# Patient Record
Sex: FEMALE | Race: WHITE | NOT HISPANIC OR LATINO | Employment: UNEMPLOYED | URBAN - METROPOLITAN AREA
[De-identification: names, ages, dates, MRNs, and addresses within clinical notes are randomized per-mention and may not be internally consistent; named-entity substitution may affect disease eponyms.]

---

## 2020-06-29 ENCOUNTER — TRANSFERRED RECORDS (OUTPATIENT)
Dept: MULTI SPECIALTY CLINIC | Facility: CLINIC | Age: 20
End: 2020-06-29

## 2020-06-29 LAB
HIV 1&2 EXT: NORMAL
PAP-ABSTRACT: NORMAL

## 2020-12-26 ENCOUNTER — NURSE TRIAGE (OUTPATIENT)
Dept: NURSING | Facility: CLINIC | Age: 20
End: 2020-12-26

## 2020-12-26 ENCOUNTER — HOSPITAL ENCOUNTER (INPATIENT)
Facility: CLINIC | Age: 20
LOS: 3 days | Discharge: HOME | End: 2020-12-30
Attending: EMERGENCY MEDICINE | Admitting: EMERGENCY MEDICINE
Payer: COMMERCIAL

## 2020-12-26 ENCOUNTER — RECORDS - HEALTHEAST (OUTPATIENT)
Dept: ADMINISTRATIVE | Facility: OTHER | Age: 20
End: 2020-12-26

## 2020-12-26 DIAGNOSIS — R05.9 COUGH: ICD-10-CM

## 2020-12-26 DIAGNOSIS — M79.10 MYALGIA: ICD-10-CM

## 2020-12-26 DIAGNOSIS — J02.9 ACUTE PHARYNGITIS, UNSPECIFIED ETIOLOGY: ICD-10-CM

## 2020-12-26 DIAGNOSIS — M31.30 GRANULOMATOSIS WITH POLYANGIITIS, UNSPECIFIED WHETHER RENAL INVOLVEMENT (H): Primary | ICD-10-CM

## 2020-12-26 DIAGNOSIS — U07.1 INFECTION DUE TO 2019-NCOV: ICD-10-CM

## 2020-12-26 DIAGNOSIS — F17.210 CIGARETTE NICOTINE DEPENDENCE WITHOUT COMPLICATION: ICD-10-CM

## 2020-12-26 DIAGNOSIS — F17.210 CIGARETTE SMOKER: ICD-10-CM

## 2020-12-26 DIAGNOSIS — R21 RASH AND OTHER NONSPECIFIC SKIN ERUPTION: ICD-10-CM

## 2020-12-26 PROCEDURE — 99285 EMERGENCY DEPT VISIT HI MDM: CPT | Performed by: EMERGENCY MEDICINE

## 2020-12-26 PROCEDURE — C9803 HOPD COVID-19 SPEC COLLECT: HCPCS | Performed by: EMERGENCY MEDICINE

## 2020-12-26 PROCEDURE — 99285 EMERGENCY DEPT VISIT HI MDM: CPT | Mod: 25 | Performed by: EMERGENCY MEDICINE

## 2020-12-26 ASSESSMENT — MIFFLIN-ST. JEOR: SCORE: 1788.27

## 2020-12-27 ENCOUNTER — APPOINTMENT (OUTPATIENT)
Dept: GENERAL RADIOLOGY | Facility: CLINIC | Age: 20
End: 2020-12-27
Attending: EMERGENCY MEDICINE
Payer: COMMERCIAL

## 2020-12-27 PROBLEM — M79.10 MYALGIA: Status: ACTIVE | Noted: 2020-12-27

## 2020-12-27 PROBLEM — M31.30: Status: ACTIVE | Noted: 2020-12-27

## 2020-12-27 LAB
ALBUMIN UR-MCNC: 300 MG/DL
ANION GAP SERPL CALCULATED.3IONS-SCNC: 7 MMOL/L (ref 3–14)
APPEARANCE UR: CLEAR
BACTERIA #/AREA URNS HPF: ABNORMAL /HPF
BASOPHILS # BLD AUTO: 0 10E9/L (ref 0–0.2)
BASOPHILS NFR BLD AUTO: 0.4 %
BILIRUB UR QL STRIP: NEGATIVE
BUN SERPL-MCNC: 11 MG/DL (ref 7–30)
CALCIUM SERPL-MCNC: 9.4 MG/DL (ref 8.5–10.1)
CHLORIDE SERPL-SCNC: 108 MMOL/L (ref 94–109)
CK SERPL-CCNC: 62 U/L (ref 30–225)
CO2 SERPL-SCNC: 25 MMOL/L (ref 20–32)
COLOR UR AUTO: YELLOW
CREAT SERPL-MCNC: 0.65 MG/DL (ref 0.52–1.04)
CREAT UR-MCNC: 160 MG/DL
CRP SERPL-MCNC: 39 MG/L (ref 0–8)
DIFFERENTIAL METHOD BLD: NORMAL
EOSINOPHIL # BLD AUTO: 0.4 10E9/L (ref 0–0.7)
EOSINOPHIL NFR BLD AUTO: 4.8 %
ERYTHROCYTE [DISTWIDTH] IN BLOOD BY AUTOMATED COUNT: 11.7 % (ref 10–15)
ERYTHROCYTE [SEDIMENTATION RATE] IN BLOOD BY WESTERGREN METHOD: 38 MM/H (ref 0–20)
FLUABV+SARS-COV-2+RSV PNL RESP NAA+PROBE: NEGATIVE
FLUABV+SARS-COV-2+RSV PNL RESP NAA+PROBE: NEGATIVE
GFR SERPL CREATININE-BSD FRML MDRD: >90 ML/MIN/{1.73_M2}
GLUCOSE SERPL-MCNC: 86 MG/DL (ref 70–99)
GLUCOSE UR STRIP-MCNC: NEGATIVE MG/DL
HCG UR QL: NEGATIVE
HCT VFR BLD AUTO: 38.6 % (ref 35–47)
HGB BLD-MCNC: 12.6 G/DL (ref 11.7–15.7)
HGB UR QL STRIP: ABNORMAL
HYALINE CASTS #/AREA URNS LPF: 12 /LPF (ref 0–2)
IMM GRANULOCYTES # BLD: 0 10E9/L (ref 0–0.4)
IMM GRANULOCYTES NFR BLD: 0.2 %
INTERNAL QC OK POCT: YES
KETONES UR STRIP-MCNC: NEGATIVE MG/DL
LABORATORY COMMENT REPORT: ABNORMAL
LACTATE BLD-SCNC: 1.1 MMOL/L (ref 0.7–2)
LEUKOCYTE ESTERASE UR QL STRIP: NEGATIVE
LYMPHOCYTES # BLD AUTO: 2.1 10E9/L (ref 0.8–5.3)
LYMPHOCYTES NFR BLD AUTO: 23.6 %
MCH RBC QN AUTO: 28.7 PG (ref 26.5–33)
MCHC RBC AUTO-ENTMCNC: 32.6 G/DL (ref 31.5–36.5)
MCV RBC AUTO: 88 FL (ref 78–100)
MIXED CELL CASTS #/AREA URNS LPF: 3 /LPF
MONOCYTES # BLD AUTO: 0.7 10E9/L (ref 0–1.3)
MONOCYTES NFR BLD AUTO: 7.7 %
MUCOUS THREADS #/AREA URNS LPF: PRESENT /LPF
NEUTROPHILS # BLD AUTO: 5.7 10E9/L (ref 1.6–8.3)
NEUTROPHILS NFR BLD AUTO: 63.3 %
NITRATE UR QL: NEGATIVE
NRBC # BLD AUTO: 0 10*3/UL
NRBC BLD AUTO-RTO: 0 /100
PH UR STRIP: 6 PH (ref 5–7)
PLATELET # BLD AUTO: 373 10E9/L (ref 150–450)
POTASSIUM SERPL-SCNC: 4 MMOL/L (ref 3.4–5.3)
PROT UR-MCNC: 2.34 G/L
PROT/CREAT 24H UR: 1.46 G/G CR (ref 0–0.2)
RBC # BLD AUTO: 4.39 10E12/L (ref 3.8–5.2)
RBC #/AREA URNS AUTO: 54 /HPF (ref 0–2)
RSV RNA SPEC QL NAA+PROBE: ABNORMAL
SARS-COV-2 RNA SPEC QL NAA+PROBE: POSITIVE
SODIUM SERPL-SCNC: 140 MMOL/L (ref 133–144)
SOURCE: ABNORMAL
SP GR UR STRIP: 1.02 (ref 1–1.03)
SPECIMEN SOURCE: ABNORMAL
SQUAMOUS #/AREA URNS AUTO: 2 /HPF (ref 0–1)
TRANS CELLS #/AREA URNS HPF: <1 /HPF (ref 0–1)
TROPONIN I SERPL-MCNC: <0.015 UG/L (ref 0–0.04)
UROBILINOGEN UR STRIP-MCNC: NORMAL MG/DL (ref 0–2)
WBC # BLD AUTO: 9 10E9/L (ref 4–11)
WBC #/AREA URNS AUTO: 18 /HPF (ref 0–5)

## 2020-12-27 PROCEDURE — G0378 HOSPITAL OBSERVATION PER HR: HCPCS

## 2020-12-27 PROCEDURE — 83605 ASSAY OF LACTIC ACID: CPT | Performed by: EMERGENCY MEDICINE

## 2020-12-27 PROCEDURE — 250N000013 HC RX MED GY IP 250 OP 250 PS 637: Performed by: EMERGENCY MEDICINE

## 2020-12-27 PROCEDURE — 96372 THER/PROPH/DIAG INJ SC/IM: CPT | Performed by: EMERGENCY MEDICINE

## 2020-12-27 PROCEDURE — 81001 URINALYSIS AUTO W/SCOPE: CPT | Performed by: EMERGENCY MEDICINE

## 2020-12-27 PROCEDURE — 84484 ASSAY OF TROPONIN QUANT: CPT | Performed by: EMERGENCY MEDICINE

## 2020-12-27 PROCEDURE — 999N000128 HC STATISTIC PERIPHERAL IV START W/O US GUIDANCE

## 2020-12-27 PROCEDURE — 258N000003 HC RX IP 258 OP 636: Performed by: EMERGENCY MEDICINE

## 2020-12-27 PROCEDURE — 250N000013 HC RX MED GY IP 250 OP 250 PS 637: Performed by: PHYSICIAN ASSISTANT

## 2020-12-27 PROCEDURE — 120N000002 HC R&B MED SURG/OB UMMC

## 2020-12-27 PROCEDURE — 85652 RBC SED RATE AUTOMATED: CPT | Performed by: EMERGENCY MEDICINE

## 2020-12-27 PROCEDURE — 82550 ASSAY OF CK (CPK): CPT | Performed by: EMERGENCY MEDICINE

## 2020-12-27 PROCEDURE — 86140 C-REACTIVE PROTEIN: CPT | Performed by: EMERGENCY MEDICINE

## 2020-12-27 PROCEDURE — 80048 BASIC METABOLIC PNL TOTAL CA: CPT | Performed by: EMERGENCY MEDICINE

## 2020-12-27 PROCEDURE — 250N000013 HC RX MED GY IP 250 OP 250 PS 637: Performed by: NURSE PRACTITIONER

## 2020-12-27 PROCEDURE — 96361 HYDRATE IV INFUSION ADD-ON: CPT | Performed by: EMERGENCY MEDICINE

## 2020-12-27 PROCEDURE — 87636 SARSCOV2 & INF A&B AMP PRB: CPT | Performed by: EMERGENCY MEDICINE

## 2020-12-27 PROCEDURE — 81025 URINE PREGNANCY TEST: CPT | Performed by: EMERGENCY MEDICINE

## 2020-12-27 PROCEDURE — 99233 SBSQ HOSP IP/OBS HIGH 50: CPT | Performed by: STUDENT IN AN ORGANIZED HEALTH CARE EDUCATION/TRAINING PROGRAM

## 2020-12-27 PROCEDURE — 250N000011 HC RX IP 250 OP 636: Performed by: EMERGENCY MEDICINE

## 2020-12-27 PROCEDURE — 96374 THER/PROPH/DIAG INJ IV PUSH: CPT | Performed by: EMERGENCY MEDICINE

## 2020-12-27 PROCEDURE — 96375 TX/PRO/DX INJ NEW DRUG ADDON: CPT | Performed by: EMERGENCY MEDICINE

## 2020-12-27 PROCEDURE — 96376 TX/PRO/DX INJ SAME DRUG ADON: CPT | Performed by: EMERGENCY MEDICINE

## 2020-12-27 PROCEDURE — 250N000011 HC RX IP 250 OP 636: Performed by: NURSE PRACTITIONER

## 2020-12-27 PROCEDURE — 84156 ASSAY OF PROTEIN URINE: CPT

## 2020-12-27 PROCEDURE — 87086 URINE CULTURE/COLONY COUNT: CPT | Performed by: EMERGENCY MEDICINE

## 2020-12-27 PROCEDURE — 250N000011 HC RX IP 250 OP 636: Performed by: PHYSICIAN ASSISTANT

## 2020-12-27 PROCEDURE — 99223 1ST HOSP IP/OBS HIGH 75: CPT | Mod: GC | Performed by: INTERNAL MEDICINE

## 2020-12-27 PROCEDURE — 71046 X-RAY EXAM CHEST 2 VIEWS: CPT

## 2020-12-27 PROCEDURE — 85025 COMPLETE CBC W/AUTO DIFF WBC: CPT | Performed by: EMERGENCY MEDICINE

## 2020-12-27 RX ORDER — METHYLPREDNISOLONE SODIUM SUCCINATE 125 MG/2ML
80 INJECTION, POWDER, LYOPHILIZED, FOR SOLUTION INTRAMUSCULAR; INTRAVENOUS ONCE
Status: COMPLETED | OUTPATIENT
Start: 2020-12-28 | End: 2020-12-28

## 2020-12-27 RX ORDER — CEPHALEXIN 500 MG/1
500 CAPSULE ORAL 3 TIMES DAILY
COMMUNITY
Start: 2020-12-22 | End: 2021-01-01

## 2020-12-27 RX ORDER — NALOXONE HYDROCHLORIDE 0.4 MG/ML
0.2 INJECTION, SOLUTION INTRAMUSCULAR; INTRAVENOUS; SUBCUTANEOUS
Status: DISCONTINUED | OUTPATIENT
Start: 2020-12-27 | End: 2020-12-30 | Stop reason: HOSPADM

## 2020-12-27 RX ORDER — NICOTINE 21 MG/24HR
1 PATCH, TRANSDERMAL 24 HOURS TRANSDERMAL DAILY
Status: DISCONTINUED | OUTPATIENT
Start: 2020-12-27 | End: 2020-12-30 | Stop reason: HOSPADM

## 2020-12-27 RX ORDER — HYDROMORPHONE HYDROCHLORIDE 1 MG/ML
0.5 INJECTION, SOLUTION INTRAMUSCULAR; INTRAVENOUS; SUBCUTANEOUS
Status: DISCONTINUED | OUTPATIENT
Start: 2020-12-27 | End: 2020-12-27

## 2020-12-27 RX ORDER — POLYETHYLENE GLYCOL 3350 17 G/17G
17 POWDER, FOR SOLUTION ORAL DAILY
Status: DISCONTINUED | OUTPATIENT
Start: 2020-12-27 | End: 2020-12-30 | Stop reason: HOSPADM

## 2020-12-27 RX ORDER — ACETAMINOPHEN 325 MG/1
650 TABLET ORAL EVERY 4 HOURS PRN
Status: DISCONTINUED | OUTPATIENT
Start: 2020-12-27 | End: 2020-12-30 | Stop reason: HOSPADM

## 2020-12-27 RX ORDER — MORPHINE SULFATE 2 MG/ML
2 INJECTION, SOLUTION INTRAMUSCULAR; INTRAVENOUS ONCE
Status: COMPLETED | OUTPATIENT
Start: 2020-12-27 | End: 2020-12-27

## 2020-12-27 RX ORDER — CODEINE PHOSPHATE AND GUAIFENESIN 10; 100 MG/5ML; MG/5ML
5 SOLUTION ORAL 3 TIMES DAILY PRN
Status: ON HOLD | COMMUNITY
Start: 2020-12-20 | End: 2021-01-29

## 2020-12-27 RX ORDER — MORPHINE SULFATE 2 MG/ML
4 INJECTION, SOLUTION INTRAMUSCULAR; INTRAVENOUS
Status: DISCONTINUED | OUTPATIENT
Start: 2020-12-27 | End: 2020-12-30 | Stop reason: HOSPADM

## 2020-12-27 RX ORDER — AMOXICILLIN 250 MG
2 CAPSULE ORAL 2 TIMES DAILY
Status: DISCONTINUED | OUTPATIENT
Start: 2020-12-27 | End: 2020-12-30 | Stop reason: HOSPADM

## 2020-12-27 RX ORDER — OXYCODONE HYDROCHLORIDE 5 MG/1
5 TABLET ORAL ONCE
Status: COMPLETED | OUTPATIENT
Start: 2020-12-27 | End: 2020-12-27

## 2020-12-27 RX ORDER — CARBOXYMETHYLCELLULOSE SODIUM 5 MG/ML
1 SOLUTION/ DROPS OPHTHALMIC
Status: DISCONTINUED | OUTPATIENT
Start: 2020-12-27 | End: 2020-12-30 | Stop reason: HOSPADM

## 2020-12-27 RX ORDER — METHYLPREDNISOLONE SODIUM SUCCINATE 125 MG/2ML
80 INJECTION, POWDER, LYOPHILIZED, FOR SOLUTION INTRAMUSCULAR; INTRAVENOUS ONCE
Status: COMPLETED | OUTPATIENT
Start: 2020-12-27 | End: 2020-12-27

## 2020-12-27 RX ORDER — CEPHALEXIN 500 MG/1
500 CAPSULE ORAL 3 TIMES DAILY
Status: DISCONTINUED | OUTPATIENT
Start: 2020-12-27 | End: 2020-12-30 | Stop reason: HOSPADM

## 2020-12-27 RX ORDER — ONDANSETRON 2 MG/ML
4 INJECTION INTRAMUSCULAR; INTRAVENOUS EVERY 30 MIN PRN
Status: DISCONTINUED | OUTPATIENT
Start: 2020-12-27 | End: 2020-12-27 | Stop reason: DRUGHIGH

## 2020-12-27 RX ORDER — CEFPODOXIME PROXETIL 200 MG/1
200 TABLET, FILM COATED ORAL ONCE
Status: COMPLETED | OUTPATIENT
Start: 2020-12-27 | End: 2020-12-27

## 2020-12-27 RX ORDER — SODIUM CHLORIDE 9 MG/ML
INJECTION, SOLUTION INTRAVENOUS CONTINUOUS
Status: DISCONTINUED | OUTPATIENT
Start: 2020-12-27 | End: 2020-12-28

## 2020-12-27 RX ORDER — CEFTRIAXONE 1 G/1
1 INJECTION, POWDER, FOR SOLUTION INTRAMUSCULAR; INTRAVENOUS ONCE
Status: DISCONTINUED | OUTPATIENT
Start: 2020-12-27 | End: 2020-12-27

## 2020-12-27 RX ORDER — NALOXONE HYDROCHLORIDE 0.4 MG/ML
0.4 INJECTION, SOLUTION INTRAMUSCULAR; INTRAVENOUS; SUBCUTANEOUS
Status: DISCONTINUED | OUTPATIENT
Start: 2020-12-27 | End: 2020-12-30 | Stop reason: HOSPADM

## 2020-12-27 RX ORDER — AMOXICILLIN 250 MG
1 CAPSULE ORAL 2 TIMES DAILY
Status: DISCONTINUED | OUTPATIENT
Start: 2020-12-27 | End: 2020-12-30 | Stop reason: HOSPADM

## 2020-12-27 RX ORDER — ACETAMINOPHEN 650 MG/1
650 SUPPOSITORY RECTAL EVERY 4 HOURS PRN
Status: DISCONTINUED | OUTPATIENT
Start: 2020-12-27 | End: 2020-12-30 | Stop reason: HOSPADM

## 2020-12-27 RX ORDER — ONDANSETRON 2 MG/ML
4 INJECTION INTRAMUSCULAR; INTRAVENOUS EVERY 6 HOURS PRN
Status: DISCONTINUED | OUTPATIENT
Start: 2020-12-27 | End: 2020-12-30 | Stop reason: HOSPADM

## 2020-12-27 RX ORDER — ONDANSETRON 4 MG/1
4 TABLET, ORALLY DISINTEGRATING ORAL EVERY 6 HOURS PRN
Status: DISCONTINUED | OUTPATIENT
Start: 2020-12-27 | End: 2020-12-30 | Stop reason: HOSPADM

## 2020-12-27 RX ORDER — CODEINE PHOSPHATE AND GUAIFENESIN 10; 100 MG/5ML; MG/5ML
5 SOLUTION ORAL 3 TIMES DAILY PRN
Status: DISCONTINUED | OUTPATIENT
Start: 2020-12-27 | End: 2020-12-30 | Stop reason: HOSPADM

## 2020-12-27 RX ADMIN — CEPHALEXIN 500 MG: 500 CAPSULE ORAL at 16:23

## 2020-12-27 RX ADMIN — BENZOCAINE, MENTHOL 1 LOZENGE: 15; 3.6 LOZENGE ORAL at 11:29

## 2020-12-27 RX ADMIN — BENZOCAINE, MENTHOL 1 LOZENGE: 15; 3.6 LOZENGE ORAL at 20:00

## 2020-12-27 RX ADMIN — HYDROMORPHONE HYDROCHLORIDE 0.5 MG: 1 INJECTION, SOLUTION INTRAMUSCULAR; INTRAVENOUS; SUBCUTANEOUS at 02:47

## 2020-12-27 RX ADMIN — HYDROMORPHONE HYDROCHLORIDE 1 MG: 1 INJECTION, SOLUTION INTRAMUSCULAR; INTRAVENOUS; SUBCUTANEOUS at 13:29

## 2020-12-27 RX ADMIN — SODIUM CHLORIDE: 9 INJECTION, SOLUTION INTRAVENOUS at 17:42

## 2020-12-27 RX ADMIN — CEFPODOXIME PROXETIL 200 MG: 200 TABLET, FILM COATED ORAL at 05:14

## 2020-12-27 RX ADMIN — NICOTINE 1 PATCH: 14 PATCH, EXTENDED RELEASE TRANSDERMAL at 19:50

## 2020-12-27 RX ADMIN — ACETAMINOPHEN 650 MG: 325 TABLET, FILM COATED ORAL at 21:24

## 2020-12-27 RX ADMIN — OXYCODONE HYDROCHLORIDE 5 MG: 5 TABLET ORAL at 09:05

## 2020-12-27 RX ADMIN — BENZOCAINE AND MENTHOL 1 LOZENGE: 15; 3.6 LOZENGE ORAL at 04:40

## 2020-12-27 RX ADMIN — MORPHINE SULFATE 4 MG: 2 INJECTION, SOLUTION INTRAMUSCULAR; INTRAVENOUS at 23:06

## 2020-12-27 RX ADMIN — HYDROMORPHONE HYDROCHLORIDE 0.5 MG: 1 INJECTION, SOLUTION INTRAMUSCULAR; INTRAVENOUS; SUBCUTANEOUS at 03:16

## 2020-12-27 RX ADMIN — GUAIFENESIN AND CODEINE PHOSPHATE 5 ML: 100; 10 SOLUTION ORAL at 20:01

## 2020-12-27 RX ADMIN — SODIUM CHLORIDE 1000 ML: 9 INJECTION, SOLUTION INTRAVENOUS at 02:46

## 2020-12-27 RX ADMIN — METHYLPREDNISOLONE 81.25 MG: 125 INJECTION, POWDER, LYOPHILIZED, FOR SOLUTION INTRAMUSCULAR; INTRAVENOUS at 18:59

## 2020-12-27 RX ADMIN — CEPHALEXIN 500 MG: 500 CAPSULE ORAL at 21:24

## 2020-12-27 RX ADMIN — ONDANSETRON 4 MG: 2 INJECTION INTRAMUSCULAR; INTRAVENOUS at 02:52

## 2020-12-27 RX ADMIN — OXYCODONE HYDROCHLORIDE 5 MG: 5 TABLET ORAL at 05:13

## 2020-12-27 RX ADMIN — MORPHINE SULFATE 2 MG: 2 INJECTION, SOLUTION INTRAMUSCULAR; INTRAVENOUS at 16:24

## 2020-12-27 RX ADMIN — MORPHINE SULFATE 4 MG: 2 INJECTION, SOLUTION INTRAMUSCULAR; INTRAVENOUS at 19:50

## 2020-12-27 ASSESSMENT — ACTIVITIES OF DAILY LIVING (ADL)
WHICH_OF_THE_ABOVE_FUNCTIONAL_RISKS_HAD_A_RECENT_ONSET_OR_CHANGE?: AMBULATION;TRANSFERRING;DRESSING
HEARING_DIFFICULTY_OR_DEAF: NO
TOILETING_ISSUES: NO
DRESSING/BATHING_DIFFICULTY: NO
DIFFICULTY_EATING/SWALLOWING: NO
FALL_HISTORY_WITHIN_LAST_SIX_MONTHS: NO
WALKING_OR_CLIMBING_STAIRS_DIFFICULTY: NO
DIFFICULTY_COMMUNICATING: NO
CONCENTRATING,_REMEMBERING_OR_MAKING_DECISIONS_DIFFICULTY: NO
WEAR_GLASSES_OR_BLIND: NO

## 2020-12-27 ASSESSMENT — MIFFLIN-ST. JEOR: SCORE: 1835.45

## 2020-12-27 ASSESSMENT — ENCOUNTER SYMPTOMS
SORE THROAT: 1
MYALGIAS: 1
COUGH: 1

## 2020-12-27 NOTE — PROGRESS NOTES
Brief Rheumatology Note:    We were contacted this morning regarding patient Ms. Shields. She came to the hospital for body aches, joint aches, myalgias and pedal edema that started a few days back. She has a recent diagnosis of COVID infection earlier Dec 2020, and most recent PCR done 12/27 continues to be positive. She was then seen by [facility] for myalgias and sore throat and diagnosed with strep infection and given Keflex.     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 She was receiving RTX  infusions Q 6-8 months, and has been off it and in remission since 2015/2016. She does have history of PR3 positivity.   She now presents with above mentioned symptoms and elevated inflammatory markers, UA showing proteinuria, pulmonary nodule of CXR, and what seems to be petechial lesions on lower extremities, which are all concerning for flaring of her vasculitis.   We recommend starting with Lab work including ASA, dsDNA, Protein to creatinine ratio, first morning UA, ANCA, PR3, MPO, Hep B, Hep C, cryoglobulins, CESARIO.   We will see the patient in person this AM for a full consult note.   She is being transferred to the [facility].    Kori Bain MD  Rheumatology Fellow    Case discussed with Dr. Call, attending physician.

## 2020-12-27 NOTE — ED PROVIDER NOTES
ED Provider Note  [facility]      History     Chief Complaint   Patient presents with     Generalized Body Aches     Cough     Rash     The history is provided by the patient and medical records.   Cough  Associated symptoms: myalgias, rash and sore throat    Rash  Associated symptoms: myalgias and sore throat      Cande Shields is a 20 year old female with a past medical history significant for Carolyn's granulomatosis, cannabis abuse and alcohol abuse.   3 weeks ago diagnosed with Covid following presentation for typical viral myalgias which improved.  Subsequently also developed sore throat, was diagnosed with strep which was treated, and also recently started on Keflex for urinary tract infection.  Of note she has been taking approximately half the dose-500 mg twice a day rather than 4 times a day.    Today she presents because of 4 days of worsening whole body aches including her upper extremity and lower extremity joints as well as muscles, primarily noted in the thighs.  Also notes bilateral lower extremity swelling in her feet as well as diffuse body rash, most notable in the extensor surfaces of her legs and thighs, elbows, and under her chin.  Has no oral ulcers or pain along her lips.  The patient and her mother, who was present for the encounter and interview via [employer], think that this may be a Wegener's flare as she had similar presentations in the past.    Currently denies any chest pain or fevers no shortness of breath.  Endorses ongoing bothersome cough with occasional posttussive nausea    No trauma or falls.  No headache.  No visual changes.  Does note conjunctival injection practically along the left eye.      Past Medical History  Past Medical History:   Diagnosis Date     ADHD (attention deficit hyperactivity disorder)      Wegener's disease, pulmonary (H) 2010    renal biopdy     Past Surgical History:   Procedure Laterality Date     ENT SURGERY  2000    cyst      "EXCISE GANGLION WRIST  2009          acetaminophen (TYLENOL) 325 MG tablet       Calcium Carb-Cholecalciferol (HM CALCIUM-VITAMIN D) 600-800 MG-UNIT TABS       ibuprofen (ADVIL) 200 MG tablet       nicotine (NICODERM CQ) 14 MG/24HR 24 hr patch       ondansetron (ZOFRAN-ODT) 4 MG ODT tab       polyethylene glycol (MIRALAX) 17 GM/Dose powder       predniSONE (DELTASONE) 5 MG tablet       senna-docusate (SENOKOT-S/PERICOLACE) 8.6-50 MG tablet       sulfamethoxazole-trimethoprim (BACTRIM DS) 800-160 MG tablet       buPROPion (WELLBUTRIN XL) 150 MG 24 hr tablet       guaiFENesin-codeine (ROBITUSSIN AC) 100-10 MG/5ML solution      Allergies   Allergen Reactions     Penicillins Rash     Unknown, but think rash.  Tolerated cephalexin (12/27/20), cefpodoxime (12/27/20)     Family History  Family History   Problem Relation Age of Onset     Thyroid Disease Mother      Asthma No family hx of      Diabetes No family hx of      Social History   Social History     Tobacco Use     Smoking status: Current Every Day Smoker     Packs/day: 1.00     Smokeless tobacco: Never Used   Substance Use Topics     Alcohol use: Yes     Comment: 3x a week     Drug use: No      Past medical history, past surgical history, medications, allergies, family history, and social history were reviewed with the patient. No additional pertinent items.       Review of Systems   HENT: Positive for sore throat.    Respiratory: Positive for cough.    Musculoskeletal: Positive for myalgias.   Skin: Positive for rash.     A complete review of systems was performed with pertinent positives and negatives noted in the HPI, and all other systems negative.    Physical Exam   BP: 132/83  Pulse: 98  Temp: 98.1  F (36.7  C)  Resp: 20  Height: 162.6 cm (5' 4\")  Weight: 104.3 kg (230 lb)  SpO2: 98 %  Physical Exam  GEN: Upset, in pain, non toxic, cooperative and conversant.   HEENT: The head is normocephalic and atraumatic. Pupils are equal round and reactive to light. " Extraocular motions are intact.  With limbic sparing. there is bilateral conjunctival injection.  There is no facial swelling. The neck is nontender and supple.  There is scattered areas of papular rash with central excoriation or ulceration, nonblanching distributed over the anterior submandibular area.  CV: Regular rate and rhythm . 2+ radial pulses bilaterally.  PULM: Clear to auscultation bilaterally.  ABD: Soft, nontender, .   EXT: Full range of motion.  Bilateral lower extremity 2+ edema to above the ankles  NEURO: Cranial nerves II through XII are intact and symmetric. Bilateral upper and lower extremities grossly show full range of motion without any focal deficits.   SKIN: Bilateral lower extremities he was scattered papular circular rash with central ulceration versus excoriation.  No vesicles.  Nonblanching.  Approximately 1-3 mm in size.  No skin peeling or desquamation.  PSYCH: Calm and cooperative, interactive.     ED Course      Procedures             Results for orders placed or performed during the hospital encounter of 12/26/20   XR Chest 2 Views     Status: None    Narrative    EXAM: XR CHEST 2 VW  LOCATION: [facility]  DATE/TIME: 12/27/2020 3:15 AM    INDICATION: Cough and myalgias. History of Wegener's granulomatosis.  COMPARISON: None.    FINDINGS: The heart size is normal. There is a 1.2 cm nodule in the right lung laterally. The lungs are otherwise clear. No pneumothorax or pleural effusion.      Impression    IMPRESSION: Right lung nodule.   CBC with platelets differential     Status: None   Result Value Ref Range    WBC 9.0 4.0 - 11.0 10e9/L    RBC Count 4.39 3.8 - 5.2 10e12/L    Hemoglobin 12.6 11.7 - 15.7 g/dL    Hematocrit 38.6 35.0 - 47.0 %    MCV 88 78 - 100 fl    MCH 28.7 26.5 - 33.0 pg    MCHC 32.6 31.5 - 36.5 g/dL    RDW 11.7 10.0 - 15.0 %    Platelet Count 373 150 - 450 10e9/L    Diff Method Automated Method     % Neutrophils 63.3 %    % Lymphocytes 23.6 %    %  Monocytes 7.7 %    % Eosinophils 4.8 %    % Basophils 0.4 %    % Immature Granulocytes 0.2 %    Nucleated RBCs 0 0 /100    Absolute Neutrophil 5.7 1.6 - 8.3 10e9/L    Absolute Lymphocytes 2.1 0.8 - 5.3 10e9/L    Absolute Monocytes 0.7 0.0 - 1.3 10e9/L    Absolute Eosinophils 0.4 0.0 - 0.7 10e9/L    Absolute Basophils 0.0 0.0 - 0.2 10e9/L    Abs Immature Granulocytes 0.0 0 - 0.4 10e9/L    Absolute Nucleated RBC 0.0    Basic metabolic panel     Status: None   Result Value Ref Range    Sodium 140 133 - 144 mmol/L    Potassium 4.0 3.4 - 5.3 mmol/L    Chloride 108 94 - 109 mmol/L    Carbon Dioxide 25 20 - 32 mmol/L    Anion Gap 7 3 - 14 mmol/L    Glucose 86 70 - 99 mg/dL    Urea Nitrogen 11 7 - 30 mg/dL    Creatinine 0.65 0.52 - 1.04 mg/dL    GFR Estimate >90 >60 mL/min/[1.73_m2]    GFR Estimate If Black >90 >60 mL/min/[1.73_m2]    Calcium 9.4 8.5 - 10.1 mg/dL   Lactic acid whole blood     Status: None   Result Value Ref Range    Lactic Acid 1.1 0.7 - 2.0 mmol/L   Troponin I     Status: None   Result Value Ref Range    Troponin I ES <0.015 0.000 - 0.045 ug/L   UA with Microscopic reflex to Culture     Status: Abnormal    Specimen: Urine clean catch; Midstream Urine   Result Value Ref Range    Color Urine Yellow     Appearance Urine Clear     Glucose Urine Negative NEG^Negative mg/dL    Bilirubin Urine Negative NEG^Negative    Ketones Urine Negative NEG^Negative mg/dL    Specific Gravity Urine 1.023 1.003 - 1.035    Blood Urine Large (A) NEG^Negative    pH Urine 6.0 5.0 - 7.0 pH    Protein Albumin Urine 300 (A) NEG^Negative mg/dL    Urobilinogen mg/dL Normal 0.0 - 2.0 mg/dL    Nitrite Urine Negative NEG^Negative    Leukocyte Esterase Urine Negative NEG^Negative    Source Midstream Urine     WBC Urine 18 (H) 0 - 5 /HPF    RBC Urine 54 (H) 0 - 2 /HPF    Bacteria Urine Few (A) NEG^Negative /HPF    Squamous Epithelial /HPF Urine 2 (H) 0 - 1 /HPF    Transitional Epi <1 0 - 1 /HPF    Mucous Urine Present (A) NEG^Negative /LPF     Hyaline Casts 12 (H) 0 - 2 /LPF    Cellular Cast 3 (A) NEG^Negative /LPF   CK total     Status: None   Result Value Ref Range    CK Total 62 30 - 225 U/L   Erythrocyte sedimentation rate auto     Status: Abnormal   Result Value Ref Range    Sed Rate 38 (H) 0 - 20 mm/h   CRP inflammation     Status: Abnormal   Result Value Ref Range    CRP Inflammation 39.0 (H) 0.0 - 8.0 mg/L   Symptomatic Influenza A/B & SARS-CoV2 (COVID-19) Virus PCR Multiplex     Status: Abnormal    Specimen: Nasopharyngeal   Result Value Ref Range    Flu A/B & SARS-COV-2 PCR Source Nasopharyngeal     SARS-CoV-2 PCR Result POSITIVE (AA)     Influenza A PCR Negative NEG^Negative    Influenza B PCR Negative NEG^Negative    Respiratory Syncytial Virus PCR (Note)     Flu A/B & SARS-CoV-2 PCR Comment (Note)    Creatinine random urine     Status: None   Result Value Ref Range    Creatinine Urine Random 160 mg/dL   Protein  random urine with Creat Ratio     Status: Abnormal   Result Value Ref Range    Protein Random Urine 2.34 g/L    Protein Total Urine g/gr Creatinine 1.46 (H) 0 - 0.2 g/g Cr   ANCA IgG by IFA with Reflex to Titer     Status: Abnormal   Result Value Ref Range    Neutrophil Cytoplasmic Antibody 1:80 (A) <1:10 [titer]    Neutrophil Cytoplasmic Antibody Pattern       Cytoplasmic ANCA (c-ANCA) staining pattern observed and confirmed on formalin-fixed   neutrophils.     Anti Nuclear Ilene IgG by IFA with Reflex     Status: None   Result Value Ref Range    CESARIO interpretation Negative NEG^Negative   Complement C3     Status: None   Result Value Ref Range    Complement C3 150 81 - 157 mg/dL   Complement C4     Status: None   Result Value Ref Range    Complement C4 30 13 - 39 mg/dL   Cryoglobulin identification     Status: None   Result Value Ref Range    Cryo-Albumin Negative NEG^Negative    Cryo-IgG Negative NEG^Negative    Cryo-IgA Negative NEG^Negative    Cryo-IgM Negative NEG^Negative    Cryo-Kappa Negative NEG^Negative    Cryo-Lambda  Negative NEG^Negative    Cryo ID Interp (Note)    Cryoglobulin quantitative     Status: Abnormal   Result Value Ref Range    Cryoglobulin Trace (A) NEG^Negative %   DNase-B Antibody     Status: Abnormal   Result Value Ref Range    DNase-B Antibody 824 (H) 0 - 260 U/mL   Hepatitis B Surface Antibody     Status: None   Result Value Ref Range    Hepatitis B Surface Antibody 0.78 <8.00 m[IU]/mL   Hepatitis B surface antigen     Status: None   Result Value Ref Range    Hep B Surface Agn Nonreactive NR^Nonreactive   Hepatits C antibody     Status: None   Result Value Ref Range    Hepatitis C Antibody Nonreactive NR^Nonreactive   Myeloperoxidase Antibody IgG     Status: None   Result Value Ref Range    Myeloperoxidase Antibody IgG <0.2 0.0 - 0.9 AI   Proteinase 3 Antibody IgG     Status: Abnormal   Result Value Ref Range    Proteinase 3 Antibody IgG >8.0 (H) 0.0 - 0.9 AI   UA with Microscopic     Status: Abnormal   Result Value Ref Range    Color Urine Yellow     Appearance Urine Clear     Glucose Urine Negative NEG^Negative mg/dL    Bilirubin Urine Negative NEG^Negative    Ketones Urine Negative NEG^Negative mg/dL    Specific Gravity Urine 1.021 1.003 - 1.035    Blood Urine Large (A) NEG^Negative    pH Urine 6.5 5.0 - 7.0 pH    Protein Albumin Urine 100 (A) NEG^Negative mg/dL    Urobilinogen mg/dL Normal 0.0 - 2.0 mg/dL    Nitrite Urine Negative NEG^Negative    Leukocyte Esterase Urine Small (A) NEG^Negative    Source Midstream Urine     WBC Urine 18 (H) 0 - 5 /HPF    RBC Urine >182 (H) 0 - 2 /HPF    Bacteria Urine Few (A) NEG^Negative /HPF    Squamous Epithelial /HPF Urine <1 0 - 1 /HPF    Mucous Urine Present (A) NEG^Negative /LPF    Hyaline Casts 2 0 - 2 /LPF   Comprehensive metabolic panel     Status: Abnormal   Result Value Ref Range    Sodium 138 133 - 144 mmol/L    Potassium 4.1 3.4 - 5.3 mmol/L    Chloride 110 (H) 94 - 109 mmol/L    Carbon Dioxide 25 20 - 32 mmol/L    Anion Gap 3 3 - 14 mmol/L    Glucose 113 (H)  70 - 99 mg/dL    Urea Nitrogen 11 7 - 30 mg/dL    Creatinine 0.61 0.52 - 1.04 mg/dL    GFR Estimate >90 >60 mL/min/[1.73_m2]    GFR Estimate If Black >90 >60 mL/min/[1.73_m2]    Calcium 8.8 8.5 - 10.1 mg/dL    Bilirubin Total 0.2 0.2 - 1.3 mg/dL    Albumin 2.5 (L) 3.4 - 5.0 g/dL    Protein Total 7.0 6.8 - 8.8 g/dL    Alkaline Phosphatase 102 40 - 150 U/L    ALT 39 0 - 50 U/L    AST 15 0 - 45 U/L   CBC with platelets     Status: Abnormal   Result Value Ref Range    WBC 10.8 4.0 - 11.0 10e9/L    RBC Count 3.93 3.8 - 5.2 10e12/L    Hemoglobin 11.3 (L) 11.7 - 15.7 g/dL    Hematocrit 35.4 35.0 - 47.0 %    MCV 90 78 - 100 fl    MCH 28.8 26.5 - 33.0 pg    MCHC 31.9 31.5 - 36.5 g/dL    RDW 11.5 10.0 - 15.0 %    Platelet Count 324 150 - 450 10e9/L   [facility] Miscellaneous Test     Status: Abnormal   Result Value Ref Range    Result SEE NOTE (A)     Test Name Streptolysin O Antibody (ASO)     Send Outs Misc Test Code 50,095     Send Outs Misc Test Specimen Serum    CBC with platelets     Status: Abnormal   Result Value Ref Range    WBC 11.5 (H) 4.0 - 11.0 10e9/L    RBC Count 3.96 3.8 - 5.2 10e12/L    Hemoglobin 11.3 (L) 11.7 - 15.7 g/dL    Hematocrit 36.1 35.0 - 47.0 %    MCV 91 78 - 100 fl    MCH 28.5 26.5 - 33.0 pg    MCHC 31.3 (L) 31.5 - 36.5 g/dL    RDW 11.6 10.0 - 15.0 %    Platelet Count 324 150 - 450 10e9/L   Basic metabolic panel     Status: Abnormal   Result Value Ref Range    Sodium 141 133 - 144 mmol/L    Potassium 3.8 3.4 - 5.3 mmol/L    Chloride 110 (H) 94 - 109 mmol/L    Carbon Dioxide 26 20 - 32 mmol/L    Anion Gap 6 3 - 14 mmol/L    Glucose 84 70 - 99 mg/dL    Urea Nitrogen 13 7 - 30 mg/dL    Creatinine 0.67 0.52 - 1.04 mg/dL    GFR Estimate >90 >60 mL/min/[1.73_m2]    GFR Estimate If Black >90 >60 mL/min/[1.73_m2]    Calcium 9.1 8.5 - 10.1 mg/dL   CRP inflammation     Status: Abnormal   Result Value Ref Range    CRP Inflammation 27.0 (H) 0.0 - 8.0 mg/L   CBC with platelets     Status: Abnormal   Result Value Ref  Range    WBC 6.7 4.0 - 11.0 10e9/L    RBC Count 3.89 3.8 - 5.2 10e12/L    Hemoglobin 11.0 (L) 11.7 - 15.7 g/dL    Hematocrit 35.0 35.0 - 47.0 %    MCV 90 78 - 100 fl    MCH 28.3 26.5 - 33.0 pg    MCHC 31.4 (L) 31.5 - 36.5 g/dL    RDW 11.8 10.0 - 15.0 %    Platelet Count 258 150 - 450 10e9/L   Basic metabolic panel     Status: None   Result Value Ref Range    Sodium 140 133 - 144 mmol/L    Potassium 3.6 3.4 - 5.3 mmol/L    Chloride 108 94 - 109 mmol/L    Carbon Dioxide 25 20 - 32 mmol/L    Anion Gap 7 3 - 14 mmol/L    Glucose 84 70 - 99 mg/dL    Urea Nitrogen 15 7 - 30 mg/dL    Creatinine 0.77 0.52 - 1.04 mg/dL    GFR Estimate >90 >60 mL/min/[1.73_m2]    GFR Estimate If Black >90 >60 mL/min/[1.73_m2]    Calcium 8.7 8.5 - 10.1 mg/dL   hCG qual urine POCT     Status: Normal   Result Value Ref Range    HCG Qual Urine Negative neg    Internal QC OK Yes    Urine Culture Aerobic Bacterial     Status: None    Specimen: Urine clean catch; Midstream Urine   Result Value Ref Range    Specimen Description Midstream Urine     Special Requests Specimen received in preservative     Culture Micro No growth      Medications   medication instruction (has no administration in time range)   0.9% sodium chloride BOLUS (0 mLs Intravenous Stopped 12/27/20 0440)   benzocaine-menthol (CEPACOL) 15-3.6 MG lozenge 1 lozenge (1 lozenge Buccal Given 12/27/20 0440)   oxyCODONE (ROXICODONE) tablet 5 mg (5 mg Oral Given 12/27/20 0513)   cefpodoxime (VANTIN) tablet 200 mg (200 mg Oral Given 12/27/20 0514)   oxyCODONE (ROXICODONE) tablet 5 mg (5 mg Oral Given 12/27/20 0905)   benzocaine-menthol (CEPACOL) 15-3.6 MG lozenge 1 lozenge (1 lozenge Buccal Given 12/27/20 1129)   HYDROmorphone (DILAUDID) injection 1 mg (1 mg Intramuscular Given 12/27/20 1329)   morphine (PF) injection 2 mg (2 mg Intravenous Given 12/27/20 1624)   methylPREDNISolone sodium succinate (solu-MEDROL) injection 81.25 mg (81.25 mg Intravenous Given 12/27/20 2931)    methylPREDNISolone sodium succinate (solu-MEDROL) injection 81.25 mg (81.25 mg Intravenous Given 12/28/20 0849)   methylPREDNISolone sodium succinate (solu-MEDROL) injection 81.25 mg (81.25 mg Intravenous Given 12/29/20 0932)   diphenhydrAMINE (BENADRYL) capsule 50 mg (50 mg Oral Given 12/29/20 2028)   acetaminophen (TYLENOL) tablet 650 mg (650 mg Oral Given 12/29/20 2028)   riTUXimab-abbs (TRUXIMA) 800 mg in sodium chloride 0.9 % 800 mL infusion for NON-oncology use ( Intravenous Rate/Dose Change 12/30/20 0041)        Assessments & Plan (with Medical Decision Making)   20-year-old female with complex medical history including Wegener's granulomatosis, recent Covid exposure and likely improvement, strep throat, and urinary tract infection now with rash.  DDx is broad including drug reaction, autoimmune disorder, RA, Wegener's flare, among others.    Clinically the patient is nontoxic but very comfortable appearing has significant myalgias.  Labs reviewed   ESR and CRP are both elevated.  CK is pending   COVID pending (was positive 12/5, but sx had improved)  Chest x-ray without pna   Urinalysis notable for leukourea as well as hematuria.  Given that she has been on antibiotics for 4 days this may be related to resistant strain or inappropriate antibiotic use as noted above.  According to her culture from 4 days ago she has pansensitive E. coli.  We will give her 1 g of ceftriaxone IV in the meantime and then consider a p.o. regimen upon discharge.    The rash does not appear to be consistent with SJS, and my suspicion for a rash such as this related to a drug reaction is low.  More concerning for vasculitic type process.  Given her clinical presentation she would be appropriate for a rheumatology consultation.  We will will admit her to ED observation with anticipated room consult tomorrow morning.  At this point we will continue with analgesics as needed, and will withhold steroids until she is seen by  rheumatology.  Patient is in agreement with this plan.          I have reviewed the nursing notes. I have reviewed the findings, diagnosis, plan and need for follow up with the patient.    Discharge Medication List as of 12/30/2020 12:28 PM      START taking these medications    Details   acetaminophen (TYLENOL) 325 MG tablet Take 2 tablets (650 mg) by mouth every 4 hours as needed for mild pain, Disp-1 Bottle, R-3, E-Prescribe      Calcium Carb-Cholecalciferol (HM CALCIUM-VITAMIN D) 600-800 MG-UNIT TABS Take 1 tablet by mouth daily, Disp-30 tablet, R-3, E-Prescribe      nicotine (NICODERM CQ) 14 MG/24HR 24 hr patch Place 1 patch onto the skin daily, Disp-30 patch, R-0, E-Prescribe      ondansetron (ZOFRAN-ODT) 4 MG ODT tab Take 1 tablet (4 mg) by mouth every 6 hours as needed for nausea or vomiting, Disp-20 tablet, R-3, E-Prescribe      oxyCODONE (ROXICODONE) 5 MG tablet Take 1 tablet (5 mg) by mouth every 6 hours as needed for pain, Disp-20 tablet, R-0, E-Prescribe      polyethylene glycol (MIRALAX) 17 GM/Dose powder Take 17 g by mouth 2 times daily as needed for constipation, Disp-510 g, R-3, E-Prescribe      predniSONE (DELTASONE) 5 MG tablet Take 15 tablets (75 mg) by mouth daily for 3 days, THEN 8 tablets (40 mg) daily for 7 days, THEN 6 tablets (30 mg) daily for 14 days, THEN 5 tablets (25 mg) daily for 14 days., Disp-255 tablet, R-0, E-Prescribe      senna-docusate (SENOKOT-S/PERICOLACE) 8.6-50 MG tablet Take 1 tablet by mouth 2 times daily as needed for constipation, Disp-30 tablet, R-3, E-Prescribe      sulfamethoxazole-trimethoprim (BACTRIM DS) 800-160 MG tablet Take 1 tablet by mouth three times a week, Disp-24 tablet, R-0, E-Prescribe             Final diagnoses:   Granulomatosis with polyangiitis, unspecified whether renal involvement (H)   Cigarette nicotine dependence without complication   Myalgia   Infection due to 2019-nCoV   Acute pharyngitis, unspecified etiology   Cough   Rash and other  nonspecific skin eruption   Cigarette smoker       --    [facility] EMERGENCY DEPARTMENT  12/26/2020     Varun Bernal MD  01/21/21 0980

## 2020-12-27 NOTE — ED NOTES
0.5 mg dilaudid removed from Pyxis by this RN and passed to GERHARD Bella for administration inside a covid positive patient room.

## 2020-12-27 NOTE — H&P
[facility]     History and Physical - ED ObservationService       Date of Admission:  12/26/2020    Assessment & Plan   Cande Shields is a 20 year old female admitted on 12/26/2020. She has a past medical history significant for Carolyn's granulomatosis, cannabis abuse and alcohol abuse.   3 weeks ago diagnosed with Covid following presentation for typical viral myalgias.    ##. Granulomatosis with Polyangitis  ##. COVID 19 infection   ##. Tobacco abuse  Patient reports she was diagnosed with COVID 19 infection earlier this month, were her symptoms were mild body aches. She reports feeling better about 1 week after, and a few days after that the myalgias returned much more severe, this time accompanied by arthrlagias, and a rash. She has for the past 4 days, a  worsening whole body aches including her upper extremity and lower extremity joints as well as muscles, primarily noted in the thighs.  Also notes bilateral lower extremity swelling in her feet as well as diffuse body rash, most notable in the extensor surfaces of her legs and thighs, elbows, and under her chin.  Patient reports a similar presentation of a Wegener's flare.  Continues to have a productive non- bloody cough with occasional posttussive nausea. Patient is currently a smoker. In the ED: Vitals:BP:119/70  Pulse: 114 Temp:98.7  Resp: 16 SP02:99 % Labs: Na 140, K 4.0, Cr 0.65, BUN 11, GFR >90, CK 62, CRP 39.0, BG 86, WBC 9.0, Hgb 12.6, Plt 373, UA with large blood in urine, < 300 protein, WBC 18, RBC 54, Few bacteria,  COVID positive (from previous infection) Medications: Bolus of NS IV x 1 L, Vantin 200 mg , Dilaudid 1 mg IV x 1m Oxycodone 5 mg Po x 1,  Imaging: Chest xray: The heart size is normal. There is a 1.2 cm nodule in the right lung laterally. The lungs are otherwise clear. No pneumothorax or pleural effusion. Consults: Rheumatology Plan: Admit to ED observation for symptom management.   -  Symptom management with lozenges, viscous lidocaine  - Morphine 4 mg IV prn  - Per Rheumatology: Recommended to order labs including, ASA, anti-DNAse 3, protein to creatinine ratio, UA of first morning void, ANCA, PR3, MPO, Hep B, Hep C, cryoglobulinemia, CESARIO.   - Start IV solu-medrol 80mg tonight, and a repeat dose tomorrow.   - She will eventually need additional therapy for her relapsing disease, likely with Rituximab, but we will wait a few days given her ongoing COVID 19 infection.  - Rheumatology will continue to follow    ## Recent diagnosis of a UTI: Initiated on Keflex  - Continue Keflex    ## Recent diagnosis of strep throat: Was diagnosed with strep which was treated with a Z-pack.         Diet:   Regular diet  DVT Prophylaxis: Low Risk/Ambulatory with no VTE prophylaxis indicated  Guillen Catheter: not present  Code Status:   Full         Disposition Plan   Expected discharge: 4 - 7 days, recommended to prior living arrangement once adequate pain management/ tolerating PO medications.  Entered: LISETH Hyatt 12/27/2020, 3:46 AM     The patient's care was discussed with the Attending Physician, Dr. Ren, Bedside Nurse, Care Coordinator/ and Patient.    LISETH Hyatt  [facility]   Contact information available via [facility] Paging/Directory      ______________________________________________________________________    Chief Complaint   Generalized body aches, cough, rash    History is obtained from the patient    History of Present Illness   Cande Shields is a 20 year old female with a past medical history significant for Carolyn's granulomatosis, cannabis abuse and alcohol abuse, BE, ADHD, recent COVID19, recent Strep pharyngitis, recent UTI presenting to the ED with generalized body aches, cough, rash.    Per chart review in Care Everywhere, she was seen at [facility] with sore throat and LE edema. She was positive for  "Strep; ESR 25; LLE US negative for DVT. She was given a prescription for Azithromycin.     On 12/22/20 she p[facility]t [facility] with complaints of myalgias and cough. She also complained of additional bumps on elbows and knees as well as edema in left lower leg. She was noted to have CRP 2.8, ESR 32. Normal CK. UA showing 163 RBC, small LE, 40 WBC, positive nitrite. UCx grew 100k E. Coli. She was discharged with a prescription for Keflex.     Per ED Note: \"Today she presents because of 4 days of worsening whole body aches including her upper extremity and lower extremity joints as well as muscles, primarily noted in the thighs.  Also notes bilateral lower extremity swelling in her feet as well as diffuse body rash, most notable in the extensor surfaces of her legs and thighs, elbows, and under her chin.  Has no oral ulcers or pain along her lips.  The patient and her mother, who was present for the encounter and interview via [employer] ???, think that this may be a Wegener's flare as she had similar presentations in the past. Currently denies any chest pain or fevers no shortness of breath.  Endorses ongoing bothersome cough with occasional posttussive nausea. No trauma or falls.  No headache.  No visual changes. Does note conjunctival injection practically along the left eye.\"    In the ED, HR 90's-100's, /83, RR 20, SaO2 % on RA, Temp 98.1. Labs show normal BMP and CBC. Normal lactic acid at 1.1. Elevated CRP 39 and ESR 38. CXR reports 1.2 cm right lung nodule laterally otherwise negative. In the ED the patient was given 1L NS bolus, dilaudid 0.5 IV x 2, zofran 4mg IV x 1. She is at [facility] awaiting and open bed on the [facility] for Rheumatology consult.       Review of Systems    All other ROS negative except those mentioned in above note.       Past Medical History    I have reviewed this patient's medical history and updated it with pertinent information if needed.   Past Medical History:   Diagnosis " Date     ADHD (attention deficit hyperactivity disorder)      Wegener's disease, pulmonary (H) 2010    renal biopdy       Past Surgical History   I have reviewed this patient's surgical history and updated it with pertinent information if needed.  Past Surgical History:   Procedure Laterality Date     ENT SURGERY  2000    cyst     EXCISE GANGLION WRIST  2009       Social History   I have reviewed this patient's social history and updated it with pertinent information if needed.  Social History     Tobacco Use     Smoking status: Current Every Day Smoker     Packs/day: 1.00     Smokeless tobacco: Never Used   Substance Use Topics     Alcohol use: Yes     Comment: 3x a week     Drug use: No       Family History   I have reviewed this patient's family history and updated it with pertinent information if needed.  Family History   Problem Relation Age of Onset     Thyroid Disease Mother      Asthma No family hx of      Diabetes No family hx of        Prior to Admission Medications   Prior to Admission Medications   Prescriptions Last Dose Informant Patient Reported? Taking?   buPROPion (WELLBUTRIN XL) 150 MG 24 hr tablet   No No   Sig: Take 1 tablet by mouth every 24 hours.   cephALEXin (KEFLEX) 500 MG capsule 12/26/2020 at 1200  Yes Yes   Sig: Take 500 mg by mouth 3 times daily   guaiFENesin-codeine (ROBITUSSIN AC) 100-10 MG/5ML solution 12/26/2020 at 0700  Yes No   Sig: Take 5 mLs by mouth 3 times daily as needed   ibuprofen (ADVIL) 200 MG tablet 12/26/2020 at 1900  Yes Yes   Sig: Take 400 mg by mouth every 4 hours as needed.      Facility-Administered Medications: None     Allergies   Allergies   Allergen Reactions     Penicillins Rash     Unknown, but think rash.       Physical Exam   Vital Signs: Temp: 98.1  F (36.7  C) Temp src: Oral BP: 132/83 Pulse: 98   Resp: 20 SpO2: 100 % O2 Device: None (Room air)    Weight: 230 lbs 0 oz    Constitutional: awake, alert, cooperative, no apparent distress, and appears stated  age  Eyes: Lids and lashes normal, pupils equal, round and reactive to light, extra ocular muscles intact, sclera clear, conjunctiva normal  ENT: Normocephalic, without obvious abnormality, atraumatic, sinuses nontender on palpation, external ears without lesions, oral pharynx with moist mucous membranes, tonsils without erythema or exudates, gums normal and good dentition.  Hematologic / Lymphatic: no cervical lymphadenopathy  Respiratory: No increased work of breathing, good air exchange, clear to auscultation bilaterally, no crackles or wheezing  Cardiovascular: Normal apical impulse, regular rate and rhythm, normal S1 and S2, no S3 or S4, and no murmur noted  GI: No scars, normal bowel sounds, soft, non-distended, non-tender, no masses palpated, no hepatosplenomegally  Skin: no bruising or bleeding and normal skin color, texture, turgor  Musculoskeletal: There is no redness, warmth, or swelling of the joints.  Full range of motion noted.  Motor strength is 5 out of 5 all extremities bilaterally.  Tone is normal.  Neurologic: Awake, alert, oriented to name, place and time.  Cranial nerves II-XII are grossly intact.  Motor is 5 out of 5 bilaterally.  Cerebellar finger to nose, heel to shin intact.  Sensory is intact.  Babinski down going, Romberg negative, and gait is normal.  Neuropsychiatric: General: normal, calm and normal eye contact    Data   Data reviewed today: I reviewed all medications, new labs and imaging results over the last 24 hours.  Recent Labs   Lab 12/27/20  0242   WBC 9.0   HGB 12.6   MCV 88         POTASSIUM 4.0   CHLORIDE 108   CO2 25   BUN 11   CR 0.65   ANIONGAP 7   KRAIG 9.4   GLC 86   TROPI <0.015     Most Recent 3 CBC's:  Recent Labs   Lab Test 12/27/20  0242   WBC 9.0   HGB 12.6   MCV 88        Most Recent 3 BMP's:  Recent Labs   Lab Test 12/27/20  0242      POTASSIUM 4.0   CHLORIDE 108   CO2 25   BUN 11   CR 0.65   ANIONGAP 7   KRAIG 9.4   GLC 86     Most Recent 2  LFT's:No lab results found.  Most Recent 3 INR's:No lab results found.  Most Recent 3 Creatinines:  Recent Labs   Lab Test 12/27/20  0242   CR 0.65     Most Recent 3 Hemoglobins:  Recent Labs   Lab Test 12/27/20 0242   HGB 12.6     Most Recent ESR & CRP:  Recent Labs   Lab Test 12/27/20 0242   SED 38*   CRP 39.0*     Recent Results (from the past 24 hour(s))   XR Chest 2 Views    Narrative    EXAM: XR CHEST 2 VW  LOCATION: [facility]  DATE/TIME: 12/27/2020 3:15 AM    INDICATION: Cough and myalgias. History of Wegener's granulomatosis.  COMPARISON: None.    FINDINGS: The heart size is normal. There is a 1.2 cm nodule in the right lung laterally. The lungs are otherwise clear. No pneumothorax or pleural effusion.      Impression    IMPRESSION: Right lung nodule.

## 2020-12-27 NOTE — ED NOTES
[facility]    ED Nurse to Floor Handoff     Cande Shields is a 20 year old female who speaks English and lives with a spouse,  in a home  They arrived in the ED by car from home    ED Chief Complaint: Generalized Body Aches, Cough, and Rash    ED Dx;   Final diagnoses:   None         Needed?: No    Allergies:   Allergies   Allergen Reactions     Penicillins Rash     Unknown, but think rash.   .  Past Medical Hx:   Past Medical History:   Diagnosis Date     ADHD (attention deficit hyperactivity disorder)      Wegener's disease, pulmonary (H) 2010    renal biopdy      Baseline Mental status: WDL  Current Mental Status changes: at basesline    Infection present or suspected this encounter: Covid-19  Sepsis suspected: No  Isolation type: Special Precautions-COVID-19  Patient tested for COVID 19 prior to admission: YES     Activity level - Baseline/Home:  Independent  Activity Level - Current:   standby assist    Bariatric equipment needed?: No    In the ED these meds were given:   Medications   0.9% sodium chloride BOLUS (0 mLs Intravenous Stopped 12/27/20 0440)     Followed by   sodium chloride 0.9% infusion (has no administration in time range)   ondansetron (ZOFRAN) injection 4 mg (4 mg Intravenous Given 12/27/20 0252)   HYDROmorphone (PF) (DILAUDID) injection 0.5 mg (0.5 mg Intravenous Given 12/27/20 0316)   cefTRIAXone (ROCEPHIN) 1 g vial to attach to  mL bag for ADULTS or NS 50 mL bag for PEDS (has no administration in time range)   benzocaine-menthol (CEPACOL) 15-3.6 MG lozenge 1 lozenge (1 lozenge Buccal Given 12/27/20 0440)       Drips running?  No    Home pump  No    Current LDAs       Labs results:   Labs Ordered and Resulted from Time of ED Arrival Up to the Time of Departure from the ED   ROUTINE UA WITH MICROSCOPIC REFLEX TO CULTURE - Abnormal; Notable for the following components:       Result Value    Blood Urine Large (*)     Protein  "Albumin Urine 300 (*)     WBC Urine 18 (*)     RBC Urine 54 (*)     Bacteria Urine Few (*)     Squamous Epithelial /HPF Urine 2 (*)     Mucous Urine Present (*)     Hyaline Casts 12 (*)     Cellular Cast 3 (*)     All other components within normal limits   ERYTHROCYTE SEDIMENTATION RATE AUTO - Abnormal; Notable for the following components:    Sed Rate 38 (*)     All other components within normal limits   CRP INFLAMMATION - Abnormal; Notable for the following components:    CRP Inflammation 39.0 (*)     All other components within normal limits   INFLUENZA A/B & SARS-COV2 PCR MULTIPLEX - Abnormal; Notable for the following components:    SARS-CoV-2 PCR Result POSITIVE (*)     All other components within normal limits   HCG QUAL URINE POCT - Normal   CBC WITH PLATELETS DIFFERENTIAL   BASIC METABOLIC PANEL   LACTIC ACID WHOLE BLOOD   TROPONIN I   CK TOTAL   URINE CULTURE AEROBIC BACTERIAL       Imaging Studies:   Recent Results (from the past 24 hour(s))   XR Chest 2 Views    Narrative    EXAM: XR CHEST 2 VW  LOCATION: [facility]  DATE/TIME: 12/27/2020 3:15 AM    INDICATION: Cough and myalgias. History of Wegener's granulomatosis.  COMPARISON: None.    FINDINGS: The heart size is normal. There is a 1.2 cm nodule in the right lung laterally. The lungs are otherwise clear. No pneumothorax or pleural effusion.      Impression    IMPRESSION: Right lung nodule.       Recent vital signs:   /71   Pulse 105   Temp 98.1  F (36.7  C) (Oral)   Resp 20   Ht 1.626 m (5' 4\")   Wt 104.3 kg (230 lb)   LMP 12/15/2020 (Approximate)   SpO2 100%   Breastfeeding No   BMI 39.48 kg/m      [place] Coma Scale Score: 15 (12/26/20 2354)       Cardiac Rhythm: Other  Pt needs tele? No  Skin/wound Issues: None    Code Status: Full Code    Pain control: fair    Nausea control: good    Abnormal labs/tests/findings requiring intervention:     Family present during ED course? No   Family Comments/Social Situation " comments:     Tasks needing completion: None    Jena Soriano, RN  [facility] --   [phone] [facility]  [phone] [facility] ED

## 2020-12-27 NOTE — CONSULTS
Rheumatology Consultation Note    REASON FOR CONSULT: I was asked to see the patient for evaluation of possible flare of GPA.    PROBLEM LIST:   Granulomatosis with Polyangiitis  COVID 19 infection    DISCUSSION: This 19 yo F has a history of Granulomatosis with polyangitis dx at age 10, with PR3 positivitiy, initially treated with Methotrexate, Steroids, Rituximab infusions Q6-8 months until about 2012, since then has been in remission. She presents to the hospital today for myalgias, arthralgias, pedal edema and what seems to be a petechial non blanching palpable purpura rash involving her thighs, legs, elbows, chin and neck, that appear to be ischemic vasculitic lesions.   Patient also has proteinuria on UA, lung nodule on CXR, elevated inflammatory markers.   At this time our leading diagnosis is GPA flare, other  etiologies include SLE and infection related immune response.  Of note, she also has recent diagnosis of COVID positivity in early December, with repeat COVID PCR done today still positive.  We have recommended to order labs including, ASA, anti-DNAse 3, protein to creatinine ratio, UA of first morning void, ANCA, PR3, MPO, Hep B, Hep C, cryoglobulinemia, CESARIO.   We also recommend to start IV solu-medrol 80mg tonight, and a repeat dose tomorrow. She will eventually need additional therapy for her relapsing disease, likely with Rituximab, but we will wait a few days given her ongoing COVID 19 infection.  We have spoken to infectious disease regarding our plan and they are on board.   We will re-evaluate patient tomorrow.     RECOMMENDATIONS:  -- Lab studies mentioned above  -- Start IV solumedrol 80mg, give one dose tonight, another dose tomorrow, then re-evalaute    The patient was seen and staffed with Dr. Call. The rheumatology team will continue to follow along, please contact with any questions or concerns.     I saw this patient with the Rheumatology Fellow. I agree with the findings and  recommendations. Discussed concurrent COVID19 infection with relapsing GPA, with Dr. Zuniga, Infectious Disease. Plan to initiate 1 mg/kg/day methylprednisolone for relapsing GPA right away, and contemplate addition of rituximab when course of covid19 infection is clear.    Ramses Call M.D.  Staff Rheumatologist, [facility]  Pager [phone]            HPI:   Patient is a 19 yo presenting with joint aches, myalgias and pedal edema. She reports that she was diagnosed with COVID 19 infection earlier this month, were her symptoms were mild body aches. She reports feeling better about 1 week after, and a few days after that the myalgias returned much more severe, this time accompanied by arthrlagias, and a rash. The myalgias and joint pain have been so severe her  has had to help her get dressed due to limited mobility in her joints due to pain.   The rash was initially on her elbows, then migrated to the left leg, right leg and now on her chin and neck. The rash is described to be itchy and painful. She does not recall having a rash similar to this in the past. She denies any mouth ulcers or sores.  A few days after the COVID infection, she went to [facility] for sore throat, and diagnosed with Streptococcal pharyngitis. She was treated with 5 days of Keflex and completed the course.   She also reports about 1-2 months (prior to COVID infection) of scant blood everytime she blows her nose, which she attributes to the cold air. Also afte rthe strep swab exam she did notice some blood in the back of her throat.   She has also noticed some painful bumps on the outside of her nose also started about 1-2 months back.   About her rheumatologic history, she was diagnosed with GPA at age 10, were she initially presented with hemoptysis and bloody nasal discharge, and also had kidney involvement. She was initially treated with Methotrexate and steroids, and then put on Rituxumb which she recieved every 6-8  months until about 2012. She reports that around 2012 she was in remission, and has not had any episodes since. She was supposed to follow up with rheumatology closely but did not. She went to one follow up around 2015, and then went to another follow up July 2020, when she found out she was pregnant. unfortunately at week 31 gestation she delivered a stillborn fetus, due to placenta insufficiency and preeclampsia.   She denies any visual disturbances, headaches, dizziness, shortness of breath, chest pain, abdominal pain, urinary or bowel movement changes, numbness, tingling, weakness.     Immunosuppressants and therapies:  Current: none  Previous: Rituximab, Methotrexate, Prednisone    ROS:   Negative unless stated in the HPI     PMH:   Reviewed  --pregnancy loss/stillbirth at 31 weeks 2020  -- diagnosed with GPA at age 10, when she initially presented with hemoptysis and bloody nasal discharge, and also had kidney involvement. She was initially treated with Methotrexate and steroids, and then put on Rituxumb which she recieved every 6-8 months until about 2012. She reports that around 2012 she was in remission, and has not had any episodes since.    SOCIAL HISTORY AND RISK FACTORS  Residence: lives with   Work/school: no/no  Tobacco use: smokes 1 PPD  Alcohol use: drinks many drinks 2-3 days out of the week   Illicit drug use:  denies    FAMILY HISTORY:  reviewed    EXAMINATION:    BP (!) 171/117 (Cuff Location: Left Arm)   Pulse 84   Temp 36.2  C (97.2  F) (Tympanic)   Resp 18   Ht 1.829 m (6')   Wt 126.4 kg (278 lb 8.8 oz)   SpO2 100%   BMI 37.78 kg/m    GENERAL: NAD  HEAD/SCALP: No scalp tenderness. Temporal arteries 2+ and equal. No jaw tenderness/difficulty with jaw ROM.   EYES: EOMI, white sclera without icterus or injection.   ENT/MOUTH: oropharynx clear - no oral or nasal ulcers.   LYMPH: No lymphadenopathy present  HEART: RRR, no murmurs, rubs, or gallops. Pulses 2+ in distal  extremities.  LUNGS: Clear bilaterally, no wheezes, rhonchi, rales. Normal effort.  ABDOMEN: soft, nontender, nondistended, bs+  NEURO: AOx3. CN II-XII grossly intact. Strength 5/5 in proximal and distal muscle groups in bilateral upper and lower extremities. Normal speech.  SKIN: Warm, dry; multiple erythematous papules that are nonblanchable, some with dark centering over her thighs, legs, extensor surface of her elbows and under chin extending into the neck. Also some puprple marginating papules also nonblanchable and purple in color on the palmar surfaces.    MSK:  Painful shoulder ROM, flexion limited to 30 degrees due to the pain.   Tenderness over MCP and MTP joints b/l, without any palpable synovitis.   Generalized joint tenderness on examination    RELEVANT DATA:  Labs:  ESR/CRP:38, 40  Urinalysis:+18 WBC, +5 RBC, 12 hyaline cast, 3 cellular cast, 300 protein  Cr and BUN within normal  SARS-CoV 2 PCR: positive    Autoimmune workup:  Positive:   2011: positive ANCA 1:160, positive PR3, negative MPO    Imaging results:   EXAM: XR CHEST 2 VW  LOCATION: [facility]  DATE/TIME: 12/27/2020 3:15 AM     INDICATION: Cough and myalgias. History of Wegener's granulomatosis.  COMPARISON: None.     FINDINGS: The heart size is normal. There is a 1.2 cm nodule in the right lung laterally. The lungs are otherwise clear. No pneumothorax or pleural effusion.                                                                      IMPRESSION: Right lung nodule.    RHEUM RESULTS Latest Ref Rng & Units 3/2/2011 3/2/2011 12/27/2020   SED RATE 0 - 20 mm/h - - 38(H)   CRP, INFLAMMATION 0.0 - 8.0 mg/L - - 39.0(H)   CK TOTAL 30 - 225 U/L - - 62   ALBUMIN 3.9 - 5.1 g/dL 4.0 - -   WBC 4.0 - 11.0 10e9/L 12.6(H) 10.6 9.0   RBC 3.8 - 5.2 10e12/L 4.38 4.13 4.39   HGB 11.7 - 15.7 g/dL 11.0(L) 10.2(L) 12.6   HCT 35.0 - 47.0 % 35.4 33.7(L) 38.6   MCV 78 - 100 fl 81 82 88   MCHC 31.5 - 36.5 g/dL 31.1(L) 30.3(L) 32.6   RDW 10.0 - 15.0  % 19.0(H) 19.0(H) 11.7    - 450 10e9/L 256 230 373   CREATININE 0.52 - 1.04 mg/dL 0.56 - 0.65   GFR ESTIMATE, IF BLACK >60 mL/min/[1.73:m2] GFR not calculated, patient <16 years old. - >90   GFR ESTIMATE >60 mL/min/[1.73:m2] GFR not calculated, patient <16 years old. - >90       Neutrophil Cytoplasmic IgG Antibody   Date Value Ref Range Status   02/22/2011 1:160  Final     Comment:     Reference range: <1:20  (Note)  Cytoplasmic ANCA (c-ANCA) staining pattern observed. No  perinuclear ANCA (p-ANCA) pattern seen. Presence of p-ANCA  ruled out on formalin-fixed neutrophils.  TEST INFORMATION: Anti-Neutrophil Cyto Ab, IgG    Neutrophil Cytoplasmic Antibodies (C-ANCA = granular  cytoplasmic staining, P-ANCA = perinuclear staining) are  found in the serum of over 90 percent of patients with  certain necrotizing systemic vasculitides, and usually in  less than 5 percent of patients with collagen vascular  disease or arthritis.  Performed by [facility],  [address] [facility] [facility] [id number] [phone]  [email or web address], Leonie Damon MD, Lab. Director                                                                           ,  ,  ,  ,  ,   Serine Protease 3   Date Value Ref Range Status   02/22/2011 1684 (H)  Final     Comment:     Reference range: 0 to 19  Unit: AU/mL  (Note)  REFERENCE INTERVAL: Serine Protease 3, IgG     19 AU/mL or Less ........ Negative   20-25 AU/mL ............. Equivocal   26 AU/mL or Greater ..... Positive    Approximately 90% of patients with a P-ANCA pattern by IFA  have antibodies specific for MPO.    Approximately 85% of patients with a C-ANCA pattern by IFA  have antibodies specific for PR3.  Performed by [facility],  [address] [facility] [id number] [phone]  [email or web address], Leonie Damon MD, Lab. Director                                                                                                                                                                                                                                     ,   Myeloperox Antibodyys   Date Value Ref Range Status   02/22/2011 0  Final     Comment:     Reference range: 0 to 19  Unit: AU/mL  (Note)  REFERENCE INTERVAL: Myeloperoxidase Abs, IgG     19 AU/mL or Less ......... Negative   20-25 AU/mL .............. Equivocal   26 AU/mL or Greater ...... Positive    Approximately 90% of patients with a P-ANCA pattern by IFA  have antibodies specific for MPO.    Approximately 85% of patients with a C-ANCA pattern by IFA  have antibodies specific for PR3.                                                                                                          Kori Solis MD  Rheumatology Fellow

## 2020-12-27 NOTE — TELEPHONE ENCOUNTER
Mother is concerned about daughter. She was in [facility] 4 days ago. She is relapsing with her disease, and mother feels that hospital has misdiagnosed her. She was told she has COVID.  Hx of Carolyn's granulomatosis. She has bumps that look like blood blisters all over her body. Her feet are enlarged. [facility] gave her IV drug for inflammation. She is getting blisters and purple color to her foot. This is the same as when she was 10 yrs old. She was hospitalized 7 times in 6 months. She woke up today crying because her legs hurt so bad and she is having difficulty sitting on a toilet. . She cannot sit in a bath tub. Mother states she needs a rheumatologist. Mother called the [facility] but they could not get her into office appointment until 1/13/2021. Mother is unable to go with daughter into ER due to COVID restrictions. Mother feels her sx are getting worse every day. Her former pediatric rheumatologist was Dr Enriqueta Christianson @ [facility] [address] [place] [address]. Daughter has been In remission for years.    Daughter was treated @ [facility] when she was hospitalized @ age 10.  Mother states daughter needs methotrexate and steroids. She is worse than when she was seen in ER.   Mother feels that daughter needs to go back to the ER now. She is considering taking her to the [facility].     Pura Deleon RN Triage Nurse Advisor 10:44 PM 12/26/2020  .

## 2020-12-27 NOTE — ED NOTES
PIV was infiltrated and was removed. MD was informed. IV antibiotic was switch to oral. CRNA was paged.

## 2020-12-28 LAB
ALBUMIN SERPL-MCNC: 2.5 G/DL (ref 3.4–5)
ALBUMIN UR-MCNC: 100 MG/DL
ALP SERPL-CCNC: 102 U/L (ref 40–150)
ALT SERPL W P-5'-P-CCNC: 39 U/L (ref 0–50)
ANION GAP SERPL CALCULATED.3IONS-SCNC: 3 MMOL/L (ref 3–14)
APPEARANCE UR: CLEAR
AST SERPL W P-5'-P-CCNC: 15 U/L (ref 0–45)
BACTERIA #/AREA URNS HPF: ABNORMAL /HPF
BACTERIA SPEC CULT: NO GROWTH
BILIRUB SERPL-MCNC: 0.2 MG/DL (ref 0.2–1.3)
BILIRUB UR QL STRIP: NEGATIVE
BUN SERPL-MCNC: 11 MG/DL (ref 7–30)
C3 SERPL-MCNC: 150 MG/DL (ref 81–157)
C4 SERPL-MCNC: 30 MG/DL (ref 13–39)
CALCIUM SERPL-MCNC: 8.8 MG/DL (ref 8.5–10.1)
CHLORIDE SERPL-SCNC: 110 MMOL/L (ref 94–109)
CO2 SERPL-SCNC: 25 MMOL/L (ref 20–32)
COLOR UR AUTO: YELLOW
CREAT SERPL-MCNC: 0.61 MG/DL (ref 0.52–1.04)
ERYTHROCYTE [DISTWIDTH] IN BLOOD BY AUTOMATED COUNT: 11.5 % (ref 10–15)
GFR SERPL CREATININE-BSD FRML MDRD: >90 ML/MIN/{1.73_M2}
GLUCOSE SERPL-MCNC: 113 MG/DL (ref 70–99)
GLUCOSE UR STRIP-MCNC: NEGATIVE MG/DL
HBV SURFACE AB SERPL IA-ACNC: 0.78 M[IU]/ML
HBV SURFACE AG SERPL QL IA: NONREACTIVE
HCT VFR BLD AUTO: 35.4 % (ref 35–47)
HCV AB SERPL QL IA: NONREACTIVE
HEP C HIM: NORMAL
HGB BLD-MCNC: 11.3 G/DL (ref 11.7–15.7)
HGB UR QL STRIP: ABNORMAL
HYALINE CASTS #/AREA URNS LPF: 2 /LPF (ref 0–2)
KETONES UR STRIP-MCNC: NEGATIVE MG/DL
LEUKOCYTE ESTERASE UR QL STRIP: ABNORMAL
Lab: NORMAL
MCH RBC QN AUTO: 28.8 PG (ref 26.5–33)
MCHC RBC AUTO-ENTMCNC: 31.9 G/DL (ref 31.5–36.5)
MCV RBC AUTO: 90 FL (ref 78–100)
MUCOUS THREADS #/AREA URNS LPF: PRESENT /LPF
MYELOPEROXIDASE AB SER-ACNC: <0.2 AI (ref 0–0.9)
NITRATE UR QL: NEGATIVE
PH UR STRIP: 6.5 PH (ref 5–7)
PLATELET # BLD AUTO: 324 10E9/L (ref 150–450)
POTASSIUM SERPL-SCNC: 4.1 MMOL/L (ref 3.4–5.3)
PROT SERPL-MCNC: 7 G/DL (ref 6.8–8.8)
PROTEINASE3 IGG SER-ACNC: >8 AI (ref 0–0.9)
RBC # BLD AUTO: 3.93 10E12/L (ref 3.8–5.2)
RBC #/AREA URNS AUTO: >182 /HPF (ref 0–2)
SODIUM SERPL-SCNC: 138 MMOL/L (ref 133–144)
SOURCE: ABNORMAL
SP GR UR STRIP: 1.02 (ref 1–1.03)
SPECIMEN SOURCE: NORMAL
SQUAMOUS #/AREA URNS AUTO: <1 /HPF (ref 0–1)
UROBILINOGEN UR STRIP-MCNC: NORMAL MG/DL (ref 0–2)
WBC # BLD AUTO: 10.8 10E9/L (ref 4–11)
WBC #/AREA URNS AUTO: 18 /HPF (ref 0–5)

## 2020-12-28 PROCEDURE — 86160 COMPLEMENT ANTIGEN: CPT | Performed by: PHYSICIAN ASSISTANT

## 2020-12-28 PROCEDURE — 80053 COMPREHEN METABOLIC PANEL: CPT | Performed by: PHYSICIAN ASSISTANT

## 2020-12-28 PROCEDURE — 86334 IMMUNOFIX E-PHORESIS SERUM: CPT | Mod: 26 | Performed by: PATHOLOGY

## 2020-12-28 PROCEDURE — 250N000011 HC RX IP 250 OP 636: Performed by: PHYSICIAN ASSISTANT

## 2020-12-28 PROCEDURE — 84999 UNLISTED CHEMISTRY PROCEDURE: CPT | Performed by: PHYSICIAN ASSISTANT

## 2020-12-28 PROCEDURE — 250N000013 HC RX MED GY IP 250 OP 250 PS 637: Performed by: PHYSICIAN ASSISTANT

## 2020-12-28 PROCEDURE — 99233 SBSQ HOSP IP/OBS HIGH 50: CPT | Performed by: INTERNAL MEDICINE

## 2020-12-28 PROCEDURE — 86060 ANTISTREPTOLYSIN O TITER: CPT | Performed by: PHYSICIAN ASSISTANT

## 2020-12-28 PROCEDURE — 85027 COMPLETE CBC AUTOMATED: CPT | Performed by: PHYSICIAN ASSISTANT

## 2020-12-28 PROCEDURE — 250N000013 HC RX MED GY IP 250 OP 250 PS 637: Performed by: STUDENT IN AN ORGANIZED HEALTH CARE EDUCATION/TRAINING PROGRAM

## 2020-12-28 PROCEDURE — 86803 HEPATITIS C AB TEST: CPT | Performed by: PHYSICIAN ASSISTANT

## 2020-12-28 PROCEDURE — 250N000011 HC RX IP 250 OP 636: Performed by: INTERNAL MEDICINE

## 2020-12-28 PROCEDURE — 83876 ASSAY MYELOPEROXIDASE: CPT | Performed by: PHYSICIAN ASSISTANT

## 2020-12-28 PROCEDURE — 81001 URINALYSIS AUTO W/SCOPE: CPT | Performed by: PHYSICIAN ASSISTANT

## 2020-12-28 PROCEDURE — 86706 HEP B SURFACE ANTIBODY: CPT | Performed by: PHYSICIAN ASSISTANT

## 2020-12-28 PROCEDURE — 87340 HEPATITIS B SURFACE AG IA: CPT | Performed by: PHYSICIAN ASSISTANT

## 2020-12-28 PROCEDURE — 86038 ANTINUCLEAR ANTIBODIES: CPT | Performed by: PHYSICIAN ASSISTANT

## 2020-12-28 PROCEDURE — 82595 ASSAY OF CRYOGLOBULIN: CPT | Performed by: PHYSICIAN ASSISTANT

## 2020-12-28 PROCEDURE — 83516 IMMUNOASSAY NONANTIBODY: CPT | Performed by: PHYSICIAN ASSISTANT

## 2020-12-28 PROCEDURE — 86255 FLUORESCENT ANTIBODY SCREEN: CPT | Performed by: PHYSICIAN ASSISTANT

## 2020-12-28 PROCEDURE — 36415 COLL VENOUS BLD VENIPUNCTURE: CPT | Performed by: PHYSICIAN ASSISTANT

## 2020-12-28 PROCEDURE — 86215 DEOXYRIBONUCLEASE ANTIBODY: CPT | Performed by: PHYSICIAN ASSISTANT

## 2020-12-28 PROCEDURE — 258N000003 HC RX IP 258 OP 636: Performed by: PHYSICIAN ASSISTANT

## 2020-12-28 PROCEDURE — 120N000002 HC R&B MED SURG/OB UMMC

## 2020-12-28 PROCEDURE — 86334 IMMUNOFIX E-PHORESIS SERUM: CPT | Performed by: PHYSICIAN ASSISTANT

## 2020-12-28 PROCEDURE — 86256 FLUORESCENT ANTIBODY TITER: CPT | Performed by: PHYSICIAN ASSISTANT

## 2020-12-28 RX ORDER — TRAZODONE HYDROCHLORIDE 50 MG/1
50 TABLET, FILM COATED ORAL
Status: DISCONTINUED | OUTPATIENT
Start: 2020-12-28 | End: 2020-12-30 | Stop reason: HOSPADM

## 2020-12-28 RX ADMIN — MORPHINE SULFATE 4 MG: 2 INJECTION, SOLUTION INTRAMUSCULAR; INTRAVENOUS at 14:31

## 2020-12-28 RX ADMIN — MORPHINE SULFATE 4 MG: 2 INJECTION, SOLUTION INTRAMUSCULAR; INTRAVENOUS at 03:46

## 2020-12-28 RX ADMIN — METHYLPREDNISOLONE SODIUM SUCCINATE 81.25 MG: 125 INJECTION, POWDER, FOR SOLUTION INTRAMUSCULAR; INTRAVENOUS at 08:49

## 2020-12-28 RX ADMIN — BENZOCAINE, MENTHOL 1 LOZENGE: 15; 3.6 LOZENGE ORAL at 09:00

## 2020-12-28 RX ADMIN — MORPHINE SULFATE 4 MG: 2 INJECTION, SOLUTION INTRAMUSCULAR; INTRAVENOUS at 07:06

## 2020-12-28 RX ADMIN — DOCUSATE SODIUM AND SENNOSIDES 2 TABLET: 8.6; 5 TABLET, FILM COATED ORAL at 08:50

## 2020-12-28 RX ADMIN — MORPHINE SULFATE 4 MG: 2 INJECTION, SOLUTION INTRAMUSCULAR; INTRAVENOUS at 10:40

## 2020-12-28 RX ADMIN — Medication 1 MG: at 19:56

## 2020-12-28 RX ADMIN — SODIUM CHLORIDE: 9 INJECTION, SOLUTION INTRAVENOUS at 01:01

## 2020-12-28 RX ADMIN — SODIUM CHLORIDE: 9 INJECTION, SOLUTION INTRAVENOUS at 09:00

## 2020-12-28 RX ADMIN — ENOXAPARIN SODIUM 40 MG: 40 INJECTION SUBCUTANEOUS at 16:13

## 2020-12-28 RX ADMIN — NICOTINE 1 PATCH: 14 PATCH, EXTENDED RELEASE TRANSDERMAL at 08:49

## 2020-12-28 RX ADMIN — CEPHALEXIN 500 MG: 500 CAPSULE ORAL at 14:31

## 2020-12-28 RX ADMIN — BENZOCAINE, MENTHOL 1 LOZENGE: 15; 3.6 LOZENGE ORAL at 03:53

## 2020-12-28 RX ADMIN — MORPHINE SULFATE 4 MG: 2 INJECTION, SOLUTION INTRAMUSCULAR; INTRAVENOUS at 19:56

## 2020-12-28 RX ADMIN — CEPHALEXIN 500 MG: 500 CAPSULE ORAL at 19:56

## 2020-12-28 RX ADMIN — CEPHALEXIN 500 MG: 500 CAPSULE ORAL at 08:50

## 2020-12-28 RX ADMIN — TRAZODONE HYDROCHLORIDE 50 MG: 50 TABLET ORAL at 23:21

## 2020-12-28 ASSESSMENT — ACTIVITIES OF DAILY LIVING (ADL)
ADLS_ACUITY_SCORE: 13
ADLS_ACUITY_SCORE: 15
ADLS_ACUITY_SCORE: 15
ADLS_ACUITY_SCORE: 13

## 2020-12-28 NOTE — PLAN OF CARE
"BP (!) 140/73 (BP Location: Right arm)   Pulse 101   Temp 98.3  F (36.8  C) (Oral)   Resp 16   Ht 1.626 m (5' 4\")   Wt 109 kg (240 lb 6.4 oz)   LMP 12/15/2020 (Approximate)   SpO2 94%   Breastfeeding No   BMI 41.26 kg/m        Neuro: A&Ox4, calls appropriately  Cardiac: hypertensive but within parameters. Denies chest pain.  Respiratory: sats mid 90s on RA, denies SOB. Infreq dry cough/throat.   GI/: +BS, +passing flatus, LBM 12/26. Voiding adequately, not saving.   Diet: Tolerating regular diet.  Pain/Nausea: C/O generalized all over pain. Currently managed with PRN morphine IV. C/O dry throat discomfort. PRN cepacol drop given X1. Denies nausea.  Skin/Incisions: BLE rash with scattered scabbing and blisters on finger joints due to GPA flare up.  Lines/Drains: Left PIV- saline locked.   Activity: Up indepedently  Plan: Rheumatology unable to see patient today d/t communication issue with telecom services. Will plan to see patient via virtual visit tomorrow. Continue to monitor and follow POC.    "

## 2020-12-28 NOTE — UTILIZATION REVIEW
Admission Status; Secondary Review Determination         Under the authority of the Utilization Management Committee, the utilization review process indicated a secondary review on the above patient.  The review outcome is based on review of the medical records, discussions with staff, and applying clinical experience noted on the date of the review.        (x)      Inpatient Status Appropriate - This patient's medical care is consistent with medical management for inpatient care and reasonable inpatient medical practice.      RATIONALE FOR DETERMINATION   The patient is a 20-year-old admitted on 12/26/2020.  She has a history of granulomatosis with polyangiitis diagnosed at age 10 and has been on medications including methotrexate, steroids, Rituxan infusions until about 2012 and has been in remission since that time.  It appears now that she has a flare with elevated inflammatory markers, lung nodule on chest x-ray and proteinuria on urinalysis.  On exam she has pedal edema and petechia with nonblanching palpable purpura involving her thighs legs elbows chin and neck there appears to be consistent with ischemic vasculitic lesions.  She has symptoms of myalgias and arthralgias.  She is started on IV Solu-Medrol 80 mg with an additional dose both on admission and today.  Rheumatology has been in consultation.  She did have a history of being diagnosed with COVID-19 earlier this month.  She has a history of positive antinuclear antibody test of 1: 160 with a positive VA-3.  Based on need for acute hospital management and additional acute work-up, the patient is appropriate for inpatient class.      The severity of illness, intensity of service provided, expected LOS and risk for adverse outcome make the care complex, high risk and appropriate for hospital admission.        The information on this document is developed by the utilization review team in order for the business office to ensure compliance.  This only  denotes the appropriateness of proper admission status and does not reflect the quality of care rendered.         The definitions of Inpatient Status and Observation Status used in making the determination above are those provided in the CMS Coverage Manual, Chapter 1 and Chapter 6, section 70.4.      Sincerely,     Carlos Millard MD  Physician Advisor  Utilization Review/ Case Management  [facility].

## 2020-12-28 NOTE — PROGRESS NOTES
[facility]     Medicine Progress Note - Hospitalist Service, Gold Cross Cover       Date of Admission:  12/26/2020  Assessment & Plan         Cande Shields is a 19 yo female with a past medical history of Granulomatosis with Polyangiitis, recent UTI  and ADHD who presented to the ED on 12/27 with a several day history of myalgias, joint pain, and pedal edema. She is admitted to internal medicine for concerns of a GPA flare.     Granulomatosis with Polyangiitis Flare - Patient presented to the [facility] ED with complaints of myalgias, joint pain, pedal edema, and a petechial rash of her bilateral LE's for the past several days.  CRP elevated at 39. Chest x-ray with right lung nodule.  Seen by rheumatology in ED who expressed concerns for GPA flare. Of note, patient considered to be in remission since 2012.  Remote treatment with methotrexate, steroids, and Rituximab.   -Rheumatology following, appreciate recs  -Solu-medrol 80mg tonight and tomorrow AM  -Obtain ASA, anti-DNAse 3, protein to creatinine ratio, PR3, ANCA, MPO, hepatitis B and C, cryoglobulinemia, and CESARIO  -Obtain first morning void UA  -Pain control with morphine 4mg Q3H PRN    COVID 19 Infection, recovered - Tested positive 12/5, remains positive with test today (12/27), likely viral shedding. Asymptomatic and afebrile.     Recent Urinary Tract Infection  Proteinuria - Diagnosed 12/22 with a UTI. Started on Keflex for a 10 day course (ending 1/1/2021).  Found to have proteinuria on UA today, likely secondary to suspected GPA flare.   -Continue Keflex course while inpatient   -First void UA tomorrow AM  -Protein to creatinine ratio              Diet: Regular Diet Adult    DVT Prophylaxis: Ambulate every shift  Guillen Catheter: not present  Code Status: Full Code           Disposition Plan   Expected discharge: 2 - 3 days, recommended to prior living arrangement once GPA Flare treatment plan  established.  Entered: Sangita Kearns PA-C 12/27/2020, 6:13 PM       The patient's care was discussed with Dr. Alejandra Kearns PA-C  Hospitalist Service, [facility]   Contact information available via [facility] Paging/Directory  Please see sign in/sign out for up to date coverage information  ______________________________________________________________________    Interval History   Internal Medicine is assuming care of the patient from ED Obs.  Patient presented early this morning with complaints of myalgias, joint pain, and pedal edema for the past several days. New rash of bilateral hands and lower extremities.      She states her main compliant is her body pain.  She states the morphine has been working well.  She denies epistaxis, hematemesis, shortness of breath, fevers, chills, palpitations, chest pain, abdominal pain, change in bowel or urinary habits.     Data reviewed today: I reviewed all medications, new labs and imaging results over the last 24 hours.    Physical Exam   Vital Signs: Temp: 98.7  F (37.1  C) Temp src: Oral BP: 122/70 Pulse: 88   Resp: 20 SpO2: 95 % O2 Device: None (Room air)    Weight: 240 lbs 6.4 oz  GENERAL: Alert and oriented x 3. NAD. Ambulatory. Cooperative.   HEENT: Anicteric sclera. Mucous membranes moist. NC. AT.   CV: RRR. S1, S2. No murmurs appreciated.   RESPIRATORY: Effort normal on RA. Lungs CTAB with no wheezing, rales, rhonchi.   GI: Abdomen soft and non distended with normoactive bowel sounds present in all quadrants. No tenderness, rebound, guarding. No lesions.   NEUROLOGICAL: No focal deficits. Moves all extremities.  CN 2-12 grossly intact.  EXTREMITIES: Trace pedal edema. Intact bilateral pedal pulses.   SKIN: No jaundice.Petechial rash of bilateral lower extremities. Nodular rash on palmar surface of hands         Data   Results for YUMIKO LYONS (MRN [id number]) as of 12/27/2020 18:15   Ref.  Range 12/27/2020 02:42   Sodium Latest Ref Range: 133 - 144 mmol/L 140   Potassium Latest Ref Range: 3.4 - 5.3 mmol/L 4.0   Chloride Latest Ref Range: 94 - 109 mmol/L 108   Carbon Dioxide Latest Ref Range: 20 - 32 mmol/L 25   Urea Nitrogen Latest Ref Range: 7 - 30 mg/dL 11   Creatinine Latest Ref Range: 0.52 - 1.04 mg/dL 0.65   GFR Estimate Latest Ref Range: >60 mL/min/1.73_m2 >90   GFR Estimate If Black Latest Ref Range: >60 mL/min/1.73_m2 >90   Calcium Latest Ref Range: 8.5 - 10.1 mg/dL 9.4   Anion Gap Latest Ref Range: 3 - 14 mmol/L 7   CK Total Latest Ref Range: 30 - 225 U/L 62   CRP Inflammation Latest Ref Range: 0.0 - 8.0 mg/L 39.0 (H)   Lactic Acid Latest Ref Range: 0.7 - 2.0 mmol/L 1.1   Results for LUCIANHAYLEYFABIANA YUMIKO N (MRN [id number]) as of 12/27/2020 18:15   Ref. Range 12/27/2020 02:42   WBC Latest Ref Range: 4.0 - 11.0 10e9/L 9.0   Hemoglobin Latest Ref Range: 11.7 - 15.7 g/dL 12.6   Hematocrit Latest Ref Range: 35.0 - 47.0 % 38.6   Platelet Count Latest Ref Range: 150 - 450 10e9/L 373   RBC Count Latest Ref Range: 3.8 - 5.2 10e12/L 4.39   MCV Latest Ref Range: 78 - 100 fl 88   MCH Latest Ref Range: 26.5 - 33.0 pg 28.7   MCHC Latest Ref Range: 31.5 - 36.5 g/dL 32.6

## 2020-12-28 NOTE — PROGRESS NOTES
[facility]     Medicine Progress Note - Hospitalist Service, Gold 11        Date of Admission:  12/26/2020  Assessment & Plan       Cande Shields is a 19 yo female with a past medical history of Granulomatosis with Polyangiitis, recent UTI  and ADHD who presented to the ED on 12/27 with a several day history of myalgias, joint pain, and pedal edema. She is admitted to internal medicine for concerns of a GPA flare.     Today  - solumedrol in am, with further steroids per rheum who will see   - continue morphine   - start lovenox dvt ppx   - stop IVF      Granulomatosis with Polyangiitis Flare - Patient presented to the [facility] ED with complaints of myalgias, joint pain, pedal edema, and a petechial rash of her bilateral LE's for the past several days.  CRP elevated at 39. Chest x-ray with right lung nodule.  Seen by rheumatology in ED who expressed concerns for GPA flare. Of note, patient considered to be in remission since 2012.  Remote treatment with methotrexate, steroids, and Rituximab.   -Rheumatology following, appreciate recs  -Solu-medrol 80mg this AM, further steroids pending rheum evaluation   -pending ASA, anti-DNAse 3, protein to creatinine ratio, PR3, ANCA, MPO, hepatitis B and C, cryoglobulinemia, and CESARIO  -Pain control with morphine 4mg Q3H PRN     COVID 19 Infection, recovered - Tested positive 12/5, remains positive with test today (12/27), likely viral shedding. Asymptomatic and afebrile.      Recent Urinary Tract Infection  Proteinuria - Diagnosed 12/22 with a UTI. Started on Keflex for a 10 day course (ending 1/1/2021).  Found to have proteinuria on UA today, likely secondary to suspected GPA flare. Protein to creatinine ratio increased to 1.5   -Continue Keflex course while inpatient         Diet: Regular Diet Adult    DVT Prophylaxis: Enoxaparin (Lovenox) SQ  Guillen Catheter: not present  Code Status: Full Code           Disposition Plan  "  Expected discharge: 1-2, recommended to prior living arrangement once adequate pain management/ tolerating PO medications and rheumatology plan in place .  Entered: Chantale Turcios MD 12/28/2020, 1:23 PM       The patient's care was discussed with the Bedside Nurse and Patient.    Chantale Turcios MD  Hospitalist Service, [address]   Contact information available via [facility] Paging/Directory  Please see sign in/sign out for up to date coverage information  ______________________________________________________________________    Interval History    No acute events overnight, nursing notes reviewed.     Feeling overall much better this morning, pain controlled on morphine regimen. Tolerating diet without abdominal pain or nausea. Rash mostly unchanged, legs are more itchy recently  Than when it started. Hands are achy in the joints, but elbows ok though rash is present there as well. Lower leg swelling a little worse, rash unchanged from last night. Notes the bottom of her feet are \"blue\" and tender.     5-pt ROS otherwise negative, including for new fevers, chest pain, shortness of breath, abdominal pain, or nausea/vomiting.         Data reviewed today: I reviewed all medications, new labs and imaging results over the last 24 hours.    Physical Exam   Vital Signs: Temp: 96.3  F (35.7  C) Temp src: Oral BP: 132/72 Pulse: 98   Resp: 16 SpO2: 93 % O2 Device: None (Room air)    Weight: 240 lbs 6.4 oz  Gen: NAD, alert, pleasant, cooperative, non-toxic  HEENT: EOMI, no scleral icterus, no oral ulcers   Resp: CTAB, no crackles or wheezes, no increased WOB  Cardiac: RRR, no S3/S4, no M/R/G appreciated  GI: soft, non-tender, non-distended  Ext: WWP, trace to 1+ edema in lower extremities   Skin: inumberable crusted small blood scattered rash along bilateral lower extremities. Hands with small blood blisters in palmar flexe of the fingers. Elbows with bleeding/crusted " nodules, no lesions over back, chest, abdomen. Bottom of the feet with purplish discoloration (may be deeper purpura but not typical) and tenderness   Neuro: AOx3, CN 2-12 grossly intact, appropriate mentation    Data

## 2020-12-28 NOTE — PLAN OF CARE
"Pt skin check done by writer only d/t COVID precautions. Pt skin is CDI, pt does have some bruising on feet, and some red raised bumps on all extremities. The bumps are very painful, most likely related to pts Carolyn's flare per MD. Pt is up with SBA/A1 d/t pain. Urine sample collected and sent, last BM 12/26. First morning urine UA ordered as well to be collected on 12/28. Rheum saw pt in the room, labs ordered per rheum recs. Pt got x1 dose of IV morphine on unit. Med helped \"a little\" per pt, but it helped more than the IM, PO and IV dilaudid pt got on rivers[place]. PRN q3 morphine ordered, pt would like to stay on top of pain meds ON. On reg diet, poor appetite. Pt is obs status:    vital signs normal or at patient baseline - MET, 18:00 VSS, 14:00 VS were stable exc for tachycardia.   -tolerating oral intake to maintain hydration - MET, pt tolerating water and apple juice at bedside.  -adequate pain control on oral analgesics - NOT MET, pt oon IV morphine, PO dilaudid and oxy did not help when pt was on riverside per pt.   -tolerating oral antibiotics or has plans for home infusion setup - Not MET, no abx plan in place.  -infection is improving - Not MET, follow uplabs not done to monitor pts infectious status.   "

## 2020-12-28 NOTE — PLAN OF CARE
"/71 (BP Location: Right arm)   Pulse 94   Temp 97.5  F (36.4  C) (Oral)   Resp 16   Ht 1.626 m (5' 4\")   Wt 109 kg (240 lb 6.4 oz)   LMP 12/15/2020 (Approximate)   SpO2 95%   Breastfeeding No   BMI 41.26 kg/m      Status: GPA flare  Activity: SBA/up ad drake  Neuros: A&Ox4, no deficits noted.  Cardiac: WDL, denies chest pain.  Respiratory: WDL on RA, denies SOB. Infreq cough/dry throat managed with robitussin and throat lozenges.  GI/: +BS, LBM 12/26, voids spont/not saving.  Diet: Tolerating regular diet.  Skin/Incisions: WDL ex BLE rash and scattered blisters/dry scabs on bilateral elbows and finger joints r/t GPA flare up.  Lines/Drains: L PIV running NS at 125.  Pain/Nausea: Pain manged with PRN morphine, denies nausea.  New Changes: No acute changes this shift.  Plan: Continue to monitor and follow POC.    "

## 2020-12-28 NOTE — PLAN OF CARE
Time: 2949-5336    Reason for admission: Myalgia, arthralgia    Pt is A&Ox4, up w/ SBA, calls appropriately. VSS on RA ex intermittently tachy in low 100s. C/o generalized body aches and joint pain -- currently managed well w/ IV morphine q3h and PO tylenol. PRN robitussin-codeine given for infrequent dry/congested cough, cepacol lozenge given for sore throat. LS clear, denies SOB. Trace edema in bilateral feet. Voiding spontaneously, LBM 12/26 per pt -- declined stool softeners. Regular diet. L PIV infusing NS @ 125mL/hr. Generalized purpura rash on all extremities.    Shift highlights: Pt is inpatient status, gold cross cover paged to remove OBS ords. Transfer to  med/surg, pt hand off report given to Yaz HENNESSY.    Plan: Rheumatology following, continue steroids and pain management

## 2020-12-28 NOTE — PROGRESS NOTES
Admitted/transferred from:   2 RN full   skin assessment completed by Yaz Downs RN and Julisa EDUARDO RN.  Skin assessment finding: rash on BLEs, small raised diffuse blisters/petechiae; larger blisters/scabs on bilateral elbows and finger joints.   Interventions/actions: skin interventions include standard education on appropriate skin health management and continued monitoring of rash.     Will continue to monitor.

## 2020-12-28 NOTE — PLAN OF CARE
Per charge update, ED OBS is singing off and GOLD medicine will take over cares. Pt will be changed to inpt status, still on obs as of now.

## 2020-12-29 LAB
ANA SER QL IF: NEGATIVE
ANCA AB PATTERN SER IF-IMP: ABNORMAL
ANION GAP SERPL CALCULATED.3IONS-SCNC: 6 MMOL/L (ref 3–14)
BUN SERPL-MCNC: 13 MG/DL (ref 7–30)
C-ANCA TITR SER IF: ABNORMAL {TITER}
CALCIUM SERPL-MCNC: 9.1 MG/DL (ref 8.5–10.1)
CHLORIDE SERPL-SCNC: 110 MMOL/L (ref 94–109)
CO2 SERPL-SCNC: 26 MMOL/L (ref 20–32)
CREAT SERPL-MCNC: 0.67 MG/DL (ref 0.52–1.04)
CRP SERPL-MCNC: 27 MG/L (ref 0–8)
ERYTHROCYTE [DISTWIDTH] IN BLOOD BY AUTOMATED COUNT: 11.6 % (ref 10–15)
GFR SERPL CREATININE-BSD FRML MDRD: >90 ML/MIN/{1.73_M2}
GLUCOSE SERPL-MCNC: 84 MG/DL (ref 70–99)
HCT VFR BLD AUTO: 36.1 % (ref 35–47)
HGB BLD-MCNC: 11.3 G/DL (ref 11.7–15.7)
MCH RBC QN AUTO: 28.5 PG (ref 26.5–33)
MCHC RBC AUTO-ENTMCNC: 31.3 G/DL (ref 31.5–36.5)
MCV RBC AUTO: 91 FL (ref 78–100)
PLATELET # BLD AUTO: 324 10E9/L (ref 150–450)
POTASSIUM SERPL-SCNC: 3.8 MMOL/L (ref 3.4–5.3)
RBC # BLD AUTO: 3.96 10E12/L (ref 3.8–5.2)
RESULT: ABNORMAL
SEND OUTS MISC TEST CODE: ABNORMAL
SEND OUTS MISC TEST SPECIMEN: ABNORMAL
SODIUM SERPL-SCNC: 141 MMOL/L (ref 133–144)
STREP DNASE B SER-ACNC: 824 U/ML (ref 0–260)
TEST NAME: ABNORMAL
WBC # BLD AUTO: 11.5 10E9/L (ref 4–11)

## 2020-12-29 PROCEDURE — 250N000011 HC RX IP 250 OP 636: Performed by: INTERNAL MEDICINE

## 2020-12-29 PROCEDURE — 99232 SBSQ HOSP IP/OBS MODERATE 35: CPT | Performed by: INTERNAL MEDICINE

## 2020-12-29 PROCEDURE — 36415 COLL VENOUS BLD VENIPUNCTURE: CPT | Performed by: INTERNAL MEDICINE

## 2020-12-29 PROCEDURE — 80048 BASIC METABOLIC PNL TOTAL CA: CPT | Performed by: INTERNAL MEDICINE

## 2020-12-29 PROCEDURE — 250N000013 HC RX MED GY IP 250 OP 250 PS 637: Performed by: PHYSICIAN ASSISTANT

## 2020-12-29 PROCEDURE — 250N000013 HC RX MED GY IP 250 OP 250 PS 637: Performed by: INTERNAL MEDICINE

## 2020-12-29 PROCEDURE — 120N000002 HC R&B MED SURG/OB UMMC

## 2020-12-29 PROCEDURE — 99232 SBSQ HOSP IP/OBS MODERATE 35: CPT | Mod: GC | Performed by: INTERNAL MEDICINE

## 2020-12-29 PROCEDURE — 85027 COMPLETE CBC AUTOMATED: CPT | Performed by: INTERNAL MEDICINE

## 2020-12-29 PROCEDURE — 86140 C-REACTIVE PROTEIN: CPT | Performed by: INTERNAL MEDICINE

## 2020-12-29 PROCEDURE — 258N000003 HC RX IP 258 OP 636: Performed by: INTERNAL MEDICINE

## 2020-12-29 PROCEDURE — 250N000011 HC RX IP 250 OP 636: Performed by: PHYSICIAN ASSISTANT

## 2020-12-29 RX ORDER — METHYLPREDNISOLONE SODIUM SUCCINATE 125 MG/2ML
80 INJECTION, POWDER, LYOPHILIZED, FOR SOLUTION INTRAMUSCULAR; INTRAVENOUS ONCE
Status: COMPLETED | OUTPATIENT
Start: 2020-12-29 | End: 2020-12-29

## 2020-12-29 RX ORDER — METHYLPREDNISOLONE SODIUM SUCCINATE 125 MG/2ML
125 INJECTION, POWDER, LYOPHILIZED, FOR SOLUTION INTRAMUSCULAR; INTRAVENOUS
Status: DISCONTINUED | OUTPATIENT
Start: 2020-12-29 | End: 2020-12-30 | Stop reason: HOSPADM

## 2020-12-29 RX ORDER — DIPHENHYDRAMINE HCL 25 MG
50 CAPSULE ORAL ONCE
Status: COMPLETED | OUTPATIENT
Start: 2020-12-29 | End: 2020-12-29

## 2020-12-29 RX ORDER — DIPHENHYDRAMINE HYDROCHLORIDE 50 MG/ML
50 INJECTION INTRAMUSCULAR; INTRAVENOUS
Status: DISCONTINUED | OUTPATIENT
Start: 2020-12-29 | End: 2020-12-30 | Stop reason: HOSPADM

## 2020-12-29 RX ORDER — ALBUTEROL SULFATE 90 UG/1
2 AEROSOL, METERED RESPIRATORY (INHALATION)
Status: DISCONTINUED | OUTPATIENT
Start: 2020-12-29 | End: 2020-12-30 | Stop reason: HOSPADM

## 2020-12-29 RX ORDER — ACETAMINOPHEN 325 MG/1
650 TABLET ORAL ONCE
Status: COMPLETED | OUTPATIENT
Start: 2020-12-29 | End: 2020-12-29

## 2020-12-29 RX ORDER — ALBUTEROL SULFATE 0.83 MG/ML
2.5 SOLUTION RESPIRATORY (INHALATION)
Status: DISCONTINUED | OUTPATIENT
Start: 2020-12-29 | End: 2020-12-30 | Stop reason: HOSPADM

## 2020-12-29 RX ORDER — SODIUM CHLORIDE 9 MG/ML
INJECTION, SOLUTION INTRAVENOUS CONTINUOUS PRN
Status: DISCONTINUED | OUTPATIENT
Start: 2020-12-29 | End: 2020-12-30 | Stop reason: HOSPADM

## 2020-12-29 RX ORDER — SULFAMETHOXAZOLE/TRIMETHOPRIM 800-160 MG
1 TABLET ORAL
Status: DISCONTINUED | OUTPATIENT
Start: 2020-12-30 | End: 2020-12-30 | Stop reason: HOSPADM

## 2020-12-29 RX ADMIN — NICOTINE 1 PATCH: 14 PATCH, EXTENDED RELEASE TRANSDERMAL at 09:33

## 2020-12-29 RX ADMIN — MORPHINE SULFATE 4 MG: 2 INJECTION, SOLUTION INTRAMUSCULAR; INTRAVENOUS at 09:32

## 2020-12-29 RX ADMIN — RITUXIMAB-ABBS 800 MG: 10 INJECTION, SOLUTION INTRAVENOUS at 21:14

## 2020-12-29 RX ADMIN — ACETAMINOPHEN 650 MG: 325 TABLET, FILM COATED ORAL at 20:28

## 2020-12-29 RX ADMIN — METHYLPREDNISOLONE SODIUM SUCCINATE 81.25 MG: 125 INJECTION, POWDER, FOR SOLUTION INTRAMUSCULAR; INTRAVENOUS at 09:32

## 2020-12-29 RX ADMIN — CEPHALEXIN 500 MG: 500 CAPSULE ORAL at 14:37

## 2020-12-29 RX ADMIN — CEPHALEXIN 500 MG: 500 CAPSULE ORAL at 20:36

## 2020-12-29 RX ADMIN — DIPHENHYDRAMINE HYDROCHLORIDE 50 MG: 25 CAPSULE ORAL at 20:28

## 2020-12-29 RX ADMIN — MORPHINE SULFATE 4 MG: 2 INJECTION, SOLUTION INTRAMUSCULAR; INTRAVENOUS at 02:26

## 2020-12-29 RX ADMIN — ENOXAPARIN SODIUM 40 MG: 40 INJECTION SUBCUTANEOUS at 17:15

## 2020-12-29 RX ADMIN — CEPHALEXIN 500 MG: 500 CAPSULE ORAL at 09:32

## 2020-12-29 ASSESSMENT — ACTIVITIES OF DAILY LIVING (ADL)
ADLS_ACUITY_SCORE: 13

## 2020-12-29 NOTE — PROGRESS NOTES
RHEUMATOLOGY PROGRESS NOTE - FELLOW     Cande Shields MRN# [id number]   Age: 20 year old YOB: 2000     Date of Admission: 12/26/2020  Date of initial consult: 12/27/2020    Assessment and Plan:   Summary: 20 year old female with PMHx of GPA (+ND-3) diagnosed at age 10, treated with methotrexate, glucocorticoids and RTX infusions up until about 2012. Patient also recently diagnosed with COVID-19 pneumonia. She was admitted on 12/26/20 with myalgias, arthralgias, pedal edema, and petechial rash also with palpable purpura. Found to have active sediment on urinalysis and updated lab data suggestive of relapse of GPA. Patient started on methylprednisolone.    Interval discussion: Updated imaging and laboratory data notable for R lung nodule on CXR, active sediment with hematuria and proteinuria, but normal kidney function. Inflammatory markers are elevated. C-ANCA returns positive at 1:80 and ND-3 elevated at >8. Complements, CESARIO, MPO, HBV, HCV were negative.     From a COVID perspective, patient is asymptomatic. Denies dyspnea or cough. Initially tested positive on 12/5, but test remains positive on 12/27. Given relapse of GPA, which is more common in patients who are ND-3+, it would be prudent to complete re-induction with rituximab. Patient can get first dose while hospitalized and then will need weekly RTX infusions x4 weeks total. I suspect symptoms at this time are driven by GPA flare and COVID infection has resolved. Will plan to utilize decreased dose of prednisone as it was shown to be just as effective as traditional higher dose steroid taper in recent PEXIVAS trial.    Problem list:  Granulomatosus with polyangiitis, relapse  Active urinary sediment with hematuria, pyuria, proteinuria  R Lung Nodule  COVID pneumonia, recovered    Recommendations:  -- Rituximab 375 mg/m2 IV to be given this evening. (ordered for you along with pretreatment meds including diphenhydramine and APAP).  -- Will need  weekly rituximab infusions x4. Remaining 3 can be completed at outpatient infusion center (we can arrange)  -- Recommend transition to PO prednisone 75 mg daily tomorrow for 4 doses (12/30-1/2/21).  ---- Further prednisone taper as follows:        Prednisone 40 mg daily x 1 week (1/3-1/9), then;        Prednisone 30 mg daily x 2 weeks (1/10-1/23), then;        Prednisone 25 mg daily x 2 weeks (1/24-2/6). Continued taper can be directed by outpatient rheumatologist.  -- Recommend Bactrim, 1 DS tablet thrice weekly, for PJP prophylaxis while daily prednisone dose >20 mg. This can be started at discharge.  -- Recommend initiation of Calcium 1200 mg daily and Vitamin D 800 units daily for bone health optimization while on glucocorticoids.  -- Patient should follow up with outpatient rheumatology, Dr. Call, roughly 2-3 weeks after discharge. (we will arrange)  -- If patient tolerates rituximab well this evening and is doing well tomorrow, rheumatology has no objection to patient discharging tomorrow, 12/30.    The patient was seen and staffed with Dr. Call.     Gideon Cutler, DO  Rheumatology fellow  [phone]    Subjective/24 hour events:   No acute events overnight.  Feels better in her joints and muscles after getting started on the solumedrol.  No cough or dyspnea.  Did endorse some mild blood when she was blowing her nose last night. Quickly achieved hemostasis.         Medications:     Current Facility-Administered Medications   Medication     acetaminophen (TYLENOL) Suppository 650 mg     acetaminophen (TYLENOL) tablet 650 mg     benzocaine-menthol (CEPACOL) 15-3.6 MG lozenge 1 lozenge     carboxymethylcellulose PF (REFRESH PLUS) 0.5 % ophthalmic solution 1 drop     cephALEXin (KEFLEX) capsule 500 mg     enoxaparin ANTICOAGULANT (LOVENOX) injection 40 mg     guaiFENesin-codeine (ROBITUSSIN AC) 100-10 MG/5ML solution 5 mL     lidocaine (XYLOCAINE) 2 % 15 mL, alum & mag hydroxide-simethicone (MAALOX)  "15 mL GI Cocktail     melatonin tablet 1 mg     morphine (PF) injection 4 mg     naloxone (NARCAN) injection 0.2 mg    Or     naloxone (NARCAN) injection 0.4 mg    Or     naloxone (NARCAN) injection 0.2 mg    Or     naloxone (NARCAN) injection 0.4 mg     nicotine (NICODERM CQ) 14 MG/24HR 24 hr patch 1 patch     nicotine Patch in Place     ondansetron (ZOFRAN-ODT) ODT tab 4 mg    Or     ondansetron (ZOFRAN) injection 4 mg     polyethylene glycol (MIRALAX) Packet 17 g     senna-docusate (SENOKOT-S/PERICOLACE) 8.6-50 MG per tablet 1 tablet    Or     senna-docusate (SENOKOT-S/PERICOLACE) 8.6-50 MG per tablet 2 tablet     traZODone (DESYREL) tablet 50 mg            Review of Systems:   Complete 8 point ROS completed and negative unless mentioned in subjective.          Physical Exam:   /60 (BP Location: Right arm)   Pulse 96   Temp 97.2  F (36.2  C) (Oral)   Resp 16   Ht 1.626 m (5' 4\")   Wt 109 kg (240 lb 6.4 oz)   LMP 12/15/2020 (Approximate)   SpO2 96%   Breastfeeding No   BMI 41.26 kg/m      General: NAD  HEENT: NC/AT. EOMI, white sclera  CV: RRR, no m/r/g  Lung: CTAB, no wheezes, rales, or rhonchi. Normal work of breathing.  Abdomen: soft, non-tender, non-distended  Neuro: A&O x3.  Skin: Warm, dry; multiple erythematous papules that are nonblanchable, some with dark centering over her thighs, legs, extensor surface of her elbows. Also some purple, nonblanching papules on palmar surface that have decreased in size from admission.  Msk: FAROM of shoulders, much improved from admission. No synovitis appreciated over hands.          Data:   Labs and imaging reviewed.     Gideon Cutler DO  Rheumatology fellow  [phone]       "

## 2020-12-29 NOTE — PLAN OF CARE
"/60 (BP Location: Right arm)   Pulse 96   Temp 97.2  F (36.2  C) (Oral)   Resp 16   Ht 1.626 m (5' 4\")   Wt 109 kg (240 lb 6.4 oz)   LMP 12/15/2020 (Approximate)   SpO2 96%   Breastfeeding No   BMI 41.26 kg/m      Neuro: A&Ox4, calls appropriately  Cardiac: slightly hypertensive. Denies chest pain.  Respiratory: sats mid 90s on RA, denies SOB. Infrequent dry cough/ throat discomfort.   GI/: +BS, +passing flatus, pt reports one BM today. Voiding adequately, not saving.   Diet: Regular diet, tolerating well   Pain/Nausea: C/O generalized all over pain. Currently managed with PRN morphine IV. Denies nausea.  Skin/Incisions: BLE rash with scattered scabbing and blisters on finger joints due to GPA flare up.  Lines/Drains: Left PIV- saline locked.   Activity: Up independently  New changes this shift: New order for rituximab infusion to start this evening. Premeds tylenol and benadryl will need to be given prior to infusion.   Plan: Continue to monitor and follow POC.  "

## 2020-12-29 NOTE — PLAN OF CARE
"/62 (BP Location: Right arm)   Pulse 98   Temp 98  F (36.7  C) (Oral)   Resp 16   Ht 1.626 m (5' 4\")   Wt 109 kg (240 lb 6.4 oz)   LMP 12/15/2020 (Approximate)   SpO2 94%   Breastfeeding No   BMI 41.26 kg/m      Status: GPA flare  Activity: SBA/up ad drake  Neuros: A&Ox4, no deficits noted.  Cardiac: WDL, denies chest pain.  Respiratory: WDL on RA, denies SOB. Infreq cough/dry throat managed with robitussin and throat lozenges.  GI/: +BS, +BM this shift, voids spont/not saving.  Diet: Tolerating regular diet.  Skin/Incisions: WDL ex BLE rash and scattered blisters/dry scabs on bilateral elbows and finger joints r/t GPA flare up.  Lines/Drains: L PIV SL.  Pain/Nausea: Pain manged with PRN morphine, denies nausea.  New Changes: No acute changes this shift.  Plan: Continue to monitor and follow POC, rheumatology consult today.             "

## 2020-12-29 NOTE — PROGRESS NOTES
[facility]     Medicine Progress Note - Hospitalist Service, [place]        Date of Admission:  12/26/2020  Assessment & Plan       Cande Shields is a 19 yo female with a past medical history of Granulomatosis with Polyangiitis, recent UTI  and ADHD who presented to the ED on 12/27 with a several day history of myalgias, joint pain, and pedal edema. She is admitted to internal medicine for concerns of a GPA flare.     Today  - Repeat solumedrol this AM  - continue morphine       Granulomatosis with Polyangiitis Flare - Patient presented to the [facility] ED with complaints of myalgias, joint pain, pedal edema, and a petechial rash of her bilateral LE's for the past several days.  CRP elevated at 39. Chest x-ray with right lung nodule.  Seen by rheumatology in ED who expressed concerns for GPA flare. Of note, patient considered to be in remission since 2012.  Remote treatment with methotrexate, steroids, and Rituximab.   -Rheumatology following, appreciate recs  -Repeat Solu-medrol 80mg this AM, further steroids pending rheum evaluation   -Rheum discussing further recommendation regarding RTX  -Pain control with morphine 4mg Q3H PRN     COVID 19 Infection, recovered - Tested positive 12/5, remains positive with test today (12/27), likely viral shedding. Asymptomatic and afebrile.      Recent Urinary Tract Infection  Proteinuria - Diagnosed 12/22 with a UTI. Started on Keflex for a 10 day course (ending 1/1/2021).  Found to have proteinuria on UA today, likely secondary to suspected GPA flare. Protein to creatinine ratio increased to 1.5   -Continue Keflex course while inpatient         Diet: Regular Diet Adult    DVT Prophylaxis: Enoxaparin (Lovenox) SQ  Guillen Catheter: not present  Code Status: Full Code           Disposition Plan   Expected discharge: 1-2, recommended to prior living arrangement once adequate pain management/ tolerating PO medications and  rheumatology plan in place .  Entered: Deny Lee MD 12/29/2020, 3:48 PM       The patient's care was discussed with the Bedside Nurse and Patient.    Deny Lee MD  Hospitalist Service, [address]   Contact information available via [facility] Paging/Directory  Please see sign in/sign out for up to date coverage information  ______________________________________________________________________    Interval History    No acute events overnight, feels much improved, pain controlled with current regimen     5-pt ROS otherwise negative      Data reviewed today: I reviewed all medications, new labs and imaging results over the last 24 hours.    Physical Exam   Vital Signs: Temp: 97.2  F (36.2  C) Temp src: Oral BP: 132/60 Pulse: 96   Resp: 16 SpO2: 96 % O2 Device: None (Room air)    Weight: 240 lbs 6.4 oz  Gen: NAD, alert, pleasant, cooperative, non-toxic  HEENT: EOMI, no scleral icterus, no oral ulcers   Resp: CTAB, no crackles or wheezes, no increased WOB  Cardiac: RRR, no S3/S4, no M/R/G appreciated  GI: soft, non-tender, non-distended  Ext: WWP, trace to 1+ edema in lower extremities   Skin: inumberable crusted small blood scattered rash along bilateral lower extremities. Hands with small blood blisters in palmar flexe of the fingers. Elbows with bleeding/crusted nodules, no lesions over back, chest, abdomen. Bottom of the feet with purplish discoloration (may be deeper purpura but not typical) and tenderness   Neuro: AOx3, CN 2-12 grossly intact, appropriate mentation    Data

## 2020-12-30 ENCOUNTER — AMBULATORY - HEALTHEAST (OUTPATIENT)
Dept: MULTI SPECIALTY CLINIC | Facility: CLINIC | Age: 20
End: 2020-12-30

## 2020-12-30 ENCOUNTER — TELEPHONE (OUTPATIENT)
Dept: RHEUMATOLOGY | Facility: CLINIC | Age: 20
End: 2020-12-30

## 2020-12-30 ENCOUNTER — RECORDS - HEALTHEAST (OUTPATIENT)
Dept: ADMINISTRATIVE | Facility: OTHER | Age: 20
End: 2020-12-30

## 2020-12-30 VITALS
HEIGHT: 64 IN | SYSTOLIC BLOOD PRESSURE: 123 MMHG | HEART RATE: 77 BPM | OXYGEN SATURATION: 93 % | RESPIRATION RATE: 16 BRPM | DIASTOLIC BLOOD PRESSURE: 65 MMHG | WEIGHT: 240.4 LBS | BODY MASS INDEX: 41.04 KG/M2 | TEMPERATURE: 99.2 F

## 2020-12-30 LAB
ANION GAP SERPL CALCULATED.3IONS-SCNC: 7 MMOL/L (ref 3–14)
BUN SERPL-MCNC: 15 MG/DL (ref 7–30)
CALCIUM SERPL-MCNC: 8.7 MG/DL (ref 8.5–10.1)
CHLORIDE SERPL-SCNC: 108 MMOL/L (ref 94–109)
CO2 SERPL-SCNC: 25 MMOL/L (ref 20–32)
CREAT SERPL-MCNC: 0.77 MG/DL (ref 0.52–1.04)
CREAT SERPL-MCNC: 0.77 MG/DL (ref 0.52–1.04)
ERYTHROCYTE [DISTWIDTH] IN BLOOD BY AUTOMATED COUNT: 11.8 % (ref 10–15)
GFR ESTIMATE EXT - HISTORICAL: >90 ML/MIN/1.73M2
GFR ESTIMATE, IF BLACK EXT - HISTORICAL: >90 ML/MIN/1.73M2
GFR SERPL CREATININE-BSD FRML MDRD: >90 ML/MIN/{1.73_M2}
GLUCOSE SERPL-MCNC: 84 MG/DL (ref 70–99)
HCT VFR BLD AUTO: 35 % (ref 35–47)
HGB BLD-MCNC: 11 G/DL (ref 11.7–15.7)
MCH RBC QN AUTO: 28.3 PG (ref 26.5–33)
MCHC RBC AUTO-ENTMCNC: 31.4 G/DL (ref 31.5–36.5)
MCV RBC AUTO: 90 FL (ref 78–100)
PLATELET # BLD AUTO: 258 10E9/L (ref 150–450)
POTASSIUM SERPL-SCNC: 3.6 MMOL/L (ref 3.4–5.3)
RBC # BLD AUTO: 3.89 10E12/L (ref 3.8–5.2)
SODIUM SERPL-SCNC: 140 MMOL/L (ref 133–144)
WBC # BLD AUTO: 6.7 10E9/L (ref 4–11)

## 2020-12-30 PROCEDURE — 250N000013 HC RX MED GY IP 250 OP 250 PS 637: Performed by: PHYSICIAN ASSISTANT

## 2020-12-30 PROCEDURE — 250N000012 HC RX MED GY IP 250 OP 636 PS 637: Performed by: INTERNAL MEDICINE

## 2020-12-30 PROCEDURE — 36415 COLL VENOUS BLD VENIPUNCTURE: CPT | Performed by: INTERNAL MEDICINE

## 2020-12-30 PROCEDURE — 80048 BASIC METABOLIC PNL TOTAL CA: CPT | Performed by: INTERNAL MEDICINE

## 2020-12-30 PROCEDURE — 99238 HOSP IP/OBS DSCHRG MGMT 30/<: CPT | Performed by: INTERNAL MEDICINE

## 2020-12-30 PROCEDURE — 250N000013 HC RX MED GY IP 250 OP 250 PS 637: Performed by: INTERNAL MEDICINE

## 2020-12-30 PROCEDURE — 85027 COMPLETE CBC AUTOMATED: CPT | Performed by: INTERNAL MEDICINE

## 2020-12-30 PROCEDURE — 250N000011 HC RX IP 250 OP 636: Performed by: PHYSICIAN ASSISTANT

## 2020-12-30 RX ORDER — POLYETHYLENE GLYCOL 3350 17 G/17G
17 POWDER, FOR SOLUTION ORAL 2 TIMES DAILY PRN
Qty: 510 G | Refills: 3 | Status: ON HOLD | OUTPATIENT
Start: 2020-12-30 | End: 2021-01-29

## 2020-12-30 RX ORDER — AMOXICILLIN 250 MG
1 CAPSULE ORAL 2 TIMES DAILY PRN
Qty: 30 TABLET | Refills: 3 | Status: ON HOLD | OUTPATIENT
Start: 2020-12-30 | End: 2021-01-29

## 2020-12-30 RX ORDER — ONDANSETRON 4 MG/1
4 TABLET, ORALLY DISINTEGRATING ORAL EVERY 6 HOURS PRN
Qty: 20 TABLET | Refills: 3 | Status: ON HOLD | OUTPATIENT
Start: 2020-12-30 | End: 2021-02-01

## 2020-12-30 RX ORDER — NICOTINE 21 MG/24HR
1 PATCH, TRANSDERMAL 24 HOURS TRANSDERMAL DAILY
Qty: 30 PATCH | Refills: 0 | Status: ON HOLD | OUTPATIENT
Start: 2020-12-31 | End: 2021-02-01

## 2020-12-30 RX ORDER — OXYCODONE HYDROCHLORIDE 5 MG/1
5 TABLET ORAL EVERY 6 HOURS PRN
Qty: 20 TABLET | Refills: 0 | Status: SHIPPED | OUTPATIENT
Start: 2020-12-30 | End: 2021-01-04

## 2020-12-30 RX ORDER — SULFAMETHOXAZOLE/TRIMETHOPRIM 800-160 MG
1 TABLET ORAL
Qty: 24 TABLET | Refills: 0 | Status: ON HOLD | OUTPATIENT
Start: 2021-01-01 | End: 2021-02-01

## 2020-12-30 RX ORDER — PREDNISONE 5 MG/1
TABLET ORAL
Qty: 255 TABLET | Refills: 0 | Status: ON HOLD | OUTPATIENT
Start: 2020-12-31 | End: 2021-01-29

## 2020-12-30 RX ORDER — ACETAMINOPHEN 325 MG/1
650 TABLET ORAL EVERY 4 HOURS PRN
Qty: 1 BOTTLE | Refills: 3 | Status: SHIPPED | OUTPATIENT
Start: 2020-12-30 | End: 2021-04-06

## 2020-12-30 RX ADMIN — MORPHINE SULFATE 4 MG: 2 INJECTION, SOLUTION INTRAMUSCULAR; INTRAVENOUS at 10:49

## 2020-12-30 RX ADMIN — SULFAMETHOXAZOLE AND TRIMETHOPRIM 1 TABLET: 800; 160 TABLET ORAL at 10:07

## 2020-12-30 RX ADMIN — CEPHALEXIN 500 MG: 500 CAPSULE ORAL at 10:02

## 2020-12-30 RX ADMIN — PREDNISONE 75 MG: 5 TABLET ORAL at 10:01

## 2020-12-30 RX ADMIN — MORPHINE SULFATE 4 MG: 2 INJECTION, SOLUTION INTRAMUSCULAR; INTRAVENOUS at 02:38

## 2020-12-30 RX ADMIN — NICOTINE 1 PATCH: 14 PATCH, EXTENDED RELEASE TRANSDERMAL at 10:02

## 2020-12-30 ASSESSMENT — ACTIVITIES OF DAILY LIVING (ADL)
ADLS_ACUITY_SCORE: 13

## 2020-12-30 NOTE — TELEPHONE ENCOUNTER
Left message for Cande to return my call in regards to setting up an appointment with Dr. Call.    Lexis Tellez MSN, RN  Rheumatology RN Care Coordinator  [facility]          ----- Message from Gideon Cutler DO sent at 12/30/2020  2:27 PM CST -----  Mrs. Shields discharged earlier today, 12/30. She is a patient who relapsed with ANCA vasculitis and was starting rituximab therapy again. She had her first dose on 12/30. She will need it weekly x3 more doses in the infusion center. Dr. Call would also like to see her in the clinic in about 2-3 weeks. Thanks so much for your help!    Neil Cutler  Rheumatology fellow  p:[phone]

## 2020-12-30 NOTE — PLAN OF CARE
Patient discharging. Discharge paperwork was reviewed with patient. Patient expressed understanding. A copy was given to patient. Pt discharging home. Significant other will be transport. Discharge medications available at outside pharmacy; pt was advised to pickup medications after discharge. All patient belongings were taken with patient.     Patient refused discharge wheel chair transport; patient wanted to walk to lobby. Alert and oriented at discharge. Denied pain.

## 2020-12-30 NOTE — PLAN OF CARE
Problem: Adult Inpatient Plan of Care  Goal: Plan of Care Review  Outcome: Adequate for Discharge  Goal: Patient-Specific Goal (Individualized)  Outcome: Adequate for Discharge  Goal: Absence of Hospital-Acquired Illness or Injury  Outcome: Adequate for Discharge  Intervention: Identify and Manage Fall Risk  Recent Flowsheet Documentation  Taken 12/30/2020 1000 by MYRON EISENBERG  Safety Promotion/Fall Prevention:   nonskid shoes/slippers when out of bed   patient and family education   assistive device/personal items within reach  Intervention: Prevent Skin Injury  Recent Flowsheet Documentation  Taken 12/30/2020 1000 by MYRON EISENBERG  Body Position: position changed independently  Intervention: Prevent and Manage VTE (Venous Thromboembolism) Risk  Recent Flowsheet Documentation  Taken 12/30/2020 1000 by MYRON EISENBERG  VTE Prevention/Management: ambulation promoted  Goal: Optimal Comfort and Wellbeing  Outcome: Adequate for Discharge  Goal: Readiness for Transition of Care  Outcome: Adequate for Discharge

## 2020-12-30 NOTE — PLAN OF CARE
"BP (!) 146/75 (BP Location: Right arm)   Pulse 89   Temp 98.3  F (36.8  C) (Oral)   Resp 16   Ht 1.626 m (5' 4\")   Wt 109 kg (240 lb 6.4 oz)   LMP 12/15/2020 (Approximate)   SpO2 100%   Breastfeeding No   BMI 41.26 kg/m      Activity: up ad drake/SBA  Neuros: WDL, intermittently anxious  Cardiac: WDL, VSS  Respiratory: WDL, sating well on RA  GI/: Voiding spontaneously, no BM this shift  Diet: Regular, tolerating well  Skin: Diffuse rash on BLE  Lines: PIV infusing Truxima infusion, then SL   Pain/nausea: IV morphine   New changes this shift: Truxima infusion this shift, monitoring VS per order. No infusion reaction.  Plan:   Continue plan of care. Possible discharge today      "

## 2020-12-30 NOTE — PROVIDER NOTIFICATION
Dr Lee paged by this RN via text page, asking if IV morphine was ok to give if patient was being discharged today. Awaiting return communication or orders.     Will continue to monitor.     Michael Cornell RN

## 2020-12-31 ENCOUNTER — RECORDS - HEALTHEAST (OUTPATIENT)
Dept: ADMINISTRATIVE | Facility: OTHER | Age: 20
End: 2020-12-31

## 2020-12-31 ENCOUNTER — TELEPHONE (OUTPATIENT)
Dept: RHEUMATOLOGY | Facility: CLINIC | Age: 20
End: 2020-12-31

## 2020-12-31 DIAGNOSIS — I77.82 ANCA-ASSOCIATED VASCULITIS (H): ICD-10-CM

## 2020-12-31 RX ORDER — ACETAMINOPHEN 325 MG/1
650 TABLET ORAL ONCE
Status: CANCELLED
Start: 2021-01-06

## 2020-12-31 RX ORDER — DIPHENHYDRAMINE HCL 25 MG
50 CAPSULE ORAL ONCE
Status: CANCELLED
Start: 2021-01-06

## 2020-12-31 RX ORDER — HEPARIN SODIUM (PORCINE) LOCK FLUSH IV SOLN 100 UNIT/ML 100 UNIT/ML
5 SOLUTION INTRAVENOUS
Status: CANCELLED | OUTPATIENT
Start: 2021-01-06

## 2020-12-31 RX ORDER — HEPARIN SODIUM,PORCINE 10 UNIT/ML
5 VIAL (ML) INTRAVENOUS
Status: CANCELLED | OUTPATIENT
Start: 2021-01-06

## 2020-12-31 NOTE — TELEPHONE ENCOUNTER
Left message for Cande to return my call in regards to scheduling infusion appointment and appointment with Dr. Call .    Lexis Tellez MSN, RN  Rheumatology RN Care Coordinator   [place]

## 2020-12-31 NOTE — TELEPHONE ENCOUNTER
Made several calls to various infusion centers to make sure that Cande is able to get her next infusion on 1/6/2021.  The only infusion center that can accommodate is [facility] infusion center at [address]; [place], [place]  On the 1st floor infusion center.  Infusion orders and note and face sheet have been faxed at [phone].    Lexis RETANA, RN  Rheumatology RN Care Coordinator  [facility]    ----- Message from Ramses Call MD sent at 12/30/2020  4:30 PM CST -----  Thank you Lexis.  Even more than an appointment with me, she needs rituximab orders for 1 week from today. 375 mg/m2 IV with standard pre-medications (no methylprednisolone). Are you able to copy the orders that Dr. Cutler and I placed for patient in the hospital last night?  Thanks.  ----- Message -----  From: Lexis Tellez RN  Sent: 12/30/2020   2:48 PM CST  To: Ramses Call MD, #    I have left a message for her to return my call; but I will continue to try to reach her.     Lexis RETANA, RN  Rheumatology RN Care Coordinator  [facility]      ----- Message -----  From: Gideon Cutler DO  Sent: 12/30/2020   2:27 PM CST  To: Ramses Call MD, Lexis Tellez RN    Hi Lexis,     Mrs. Shields discharged earlier today, 12/30. She is a patient who relapsed with ANCA vasculitis and was starting rituximab therapy again. She had her first dose on 12/30. She will need it weekly x3 more doses in the infusion center. Dr. Call would also like to see her in the clinic in about 2-3 weeks. Thanks so much for your help!    Neil Cutler  Rheumatology fellow  p:[phone]

## 2020-12-31 NOTE — TELEPHONE ENCOUNTER
Left message on the mothers phone as well to have Cande return my call.     Lexis Tellez MSN, RN  Rheumatology RN Care Coordinator   [place]

## 2020-12-31 NOTE — TELEPHONE ENCOUNTER
Left message for Cande to return my call and also left her my direct line to call     Lexis Tellez MSN, RN  Rheumatology RN Care Coordinator  [place]

## 2020-12-31 NOTE — DISCHARGE SUMMARY
[facility]   Hospitalist Discharge Summary      Date of Admission:  12/26/2020  Date of Discharge:  12/30/2020  1:48 PM  Discharging Provider: Deny Lee MD  Discharge Team: Hospitalist Service, Gold 11    Discharge Diagnoses   Please see below on Hospital course     Follow-ups Needed After Discharge   Follow-up Appointments     Adult [facility] Follow-up and recommended labs and tests      Follow up with primary care provider, Trudy Faye, within 7 days for   hospital follow- up.  No follow up labs or test are needed.    You will need 3 more Rituximab infusions, each given weekly. The   Rheumatology team will arrange and give you a call for appointments     Please follow up with your Rheumatologist in 2-3 weeks (you will get a   call for this appointment)      Appointments on [place] and/or [facility] (with [facility] or [facility]   provider or service). Call [phone] if you haven't heard regarding   these appointments within 7 days of discharge.             Unresulted Labs Ordered in the Past 30 Days of this Admission     Date and Time Order Name Status Description    12/28/2020 0001 Cryoglobulin quantitative In process     12/28/2020 0001 Cryoglobulin identification In process       These results will be followed up by Ordering physician     Discharge Disposition   Discharged to home  Condition at discharge: Good    Hospital Course   Cande Shields is a 19 yo female with a past medical history of Granulomatosis with Polyangiitis, recent UTI  and ADHD who presented to the ED on 12/27 with a several day history of myalgias, joint pain, and pedal edema. She is admitted to internal medicine for concerns of a GPA flare.      Granulomatosis with Polyangiitis Flare  Patient presented to the [facility] ED with complaints of myalgias, joint pain, pedal edema, and a petechial rash of her bilateral LE's for the past several days.  CRP elevated at 39. Chest x-ray with  right lung nodule.  Seen by rheumatology in ED who expressed concerns for GPA flare. Of note, patient considered to be in remission since 2012.  Remote treatment with methotrexate, steroids, and Rituximab.   -Rheumatology following, appreciate recs  -S/p Solu-medrol 80mg and 1 dose of Rituximab   -Recommended steroid taper (slow) and weekly Rituximab x3   -Rheum to arrange f/u and further treatment      COVID 19 Infection, recovered - Tested positive 12/5, remains positive with test today (12/27), likely viral shedding. Asymptomatic and afebrile.      Recent Urinary Tract Infection  Proteinuria - Diagnosed 12/22 with a UTI. Started on Keflex for a 10 day course (ending 1/1/2021).  Found to have proteinuria on UA today, likely secondary to suspected GPA flare  -Continue Keflex    Consultations This Hospital Stay   VASCULAR ACCESS CARE ADULT IP CONSULT  VASCULAR ACCESS CARE ADULT IP CONSULT    Code Status   Full Code    Time Spent on this Encounter   I, Deny Lee MD, personally saw the patient today and spent less than or equal to 30 minutes discharging this patient.       Deny Lee MD  [facility] [address] [address]  Phone: [phone]  ______________________________________________________________________    Physical Exam   Vital Signs: Temp: 99.2  F (37.3  C) Temp src: Oral BP: 123/65 Pulse: 77   Resp: 16 SpO2: 93 % O2 Device: None (Room air)    Weight: 240 lbs 6.4 oz    Gen: NAD, alert, pleasant, cooperative, non-toxic  HEENT: EOMI, no scleral icterus, no oral ulcers   Resp: CTAB, no crackles or wheezes, no increased WOB  Cardiac: RRR, no S3/S4, no M/R/G appreciated  GI: soft, non-tender, non-distended  Ext: WWP, trace to 1+ edema in lower extremities   Skin: inumberable crusted small blood scattered rash along bilateral lower extremities. Hands with small blood blisters in palmar flexe of the fingers. Elbows with bleeding/crusted nodules, no lesions  over back, chest, abdomen. Bottom of the feet with purplish discoloration (may be deeper purpura but not typical) and tenderness   Neuro: AOx3, CN 2-12 grossly intact, appropriate mentation          Primary Care Physician   Trudy Faye    Discharge Orders      Reason for your hospital stay    You were hospitalized for fatigue, pain, rash which was presumed to be due to GPA flare treated with steroids and Rituximab     Adult [facility] Follow-up and recommended labs and tests    Follow up with primary care provider, Trudy Faye, within 7 days for hospital follow- up.  No follow up labs or test are needed.    You will need 3 more Rituximab infusions, each given weekly. The Rheumatology team will arrange and give you a call for appointments     Please follow up with your Rheumatologist in 2-3 weeks (you will get a call for this appointment)      Appointments on [place] and/or [facility] (with [facility] or [facility] provider or service). Call [phone] if you haven't heard regarding these appointments within 7 days of discharge.     Activity    Your activity upon discharge: activity as tolerated     Discharge Instructions    1. Please continue the prednisone taper, once you are done with 25 mg taper your Rheumatologist will give you instruction.   2. Please take Bactrim three times a week while on prednisone     Full Code     Diet    Follow this diet upon discharge: Orders Placed This Encounter      Regular Diet Adult       Significant Results and Procedures   Most Recent 3 CBC's:  Recent Labs   Lab Test 12/30/20  0711 12/29/20  0636 12/28/20  0849   WBC 6.7 11.5* 10.8   HGB 11.0* 11.3* 11.3*   MCV 90 91 90    324 324     Most Recent 3 BMP's:  Recent Labs   Lab Test 12/30/20  0711 12/29/20  0636 12/28/20  0849    141 138   POTASSIUM 3.6 3.8 4.1   CHLORIDE 108 110* 110*   CO2 25 26 25   BUN 15 13 11   CR 0.77 0.67 0.61   ANIONGAP 7 6 3   KRAIG 8.7 9.1 8.8   GLC 84 84 113*   ,   Results for orders placed  or performed during the hospital encounter of 12/26/20   XR Chest 2 Views    Narrative    EXAM: XR CHEST 2 VW  LOCATION: [facility]  DATE/TIME: 12/27/2020 3:15 AM    INDICATION: Cough and myalgias. History of Wegener's granulomatosis.  COMPARISON: None.    FINDINGS: The heart size is normal. There is a 1.2 cm nodule in the right lung laterally. The lungs are otherwise clear. No pneumothorax or pleural effusion.      Impression    IMPRESSION: Right lung nodule.       Discharge Medications   Discharge Medication List as of 12/30/2020 12:28 PM      START taking these medications    Details   acetaminophen (TYLENOL) 325 MG tablet Take 2 tablets (650 mg) by mouth every 4 hours as needed for mild pain, Disp-1 Bottle, R-3, E-Prescribe      Calcium Carb-Cholecalciferol (HM CALCIUM-VITAMIN D) 600-800 MG-UNIT TABS Take 1 tablet by mouth daily, Disp-30 tablet, R-3, E-Prescribe      nicotine (NICODERM CQ) 14 MG/24HR 24 hr patch Place 1 patch onto the skin daily, Disp-30 patch, R-0, E-Prescribe      ondansetron (ZOFRAN-ODT) 4 MG ODT tab Take 1 tablet (4 mg) by mouth every 6 hours as needed for nausea or vomiting, Disp-20 tablet, R-3, E-Prescribe      oxyCODONE (ROXICODONE) 5 MG tablet Take 1 tablet (5 mg) by mouth every 6 hours as needed for pain, Disp-20 tablet, R-0, E-Prescribe      polyethylene glycol (MIRALAX) 17 GM/Dose powder Take 17 g by mouth 2 times daily as needed for constipation, Disp-510 g, R-3, E-Prescribe      predniSONE (DELTASONE) 5 MG tablet Take 15 tablets (75 mg) by mouth daily for 3 days, THEN 8 tablets (40 mg) daily for 7 days, THEN 6 tablets (30 mg) daily for 14 days, THEN 5 tablets (25 mg) daily for 14 days., Disp-255 tablet, R-0, E-Prescribe      senna-docusate (SENOKOT-S/PERICOLACE) 8.6-50 MG tablet Take 1 tablet by mouth 2 times daily as needed for constipation, Disp-30 tablet, R-3, E-Prescribe      sulfamethoxazole-trimethoprim (BACTRIM DS) 800-160 MG tablet Take 1 tablet by mouth three  times a week, Disp-24 tablet, R-0, E-Prescribe         CONTINUE these medications which have NOT CHANGED    Details   buPROPion (WELLBUTRIN XL) 150 MG 24 hr tablet Take 1 tablet by mouth every 24 hours., 150 mg, Oral, EVERY 24 HOURS Starting 10/5/2011, Until Discontinued, Disp-30 tablet, R-0, E-Prescribe      cephALEXin (KEFLEX) 500 MG capsule Take 500 mg by mouth 3 times daily, Historical      guaiFENesin-codeine (ROBITUSSIN AC) 100-10 MG/5ML solution Take 5 mLs by mouth 3 times daily as needed, Historical      ibuprofen (ADVIL) 200 MG tablet Take 400 mg by mouth every 4 hours as needed., 400 mg, Oral, EVERY 4 HOURS PRN, Until Discontinued, Historical           Allergies   Allergies   Allergen Reactions     Penicillins Rash     Unknown, but think rash.  Tolerated cephalexin (12/27/20), cefpodoxime (12/27/20)

## 2021-01-04 LAB — CRYOGLOB SER QL: ABNORMAL %

## 2021-01-04 ASSESSMENT — MIFFLIN-ST. JEOR: SCORE: 2136.13

## 2021-01-05 ENCOUNTER — NURSE TRIAGE (OUTPATIENT)
Dept: NURSING | Facility: CLINIC | Age: 21
End: 2021-01-05

## 2021-01-05 ENCOUNTER — AMBULATORY - HEALTHEAST (OUTPATIENT)
Dept: INFUSION THERAPY | Facility: HOSPITAL | Age: 21
End: 2021-01-05

## 2021-01-05 ENCOUNTER — TELEPHONE (OUTPATIENT)
Dept: RHEUMATOLOGY | Facility: CLINIC | Age: 21
End: 2021-01-05

## 2021-01-05 DIAGNOSIS — I77.82 ANCA-ASSOCIATED VASCULITIS (H): Primary | ICD-10-CM

## 2021-01-05 DIAGNOSIS — I77.82 ANCA-ASSOCIATED VASCULITIS (H): ICD-10-CM

## 2021-01-05 DIAGNOSIS — M31.30 WEGENER'S GRANULOMATOSIS: ICD-10-CM

## 2021-01-05 LAB
ALBUMIN SERPL ELPH-MCNC: NEGATIVE G/DL
CRYOGLOB IGA & IGG & IGM SER-IMP: NORMAL
CRYOGLOB TYP SER IFE: NEGATIVE

## 2021-01-05 NOTE — TELEPHONE ENCOUNTER
"Cande needs a post Covid-19 clearance in order to receive her infusion scheduled for tomorrow.    She initially tested Positive for Covid-19 a little more than a month ago (with [facility] in [place]).    She is still testing Positive. She was just notified that she would need clearance from a provider before she is allowed to have the infusion tomorrow.    She was contacted by \"Lexis\" with [facility] center.  [phone]    Notified that on-call provider would be paged. Call back if no provider contact within 20 minutes    4:34 pm - Contacted page , [place].  On-call provider, Dr. Kori Bain to contact pt directly @ [phone]    Anna Hollins RN  [facility] Nurse Advisors      Reason for Disposition    [1] Follow-up call from patient regarding patient's clinical status AND [2] information urgent    Protocols used: PCP CALL - NO TRIAGE-A-      "

## 2021-01-05 NOTE — TELEPHONE ENCOUNTER
"COVID 19 Infection, recovered - Tested positive 12/5, remains positive with test today (12/27), likely viral shedding. Asymptomatic and afebrile.     Additional documentation faxed [phone] to  infusion center regarding \"COVID RECOVERED\" as infusion center would actually like Cande to retest.     Infusion center says they will more than likely test her for COVID when she arrives in the AM.  They say they will go ahead and still continue on with the infusion.    Cande has been contacted to let her know that the infusion will still take place as scheduled.     Lexis Tellez MSN, RN  Rheumatology RN Care Coordinator  [facility]      "

## 2021-01-06 ENCOUNTER — INFUSION - HEALTHEAST (OUTPATIENT)
Dept: INFUSION THERAPY | Facility: HOSPITAL | Age: 21
End: 2021-01-06

## 2021-01-06 ENCOUNTER — COMMUNICATION - HEALTHEAST (OUTPATIENT)
Dept: SCHEDULING | Facility: CLINIC | Age: 21
End: 2021-01-06

## 2021-01-06 DIAGNOSIS — I77.82 ANCA-ASSOCIATED VASCULITIS (H): ICD-10-CM

## 2021-01-06 LAB
IGA SERPL-MCNC: 121 MG/DL (ref 65–400)
IGA SERPL-MCNC: 1231 MG/DL
IGM SERPL-MCNC: 44 MG/DL (ref 60–280)
SARS-COV-2 PCR COMMENT: NORMAL
SARS-COV-2 RNA SPEC QL NAA+PROBE: NEGATIVE
SARS-COV-2 VIRUS SPECIMEN SOURCE: NORMAL

## 2021-01-06 ASSESSMENT — MIFFLIN-ST. JEOR: SCORE: 1960.76

## 2021-01-07 LAB
CD19 CELLS # BLD: 27 /UL (ref 44–582)
CD19 CELLS NFR BLD: 3 % (ref 3–27)
CD3 CELLS # BLD: 881 /UL
CD3 CELLS NFR BLD: 86 % (ref 53–86)
CD3+CD4+ CELLS # BLD: 509 /UL
CD3+CD4+ CELLS NFR BLD: 50 % (ref 31–67)
CD3+CD4+ CELLS/CD3+CD8+ CLL BLD: 1.44 % (ref 0.8–4.5)
CD3+CD8+ CELLS # BLD: 353 /UL (ref 117–923)
CD3+CD8+ CELLS NFR BLD: 35 % (ref 13–44)
CD3-CD16+CD56+ CELLS # BLD: 109 /UL (ref 66–546)
CD3-CD16+CD56+ CELLS NFR BLD: 11 % (ref 4–27)
LYMPHOCYTES # BLD AUTO: 1 THOU/UL (ref 0.8–4.4)

## 2021-01-13 ENCOUNTER — INFUSION - HEALTHEAST (OUTPATIENT)
Dept: INFUSION THERAPY | Facility: HOSPITAL | Age: 21
End: 2021-01-13

## 2021-01-13 DIAGNOSIS — I77.82 ANCA-ASSOCIATED VASCULITIS (H): ICD-10-CM

## 2021-01-15 ENCOUNTER — HEALTH MAINTENANCE LETTER (OUTPATIENT)
Age: 21
End: 2021-01-15

## 2021-01-20 ENCOUNTER — INFUSION - HEALTHEAST (OUTPATIENT)
Dept: INFUSION THERAPY | Facility: HOSPITAL | Age: 21
End: 2021-01-20

## 2021-01-20 DIAGNOSIS — I77.82 ANCA-ASSOCIATED VASCULITIS (H): ICD-10-CM

## 2021-01-26 ENCOUNTER — OFFICE VISIT (OUTPATIENT)
Dept: RHEUMATOLOGY | Facility: CLINIC | Age: 21
End: 2021-01-26
Attending: INTERNAL MEDICINE
Payer: COMMERCIAL

## 2021-01-26 VITALS
DIASTOLIC BLOOD PRESSURE: 86 MMHG | TEMPERATURE: 98.3 F | HEART RATE: 91 BPM | WEIGHT: 246.7 LBS | SYSTOLIC BLOOD PRESSURE: 130 MMHG | OXYGEN SATURATION: 96 % | BODY MASS INDEX: 42.35 KG/M2

## 2021-01-26 DIAGNOSIS — D84.9 IMMUNOSUPPRESSED STATUS (H): ICD-10-CM

## 2021-01-26 DIAGNOSIS — I77.82 ANCA-ASSOCIATED VASCULITIS (H): Primary | ICD-10-CM

## 2021-01-26 DIAGNOSIS — E66.01 MORBID OBESITY (H): ICD-10-CM

## 2021-01-26 PROCEDURE — 99215 OFFICE O/P EST HI 40 MIN: CPT | Performed by: INTERNAL MEDICINE

## 2021-01-26 PROCEDURE — 85025 COMPLETE CBC W/AUTO DIFF WBC: CPT | Performed by: INTERNAL MEDICINE

## 2021-01-26 PROCEDURE — G0463 HOSPITAL OUTPT CLINIC VISIT: HCPCS

## 2021-01-26 RX ORDER — PREDNISONE 10 MG/1
TABLET ORAL
Qty: 150 TABLET | Refills: 1 | Status: ON HOLD | OUTPATIENT
Start: 2021-01-26 | End: 2021-02-01

## 2021-01-26 RX ORDER — SULFAMETHOXAZOLE/TRIMETHOPRIM 800-160 MG
TABLET ORAL
Qty: 12 TABLET | Refills: 1 | Status: ON HOLD | OUTPATIENT
Start: 2021-01-26 | End: 2021-01-29

## 2021-01-26 SDOH — HEALTH STABILITY: MENTAL HEALTH: HOW MANY STANDARD DRINKS CONTAINING ALCOHOL DO YOU HAVE ON A TYPICAL DAY?: NOT ASKED

## 2021-01-26 SDOH — HEALTH STABILITY: MENTAL HEALTH: HOW OFTEN DO YOU HAVE A DRINK CONTAINING ALCOHOL?: 2-3 TIMES A WEEK

## 2021-01-26 SDOH — HEALTH STABILITY: MENTAL HEALTH: HOW OFTEN DO YOU HAVE 6 OR MORE DRINKS ON ONE OCCASION?: NOT ASKED

## 2021-01-26 NOTE — PROGRESS NOTES
Rheumatology Clinic Visit 1-in person     Cande Shields MRN# [id number]   YOB: 2000 Age: 20 year old     Date of Visit: 01/26/2021  Primary care provider: Trudy Faye          Assessment and Plan:     #Granulomatous polyangiitis, flare in : Skin rash on the legs has improved, but shortness of breath, frequent bloody nose, fatigue, and bumps on the skin continue.  Physical exam today is remarkable for normal nose and throat exam; flesh colored firm papules over the extensor surfaces of both elbows persist; fading erythematous papules and macules on both thighs and legs are present but are less intense than lesions that were seen during physical exam in late December 2020.  On December 28, 2020, ANCA was positive at 1-80; proteinase 3 antibodies were greater than 8 units.  MPO was negative.  Complement C3 and C4 and CESARIO were normal or negative; anti-DNase B antibody was elevated at 824.  Hepatitis B and C were negative.  Urinalysis showed large blood with greater than 182 red cells per high-powered field.  CBC showed mild anemia with hemoglobin of 11.0.  CRP was 27 at the time of discharge on December 29.  Comprehensive metabolic panel was unremarkable except for an albumin of 2.5.    Patient has now completed induction therapy for GPA flare, started in late  with 1 mg/kg/day prednisone, and a 4 dose course of rituximab 375 mg/m  completed in late January 2021.  There are multiple persistent symptoms.  Some are likely attributable to high-dose prednisone exposure (tremor, weight gain, leg swelling, easy bruising), but others raise questions aise questions about potential ongoing inflammatory conditions, including infection or persistently active GPA. Studies to check inflammation, kidney function, blood counts, and urine are in order.  I will request consultations with ENT, to investigate reports of daily bloody nasal discharge, and with dermatology, to gain input regarding the  cause of persistent painful elbow papules.  At the same time, prednisone may be tapered now that rituximab course has been completed.  Measures to prevent steroid effects on bone density and to prevent pneumocystis infection are appropriate.    #  Urinary discharge: Bladder infection versus kidney infection; symptom needs investigation.    Plan:  1.  Dermatology consultation regarding painful elbow bumps.  2.  ENT consultation regarding persistent bloody noses and cough productive of phlegm.  3.  Blood work today for creatinine, CBC, CRP, LFTs, and urinalysis with culture  4.  Reduce prednisone to 50 mg daily for 1 week, then 40 mg daily for 1 week then 30 mg daily for 1 week, then 20 mg daily.  5. Take calcium carbonate 1200 meq daily and Vit D 800 international unit(s) daily to protect bones.  6. Take bactrim ds 3 times weekly to prevent pneumocystis until prednisone 20 mg per day or less.    RTC 4 weeks    Ramses Call MD  Staff Rheumatologist, [facility] spent >50% of this 40 minute encounter in counseling and coordination of care regarding the patient's complex medical problem of granulomatous polyangiitis.              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     Background: Granulomatosis with polyangitis dx at age 10, with PR3 positivitiy, initially treated with Methotrexate, Steroids, Rituximab infusions Q6-8 months until about 2012, since then had been in remission.    On December 26 2020, she was admitted to [facility] for for myalgias, arthralgias, pedal edema and a  "petechial non blanching palpable purpura rash involving her thighs, legs, elbows, chin and neck (vasculitis). She had proteinuria on UA, lung nodule on CXR, elevated inflammatory markers.  Although COVID-19 infection was also present, a flare of granulomatous polyangiitis was diagnosed.  The patient was started on Solu-Medrol 1 mg/kg/day.  She was also begun on induction protocol with rituximab 375 mg/m  IV 4 doses, given on a weekly schedule.  She improved, and was discharged on December 30, 2020 on high-dose prednisone.    Interval history 01-:    She had more bumps on her feet,a nd her joints started to ache when she went down to 40 mg prednisone after a few days.    She notes tremors and shakinesss in her hands, and her legs are swollen on prednisone.    Skin on her legs has cleared up. \"bumps\" on her elbows persist, no larger.    She is breathless with minimal exertion, like climbing stairs. She notes hot flashes, she sometiems has sweats as well, sudden onset after being cold.    She continues to cough; there is frequently flecks of blood, usually dark.    Her nose is dry despite use of a humidifier. She also has had a bloody nose daily for the last 10 days.            Review of Systems:       Constitutional: negative  Skin: negative  Eyes: negative  Ears/Nose/Throat: negative  Respiratory: No shortness of breath, dyspnea on exertion, cough, or hemoptysis  Cardiovascular: negative  Gastrointestinal: negative  Genitourinary: negative  Musculoskeletal: negative  Neurologic: negative  Psychiatric: negative  Hematologic/Lymphatic/Immunologic: negative  Endocrine: negative          Past Medical History:     Past Medical History:   Diagnosis Date     ADHD (attention deficit hyperactivity disorder)      Wegener's disease, pulmonary (H) 2010    renal biopdy     Past Surgical History:   Procedure Laterality Date     ENT SURGERY  2000    cyst     EXCISE GANGLION WRIST  2009            Social History:     Social " History     Occupational History     Not on file   Tobacco Use     Smoking status: Current Every Day Smoker     Packs/day: 1.00     Smokeless tobacco: Never Used   Substance and Sexual Activity     Alcohol use: Yes     Comment: 3x a week     Drug use: No     Sexual activity: Never            Family History:     Family History   Problem Relation Age of Onset     Thyroid Disease Mother      Asthma No family hx of      Diabetes No family hx of             Allergies:     Allergies   Allergen Reactions     Penicillins Rash     Unknown, but think rash.  Tolerated cephalexin (12/27/20), cefpodoxime (12/27/20)            Medications:     Current Outpatient Medications   Medication Sig Dispense Refill     acetaminophen (TYLENOL) 325 MG tablet Take 2 tablets (650 mg) by mouth every 4 hours as needed for mild pain 1 Bottle 3     buPROPion (WELLBUTRIN XL) 150 MG 24 hr tablet Take 1 tablet by mouth every 24 hours. 30 tablet 0     Calcium Carb-Cholecalciferol (HM CALCIUM-VITAMIN D) 600-800 MG-UNIT TABS Take 1 tablet by mouth daily 30 tablet 3     guaiFENesin-codeine (ROBITUSSIN AC) 100-10 MG/5ML solution Take 5 mLs by mouth 3 times daily as needed       ibuprofen (ADVIL) 200 MG tablet Take 400 mg by mouth every 4 hours as needed.       nicotine (NICODERM CQ) 14 MG/24HR 24 hr patch Place 1 patch onto the skin daily 30 patch 0     ondansetron (ZOFRAN-ODT) 4 MG ODT tab Take 1 tablet (4 mg) by mouth every 6 hours as needed for nausea or vomiting 20 tablet 3     polyethylene glycol (MIRALAX) 17 GM/Dose powder Take 17 g by mouth 2 times daily as needed for constipation 510 g 3     predniSONE (DELTASONE) 5 MG tablet Take 15 tablets (75 mg) by mouth daily for 3 days, THEN 8 tablets (40 mg) daily for 7 days, THEN 6 tablets (30 mg) daily for 14 days, THEN 5 tablets (25 mg) daily for 14 days. 255 tablet 0     senna-docusate (SENOKOT-S/PERICOLACE) 8.6-50 MG tablet Take 1 tablet by mouth 2 times daily as needed for constipation 30 tablet 3      sulfamethoxazole-trimethoprim (BACTRIM DS) 800-160 MG tablet Take 1 tablet by mouth three times a week 24 tablet 0            Physical Exam:   not currently breastfeeding.  Wt Readings from Last 4 Encounters:   12/27/20 109 kg (240 lb 6.4 oz)   10/03/11 83.9 kg (185 lb) (99 %, Z= 2.32)*   01/11/11 61.7 kg (136 lb) (94 %, Z= 1.52)*   07/06/10 54.9 kg (121 lb) (90 %, Z= 1.28)*     * Growth percentiles are based on [facility] (Girls, 2-20 Years) data.       Constitutional: well-developed, appearing stated age; cooperative  Eyes: nl EOM, PERRLA, vision, conjunctiva, sclera  ENT: nl external ears, nose, hearing, lips, teeth, gums, throat  No mucous membrane lesions, normal saliva pool  Neck: no mass or thyroid enlargement  Resp: lungs clear to auscultation, nl to palpation  CV: RRR, no murmurs, rubs or gallops, no edema  GI: no ABD mass or tenderness, no HSM  Lymph: no cervical, supraclavicular, inguinal or epitrochlear nodes  MS: The TMJ, neck, shoulder, elbow, wrist, MCP/PIP/DIP, spine, hip, knee were examined and found normal. No active synovitis or altered joint anatomy. Full joint ROM. Normal  strength. No dactylitis,  tenosynovitis, enthesopathy.  Skin: Fading, poorly marginated purplish 1 to 2 mm macules over thighs and legs; firm 1 to 2 mm papules grouped over both extensor surfaces of the elbows.  Neuro: nl cranial nerves  Psych: nl judgement, orientation, memory, affect.         Data:     No results found for any visits on 01/26/21.  RHEUM RESULTS Latest Ref Rng & Units 12/28/2020 12/29/2020 12/30/2020   COMPLEMENT C3 81 - 157 mg/dL 150 - -   COMPLEMENT C4 13 - 39 mg/dL 30 - -   SED RATE 0 - 20 mm/h - - -   CRP, INFLAMMATION 0.0 - 8.0 mg/L - 27.0(H) -   CK TOTAL 30 - 225 U/L - - -   AST 0 - 45 U/L 15 - -   ALT 0 - 50 U/L 39 - -   ALBUMIN 3.4 - 5.0 g/dL 2.5(L) - -   WBC 4.0 - 11.0 10e9/L 10.8 11.5(H) 6.7   RBC 3.8 - 5.2 10e12/L 3.93 3.96 3.89   HGB 11.7 - 15.7 g/dL 11.3(L) 11.3(L) 11.0(L)   HCT 35.0 - 47.0 %  35.4 36.1 35.0   MCV 78 - 100 fl 90 91 90   MCHC 31.5 - 36.5 g/dL 31.9 31.3(L) 31.4(L)   RDW 10.0 - 15.0 % 11.5 11.6 11.8    - 450 10e9/L 324 324 258   CREATININE 0.52 - 1.04 mg/dL 0.61 0.67 0.77   GFR ESTIMATE, IF BLACK >60 mL/min/[1.73:m2] >90 >90 >90   GFR ESTIMATE >60 mL/min/[1.73:m2] >90 >90 >90   HEPATITIS C ANTIBODY NR:Nonreactive Nonreactive - -        ,  ,  ,  ,  ,  ,   CESARIO interpretation   Date Value Ref Range Status   12/28/2020 Negative NEG^Negative Final     Comment:                                        Reference range:  <1:40  NEGATIVE  1:40 - 1:80  BORDERLINE POSITIVE  >1:80 POSITIVE       ,  ,  ,  ,  ,  ,  ,  ,  ,   Hep B Surface Agn   Date Value Ref Range Status   12/28/2020 Nonreactive NR^Nonreactive Final   ,  ,  ,  ,  ,  ,  ,  ,  ,  ,  ,   Neutrophil Cytoplasmic IgG Antibody   Date Value Ref Range Status   02/22/2011 1:160  Final     Comment:     Reference range: <1:20  (Note)  Cytoplasmic ANCA (c-ANCA) staining pattern observed. No  perinuclear ANCA (p-ANCA) pattern seen. Presence of p-ANCA  ruled out on formalin-fixed neutrophils.  TEST INFORMATION: Anti-Neutrophil Cyto Ab, IgG    Neutrophil Cytoplasmic Antibodies (C-ANCA = granular  cytoplasmic staining, P-ANCA = perinuclear staining) are  found in the serum of over 90 percent of patients with  certain necrotizing systemic vasculitides, and usually in  less than 5 percent of patients with collagen vascular  disease or arthritis.  Performed by [facility],  [address] [place] [id number] [phone]  [email or web address], Leonie Damon MD, Lab. Director                                                                           ,   Proteinase 3 Antibody IgG   Date Value Ref Range Status   12/28/2020 >8.0 (H) 0.0 - 0.9 AI Final     Comment:     Positive  Antibody index (AI) values reflect qualitative changes in antibody   concentration that cannot be directly associated with clinical condition or   disease state.       ,    Myeloperoxidase Antibody IgG   Date Value Ref Range Status   12/28/2020 <0.2 0.0 - 0.9 AI Final     Comment:     Negative  Antibody index (AI) values reflect qualitative changes in antibody   concentration that cannot be directly associated with clinical condition or   disease state.       ,   Neutrophil Cytoplasmic Antibody   Date Value Ref Range Status   12/28/2020 1:80 (A) <1:10 [titer] Final   ,   Neutrophil Cytoplasmic Antibody Pattern   Date Value Ref Range Status   12/28/2020   Final    Cytoplasmic ANCA (c-ANCA) staining pattern observed and confirmed on formalin-fixed   neutrophils.       ,   Serine Protease 3   Date Value Ref Range Status   02/22/2011 1684 (H)  Final     Comment:     Reference range: 0 to 19  Unit: AU/mL  (Note)  REFERENCE INTERVAL: Serine Protease 3, IgG     19 AU/mL or Less ........ Negative   20-25 AU/mL ............. Equivocal   26 AU/mL or Greater ..... Positive    Approximately 90% of patients with a P-ANCA pattern by IFA  have antibodies specific for MPO.    Approximately 85% of patients with a C-ANCA pattern by IFA  have antibodies specific for PR3.  Performed by [facility],  [address] [place] [id number] [phone]  [email or web address], Leonie Damon MD, Lab. Director                                                                                                                                                                                                                                    ,   Myeloperox Antibodyys   Date Value Ref Range Status   02/22/2011 0  Final     Comment:     Reference range: 0 to 19  Unit: AU/mL  (Note)  REFERENCE INTERVAL: Myeloperoxidase Abs, IgG     19 AU/mL or Less ......... Negative   20-25 AU/mL .............. Equivocal   26 AU/mL or Greater ...... Positive    Approximately 90% of patients with a P-ANCA pattern by IFA  have antibodies specific for MPO.    Approximately 85% of patients with a C-ANCA pattern by IFA  have antibodies  specific for PR3.                                                                                                        ,  ,  ,  ,  ,  ,  ,  ,  ,  ,  ,  ,

## 2021-01-26 NOTE — NURSING NOTE
Patient has questions about the prednisone dose and also the bactrim.    Chief Complaint   Patient presents with     RECHECK     Hospital follow up         /86 (BP Location: Right arm, Patient Position: Sitting, Cuff Size: Adult Regular)   Pulse 91   Temp 98.3  F (36.8  C)   Wt 111.9 kg (246 lb 11.2 oz)   SpO2 96%   BMI 42.35 kg/m        Cyrus Perez, EMT

## 2021-01-26 NOTE — LETTER
1/26/2021       RE: Cande Shields  [address] [place] [id number]     Dear Colleague,    Thank you for referring your patient, Cande Shields, to the [facility] RHEUMATOLOGY CLINIC Branch at [facility]. Please see a copy of my visit note below.    Rheumatology Clinic Visit 1-in person     Cande Shields MRN# [id number]   YOB: 2000 Age: 20 year old     Date of Visit: 01/26/2021  Primary care provider: Trudy Faye          Assessment and Plan:     #Granulomatous polyangiitis, flare in : Skin rash on the legs has improved, but shortness of breath, frequent bloody nose, fatigue, and bumps on the skin continue.  Physical exam today is remarkable for normal nose and throat exam; flesh colored firm papules over the extensor surfaces of both elbows persist; fading erythematous papules and macules on both thighs and legs are present but are less intense than lesions that were seen during physical exam in late December 2020.  On December 28, 2020, ANCA was positive at 1-80; proteinase 3 antibodies were greater than 8 units.  MPO was negative.  Complement C3 and C4 and CESARIO were normal or negative; anti-DNase B antibody was elevated at 824.  Hepatitis B and C were negative.  Urinalysis showed large blood with greater than 182 red cells per high-powered field.  CBC showed mild anemia with hemoglobin of 11.0.  CRP was 27 at the time of discharge on December 29.  Comprehensive metabolic panel was unremarkable except for an albumin of 2.5.    Patient has now completed induction therapy for GPA flare, started in late  with 1 mg/kg/day prednisone, and a 4 dose course of rituximab 375 mg/m  completed in late January 2021.  There are multiple persistent symptoms.  Some are likely attributable to high-dose prednisone exposure (tremor, weight gain, leg swelling, easy bruising), but others raise questions aise questions about potential ongoing  inflammatory conditions, including infection or persistently active GPA. Studies to check inflammation, kidney function, blood counts, and urine are in order.  I will request consultations with ENT, to investigate reports of daily bloody nasal discharge, and with dermatology, to gain input regarding the cause of persistent painful elbow papules.  At the same time, prednisone may be tapered now that rituximab course has been completed.  Measures to prevent steroid effects on bone density and to prevent pneumocystis infection are appropriate.    #  Urinary discharge: Bladder infection versus kidney infection; symptom needs investigation.    Plan:  1.  Dermatology consultation regarding painful elbow bumps.  2.  ENT consultation regarding persistent bloody noses and cough productive of phlegm.  3.  Blood work today for creatinine, CBC, CRP, LFTs, and urinalysis with culture  4.  Reduce prednisone to 50 mg daily for 1 week, then 40 mg daily for 1 week then 30 mg daily for 1 week, then 20 mg daily.  5. Take calcium carbonate 1200 meq daily and Vit D 800 international unit(s) daily to protect bones.  6. Take bactrim ds 3 times weekly to prevent pneumocystis until prednisone 20 mg per day or less.    RTC 4 weeks    Ramses Call MD  Staff Rheumatologist, [facility] spent >50% of this 40 minute encounter in counseling and coordination of care regarding the patient's complex medical problem of granulomatous polyangiitis.              Active Problem List:     Patient Active Problem List    Diagnosis Date Noted     ANCA-associated vasculitis (H) 12/31/2020     Priority: Medium     Myalgia 12/27/2020     Priority: Medium     Granulomatosis with polyangiitis, unspecified whether renal involvement (H) 12/27/2020     Priority: Medium     Wegener's granulomatosis (H) 10/04/2011     Priority: Medium            History of Present Illness:   Cande PAWEL Cornelius presents for evaluation post hospital discharge.     Background:  "Granulomatosis with polyangitis dx at age 10, with PR3 positivitiy, initially treated with Methotrexate, Steroids, Rituximab infusions Q6-8 months until about 2012, since then had been in remission.    On December 26 2020, she was admitted to [facility] for for myalgias, arthralgias, pedal edema and a petechial non blanching palpable purpura rash involving her thighs, legs, elbows, chin and neck (vasculitis). She had proteinuria on UA, lung nodule on CXR, elevated inflammatory markers.  Although COVID-19 infection was also present, a flare of granulomatous polyangiitis was diagnosed.  The patient was started on Solu-Medrol 1 mg/kg/day.  She was also begun on induction protocol with rituximab 375 mg/m  IV 4 doses, given on a weekly schedule.  She improved, and was discharged on December 30, 2020 on high-dose prednisone.    Interval history 01-:    She had more bumps on her feet,a nd her joints started to ache when she went down to 40 mg prednisone after a few days.    She notes tremors and shakinesss in her hands, and her legs are swollen on prednisone.    Skin on her legs has cleared up. \"bumps\" on her elbows persist, no larger.    She is breathless with minimal exertion, like climbing stairs. She notes hot flashes, she sometiems has sweats as well, sudden onset after being cold.    She continues to cough; there is frequently flecks of blood, usually dark.    Her nose is dry despite use of a humidifier. She also has had a bloody nose daily for the last 10 days.            Review of Systems:       Constitutional: negative  Skin: negative  Eyes: negative  Ears/Nose/Throat: negative  Respiratory: No shortness of breath, dyspnea on exertion, cough, or hemoptysis  Cardiovascular: negative  Gastrointestinal: negative  Genitourinary: negative  Musculoskeletal: negative  Neurologic: negative  Psychiatric: negative  Hematologic/Lymphatic/Immunologic: negative  Endocrine: negative          Past Medical " History:     Past Medical History:   Diagnosis Date     ADHD (attention deficit hyperactivity disorder)      Wegener's disease, pulmonary (H) 2010    renal biopdy     Past Surgical History:   Procedure Laterality Date     ENT SURGERY  2000    cyst     EXCISE GANGLION WRIST  2009            Social History:     Social History     Occupational History     Not on file   Tobacco Use     Smoking status: Current Every Day Smoker     Packs/day: 1.00     Smokeless tobacco: Never Used   Substance and Sexual Activity     Alcohol use: Yes     Comment: 3x a week     Drug use: No     Sexual activity: Never            Family History:     Family History   Problem Relation Age of Onset     Thyroid Disease Mother      Asthma No family hx of      Diabetes No family hx of             Allergies:     Allergies   Allergen Reactions     Penicillins Rash     Unknown, but think rash.  Tolerated cephalexin (12/27/20), cefpodoxime (12/27/20)            Medications:     Current Outpatient Medications   Medication Sig Dispense Refill     acetaminophen (TYLENOL) 325 MG tablet Take 2 tablets (650 mg) by mouth every 4 hours as needed for mild pain 1 Bottle 3     buPROPion (WELLBUTRIN XL) 150 MG 24 hr tablet Take 1 tablet by mouth every 24 hours. 30 tablet 0     Calcium Carb-Cholecalciferol (HM CALCIUM-VITAMIN D) 600-800 MG-UNIT TABS Take 1 tablet by mouth daily 30 tablet 3     guaiFENesin-codeine (ROBITUSSIN AC) 100-10 MG/5ML solution Take 5 mLs by mouth 3 times daily as needed       ibuprofen (ADVIL) 200 MG tablet Take 400 mg by mouth every 4 hours as needed.       nicotine (NICODERM CQ) 14 MG/24HR 24 hr patch Place 1 patch onto the skin daily 30 patch 0     ondansetron (ZOFRAN-ODT) 4 MG ODT tab Take 1 tablet (4 mg) by mouth every 6 hours as needed for nausea or vomiting 20 tablet 3     polyethylene glycol (MIRALAX) 17 GM/Dose powder Take 17 g by mouth 2 times daily as needed for constipation 510 g 3     predniSONE (DELTASONE) 5 MG tablet Take 15  tablets (75 mg) by mouth daily for 3 days, THEN 8 tablets (40 mg) daily for 7 days, THEN 6 tablets (30 mg) daily for 14 days, THEN 5 tablets (25 mg) daily for 14 days. 255 tablet 0     senna-docusate (SENOKOT-S/PERICOLACE) 8.6-50 MG tablet Take 1 tablet by mouth 2 times daily as needed for constipation 30 tablet 3     sulfamethoxazole-trimethoprim (BACTRIM DS) 800-160 MG tablet Take 1 tablet by mouth three times a week 24 tablet 0            Physical Exam:   not currently breastfeeding.  Wt Readings from Last 4 Encounters:   12/27/20 109 kg (240 lb 6.4 oz)   10/03/11 83.9 kg (185 lb) (99 %, Z= 2.32)*   01/11/11 61.7 kg (136 lb) (94 %, Z= 1.52)*   07/06/10 54.9 kg (121 lb) (90 %, Z= 1.28)*     * Growth percentiles are based on [facility] (Girls, 2-20 Years) data.       Constitutional: well-developed, appearing stated age; cooperative  Eyes: nl EOM, PERRLA, vision, conjunctiva, sclera  ENT: nl external ears, nose, hearing, lips, teeth, gums, throat  No mucous membrane lesions, normal saliva pool  Neck: no mass or thyroid enlargement  Resp: lungs clear to auscultation, nl to palpation  CV: RRR, no murmurs, rubs or gallops, no edema  GI: no ABD mass or tenderness, no HSM  Lymph: no cervical, supraclavicular, inguinal or epitrochlear nodes  MS: The TMJ, neck, shoulder, elbow, wrist, MCP/PIP/DIP, spine, hip, knee were examined and found normal. No active synovitis or altered joint anatomy. Full joint ROM. Normal  strength. No dactylitis,  tenosynovitis, enthesopathy.  Skin: Fading, poorly marginated purplish 1 to 2 mm macules over thighs and legs; firm 1 to 2 mm papules grouped over both extensor surfaces of the elbows.  Neuro: nl cranial nerves  Psych: nl judgement, orientation, memory, affect.         Data:     No results found for any visits on 01/26/21.  RHEUM RESULTS Latest Ref Rng & Units 12/28/2020/28/2020 12/29/2020 12/30/2020   COMPLEMENT C3 81 - 157 mg/dL 150 - -   COMPLEMENT C4 13 - 39 mg/dL 30 - -   SED RATE 0 - 20 mm/h  - - -   CRP, INFLAMMATION 0.0 - 8.0 mg/L - 27.0(H) -   CK TOTAL 30 - 225 U/L - - -   AST 0 - 45 U/L 15 - -   ALT 0 - 50 U/L 39 - -   ALBUMIN 3.4 - 5.0 g/dL 2.5(L) - -   WBC 4.0 - 11.0 10e9/L 10.8 11.5(H) 6.7   RBC 3.8 - 5.2 10e12/L 3.93 3.96 3.89   HGB 11.7 - 15.7 g/dL 11.3(L) 11.3(L) 11.0(L)   HCT 35.0 - 47.0 % 35.4 36.1 35.0   MCV 78 - 100 fl 90 91 90   MCHC 31.5 - 36.5 g/dL 31.9 31.3(L) 31.4(L)   RDW 10.0 - 15.0 % 11.5 11.6 11.8    - 450 10e9/L 324 324 258   CREATININE 0.52 - 1.04 mg/dL 0.61 0.67 0.77   GFR ESTIMATE, IF BLACK >60 mL/min/[1.73:m2] >90 >90 >90   GFR ESTIMATE >60 mL/min/[1.73:m2] >90 >90 >90   HEPATITIS C ANTIBODY NR:Nonreactive Nonreactive - -        ,  ,  ,  ,  ,  ,   CESARIO interpretation   Date Value Ref Range Status   12/28/2020 Negative NEG^Negative Final     Comment:                                        Reference range:  <1:40  NEGATIVE  1:40 - 1:80  BORDERLINE POSITIVE  >1:80 POSITIVE       ,  ,  ,  ,  ,  ,  ,  ,  ,   Hep B Surface Agn   Date Value Ref Range Status   12/28/2020 Nonreactive NR^Nonreactive Final   ,  ,  ,  ,  ,  ,  ,  ,  ,  ,  ,   Neutrophil Cytoplasmic IgG Antibody   Date Value Ref Range Status   02/22/2011 1:160  Final     Comment:     Reference range: <1:20  (Note)  Cytoplasmic ANCA (c-ANCA) staining pattern observed. No  perinuclear ANCA (p-ANCA) pattern seen. Presence of p-ANCA  ruled out on formalin-fixed neutrophils.  TEST INFORMATION: Anti-Neutrophil Cyto Ab, IgG    Neutrophil Cytoplasmic Antibodies (C-ANCA = granular  cytoplasmic staining, P-ANCA = perinuclear staining) are  found in the serum of over 90 percent of patients with  certain necrotizing systemic vasculitides, and usually in  less than 5 percent of patients with collagen vascular  disease or arthritis.  Performed by [facility],  [address] [place] [id number] [phone]  [email or web address], Leonie Damon MD, Lab. Director                                                                            ,   Proteinase 3 Antibody IgG   Date Value Ref Range Status   12/28/2020 >8.0 (H) 0.0 - 0.9 AI Final     Comment:     Positive  Antibody index (AI) values reflect qualitative changes in antibody   concentration that cannot be directly associated with clinical condition or   disease state.       ,   Myeloperoxidase Antibody IgG   Date Value Ref Range Status   12/28/2020 <0.2 0.0 - 0.9 AI Final     Comment:     Negative  Antibody index (AI) values reflect qualitative changes in antibody   concentration that cannot be directly associated with clinical condition or   disease state.       ,   Neutrophil Cytoplasmic Antibody   Date Value Ref Range Status   12/28/2020 1:80 (A) <1:10 [titer] Final   ,   Neutrophil Cytoplasmic Antibody Pattern   Date Value Ref Range Status   12/28/2020   Final    Cytoplasmic ANCA (c-ANCA) staining pattern observed and confirmed on formalin-fixed   neutrophils.       ,   Serine Protease 3   Date Value Ref Range Status   02/22/2011 1684 (H)  Final     Comment:     Reference range: 0 to 19  Unit: AU/mL  (Note)  REFERENCE INTERVAL: Serine Protease 3, IgG     19 AU/mL or Less ........ Negative   20-25 AU/mL ............. Equivocal   26 AU/mL or Greater ..... Positive    Approximately 90% of patients with a P-ANCA pattern by IFA  have antibodies specific for MPO.    Approximately 85% of patients with a C-ANCA pattern by IFA  have antibodies specific for PR3.  Performed by [facility],  [address] [place] [address] [phone]  [email or web address], Leonie Damon MD, Lab. Director                                                                                                                                                                                                                                    ,   Myeloperox Antibodyys   Date Value Ref Range Status   02/22/2011 0  Final     Comment:     Reference range: 0 to 19  Unit: AU/mL  (Note)  REFERENCE INTERVAL: Myeloperoxidase Abs,  IgG     19 AU/mL or Less ......... Negative   20-25 AU/mL .............. Equivocal   26 AU/mL or Greater ...... Positive    Approximately 90% of patients with a P-ANCA pattern by IFA  have antibodies specific for MPO.    Approximately 85% of patients with a C-ANCA pattern by IFA  have antibodies specific for PR3.                                                                                                            Again, thank you for allowing me to participate in the care of your patient.      Sincerely,    Ramses Call MD

## 2021-01-26 NOTE — PATIENT INSTRUCTIONS
Diagnosis:  1.  Granulomatous polyangiitis: Skin rash on the legs has improved, but shortness of breath, frequent bloody nose, bumps on the skin raise questions about potential ongoing inflammatory illness.  Blood work to check inflammation, kidney function, blood counts, and urine are in order.  Prednisone may be tapered now that rituximab course has been completed.  2.  Urinary discharge: Bladder infection versus kidney infection needs investigation.    Plan:  1.  Dermatology consultation regarding painful elbow bumps.  2.  ENT consultation regarding persistent bloody noses and cough productive of phlegm.  3.  Blood work today for creatinine, CBC, CRP, LFTs, and urinalysis with culture  4.  Reduce prednisone to 50 mg daily for 1 week, then 40 mg daily for 1 week then 30 mg daily for 1 week, then 20 mg daily.  5. Take calcium carbonate 1200 meq daily and Vit D 800 international unit(s) daily to protect bones.  6. Take bactrim ds 3 times weekly to prevent lung infection.

## 2021-01-27 ENCOUNTER — TELEPHONE (OUTPATIENT)
Dept: OTOLARYNGOLOGY | Facility: CLINIC | Age: 21
End: 2021-01-27

## 2021-01-27 ENCOUNTER — TELEPHONE (OUTPATIENT)
Dept: DERMATOLOGY | Facility: CLINIC | Age: 21
End: 2021-01-27

## 2021-01-27 DIAGNOSIS — I77.82 ANCA-ASSOCIATED VASCULITIS (H): ICD-10-CM

## 2021-01-27 LAB
ALBUMIN SERPL-MCNC: 3 G/DL (ref 3.4–5)
ALBUMIN UR-MCNC: 100 MG/DL
ALP SERPL-CCNC: 84 U/L (ref 40–150)
ALT SERPL W P-5'-P-CCNC: 30 U/L (ref 0–50)
ANION GAP SERPL CALCULATED.3IONS-SCNC: 5 MMOL/L (ref 3–14)
APPEARANCE UR: ABNORMAL
AST SERPL W P-5'-P-CCNC: 4 U/L (ref 0–45)
BASOPHILS # BLD AUTO: 0.1 10E9/L (ref 0–0.2)
BASOPHILS NFR BLD AUTO: 0.6 %
BILIRUB SERPL-MCNC: 0.2 MG/DL (ref 0.2–1.3)
BILIRUB UR QL STRIP: NEGATIVE
BUN SERPL-MCNC: 40 MG/DL (ref 7–30)
CALCIUM SERPL-MCNC: 9 MG/DL (ref 8.5–10.1)
CHLORIDE SERPL-SCNC: 110 MMOL/L (ref 94–109)
CO2 SERPL-SCNC: 25 MMOL/L (ref 20–32)
COLOR UR AUTO: YELLOW
CREAT SERPL-MCNC: 2.05 MG/DL (ref 0.52–1.04)
CRP SERPL-MCNC: 29.7 MG/L (ref 0–8)
DIFFERENTIAL METHOD BLD: ABNORMAL
EOSINOPHIL # BLD AUTO: 0.3 10E9/L (ref 0–0.7)
EOSINOPHIL NFR BLD AUTO: 2.2 %
ERYTHROCYTE [DISTWIDTH] IN BLOOD BY AUTOMATED COUNT: 13.2 % (ref 10–15)
GFR SERPL CREATININE-BSD FRML MDRD: 33 ML/MIN/{1.73_M2}
GLUCOSE SERPL-MCNC: 117 MG/DL (ref 70–99)
GLUCOSE UR STRIP-MCNC: NEGATIVE MG/DL
HCT VFR BLD AUTO: 39.7 % (ref 35–47)
HGB BLD-MCNC: 12.1 G/DL (ref 11.7–15.7)
HGB UR QL STRIP: ABNORMAL
IMM GRANULOCYTES # BLD: 0.3 10E9/L (ref 0–0.4)
IMM GRANULOCYTES NFR BLD: 2.6 %
KETONES UR STRIP-MCNC: NEGATIVE MG/DL
LEUKOCYTE ESTERASE UR QL STRIP: NEGATIVE
LYMPHOCYTES # BLD AUTO: 1.3 10E9/L (ref 0.8–5.3)
LYMPHOCYTES NFR BLD AUTO: 10.3 %
MCH RBC QN AUTO: 26.7 PG (ref 26.5–33)
MCHC RBC AUTO-ENTMCNC: 30.5 G/DL (ref 31.5–36.5)
MCV RBC AUTO: 87 FL (ref 78–100)
MONOCYTES # BLD AUTO: 0.8 10E9/L (ref 0–1.3)
MONOCYTES NFR BLD AUTO: 6.1 %
MUCOUS THREADS #/AREA URNS LPF: PRESENT /LPF
NEUTROPHILS # BLD AUTO: 9.8 10E9/L (ref 1.6–8.3)
NEUTROPHILS NFR BLD AUTO: 78.2 %
NITRATE UR QL: NEGATIVE
NRBC # BLD AUTO: 0 10*3/UL
NRBC BLD AUTO-RTO: 0 /100
PH UR STRIP: 5 PH (ref 5–7)
PLATELET # BLD AUTO: 232 10E9/L (ref 150–450)
POTASSIUM SERPL-SCNC: 4.5 MMOL/L (ref 3.4–5.3)
PROT SERPL-MCNC: 7.2 G/DL (ref 6.8–8.8)
RBC # BLD AUTO: 4.54 10E12/L (ref 3.8–5.2)
RBC #/AREA URNS AUTO: 76 /HPF (ref 0–2)
SODIUM SERPL-SCNC: 140 MMOL/L (ref 133–144)
SOURCE: ABNORMAL
SP GR UR STRIP: 1.01 (ref 1–1.03)
SQUAMOUS #/AREA URNS AUTO: <1 /HPF (ref 0–1)
UROBILINOGEN UR STRIP-MCNC: 0 MG/DL (ref 0–2)
WBC # BLD AUTO: 12.5 10E9/L (ref 4–11)
WBC #/AREA URNS AUTO: 5 /HPF (ref 0–5)

## 2021-01-27 PROCEDURE — 86140 C-REACTIVE PROTEIN: CPT | Performed by: PATHOLOGY

## 2021-01-27 PROCEDURE — 81001 URINALYSIS AUTO W/SCOPE: CPT | Performed by: PATHOLOGY

## 2021-01-27 PROCEDURE — 36415 COLL VENOUS BLD VENIPUNCTURE: CPT | Performed by: PATHOLOGY

## 2021-01-27 PROCEDURE — 85025 COMPLETE CBC W/AUTO DIFF WBC: CPT | Performed by: PATHOLOGY

## 2021-01-27 PROCEDURE — 80053 COMPREHEN METABOLIC PANEL: CPT | Performed by: PATHOLOGY

## 2021-01-27 NOTE — TELEPHONE ENCOUNTER
[facility] Call Center    Phone Message    May a detailed message be left on voicemail: yes     Reason for Call: Appointment Intake    Referring Provider Name: Dr Ramses Call  Diagnosis and/or Symptoms: ANCA-associated vasculitis (H)    Action Taken: Message routed to:  [facility] Dermatology    Travel Screening: Not Applicable

## 2021-01-27 NOTE — TELEPHONE ENCOUNTER
FUTURE VISIT INFORMATION      FUTURE VISIT INFORMATION:    Date: 2/3/2021    Time: 12PM    [place] [facility] [place]  REFERRAL INFORMATION:    Referring provider:  Ramses Call MD    Referring providers clinic:  [facility] Rheumat[facility] [place]     Reason for visit/diagnosis  ANCA-associated vasculitis per ref by Ramses Ramirez MD in [facility]. Records in [facility]. Per Pt     RECORDS REQUESTED FROM:       Clinic name Comments Records Status Imaging Status   [facility] [place]  1/26/2021 note and referral from Ramses Call MD Epic

## 2021-01-27 NOTE — TELEPHONE ENCOUNTER
LVM for patient to schedule in-person appt with Jai Chou, or Leah per referral from Dr. Call.    Left call center number for scheduling.

## 2021-01-28 ENCOUNTER — APPOINTMENT (OUTPATIENT)
Dept: CT IMAGING | Facility: CLINIC | Age: 21
End: 2021-01-28
Attending: EMERGENCY MEDICINE
Payer: COMMERCIAL

## 2021-01-28 ENCOUNTER — HOSPITAL ENCOUNTER (INPATIENT)
Facility: CLINIC | Age: 21
LOS: 4 days | Discharge: HOME | End: 2021-02-01
Attending: EMERGENCY MEDICINE | Admitting: HOSPITALIST
Payer: COMMERCIAL

## 2021-01-28 ENCOUNTER — TELEPHONE (OUTPATIENT)
Dept: RHEUMATOLOGY | Facility: CLINIC | Age: 21
End: 2021-01-28

## 2021-01-28 DIAGNOSIS — R91.8 PULMONARY INFILTRATES: ICD-10-CM

## 2021-01-28 DIAGNOSIS — M31.31 GRANULOMATOSIS WITH POLYANGIITIS WITH RENAL INVOLVEMENT (H): ICD-10-CM

## 2021-01-28 DIAGNOSIS — F19.982 DRUG-INDUCED INSOMNIA (H): ICD-10-CM

## 2021-01-28 DIAGNOSIS — I77.82 ANCA-ASSOCIATED VASCULITIS (H): ICD-10-CM

## 2021-01-28 DIAGNOSIS — R06.02 SHORTNESS OF BREATH: ICD-10-CM

## 2021-01-28 DIAGNOSIS — Z20.822 CONTACT WITH AND (SUSPECTED) EXPOSURE TO COVID-19: ICD-10-CM

## 2021-01-28 DIAGNOSIS — R04.2 HEMOPTYSIS: ICD-10-CM

## 2021-01-28 DIAGNOSIS — M31.30 GRANULOMATOSIS WITH POLYANGIITIS, UNSPECIFIED WHETHER RENAL INVOLVEMENT (H): Primary | ICD-10-CM

## 2021-01-28 DIAGNOSIS — N17.9 ACUTE KIDNEY INJURY (H): Primary | ICD-10-CM

## 2021-01-28 DIAGNOSIS — R07.9 CHEST PAIN, UNSPECIFIED TYPE: ICD-10-CM

## 2021-01-28 DIAGNOSIS — R91.8 PULMONARY INFILTRATE: ICD-10-CM

## 2021-01-28 LAB
ALBUMIN SERPL-MCNC: 2.7 G/DL (ref 3.4–5)
ALBUMIN UR-MCNC: 30 MG/DL
ALP SERPL-CCNC: 74 U/L (ref 40–150)
ALT SERPL W P-5'-P-CCNC: 30 U/L (ref 0–50)
ANION GAP SERPL CALCULATED.3IONS-SCNC: 5 MMOL/L (ref 3–14)
APPEARANCE UR: CLEAR
AST SERPL W P-5'-P-CCNC: <3 U/L (ref 0–45)
BACTERIA #/AREA URNS HPF: ABNORMAL /HPF
BASOPHILS # BLD AUTO: 0.1 10E9/L (ref 0–0.2)
BASOPHILS NFR BLD AUTO: 0.3 %
BILIRUB SERPL-MCNC: 0.2 MG/DL (ref 0.2–1.3)
BILIRUB UR QL STRIP: NEGATIVE
BUN SERPL-MCNC: 44 MG/DL (ref 7–30)
CALCIUM SERPL-MCNC: 8.9 MG/DL (ref 8.5–10.1)
CHLORIDE SERPL-SCNC: 111 MMOL/L (ref 94–109)
CO2 SERPL-SCNC: 22 MMOL/L (ref 20–32)
COLOR UR AUTO: ABNORMAL
CREAT SERPL-MCNC: 2.07 MG/DL (ref 0.52–1.04)
DIFFERENTIAL METHOD BLD: ABNORMAL
EOSINOPHIL # BLD AUTO: 0.2 10E9/L (ref 0–0.7)
EOSINOPHIL NFR BLD AUTO: 0.8 %
ERYTHROCYTE [DISTWIDTH] IN BLOOD BY AUTOMATED COUNT: 13.2 % (ref 10–15)
GFR SERPL CREATININE-BSD FRML MDRD: 33 ML/MIN/{1.73_M2}
GLUCOSE SERPL-MCNC: 138 MG/DL (ref 70–99)
GLUCOSE UR STRIP-MCNC: NEGATIVE MG/DL
HCT VFR BLD AUTO: 35.3 % (ref 35–47)
HGB BLD-MCNC: 10.8 G/DL (ref 11.7–15.7)
HGB UR QL STRIP: ABNORMAL
IMM GRANULOCYTES # BLD: 0.3 10E9/L (ref 0–0.4)
IMM GRANULOCYTES NFR BLD: 1.5 %
KETONES UR STRIP-MCNC: NEGATIVE MG/DL
LABORATORY COMMENT REPORT: NORMAL
LACTATE BLD-SCNC: 2.3 MMOL/L (ref 0.7–2)
LEUKOCYTE ESTERASE UR QL STRIP: ABNORMAL
LYMPHOCYTES # BLD AUTO: 0.8 10E9/L (ref 0.8–5.3)
LYMPHOCYTES NFR BLD AUTO: 4.3 %
MCH RBC QN AUTO: 27 PG (ref 26.5–33)
MCHC RBC AUTO-ENTMCNC: 30.6 G/DL (ref 31.5–36.5)
MCV RBC AUTO: 88 FL (ref 78–100)
MONOCYTES # BLD AUTO: 0.5 10E9/L (ref 0–1.3)
MONOCYTES NFR BLD AUTO: 2.4 %
MUCOUS THREADS #/AREA URNS LPF: PRESENT /LPF
NEUTROPHILS # BLD AUTO: 16.7 10E9/L (ref 1.6–8.3)
NEUTROPHILS NFR BLD AUTO: 90.7 %
NITRATE UR QL: NEGATIVE
NRBC # BLD AUTO: 0 10*3/UL
NRBC BLD AUTO-RTO: 0 /100
PH UR STRIP: 6.5 PH (ref 5–7)
PLATELET # BLD AUTO: 255 10E9/L (ref 150–450)
POTASSIUM SERPL-SCNC: 5 MMOL/L (ref 3.4–5.3)
PROT SERPL-MCNC: 6.6 G/DL (ref 6.8–8.8)
RBC # BLD AUTO: 4 10E12/L (ref 3.8–5.2)
RBC #/AREA URNS AUTO: 50 /HPF (ref 0–2)
SARS-COV-2 RNA RESP QL NAA+PROBE: NEGATIVE
SODIUM SERPL-SCNC: 138 MMOL/L (ref 133–144)
SOURCE: ABNORMAL
SP GR UR STRIP: 1.01 (ref 1–1.03)
SPECIMEN SOURCE: NORMAL
SQUAMOUS #/AREA URNS AUTO: 3 /HPF (ref 0–1)
TRANS CELLS #/AREA URNS HPF: <1 /HPF (ref 0–1)
UROBILINOGEN UR STRIP-MCNC: NORMAL MG/DL (ref 0–2)
WBC # BLD AUTO: 18.4 10E9/L (ref 4–11)
WBC #/AREA URNS AUTO: 4 /HPF (ref 0–5)

## 2021-01-28 PROCEDURE — 85025 COMPLETE CBC W/AUTO DIFF WBC: CPT | Performed by: EMERGENCY MEDICINE

## 2021-01-28 PROCEDURE — 81001 URINALYSIS AUTO W/SCOPE: CPT | Performed by: EMERGENCY MEDICINE

## 2021-01-28 PROCEDURE — C9803 HOPD COVID-19 SPEC COLLECT: HCPCS | Performed by: EMERGENCY MEDICINE

## 2021-01-28 PROCEDURE — 83605 ASSAY OF LACTIC ACID: CPT | Performed by: EMERGENCY MEDICINE

## 2021-01-28 PROCEDURE — 96365 THER/PROPH/DIAG IV INF INIT: CPT | Performed by: EMERGENCY MEDICINE

## 2021-01-28 PROCEDURE — 99285 EMERGENCY DEPT VISIT HI MDM: CPT | Mod: 25 | Performed by: EMERGENCY MEDICINE

## 2021-01-28 PROCEDURE — 120N000005 HC R&B MS OVERFLOW UMMC

## 2021-01-28 PROCEDURE — 80053 COMPREHEN METABOLIC PANEL: CPT | Performed by: EMERGENCY MEDICINE

## 2021-01-28 PROCEDURE — U0003 INFECTIOUS AGENT DETECTION BY NUCLEIC ACID (DNA OR RNA); SEVERE ACUTE RESPIRATORY SYNDROME CORONAVIRUS 2 (SARS-COV-2) (CORONAVIRUS DISEASE [COVID-19]), AMPLIFIED PROBE TECHNIQUE, MAKING USE OF HIGH THROUGHPUT TECHNOLOGIES AS DESCRIBED BY CMS-2020-01-R: HCPCS | Performed by: EMERGENCY MEDICINE

## 2021-01-28 PROCEDURE — 86256 FLUORESCENT ANTIBODY TITER: CPT | Performed by: EMERGENCY MEDICINE

## 2021-01-28 PROCEDURE — 87040 BLOOD CULTURE FOR BACTERIA: CPT | Performed by: EMERGENCY MEDICINE

## 2021-01-28 PROCEDURE — 258N000003 HC RX IP 258 OP 636: Performed by: EMERGENCY MEDICINE

## 2021-01-28 PROCEDURE — 96361 HYDRATE IV INFUSION ADD-ON: CPT | Performed by: EMERGENCY MEDICINE

## 2021-01-28 PROCEDURE — 250N000011 HC RX IP 250 OP 636: Performed by: EMERGENCY MEDICINE

## 2021-01-28 PROCEDURE — 71250 CT THORAX DX C-: CPT

## 2021-01-28 PROCEDURE — U0005 INFEC AGEN DETEC AMPLI PROBE: HCPCS | Performed by: EMERGENCY MEDICINE

## 2021-01-28 PROCEDURE — 93010 ELECTROCARDIOGRAM REPORT: CPT | Performed by: EMERGENCY MEDICINE

## 2021-01-28 PROCEDURE — 71250 CT THORAX DX C-: CPT | Mod: 26 | Performed by: RADIOLOGY

## 2021-01-28 PROCEDURE — 86255 FLUORESCENT ANTIBODY SCREEN: CPT | Performed by: EMERGENCY MEDICINE

## 2021-01-28 PROCEDURE — 93005 ELECTROCARDIOGRAM TRACING: CPT | Performed by: EMERGENCY MEDICINE

## 2021-01-28 RX ORDER — METHYLPREDNISOLONE SODIUM SUCCINATE 125 MG/2ML
500 INJECTION, POWDER, LYOPHILIZED, FOR SOLUTION INTRAMUSCULAR; INTRAVENOUS ONCE
Status: DISCONTINUED | OUTPATIENT
Start: 2021-01-28 | End: 2021-01-28

## 2021-01-28 RX ORDER — SODIUM CHLORIDE 9 MG/ML
INJECTION, SOLUTION INTRAVENOUS CONTINUOUS
Status: DISCONTINUED | OUTPATIENT
Start: 2021-01-28 | End: 2021-01-29

## 2021-01-28 RX ADMIN — SODIUM CHLORIDE 1000 ML: 9 INJECTION, SOLUTION INTRAVENOUS at 19:51

## 2021-01-28 RX ADMIN — SODIUM CHLORIDE 1000 ML: 9 INJECTION, SOLUTION INTRAVENOUS at 21:01

## 2021-01-28 RX ADMIN — SODIUM CHLORIDE 500 MG: 9 INJECTION, SOLUTION INTRAVENOUS at 23:50

## 2021-01-28 ASSESSMENT — ENCOUNTER SYMPTOMS
NAUSEA: 1
DIARRHEA: 0
FEVER: 0
FREQUENCY: 1
LIGHT-HEADEDNESS: 1
FLANK PAIN: 1
VOMITING: 0
COUGH: 1

## 2021-01-28 ASSESSMENT — MIFFLIN-ST. JEOR: SCORE: 1856.31

## 2021-01-28 NOTE — TELEPHONE ENCOUNTER
"      ----- Message from Ramses Call MD sent at 1/28/2021  9:28 AM CST -----  Lexis I sent the patient this message in epic.  I would like consultation with Dr. Shay or Dr. Chang in nephrology \"Patient with GPA flare, now with rising creatinine, hematuria, proteinuria despite steroid and rituxan therapy\" as soon as possible.  Moreover, I recommend follow-up laboratory testing to include total urine protein and creatinine, sedimentation rate, and chest xray.    I tried calling patient with these recommendations this morning; left a message on [employer] that I would try calling back.  Please set these orders up in a telephone encounter for me.  Thanks.    \"Blood counts are normal. Liver function is normal.  However, I am concerned about the serum creatinine level, which suggests a significant decline in kidney function since you were in the hospital.  There is evidence of red blood cells in the urine, and together these findings are worrisome that vasculitis is still affecting the kidneys.  I recommend consultation with the nephrologist in the very near future.  It was a pleasure seeing you in clinic. Please let me know if you have questions.    Sincerely,    Ramses Call MD  Rheumatologist, [facility]    "

## 2021-01-28 NOTE — TELEPHONE ENCOUNTER
I appreciate Dr. Chang's considerations. I will be available to ER staff or to on Call providers for consultation through the night.

## 2021-01-28 NOTE — TELEPHONE ENCOUNTER
Discussed with Dr. Chang, he is extremely concerned about her increase of creatinine, despite sufficient therapy, is likely complication of or insufficient response to current therapy.  He is recommending that pt go to ER for evaluation for Chest CT and chest xray to rule out possible infection as cause of increased creatinine.  He would like her to go tonight, if not infection, need to treat with pulse dose steroids and possible further long term treatment.    Called and spoke with pt, explained reason for calling, discussed Dr. Chang's recommendations and reasoning. Pt understands and is in agreement to go to ER tonight.      Have called ER and provided report and Dr. Chang's recommendations and concerns.    Eunice Lim RN  Rheumatology Clinic

## 2021-01-29 ENCOUNTER — APPOINTMENT (OUTPATIENT)
Dept: ULTRASOUND IMAGING | Facility: CLINIC | Age: 21
End: 2021-01-29
Payer: COMMERCIAL

## 2021-01-29 LAB
ALBUMIN SERPL-MCNC: 2.6 G/DL (ref 3.4–5)
ALP SERPL-CCNC: 71 U/L (ref 40–150)
ALT SERPL W P-5'-P-CCNC: 33 U/L (ref 0–50)
ANCA AB PATTERN SER IF-IMP: ABNORMAL
ANION GAP SERPL CALCULATED.3IONS-SCNC: 8 MMOL/L (ref 3–14)
AST SERPL W P-5'-P-CCNC: 5 U/L (ref 0–45)
BASOPHILS # BLD AUTO: 0 10E9/L (ref 0–0.2)
BASOPHILS NFR BLD AUTO: 0.2 %
BILIRUB SERPL-MCNC: 0.2 MG/DL (ref 0.2–1.3)
BUN SERPL-MCNC: 46 MG/DL (ref 7–30)
C-ANCA TITR SER IF: ABNORMAL {TITER}
CALCIUM SERPL-MCNC: 8.4 MG/DL (ref 8.5–10.1)
CHLORIDE SERPL-SCNC: 112 MMOL/L (ref 94–109)
CK SERPL-CCNC: 8 U/L (ref 30–225)
CO2 SERPL-SCNC: 19 MMOL/L (ref 20–32)
CREAT SERPL-MCNC: 1.96 MG/DL (ref 0.52–1.04)
DIFFERENTIAL METHOD BLD: ABNORMAL
EOSINOPHIL # BLD AUTO: 0 10E9/L (ref 0–0.7)
EOSINOPHIL NFR BLD AUTO: 0.1 %
ERYTHROCYTE [DISTWIDTH] IN BLOOD BY AUTOMATED COUNT: 13.1 % (ref 10–15)
GFR SERPL CREATININE-BSD FRML MDRD: 35 ML/MIN/{1.73_M2}
GLUCOSE BLDC GLUCOMTR-MCNC: 142 MG/DL (ref 70–99)
GLUCOSE SERPL-MCNC: 134 MG/DL (ref 70–99)
HCT VFR BLD AUTO: 33.8 % (ref 35–47)
HGB BLD-MCNC: 10.3 G/DL (ref 11.7–15.7)
IMM GRANULOCYTES # BLD: 0.1 10E9/L (ref 0–0.4)
IMM GRANULOCYTES NFR BLD: 0.9 %
INTERPRETATION ECG - MUSE: NORMAL
INTERPRETATION ECG - MUSE: NORMAL
LACTATE BLD-SCNC: 1.2 MMOL/L (ref 0.7–2)
LYMPHOCYTES # BLD AUTO: 0.7 10E9/L (ref 0.8–5.3)
LYMPHOCYTES NFR BLD AUTO: 4.3 %
MCH RBC QN AUTO: 26.8 PG (ref 26.5–33)
MCHC RBC AUTO-ENTMCNC: 30.5 G/DL (ref 31.5–36.5)
MCV RBC AUTO: 88 FL (ref 78–100)
MONOCYTES # BLD AUTO: 0.1 10E9/L (ref 0–1.3)
MONOCYTES NFR BLD AUTO: 0.4 %
MYELOPEROXIDASE AB SER-ACNC: <0.2 AI (ref 0–0.9)
NEUTROPHILS # BLD AUTO: 14.7 10E9/L (ref 1.6–8.3)
NEUTROPHILS NFR BLD AUTO: 94.1 %
NRBC # BLD AUTO: 0 10*3/UL
NRBC BLD AUTO-RTO: 0 /100
PLATELET # BLD AUTO: 232 10E9/L (ref 150–450)
POTASSIUM SERPL-SCNC: 5.3 MMOL/L (ref 3.4–5.3)
POTASSIUM SERPL-SCNC: 5.7 MMOL/L (ref 3.4–5.3)
POTASSIUM SERPL-SCNC: 5.7 MMOL/L (ref 3.4–5.3)
PROT SERPL-MCNC: 6.3 G/DL (ref 6.8–8.8)
PROTEINASE3 IGG SER-ACNC: 4 AI (ref 0–0.9)
RBC # BLD AUTO: 3.85 10E12/L (ref 3.8–5.2)
SODIUM SERPL-SCNC: 139 MMOL/L (ref 133–144)
WBC # BLD AUTO: 15.6 10E9/L (ref 4–11)

## 2021-01-29 PROCEDURE — 87205 SMEAR GRAM STAIN: CPT | Performed by: STUDENT IN AN ORGANIZED HEALTH CARE EDUCATION/TRAINING PROGRAM

## 2021-01-29 PROCEDURE — 258N000003 HC RX IP 258 OP 636: Performed by: EMERGENCY MEDICINE

## 2021-01-29 PROCEDURE — 85025 COMPLETE CBC W/AUTO DIFF WBC: CPT | Performed by: STUDENT IN AN ORGANIZED HEALTH CARE EDUCATION/TRAINING PROGRAM

## 2021-01-29 PROCEDURE — 999N000147 HC STATISTIC PT IP EVAL DEFER

## 2021-01-29 PROCEDURE — 36415 COLL VENOUS BLD VENIPUNCTURE: CPT | Performed by: STUDENT IN AN ORGANIZED HEALTH CARE EDUCATION/TRAINING PROGRAM

## 2021-01-29 PROCEDURE — 83516 IMMUNOASSAY NONANTIBODY: CPT

## 2021-01-29 PROCEDURE — 80053 COMPREHEN METABOLIC PANEL: CPT | Performed by: STUDENT IN AN ORGANIZED HEALTH CARE EDUCATION/TRAINING PROGRAM

## 2021-01-29 PROCEDURE — 250N000013 HC RX MED GY IP 250 OP 250 PS 637: Performed by: STUDENT IN AN ORGANIZED HEALTH CARE EDUCATION/TRAINING PROGRAM

## 2021-01-29 PROCEDURE — 120N000005 HC R&B MS OVERFLOW UMMC

## 2021-01-29 PROCEDURE — 83876 ASSAY MYELOPEROXIDASE: CPT

## 2021-01-29 PROCEDURE — 83605 ASSAY OF LACTIC ACID: CPT | Performed by: STUDENT IN AN ORGANIZED HEALTH CARE EDUCATION/TRAINING PROGRAM

## 2021-01-29 PROCEDURE — 36415 COLL VENOUS BLD VENIPUNCTURE: CPT

## 2021-01-29 PROCEDURE — 99233 SBSQ HOSP IP/OBS HIGH 50: CPT | Mod: GC | Performed by: STUDENT IN AN ORGANIZED HEALTH CARE EDUCATION/TRAINING PROGRAM

## 2021-01-29 PROCEDURE — 999N001017 HC STATISTIC GLUCOSE BY METER IP

## 2021-01-29 PROCEDURE — 87070 CULTURE OTHR SPECIMN AEROBIC: CPT | Performed by: STUDENT IN AN ORGANIZED HEALTH CARE EDUCATION/TRAINING PROGRAM

## 2021-01-29 PROCEDURE — 99222 1ST HOSP IP/OBS MODERATE 55: CPT | Mod: GC | Performed by: INTERNAL MEDICINE

## 2021-01-29 PROCEDURE — 76770 US EXAM ABDO BACK WALL COMP: CPT | Mod: 26 | Performed by: RADIOLOGY

## 2021-01-29 PROCEDURE — 99223 1ST HOSP IP/OBS HIGH 75: CPT | Mod: GC | Performed by: INTERNAL MEDICINE

## 2021-01-29 PROCEDURE — 82550 ASSAY OF CK (CPK): CPT | Performed by: STUDENT IN AN ORGANIZED HEALTH CARE EDUCATION/TRAINING PROGRAM

## 2021-01-29 PROCEDURE — 84132 ASSAY OF SERUM POTASSIUM: CPT | Performed by: STUDENT IN AN ORGANIZED HEALTH CARE EDUCATION/TRAINING PROGRAM

## 2021-01-29 PROCEDURE — 258N000003 HC RX IP 258 OP 636: Performed by: STUDENT IN AN ORGANIZED HEALTH CARE EDUCATION/TRAINING PROGRAM

## 2021-01-29 PROCEDURE — 250N000011 HC RX IP 250 OP 636: Performed by: STUDENT IN AN ORGANIZED HEALTH CARE EDUCATION/TRAINING PROGRAM

## 2021-01-29 PROCEDURE — 93010 ELECTROCARDIOGRAM REPORT: CPT | Performed by: INTERNAL MEDICINE

## 2021-01-29 PROCEDURE — 76770 US EXAM ABDO BACK WALL COMP: CPT

## 2021-01-29 PROCEDURE — 93005 ELECTROCARDIOGRAM TRACING: CPT

## 2021-01-29 PROCEDURE — 250N000013 HC RX MED GY IP 250 OP 250 PS 637: Performed by: EMERGENCY MEDICINE

## 2021-01-29 RX ORDER — LIDOCAINE 40 MG/G
CREAM TOPICAL
Status: DISCONTINUED | OUTPATIENT
Start: 2021-01-29 | End: 2021-02-01 | Stop reason: HOSPADM

## 2021-01-29 RX ORDER — TRAZODONE HYDROCHLORIDE 50 MG/1
50 TABLET, FILM COATED ORAL ONCE
Status: COMPLETED | OUTPATIENT
Start: 2021-01-29 | End: 2021-01-29

## 2021-01-29 RX ORDER — ZOLPIDEM TARTRATE 5 MG/1
5 TABLET ORAL
Status: ON HOLD | COMMUNITY
End: 2021-02-01

## 2021-01-29 RX ORDER — ONDANSETRON 4 MG/1
4 TABLET, ORALLY DISINTEGRATING ORAL EVERY 6 HOURS PRN
Status: DISCONTINUED | OUTPATIENT
Start: 2021-01-29 | End: 2021-02-01 | Stop reason: HOSPADM

## 2021-01-29 RX ORDER — BISACODYL 10 MG
10 SUPPOSITORY, RECTAL RECTAL DAILY PRN
Status: DISCONTINUED | OUTPATIENT
Start: 2021-01-29 | End: 2021-02-01 | Stop reason: HOSPADM

## 2021-01-29 RX ORDER — SULFAMETHOXAZOLE/TRIMETHOPRIM 800-160 MG
1 TABLET ORAL
Status: DISCONTINUED | OUTPATIENT
Start: 2021-01-29 | End: 2021-01-31

## 2021-01-29 RX ORDER — LANOLIN ALCOHOL/MO/W.PET/CERES
9 CREAM (GRAM) TOPICAL
Status: DISCONTINUED | OUTPATIENT
Start: 2021-01-29 | End: 2021-02-01 | Stop reason: HOSPADM

## 2021-01-29 RX ORDER — TRAZODONE HYDROCHLORIDE 50 MG/1
50 TABLET, FILM COATED ORAL
Status: DISCONTINUED | OUTPATIENT
Start: 2021-01-29 | End: 2021-01-30

## 2021-01-29 RX ORDER — AMOXICILLIN 250 MG
2 CAPSULE ORAL 2 TIMES DAILY PRN
Status: DISCONTINUED | OUTPATIENT
Start: 2021-01-29 | End: 2021-02-01 | Stop reason: HOSPADM

## 2021-01-29 RX ORDER — ONDANSETRON 2 MG/ML
4 INJECTION INTRAMUSCULAR; INTRAVENOUS EVERY 6 HOURS PRN
Status: DISCONTINUED | OUTPATIENT
Start: 2021-01-29 | End: 2021-02-01 | Stop reason: HOSPADM

## 2021-01-29 RX ORDER — PROCHLORPERAZINE MALEATE 10 MG
10 TABLET ORAL EVERY 6 HOURS PRN
Status: DISCONTINUED | OUTPATIENT
Start: 2021-01-29 | End: 2021-02-01 | Stop reason: HOSPADM

## 2021-01-29 RX ORDER — POLYETHYLENE GLYCOL 3350 17 G/17G
17 POWDER, FOR SOLUTION ORAL DAILY PRN
Status: DISCONTINUED | OUTPATIENT
Start: 2021-01-29 | End: 2021-02-01 | Stop reason: HOSPADM

## 2021-01-29 RX ORDER — NICOTINE 21 MG/24HR
1 PATCH, TRANSDERMAL 24 HOURS TRANSDERMAL DAILY
Status: DISCONTINUED | OUTPATIENT
Start: 2021-01-29 | End: 2021-02-01 | Stop reason: HOSPADM

## 2021-01-29 RX ORDER — LANOLIN ALCOHOL/MO/W.PET/CERES
6 CREAM (GRAM) TOPICAL
Status: DISCONTINUED | OUTPATIENT
Start: 2021-01-29 | End: 2021-01-29

## 2021-01-29 RX ORDER — AMOXICILLIN 250 MG
1 CAPSULE ORAL 2 TIMES DAILY PRN
Status: DISCONTINUED | OUTPATIENT
Start: 2021-01-29 | End: 2021-02-01 | Stop reason: HOSPADM

## 2021-01-29 RX ORDER — PROCHLORPERAZINE 25 MG
25 SUPPOSITORY, RECTAL RECTAL EVERY 12 HOURS PRN
Status: DISCONTINUED | OUTPATIENT
Start: 2021-01-29 | End: 2021-02-01 | Stop reason: HOSPADM

## 2021-01-29 RX ADMIN — TRAZODONE HYDROCHLORIDE 50 MG: 50 TABLET ORAL at 19:49

## 2021-01-29 RX ADMIN — CALCIUM CARBONATE 600 MG (1,500 MG)-VITAMIN D3 400 UNIT TABLET 1 TABLET: at 08:20

## 2021-01-29 RX ADMIN — NICOTINE 1 PATCH: 14 PATCH, EXTENDED RELEASE TRANSDERMAL at 08:28

## 2021-01-29 RX ADMIN — SODIUM CHLORIDE 500 MG: 9 INJECTION, SOLUTION INTRAVENOUS at 15:05

## 2021-01-29 RX ADMIN — TRAZODONE HYDROCHLORIDE 50 MG: 50 TABLET ORAL at 23:00

## 2021-01-29 RX ADMIN — SODIUM CHLORIDE: 9 INJECTION, SOLUTION INTRAVENOUS at 06:54

## 2021-01-29 RX ADMIN — MELATONIN TAB 3 MG 9 MG: 3 TAB at 23:00

## 2021-01-29 RX ADMIN — TRAZODONE HYDROCHLORIDE 50 MG: 50 TABLET ORAL at 01:50

## 2021-01-29 RX ADMIN — OMEPRAZOLE 20 MG: 20 CAPSULE, DELAYED RELEASE ORAL at 08:20

## 2021-01-29 ASSESSMENT — ACTIVITIES OF DAILY LIVING (ADL)
ADLS_ACUITY_SCORE: 13

## 2021-01-29 ASSESSMENT — MIFFLIN-ST. JEOR: SCORE: 1898

## 2021-01-29 NOTE — ED PROVIDER NOTES
[facility] EMERGENCY DEPARTMENT ([facility])  January 28, 2021     History     Chief Complaint   Patient presents with     Abnormal Labs     The history is provided by the patient and medical records.     Cande Shields is a 20 year old female with history of ANCA associated vasculitis, granulomatosis with polyangiitis (GPA) who was sent in for further evaluation of elevated creatinine levels. Epic records reviewed, she had recent hospitalization last month from 12/26-12/30/2020.  At that time she had presented for evaluation of muscle aches, joint pain and swelling in her feet, was felt to have possible GPA flare and was admitted to internal medicine.  Her GPA was previously in remission after distant history of treatment with methotrexate, steroids and rituximab.  She was treated Solu-Medrol 80 mg and 1 dose of rituximab while she was inpatient.  She was discharged on a slow steroid taper and weekly rituximab x3. She is followed by rheumatology and had routine outpatient labs yesterday.  In the span of less than a month her creatinine had spiked from 0.77 to 2.05, GFR went from being greater than 90 to 33.  It was felt that she was not responding well to her current therapies which include prednisone. She reports that she has been experiencing hemoptysis, nose bleeds, shortness of breath, and chest pain for the past week. She also reports increased urination and bilateral kidney pain. She has some slight lower abdominal pain that she noticed today. She has had episodes of nausea, lightheadedness, and hot flashes. Her legs are swollen, but fingers are not. She has gained 5 lbs. She denies fever, vomiting, and diarrhea.     PAST MEDICAL HISTORY:   Past Medical History:   Diagnosis Date     ADHD (attention deficit hyperactivity disorder)      Wegener's disease, pulmonary (H) 2010    renal biopdy       PAST SURGICAL HISTORY:   Past Surgical History:   Procedure Laterality Date     ENT SURGERY  2000    cyst      EXCISE GANGLION WRIST  2009       Past medical history, past surgical history, medications, and allergies were reviewed with the patient. Additional pertinent items: None    FAMILY HISTORY:   Family History   Problem Relation Age of Onset     Thyroid Disease Mother      Asthma No family hx of      Diabetes No family hx of        SOCIAL HISTORY:   Social History     Tobacco Use     Smoking status: Current Every Day Smoker     Smokeless tobacco: Never Used     Tobacco comment: down to 5 ciggaretes per day   Substance Use Topics     Alcohol use: Yes     Frequency: 2-3 times a week     Comment: 3x a week     Social history was reviewed with the patient. Additional pertinent items: None      Current Discharge Medication List      CONTINUE these medications which have NOT CHANGED    Details   Oxymetazoline HCl (NASAL SPRAY NA) Spray 2-3 sprays into both nostrils twice daily as needed for nasal dryness      !! predniSONE (DELTASONE) 10 MG tablet Take 5 tabs daily for 1 week, taper as directed by MD.  Qty: 150 tablet, Refills: 1    Associated Diagnoses: ANCA-associated vasculitis (H)      sulfamethoxazole-trimethoprim (BACTRIM DS) 800-160 MG tablet Take 1 tablet by mouth three times a week  Qty: 24 tablet, Refills: 0    Associated Diagnoses: Granulomatosis with polyangiitis, unspecified whether renal involvement (H); Myalgia      zolpidem (AMBIEN) 5 MG tablet Take 5 mg by mouth nightly as needed for sleep      acetaminophen (TYLENOL) 325 MG tablet Take 2 tablets (650 mg) by mouth every 4 hours as needed for mild pain  Qty: 1 Bottle, Refills: 3    Associated Diagnoses: Granulomatosis with polyangiitis, unspecified whether renal involvement (H); Myalgia      buPROPion (WELLBUTRIN XL) 150 MG 24 hr tablet Take 1 tablet by mouth every 24 hours.  Qty: 30 tablet, Refills: 0    Associated Diagnoses: Depression      Calcium Carb-Cholecalciferol (HM CALCIUM-VITAMIN D) 600-800 MG-UNIT TABS Take 1 tablet by mouth daily  Qty: 30  tablet, Refills: 3    Associated Diagnoses: Granulomatosis with polyangiitis, unspecified whether renal involvement (H); Myalgia      guaiFENesin-codeine (ROBITUSSIN AC) 100-10 MG/5ML solution Take 5 mLs by mouth 3 times daily as needed      ibuprofen (ADVIL) 200 MG tablet Take 400 mg by mouth every 4 hours as needed.      nicotine (NICODERM CQ) 14 MG/24HR 24 hr patch Place 1 patch onto the skin daily  Qty: 30 patch, Refills: 0    Associated Diagnoses: Cigarette nicotine dependence without complication      ondansetron (ZOFRAN-ODT) 4 MG ODT tab Take 1 tablet (4 mg) by mouth every 6 hours as needed for nausea or vomiting  Qty: 20 tablet, Refills: 3    Associated Diagnoses: Granulomatosis with polyangiitis, unspecified whether renal involvement (H); Myalgia      polyethylene glycol (MIRALAX) 17 GM/Dose powder Take 17 g by mouth 2 times daily as needed for constipation  Qty: 510 g, Refills: 3    Associated Diagnoses: Granulomatosis with polyangiitis, unspecified whether renal involvement (H); Myalgia      !! predniSONE (DELTASONE) 5 MG tablet Take 15 tablets (75 mg) by mouth daily for 3 days, THEN 8 tablets (40 mg) daily for 7 days, THEN 6 tablets (30 mg) daily for 14 days, THEN 5 tablets (25 mg) daily for 14 days.  Qty: 255 tablet, Refills: 0    Associated Diagnoses: Granulomatosis with polyangiitis, unspecified whether renal involvement (H); Myalgia      senna-docusate (SENOKOT-S/PERICOLACE) 8.6-50 MG tablet Take 1 tablet by mouth 2 times daily as needed for constipation  Qty: 30 tablet, Refills: 3    Associated Diagnoses: Granulomatosis with polyangiitis, unspecified whether renal involvement (H); Myalgia       !! - Potential duplicate medications found. Please discuss with provider.             Allergies   Allergen Reactions     Penicillins Rash     Unknown, but think rash.  Tolerated cephalexin (12/27/20), cefpodoxime (12/27/20)        Review of Systems   Constitutional: Negative for fever.   HENT: Positive for  "nosebleeds.    Respiratory: Positive for cough (hemoptysis).    Cardiovascular: Positive for leg swelling.   Gastrointestinal: Positive for nausea. Negative for diarrhea and vomiting.   Genitourinary: Positive for flank pain and frequency.   Neurological: Positive for light-headedness.   All other systems reviewed and are negative.    A complete review of systems was performed with pertinent positives and negatives noted in the HPI, and all other systems negative.    Physical Exam   BP: (!) 160/79  Pulse: 114  Temp: 98.3  F (36.8  C)  Resp: 16  Height: 162.6 cm (5' 4\")  Weight: 111.1 kg (245 lb)  SpO2: 97 %      Physical Exam  Vitals signs and nursing note reviewed.   Constitutional:       General: She is not in acute distress.     Appearance: She is well-developed. She is not ill-appearing, toxic-appearing or diaphoretic.   HENT:      Head: Normocephalic and atraumatic.      Mouth/Throat:      Lips: Pink.      Mouth: Mucous membranes are moist.      Pharynx: Oropharynx is clear. No oropharyngeal exudate.   Eyes:      General: Lids are normal. No scleral icterus.     Extraocular Movements: Extraocular movements intact.      Right eye: No nystagmus.      Left eye: No nystagmus.      Conjunctiva/sclera: Conjunctivae normal.      Pupils: Pupils are equal, round, and reactive to light.   Neck:      Musculoskeletal: Normal range of motion and neck supple. No erythema or neck rigidity.      Thyroid: No thyromegaly.      Vascular: No JVD.      Trachea: No tracheal deviation.   Cardiovascular:      Rate and Rhythm: Normal rate and regular rhythm.      Pulses: Normal pulses.      Heart sounds: Normal heart sounds. No murmur. No friction rub. No gallop.    Pulmonary:      Effort: Pulmonary effort is normal. No respiratory distress.      Breath sounds: Normal breath sounds.   Abdominal:      General: Bowel sounds are normal. There is no distension.      Palpations: Abdomen is soft. There is no mass.      Tenderness: There is " no abdominal tenderness. There is no guarding or rebound.   Musculoskeletal: Normal range of motion.         General: No tenderness.      Right lower leg: Edema present.      Left lower leg: Edema present.   Lymphadenopathy:      Cervical: No cervical adenopathy.   Skin:     General: Skin is warm and dry.      Capillary Refill: Capillary refill takes less than 2 seconds.      Coloration: Skin is not pale.      Findings: No erythema or rash.   Neurological:      Mental Status: She is alert and oriented to person, place, and time.      Cranial Nerves: No cranial nerve deficit.      Sensory: No sensory deficit.      Motor: Motor function is intact.   Psychiatric:         Mood and Affect: Mood and affect normal.         Speech: Speech normal.         Behavior: Behavior normal.         ED Course        Procedures             EKG Interpretation:      Interpreted by Derrell Willett MD    Symptoms at time of EKG: chest pain, soa   Rhythm: normal sinus   Rate: 97  Ectopy: none  Conduction: normal  ST Segments/ T Waves: No ST-T wave changes  Q Waves: none  Comparison to prior: No old EKG available    Clinical Impression: normal EKG             Results for orders placed or performed during the hospital encounter of 01/28/21 (from the past 24 hour(s))   EKG 12 lead   Result Value Ref Range    Interpretation ECG Click View Image link to view waveform and result    Lactate for Sepsis Protocol   Result Value Ref Range    Lactate for Sepsis Protocol 2.3 (H) 0.7 - 2.0 mmol/L   CBC with platelets differential   Result Value Ref Range    WBC 18.4 (H) 4.0 - 11.0 10e9/L    RBC Count 4.00 3.8 - 5.2 10e12/L    Hemoglobin 10.8 (L) 11.7 - 15.7 g/dL    Hematocrit 35.3 35.0 - 47.0 %    MCV 88 78 - 100 fl    MCH 27.0 26.5 - 33.0 pg    MCHC 30.6 (L) 31.5 - 36.5 g/dL    RDW 13.2 10.0 - 15.0 %    Platelet Count 255 150 - 450 10e9/L    Diff Method Automated Method     % Neutrophils 90.7 %    % Lymphocytes 4.3 %    % Monocytes 2.4 %    %  Eosinophils 0.8 %    % Basophils 0.3 %    % Immature Granulocytes 1.5 %    Nucleated RBCs 0 0 /100    Absolute Neutrophil 16.7 (H) 1.6 - 8.3 10e9/L    Absolute Lymphocytes 0.8 0.8 - 5.3 10e9/L    Absolute Monocytes 0.5 0.0 - 1.3 10e9/L    Absolute Eosinophils 0.2 0.0 - 0.7 10e9/L    Absolute Basophils 0.1 0.0 - 0.2 10e9/L    Abs Immature Granulocytes 0.3 0 - 0.4 10e9/L    Absolute Nucleated RBC 0.0    Comprehensive metabolic panel   Result Value Ref Range    Sodium 138 133 - 144 mmol/L    Potassium 5.0 3.4 - 5.3 mmol/L    Chloride 111 (H) 94 - 109 mmol/L    Carbon Dioxide 22 20 - 32 mmol/L    Anion Gap 5 3 - 14 mmol/L    Glucose 138 (H) 70 - 99 mg/dL    Urea Nitrogen 44 (H) 7 - 30 mg/dL    Creatinine 2.07 (H) 0.52 - 1.04 mg/dL    GFR Estimate 33 (L) >60 mL/min/[1.73_m2]    GFR Estimate If Black 38 (L) >60 mL/min/[1.73_m2]    Calcium 8.9 8.5 - 10.1 mg/dL    Bilirubin Total 0.2 0.2 - 1.3 mg/dL    Albumin 2.7 (L) 3.4 - 5.0 g/dL    Protein Total 6.6 (L) 6.8 - 8.8 g/dL    Alkaline Phosphatase 74 40 - 150 U/L    ALT 30 0 - 50 U/L    AST <3 0 - 45 U/L   Chest CT w/o contrast    Narrative    EXAMINATION: Chest CT  1/28/2021 9:02 PM    CLINICAL HISTORY: Hemoptysis    COMPARISON: Chest x-ray 12/27/2020. No CT comparison.    TECHNIQUE: CT imaging obtained through the chest without contrast.  Axial, coronal, and sagittal reconstructions and axial MIP reformatted  images are reviewed.     CONTRAST: None    FINDINGS:  Lungs: The central tracheobronchial tree is patent. No pleural  effusion or pneumothorax. There are scattered peribronchovascular  groundglass opacities with mild tree-in-bud nodularity, for example in  the right upper lobe (series 6, image 130), and right lung apex  (series 6, image 69). Rounded nodular areas of consolidation within  the medial right middle lobe and in the left lung base.    Mediastinum: Multiple subcentimeter hypodense thyroid nodules,  including a partially calcified nodule arising off the left  lobe of  the gland. Heart size is normal. Small amount of pericardial fluid.  Normal caliber aorta and main pulmonary artery. Normal branching  pattern of the aortic arch. No significant coronary artery calcium or  atherosclerotic changes of the aorta. No axillary or mediastinal  lymphadenopathy. Esophagus is normal in caliber.    Bones and soft tissues: Mild degenerative changes of the thoracic  spine. No acute or suspicious osseous lesions.    Upper Abdomen: Limited evaluation of the upper abdomen is within  normal limits.      Impression    IMPRESSION:   Scattered peribronchovascular tree-in-bud nodularity with larger  nodular opacities in the right middle lobe and left lung base, could  be due to infection or vasculitis and hemorrhage in the setting of  granulomatosis with polyangiitis given the patient's clinical history.    I have personally reviewed the examination and initial interpretation  and I agree with the findings.    ARLETTE POOLE MD   Blood culture    Specimen: Arm, Forearm Only, Left; Blood    Left Arm   Result Value Ref Range    Specimen Description Blood Left Arm     Culture Micro No growth after 9 hours    Blood culture    Specimen: Arm, Left; Blood    Left Arm   Result Value Ref Range    Specimen Description Blood Left Arm     Culture Micro No growth after 9 hours    UA with Microscopic reflex to Culture    Specimen: Urine Midstream; Midstream Urine   Result Value Ref Range    Color Urine Light Yellow     Appearance Urine Clear     Glucose Urine Negative NEG^Negative mg/dL    Bilirubin Urine Negative NEG^Negative    Ketones Urine Negative NEG^Negative mg/dL    Specific Gravity Urine 1.014 1.003 - 1.035    Blood Urine Moderate (A) NEG^Negative    pH Urine 6.5 5.0 - 7.0 pH    Protein Albumin Urine 30 (A) NEG^Negative mg/dL    Urobilinogen mg/dL Normal 0.0 - 2.0 mg/dL    Nitrite Urine Negative NEG^Negative    Leukocyte Esterase Urine Trace (A) NEG^Negative    Source Midstream Urine     WBC Urine  4 0 - 5 /HPF    RBC Urine 50 (H) 0 - 2 /HPF    Bacteria Urine Few (A) NEG^Negative /HPF    Squamous Epithelial /HPF Urine 3 (H) 0 - 1 /HPF    Transitional Epi <1 0 - 1 /HPF    Mucous Urine Present (A) NEG^Negative /LPF   Asymptomatic SARS-CoV-2 COVID-19 Virus (Coronavirus) by PCR    Specimen: Nasopharyngeal   Result Value Ref Range    SARS-CoV-2 Virus Specimen Source Nasopharyngeal     SARS-CoV-2 PCR Result NEGATIVE     SARS-CoV-2 PCR Comment       Testing was performed using the Xpert Xpress SARS-CoV-2 Assay on the Cepheid Gene-Xpert   Instrument Systems. Additional information about this Emergency Use Authorization (EUA)   assay can be found via the Lab Guide.                 Assessments & Plan (with Medical Decision Making)   This patient presented to the emergency department with worsening renal function and some increasing shortness of breath with some continued mild hemoptysis in the setting of Wegener's granulomatosis.  She had recently been on 1 month of steroids, 60 mg of prednisone a day which was recently been decreased to 50 mg a day.  She is also receiving rituximab.  At rheumatology visit this week she was found to have significantly increased creatinine from 0.7-2.05.  Her urinalysis demonstrated large blood and microscopic hematuria without infectious appearing sediment.  She was spilling protein in her urine also.  Serum albumin level is somewhat low at 2.7 as is total protein but other LFTs are within normal limits.  Lactate was mildly elevated.  Unclear as to the significance of this.  She does have a leukocytosis of 18.4 which is up from 12.5 yesterday.  Chest CT was obtained which demonstrated scattered tree-in-bud nodularity with some nodular opacities in the right middle lobe and left lung base.  Unclear if this is infection versus worsening of her vasculitis. Did discuss the case with rheumatology as well as nephrology.  Nephrology recommended a pulse dose of steroid to start tonight and so  500 mg of Solu-Medrol was ordered.  Patient is at risk for more atypical infections given her immunosuppression and underlying vasculitis.  I am waiting to talk to the triage hospitalist to hand the patient off for admission.  She will probably need further infectious work-up.  At time of handoff we will discuss empiric antibiotics.  This part of the medical record was transcribed by Hollie Benton, Medical Scribe, from a dictation done by Yemi Willett MD.     I have reviewed the nursing notes.    I have reviewed the findings, diagnosis, plan and need for follow up with the patient.    Current Discharge Medication List          Final diagnoses:   Acute kidney injury (H)   Pulmonary infiltrates   I, Hollie Benton, am serving as a trained medical scribe to document services personally performed by Derrell Willett MD, based on the provider's statements to me.     I, Derrell Willett MD, was physically present and have reviewed and verified the accuracy of this note documented by Hollie Benton.    Derrell Willett MD  1/28/2021   [facility] EMERGENCY DEPARTMENT     Derrell Willett MD  01/29/21 1122

## 2021-01-29 NOTE — ED NOTES
Pt presents to ed with c/o of abnormal labs. Pt has hx of wegners disease and was following up at her doctors office with labs and was told her creatinine was double the level than it should be. Pt md told her to come to the ED for assessment. Pt denies all symptoms besides flank pain to her r flank

## 2021-01-29 NOTE — PLAN OF CARE
OT: after conversation with PT post PT evaluation it is concluded that this pt has no acute OT needs. Pt currently up in room I and I with ADL. Defer OT today.

## 2021-01-29 NOTE — PHARMACY-ADMISSION MEDICATION HISTORY
Admission medication history interview status for the 1/28/2021 admission is complete. See Epic admission navigator for allergy information, pharmacy, prior to admission medications and immunization status.     Medication history interview sources:  -Patient  -Surescripts    Changes made to PTA medication list (reason):  Added:  -zolpidem 5 mg tablets: Take 5 mg by mouth nightly as needed for sleep  -oxymetazoline Hcl (NASAL SPRAY NA): [place] 2-3 sprays into both nostrils twice daily as needed for nasal dryness    Deleted:  -sulfamethoxazole-trimethoprim (BACTRIM DS) 800-160 mg tablets: Take 1 tab daily 3 times weekly; [listed as a duplicate on medication list]  -bupropion (WELLBUTRIN XL) 150 mg 24 hr tablet: patient reports no longer taking  -guaifenesin-codeine 100-10 mg/5 mL solution: patient reports no longer taking  -polyethylene glycol 17 gm/dose powder: patient reports no longer taking  -prednisone 5 mg tablet:  patient reports no longer taking  -senna-docusate 0.6-50 mg tablet: patient reports no longer taking    Changed:  -prednisone 10 mg tablets: patient taking differently - Take 5 tablets (50 mg) by mouth daily        Additional medication history information (including reliability of information, actions taken by pharmacist):  -The patient is an adequate historian.    -The patient reports that she has been taking 50 mg of prednisone since Tuesday (1/26/21). She states that she was originally taking 75 mg of prednisone, then decreased to 40 mg, then increased to 60 mg for flares, and now decreased to 50 mg. She states that she has been taking the prednisone 10 mg tablets and not the prednisone 5 mg or 20 mg tablets.     -The patient reports that she is not currently taking acetaminophen 325 mg tablets (last dose 3-4 weeks ago; usually took 2 tablets by mouth daily as needed), calcium carb-cholecalciferol 600-800 mg-unit tablets (has not yet initiated), ibuprofen 200 mg tablets (last dose a month ago),  "ondansetron 4 mg ODT tablets (last dose a month ago; usually took 1 tablet by mouth daily as needed).    -The patient reports that she is not currently using the nicotine 14 mg/24 hr patch. She states that she has used it in the past but the last time was about a month ago. In the past, she placed 1 patch onto the skin daily. She states that she \"should really start it soon.\"    -The patient was not sure what dose of the sulfamethoxazole-trimethoprim tablets she was taking. However, Surescripts shows that she was dispensed sulfamethoxazole-trimethoprim 800-160 mg tablets on 12/30/20 for a 28 day supply.    -The patient reports that she has been taking zolpidem 5 mg tablets every night and her last dose was Wednesday, 1/27/21. She states that it hasn't been helping her sleep but has been taking it consistently. Surescripts shows that she was last dispensed zolpidem 5 mg tablets on 1/8/21 for a 15 day supply.     -The patient reports that she has been needing to use a nasal spray every day for her nasal dryness, with her last dose being on Wednesday, 1/27/21. She states that she uses it either once or twice daily.    -Per Surescripts, escitalopram 5 mg tablets were dispensed on 12/19/20 (30 DS) but she took 1 or 2 tablets before discontinuing in December. Aurovela Fe 1/20 tablets was dispensed on 11/10/20 (84 DS) but she has not been taking it since November. Oxycodone 5 mg tablets were dispensed on 12/30/20 (5 DS) but her last dose was 2 weeks ago.    -The patient reports that her most recent dose of Rituxan was last Wednesday (1/20/21). However, she states that that was her last dose and will no longer be receiving additional doses.      Prior to Admission medications    Medication Sig Last Dose Taking? Auth Provider   predniSONE (DELTASONE) 10 MG tablet Take 5 tabs daily for 1 week, taper as directed by MD.  Patient taking differently: Take 5 tablets (50 mg) by mouth daily 1/28/2021 at Unknown time Yes Jakub, " Ramses Weber MD   sodium chloride (OCEAN) 0.65 % nasal spray Spray 2-3 sprays into both nostrils twice daily as needed for nasal dryness 1/27/2021 Yes Unknown, Entered By History   sulfamethoxazole-trimethoprim (BACTRIM DS) 800-160 MG tablet Take 1 tablet by mouth three times a week 1/27/2021 Yes Deny Lee MD   zolpidem (AMBIEN) 5 MG tablet Take 5 mg by mouth nightly as needed for sleep 1/27/2021 Yes Unknown, Entered By History   acetaminophen (TYLENOL) 325 MG tablet Take 2 tablets (650 mg) by mouth every 4 hours as needed for mild pain  Patient not taking: Reported on 1/26/2021   Deny Lee MD   Calcium Carb-Cholecalciferol (HM CALCIUM-VITAMIN D) 600-800 MG-UNIT TABS Take 1 tablet by mouth daily  Patient not taking: Reported on 1/26/2021   Deny Lee MD   ibuprofen (ADVIL) 200 MG tablet Take 400 mg by mouth every 4 hours as needed.   Reported, Patient   nicotine (NICODERM CQ) 14 MG/24HR 24 hr patch Place 1 patch onto the skin daily  Patient not taking: Reported on 1/26/2021   Deny Lee MD   ondansetron (ZOFRAN-ODT) 4 MG ODT tab Take 1 tablet (4 mg) by mouth every 6 hours as needed for nausea or vomiting  Patient not taking: Reported on 1/26/2021   Deny Lee MD         Medication history completed by: Jackelyn Ferrari, PharmD IV Student

## 2021-01-29 NOTE — PLAN OF CARE
Neuro: A&Ox4. Pt uses call light appropriately.  Cardiac: SR. VSS. Rhythm regular.   Respiratory: Sats maintain above 93% on RA. Pt has occasional dry cough.  GI/: Adequate urine output. LBM stated to be on 1/28/21. No c/o abdm pain.  Diet/appetite: Tolerating low K+ diet. Pt states that she has gained 10 lbs in 6 months. Pt states she has gained wt since starting prednisone.  Activity:  Assist of SBA/self. Pt is stable w/ ambulation. Pt states she was dizzy w/ ambulation prior to admission. No dizziness upon ambulation today.  Pain: Pt having no reports of pain.  Skin: No new deficits noted.  LDA's: PIV    Plan: Continue with POC. Notify primary team with changes.

## 2021-01-29 NOTE — PLAN OF CARE
Transfer  Transferred from: ED  Via:bed  Reason for transfer: med/surg overflow  Family: Aware of transfer  Belongings: Received with pt  Chart: Received with pt  Medications: none from home  Code Status verified on armband: yes  2 RN Skin Assessment Completed By: Ruby DANIELLE RN and Gloria EDUARDO RN  Med rec completed: yes  Bed surface reassessed with algorithm and charted: yes  New bed surface ordered: no    Report received from: Rey  Pt status: VSS, RA, up self

## 2021-01-29 NOTE — PLAN OF CARE
PT 6A: DEFER.  Pt in normal, IND mobility state.  Ambulates 20 ft in room, IND.  No cognitive or ADL concerns.  When medically ready, is safe to discharge home.  Will complete orders.

## 2021-01-29 NOTE — PROGRESS NOTES
Resident/Fellow Attestation   I, Darlin Zabala, was present with the medical/OJ student who participated in the service and in the documentation of the note.  I have verified the history and personally performed the physical exam and medical decision making.  I agree with the assessment and plan of care as documented in the note.      Darlin Zabala MD  PGY3  Date of Service (when I saw the patient): 01/29/21    [facility]     Progress Note - [place] 2 Service        Date of Admission:  1/28/2021    Assessment & Plan    Cande Shields is a 20 year old female who has a history of granulomatosis with polyangiitis (GPA) and ADD is admitted for evaluation of elevated serum creatinine c/f GPA flare. Rheumatology and nephrology involved.     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 Admitted 1 month ago (12/26-12/30) for a GPA flare: symptoms were purpura at lower extremities, diffuse joint pain, leg swelling. Treated with Solu-Medrol 80 mg and Rituximab x1 while inpatient. She completed induction therapy with prednisone and a 4 dose course of rituximab in late January and was on a steroid taper. Taper started at 75 mg with rapid taped to 40 mg and reoccurrence of flare. Dose increased back to 60 mg. On admission, now at 50 mg. 1 week before admission, onset of epistaxis, hemoptysis, and occasional SOB. Outpatient labs showed  elevated serum Cr concerning for GPA flare. Chest CT on admission showed scattered peribronchovascular tree-in-bud nodularity with larger nodular opacities in the right middle lobe and left lung base. CBC was notable for worsening leucocytosis (12.5to 18.4) w/ neutrophilia and CRP was elevated (29.7). 500 mg of Methylprednisone administered in the ED. Rheumatology and Nephrology consulting. Pulse dose steroids continuing until 1/31.  PLAN:   -Rheumatology and Nephrology consulted, appreciate recs   -Consider: ENT consult for persistent epistaxis, Derm consult for skin lesions [can f/u outpatient for both]   -Pending studies:   Urine and blood cultures    ANCA IgG   Other studies per rheumatology  -[place] Nas[place] ordered 1/29 for epistaxis   -Renal US: no hydronephrosis  -Avoid nephrotoxic medications: holding Bactrim given KATERINA  -Continue PTA calcium carbonate 1200 meq daily and Vit D 800 international unit(s)  -Started on omeprazole 20 mg daily      #Episodes of Dyspnea with associated Lightheadedness and Chest Pain   Episodes occur randomly with no known inciting factor. Patient does note some feeling of anxiety when these occur. Etiology is most likely multifactorial. Patient was diagnosed with COVID in December and is also currently in a flare for her GPA. Will CTM and control symptoms.     #Hyperkalemia, resolved  Potassium elevated to 5.7 on 1/29. EKG checked: normal sinus rhythm with no major changes noted. No peaked T waves. Will CTM and if elevated can give Kayexalate.   -BMP in AM    #Insomnia in the context of MDD and ADD  Reports difficulty sleeping, was prescirbed ambien by her PCP, however she states this has not helped.  -Melatonin at bedtime PRN  -Trazodone started 1/29      #Nicotine use  Reports 4 pack-year smoking history. Has reduced her cigarette use to 5 sticks/day. States that she also vapes nicotine containing fluid. Has been using a nicotine patch.  -PTA nicotine patch     Diet: 2 Gram K  Diet    Fluids: IVF stopped; PO fluids  Lines: Peripheral IV in the left lower forearm   DVT Prophylaxis: Low Risk/Ambulatory with no VTE prophylaxis indicated  Guillen Catheter: not present  Code Status: Full Code      Disposition Plan   Expected discharge: 2 - 3 days, recommended to prior living arrangement once renal function improved, treatment plan for GPA flare established.  Entered: Jadiel Coates 01/29/2021, 7:58 AM     The patient's care was discussed with the [place] [id number] Team and Attending Dr. Casi Coates  Medical Student  [address]   Please see sign in/sign out for up to date coverage information  ______________________________________________________________________    Interval History   Patient is doing well overall. No pain. Has been having increased nose bleeds: she normally uses nasal spray at home. She has been coughing up blood and reports she is not sure if it is from her lungs or from the nose that has leaked back into her throat. She has been having trouble sleeping and asked for Trazodone. She has these random episodes of SOB, lightheadedness and chest pain that have increased in frequency lately and is worried if they are related to her GPA or something else.    Data reviewed today: I reviewed all medications, new labs and imaging results over the last 24 hours. I personally reviewed          EKG Interpretation:      Interpreted by Jadiel Coates  Symptoms at time of EKG: None   Rhythm: Normal sinus   Rate: Normal, 63   Axis: Normal  Ectopy: None  Conduction: Normal  ST Segments/ T Waves: No ST-T wave changes except for inverted T waves in VI. No acute ischemic changes   Q Waves: None    RUQ US:  -Initial read with no hydronephrosis    Physical Exam   Vital Signs: Temp: 98.1  F (36.7  C) Temp src: Oral BP: 114/74 Pulse: 64   Resp: 18 SpO2: 95 % O2 Device: None (Room air)    Weight: 254 lbs  3.05 oz  Constitutional: awake, alert, cooperative, no apparent distress  Eyes: pupils equal, round and reactive to light, extra ocular muscles intact  ENT: Normocephalic, without obvious abnormality, atraumatic,  Respiratory: No increased work of breathing, good air exchange  Cardiovascular: Regular rate and rhythm, normal S1 and S2, no S3 or S4, and no murmur noted  GI: No scars, normal bowel sounds, soft, non-distended, non-tender  Skin: Small scattered petechiae on the lower extremities   Musculoskeletal: Full range of motion noted. Motor strength is 5 out of 5 all extremities bilaterally.  Neurologic: Awake, alert, oriented to name, place and time.  Cranial nerves II-XII are grossly intact.  Motor is 5 out of 5 bilaterally.   Neuropsychiatric: General: normal, calm and normal eye contact    Data   Recent Labs   Lab 01/29/21  1219 01/29/21  0716 01/29/21  0519 01/28/21  1949 01/28/21  1936 01/27/21  1756   WBC  --   --  15.6*  --  18.4* 12.5*   HGB  --   --  10.3*  --  10.8* 12.1   MCV  --   --  88  --  88 87   PLT  --   --  232  --  255 232   NA  --   --  139 138  --  140   POTASSIUM 5.3 5.7* 5.7* 5.0  --  4.5   CHLORIDE  --   --  112* 111*  --  110*   CO2  --   --  19* 22  --  25   BUN  --   --  46* 44*  --  40*   CR  --   --  1.96* 2.07*  --  2.05*   ANIONGAP  --   --  8 5  --  5   KRAIG  --   --  8.4* 8.9  --  9.0   GLC  --   --  134* 138*  --  117*   ALBUMIN  --   --  2.6* 2.7*  --  3.0*   PROTTOTAL  --   --  6.3* 6.6*  --  7.2   BILITOTAL  --   --  0.2 0.2  --  0.2   ALKPHOS  --   --  71 74  --  84   ALT  --   --  33 30  --  30   AST  --   --  5 <3  --  4     Recent Results (from the past 24 hour(s))   Chest CT w/o contrast    Narrative    EXAMINATION: Chest CT  1/28/2021 9:02 PM    CLINICAL HISTORY: Hemoptysis    COMPARISON: Chest x-ray 12/27/2020. No CT comparison.    TECHNIQUE: CT imaging obtained through the chest without contrast.  Axial, coronal, and sagittal reconstructions and axial MIP  reformatted  images are reviewed.     CONTRAST: None    FINDINGS:  Lungs: The central tracheobronchial tree is patent. No pleural  effusion or pneumothorax. There are scattered peribronchovascular  groundglass opacities with mild tree-in-bud nodularity, for example in  the right upper lobe (series 6, image 130), and right lung apex  (series 6, image 69). Rounded nodular areas of consolidation within  the medial right middle lobe and in the left lung base.    Mediastinum: Multiple subcentimeter hypodense thyroid nodules,  including a partially calcified nodule arising off the left lobe of  the gland. Heart size is normal. Small amount of pericardial fluid.  Normal caliber aorta and main pulmonary artery. Normal branching  pattern of the aortic arch. No significant coronary artery calcium or  atherosclerotic changes of the aorta. No axillary or mediastinal  lymphadenopathy. Esophagus is normal in caliber.    Bones and soft tissues: Mild degenerative changes of the thoracic  spine. No acute or suspicious osseous lesions.    Upper Abdomen: Limited evaluation of the upper abdomen is within  normal limits.      Impression    IMPRESSION:   Scattered peribronchovascular tree-in-bud nodularity with larger  nodular opacities in the right middle lobe and left lung base, could  be due to infection or vasculitis and hemorrhage in the setting of  granulomatosis with polyangiitis given the patient's clinical history.    I have personally reviewed the examination and initial interpretation  and I agree with the findings.    ARLETTE POOLE MD     Medications       calcium carbonate 600 mg-vitamin D 400 units  1 tablet Oral Daily     methylPREDNISolone  500 mg Intravenous Daily     nicotine  1 patch Transdermal Daily     nicotine   Transdermal Q8H     omeprazole  20 mg Oral QAM AC     [Held by provider] sulfamethoxazole-trimethoprim  1 tablet Oral Once per day on Mon Wed Fri

## 2021-01-29 NOTE — PROGRESS NOTES
[facility]    ED Nurse to Floor Handoff     Cande Shields is a 20 year old female who speaks English and lives with family members,  in a home  They arrived in the ED by car from home    ED Chief Complaint: Abnormal Labs    ED Dx;   Final diagnoses:   Acute kidney injury (H)   Pulmonary infiltrates         Needed?: No    Allergies:   Allergies   Allergen Reactions     Penicillins Rash     Unknown, but think rash.  Tolerated cephalexin (12/27/20), cefpodoxime (12/27/20)   .  Past Medical Hx:   Past Medical History:   Diagnosis Date     ADHD (attention deficit hyperactivity disorder)      Wegener's disease, pulmonary (H) 2010    renal biopdy      Baseline Mental status: WDL  Current Mental Status changes: at basesline    Infection present or suspected this encounter: yes  Sepsis suspected: Yes  Isolation type: No active isolations  Patient tested for COVID 19 prior to admission: NO     Activity level - Baseline/Home:  Independent  Activity Level - Current:   Independent    Bariatric equipment needed?: No    In the ED these meds were given:   Medications   0.9% sodium chloride BOLUS (0 mLs Intravenous Stopped 1/28/21 2059)     Followed by   sodium chloride 0.9% infusion ( Intravenous Not Given 1/28/21 2135)   methylPREDNISolone sodium succinate (solu-MEDROL) 500 mg in sodium chloride 0.9 % 100 mL intermittent infusion (has no administration in time range)   0.9% sodium chloride BOLUS (1,000 mLs Intravenous New Bag 1/28/21 2101)       Drips running?  No    Home pump  No    Current LDAs  Peripheral IV 01/28/21 Left Lower forearm (Active)   Number of days: 0       Rash 12/28/20 0056 other (see comments) other (see comments) leg bulla/blister (Active)   Number of days: 31       Labs results:   Labs Ordered and Resulted from Time of ED Arrival Up to the Time of Departure from the ED   LACTATE FOR SEPSIS PROTOCOL - Abnormal; Notable for the following components:        Result Value    Lactate for Sepsis Protocol 2.3 (*)     All other components within normal limits   CBC WITH PLATELETS DIFFERENTIAL - Abnormal; Notable for the following components:    WBC 18.4 (*)     Hemoglobin 10.8 (*)     MCHC 30.6 (*)     Absolute Neutrophil 16.7 (*)     All other components within normal limits   COMPREHENSIVE METABOLIC PANEL - Abnormal; Notable for the following components:    Chloride 111 (*)     Glucose 138 (*)     Urea Nitrogen 44 (*)     Creatinine 2.07 (*)     GFR Estimate 33 (*)     GFR Estimate If Black 38 (*)     Albumin 2.7 (*)     Protein Total 6.6 (*)     All other components within normal limits   ROUTINE UA WITH MICROSCOPIC REFLEX TO CULTURE - Abnormal; Notable for the following components:    Blood Urine Moderate (*)     Protein Albumin Urine 30 (*)     Leukocyte Esterase Urine Trace (*)     RBC Urine 50 (*)     Bacteria Urine Few (*)     Squamous Epithelial /HPF Urine 3 (*)     Mucous Urine Present (*)     All other components within normal limits   SARS-COV-2 (COVID-19) VIRUS RT-PCR   ANCA IGG BY IFA WITH REFLEX TO TITER   PERIPHERAL IV CATHETER   BLOOD CULTURE   BLOOD CULTURE       Imaging Studies:   Recent Results (from the past 24 hour(s))   Chest CT w/o contrast    Narrative    EXAMINATION: Chest CT  1/28/2021 9:02 PM    CLINICAL HISTORY: Hemoptysis    COMPARISON: Chest x-ray 12/27/2020. No CT comparison.    TECHNIQUE: CT imaging obtained through the chest without contrast.  Axial, coronal, and sagittal reconstructions and axial MIP reformatted  images are reviewed.     CONTRAST: None    FINDINGS:  Lungs: The central tracheobronchial tree is patent. No pleural  effusion or pneumothorax. There are scattered peribronchovascular  groundglass opacities with mild tree-in-bud nodularity, for example in  the right upper lobe (series 6, image 130), and right lung apex  (series 6, image 69). Rounded nodular areas of consolidation within  the medial right middle lobe and in the  "left lung base.    Mediastinum: Multiple subcentimeter hypodense thyroid nodules,  including a partially calcified nodule arising off the left lobe of  the gland. Heart size is normal. Small amount of pericardial fluid.  Normal caliber aorta and main pulmonary artery. Normal branching  pattern of the aortic arch. No significant coronary artery calcium or  atherosclerotic changes of the aorta. No axillary or mediastinal  lymphadenopathy. Esophagus is normal in caliber.    Bones and soft tissues: Mild degenerative changes of the thoracic  spine. No acute or suspicious osseous lesions.    Upper Abdomen: Limited evaluation of the upper abdomen is within  normal limits.      Impression    IMPRESSION:   Scattered peribronchovascular tree-in-bud nodularity with larger  nodular opacities in the right middle lobe and left lung base, could  be due to infection or vasculitis and hemorrhage in the setting of  granulomatosis with polyangiitis given the patient's clinical history.    I have personally reviewed the examination and initial interpretation  and I agree with the findings.    ARLETTE POOLE MD       Recent vital signs:   /70   Pulse 97   Temp 98.3  F (36.8  C) (Oral)   Resp 17   Ht 1.626 m (5' 4\")   Wt 111.1 kg (245 lb)   SpO2 94%   Breastfeeding No   BMI 42.05 kg/m      Cheal Coma Scale Score: 15 (01/28/21 1830)       Cardiac Rhythm: Normal Sinus  Pt needs tele? Yes  Skin/wound Issues: None    Code Status: Full Code    Pain control: good    Nausea control: good    Abnormal labs/tests/findings requiring intervention:       Family present during ED course? No   Family Comments/Social Situation comments:     Tasks needing completion: None    Rey Rodriguez RN  [facility]   [phone] [place] ED  [phone] [facility] ED    "

## 2021-01-29 NOTE — CONSULTS
Rheumatology Consult Note-    Cande Shields MRN# [id number]   Age: 20 year old YOB: 2000     Date of Admission: 1/28/2021    Reason for consult:  GPA flare    Requesting Physician:   Neha Red    This visit was completed by video call.   Start time:  10:42 AM  End time:  11:08 AM    Assessment & Plan:     ASSESSMENT:  Cande Shields is a 20 year old female with a hx of COVID-19 (12/20, resolved), GPA diagnosed in 2012 who is admitted for KATERINA, hemoptysis and purpuric nodules over the elbows thought to be due to GPA flare.      Patient's symptoms of hematuria, hemoptysis, new rash, pedal edema with proteinuria and BC casts on microscopy suggests active flare of her GPA. CT chest demonstrates tree-in-bud nodularity that may also represent active vasculitis in conjunction with her KATERINA. Though she was taking prednisone 50 mg daily prior to arrival (60 mg daily as of last week) and completed her 4th rituximab infusion 1/20, it is likely her GPA flare will improve with bigger doses of steroids. Regarding whether to give another rituximab infusion will depend on how effective her previous transfusions were based on her CD19 count. It can take months for the effects of rituximab to be observed; however, considering the severity of patient's flare, she will be continued on pulse dose steroids while her CD 19 count is pending.    Patient's hemoptysis appears to be less voluminous and not causing hemodynamic instability or acute hypoxia. Given her ongoing rheumatological workup that started during her last hospitalization, will order Anti GBM antibody in addition to C3, C4, CRP, and ESR to r/o Goodpasture's syndrome and track her inflammatory markers, respectively.    DIAGNOSIS:  1. Granulomatosis with polyangiitis flare  2. Acute kidney injury  3. Microscopic hematuria       PLAN:  -Agree with Renal consult and their assistance  -Continue with pulse dose methylprednisolone 500 mg IV qday x 3 days (day 2/3  "today)  -C3, C4, CD 19, CRP, ESR, Anti-GBM IgG (ordered for you)  -Order urine creatinine/albumin to quantify proteinuria (ordered for you)  -OK to hold PJP ppx at this time    I discussed the findings and recommendations with the patient.  I communicated the assessment and plan to the consulting team.    Case seen and discussed with Dr. Plaza, who agrees with above assessment and plan.     Thank you Dr. Zabala and the [facility] for for this interesting consult. We will continue to follow. Please contact us if there are any questions.     Jesse Wei MD   PGY-3  [phone]    Staff addendum  I performed the history and physical examination of the patient and discussed the management with the resident. I reviewed the available lab and imaging studies. I reviewed the resident s note and agree with the documented findings and plan of care.    Christopher Plaza MD  Rheumatology        HPI     Cande Shields is a 20 year old female with history of COVID-19 infection (12/2019), GPA dx in 2012 who is admitted from Rheumatology clinic for KATERINA and new onset of hemoptysis, lightheadedness, chest pain and swelling. Of note patient was hospitalized 12/27-12/30 for recurrence of her GPA for which she was administered IV rituximab and solumedrol 80 mg. Prior to her discharge, she was started on prednisone 75 mg daily with a taper and 3 scheduled rituximab infusions qweekly. Patient reports after decreasing her prednisone dose from 75 mg to 40 mg daily, she noticed an acute \"purpuric rash\" spreading on her feet. This symptom was followed by lightheadedness, hemoptysis, and leg swelling. Patient states she called her PMD and Dr. Call of Rheumatology, who recommended she increase her prednisone dose to 60 mg daily. Patient notes she completed her rituximab infusions on 1/20, and followed up with Dr. Call on 1/27--there, she was found to have worsening kidney function compared to her discharge labs 12/30. " Patient states she was referred to [facility] for further workup of her worsening kidney function.      Patient denies any fevers, chills, weight loss, vision changes, headaches, focal weakness or numbness.  Denies any arthralgias, Raynaud's, myalgias,  Alopecia, rash, vitiligo, photosensitivity, nasal or oral ulcers, ear fullness or drainage.     Patient's workup is notable for elevated SCr to 2 with BUN 45, proteinuria and hematuria on UA, elevated WBC.  CT chest was notable for tree in bud nodularity in the RML and left lower lung, concerning for infection vs vasculitis. Patient was given methylprednisolone 500 mg IV x 1 dose and was admitted to Medicine for management of a suspected GPA flare.    HISTORY REVIEW:  Past Medical History:   Diagnosis Date     ADHD (attention deficit hyperactivity disorder)      Wegener's disease, pulmonary (H) 2010    renal biopdy     Past Surgical History:   Procedure Laterality Date     ENT SURGERY  2000    cyst     EXCISE GANGLION WRIST  2009     Family History   Problem Relation Age of Onset     Thyroid Disease Mother      Asthma No family hx of      Diabetes No family hx of      Social History     Socioeconomic History     Marital status: Single     Spouse name: Not on file     Number of children: Not on file     Years of education: Not on file     Highest education level: Not on file   Occupational History     Not on file   Social Needs     Financial resource strain: Not on file     Food insecurity     Worry: Not on file     Inability: Not on file     Transportation needs     Medical: Not on file     Non-medical: Not on file   Tobacco Use     Smoking status: Current Every Day Smoker     Smokeless tobacco: Never Used     Tobacco comment: down to 5 ciggaretes per day   Substance and Sexual Activity     Alcohol use: Yes     Frequency: 2-3 times a week     Comment: 3x a week     Drug use: No     Sexual activity: Never   Lifestyle     Physical activity     Days per week: Not on  "file     Minutes per session: Not on file     Stress: Not on file   Relationships     Social connections     Talks on phone: Not on file     Gets together: Not on file     Attends Sikhism service: Not on file     Active member of club or organization: Not on file     Attends meetings of clubs or organizations: Not on file     Relationship status: Not on file     Intimate partner violence     Fear of current or ex partner: Not on file     Emotionally abused: Not on file     Physically abused: Not on file     Forced sexual activity: Not on file   Other Topics Concern     Not on file   Social History Narrative    Lives with brother 16yo and mother. Pets: Dog Nicholas.     Patient Active Problem List   Diagnosis     Wegener's granulomatosis (H)     Myalgia     Granulomatosis with polyangiitis, unspecified whether renal involvement (H)     ANCA-associated vasculitis (H)     Morbid obesity (H)     Pulmonary infiltrates     Acute kidney injury (H)     Allergies   Allergen Reactions     Penicillins Rash     Unknown, but think rash.  Tolerated cephalexin (12/27/20), cefpodoxime (12/27/20)         ROS     A 14 point ROS was performed with pertinent findings listed above.      Objective     PHYSICAL EXAM  /88 (BP Location: Right arm)   Pulse 64   Temp 97.9  F (36.6  C) (Oral)   Resp 18   Ht 1.626 m (5' 4\")   Wt 115.3 kg (254 lb 3.1 oz)   SpO2 96%   Breastfeeding No   BMI 43.63 kg/m    Wt Readings from Last 4 Encounters:   01/29/21 115.3 kg (254 lb 3.1 oz)   01/26/21 111.9 kg (246 lb 11.2 oz)   12/27/20 109 kg (240 lb 6.4 oz)   10/03/11 83.9 kg (185 lb) (99 %, Z= 2.32)*     * Growth percentiles are based on CDC (Girls, 2-20 Years) data.     Video visit physical exam:  GEN Well appearing individual, no acute distress.   HEENT: no facial rash, sclera clear  CV: no upper extremity edema  Pulm: Breathing on room air, speaking in full sentences.   Abdomen: not distended  MSK: FROM x 4 extremities. No erythema of any " joints. No tenderness to self palpation  Skin: There are 3-4 purpuric nodules appearing on the dorsal aspect of the elbows bilaterally, overlying the olecranon.   Neuro: Normal speech, normal thought process and no facial droop.     DATA:  Outside Records: Yes  Outside Xrays: No  CBC:  Recent Labs   Lab Test 01/29/21 0519 01/28/21 1936 01/27/21  1756   WBC 15.6* 18.4* 12.5*   RBC 3.85 4.00 4.54   HGB 10.3* 10.8* 12.1   HCT 33.8* 35.3 39.7   MCV 88 88 87   MCH 26.8 27.0 26.7   MCHC 30.5* 30.6* 30.5*   RDW 13.1 13.2 13.2    255 232       BMP:  Recent Labs   Lab Test 01/29/21  0716 01/29/21 0519 01/28/21 1949 01/27/21  1756   NA  --  139 138 140   POTASSIUM 5.7* 5.7* 5.0 4.5   CHLORIDE  --  112* 111* 110*   CO2  --  19* 22 25   ANIONGAP  --  8 5 5   GLC  --  134* 138* 117*   BUN  --  46* 44* 40*   CR  --  1.96* 2.07* 2.05*   GFRESTIMATED  --  35* 33* 33*   KRAIG  --  8.4* 8.9 9.0       LFT:  Recent Labs   Lab Test 01/29/21 0519 01/28/21 1949 01/27/21  1756   PROTTOTAL 6.3* 6.6* 7.2   ALBUMIN 2.6* 2.7* 3.0*   BILITOTAL 0.2 0.2 0.2   ALKPHOS 71 74 84   AST 5 <3 4   ALT 33 30 30       No results found for: CKTOTAL  No results found for: TSH  No results found for: URIC    Inflammatory markers  Lab Results   Component Value Date    CRP 29.7 01/27/2021    CRP 27.0 12/29/2020    CRP 39.0 12/27/2020     Lab Results   Component Value Date    SED 38 12/27/2020     No results found for: WILFREDO    UA RESULTS:  Recent Labs   Lab Test 01/28/21  2102 01/27/21  1707 12/28/20  0920   COLOR Light Yellow Yellow Yellow   APPEARANCE Clear Slightly Cloudy Clear   URINEGLC Negative Negative Negative   URINEBILI Negative Negative Negative   URINEKETONE Negative Negative Negative   SG 1.014 1.013 1.021   UBLD Moderate* Large* Large*   URINEPH 6.5 5.0 6.5   PROTEIN 30* 100* 100*   NITRITE Negative Negative Negative   LEUKEST Trace* Negative Small*   RBCU 50* 76* >182*   WBCU 4 5 18*         Autoimmunity labs:     No results found for:  RHF  No results found for: CCPIGG  Lab Results   Component Value Date    ANCA 1:160 02/22/2011     Lab Results   Component Value Date    X6NJPGU 150 12/28/2020     Lab Results   Component Value Date    G1NVKGV 30 12/28/2020     No results found for: KARISSA  No results found for: DNA  No results found for: RNPIGG, SMIGG, SSAIGG, SSBIGG, SCLIGG    IMAGING:  CT Chest 1/28/20  IMPRESSION:   Scattered peribronchovascular tree-in-bud nodularity with larger  nodular opacities in the right middle lobe and left lung base, could  be due to infection or vasculitis and hemorrhage in the setting of  granulomatosis with polyangiitis given the patient's clinical history.    US Renal 1/29/20  IMPRESSION:  Somewhat limited examination due to habitus demonstrates no focal  renal hydronephrosis, evidence of obstruction, masses, or other focal  sonographic findings to correlate with acute kidney injury.

## 2021-01-29 NOTE — ED TRIAGE NOTES
wagners disease since 10 years of age  Was in remission for years until last month  Miscarriage in October due to preeclampsia  Got covid in December  Current smoker  Yesterday creatinine was increased from baseline which brings pt to ED

## 2021-01-29 NOTE — CONSULTS
Nephrology Initial Consult  January 29, 2021      Cande Shields MRN:[id number] YOB: 2000  Date of Admission:1/28/2021  Primary care provider: Cira Amanda  Requesting physician: Harry Oneal,*    ASSESSMENT AND RECOMMENDATIONS:   Cande Shields is a 20 year old female who has a history of PR3+ ANCA vasculitis s/p rituximab 375mg/m2 for 4 weekly doses and steroid taper, who presents after lab monitoring demonstrated KATERINA with Cr 2.05 up from baseline of 0.77 and new pulmonary infiltrates concerning for a vasculitis flare.     PR3+ ANCA Vasculitis  Cr elevated at ~2.0 up from baseline ~0.6-0.7 with microscopic hematuria and RBC casts on urine microscopy suggestive of active renal vasculitis. Additionally, pt with epistaxis, mild hemoptysis and new pulm infiltrates on CT concerning for vasculitis vs infection. Overall clinical picture concerning for vasculitis flare. Pulse dose steroids started overnight on admission, will continue 3 day course. We would favor starting cytoxan in the coming days. We do not feel that she needs PLEX at this time, however should she develop signs/symptoms of DAH we would consider initiating. Discussed case with Rheumatology.   - Continue pulse dose steroids - methylpred 500mg daily x3  - Recommend cytoxan in 1-2 days - if proceeding with this plan, Renal will write orders  - CD19, Anti-GBM pending per Rheum  - Agree with holding antibiotics for now - low suspicion for infection  - Hold Bactrim in setting of mild hyperkalemia    Recommendations were communicated to primary team via phone. Discussed with Rheumatology.     Seen and discussed with Dr. March.    [facility] Cary Garcia MD   [phone]      REASON FOR CONSULT: KATERINA, hx of PR3+ ANCA vasculitis    HISTORY OF PRESENT ILLNESS:  Admitting provider and nursing notes reviewed  Cande Shields is a 20 year old female who has a history of PR3+ ANCA vasculitis s/p rituximab 375mg/m2 for 4 weekly doses and  steroid taper, who presents after lab monitoring demonstrated KATERINA with Cr 2.05 up from baseline of 0.77.     The patient was recently admitted from 12/26/20-12/30/20 for an ANCA vasculitis flare. Prior to that admission, she had been considered to be in remission since 2012. She received solumedtrol 80mg x1 and her first dose of rituximab with plan for steroid taper and weekly rituximab x3 (total 4 doses). She then developed frequent epistaxis and worsening SOB. She has had a cough for the past week and reports some blood flecks in the sputum, but is not coughing up bright red blood. She also feels like she's had increased urinary urgency and some burning with urination for the past few days. She has been eating & drinking well and has increased appetite while on prednisone. She has not noticed increased peripheral edema. No fevers or chills.     PAST MEDICAL HISTORY:  Reviewed with patient on 01/29/2021   Past Medical History:   Diagnosis Date     ADHD (attention deficit hyperactivity disorder)      Wegener's disease, pulmonary (H) 2010    renal biopdy       Past Surgical History:   Procedure Laterality Date     ENT SURGERY  2000    cyst     EXCISE GANGLION WRIST  2009        MEDICATIONS:  PTA Meds  Prior to Admission medications    Medication Sig Last Dose Taking? Auth Provider   acetaminophen (TYLENOL) 325 MG tablet Take 2 tablets (650 mg) by mouth every 4 hours as needed for mild pain  Patient not taking: Reported on 1/26/2021   Deny Lee MD   buPROPion (WELLBUTRIN XL) 150 MG 24 hr tablet Take 1 tablet by mouth every 24 hours.  Patient not taking: Reported on 1/26/2021   Dasia Max MD   Calcium Carb-Cholecalciferol (HM CALCIUM-VITAMIN D) 600-800 MG-UNIT TABS Take 1 tablet by mouth daily  Patient not taking: Reported on 1/26/2021   Deny Lee MD   guaiFENesin-codeine (ROBITUSSIN AC) 100-10 MG/5ML solution Take 5 mLs by mouth 3 times daily as needed   Reported, Patient   ibuprofen (ADVIL) 200 MG  tablet Take 400 mg by mouth every 4 hours as needed.   Reported, Patient   nicotine (NICODERM CQ) 14 MG/24HR 24 hr patch Place 1 patch onto the skin daily  Patient not taking: Reported on 1/26/2021   Deny Lee MD   ondansetron (ZOFRAN-ODT) 4 MG ODT tab Take 1 tablet (4 mg) by mouth every 6 hours as needed for nausea or vomiting  Patient not taking: Reported on 1/26/2021   Deny Lee MD   polyethylene glycol (MIRALAX) 17 GM/Dose powder Take 17 g by mouth 2 times daily as needed for constipation  Patient not taking: Reported on 1/26/2021   Deny Lee MD   predniSONE (DELTASONE) 10 MG tablet Take 5 tabs daily for 1 week, taper as directed by MD.   Ramses Call MD   predniSONE (DELTASONE) 5 MG tablet Take 15 tablets (75 mg) by mouth daily for 3 days, THEN 8 tablets (40 mg) daily for 7 days, THEN 6 tablets (30 mg) daily for 14 days, THEN 5 tablets (25 mg) daily for 14 days.   Deny Lee MD   senna-docusate (SENOKOT-S/PERICOLACE) 8.6-50 MG tablet Take 1 tablet by mouth 2 times daily as needed for constipation  Patient not taking: Reported on 1/26/2021   Deny Lee MD   sulfamethoxazole-trimethoprim (BACTRIM DS) 800-160 MG tablet Take 1 tab daily 3 times weekly   Ramses Call MD   sulfamethoxazole-trimethoprim (BACTRIM DS) 800-160 MG tablet Take 1 tablet by mouth three times a week   Deny Lee MD      Current Meds    calcium carbonate 600 mg-vitamin D 400 units  1 tablet Oral Daily     nicotine  1 patch Transdermal Daily     nicotine   Transdermal Q8H     omeprazole  20 mg Oral QAM AC     [Held by provider] sulfamethoxazole-trimethoprim  1 tablet Oral Once per day on Mon Wed Fri     Infusion Meds    sodium chloride 125 mL/hr at 01/29/21 0654       ALLERGIES:    Allergies   Allergen Reactions     Penicillins Rash     Unknown, but think rash.  Tolerated cephalexin (12/27/20), cefpodoxime (12/27/20)       REVIEW OF SYSTEMS:  A comprehensive of systems was negative except as  noted above.    SOCIAL HISTORY:   Social History     Socioeconomic History     Marital status: Single     Spouse name: Not on file     Number of children: Not on file     Years of education: Not on file     Highest education level: Not on file   Occupational History     Not on file   Social Needs     Financial resource strain: Not on file     Food insecurity     Worry: Not on file     Inability: Not on file     Transportation needs     Medical: Not on file     Non-medical: Not on file   Tobacco Use     Smoking status: Current Every Day Smoker     Smokeless tobacco: Never Used     Tobacco comment: down to 5 ciggaretes per day   Substance and Sexual Activity     Alcohol use: Yes     Frequency: 2-3 times a week     Comment: 3x a week     Drug use: No     Sexual activity: Never   Lifestyle     Physical activity     Days per week: Not on file     Minutes per session: Not on file     Stress: Not on file   Relationships     Social connections     Talks on phone: Not on file     Gets together: Not on file     Attends Mormonism service: Not on file     Active member of club or organization: Not on file     Attends meetings of clubs or organizations: Not on file     Relationship status: Not on file     Intimate partner violence     Fear of current or ex partner: Not on file     Emotionally abused: Not on file     Physically abused: Not on file     Forced sexual activity: Not on file   Other Topics Concern     Not on file   Social History Narrative    Lives with brother 16yo and mother. Pets: Dog Nicholas.     Reviewed with patient.    FAMILY MEDICAL HISTORY:   Family History   Problem Relation Age of Onset     Thyroid Disease Mother      Asthma No family hx of      Diabetes No family hx of      Reviewed with patient.    PHYSICAL EXAM:   Temp  Av.2  F (36.8  C)  Min: 98.1  F (36.7  C)  Max: 98.3  F (36.8  C)      Pulse  Av.8  Min: 62  Max: 114 Resp  Av.6  Min: 16  Max: 27  SpO2  Av.4 %  Min: 94 %  Max: 98 %      "  /74 (BP Location: Right arm)   Pulse 64   Temp 98.1  F (36.7  C) (Oral)   Resp 18   Ht 1.626 m (5' 4\")   Wt 115.3 kg (254 lb 3.1 oz)   SpO2 95%   Breastfeeding No   BMI 43.63 kg/m        Admit Weight: 111.1 kg (245 lb)     GENERAL APPEARANCE: no distress, awake  EYES: no scleral icterus, pupils equal  Endo: no goiter  Pulmonary: scattered crackles bilaterally, no increased WOB  CV: RRR, no m/r/g   - no lower extremity edema  GI: soft, nontender, normal bowel sounds  MS: no evidence of inflammation in joints, no muscle tenderness  : no arambula  SKIN: no rash, warm, dry, no cyanosis  NEURO: face symmetric, AOx3    LABS:   CMP  Recent Labs   Lab 01/29/21 0519 01/28/21 1949 01/27/21  1756    138 140   POTASSIUM 5.7* 5.0 4.5   CHLORIDE 112* 111* 110*   CO2 19* 22 25   ANIONGAP 8 5 5   * 138* 117*   BUN 46* 44* 40*   CR 1.96* 2.07* 2.05*   GFRESTIMATED 35* 33* 33*   GFRESTBLACK 41* 38* 39*   KRAGI 8.4* 8.9 9.0   PROTTOTAL 6.3* 6.6* 7.2   ALBUMIN 2.6* 2.7* 3.0*   BILITOTAL 0.2 0.2 0.2   ALKPHOS 71 74 84   AST 5 <3 4   ALT 33 30 30     CBC  Recent Labs   Lab 01/29/21 0519 01/28/21 1936 01/27/21  1756   HGB 10.3* 10.8* 12.1   WBC 15.6* 18.4* 12.5*   RBC 3.85 4.00 4.54   HCT 33.8* 35.3 39.7   MCV 88 88 87   MCH 26.8 27.0 26.7   MCHC 30.5* 30.6* 30.5*   RDW 13.1 13.2 13.2    255 232     INRNo lab results found in last 7 days.  ABGNo lab results found in last 7 days.   URINE STUDIES  Recent Labs   Lab Test 01/28/21  2102 01/27/21  1707 12/28/20  0920 12/27/20  0127   COLOR Light Yellow Yellow Yellow Yellow   APPEARANCE Clear Slightly Cloudy Clear Clear   URINEGLC Negative Negative Negative Negative   URINEBILI Negative Negative Negative Negative   URINEKETONE Negative Negative Negative Negative   SG 1.014 1.013 1.021 1.023   UBLD Moderate* Large* Large* Large*   URINEPH 6.5 5.0 6.5 6.0   PROTEIN 30* 100* 100* 300*   NITRITE Negative Negative Negative Negative   LEUKEST Trace* Negative " Small* Negative   RBCU 50* 76* >182* 54*   WBCU 4 5 18* 18*     Recent Labs   Lab Test 12/27/20  1630   UTPG 1.46*     PTH  No lab results found.  IRON STUDIES  No lab results found.    IMAGING:  CT Chest W/O Contrast 1/28/21  IMPRESSION:   Scattered peribronchovascular tree-in-bud nodularity with larger  nodular opacities in the right middle lobe and left lung base, could  be due to infection or vasculitis and hemorrhage in the setting of  granulomatosis with polyangiitis given the patient's clinical history.     Daphne Garcia MD    I was present with the fellow during the history and exam.  I discussed the case with the fellow and agree with the findings as documented in the assessment and plan.   Patient discussed with Dr Chang who will help manage patient as outpatient.  Patient also sees Dr Call and will involve them in her management.  Cadence March

## 2021-01-29 NOTE — H&P
[facility]     History and Physical - [facility] Night Service        Date of Admission:  1/28/2021    Assessment & Plan   Cande Shields is a 20 year old female who has a history of granulomatosis with polyangiitis (GPA) and ADD is admitted for evaluation of elevated serum creatinine c/f GPA flare.    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 Her GPA was previously in remission after distant history of treatment with methotrexate, steroids and rituximab.  She was treated Solu-Medrol 80 mg and 1 dose of rituximab while she was inpatient. Per chart, she had completed induction therapy w/ of prednisone and a 4 dose course of rituximab in late January and was on a steroid taper. She presented to the ED following a finidng of elevated Scr on routine outpatient labs c/f GPA flare. Also reports epistaxis, hemoptysis, and occasional SOB.  Chest CT done at admission showed scattered peribronchovascular tree-in-bud nodularity with larger nodular opacities in the right middle lobe and left lung base, could be due to infection or vasculitis and hemorrhage in the setting of granulomatosis with polyangiitis given the patient's clinical history. CBC was notable for worsening  leucocytosis (12.5, 01.27 to 18.4k, 01/28) w/ neutrophilia but no increased immature granulocytes. CRP was also elevated (29.7, 01/27). Lactate was 2.3 (01/28). Leucocytosis possibly demargination d/t steroids vs active infection. Per chart, patient was discussed w/ Rheumatology and nephrology while in the ED, recommended pulse dose steroids, to start 01/28. Had 500 mg of Methylprednisone administered in the ED.  - Rheumatology consulted, discuss steroid dosing w/ Rheumatolgy  - Nephrology consulted  - consider ENT consult for persistent epistaxis  - consider Dermatology consult for skin lesions  - daily BMP  - Renal  US (ordered)  - avoid nephrotoxic medications  -  Strict I/Os  - daily standing weights  - follow urine and blood cultures  - follow-up ANCA IgG  - BG every PM  - holding Bactrim given KATERINA  - continue PTA calcium carbonate 1200 meq daily and Vit D 800 international unit(s)  - Started on omeprazole 20 mg daily       H/o MDD  H/o ADD  Insomnia  Reports difficulty sleeping, was prescirbed ambien b PCP, however  She states brigette  - melatonin at bedtime PRN  - consider Trazodone    Nicotine use  Reports 4 pack-year smoking history. Has reduced her cigarette use to 5 sticks/day. States that she also vapes nicotine containing fluid. Has been using a nicotine patch.  - PTA nicotine patch       Diet:   None  Fluids: None  DVT Prophylaxis: Low Risk/Ambulatory with no VTE prophylaxis indicated  Guillen Catheter: not present  Code Status:   Full code         Disposition Plan   Expected discharge: 2 - 3 days, recommended to prior living arrangement once renal function improved.  Entered: Sky Hernández MD 01/29/2021, 2:57 AM       The patient's care was discussed with the Attending Physician, Dr. Harry Oneal.    Sky Hernández MD  Internal Medicine, PGY-1  [facility]   Contact information available via [facility] Paging/Directory  Please see sign in/sign out for up to date coverage information  ______________________________________________________________________    Chief Complaint   Elevated Scr    History is obtained from the patient and from medical records    History of Present Illness   Cande Shields is a 20 year old female who has a history of granulomatosis with polyangiitis (GPA) and ADD is admitted for evaluation of elevated serum creatinine.    Patient reports that she was admitted 1 month ago (12/26/20-12/30/20) for a GPA flare. Her GPA was previously in remission after distant history of treatment with methotrexate, steroids and rituximab.  She was  treated Solu-Medrol 80 mg and 1 dose of rituximab while she was inpatient. She was discharged on a slow steroid taper and weekly rituximab x3. She had an OP visit w/ her Rheumatologist on 01/26 and was noted to have an elevated Scr on routine outpatient labs yesterday--Scr 2.05 from baseline of ~0.6-0.7.      She reports lower back pain, described as being around her kidneys, pain does not radiate to her groin, she denies dysuria or gross hematuria. She endorses occasional pain in her calves. She endorse bilateral lower extremity swelling, which she states has remained unchanged over the last 2 months. She also reports shortness of breath--said to be unrelated to exertion or position. Endorses occasional light headedness, denies headaches,  tinnitus, palpitations, and has had no falls.     She reports daily epistaxis for 1 week, and has had hemoptysis, initially attributed to her epistaxis. She has had no bruising,  hematochezia, nausea or vomiting. She denies fevers, weight loss, or rashes.  She also reports difficulty sleeping , for which she was prescribed ambien by her PCP, however she states that this has not helped and that she had a better response while on Trazodone during her last admission.    She presented at the ED today based on her Rheumatologist's advice, for evaluation of her rising Scr.      Review of Systems    The 10 point Review of Systems is negative other than noted in the HPI or here.     Past Medical History    I have reviewed this patient's medical history and updated it with pertinent information if needed.   Past Medical History:   Diagnosis Date     ADHD (attention deficit hyperactivity disorder)      Wegener's disease, pulmonary (H) 2010    renal biopdy        Past Surgical History   I have reviewed this patient's surgical history and updated it with pertinent information if needed.  Past Surgical History:   Procedure Laterality Date     ENT SURGERY  2000    cyst     EXCISE GANGLION  WRIST  2009        Social History   I have reviewed this patient's social history and updated it with pertinent information if needed. Cande Shields  reports that she has been smoking. She has never used smokeless tobacco. She reports current alcohol use. She reports that she does not use drugs.    Family History   I have reviewed this patient's family history and updated it with pertinent information if needed.  Family History   Problem Relation Age of Onset     Thyroid Disease Mother      Asthma No family hx of      Diabetes No family hx of        Prior to Admission Medications   Prior to Admission Medications   Prescriptions Last Dose Informant Patient Reported? Taking?   Calcium Carb-Cholecalciferol (HM CALCIUM-VITAMIN D) 600-800 MG-UNIT TABS   No No   Sig: Take 1 tablet by mouth daily   Patient not taking: Reported on 1/26/2021   acetaminophen (TYLENOL) 325 MG tablet   No No   Sig: Take 2 tablets (650 mg) by mouth every 4 hours as needed for mild pain   Patient not taking: Reported on 1/26/2021   buPROPion (WELLBUTRIN XL) 150 MG 24 hr tablet   No No   Sig: Take 1 tablet by mouth every 24 hours.   Patient not taking: Reported on 1/26/2021   guaiFENesin-codeine (ROBITUSSIN AC) 100-10 MG/5ML solution   Yes No   Sig: Take 5 mLs by mouth 3 times daily as needed   ibuprofen (ADVIL) 200 MG tablet   Yes No   Sig: Take 400 mg by mouth every 4 hours as needed.   nicotine (NICODERM CQ) 14 MG/24HR 24 hr patch   No No   Sig: Place 1 patch onto the skin daily   Patient not taking: Reported on 1/26/2021   ondansetron (ZOFRAN-ODT) 4 MG ODT tab   No No   Sig: Take 1 tablet (4 mg) by mouth every 6 hours as needed for nausea or vomiting   Patient not taking: Reported on 1/26/2021   polyethylene glycol (MIRALAX) 17 GM/Dose powder   No No   Sig: Take 17 g by mouth 2 times daily as needed for constipation   Patient not taking: Reported on 1/26/2021   predniSONE (DELTASONE) 10 MG tablet   No No   Sig: Take 5 tabs daily for 1 week,  taper as directed by MD.   predniSONE (DELTASONE) 5 MG tablet   No No   Sig: Take 15 tablets (75 mg) by mouth daily for 3 days, THEN 8 tablets (40 mg) daily for 7 days, THEN 6 tablets (30 mg) daily for 14 days, THEN 5 tablets (25 mg) daily for 14 days.   senna-docusate (SENOKOT-S/PERICOLACE) 8.6-50 MG tablet   No No   Sig: Take 1 tablet by mouth 2 times daily as needed for constipation   Patient not taking: Reported on 1/26/2021   sulfamethoxazole-trimethoprim (BACTRIM DS) 800-160 MG tablet   No No   Sig: Take 1 tablet by mouth three times a week   sulfamethoxazole-trimethoprim (BACTRIM DS) 800-160 MG tablet   No No   Sig: Take 1 tab daily 3 times weekly      Facility-Administered Medications: None     Allergies   Allergies   Allergen Reactions     Penicillins Rash     Unknown, but think rash.  Tolerated cephalexin (12/27/20), cefpodoxime (12/27/20)       Physical Exam   Vital Signs: Temp: 98.3  F (36.8  C) Temp src: Oral BP: 123/75 Pulse: 79   Resp: 17 SpO2: 96 % O2 Device: None (Room air)    Weight: 245 lbs 0 oz    General Appearance: not in apparent distress, appears comfortable on room air   HEENT: NCAT, EOMI, anicteric sclera, no conjunctival injection   Respiratory: normal WOB, vesicular breath  Sounds, no crackles or wheeze  Cardiovascular: RRR, no m/r/g. Radial and DP pulses palpable, no peripheral edema  GI: non-distended, soft, non-tender, no palpable masses or organomegaly, normoactive bowel sounds, no CVA tenderness  Lymph/Hematologic: no significant cervical lymphadenopathy   Skin: solitary papule on right elbow  Musculoskeletal: no joint swelling, full ROM in bed  Neurologic: A&Ox4, no gross facial asymmetry, CN II-XII intact  Psychiatric: calm, pleasant, cooperative    Data   Data reviewed today: I reviewed all medications, new labs and imaging results over the last 24 hours.     Recent Results (from the past 24 hour(s))   Chest CT w/o contrast    Narrative    EXAMINATION: Chest CT  1/28/2021 9:02  PM    CLINICAL HISTORY: Hemoptysis    COMPARISON: Chest x-ray 12/27/2020. No CT comparison.    TECHNIQUE: CT imaging obtained through the chest without contrast.  Axial, coronal, and sagittal reconstructions and axial MIP reformatted  images are reviewed.     CONTRAST: None    FINDINGS:  Lungs: The central tracheobronchial tree is patent. No pleural  effusion or pneumothorax. There are scattered peribronchovascular  groundglass opacities with mild tree-in-bud nodularity, for example in  the right upper lobe (series 6, image 130), and right lung apex  (series 6, image 69). Rounded nodular areas of consolidation within  the medial right middle lobe and in the left lung base.    Mediastinum: Multiple subcentimeter hypodense thyroid nodules,  including a partially calcified nodule arising off the left lobe of  the gland. Heart size is normal. Small amount of pericardial fluid.  Normal caliber aorta and main pulmonary artery. Normal branching  pattern of the aortic arch. No significant coronary artery calcium or  atherosclerotic changes of the aorta. No axillary or mediastinal  lymphadenopathy. Esophagus is normal in caliber.    Bones and soft tissues: Mild degenerative changes of the thoracic  spine. No acute or suspicious osseous lesions.    Upper Abdomen: Limited evaluation of the upper abdomen is within  normal limits.      Impression    IMPRESSION:   Scattered peribronchovascular tree-in-bud nodularity with larger  nodular opacities in the right middle lobe and left lung base, could  be due to infection or vasculitis and hemorrhage in the setting of  granulomatosis with polyangiitis given the patient's clinical history.    I have personally reviewed the examination and initial interpretation  and I agree with the findings.    ARLETTE POOLE MD

## 2021-01-30 LAB
ANION GAP SERPL CALCULATED.3IONS-SCNC: 5 MMOL/L (ref 3–14)
BACTERIA SPEC CULT: ABNORMAL
BUN SERPL-MCNC: 46 MG/DL (ref 7–30)
CALCIUM SERPL-MCNC: 8.7 MG/DL (ref 8.5–10.1)
CHLORIDE SERPL-SCNC: 114 MMOL/L (ref 94–109)
CO2 SERPL-SCNC: 20 MMOL/L (ref 20–32)
CREAT SERPL-MCNC: 1.91 MG/DL (ref 0.52–1.04)
CRP SERPL-MCNC: 7.9 MG/L (ref 0–8)
ERYTHROCYTE [DISTWIDTH] IN BLOOD BY AUTOMATED COUNT: 13.2 % (ref 10–15)
ERYTHROCYTE [SEDIMENTATION RATE] IN BLOOD BY WESTERGREN METHOD: 16 MM/H (ref 0–20)
GFR SERPL CREATININE-BSD FRML MDRD: 36 ML/MIN/{1.73_M2}
GLUCOSE SERPL-MCNC: 138 MG/DL (ref 70–99)
GRAM STN SPEC: ABNORMAL
HCG UR QL: NEGATIVE
HCT VFR BLD AUTO: 33.9 % (ref 35–47)
HGB BLD-MCNC: 10.6 G/DL (ref 11.7–15.7)
Lab: ABNORMAL
MCH RBC QN AUTO: 27.8 PG (ref 26.5–33)
MCHC RBC AUTO-ENTMCNC: 31.3 G/DL (ref 31.5–36.5)
MCV RBC AUTO: 89 FL (ref 78–100)
PLATELET # BLD AUTO: 258 10E9/L (ref 150–450)
POTASSIUM SERPL-SCNC: 5.3 MMOL/L (ref 3.4–5.3)
RBC # BLD AUTO: 3.81 10E12/L (ref 3.8–5.2)
SODIUM SERPL-SCNC: 139 MMOL/L (ref 133–144)
SPECIMEN SOURCE: ABNORMAL
SPECIMEN SOURCE: ABNORMAL
WBC # BLD AUTO: 25.8 10E9/L (ref 4–11)

## 2021-01-30 PROCEDURE — 81025 URINE PREGNANCY TEST: CPT | Performed by: STUDENT IN AN ORGANIZED HEALTH CARE EDUCATION/TRAINING PROGRAM

## 2021-01-30 PROCEDURE — 80048 BASIC METABOLIC PNL TOTAL CA: CPT | Performed by: STUDENT IN AN ORGANIZED HEALTH CARE EDUCATION/TRAINING PROGRAM

## 2021-01-30 PROCEDURE — 258N000003 HC RX IP 258 OP 636: Performed by: STUDENT IN AN ORGANIZED HEALTH CARE EDUCATION/TRAINING PROGRAM

## 2021-01-30 PROCEDURE — 86355 B CELLS TOTAL COUNT: CPT | Performed by: STUDENT IN AN ORGANIZED HEALTH CARE EDUCATION/TRAINING PROGRAM

## 2021-01-30 PROCEDURE — 86140 C-REACTIVE PROTEIN: CPT | Performed by: STUDENT IN AN ORGANIZED HEALTH CARE EDUCATION/TRAINING PROGRAM

## 2021-01-30 PROCEDURE — 250N000011 HC RX IP 250 OP 636: Performed by: STUDENT IN AN ORGANIZED HEALTH CARE EDUCATION/TRAINING PROGRAM

## 2021-01-30 PROCEDURE — 99233 SBSQ HOSP IP/OBS HIGH 50: CPT | Mod: GC | Performed by: INTERNAL MEDICINE

## 2021-01-30 PROCEDURE — 99233 SBSQ HOSP IP/OBS HIGH 50: CPT | Mod: GC | Performed by: STUDENT IN AN ORGANIZED HEALTH CARE EDUCATION/TRAINING PROGRAM

## 2021-01-30 PROCEDURE — 250N000013 HC RX MED GY IP 250 OP 250 PS 637: Performed by: STUDENT IN AN ORGANIZED HEALTH CARE EDUCATION/TRAINING PROGRAM

## 2021-01-30 PROCEDURE — 99207 PR NO CHARGE LOS: CPT | Performed by: OBSTETRICS & GYNECOLOGY

## 2021-01-30 PROCEDURE — 85652 RBC SED RATE AUTOMATED: CPT | Performed by: STUDENT IN AN ORGANIZED HEALTH CARE EDUCATION/TRAINING PROGRAM

## 2021-01-30 PROCEDURE — 83516 IMMUNOASSAY NONANTIBODY: CPT | Performed by: STUDENT IN AN ORGANIZED HEALTH CARE EDUCATION/TRAINING PROGRAM

## 2021-01-30 PROCEDURE — 36415 COLL VENOUS BLD VENIPUNCTURE: CPT | Performed by: STUDENT IN AN ORGANIZED HEALTH CARE EDUCATION/TRAINING PROGRAM

## 2021-01-30 PROCEDURE — 120N000005 HC R&B MS OVERFLOW UMMC

## 2021-01-30 PROCEDURE — 85027 COMPLETE CBC AUTOMATED: CPT | Performed by: STUDENT IN AN ORGANIZED HEALTH CARE EDUCATION/TRAINING PROGRAM

## 2021-01-30 RX ORDER — TRAZODONE HYDROCHLORIDE 50 MG/1
100 TABLET, FILM COATED ORAL
Status: DISCONTINUED | OUTPATIENT
Start: 2021-01-30 | End: 2021-02-01 | Stop reason: HOSPADM

## 2021-01-30 RX ORDER — TRAZODONE HYDROCHLORIDE 50 MG/1
50 TABLET, FILM COATED ORAL ONCE
Status: DISCONTINUED | OUTPATIENT
Start: 2021-01-30 | End: 2021-02-01 | Stop reason: HOSPADM

## 2021-01-30 RX ADMIN — MELATONIN TAB 3 MG 9 MG: 3 TAB at 21:21

## 2021-01-30 RX ADMIN — NICOTINE 1 PATCH: 14 PATCH, EXTENDED RELEASE TRANSDERMAL at 09:33

## 2021-01-30 RX ADMIN — OMEPRAZOLE 20 MG: 20 CAPSULE, DELAYED RELEASE ORAL at 09:31

## 2021-01-30 RX ADMIN — WHITE PETROLATUM: 1 OINTMENT TOPICAL at 20:02

## 2021-01-30 RX ADMIN — CALCIUM CARBONATE 600 MG (1,500 MG)-VITAMIN D3 400 UNIT TABLET 1 TABLET: at 09:33

## 2021-01-30 RX ADMIN — SODIUM CHLORIDE 500 MG: 9 INJECTION, SOLUTION INTRAVENOUS at 09:31

## 2021-01-30 RX ADMIN — TRAZODONE HYDROCHLORIDE 100 MG: 50 TABLET ORAL at 21:22

## 2021-01-30 ASSESSMENT — ACTIVITIES OF DAILY LIVING (ADL)
ADLS_ACUITY_SCORE: 13

## 2021-01-30 ASSESSMENT — MIFFLIN-ST. JEOR: SCORE: 1886

## 2021-01-30 NOTE — CONSULTS
Gynecology Consult Note    Name: Cande Shields    MRN: [id number]   YOB: 2000         We were asked to see Cande Shields at the request of Dr. Cooney for fertility preservation counseling prior to starting a course of cyclophosphamide.          History of Present Illness:     Cande Shields is a 20 year old  female who is HD#2 for treatment of flare up of granulomatosis polyangitis. The patient desires future fertility. After reviewing risks of immediate course of cyclophosphamide the patient expressed understanding and wanted to proceed. She reports a history of <1 month cyclophosphamide course at the age of 10.     She reports a history of concern for weight gain when previously on Depo Provera for birth control but feels like this will be the best option for contraception while on cyclophosphamide.     Gyn Hx:   Menses: Heavy   Contraception: None, previously on cOCPs and Depo Provera  Sexually active with a male partner     OB Hx:   , 31w IUFD            Past Medical History:     Past Medical History:   Diagnosis Date     ADHD (attention deficit hyperactivity disorder)      Wegener's disease, pulmonary (H) 2010    renal biopdy            Past Surgical History:     Past Surgical History:   Procedure Laterality Date     ENT SURGERY      cyst     EXCISE GANGLION WRIST              Social History:     Social History     Tobacco Use     Smoking status: Current Every Day Smoker     Smokeless tobacco: Never Used     Tobacco comment: down to 5 ciggaretes per day   Substance Use Topics     Alcohol use: Yes     Frequency: 2-3 times a week     Comment: 3x a week            Family History:     Family History   Problem Relation Age of Onset     Thyroid Disease Mother      Asthma No family hx of      Diabetes No family hx of           Allergies:     Allergies   Allergen Reactions     Penicillins Rash     Unknown, but think rash.  Tolerated cephalexin (20), cefpodoxime (20)             Medications:     Current Facility-Administered Medications   Medication     bisacodyl (DULCOLAX) Suppository 10 mg     calcium carbonate 600 mg-vitamin D 400 units (CALTRATE) per tablet 1 tablet     lidocaine (LMX4) cream     lidocaine 1 % 0.1-1 mL     melatonin tablet 9 mg     nicotine (NICODERM CQ) 14 MG/24HR 24 hr patch 1 patch     nicotine Patch in Place     omeprazole (priLOSEC) CR capsule 20 mg     ondansetron (ZOFRAN-ODT) ODT tab 4 mg    Or     ondansetron (ZOFRAN) injection 4 mg     polyethylene glycol (MIRALAX) Packet 17 g     prochlorperazine (COMPAZINE) injection 10 mg    Or     prochlorperazine (COMPAZINE) tablet 10 mg    Or     prochlorperazine (COMPAZINE) suppository 25 mg     senna-docusate (SENOKOT-S/PERICOLACE) 8.6-50 MG per tablet 1 tablet    Or     senna-docusate (SENOKOT-S/PERICOLACE) 8.6-50 MG per tablet 2 tablet     sodium chloride (OCEAN) 0.65 % nasal spray 2 spray     sodium chloride (PF) 0.9% PF flush 3 mL     sodium chloride (PF) 0.9% PF flush 3 mL     [Held by provider] sulfamethoxazole-trimethoprim (BACTRIM DS) 800-160 MG per tablet 1 tablet     traZODone (DESYREL) tablet 50 mg     White Petrolatum GEL             Review of Systems:    ROS: 10 point ROS negative other than those noted above in the HPI.       Physical Exam:     Vitals:    21 2302 21 0527 21 0830 21 1204   BP: (!) 149/76  136/89 139/85   BP Location: Right arm  Right arm Right arm   Pulse: 80  69 89   Resp: 16  16 20   Temp: 97.8  F (36.6  C)  98  F (36.7  C) 98  F (36.7  C)   TempSrc: Oral  Oral Oral   SpO2: 96%  95%    Weight:  114.1 kg (251 lb 8.7 oz)     Height:         General: NAD, appears   Resp: no respiratory distress  CV: heart rate regular      Labs/Imaging     Cr 1.91         Impression and Plan:   Impression:  Cande Shields is a 20 year old  female with polyangitis granulomatosis who is HD#2 for an acute flare, planning on receiving acute treatment with  cyclophosphamide. Explained to the patient that the use of cyclophosphamide is associated with ovarian failure and follicle loss, with this correlation increasing depending on patient's age and the duration/cumulative dose of treatment. Reviewed options for fertility preservation that are available, neither of which can be carried out before tomorrow, when the primary team is planning on beginning the cyclophosphamide course. Explained that the most effective fertility preservation option would be oocyte or embryo cryopreservation, which would require establishing care with a reproductive endocrinologist and infertility specialist and undergoing ovarian stimulation and egg retrieval, which can unfortunately sometimes take months.     An alternative ovarian hormone preservation option under study is the use of Depo Lupron for ovarian suppression. Explained to the patient that the goal of this medication is to put ovaries in a quiescent state that results in a lower level of cell division, theoretically decreasing effects on hormone producing cells by cyclophosphamide. The results of several RTC's looking at using Depo Lupron for this purpose have had conflicting results, but do favor possible effectiveness when cyclophosphamide is the single cytotoxic agent used and when it is used for rheumatic disease rather than cancer. Because Depo Lupron is a GnRH agonist there is a potential that it could transiently increase the activity of ovarian follicles in the first 1-2 weeks after it is administered and have the opposite of the desired effect if cyclophosphamide is given within that time frame. Because of this, it is recommended to give GnRH agonists at least 10 days prior to cyclophosphamide therapy. Lastly, we reviewed the short term side effects (hot flashes, mood disturbances, sexual dysfunction) and possible long term side effect of bone density loss, which could be aggravated by her concurrent use of high dose  corticosteroids.     Given that the patient is having an acute flare, with kidney involvement and possible peribronch-ovascular involvement, she does not want to delay cyclophosphamide treatment for either of the above fertility preservation options. She understands the possible risks of cyclophosphamide treatment on her fertility and ovarian function but also acknowledges the importance of prioritizing her overall health at this point in time.     Because the patient is planning to start cyclophosphamide, I recommend effective contraception during her treatment and until 6 months after completion; patient elected Depo Provera. Also strongly advised against future use of combined hormonal contraception use given risk of blood clots with history of tobacco use, HTN, and vasculitis.       Plan:  - Do not administer Depo Lupron (leuprolide) if planning on giving cyclophosphamide within 10 days   - Administer Depo Provera 150 mg IM [id number] for effective contraception during cyclophosphamide treatment   - Advised patient to establish care with an [facility] specialist after improvement of this flare to discuss cryopreservation options in case she needs additional rounds of treatment with cytotoxic medications in the future, as this is the preferred method of fertility preservation       Thank you for allowing us to be involved in the care of your patient. Please do not hesitate to give us a call with further questions.     Patient care plan discussed with Dr. Cira Brandon.    Sylvia Perez MD PGY-2  1/30/2021 5:30 PM  OB/GYN Resident    Cira Brandon MD      --------------------------------------------------------------------------------------------------------------------------    Reference article:   SHAMAR Nieto,  Ovarian protection with gonadotropin-releasing hormone agonists during cyclophosphamide therapy in systemic lupus erythematosus,  Best Practice & Research Clinical Obstetrics &  Gynaecology,  Volume 64,  2020,  Pages ,  ISSN 7607-2538,  [email or web address]/j.[email or web address].  (http://www.sciencedirect.com/science/article/pii/[email or web address])  Abstract: Administration of cyclophosphamide (CYC), an alkylating agent used to treat malignancies and severe rheumatic diseases, creates a risk of ovarian insufficiency that is related to the intensity and duration of therapy and the age of the patient. To preserve reproductive capacity in the appropriate clinical setting, oocyte, embryo, and/or ovarian tissue cryopreservation are recommended. Medical protection with depot gonadotropin-releasing hormone agonists (GNRHa) has emerged as a potential means to preserve both fertility and ovarian function through the suppression of ovarian activity during treatment with alkylators. We review the trials of GNRHa for ovarian protection in both cancer and rheumatic disease patients. Trials in cancer patients receiving CYC alone, or in combination with other gonadotoxic agents that have employed several different GNRHa have yielded mixed results. Trials in lupus patients receiving lower doses of CYC alone utilizing depot leuprolide acetate have tended to show favorable results.    Keywords: Ovary; Systemic lupus erythematosus; Cyclophosphamide; Gonadotropin releasing hormone; Infertility; Premature ovarian insufficiency

## 2021-01-30 NOTE — PROGRESS NOTES
Nephrology Progress Note  01/30/2021         Assessment & Recommendations:   Cande Shields is a 20 year old female who has a history of PR3+ ANCA vasculitis s/p rituximab 375mg/m2 for 4 weekly doses and steroid taper, who presents after lab monitoring demonstrated KATERINA with Cr 2.05 up from baseline of 0.77 and new pulmonary infiltrates concerning for a vasculitis flare.      PR3+ ANCA Vasculitis  Cr elevated at ~2.0 up from baseline ~0.6-0.7 with microscopic hematuria and RBC casts on urine microscopy suggestive of active renal vasculitis. Additionally, pt with epistaxis, mild hemoptysis and new pulm infiltrates on CT concerning for vasculitis vs infection. Overall clinical picture concerning for vasculitis flare. Pulse dose steroids initiated on admission. We do not feel that she needs PLEX at this time, however should she develop signs/symptoms of DAH we would consider initiating. At this time, we would recommend giving cytoxan in the next 1-2 days as treatment for this current flare. With the patient being of reproductive age, would recommend leuprolide for ovarian protection however will need to confirm optimal timing of dosing with gynecology.   - Day 3 of pulse steroids today  - Recommend cytoxan, tentatively planning to give dose tomorrow however will wait to hear Gynecology opinion on leuprolide timing  - Check quantiferon gold prior to cytoxan administration (ordered)  - Recommend inhaled pentamidine as alternative to bactrim for PJP ppx     Recommendations were communicated to primary team via phone, in person. Discussed with Rheumatology    Seen and discussed with Dr. Anca Garcia MD   [phone]    Interval History :   Nursing and provider notes from last 24 hours reviewed.  No events overnight. Feels well, no increase in SOB. No fever, no chills.       Review of Systems:   4pt ROS negative except as above    Physical Exam:   I/O last 3 completed shifts:  In: 1694.5 [P.O.:1557;  "I.V.:137.5]  Out: 2125 [Urine:2125]   /85 (BP Location: Right arm)   Pulse 89   Temp 98  F (36.7  C) (Oral)   Resp 20   Ht 1.626 m (5' 4\")   Wt 114.1 kg (251 lb 8.7 oz)   SpO2 95%   Breastfeeding No   BMI 43.18 kg/m       GENERAL APPEARANCE: no distress, awake  Pulmonary: clear bilaterally, no increased WOB  CV: RRR, no m/r/g   - no lower extremity edema  GI: soft, nontender, normal bowel sounds  MS: no evidence of inflammation in joints, no muscle tenderness  : no arambula  SKIN: no rash, warm, dry, no cyanosis  NEURO: face symmetric, AOx3    Labs:   All labs reviewed by me  Electrolytes/Renal -   Recent Labs   Lab Test 01/30/21  0502 01/29/21  1219 01/29/21  0716 01/29/21  0519 01/28/21 1949     --   --  139 138   POTASSIUM 5.3 5.3 5.7* 5.7* 5.0   CHLORIDE 114*  --   --  112* 111*   CO2 20  --   --  19* 22   BUN 46*  --   --  46* 44*   CR 1.91*  --   --  1.96* 2.07*   *  --   --  134* 138*   KRAIG 8.7  --   --  8.4* 8.9       CBC -   Recent Labs   Lab Test 01/30/21  0502 01/29/21  0519 01/28/21  1936   WBC 25.8* 15.6* 18.4*   HGB 10.6* 10.3* 10.8*    232 255       LFTs -   Recent Labs   Lab Test 01/29/21  0519 01/28/21 1949 01/27/21  1756   ALKPHOS 71 74 84   BILITOTAL 0.2 0.2 0.2   ALT 33 30 30   AST 5 <3 4   PROTTOTAL 6.3* 6.6* 7.2   ALBUMIN 2.6* 2.7* 3.0*       Iron Panel - No lab results found.      Imaging:  CT Chest W/O Contrast 1/28/21  IMPRESSION:   Scattered peribronchovascular tree-in-bud nodularity with larger  nodular opacities in the right middle lobe and left lung base, could  be due to infection or vasculitis and hemorrhage in the setting of  granulomatosis with polyangiitis given the patient's clinical history.     Current Medications:    calcium carbonate 600 mg-vitamin D 400 units  1 tablet Oral Daily     nicotine  1 patch Transdermal Daily     nicotine   Transdermal Q8H     omeprazole  20 mg Oral QAM AC     [Held by provider] sulfamethoxazole-trimethoprim  1 " tablet Oral Once per day on Mon Wed Fri       Daphne Garcia MD    I was present with the fellow during the history and exam.  I discussed the case with the fellow and agree with the findings as documented in the assessment and plan.  Cadence March

## 2021-01-30 NOTE — CONSULTS
Otolaryngology Consult Note  January 30, 2021    CC: History of epistaxis    HPI: Cande Shields is a 20 year old female with a past medical history of GPA. She has been hospitalized related to this condition in the past, and presented again to the [facility] in the setting of a likely acute flair. She reports history of epistaxis. She says she thinks her symptoms are related to nasal manifestations of her systemic disease. She reports a large crusting burden. She reports intermittent epistaxis that resolves without aggressive cares. She reports some intermittent nasal obstruction. She reports no occular changes, no changes to facial or nasal sensation. She has no changes to palatal sensation. She has no confusion or altering of her mental status. She uses a humidifier at home. She uses john-med sinus rinse. She tries to keep the nose moist and healthy. She has had recent treatment with systemic steroids and immune modulators.    Past Medical History:   Diagnosis Date     ADHD (attention deficit hyperactivity disorder)      Wegener's disease, pulmonary (H) 2010    renal biopdy       Past Surgical History:   Procedure Laterality Date     ENT SURGERY  2000    cyst     EXCISE GANGLION WRIST  2009       No current outpatient medications on file.          Allergies   Allergen Reactions     Penicillins Rash     Unknown, but think rash.  Tolerated cephalexin (12/27/20), cefpodoxime (12/27/20)       Social History     Socioeconomic History     Marital status: Single     Spouse name: Not on file     Number of children: Not on file     Years of education: Not on file     Highest education level: Not on file   Occupational History     Not on file   Social Needs     Financial resource strain: Not on file     Food insecurity     Worry: Not on file     Inability: Not on file     Transportation needs     Medical: Not on file     Non-medical: Not on file   Tobacco Use     Smoking status: Current Every  "Day Smoker     Smokeless tobacco: Never Used     Tobacco comment: down to 5 ciggaretes per day   Substance and Sexual Activity     Alcohol use: Yes     Frequency: 2-3 times a week     Comment: 3x a week     Drug use: No     Sexual activity: Never   Lifestyle     Physical activity     Days per week: Not on file     Minutes per session: Not on file     Stress: Not on file   Relationships     Social connections     Talks on phone: Not on file     Gets together: Not on file     Attends Mandaeism service: Not on file     Active member of club or organization: Not on file     Attends meetings of clubs or organizations: Not on file     Relationship status: Not on file     Intimate partner violence     Fear of current or ex partner: Not on file     Emotionally abused: Not on file     Physically abused: Not on file     Forced sexual activity: Not on file   Other Topics Concern     Not on file   Social History Narrative    Lives with brother 14yo and mother. Pets: Dog Nicholas.       Family History   Problem Relation Age of Onset     Thyroid Disease Mother      Asthma No family hx of      Diabetes No family hx of        ROS: 12 point review of systems is negative unless noted in HPI.    PHYSICAL EXAM:  General: laying in bed, no acute distress  /85 (BP Location: Right arm)   Pulse 89   Temp 98  F (36.7  C) (Oral)   Resp 20   Ht 1.626 m (5' 4\")   Wt 114.1 kg (251 lb 8.7 oz)   SpO2 95%   Breastfeeding No   BMI 43.18 kg/m    HEAD: normocephalic, atraumatic  Face: symmetrical, CN VII intact bilaterally (HB 1), no swelling, edema, or erythema. Sensation V1-V3 intact and equal bilaterally.   Eyes: EOMI without spontaneous or gaze evoked nystagmus, PERRL, clear sclera  Ears: no tragal tenderness, external ear canal open and clear bilaterally  Nose: no anterior drainage, intact and midline septum without perforation or hematoma, some crusting is visualized to the posterior septum, no necrosis  Mouth: moist, no ulcers, no " jaw or tooth tenderness, tongue midline and symmetric, sensate palate no necrosis   Oropharynx: tonsils within normal limits, uvula midline, no oropharyngeal erythema  Neck: no LAD, trachea midline  Neuro: cranial nerves 2-12 grossly intact    ROUTINE IP LABS (Last four results)  BMP  Recent Labs   Lab 01/30/21  0502 01/29/21  1219 01/29/21  0716 01/29/21  0519 01/28/21  1949 01/27/21  1756     --   --  139 138 140   POTASSIUM 5.3 5.3 5.7* 5.7* 5.0 4.5   CHLORIDE 114*  --   --  112* 111* 110*   KRAIG 8.7  --   --  8.4* 8.9 9.0   CO2 20  --   --  19* 22 25   BUN 46*  --   --  46* 44* 40*   CR 1.91*  --   --  1.96* 2.07* 2.05*   *  --   --  134* 138* 117*     CBC  Recent Labs   Lab 01/30/21  0502 01/29/21  0519 01/28/21  1936 01/27/21  1756   WBC 25.8* 15.6* 18.4* 12.5*   RBC 3.81 3.85 4.00 4.54   HGB 10.6* 10.3* 10.8* 12.1   HCT 33.9* 33.8* 35.3 39.7   MCV 89 88 88 87   MCH 27.8 26.8 27.0 26.7   MCHC 31.3* 30.5* 30.6* 30.5*   RDW 13.2 13.1 13.2 13.2    232 255 232     INRNo lab results found in last 7 days.     Assessment and Plan  Cande Shields is a 20 year old female with a past medical history of GPA who presents to ENT with nasal crusting and nasal manifestations of her GPA. We discussed appropriate nasal hygiene, and she has been relatively complaint with these cares at home. We discussed Vaseline to the nasal vestibule, humidity, irrigations and systemic treatment of her condition. She has an appointment with ENT outpatient, this should rescheduled for 1-2 weeks after discharge.     Eugene Polanco MD   Otolaryngology-Head & Neck Surgery  Please page ENT with questions by dialing [phone] and entering job code [phone] when prompted.    Discussed with on call staff

## 2021-01-30 NOTE — PROGRESS NOTES
[facility]     Progress Note - [place] 2 Service        Date of Admission:  1/28/2021    Assessment & Plan    Cande Shields is a 20 year old female who has a history of granulomatosis with polyangiitis (GPA) and ADD is admitted for evaluation of elevated serum creatinine, c/f GPA flare. Rheumatology and nephrology involved.     Changes Today (1/30):   - Creatinine stable, but not at baseline  - ENT consulted for chronic epistaxis, current hemoptysis  - Derm consulted for skin findings  - Gyn consulted for timing of leupron with cyclophosphamide    #Granulomatosis with polyangiitis with recent flare, readmitted for likely flare, with epistaxis and hemoptysis  #Intrinsic KATERINA with presence of RBC casts 2/2 underlying vasculitis  Patient diagnosed with GPA at 10, treated with Methotrexate, Rituximab,and steroids, with remission for 10 years. Admitted 1 month ago (12/26-12/30) for a GPA flare: symptoms were purpura at lower extremities, diffuse joint pain, leg swelling. Treated with Solu-Medrol 80 mg and Rituximab x1 while inpatient. She completed induction therapy with prednisone and a 4 dose course of rituximab in late January and was on a steroid taper. Taper started at 75 mg with rapid taper to 40 mg and reoccurrence of flare. Dose briefly increased back to 60 mg. On admission, now at 50 mg. 1 week before admission, onset of epistaxis, hemoptysis, and occasional SOB. Outpatient labs showed elevated serum Cr concerning for GPA flare.  Renal US without hydronephrosis Chest CT on admission showed scattered peribronchovascular tree-in-bud nodularity with larger nodular opacities in the right middle lobe and left lung base. CBC was notable for worsening leucocytosis (12.5to 18.4) w/ neutrophilia and CRP was elevated (29.7).  Blood, urine cx NGTD.  500 mg of Methylprednisone administered in the ED. Rheumatology and Nephrology consulting.  ENT consulted, and believes current  hemoptysis unlikely from epistaxis.  PLAN:   - Methylpred 500mg every day x 3 days (1/29 - 1/31)  - Planning for cyclophosphamide administration, will time leuprolide dose appropriately with therapy to improve chances of fertility post-chemo   --OB-Gyn consulted, appreciate recs  -Rheumatology and Nephrology consulted, appreciate recs   -ENT consulted, appreciate recs:   --Ocean Nasal Spray for epistaxis    --Vaseline for nares BID  -Pending studies:   -ANCA IgG   -C3, C4, CD 19, CRP, ESR, Anti-GBM IgG   -urine creatinine/albumin to quantify proteinuria  -Avoid nephrotoxic medications: holding Bactrim given KATERINA  -Continue PTA calcium carbonate 1200 meq daily and Vit D 800 international unit(s)  -Started on omeprazole 20 mg daily     #Umbilicated papules  Patient with raised papules on finger and extensor elbows, with umbilicated vs ulcerated centers.  Not improved with first dose of pulsed steroids, unlike LE palpable purpura.  -Dermatology consulted, appreciate recs    #Episodes of Dyspnea a/w Lightheadedness and Chest Pain, resolved  Episodes occur randomly with no known inciting factor. Patient does note some feeling of anxiety when these occur. Etiology is most likely multifactorial. Patient was diagnosed with COVID in December and is also currently in a flare for her GPA.   No symptoms since 1/29.  -CTM    #Hyperkalemia, resolved  Potassium elevated to 5.7 on 1/29. EKG checked: normal sinus rhythm with no major changes noted. No peaked T waves. Will CTM and if elevated can give Kayexalate.   -BMP in AM    #Insomnia in the context of MDD and ADD  Reports difficulty sleeping, was prescirbed ambien by her PCP, however she states this has not helped.  Feels the steroids also create issues with sleeping.  -Melatonin at bedtime PRN  -Trazodone started 1/29   -Pulse steroids moved to AM administration of dose     #Nicotine use  Reports 4 pack-year smoking history. Has reduced her cigarette use to 5 sticks/day. States  that she also vapes nicotine containing fluid. Has been using a nicotine patch.  -PTA nicotine patch     Diet: 2 Gram K Diet    Fluids: IVF stopped; PO fluids  Lines: Peripheral IV in the left lower forearm   DVT Prophylaxis: Low Risk/Ambulatory with no VTE prophylaxis indicated  Guillen Catheter: not present  Code Status: Full Code      Disposition Plan   Expected discharge: 2 - 3 days, recommended to prior living arrangement once renal function improved, treatment plan for GPA flare established.  Entered: Thaddeus Cooney MD 01/30/2021, 4:39 PM     The patient's care was discussed with the [place] 2 Team and Attending Dr. Adelfo Cooney, PGY-1  Internal Medicine  [facility]  [phone]    ______________________________________________________________________    Interval History   Patient is doing well overall.   No issues overnight, appears well this AM.  Did have some issues falling asleep, likely related to the steroids, which were given in afternoon.  Still coughing up sputum tinged with blood, but very small amounts.      7-point ROS negative, except as above    Data reviewed today: I reviewed all medications, new labs and imaging results over the last 24 hours.    Physical Exam   Vital Signs: Temp: 98  F (36.7  C) Temp src: Oral BP: 139/85 Pulse: 89   Resp: 20 SpO2: 95 % O2 Device: None (Room air)    Weight: 251 lbs 8.72 oz     Constitutional: NAD, pleasant, cooperative.  Sitting up in bed, smiling often during exam.  HEENT: Sclera anicteric, Normal oropharynx without ulcers or exudate, MMM  CV: RRR, no murmurs, gallops or rubs. JVD normal   Respiratory: CTAB, no wheezing, crackles or rales. Non-labored  Abd: Soft, NT/ND, +bs, no HSM  Skin: small scattered purpura on LE, decreased from previous exam  -Multiple small, raised papules on extensor elbows and L finger, with umbilicated vs ulcerated center of lesion.  Neuro: AAO x 3    Data   Recent Labs   Lab 01/30/21  0502  01/29/21  1219 01/29/21  0716 01/29/21  0519 01/28/21  1949 01/28/21  1936   WBC 25.8*  --   --  15.6*  --  18.4*   HGB 10.6*  --   --  10.3*  --  10.8*   MCV 89  --   --  88  --  88     --   --  232  --  255     --   --  139 138  --    POTASSIUM 5.3 5.3 5.7* 5.7* 5.0  --    CHLORIDE 114*  --   --  112* 111*  --    CO2 20  --   --  19* 22  --    BUN 46*  --   --  46* 44*  --    CR 1.91*  --   --  1.96* 2.07*  --    ANIONGAP 5  --   --  8 5  --    KRAIG 8.7  --   --  8.4* 8.9  --    *  --   --  134* 138*  --    ALBUMIN  --   --   --  2.6* 2.7*  --    PROTTOTAL  --   --   --  6.3* 6.6*  --    BILITOTAL  --   --   --  0.2 0.2  --    ALKPHOS  --   --   --  71 74  --    ALT  --   --   --  33 30  --    AST  --   --   --  5 <3  --      No results found for this or any previous visit (from the past 24 hour(s)).  Medications       calcium carbonate 600 mg-vitamin D 400 units  1 tablet Oral Daily     nicotine  1 patch Transdermal Daily     nicotine   Transdermal Q8H     omeprazole  20 mg Oral QAM AC     [Held by provider] sulfamethoxazole-trimethoprim  1 tablet Oral Once per day on Mon Wed Fri     White Petrolatum   Topical BID

## 2021-01-30 NOTE — CONSULTS
[facility] : Inpatient    Impression and Recommendations (Patient Counseled on the Following):  1: Umbilicated papules on extensor elbows and finger, favor molluscum contagiosum, also consider palisaded and granulomatous neutrophilic dermatosis, which is associated with a variety of autoimmune disease as well as systemic vasculitis.  - No acute change in management necessary  - We will schedule the patient in dermatology clinic with Dr. Patel on Wednesday 2/3/21, and can postpone if patient is expected to remain in the hospital longer than that.    Follow-up:   Follow-up with dermatology within 1-2 weeks (2/3/21 tentatively).    Thank you for the opportunity be involved in the care of this patient.     Staff/Resident:    Adrien Bain MD  Dermatology Resident      _____________________________________________________________________________    Dermatology Problem List:  1. GPA, PR3+ with history of likely LCV on legs  2. Umbilicated papules, likely molluscum, consider PGND    Encounter Date: Jan 28, 2021    CC:   Chief Complaint   Patient presents with     Abnormal Labs       History of Present Illness:  I have reviewed the teledermatology consult information and the referring clinician s clinic note corresponding to this issue. Cande Shields is a 20-year-old female who presents as a teledermatology consult for the following:      Additional comments and observations from review of the patient s chart including the following:    Patient has granulomatosis with polyangiitis with recent palpable purpura on the legs that has essentially resolved, now admitted for systemic flare    Papules on elbows noted in rheumatology outpatient visit 1/26/21    Patient has been on longterm prednisone 50 mg daily and has now received several days of high dose methylprednisolone in hospital for GPA flare    Physical Examination:  General: Well-appearing, appropriately-developed  individual.  Skin: Focused examination of the elbows, left 5th finger, and lower extremities within the teledermatology photograph(s) was performed.   - Clustered umbilicated papules on the extensor elbows  - Translucent tan dome shaped papule on the left 5th finger  - Hyperpigmented macules on the lower extremities    Past Medical History:   Patient Active Problem List   Diagnosis     Wegener's granulomatosis (H)     Myalgia     Granulomatosis with polyangiitis, unspecified whether renal involvement (H)     ANCA-associated vasculitis (H)     Morbid obesity (H)     Pulmonary infiltrates     Acute kidney injury (H)     Past Medical History:   Diagnosis Date     ADHD (attention deficit hyperactivity disorder)      Wegener's disease, pulmonary (H) 2010    renal biopdy     Past Surgical History:   Procedure Laterality Date     ENT SURGERY  2000    cyst     EXCISE GANGLION WRIST  2009       Family History:  Family History   Problem Relation Age of Onset     Thyroid Disease Mother      Asthma No family hx of      Diabetes No family hx of        Medications:  Current Facility-Administered Medications   Medication     bisacodyl (DULCOLAX) Suppository 10 mg     calcium carbonate 600 mg-vitamin D 400 units (CALTRATE) per tablet 1 tablet     lidocaine (LMX4) cream     lidocaine 1 % 0.1-1 mL     melatonin tablet 9 mg     nicotine (NICODERM CQ) 14 MG/24HR 24 hr patch 1 patch     nicotine Patch in Place     omeprazole (priLOSEC) CR capsule 20 mg     ondansetron (ZOFRAN-ODT) ODT tab 4 mg    Or     ondansetron (ZOFRAN) injection 4 mg     polyethylene glycol (MIRALAX) Packet 17 g     prochlorperazine (COMPAZINE) injection 10 mg    Or     prochlorperazine (COMPAZINE) tablet 10 mg    Or     prochlorperazine (COMPAZINE) suppository 25 mg     senna-docusate (SENOKOT-S/PERICOLACE) 8.6-50 MG per tablet 1 tablet    Or     senna-docusate (SENOKOT-S/PERICOLACE) 8.6-50 MG per tablet 2 tablet     sodium chloride (OCEAN) 0.65 % nasal spray 2  spray     sodium chloride (PF) 0.9% PF flush 3 mL     sodium chloride (PF) 0.9% PF flush 3 mL     [Held by provider] sulfamethoxazole-trimethoprim (BACTRIM DS) 800-160 MG per tablet 1 tablet     traZODone (DESYREL) tablet 50 mg     White Petrolatum GEL          Allergies   Allergen Reactions     Penicillins Rash     Unknown, but think rash.  Tolerated cephalexin (12/27/20), cefpodoxime (12/27/20)       _____________________________________________________________________________    Teledermatology information:  - Location of patient: ([facility] [place] [address], [place] [address])  - Location of teledermatologist: [address] [place] [address]  - Reason teledermatology is appropriate: inpatient  - The patient's condition can safely be assessed using telemedicine: yes  - Method of transmission: store and forward teledermatology  - Image quality and interpretability: acceptable  - Physician has received verbal consent for a Video/Photos Visit from the patient? Yes  - In-person dermatology visit recommendation: yes - for physician visit  - Date of images: 1/30/21  - Service start time:4:15 pm  - Service end time:4:35 pm  - Date of report: 01/30/21

## 2021-01-30 NOTE — PLAN OF CARE
Neuro: A&Ox4  Cardiac: SR, 70s-80s. EKG done this morn d/t hyperkalemia. BLE edema 1-2+.  Respiratory: Sats >92% on RA. Occasional cough, producing small amt's of clear + blood-streaked/ball-like sputum, sample sent.  GI/: Adequate UOP, urine samples sent. BMx1, medium + formed.   Diet/appetite: 2g K+ restricted diet, eating well.   Activity: Up ad drake in room/bathroom  Pain: Pt having no reports of pain.  Skin/LDAs: No new deficits noted. LPIV: TKO 0.9% NS; IV Methylpred 500mg daily started      Plan: Continue with POC. Notify primary team with changes.

## 2021-01-30 NOTE — PLAN OF CARE
Neuro: A&Ox4.   Cardiac: SR. HR 80-90s VSS.   Respiratory: Sating mid 90s on RA.  GI/: Adequate urine output. No BM  Diet/appetite: Tolerating 2g K+ restricted diet. Eating well.  Activity:  Ind within room  Pain: Denies pain  Skin: No new deficits noted.  LDA's: L [id number]    Plan: Continue with POC. Notify primary team with changes.

## 2021-01-31 LAB
ANION GAP SERPL CALCULATED.3IONS-SCNC: 7 MMOL/L (ref 3–14)
BUN SERPL-MCNC: 46 MG/DL (ref 7–30)
CALCIUM SERPL-MCNC: 8.8 MG/DL (ref 8.5–10.1)
CD19 CELLS # BLD: <1 CELLS/UL (ref 107–698)
CD19 CELLS NFR BLD: <1 % (ref 6–27)
CHLORIDE SERPL-SCNC: 111 MMOL/L (ref 94–109)
CO2 SERPL-SCNC: 20 MMOL/L (ref 20–32)
CREAT SERPL-MCNC: 1.94 MG/DL (ref 0.52–1.04)
CREAT UR-MCNC: 65 MG/DL
ERYTHROCYTE [DISTWIDTH] IN BLOOD BY AUTOMATED COUNT: 13.3 % (ref 10–15)
GFR SERPL CREATININE-BSD FRML MDRD: 36 ML/MIN/{1.73_M2}
GLUCOSE SERPL-MCNC: 117 MG/DL (ref 70–99)
HCT VFR BLD AUTO: 34.5 % (ref 35–47)
HGB BLD-MCNC: 10.7 G/DL (ref 11.7–15.7)
MAGNESIUM SERPL-MCNC: 2 MG/DL (ref 1.6–2.3)
MCH RBC QN AUTO: 27.4 PG (ref 26.5–33)
MCHC RBC AUTO-ENTMCNC: 31 G/DL (ref 31.5–36.5)
MCV RBC AUTO: 89 FL (ref 78–100)
PLATELET # BLD AUTO: 241 10E9/L (ref 150–450)
POTASSIUM SERPL-SCNC: 4.9 MMOL/L (ref 3.4–5.3)
PROT UR-MCNC: 1.07 G/L
PROT/CREAT 24H UR: 1.64 G/G CR (ref 0–0.2)
RBC # BLD AUTO: 3.9 10E12/L (ref 3.8–5.2)
SODIUM SERPL-SCNC: 138 MMOL/L (ref 133–144)
TSH SERPL DL<=0.005 MIU/L-ACNC: 0.42 MU/L (ref 0.4–4)
WBC # BLD AUTO: 20.5 10E9/L (ref 4–11)

## 2021-01-31 PROCEDURE — 0HBDXZX EXCISION OF RIGHT LOWER ARM SKIN, EXTERNAL APPROACH, DIAGNOSTIC: ICD-10-PCS | Performed by: DERMATOLOGY

## 2021-01-31 PROCEDURE — 99233 SBSQ HOSP IP/OBS HIGH 50: CPT | Mod: GC | Performed by: STUDENT IN AN ORGANIZED HEALTH CARE EDUCATION/TRAINING PROGRAM

## 2021-01-31 PROCEDURE — 120N000005 HC R&B MS OVERFLOW UMMC

## 2021-01-31 PROCEDURE — 250N000012 HC RX MED GY IP 250 OP 636 PS 637: Performed by: STUDENT IN AN ORGANIZED HEALTH CARE EDUCATION/TRAINING PROGRAM

## 2021-01-31 PROCEDURE — 85027 COMPLETE CBC AUTOMATED: CPT | Performed by: STUDENT IN AN ORGANIZED HEALTH CARE EDUCATION/TRAINING PROGRAM

## 2021-01-31 PROCEDURE — 3E03305 INTRODUCTION OF OTHER ANTINEOPLASTIC INTO PERIPHERAL VEIN, PERCUTANEOUS APPROACH: ICD-10-PCS | Performed by: INTERNAL MEDICINE

## 2021-01-31 PROCEDURE — 88313 SPECIAL STAINS GROUP 2: CPT | Mod: TC | Performed by: DERMATOLOGY

## 2021-01-31 PROCEDURE — 88305 TISSUE EXAM BY PATHOLOGIST: CPT | Mod: 26 | Performed by: PATHOLOGY

## 2021-01-31 PROCEDURE — 80048 BASIC METABOLIC PNL TOTAL CA: CPT | Performed by: STUDENT IN AN ORGANIZED HEALTH CARE EDUCATION/TRAINING PROGRAM

## 2021-01-31 PROCEDURE — 36415 COLL VENOUS BLD VENIPUNCTURE: CPT | Performed by: STUDENT IN AN ORGANIZED HEALTH CARE EDUCATION/TRAINING PROGRAM

## 2021-01-31 PROCEDURE — 11104 PUNCH BX SKIN SINGLE LESION: CPT | Mod: GC | Performed by: DERMATOLOGY

## 2021-01-31 PROCEDURE — 99232 SBSQ HOSP IP/OBS MODERATE 35: CPT | Mod: GC | Performed by: INTERNAL MEDICINE

## 2021-01-31 PROCEDURE — 258N000003 HC RX IP 258 OP 636: Performed by: STUDENT IN AN ORGANIZED HEALTH CARE EDUCATION/TRAINING PROGRAM

## 2021-01-31 PROCEDURE — 84443 ASSAY THYROID STIM HORMONE: CPT | Performed by: STUDENT IN AN ORGANIZED HEALTH CARE EDUCATION/TRAINING PROGRAM

## 2021-01-31 PROCEDURE — 250N000013 HC RX MED GY IP 250 OP 250 PS 637: Performed by: STUDENT IN AN ORGANIZED HEALTH CARE EDUCATION/TRAINING PROGRAM

## 2021-01-31 PROCEDURE — 88313 SPECIAL STAINS GROUP 2: CPT | Mod: 26 | Performed by: PATHOLOGY

## 2021-01-31 PROCEDURE — 250N000011 HC RX IP 250 OP 636: Performed by: STUDENT IN AN ORGANIZED HEALTH CARE EDUCATION/TRAINING PROGRAM

## 2021-01-31 PROCEDURE — 88305 TISSUE EXAM BY PATHOLOGIST: CPT | Mod: TC | Performed by: DERMATOLOGY

## 2021-01-31 PROCEDURE — 86481 TB AG RESPONSE T-CELL SUSP: CPT | Performed by: STUDENT IN AN ORGANIZED HEALTH CARE EDUCATION/TRAINING PROGRAM

## 2021-01-31 PROCEDURE — 83735 ASSAY OF MAGNESIUM: CPT | Performed by: STUDENT IN AN ORGANIZED HEALTH CARE EDUCATION/TRAINING PROGRAM

## 2021-01-31 PROCEDURE — 84156 ASSAY OF PROTEIN URINE: CPT | Performed by: STUDENT IN AN ORGANIZED HEALTH CARE EDUCATION/TRAINING PROGRAM

## 2021-01-31 PROCEDURE — 99232 SBSQ HOSP IP/OBS MODERATE 35: CPT | Mod: 25 | Performed by: DERMATOLOGY

## 2021-01-31 RX ORDER — PENTAMIDINE ISETHIONATE 300 MG/300MG
300 INHALANT RESPIRATORY (INHALATION)
Status: DISCONTINUED | OUTPATIENT
Start: 2021-01-31 | End: 2021-02-01 | Stop reason: HOSPADM

## 2021-01-31 RX ORDER — DIPHENHYDRAMINE HYDROCHLORIDE 50 MG/ML
50 INJECTION INTRAMUSCULAR; INTRAVENOUS
Status: CANCELLED
Start: 2021-01-31

## 2021-01-31 RX ORDER — ONDANSETRON 2 MG/ML
8 INJECTION INTRAMUSCULAR; INTRAVENOUS ONCE
Status: COMPLETED | OUTPATIENT
Start: 2021-01-31 | End: 2021-01-31

## 2021-01-31 RX ORDER — MEDROXYPROGESTERONE ACETATE 150 MG/ML
150 INJECTION, SUSPENSION INTRAMUSCULAR
Status: DISCONTINUED | OUTPATIENT
Start: 2021-01-31 | End: 2021-02-01 | Stop reason: HOSPADM

## 2021-01-31 RX ORDER — PROCHLORPERAZINE MALEATE 10 MG
10 TABLET ORAL EVERY 6 HOURS PRN
Status: DISCONTINUED | OUTPATIENT
Start: 2021-01-31 | End: 2021-02-01 | Stop reason: HOSPADM

## 2021-01-31 RX ORDER — ALBUTEROL SULFATE 0.83 MG/ML
2.5 SOLUTION RESPIRATORY (INHALATION)
Status: CANCELLED
Start: 2021-01-31

## 2021-01-31 RX ORDER — ALBUTEROL SULFATE 90 UG/1
1-2 AEROSOL, METERED RESPIRATORY (INHALATION)
Status: CANCELLED
Start: 2021-01-31

## 2021-01-31 RX ORDER — ONDANSETRON 4 MG/1
8 TABLET, ORALLY DISINTEGRATING ORAL EVERY 8 HOURS PRN
Status: CANCELLED
Start: 2021-01-31

## 2021-01-31 RX ORDER — ACETAMINOPHEN 325 MG/1
975 TABLET ORAL EVERY 6 HOURS PRN
Status: DISCONTINUED | OUTPATIENT
Start: 2021-01-31 | End: 2021-02-01 | Stop reason: HOSPADM

## 2021-01-31 RX ORDER — EPINEPHRINE 1 MG/ML
0.3 INJECTION, SOLUTION, CONCENTRATE INTRAVENOUS EVERY 5 MIN PRN
Status: CANCELLED
Start: 2021-01-31

## 2021-01-31 RX ORDER — SODIUM CHLORIDE 9 MG/ML
1000 INJECTION, SOLUTION INTRAVENOUS CONTINUOUS PRN
Status: CANCELLED
Start: 2021-01-31

## 2021-01-31 RX ORDER — LORAZEPAM 0.5 MG/1
.5-1 TABLET ORAL EVERY 6 HOURS PRN
Status: CANCELLED
Start: 2021-01-31

## 2021-01-31 RX ORDER — MEPERIDINE HYDROCHLORIDE 25 MG/ML
25 INJECTION INTRAMUSCULAR; INTRAVENOUS; SUBCUTANEOUS EVERY 30 MIN PRN
Status: CANCELLED
Start: 2021-01-31

## 2021-01-31 RX ORDER — LORAZEPAM 2 MG/ML
.5-1 INJECTION INTRAMUSCULAR EVERY 6 HOURS PRN
Status: CANCELLED
Start: 2021-01-31

## 2021-01-31 RX ORDER — ONDANSETRON 8 MG/1
8 TABLET, FILM COATED ORAL ONCE
Status: CANCELLED
Start: 2021-01-31

## 2021-01-31 RX ORDER — ONDANSETRON 2 MG/ML
8 INJECTION INTRAMUSCULAR; INTRAVENOUS EVERY 8 HOURS PRN
Status: CANCELLED
Start: 2021-01-31

## 2021-01-31 RX ORDER — METHYLPREDNISOLONE SODIUM SUCCINATE 125 MG/2ML
125 INJECTION, POWDER, LYOPHILIZED, FOR SOLUTION INTRAMUSCULAR; INTRAVENOUS
Status: CANCELLED
Start: 2021-01-31

## 2021-01-31 RX ORDER — ALBUTEROL SULFATE 0.83 MG/ML
2.5 SOLUTION RESPIRATORY (INHALATION)
Status: DISCONTINUED | OUTPATIENT
Start: 2021-01-31 | End: 2021-02-01 | Stop reason: HOSPADM

## 2021-01-31 RX ORDER — PREDNISONE 20 MG/1
80 TABLET ORAL DAILY
Status: DISCONTINUED | OUTPATIENT
Start: 2021-01-31 | End: 2021-02-01 | Stop reason: HOSPADM

## 2021-01-31 RX ADMIN — ONDANSETRON 8 MG: 2 INJECTION INTRAMUSCULAR; INTRAVENOUS at 13:58

## 2021-01-31 RX ADMIN — CYCLOPHOSPHAMIDE 1135 MG: 2 INJECTION, POWDER, FOR SOLUTION INTRAVENOUS; ORAL at 14:12

## 2021-01-31 RX ADMIN — MEDROXYPROGESTERONE ACETATE 150 MG: 150 INJECTION, SUSPENSION, EXTENDED RELEASE INTRAMUSCULAR at 20:11

## 2021-01-31 RX ADMIN — OMEPRAZOLE 20 MG: 20 CAPSULE, DELAYED RELEASE ORAL at 09:54

## 2021-01-31 RX ADMIN — PREDNISONE 80 MG: 20 TABLET ORAL at 13:58

## 2021-01-31 RX ADMIN — NICOTINE 1 PATCH: 14 PATCH, EXTENDED RELEASE TRANSDERMAL at 09:55

## 2021-01-31 RX ADMIN — MESNA 230 MG: 100 INJECTION, SOLUTION INTRAVENOUS at 16:46

## 2021-01-31 RX ADMIN — MELATONIN TAB 3 MG 9 MG: 3 TAB at 21:38

## 2021-01-31 RX ADMIN — MESNA 230 MG: 100 INJECTION, SOLUTION INTRAVENOUS at 20:49

## 2021-01-31 RX ADMIN — TRAZODONE HYDROCHLORIDE 100 MG: 50 TABLET ORAL at 21:38

## 2021-01-31 RX ADMIN — WHITE PETROLATUM: 1 OINTMENT TOPICAL at 09:56

## 2021-01-31 RX ADMIN — CALCIUM CARBONATE 600 MG (1,500 MG)-VITAMIN D3 400 UNIT TABLET 1 TABLET: at 09:55

## 2021-01-31 ASSESSMENT — MIFFLIN-ST. JEOR: SCORE: 1885

## 2021-01-31 ASSESSMENT — ACTIVITIES OF DAILY LIVING (ADL)
ADLS_ACUITY_SCORE: 13

## 2021-01-31 NOTE — PROGRESS NOTES
Nephrology Progress Note  01/31/2021         Assessment & Recommendations:   aCnde Shields is a 20 year old female who has a history of PR3+ ANCA vasculitis s/p rituximab 375mg/m2 for 4 weekly doses and steroid taper, who presents after lab monitoring demonstrated KATERINA with Cr 2.05 up from baseline of 0.77 and new pulmonary infiltrates concerning for a vasculitis flare.      PR3+ ANCA Vasculitis  Cr elevated at ~2.0 up from baseline ~0.6-0.7 with microscopic hematuria and RBC casts on urine microscopy suggestive of active renal vasculitis. Additionally, pt with epistaxis, mild hemoptysis and new pulm infiltrates on CT concerning for vasculitis vs infection. Overall clinical picture concerning for vasculitis flare. Pulse dose steroids initiated on admission. We do not feel that she needs PLEX at this time, however should she develop signs/symptoms of DAH we would consider initiating. Will give cytoxan today - 500mg/m2 with mesna for prophylaxis against hemorrhagic cystitis with cyclophosphamide. Appreciate Gynecology input on ovarian preservation in setting of cyclophosphamide - did not give leuprolide. Patient will start Depo Provera for contraception. Urine pregnancy test negative yesterday.   - Cyclophosphamide 500mg/m2 => 1135mg today  - Mesna given in 3 doses at 0, 4, and 8 hours   - Start prednisone 80mg daily, plan for at least 2 weeks prior to tapering  - Recommend pentamidine for PJP ppx (stop bactrim)  - Depo Provera for contraception  - We will coordinate patient follow up with Dr. Chang in 2 weeks      Recommendations were communicated to primary team via phone.    Seen and discussed with Dr. Anca Garcia MD   [phone]    Interval History :   Nursing and provider notes from last 24 hours reviewed.  Feels well rested today, no new symptoms.       Review of Systems:   4pt ROS negative except as above    Physical Exam:   I/O last 3 completed shifts:  In: 650 [P.O.:650]  Out: 4256  "[Urine:1725]   /79 (BP Location: Right arm)   Pulse 82   Temp 98.3  F (36.8  C) (Oral)   Resp 18   Ht 1.626 m (5' 4\")   Wt 114 kg (251 lb 5.2 oz)   SpO2 97%   Breastfeeding No   BMI 43.14 kg/m       GENERAL APPEARANCE: no distress, awake  Pulmonary: clear bilaterally, no increased WOB  CV: RRR, no m/r/g   - no lower extremity edema  GI: soft, nontender, normal bowel sounds  MS: no evidence of inflammation in joints, no muscle tenderness  SKIN: no rash, warm, dry, no cyanosis  NEURO: face symmetric, AOx3    Labs:   All labs reviewed by me  Electrolytes/Renal -   Recent Labs   Lab Test 01/31/21  0450 01/30/21  0502 01/29/21  1219 01/29/21  0519 01/29/21  0519    139  --   --  139   POTASSIUM 4.9 5.3 5.3   < > 5.7*   CHLORIDE 111* 114*  --   --  112*   CO2 20 20  --   --  19*   BUN 46* 46*  --   --  46*   CR 1.94* 1.91*  --   --  1.96*   * 138*  --   --  134*   KRAIG 8.8 8.7  --   --  8.4*   MAG 2.0  --   --   --   --     < > = values in this interval not displayed.       CBC -   Recent Labs   Lab Test 01/31/21  0450 01/30/21  0502 01/29/21  0519   WBC 20.5* 25.8* 15.6*   HGB 10.7* 10.6* 10.3*    258 232       LFTs -   Recent Labs   Lab Test 01/29/21  0519 01/28/21  1949 01/27/21  1756   ALKPHOS 71 74 84   BILITOTAL 0.2 0.2 0.2   ALT 33 30 30   AST 5 <3 4   PROTTOTAL 6.3* 6.6* 7.2   ALBUMIN 2.6* 2.7* 3.0*       Iron Panel - No lab results found.      Imaging:  CT Chest W/O Contrast 1/28/21  IMPRESSION:   Scattered peribronchovascular tree-in-bud nodularity with larger  nodular opacities in the right middle lobe and left lung base, could  be due to infection or vasculitis and hemorrhage in the setting of  granulomatosis with polyangiitis given the patient's clinical history.     Current Medications:    albuterol  2.5 mg Nebulization Q28 Days    And     pentamidine  300 mg Inhalation Q28 Days     calcium carbonate 600 mg-vitamin D 400 units  1 tablet Oral Daily     medroxyPROGESTERone  150 " mg Intramuscular Q90 Days     mesna (MESNEX) infusion  100 mg/m2 (Treatment Plan Recorded) Intravenous Once     mesna (MESNEX) infusion  100 mg/m2 (Treatment Plan Recorded) Intravenous Q4H     nicotine  1 patch Transdermal Daily     nicotine   Transdermal Q8H     omeprazole  20 mg Oral QAM AC     predniSONE  80 mg Oral Daily     [Held by provider] sulfamethoxazole-trimethoprim  1 tablet Oral Once per day on Mon Wed Fri     traZODone  50 mg Oral Once     White Petrolatum   Topical BID       Daphne Garcia MD    I was present with the fellow during the history and exam.  I discussed the case with the fellow and agree with the findings as documented in the assessment and plan.  Cadence March

## 2021-01-31 NOTE — PLAN OF CARE
Neuro: A&Ox4  Cardiac: SR, high 50s-80s. VSS.  Respiratory: Sats >92% on RA. Cough rare, obtain sputum sample if possible.  GI/: Adequate UOP, *Need Chemo PPE. No BM.   Diet/appetite: 2g K+ restricted diet, eating well.   Activity: Up ad drake   Pain: Denies pain.  Skin/LDAs: LPIV: Received Cytoxan infusion this afternoon (infused by 7D nurse) tolerated well; Post-Cytoxan infusions needed, 1 dose Mesna 230mg @1645, next dose due @2045.   -R elbow punch biopsy obtained by Derm, band-aid covering. Papule/rash to both elbows & L pinky, not new- present on admit.   *Description of rash per Derm note: -Umbilicated tan to pink papules some with mild central hemorrhagic crust on elbows. Similar non-crusted tan translucent papule on the left 5th finger.  - Hyperpigmented macules on lower extremities.          Plan: Continue with POC. Notify primary team with changes.

## 2021-01-31 NOTE — PROGRESS NOTES
[facility] Dermatology Progress Note    Assessment and Plan:  1: Umbilicated papules on extensor elbows and finger, favor molluscum contagiosum, also consider palisaded and granulomatous neutrophilic dermatosis, which is associated with a variety of autoimmune disease as well as systemic vasculitis.  - Punch biopsy: After discussion of benefits and risks including but not limited to bleeding, infection, scar, incomplete removal, recurrence, and non-diagnostic biopsy, written consent and photographs were obtained. Time-out was performed. The area was cleaned with isopropyl alcohol. 0.5 mL of 1% lidocaine with 1:100,000 epinephrine was injected to obtain adequate anesthesia of the lesion on the right elbow. A 4 mm punch biopsy was performed.  4-0 prolene sutures were utilized to approximate the epidermal edges.  White petroleum jelly/VaselineTM and a bandage was applied to the wound.  Explicit verbal wound care instructions were provided.  Recommend suture removal in 14 days.   - Suture removal on or around 2/14/21    We will continue to follow peripherally and update once biopsy results return. Please do not hesitate to contact the dermatology resident/faculty on call for any additional questions or concerns.     Patient seen and evaluated with attending physician, Dr. Patel      Dermatology Problem List:  1. GPA, PR3+ with history of likely LCV on legs  2. Umbilicated papules, likely molluscum, consider PGND    Date of Admission: Jan 28, 2021   Encounter Date: 01/31/2021      Interval history:  No acute events, no new skin lesions. Lesions asymptomatic at rest but tender with pressure.    Medications:  Current Facility-Administered Medications   Medication     acetaminophen (TYLENOL) tablet 975 mg     albuterol (PROVENTIL) neb solution 2.5 mg    And     pentamidine (NEBUPENT) neb solution 300 mg     bisacodyl (DULCOLAX) Suppository 10 mg     calcium carbonate 600 mg-vitamin D 400 units  "(CALTRATE) per tablet 1 tablet     cyclophosphamide (CYTOXAN) 1,135 mg in sodium chloride 0.9 % 332 mL infusion     lidocaine (LMX4) cream     lidocaine 1 % 0.1-1 mL     melatonin tablet 9 mg     mesna (MESNEX) 450 mg in sodium chloride 0.9 % 60 mL infusion    Followed by     mesna (MESNEX) 450 mg in sodium chloride 0.9 % 60 mL infusion     nicotine (NICODERM CQ) 14 MG/24HR 24 hr patch 1 patch     nicotine Patch in Place     omeprazole (priLOSEC) CR capsule 20 mg     ondansetron (ZOFRAN-ODT) ODT tab 4 mg    Or     ondansetron (ZOFRAN) injection 4 mg     ondansetron (ZOFRAN) injection 8 mg     polyethylene glycol (MIRALAX) Packet 17 g     predniSONE (DELTASONE) tablet 80 mg     prochlorperazine (COMPAZINE) tablet 10 mg    Or     prochlorperazine (COMPAZINE) injection 10 mg     prochlorperazine (COMPAZINE) injection 10 mg    Or     prochlorperazine (COMPAZINE) tablet 10 mg    Or     prochlorperazine (COMPAZINE) suppository 25 mg     senna-docusate (SENOKOT-S/PERICOLACE) 8.6-50 MG per tablet 1 tablet    Or     senna-docusate (SENOKOT-S/PERICOLACE) 8.6-50 MG per tablet 2 tablet     sodium chloride (OCEAN) 0.65 % nasal spray 2 spray     sodium chloride (PF) 0.9% PF flush 3 mL     sodium chloride (PF) 0.9% PF flush 3 mL     [Held by provider] sulfamethoxazole-trimethoprim (BACTRIM DS) 800-160 MG per tablet 1 tablet     traZODone (DESYREL) tablet 100 mg     traZODone (DESYREL) tablet 50 mg     White Petrolatum GEL        Physical exam:  /79 (BP Location: Right arm)   Pulse 82   Temp 98.3  F (36.8  C) (Oral)   Resp 18   Ht 1.626 m (5' 4\")   Wt 114 kg (251 lb 5.2 oz)   SpO2 97%   Breastfeeding No   BMI 43.14 kg/m    GEN:This is a well developed, well-nourished female in no acute distress, in a pleasant mood.    SKIN: Focused examination of the head, arms, hands, and lower extremities was performed.  - Umbilicated tan to pink papules some with mild central hemorrhagic crust on elbows. Similar non-crusted tan " translucent papule on the left 5th finger.  - Hyperpigmented macules on lower extremities.    Laboratory:  Results for orders placed or performed during the hospital encounter of 21 (from the past 24 hour(s))   Obstetrics / Gynecology IP Consult: Patient with granulomatosis polyangiitis, coming in with likely flare.  Tx likely with cyclophosphamide, consulting for proper timing of leupron prior to cyclophosphamide administration.; Consultant may enter or...    Cira Gomez MD     2021  7:41 AM  Gynecology Consult Note    Name: Cande Shields    MRN: [id number]   YOB: 2000         We were asked to see Cande Shields at the request of Dr. Cooney   for fertility preservation counseling prior to starting a course   of cyclophosphamide.          History of Present Illness:     Cande Shields is a 20 year old  female who is HD#2 for   treatment of flare up of granulomatosis polyangitis. The patient   desires future fertility. After reviewing risks of immediate   course of cyclophosphamide the patient expressed understanding   and wanted to proceed. She reports a history of <1 month   cyclophosphamide course at the age of 10.     She reports a history of concern for weight gain when previously   on Depo Provera for birth control but feels like this will be the   best option for contraception while on cyclophosphamide.     Gyn Hx:   Menses: Heavy   Contraception: None, previously on cOCPs and Depo Provera  Sexually active with a male partner     OB Hx:   , 31w IUFD            Past Medical History:     Past Medical History:   Diagnosis Date     ADHD (attention deficit hyperactivity disorder)      Wegener's disease, pulmonary (H) 2010    renal biopdy            Past Surgical History:     Past Surgical History:   Procedure Laterality Date     ENT SURGERY  2000    cyst     EXCISE GANGLION WRIST  2009            Social History:     Social History     Tobacco Use      Smoking status: Current Every Day Smoker     Smokeless tobacco: Never Used     Tobacco comment: down to 5 ciggaretes per day   Substance Use Topics     Alcohol use: Yes     Frequency: 2-3 times a week     Comment: 3x a week            Family History:     Family History   Problem Relation Age of Onset     Thyroid Disease Mother      Asthma No family hx of      Diabetes No family hx of           Allergies:     Allergies   Allergen Reactions     Penicillins Rash     Unknown, but think rash.  Tolerated cephalexin (12/27/20), cefpodoxime (12/27/20)            Medications:     Current Facility-Administered Medications   Medication     bisacodyl (DULCOLAX) Suppository 10 mg     calcium carbonate 600 mg-vitamin D 400 units (CALTRATE) per   tablet 1 tablet     lidocaine (LMX4) cream     lidocaine 1 % 0.1-1 mL     melatonin tablet 9 mg     nicotine (NICODERM CQ) 14 MG/24HR 24 hr patch 1 patch     nicotine Patch in Place     omeprazole (priLOSEC) CR capsule 20 mg     ondansetron (ZOFRAN-ODT) ODT tab 4 mg    Or     ondansetron (ZOFRAN) injection 4 mg     polyethylene glycol (MIRALAX) Packet 17 g     prochlorperazine (COMPAZINE) injection 10 mg    Or     prochlorperazine (COMPAZINE) tablet 10 mg    Or     prochlorperazine (COMPAZINE) suppository 25 mg     senna-docusate (SENOKOT-S/PERICOLACE) 8.6-50 MG per tablet 1   tablet    Or     senna-docusate (SENOKOT-S/PERICOLACE) 8.6-50 MG per tablet 2   tablet     sodium chloride (OCEAN) 0.65 % nasal spray 2 spray     sodium chloride (PF) 0.9% PF flush 3 mL     sodium chloride (PF) 0.9% PF flush 3 mL     [Held by provider] sulfamethoxazole-trimethoprim (BACTRIM DS)   800-160 MG per tablet 1 tablet     traZODone (DESYREL) tablet 50 mg     White Petrolatum GEL             Review of Systems:    ROS: 10 point ROS negative other than those noted above in the   HPI.       Physical Exam:     Vitals:    01/29/21 2302 01/30/21 0527 01/30/21 0830 01/30/21 1204   BP: (!) 149/76  136/89 139/85    BP Location: Right arm  Right arm Right arm   Pulse: 80  69 89   Resp: 16  16 20   Temp: 97.8  F (36.6  C)  98  F (36.7  C) 98  F (36.7  C)   TempSrc: Oral  Oral Oral   SpO2: 96%  95%    Weight:  114.1 kg (251 lb 8.7 oz)     Height:         General: NAD, appears   Resp: no respiratory distress  CV: heart rate regular      Labs/Imaging     Cr 1.91         Impression and Plan:   Impression:  Cande Shields is a 20 year old  female with polyangitis   granulomatosis who is HD#2 for an acute flare, planning on   receiving acute treatment with cyclophosphamide. Explained to the   patient that the use of cyclophosphamide is associated with   ovarian failure and follicle loss, with this correlation   increasing depending on patient's age and the duration/cumulative   dose of treatment. Reviewed options for fertility preservation   that are available, neither of which can be carried out before   tomorrow, when the primary team is planning on beginning the   cyclophosphamide course. Explained that the most effective   fertility preservation option would be oocyte or embryo   cryopreservation, which would require establishing care with a   reproductive endocrinologist and infertility specialist and   undergoing ovarian stimulation and egg retrieval, which can   unfortunately sometimes take months.     An alternative ovarian hormone preservation option under study is   the use of Depo Lupron for ovarian suppression. Explained to the   patient that the goal of this medication is to put ovaries in a   quiescent state that results in a lower level of cell division,   theoretically decreasing effects on hormone producing cells by   cyclophosphamide. The results of several RTC's looking at using   Depo Lupron for this purpose have had conflicting results, but do   favor possible effectiveness when cyclophosphamide is the single   cytotoxic agent used and when it is used for rheumatic disease   rather than cancer. Because  Depo Lupron is a GnRH agonist there   is a potential that it could transiently increase the activity of   ovarian follicles in the first 1-2 weeks after it is administered   and have the opposite of the desired effect if cyclophosphamide   is given within that time frame. Because of this, it is   recommended to give GnRH agonists at least 10 days prior to   cyclophosphamide therapy. Lastly, we reviewed the short term side   effects (hot flashes, mood disturbances, sexual dysfunction) and   possible long term side effect of bone density loss, which could   be aggravated by her concurrent use of high dose corticosteroids.       Given that the patient is having an acute flare, with kidney   involvement and possible peribronch-ovascular involvement, she   does not want to delay cyclophosphamide treatment for either of   the above fertility preservation options. She understands the   possible risks of cyclophosphamide treatment on her fertility and   ovarian function but also acknowledges the importance of   prioritizing her overall health at this point in time.     Because the patient is planning to start cyclophosphamide, I   recommend effective contraception during her treatment and until   6 months after completion; patient elected Depo Provera. Also   strongly advised against future use of combined hormonal   contraception use given risk of blood clots with history of   tobacco use, HTN, and vasculitis.       Plan:  - Do not administer Depo Lupron (leuprolide) if planning on   giving cyclophosphamide within 10 days   - Administer Depo Provera 150 mg IM [id number] for effective   contraception during cyclophosphamide treatment   - Advised patient to establish care with an [facility] specialist after   improvement of this flare to discuss cryopreservation options in   case she needs additional rounds of treatment with cytotoxic   medications in the future, as this is the preferred method of   fertility preservation       Thank  you for allowing us to be involved in the care of your   patient. Please do not hesitate to give us a call with further   questions.     Patient care plan discussed with Dr. Cira Brandon.    Sylvia Perez MD PGY-2  1/30/2021 5:30 PM  OB/GYN Resident    Cira Brandon MD      ------------------------------------------------------------------  --------------------------------------------------------    Reference article:   SHAMAR Nieto,  Ovarian protection with gonadotropin-releasing hormone agonists   during cyclophosphamide therapy in systemic lupus erythematosus,  Best Practice & Research Clinical Obstetrics & Gynaecology,  Volume 64,  2020,  Pages ,  ISSN 0068-0097,  https://[email or web address]/j.[email or web address].  (http://www.sciencedirect.com/science/article/pii/[email or web address]  8)  Abstract: Administration of cyclophosphamide (CYC), an alkylating   agent used to treat malignancies and severe rheumatic diseases,   creates a risk of ovarian insufficiency that is related to the   intensity and duration of therapy and the age of the patient. To   preserve reproductive capacity in the appropriate clinical   setting, oocyte, embryo, and/or ovarian tissue cryopreservation   are recommended. Medical protection with depot   gonadotropin-releasing hormone agonists (GNRHa) has emerged as a   potential means to preserve both fertility and ovarian function   through the suppression of ovarian activity during treatment with   alkylators. We review the trials of GNRHa for ovarian protection   in both cancer and rheumatic disease patients. Trials in cancer   patients receiving CYC alone, or in combination with other   gonadotoxic agents that have employed several different GNRHa   have yielded mixed results. Trials in lupus patients receiving   lower doses of CYC alone utilizing depot leuprolide acetate have   tended to show favorable results.    Keywords: Ovary; Systemic lupus erythematosus;  Cyclophosphamide;   Gonadotropin releasing hormone; Infertility; Premature ovarian   insufficiency          HCG qualitative urine   Result Value Ref Range    HCG Qual Urine Negative NEG^Negative   Basic metabolic panel   Result Value Ref Range    Sodium 138 133 - 144 mmol/L    Potassium 4.9 3.4 - 5.3 mmol/L    Chloride 111 (H) 94 - 109 mmol/L    Carbon Dioxide 20 20 - 32 mmol/L    Anion Gap 7 3 - 14 mmol/L    Glucose 117 (H) 70 - 99 mg/dL    Urea Nitrogen 46 (H) 7 - 30 mg/dL    Creatinine 1.94 (H) 0.52 - 1.04 mg/dL    GFR Estimate 36 (L) >60 mL/min/[1.73_m2]    GFR Estimate If Black 41 (L) >60 mL/min/[1.73_m2]    Calcium 8.8 8.5 - 10.1 mg/dL   CBC with platelets   Result Value Ref Range    WBC 20.5 (H) 4.0 - 11.0 10e9/L    RBC Count 3.90 3.8 - 5.2 10e12/L    Hemoglobin 10.7 (L) 11.7 - 15.7 g/dL    Hematocrit 34.5 (L) 35.0 - 47.0 %    MCV 89 78 - 100 fl    MCH 27.4 26.5 - 33.0 pg    MCHC 31.0 (L) 31.5 - 36.5 g/dL    RDW 13.3 10.0 - 15.0 %    Platelet Count 241 150 - 450 10e9/L   Magnesium   Result Value Ref Range    Magnesium 2.0 1.6 - 2.3 mg/dL   TSH with free T4 reflex   Result Value Ref Range    TSH 0.42 0.40 - 4.00 mU/L       Staff Involved:  Resident/Staff

## 2021-01-31 NOTE — PROGRESS NOTES
Resident/Fellow Attestation   I, Thaddeus Cooney, was present with the medical/OJ student who participated in the service and in the documentation of the note.  I have verified the history and personally performed the physical exam and medical decision making.  I agree with the assessment and plan of care as documented in the note.      Thaddeus Cooney MD  PGY1  Date of Service (when I saw the patient): 01/31/21    [facility]     Progress Note - [facility] 2 Service        Date of Admission:  1/28/2021    Assessment & Plan       Cande Shields is a 20 year old female who has a history of granulomatosis with polyangiitis (GPA), ADD, and MDD with a recent admission for GPA in 12/20. She presented with epistaxis, hemoptysis and SOB in the context of elevated serum Cr seen on outpatient labs. Admitted for evaluation of renal function with elevated serum creatinine and hyperkalemia in the context of new GPA flare.     Changes Today (1/31):   -Done with pulse steroids today   -Started Cytoxan with prophylactic Mensa   -Started Depo Provera 150 mg Q3M   -Prednisone 80 mg for 2 weeks, then taper outpatient   -Started Pentamidine for PJP prophylaxis   -Ordered urine creatinine/albumin to quantify proteinuria   -Ordered TSH  -Waiting on Quantiferon and Anti-GBM     #Granulomatosis with polyangiitis with recent flare, with epistaxis and hemoptysis  #Intrinsic KATERINA with presence of RBC casts 2/2 underlying vasculitis  Patient diagnosed with GPA at 10: treated with Methotrexate, Rituximab, and steroids. Remission for 10 years. Admitted 1 month ago (12/26-12/30) for a GPA flare: symptoms were purpura at lower extremities, diffuse joint pain, leg swelling. Treated with Solu-Medrol 80 mg and Rituximab once. Completed induction therapy with Prednisone and a 4 dose course of Rituximab in late January. Steroid taper started at 75 mg, but a rapid taper to 40 mg cause flare reoccurrence and dose  briefly increased to 60 mg. On admission, at 50 mg dose. 1 week before admission, onset of epistaxis, hemoptysis, and occasional SOB. Outpatient labs showed elevated serum Cr concerning for GPA flare.  Renal US without hydronephrosis. Chest CT on admission showed scattered peribronchovascular tree-in-bud nodularity with larger nodular opacities in the right middle lobe and left lung base. Pt had leucocytosis w/ neutrophilia and elevated CRP at 29.7. Blood and urine cultures NGTD. Pt finished pulse steroid dose.   PLAN:   -Nephrology, Rheum, and Obstetrics, ENT, Derm consulting, appreciate recs   -Studies:   Quantiferon TB= pending    ANCA IgG=Elevated at 1:80   CD 19= less than 1%    Anti-GBM= pending  -Cyclophosphamide [cytoxan] with prophylactic Mensa started 1/31  -Will not start Leuprolide, patient will follow with ELYSSA  -Depo Provera 150 mg Q3M ordered 1/31-will continue 6 months post Cytoxan    -Holding Bactrim for PJP prophylaxis given KATERINA, added Inhaled Pentamidine   -Start 80 mg of Prednisone, 2 weeks then taper   -Continue PTA calcium carbonate 1200 meq daily and Vit D 800 international unit(s)  -Continue Oomeprazole 20 mg daily     #Hypertension  #Edema, Upper and Lower Extremities   Patient hypertensive to 143/87 on morning labs 1/31. Patient notes some increased leg and hand swelling. She has been on chronic steroids which can be the cause of new elevated BP. She does have a family history of thyroid complications and is has had bradycardia overnight. Will CTM on labs.   PLAN:  -Ordered urine protein:creatinine level to assess for proteinuria   -Ordered TSH with free T4 reflex---normal at 0.42   -Patient should be ambulating QID   -Possible diuretics if pressures continue to be elevated      #Umbilicated papules  Patient with raised papules on finger and extensor elbows, with umbilicated vs ulcerated centers. Slightly improved with first dose of pulsed steroids, but not resolved. Unlike CHRIS milton  purpura.  -Dermatology consulted, appreciate recs  -Biopsy done of lesions on 1/31   -Derm will see inpatient on Monday 2/1 if still admitted      #Insomnia in the context of MDD and ADD  Reports difficulty sleeping, has been prescirbed ambien by her PCP, but has not helped. Feels the steroids also create issues with sleeping.   -Melatonin at bedtime PRN  -Trazodone started 1/29     #Episodes of Dyspnea a/w Lightheadedness and Chest Pain, resolved  Episodes occur randomly with no known inciting factor. Etiology is most likely multifactorial. Patient was diagnosed with COVID in December and is also currently in a flare for her GPA. No symptoms since 1/29. CTM     #Hyperkalemia, resolved  Potassium elevated to 5.7 on 1/29. EKG checked: normal sinus rhythm with no major changes [peaked T waves] noted. Currently on 2g K+ restricted diet. Will CTM and if elevated and symptomatic can give Kayexalate.      #Nicotine use  Reports 4 pack-year smoking history, but reduced recently to 5 sticks/day. She also vapes nicotine containing fluid. PTA nicotine patch.      Diet: 2 Gram K Diet    Fluids: IVF stopped, tolerating PO  Lines: PIV in left lower forearm   DVT Prophylaxis: Low Risk/Ambulatory with no VTE prophylaxis indicated  Guillen Catheter: not present  Code Status: Full Code      Disposition Plan   Expected discharge: Tomorrow, recommended to prior living arrangement once renal function improved, treatment plan for GPA flare established.  Entered: Jadiel Coates 01/31/2021, 7:44 AM     The patient's care was discussed with the [facility] 2 Team and Attending Dr. Casi Coates  Medical Student  [facility]   Please see sign in/sign out for up to date coverage information  ______________________________________________________________________    Interval History   Patient doing well. No new pain, good appetite, sleeping alright but  reports it takes a bit to get to sleep. Overnight, she did have a few episodes of decreased HR to the mid 40's. Snoring noted, but patient endorses no history of ILA. Papules on her forearms and fingers are doing better-scabbed over. She is wondering is she can leave after getting her dose of cytoxan today. Of note, she is wanting to be placed on disability.     Data reviewed today: I reviewed all medications, new labs and imaging results over the last 24 hours. I personally reviewed no images or EKG's today.    Physical Exam   Vital Signs: Temp: 98.2  F (36.8  C) Temp src: Oral BP: 119/66 Pulse: 63   Resp: 16 SpO2: 96 % O2 Device: None (Room air)    Weight: 251 lbs 5.19 oz  Constitutional: awake, alert, cooperative, no apparent distress, sitting in bed comfortably   Eyes: pupils equal, round and reactive to light, extra ocular muscles intact  ENT: Normocephalic, without obvious abnormality, atraumatic,  Respiratory: No increased work of breathing, good air exchange  Cardiovascular: Regular rate and rhythm, normal S1 and S2, no S3 or S4, and no murmur noted  GI: No scars, normal bowel sounds, soft, non-distended, non-tender  Skin: Small scattered petechiae on the lower extremities, a few scattered umbilicated lesions with scabs at the center on the bilateral elbows and fingers   Musculoskeletal: Full range of motion noted. Motor strength is 5 out of 5 all extremities bilaterally.  Neurologic: Awake, alert, oriented to name, place and time.  Cranial nerves II-XII are grossly intact.  Motor is 5 out of 5 bilaterally.   Neuropsychiatric: General: normal, calm and normal eye contact     Data   Recent Labs   Lab 01/31/21  0450 01/30/21  0502 01/29/21  1219 01/29/21  0519 01/29/21  0519 01/28/21  1949   WBC 20.5* 25.8*  --   --  15.6*  --    HGB 10.7* 10.6*  --   --  10.3*  --    MCV 89 89  --   --  88  --     258  --   --  232  --     139  --   --  139 138   POTASSIUM 4.9 5.3 5.3   < > 5.7* 5.0   CHLORIDE  111* 114*  --   --  112* 111*   CO2 20 20  --   --  19* 22   BUN 46* 46*  --   --  46* 44*   CR 1.94* 1.91*  --   --  1.96* 2.07*   ANIONGAP 7 5  --   --  8 5   KRAIG 8.8 8.7  --   --  8.4* 8.9   * 138*  --   --  134* 138*   ALBUMIN  --   --   --   --  2.6* 2.7*   PROTTOTAL  --   --   --   --  6.3* 6.6*   BILITOTAL  --   --   --   --  0.2 0.2   ALKPHOS  --   --   --   --  71 74   ALT  --   --   --   --  33 30   AST  --   --   --   --  5 <3    < > = values in this interval not displayed.     No results found for this or any previous visit (from the past 24 hour(s)).  Medications       albuterol  2.5 mg Nebulization Q28 Days    And     pentamidine  300 mg Inhalation Q28 Days     calcium carbonate 600 mg-vitamin D 400 units  1 tablet Oral Daily     cyclophosphamide (CYTOXAN) infusion  500 mg/m2 Intravenous Once     mesna (MESNEX) infusion  200 mg/m2 Intravenous Once    Followed by     mesna (MESNEX) infusion  200 mg/m2 Intravenous Q4H     nicotine  1 patch Transdermal Daily     nicotine   Transdermal Q8H     omeprazole  20 mg Oral QAM AC     ondansetron  8 mg Intravenous Once     predniSONE  80 mg Oral Daily     [Held by provider] sulfamethoxazole-trimethoprim  1 tablet Oral Once per day on Mon Wed Fri     traZODone  50 mg Oral Once     White Petrolatum   Topical BID

## 2021-01-31 NOTE — PLAN OF CARE
Neuro: A&Ox4.   Cardiac: SR. HR 70-80s VSS.   Respiratory: Sating high 90s on RA.  GI/: Adequate urine output. No BM  Diet/appetite: Tolerating 2g K+ restricted diet. Eating well.  Activity: Ind  Pain: Denies pain  Skin: No new deficits noted.  LDA's: L PIV-tko    Plan: Continue with POC. Notify primary team with changes.

## 2021-02-01 ENCOUNTER — TELEPHONE (OUTPATIENT)
Dept: DERMATOLOGY | Facility: CLINIC | Age: 21
End: 2021-02-01

## 2021-02-01 ENCOUNTER — PATIENT OUTREACH (OUTPATIENT)
Dept: CARE COORDINATION | Facility: CLINIC | Age: 21
End: 2021-02-01

## 2021-02-01 VITALS
HEART RATE: 67 BPM | HEIGHT: 64 IN | WEIGHT: 253.31 LBS | BODY MASS INDEX: 43.25 KG/M2 | OXYGEN SATURATION: 98 % | TEMPERATURE: 97.6 F | DIASTOLIC BLOOD PRESSURE: 92 MMHG | RESPIRATION RATE: 16 BRPM | SYSTOLIC BLOOD PRESSURE: 146 MMHG

## 2021-02-01 LAB
ANION GAP SERPL CALCULATED.3IONS-SCNC: 6 MMOL/L (ref 3–14)
BUN SERPL-MCNC: 48 MG/DL (ref 7–30)
CALCIUM SERPL-MCNC: 8.5 MG/DL (ref 8.5–10.1)
CHLORIDE SERPL-SCNC: 112 MMOL/L (ref 94–109)
CO2 SERPL-SCNC: 21 MMOL/L (ref 20–32)
CREAT SERPL-MCNC: 2 MG/DL (ref 0.52–1.04)
ERYTHROCYTE [DISTWIDTH] IN BLOOD BY AUTOMATED COUNT: 13.6 % (ref 10–15)
GAMMA INTERFERON BACKGROUND BLD IA-ACNC: 0.02 IU/ML
GBM IGG SER IA-ACNC: <0.2 AI (ref 0–0.9)
GFR SERPL CREATININE-BSD FRML MDRD: 34 ML/MIN/{1.73_M2}
GLUCOSE BLDC GLUCOMTR-MCNC: 84 MG/DL (ref 70–99)
GLUCOSE SERPL-MCNC: 100 MG/DL (ref 70–99)
HCT VFR BLD AUTO: 33.4 % (ref 35–47)
HGB BLD-MCNC: 10.3 G/DL (ref 11.7–15.7)
M TB IFN-G CD4+ BCKGRND COR BLD-ACNC: 9.98 IU/ML
M TB TUBERC IFN-G BLD QL: NEGATIVE
MCH RBC QN AUTO: 26.8 PG (ref 26.5–33)
MCHC RBC AUTO-ENTMCNC: 30.8 G/DL (ref 31.5–36.5)
MCV RBC AUTO: 87 FL (ref 78–100)
MITOGEN IGNF BCKGRD COR BLD-ACNC: 0 IU/ML
MITOGEN IGNF BCKGRD COR BLD-ACNC: 0 IU/ML
PLATELET # BLD AUTO: 219 10E9/L (ref 150–450)
POTASSIUM SERPL-SCNC: 4.6 MMOL/L (ref 3.4–5.3)
RBC # BLD AUTO: 3.85 10E12/L (ref 3.8–5.2)
SODIUM SERPL-SCNC: 139 MMOL/L (ref 133–144)
WBC # BLD AUTO: 15.7 10E9/L (ref 4–11)

## 2021-02-01 PROCEDURE — 99232 SBSQ HOSP IP/OBS MODERATE 35: CPT | Mod: GC | Performed by: INTERNAL MEDICINE

## 2021-02-01 PROCEDURE — 250N000012 HC RX MED GY IP 250 OP 636 PS 637: Performed by: STUDENT IN AN ORGANIZED HEALTH CARE EDUCATION/TRAINING PROGRAM

## 2021-02-01 PROCEDURE — 250N000009 HC RX 250

## 2021-02-01 PROCEDURE — 250N000013 HC RX MED GY IP 250 OP 250 PS 637: Performed by: STUDENT IN AN ORGANIZED HEALTH CARE EDUCATION/TRAINING PROGRAM

## 2021-02-01 PROCEDURE — 85027 COMPLETE CBC AUTOMATED: CPT | Performed by: STUDENT IN AN ORGANIZED HEALTH CARE EDUCATION/TRAINING PROGRAM

## 2021-02-01 PROCEDURE — 36415 COLL VENOUS BLD VENIPUNCTURE: CPT | Performed by: STUDENT IN AN ORGANIZED HEALTH CARE EDUCATION/TRAINING PROGRAM

## 2021-02-01 PROCEDURE — 94640 AIRWAY INHALATION TREATMENT: CPT

## 2021-02-01 PROCEDURE — 250N000009 HC RX 250: Performed by: STUDENT IN AN ORGANIZED HEALTH CARE EDUCATION/TRAINING PROGRAM

## 2021-02-01 PROCEDURE — 258N000003 HC RX IP 258 OP 636: Performed by: STUDENT IN AN ORGANIZED HEALTH CARE EDUCATION/TRAINING PROGRAM

## 2021-02-01 PROCEDURE — 999N001017 HC STATISTIC GLUCOSE BY METER IP

## 2021-02-01 PROCEDURE — 99239 HOSP IP/OBS DSCHRG MGMT >30: CPT | Mod: GC | Performed by: STUDENT IN AN ORGANIZED HEALTH CARE EDUCATION/TRAINING PROGRAM

## 2021-02-01 PROCEDURE — 80048 BASIC METABOLIC PNL TOTAL CA: CPT | Performed by: STUDENT IN AN ORGANIZED HEALTH CARE EDUCATION/TRAINING PROGRAM

## 2021-02-01 PROCEDURE — 250N000011 HC RX IP 250 OP 636: Performed by: STUDENT IN AN ORGANIZED HEALTH CARE EDUCATION/TRAINING PROGRAM

## 2021-02-01 PROCEDURE — 999N000157 HC STATISTIC RCP TIME EA 10 MIN

## 2021-02-01 RX ORDER — PREDNISONE 20 MG/1
80 TABLET ORAL DAILY
Qty: 52 TABLET | Refills: 0 | Status: SHIPPED | OUTPATIENT
Start: 2021-02-02 | End: 2021-02-15

## 2021-02-01 RX ORDER — PREDNISONE 20 MG/1
70 TABLET ORAL DAILY
Qty: 49 TABLET | Refills: 0 | Status: SHIPPED | OUTPATIENT
Start: 2021-02-16 | End: 2021-03-02

## 2021-02-01 RX ORDER — ALBUTEROL SULFATE 0.83 MG/ML
SOLUTION RESPIRATORY (INHALATION)
Status: COMPLETED
Start: 2021-02-01 | End: 2021-02-01

## 2021-02-01 RX ORDER — TRAZODONE HYDROCHLORIDE 50 MG/1
50 TABLET, FILM COATED ORAL
Qty: 1 TABLET | Refills: 0 | Status: SHIPPED | OUTPATIENT
Start: 2021-02-01 | End: 2021-04-05

## 2021-02-01 RX ADMIN — WHITE PETROLATUM: 1 OINTMENT TOPICAL at 08:01

## 2021-02-01 RX ADMIN — MESNA 230 MG: 100 INJECTION, SOLUTION INTRAVENOUS at 01:10

## 2021-02-01 RX ADMIN — ALBUTEROL SULFATE 2.5 MG: 2.5 SOLUTION RESPIRATORY (INHALATION) at 11:05

## 2021-02-01 RX ADMIN — OMEPRAZOLE 20 MG: 20 CAPSULE, DELAYED RELEASE ORAL at 07:59

## 2021-02-01 RX ADMIN — NICOTINE 1 PATCH: 14 PATCH, EXTENDED RELEASE TRANSDERMAL at 08:09

## 2021-02-01 RX ADMIN — PREDNISONE 80 MG: 20 TABLET ORAL at 07:59

## 2021-02-01 RX ADMIN — CALCIUM CARBONATE 600 MG (1,500 MG)-VITAMIN D3 400 UNIT TABLET 1 TABLET: at 07:59

## 2021-02-01 RX ADMIN — PENTAMIDINE ISETHIONATE 300 MG: 300 INHALANT RESPIRATORY (INHALATION) at 11:06

## 2021-02-01 ASSESSMENT — ACTIVITIES OF DAILY LIVING (ADL)
ADLS_ACUITY_SCORE: 13

## 2021-02-01 ASSESSMENT — MIFFLIN-ST. JEOR: SCORE: 1894

## 2021-02-01 NOTE — PROGRESS NOTES
"  Nephrology Progress Note  02/01/2021         Assessment & Recommendations:   Cande Shields is a 20 year old female who has a history of PR3+ ANCA vasculitis s/p rituximab 375mg/m2 for 4 weekly doses and steroid taper, who presents after lab monitoring demonstrated KATERINA with Cr 2.05 up from baseline of 0.77 and new pulmonary infiltrates concerning for a vasculitis flare.      PR3+ ANCA Vasculitis  Cr elevated at ~2.0 up from baseline ~0.6-0.7 with microscopic hematuria and RBC casts on urine microscopy suggestive of active renal vasculitis. Additionally, pt with epistaxis, mild hemoptysis and new pulm infiltrates on CT concerning for vasculitis vs infection. Overall clinical picture concerning for vasculitis flare. Pulse dose steroids initiated on admission. She was given cytoxan on 1/31/21 - 500mg/m2 with mesna for prophylaxis against hemorrhagic cystitis. Appreciate Gynecology input on ovarian preservation in setting of cyclophosphamide - did not give leuprolide. Patient will start Depo Provera for contraception.  - Continue prednisone 80mg daily, plan for at least 2 weeks prior to tapering  - Recommend pentamidine for PJP ppx (stop bactrim)  - Depo Provera for contraception  - We will coordinate patient follow up with Dr. Chang in 2 weeks - message sent to     Recommendations were communicated to primary team via phone.    Seen and discussed with Dr. Juarze.    Daphne Garcia MD   [phone]    Interval History :   Nursing and provider notes from last 24 hours reviewed.  Tolerated infusion yesterday without any side effects. Eager to discharge today.      Review of Systems:   4pt ROS negative except as above    Physical Exam:   I/O last 3 completed shifts:  In: 840 [P.O.:840]  Out: 2325 [Urine:2325]   BP (!) 146/92 (BP Location: Right arm)   Pulse 67   Temp 97.6  F (36.4  C) (Oral)   Resp 16   Ht 1.626 m (5' 4\")   Wt 114.9 kg (253 lb 4.9 oz)   SpO2 98%   Breastfeeding No   BMI 43.48 kg/m   "     GENERAL APPEARANCE: no distress, awake  Pulmonary: clear bilaterally, no increased WOB  CV: RRR, no m/r/g   - no lower extremity edema  GI: soft, nontender, normal bowel sounds  MS: no evidence of inflammation in joints, no muscle tenderness  SKIN: no rash, warm, dry, no cyanosis  NEURO: face symmetric, AOx3    Labs:   All labs reviewed by me  Electrolytes/Renal -   Recent Labs   Lab Test 02/01/21  0430 01/31/21  0450 01/30/21  0502    138 139   POTASSIUM 4.6 4.9 5.3   CHLORIDE 112* 111* 114*   CO2 21 20 20   BUN 48* 46* 46*   CR 2.00* 1.94* 1.91*   * 117* 138*   KRAIG 8.5 8.8 8.7   MAG  --  2.0  --        CBC -   Recent Labs   Lab Test 02/01/21  0430 01/31/21  0450 01/30/21  0502   WBC 15.7* 20.5* 25.8*   HGB 10.3* 10.7* 10.6*    241 258       LFTs -   Recent Labs   Lab Test 01/29/21  0519 01/28/21  1949 01/27/21  1756   ALKPHOS 71 74 84   BILITOTAL 0.2 0.2 0.2   ALT 33 30 30   AST 5 <3 4   PROTTOTAL 6.3* 6.6* 7.2   ALBUMIN 2.6* 2.7* 3.0*       Iron Panel - No lab results found.      Imaging:  CT Chest W/O Contrast 1/28/21  IMPRESSION:   Scattered peribronchovascular tree-in-bud nodularity with larger  nodular opacities in the right middle lobe and left lung base, could  be due to infection or vasculitis and hemorrhage in the setting of  granulomatosis with polyangiitis given the patient's clinical history.     Current Medications:    albuterol  2.5 mg Nebulization Q28 Days    And     pentamidine  300 mg Inhalation Q28 Days     calcium carbonate 600 mg-vitamin D 400 units  1 tablet Oral Daily     medroxyPROGESTERone  150 mg Intramuscular Q90 Days     nicotine  1 patch Transdermal Daily     nicotine   Transdermal Q8H     omeprazole  20 mg Oral QAM AC     predniSONE  80 mg Oral Daily     traZODone  50 mg Oral Once     White Petrolatum   Topical BID       Daphne Garcia MD

## 2021-02-01 NOTE — DISCHARGE SUMMARY
[facility]   Discharge Summary - Medicine & Pediatrics       Date of Admission:  1/28/2021  Date of Discharge:  2/1/2021  Discharging Provider: Dr. 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 Call on 2/23 (scheduled)  Nephrology follow up  -Dr. Chang on 2/11 (scheduled)  Derm follow up  -Dr. Beauchamp on 3/15 (scheduled)  ENT follow up  -Originally 2/3 - to be rescheduled  PCP follow up  - In 7-14 days for suture removal and hospital follow-up  - Scheduled appointment on 4/5/21    Unresulted Labs Ordered in the Past 30 Days of this Admission     Date and Time Order Name Status Description    1/31/2021 1336 Dermatological path order and indications In process     1/28/2021 2055 Blood culture Preliminary     1/28/2021 2055 Blood culture Preliminary       These results will be followed up by Dermatology and Nephrology     Discharge Disposition   Discharged to home  Condition at discharge: Stable    Hospital Course   See Detailed H&P from 1/29 [early AM]    Cande Shields is a 20 year old female who has a history of granulomatosis with polyangiitis (GPA), ADD, and MDD with a recent admission for GPA flare in 12/20. She presented with epistaxis, mild hemoptysis and SOB in the context of elevated serum Cr seen on outpatient labs. Admitted on 1/28/2021 for evaluation of renal function with elevated serum creatinine and hyperkalemia in the context of new GPA flare.     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 In remission, with no medical regimen, for 10 years. Admitted 12/26-12/30 for a GPA flare: presented with petechiae at LE, diffuse joint pain, leg swelling. Solumedrol 80 mg and Rituximab started. Completed induction with Prednisone and a 4 dose course of Rituximab in late January. Steroid taper started at 75 mg, but a rapid taper to 40 mg caused flare reoccurrence and dose briefly increased to 60 mg. On current admission, still at 50 mg daily. 1 week before current admission, onset of epistaxis, hemoptysis, and occasional SOB. Outpatient labs showed elevated serum Cr concerning for GPA flare. Renal US without hydronephrosis. Chest CT on admission showed scattered peribronchovascular tree-in-bud nodularity with larger nodular opacities in the right middle lobe and left lung base. Leucocytosis w/ neutrophilia and elevated CRP at 29.7. Nephrology and Rheum consulted. Pulse dose Methylprednisone given for 3 days. Pt then placed on Prednisone 80 mg for 2 weeks with plan to taper.  Cytoxan with prophylactic Mensa started 1/31. Ob/Gyn consulted for fertility concerns: no Leuprolide started, Depo Provera started [will continue 6m post cytoxan] and patient will follow with ELYSSA. Started Inhaled Pentamidine for PJP prophylaxis [held bactrim given KATERINA]. Patient will follow up with Rheum on 2/23 and Nephrology 2 weeks post discharge.      Unresulted Labs:   Quantiferon TB= pending   Anti-GBM= pending     #Hypertension  #Edema, Upper and Lower Extremities   Patient hypertensive to 143/87 on morning labs 1/31. She noted increased bilateral hand and lower extremity swelling. Able to ambulate well nonetheless. Elevation of legs seemed to help symptoms. Patient has been on  chronic steroids which can be the cause of fluid retention and elevated BP. Urine protein:Cr was elevated, but with no concern for nephrotic syndrome. She has a family history of thyroid complications and had some episodes of bradycardia during admission, but TSH levels were drawn and were wnl.    - Follow up with PCP     #Umbilicated papules  Patient presented with raised skin-colored papules on finger and extensor surface of elbows, with umbilicated vs ulcerated centers. Slightly improved with first dose of pulsed steroids, but not resolved. Unlike LE palpable purpura. Dermatology was consulted: etiology most favored was molluscum contagiosum. Biopsy of lesion done on 1/3. Patient will follow with derm for biopsy results.       #Insomnia in the context of MDD and ADD  Patient reports a history of difficulty sleeping: has been prescirbed Ambien by her PCP, but Ambien has not helped. Feels the steroids also create issues with sleeping. Initially managed with only PRN Melatonin. No change in sleep. Trazodone was added to sleep regimen on 1/29. Steroids were switched to AM dosing during admission. Patient reported Trazodone helped immensely.   - Discharged with trazodone PRN for insomnia while on steroid regimen.  Ideally not to be used for long-term insomnia after steroid taper    #Hyperkalemia, resolved  Potassium elevated to 5.7 on 1/29. EKG checked: normal sinus rhythm with no major changes [peaked T waves] noted. Patient placed on 2g K+ restricted diet. Levels maintained controlled during rest of admission with no need for Kayexalate.     #Episodes of Dyspnea a/w Lightheadedness and Chest Pain, resolved  Episodes occur randomly with no known inciting factor. Etiology is most likely multifactorial. Patient was diagnosed with COVID in December and is also currently in a flare for her GPA. No symptoms after 1/29.      #Nicotine use  Reports 4 pack-year smoking history, but reduced recently to 5 sticks/day. She also  vapes nicotine containing fluid. PTA nicotine patch during admission.      Consultations This Hospital Stay   PHYSICAL THERAPY ADULT IP CONSULT  OCCUPATIONAL THERAPY ADULT IP CONSULT  RHEUMATOLOGY IP CONSULT  NEPHROLOGY GENERAL ADULT IP CONSULT  ENT IP CONSULT  DERMATOLOGY IP CONSULT  OB GYN IP CONSULT  VASCULAR ACCESS CARE ADULT IP CONSULT    Code Status   Full Code     The patient was discussed with Neha Red Team and MD Neha Boone [facility] [address] [address]  Phone: [phone]  ______________________________________________________________________    Physical Exam   Vital Signs:                   Weight: 253 lbs 4.94 oz  Constitutional: awake, alert, cooperative, no apparent distress, sitting in bed comfortably   Eyes: pupils equal, round and reactive to light, extra ocular muscles intact  ENT: Normocephalic, without obvious abnormality, atraumatic,  Respiratory: No increased work of breathing, good air exchange  Cardiovascular: Regular rate and rhythm, normal S1 and S2, no S3 or S4, and no murmur noted  GI: No scars, normal bowel sounds, soft, non-distended, non-tender  Skin: Small scattered petechiae on the lower extremities, a few scattered umbilicated lesions with scabs at the center on the bilateral elbows and fingers   Musculoskeletal: Full range of motion noted. Motor strength is 5 out of 5 all extremities bilaterally. 1+ non-pitting edema at the bilateral lower and upper extremities  Neurologic: Awake, alert, oriented to name, place and time.  Cranial nerves II-XII are grossly intact.  Neuropsychiatric: General: normal, calm and normal eye contact    Primary Care Physician   Cira Amanda    Discharge Orders      Reason for your hospital stay    Dear Cande Shields    Your were hospitalized at [facility] with a flare of your GPA, with worsening kidney function, likely related to your flare.  Your treatment consisted of cyclophosphamide and steroid treatment, which helps to treat your condition.  Over your hospitalization your inflammatory markers improved and today you are ready to be discharged home.  If you continue the prednisone (steroid), as well as meet with the specialists for your follow-up appointments, you should continue to improve but if you develop fever, shortness of breath, light headedness, chest pain or begin coughing up more blood please seek medical attention.    It was a pleasure meeting with you today. Thank you for allowing me and my team the privilege of caring for you today. You are the reason we are here, and I truly hope we provided you with the excellent service you deserve. Please let us know if there is anything else we can do for you so that we can be sure you are leaving completely satisfied with your care experience.    Your hospital unit at the time of discharge is 6B so if you have any questions please call the hospital at [phone] and ask to talk to a nurse on 6B.    Take care!    SARAH Cooney MD  Internal Medicine  [facility]     Adult [facility] Follow-up and recommended labs and tests    Follow-up appointments:  -2/3 with ENT, Dr. Vergara  -2/11 with Nephrology, Dr. Chang (with labs)  -2/23 with Rheumatology, Dr. Call  -3/15 with Dermatology, Dr. Beauchamp  -4/5 with IM, Dr. Amanda    Appointments on [place] and/or [facility] (with [facility] or [facility] provider or service). Call [phone] if you haven't heard regarding these appointments within 7 days of discharge.     Activity    Your activity upon discharge: activity as tolerated     Full Code     Diet    Follow this diet upon discharge: Orders Placed This Encounter      Regular diet       Significant Results and Procedures   Results for orders placed or performed during the hospital encounter of 01/28/21   Chest CT w/o contrast    Narrative    EXAMINATION: Chest CT  1/28/2021 9:02  PM    CLINICAL HISTORY: Hemoptysis    COMPARISON: Chest x-ray 12/27/2020. No CT comparison.    TECHNIQUE: CT imaging obtained through the chest without contrast.  Axial, coronal, and sagittal reconstructions and axial MIP reformatted  images are reviewed.     CONTRAST: None    FINDINGS:  Lungs: The central tracheobronchial tree is patent. No pleural  effusion or pneumothorax. There are scattered peribronchovascular  groundglass opacities with mild tree-in-bud nodularity, for example in  the right upper lobe (series 6, image 130), and right lung apex  (series 6, image 69). Rounded nodular areas of consolidation within  the medial right middle lobe and in the left lung base.    Mediastinum: Multiple subcentimeter hypodense thyroid nodules,  including a partially calcified nodule arising off the left lobe of  the gland. Heart size is normal. Small amount of pericardial fluid.  Normal caliber aorta and main pulmonary artery. Normal branching  pattern of the aortic arch. No significant coronary artery calcium or  atherosclerotic changes of the aorta. No axillary or mediastinal  lymphadenopathy. Esophagus is normal in caliber.    Bones and soft tissues: Mild degenerative changes of the thoracic  spine. No acute or suspicious osseous lesions.    Upper Abdomen: Limited evaluation of the upper abdomen is within  normal limits.      Impression    IMPRESSION:   Scattered peribronchovascular tree-in-bud nodularity with larger  nodular opacities in the right middle lobe and left lung base, could  be due to infection or vasculitis and hemorrhage in the setting of  granulomatosis with polyangiitis given the patient's clinical history.    I have personally reviewed the examination and initial interpretation  and I agree with the findings.    ARLETTE POOLE MD   US Renal Complete    Narrative    EXAMINATION: US RENAL COMPLETE, 1/29/2021 9:04 AM     COMPARISON: None.    HISTORY: KATERINA    TECHNIQUE: The kidneys and bladder were scanned  in the standard  fashion with specialized ultrasound transducer(s) using both gray  scale and limited color/spectral Doppler techniques.    FINDINGS:    Right kidney: Measures 12.4 cm in length. Parenchyma is of normal  thickness and grossly normal echogenicity. No focal mass. No  hydronephrosis. No shadowing renal calculi.    Left kidney: Somewhat obscured due to habitus. Measures 11.8 cm in  length. Parenchyma is of normal thickness and grossly normal  echogenicity. No focal mass. No hydronephrosis.     Bladder: Bladder is partially filled with urine with no evidence of  debris within the lumen, wall thickening, or mass. Unremarkable.      Impression    IMPRESSION:  Somewhat limited examination due to habitus demonstrates no focal  renal hydronephrosis, evidence of obstruction, masses, or other focal  sonographic findings to correlate with acute kidney injury.    I have personally reviewed the examination and initial interpretation  and I agree with the findings.    KINJAL REDDY MD       Discharge Medications   Discharge Medication List as of 2/1/2021  1:03 PM      START taking these medications    Details   traZODone (DESYREL) 50 MG tablet Take 1 tablet (50 mg) by mouth nightly as needed for sleep, Disp-1 tablet, R-0, E-PrescribeP         CONTINUE these medications which have CHANGED    Details   !! predniSONE (DELTASONE) 20 MG tablet Take 4 tablets (80 mg) by mouth daily for 13 days, Disp-52 tablet, R-0, E-PrescribePlease take 80mg daily from 2/1 to 2/15.  Then, decrease to 70mg daily for next two weeks, unless otherwise directed by rheumatology or nephrology team.      !! predniSONE (DELTASONE) 20 MG tablet Take 3.5 tablets (70 mg) by mouth daily for 14 days, Disp-49 tablet, R-0, E-PrescribePlease take 80mg daily from 2/1 to 2/15.  Then, decrease to 70mg daily for two weeks, unless otherwise directed.       !! - Potential duplicate medications found. Please discuss with provider.      CONTINUE these  medications which have NOT CHANGED    Details   acetaminophen (TYLENOL) 325 MG tablet Take 2 tablets (650 mg) by mouth every 4 hours as needed for mild pain, Disp-1 Bottle, R-3, E-Prescribe      Calcium Carb-Cholecalciferol (HM CALCIUM-VITAMIN D) 600-800 MG-UNIT TABS Take 1 tablet by mouth daily, Disp-30 tablet, R-3, E-Prescribe      sodium chloride (OCEAN) 0.65 % nasal spray Spray 2-3 sprays into both nostrils twice daily as needed for nasal dryness, Historical         STOP taking these medications       ibuprofen (ADVIL) 200 MG tablet Comments:   Reason for Stopping:         nicotine (NICODERM CQ) 14 MG/24HR 24 hr patch Comments:   Reason for Stopping:         ondansetron (ZOFRAN-ODT) 4 MG ODT tab Comments:   Reason for Stopping:         sulfamethoxazole-trimethoprim (BACTRIM DS) 800-160 MG tablet Comments:   Reason for Stopping:         zolpidem (AMBIEN) 5 MG tablet Comments:   Reason for Stopping:             Allergies   Allergies   Allergen Reactions     Penicillins Rash     Unknown, but think rash.  Tolerated cephalexin (12/27/20), cefpodoxime (12/27/20)

## 2021-02-01 NOTE — DISCHARGE INSTRUCTIONS
Follow-up appointments:  -2/3 with ENT, Dr. Vergara  -2/11 with Nephrology, Dr. Chang (with labs)  -2/23 with Rheumatology, Dr. Call  -3/15 with Dermatology, Dr. Beauchamp  -4/5 with IM, Dr. Amanda    We are suggesting the following medication changes:  - Please take prednisone 80mg every day until 2/15  - Please take prednisone 70mg every day from 2/16 - 3/2 (unless otherwise instructed by nephrology or rheumatology)  - Trazadone given as needed for sleep while taking prednisone.  Will not provide additional doses, as this is not an ideal long-term option for sleep

## 2021-02-01 NOTE — PROGRESS NOTES
Focus: Chemo cytoxan    Data: A positive blood return from PIV was confirm prior to starting chemo. Cytoxan infuse over 1 hr without complication.     Intervention: Cytoxan double check with 2 chemo nurses. Drug pamphlet given to pt. Reviewed side effects of chemo w/ pt.     Assessment: Pt tolerated chemo fine.     Plan: Administer mesna infusion per treatment plan. Chemo precaution in place until Tues 2/2 @ 15:00.

## 2021-02-01 NOTE — PROGRESS NOTES
Date: 2/3/2021 Status: Rogelio   Time: 7:45 AM Length: 15   Visit Type: [facility] HOSPITAL FOLLOW UP [[id number]] EM:     Provider: Henrry Patel MD Department: [facility] DERMATOLOGY     Patient has clinic visit within 24-48 hours of Discharge so no post DC follow up call is needed

## 2021-02-01 NOTE — DISCHARGE SUMMARY
DISCHARGE                         2/1/2021  2:02 PM  ----------------------------------------------------------------------------  Discharged to: Home  Via: private transportation  Accompanied by: self  Discharge Instructions: 2gK diet, normal activity, medications, follow up appointments, when to call the MD, aftercare instructions.  Prescriptions: To be filled by  [facility]  pharmacy; medication list reviewed & sent with pt  Follow Up Appointments: arranged; information given  Belongings: All sent with pt  IV: d/c'd  Telemetry: d/c'd  Pt exhibits understanding of above discharge instructions; all questions answered.    Discharge Paperwork: Signed, copied, and sent home with patient.

## 2021-02-01 NOTE — TELEPHONE ENCOUNTER
[facility] Call Center    Phone Message    May a detailed message be left on voicemail: yes     Reason for Call: Other: `      Pt returned call received on 2/1/21 at 2:51. Pt confirms she will be at the 2/3/21 appt with Dr Walton.    Thank you-    Action Taken: Message routed to:  [facility] Dermatology    Travel Screening: Not Applicable

## 2021-02-03 ENCOUNTER — PRE VISIT (OUTPATIENT)
Dept: OTOLARYNGOLOGY | Facility: CLINIC | Age: 21
End: 2021-02-03

## 2021-02-03 LAB
BACTERIA SPEC CULT: NO GROWTH
BACTERIA SPEC CULT: NO GROWTH
SPECIMEN SOURCE: NORMAL
SPECIMEN SOURCE: NORMAL

## 2021-02-08 LAB — COPATH REPORT: NORMAL

## 2021-02-10 ENCOUNTER — TELEPHONE (OUTPATIENT)
Dept: DERMATOLOGY | Facility: CLINIC | Age: 21
End: 2021-02-10

## 2021-02-10 NOTE — TELEPHONE ENCOUNTER
Writer called patient to discuss results of recent skin biopsy.     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   Clinical correlation is recommended.     Discussed with patient the benign nature of these lesions and the possible treatment options. Patient reports that the lesions are going away on the prednisone currently. Writer offered to help patient schedule a f/u clinic visit with Dr. Patel but patient says she would prefer to hold off at this time since the spots are going away. Writer provided patient with information on how to schedule a follow-up dermatology appt in the future should she want it.     Result discussed with carols attending, Dr. Patel.     Karolyn Ward MD  Dermatology Resident, PGY-2

## 2021-02-11 ENCOUNTER — VIRTUAL VISIT (OUTPATIENT)
Dept: NEPHROLOGY | Facility: CLINIC | Age: 21
End: 2021-02-11
Attending: INTERNAL MEDICINE
Payer: COMMERCIAL

## 2021-02-11 DIAGNOSIS — G47.00 INSOMNIA, UNSPECIFIED TYPE: Primary | ICD-10-CM

## 2021-02-11 DIAGNOSIS — I77.82 ANCA-ASSOCIATED VASCULITIS (H): ICD-10-CM

## 2021-02-11 PROCEDURE — 99214 OFFICE O/P EST MOD 30 MIN: CPT | Mod: 95 | Performed by: INTERNAL MEDICINE

## 2021-02-11 RX ORDER — HEPARIN SODIUM,PORCINE 10 UNIT/ML
5 VIAL (ML) INTRAVENOUS
Status: CANCELLED | OUTPATIENT
Start: 2021-02-28

## 2021-02-11 RX ORDER — TRAZODONE HYDROCHLORIDE 50 MG/1
50 TABLET, FILM COATED ORAL AT BEDTIME
Qty: 21 TABLET | Refills: 0 | Status: SHIPPED | OUTPATIENT
Start: 2021-02-11 | End: 2021-03-17

## 2021-02-11 RX ORDER — HEPARIN SODIUM (PORCINE) LOCK FLUSH IV SOLN 100 UNIT/ML 100 UNIT/ML
5 SOLUTION INTRAVENOUS
Status: CANCELLED | OUTPATIENT
Start: 2021-02-28

## 2021-02-11 ASSESSMENT — PAIN SCALES - GENERAL: PAINLEVEL: NO PAIN (0)

## 2021-02-11 NOTE — PROGRESS NOTES
Nephrology Clinic Note  02/11/2021         Assessment & Recommendations:   Cande Shields is a 20 year old female who has a history of PR3+ ANCA vasculitis s/p rituximab 375mg/m2 for 4 weekly doses and steroid taper in late 2020/early 2021, who was re admitted end of January 2021 with vasculitis flare. We are following for management of PR3 ANCA Vasculitis.     PR3+ ANCA Vasculitis (Treatment course in ~2012 included Cytoxan, Ritux and methotrexate per patient report)  Pulmonary and Renal Involvement  Cr elevated at ~2.0 up from baseline ~0.6-0.7 at presentation at [facility] 1/28/2021. She additionally had epistaxis, mild hemoptysis and new pulm infiltrates on CT along with RBC casts on urine micro. Overall clinical picture was concerning for vasculitis flare. Pulse dose steroids initiated on admission and she was given cytoxan on 1/31/21 - 500mg/m2 with mesna for prophylaxis against hemorrhagic cystitis. Gynecology input on ovarian preservation in setting of cyclophosphamide was obtained, and they opted for Depo Provera for contraception. (Leuprolide not administered after discussion re: Ovarian flare).     Overall, she appears to be stable from discharge. We will continue to monitor her clinically and check lans today/tmrw to assess how she is doing. At this time, will plan additional dose of Cytoxan (a la RITUXIVAS protocol) at the end of February (unless labs reveal significant improvement).   Will taper Pred to 60mg Daily and then in 2 weeks, taper to 40mg Daily. Will touch base at that time and see how she is doing. In terms of ppx, she is due for Pentamidine first week of March 2021.    Steroid Insomnia  Refilled ~2 week course of Trazodone. Insomnia should improve with taper of Pred.    We will plan to visit with her in person during her next Cytoxan infusion.     Seen and discussed with Dr. Chang.    Adrián Lacy MD   [phone]  Nephrology    Interval History :   Since discharge from the hospital on  2/1/2021, she reports overall doing well. She continues to have epistaxis and intermittent hemoptysis. She does also intermittently feel SOB (walking up the stairs). The hemoptysis is infrequent, and more like small clots as opposed to reyes hemoptysis. No chest pain. No fevers or rashes. No hematuria or dysuria. No abd pain or GIU issues.  She is taking 80mg Pred daily - sleeping okay with intermittent insomnia. Appetite is normal, slightly increased on the Prednisone. No nausea or vomiting.     Review of Systems:   10 pt ROS negative except as above    Past Medical History:   Diagnosis Date     ADHD (attention deficit hyperactivity disorder)      Wegener's disease, pulmonary (H) 2010    renal biopdy     Social History     Socioeconomic History     Marital status:      Spouse name: Not on file     Number of children: Not on file     Years of education: Not on file     Highest education level: Not on file   Occupational History     Not on file   Social Needs     Financial resource strain: Not on file     Food insecurity     Worry: Not on file     Inability: Not on file     Transportation needs     Medical: Not on file     Non-medical: Not on file   Tobacco Use     Smoking status: Current Every Day Smoker     Smokeless tobacco: Never Used     Tobacco comment: currently vaping   Substance and Sexual Activity     Alcohol use: Not Currently     Frequency: 2-3 times a week     Drug use: No     Sexual activity: Never   Lifestyle     Physical activity     Days per week: Not on file     Minutes per session: Not on file     Stress: Not on file   Relationships     Social connections     Talks on phone: Not on file     Gets together: Not on file     Attends Religion service: Not on file     Active member of club or organization: Not on file     Attends meetings of clubs or organizations: Not on file     Relationship status: Not on file     Intimate partner violence     Fear of current or ex partner: Not on file      Emotionally abused: Not on file     Physically abused: Not on file     Forced sexual activity: Not on file   Other Topics Concern     Not on file   Social History Narrative    Lives with brother 16yo and mother. Pets: Dog Nicholas.       Physical Exam:   GENERAL APPEARANCE: alert and no distress  HENT: no obvious abnormalities on appearance  RESP: breathing appears unremarkable with normal rate, no audible wheezing or cough and no apparent shortness of breath with conversation  MS: extremities normal - no gross deformities noted  SKIN: no apparent rash and normal skin tone  NEURO: speech is clear with no obvious neurological deficits  PSYCH: mentation appears normal and affect normal    Labs:   All labs reviewed by me  Electrolytes/Renal -   Recent Labs   Lab Test 02/01/21  0430 01/31/21  0450 01/30/21  0502    138 139   POTASSIUM 4.6 4.9 5.3   CHLORIDE 112* 111* 114*   CO2 21 20 20   BUN 48* 46* 46*   CR 2.00* 1.94* 1.91*   * 117* 138*   KRAIG 8.5 8.8 8.7   MAG  --  2.0  --        CBC -   Recent Labs   Lab Test 02/01/21  0430 01/31/21  0450 01/30/21  0502   WBC 15.7* 20.5* 25.8*   HGB 10.3* 10.7* 10.6*    241 258       LFTs -   Recent Labs   Lab Test 01/29/21  0519 01/28/21  1949 01/27/21  1756   ALKPHOS 71 74 84   BILITOTAL 0.2 0.2 0.2   ALT 33 30 30   AST 5 <3 4   PROTTOTAL 6.3* 6.6* 7.2   ALBUMIN 2.6* 2.7* 3.0*       Imaging:  CT Chest W/O Contrast 1/28/21  IMPRESSION:   Scattered peribronchovascular tree-in-bud nodularity with larger  nodular opacities in the right middle lobe and left lung base, could  be due to infection or vasculitis and hemorrhage in the setting of  granulomatosis with polyangiitis given the patient's clinical history.     Current Medications:  Current Outpatient Medications   Medication     predniSONE (DELTASONE) 20 MG tablet     sodium chloride (OCEAN) 0.65 % nasal spray     acetaminophen (TYLENOL) 325 MG tablet     Calcium Carb-Cholecalciferol (HM CALCIUM-VITAMIN D) 600-800  MG-UNIT TABS     [START ON 2/16/2021] predniSONE (DELTASONE) 20 MG tablet     traZODone (DESYREL) 50 MG tablet     No current facility-administered medications for this visit.      ------    Cande is a 20 year old who is being evaluated via a billable video visit.      How would you like to obtain your AVS? MyChart  If the video visit is dropped, the invitation should be resent by: Other e-mail: [phone]  Will anyone else be joining your video visit? No      Video Start Time: 2:24 PM  Video-Visit Details    Type of service:  Video Visit    Video End Time:2:54 PM    Originating Location (pt. Location): Home    Distant Location (provider location):  [facility] [place]     Platform used for Video Visit: [employer]

## 2021-02-11 NOTE — LETTER
2/11/2021       RE: Cande Shields  [address] [place] [id number]     Dear Colleague,    Thank you for referring your patient, Cande Sheilds, to the [facility] [place] at [facility]. Please see a copy of my visit note below.      Nephrology Clinic Note  02/11/2021         Assessment & Recommendations:   Cande Shields is a 20 year old female who has a history of PR3+ ANCA vasculitis s/p rituximab 375mg/m2 for 4 weekly doses and steroid taper in late 2020/early 2021, who was re admitted end of January 2021 with vasculitis flare. We are following for management of PR3 ANCA Vasculitis.     PR3+ ANCA Vasculitis (Treatment course in ~2012 included Cytoxan, Ritux and methotrexate per patient report)  Pulmonary and Renal Involvement  Cr elevated at ~2.0 up from baseline ~0.6-0.7 at presentation at [facility] 1/28/2021. She additionally had epistaxis, mild hemoptysis and new pulm infiltrates on CT along with RBC casts on urine micro. Overall clinical picture was concerning for vasculitis flare. Pulse dose steroids initiated on admission and she was given cytoxan on 1/31/21 - 500mg/m2 with mesna for prophylaxis against hemorrhagic cystitis. Gynecology input on ovarian preservation in setting of cyclophosphamide was obtained, and they opted for Depo Provera for contraception. (Leuprolide not administered after discussion re: Ovarian flare).     Overall, she appears to be stable from discharge. We will continue to monitor her clinically and check lans today/tmrw to assess how she is doing. At this time, will plan additional dose of Cytoxan (a la RITUXIVAS protocol) at the end of February (unless labs reveal significant improvement).   Will taper Pred to 60mg Daily and then in 2 weeks, taper to 40mg Daily. Will touch base at that time and see how she is doing. In terms of ppx, she is due for Pentamidine first week of March 2021.    Steroid  Insomnia  Refilled ~2 week course of Trazodone. Insomnia should improve with taper of Pred.    We will plan to visit with her in person during her next Cytoxan infusion.     Seen and discussed with Dr. Chang.    Adrián Lacy MD   [phone]  Nephrology    Interval History :   Since discharge from the hospital on 2/1/2021, she reports overall doing well. She continues to have epistaxis and intermittent hemoptysis. She does also intermittently feel SOB (walking up the stairs). The hemoptysis is infrequent, and more like small clots as opposed to reyes hemoptysis. No chest pain. No fevers or rashes. No hematuria or dysuria. No abd pain or GIU issues.  She is taking 80mg Pred daily - sleeping okay with intermittent insomnia. Appetite is normal, slightly increased on the Prednisone. No nausea or vomiting.     Review of Systems:   10 pt ROS negative except as above    Past Medical History:   Diagnosis Date     ADHD (attention deficit hyperactivity disorder)      Wegener's disease, pulmonary (H) 2010    renal biopdy     Social History     Socioeconomic History     Marital status:      Spouse name: Not on file     Number of children: Not on file     Years of education: Not on file     Highest education level: Not on file   Occupational History     Not on file   Social Needs     Financial resource strain: Not on file     Food insecurity     Worry: Not on file     Inability: Not on file     Transportation needs     Medical: Not on file     Non-medical: Not on file   Tobacco Use     Smoking status: Current Every Day Smoker     Smokeless tobacco: Never Used     Tobacco comment: currently vaping   Substance and Sexual Activity     Alcohol use: Not Currently     Frequency: 2-3 times a week     Drug use: No     Sexual activity: Never   Lifestyle     Physical activity     Days per week: Not on file     Minutes per session: Not on file     Stress: Not on file   Relationships     Social connections     Talks on phone:  Not on file     Gets together: Not on file     Attends Faith service: Not on file     Active member of club or organization: Not on file     Attends meetings of clubs or organizations: Not on file     Relationship status: Not on file     Intimate partner violence     Fear of current or ex partner: Not on file     Emotionally abused: Not on file     Physically abused: Not on file     Forced sexual activity: Not on file   Other Topics Concern     Not on file   Social History Narrative    Lives with brother 14yo and mother. Pets: Dog Nicholas.       Physical Exam:   GENERAL APPEARANCE: alert and no distress  HENT: no obvious abnormalities on appearance  RESP: breathing appears unremarkable with normal rate, no audible wheezing or cough and no apparent shortness of breath with conversation  MS: extremities normal - no gross deformities noted  SKIN: no apparent rash and normal skin tone  NEURO: speech is clear with no obvious neurological deficits  PSYCH: mentation appears normal and affect normal    Labs:   All labs reviewed by me  Electrolytes/Renal -   Recent Labs   Lab Test 02/01/21  0430 01/31/21  0450 01/30/21  0502    138 139   POTASSIUM 4.6 4.9 5.3   CHLORIDE 112* 111* 114*   CO2 21 20 20   BUN 48* 46* 46*   CR 2.00* 1.94* 1.91*   * 117* 138*   KRAIG 8.5 8.8 8.7   MAG  --  2.0  --        CBC -   Recent Labs   Lab Test 02/01/21  0430 01/31/21  0450 01/30/21  0502   WBC 15.7* 20.5* 25.8*   HGB 10.3* 10.7* 10.6*    241 258       LFTs -   Recent Labs   Lab Test 01/29/21  0519 01/28/21  1949 01/27/21  1756   ALKPHOS 71 74 84   BILITOTAL 0.2 0.2 0.2   ALT 33 30 30   AST 5 <3 4   PROTTOTAL 6.3* 6.6* 7.2   ALBUMIN 2.6* 2.7* 3.0*       Imaging:  CT Chest W/O Contrast 1/28/21  IMPRESSION:   Scattered peribronchovascular tree-in-bud nodularity with larger  nodular opacities in the right middle lobe and left lung base, could  be due to infection or vasculitis and hemorrhage in the setting of  granulomatosis  with polyangiitis given the patient's clinical history.     Current Medications:  Current Outpatient Medications   Medication     predniSONE (DELTASONE) 20 MG tablet     sodium chloride (OCEAN) 0.65 % nasal spray     acetaminophen (TYLENOL) 325 MG tablet     Calcium Carb-Cholecalciferol (HM CALCIUM-VITAMIN D) 600-800 MG-UNIT TABS     [START ON 2/16/2021] predniSONE (DELTASONE) 20 MG tablet     traZODone (DESYREL) 50 MG tablet     No current facility-administered medications for this visit.      ------    Cande is a 20 year old who is being evaluated via a billable video visit.      How would you like to obtain your AVS? MyChart  If the video visit is dropped, the invitation should be resent by: Other e-mail: [phone]  Will anyone else be joining your video visit? No      Video Start Time: 2:24 PM  Video-Visit Details    Type of service:  Video Visit    Video End Time:2:54 PM    Originating Location (pt. Location): Home    Distant Location (provider location):  [facility] [place]     Platform used for Video Visit: [employer]      Attestation signed by Clark Chang MD at 2/13/2021  7:31 PM:  Attestation:  This patient was evaluated by meClark MD on February 11, 2021 jointly with Dr Lacy.  Discussed with Dr Lacy and agree with the findings and plan in this note.  I have reviewed  Medications, Vital Signs, and Labs.  Mrs Shields appears clinically stable with not clinical symptoms of active vasculitis.    Her creatinine is very modestly improved compared to the time of discharge on 2/1/21  Her WBC is stable (no leucopenia 2 weeks post cyclophosphamide) and her Hgb in significantly improved.  I recommend decreasing the prednisone to 60 mg daily x 2 weeks then 40 mg daily.  We will plan on a second dose of cyclophosphamide around Feb 28.    Creatinine   Date Value Ref Range Status   02/12/2021 1.89 (H) 0.52 - 1.04 mg/dL Final     Results for CANDE SHIELDS (MRN  [id number]) as of 2/13/2021 19:28   Ref. Range 2/1/2021 04:30 2/12/2021 14:25   Sodium Latest Ref Range: 133 - 144 mmol/L 139 137   Potassium Latest Ref Range: 3.4 - 5.3 mmol/L 4.6 4.7   Chloride Latest Ref Range: 94 - 109 mmol/L 112 (H) 109   Carbon Dioxide Latest Ref Range: 20 - 32 mmol/L 21 19 (L)   Urea Nitrogen Latest Ref Range: 7 - 30 mg/dL 48 (H) 50 (H)   Creatinine Latest Ref Range: 0.52 - 1.04 mg/dL 2.00 (H) 1.89 (H)   GFR Estimate Latest Ref Range: >60 mL/min/1.73_m2 34 (L) 37 (L)   GFR Estimate If Black Latest Ref Range: >60 mL/min/1.73_m2 40 (L) 43 (L)   Calcium Latest Ref Range: 8.5 - 10.1 mg/dL 8.5 9.7   Anion Gap Latest Ref Range: 3 - 14 mmol/L 6 9   Phosphorus Latest Ref Range: 2.5 - 4.5 mg/dL  4.4   Albumin Latest Ref Range: 3.4 - 5.0 g/dL  3.8   Protein Random Urine Latest Units: g/L  0.82   Protein Total Urine g/gr Creatinine Latest Ref Range: 0 - 0.2 g/g Cr  1.89 (H)   Results for YUMIKO LYONS (MRN [id number]) as of 2/13/2021 19:28   Ref. Range 1/31/2021 04:50 2/1/2021 04:30 2/12/2021 14:25   WBC Latest Ref Range: 4.0 - 11.0 10e9/L 20.5 (H) 15.7 (H) 11.9 (H)   Hemoglobin Latest Ref Range: 11.7 - 15.7 g/dL 10.7 (L) 10.3 (L) 12.2   Hematocrit Latest Ref Range: 35.0 - 47.0 % 34.5 (L) 33.4 (L) 36.6   Platelet Count Latest Ref Range: 150 - 450 10e9/L 241 219 247       Clark Chang MD  [facility]  Department of Medicine  Division of Renal Disease and Hypertension  Pager

## 2021-02-12 DIAGNOSIS — I77.82 ANCA-ASSOCIATED VASCULITIS (H): ICD-10-CM

## 2021-02-12 DIAGNOSIS — R82.90 NONSPECIFIC FINDING ON EXAMINATION OF URINE: Primary | ICD-10-CM

## 2021-02-12 LAB
ALBUMIN UR-MCNC: 100 MG/DL
APPEARANCE UR: CLEAR
BACTERIA #/AREA URNS HPF: ABNORMAL /HPF
BILIRUB UR QL STRIP: NEGATIVE
COLOR UR AUTO: YELLOW
ERYTHROCYTE [DISTWIDTH] IN BLOOD BY AUTOMATED COUNT: 14.6 % (ref 10–15)
GLUCOSE UR STRIP-MCNC: NEGATIVE MG/DL
HCT VFR BLD AUTO: 36.6 % (ref 35–47)
HGB BLD-MCNC: 12.2 G/DL (ref 11.7–15.7)
HGB UR QL STRIP: ABNORMAL
KETONES UR STRIP-MCNC: NEGATIVE MG/DL
LEUKOCYTE ESTERASE UR QL STRIP: ABNORMAL
MCH RBC QN AUTO: 27.6 PG (ref 26.5–33)
MCHC RBC AUTO-ENTMCNC: 33.3 G/DL (ref 31.5–36.5)
MCV RBC AUTO: 83 FL (ref 78–100)
NITRATE UR QL: NEGATIVE
NON-SQ EPI CELLS #/AREA URNS LPF: ABNORMAL /LPF
PH UR STRIP: 5.5 PH (ref 5–7)
PLATELET # BLD AUTO: 247 10E9/L (ref 150–450)
PROT UR-MCNC: 0.82 G/L
PROT/CREAT 24H UR: 1.89 G/G CR (ref 0–0.2)
RBC # BLD AUTO: 4.42 10E12/L (ref 3.8–5.2)
RBC #/AREA URNS AUTO: ABNORMAL /HPF
SOURCE: ABNORMAL
SP GR UR STRIP: 1.01 (ref 1–1.03)
UROBILINOGEN UR STRIP-ACNC: 0.2 EU/DL (ref 0.2–1)
WBC # BLD AUTO: 11.9 10E9/L (ref 4–11)
WBC #/AREA URNS AUTO: ABNORMAL /HPF

## 2021-02-12 PROCEDURE — 87086 URINE CULTURE/COLONY COUNT: CPT | Performed by: INTERNAL MEDICINE

## 2021-02-12 PROCEDURE — 36415 COLL VENOUS BLD VENIPUNCTURE: CPT | Performed by: INTERNAL MEDICINE

## 2021-02-12 PROCEDURE — 85027 COMPLETE CBC AUTOMATED: CPT | Performed by: INTERNAL MEDICINE

## 2021-02-12 PROCEDURE — 84156 ASSAY OF PROTEIN URINE: CPT | Performed by: INTERNAL MEDICINE

## 2021-02-12 PROCEDURE — 80069 RENAL FUNCTION PANEL: CPT | Performed by: INTERNAL MEDICINE

## 2021-02-12 PROCEDURE — 81001 URINALYSIS AUTO W/SCOPE: CPT | Performed by: INTERNAL MEDICINE

## 2021-02-12 NOTE — TELEPHONE ENCOUNTER
FUTURE VISIT INFORMATION      FUTURE VISIT INFORMATION:    Date: 2/19/2021    Time: 3PM    Location: [facility]  REFERRAL INFORMATION:    Referring provider:  Ramses Call MD    Referring providers clinic:  [facility] [place]     Reason for visit/diagnosis  ANCA-associated vasculitis per ref by Ramses Ramirez MD in [facility]. Records in [facility]. Per Pt      RECORDS REQUESTED FROM:         Clinic name Comments Records Status Imaging Status   [facility] [place]  1/26/2021 note and referral from Ramses Call MD Epic

## 2021-02-13 LAB
ALBUMIN SERPL-MCNC: 3.8 G/DL (ref 3.4–5)
ANION GAP SERPL CALCULATED.3IONS-SCNC: 9 MMOL/L (ref 3–14)
BACTERIA SPEC CULT: NO GROWTH
BUN SERPL-MCNC: 50 MG/DL (ref 7–30)
CALCIUM SERPL-MCNC: 9.7 MG/DL (ref 8.5–10.1)
CHLORIDE SERPL-SCNC: 109 MMOL/L (ref 94–109)
CO2 SERPL-SCNC: 19 MMOL/L (ref 20–32)
CREAT SERPL-MCNC: 1.89 MG/DL (ref 0.52–1.04)
GFR SERPL CREATININE-BSD FRML MDRD: 37 ML/MIN/{1.73_M2}
GLUCOSE SERPL-MCNC: 100 MG/DL (ref 70–99)
Lab: NORMAL
PHOSPHATE SERPL-MCNC: 4.4 MG/DL (ref 2.5–4.5)
POTASSIUM SERPL-SCNC: 4.7 MMOL/L (ref 3.4–5.3)
SODIUM SERPL-SCNC: 137 MMOL/L (ref 133–144)
SPECIMEN SOURCE: NORMAL

## 2021-02-19 ENCOUNTER — PRE VISIT (OUTPATIENT)
Dept: OTOLARYNGOLOGY | Facility: CLINIC | Age: 21
End: 2021-02-19

## 2021-02-19 ENCOUNTER — OFFICE VISIT (OUTPATIENT)
Dept: OTOLARYNGOLOGY | Facility: CLINIC | Age: 21
End: 2021-02-19
Payer: COMMERCIAL

## 2021-02-19 VITALS — OXYGEN SATURATION: 98 % | TEMPERATURE: 98.6 F | WEIGHT: 239 LBS | HEART RATE: 92 BPM | BODY MASS INDEX: 41.02 KG/M2

## 2021-02-19 DIAGNOSIS — I77.82 ANCA-ASSOCIATED VASCULITIS (H): Primary | ICD-10-CM

## 2021-02-19 DIAGNOSIS — J34.89 NASAL CRUSTING: ICD-10-CM

## 2021-02-19 DIAGNOSIS — R04.0 EPISTAXIS: ICD-10-CM

## 2021-02-19 PROCEDURE — 31237 NSL/SINS NDSC SURG BX POLYPC: CPT | Mod: RT | Performed by: OTOLARYNGOLOGY

## 2021-02-19 PROCEDURE — 99203 OFFICE O/P NEW LOW 30 MIN: CPT | Mod: 25 | Performed by: OTOLARYNGOLOGY

## 2021-02-19 ASSESSMENT — PAIN SCALES - GENERAL: PAINLEVEL: MODERATE PAIN (5)

## 2021-02-19 NOTE — LETTER
2/19/2021       RE: Cande Shields  [address]  [facility] [address]     Dear Colleague,    Thank you for referring your patient, Cande Shields, to the [facility] [place] at [facility]. Please see a copy of my visit note below.        [facility]                     New Patient Visit      Encounter date: February 19, 2021    Referring Provider:   Ramses Call MD  [address]  [place] [address]    Chief Complaint: nasal crusting, GPA    History of Present Illness: Cande Shields is a pleasant 20 year-old woman with a history of GPA involving upper respiratory tract (nose), kidney, and skin.  She has had multiple hospitalizations/ER visits for GPA flares. She has received treatment for GPA with prednisone, rituxan, and now cyclophosphamide.  She reports ongoing symptoms of RT>LT nasal congestion, crusting, dryness and occasional epistaxis. She uses saline irrigations, topical emollient regularly at home. She denies purulence, facial pain/pressure, stridor, or changes in external appearance of her nose. She has no prior history of sinonasal surgery. \\    Sino-Nasal Outcome Test (SNOT - 22)  DNC        Review of systems: A 14-point review of systems has been conducted and was negative for any notable symptoms, except as dictated in the history of present illness.     Past Medical History:   Diagnosis Date     ADHD (attention deficit hyperactivity disorder)      Wegener's disease, pulmonary (H) 2010    renal biopdy        Past Surgical History:   Procedure Laterality Date     ENT SURGERY  2000    cyst     EXCISE GANGLION WRIST  2009        Family History   Problem Relation Age of Onset     Thyroid Disease Mother      Asthma No family hx of      Diabetes No family hx of         Social History     Socioeconomic History     Marital status:      Spouse name: None     Number of children:  None     Years of education: None     Highest education level: None   Occupational History     None   Social Needs     Financial resource strain: None     Food insecurity     Worry: None     Inability: None     Transportation needs     Medical: None     Non-medical: None   Tobacco Use     Smoking status: Current Every Day Smoker     Smokeless tobacco: Never Used     Tobacco comment: currently vaping   Substance and Sexual Activity     Alcohol use: Not Currently     Frequency: 2-3 times a week     Drug use: No     Sexual activity: Never   Lifestyle     Physical activity     Days per week: None     Minutes per session: None     Stress: None   Relationships     Social connections     Talks on phone: None     Gets together: None     Attends Taoist service: None     Active member of club or organization: None     Attends meetings of clubs or organizations: None     Relationship status: None     Intimate partner violence     Fear of current or ex partner: None     Emotionally abused: None     Physically abused: None     Forced sexual activity: None   Other Topics Concern     None   Social History Narrative    Lives with brother 14yo and mother. Pets: Dog Nicholas.        Physical Exam:  Vital signs: Pulse 92   Temp 98.6  F (37  C)   Wt 108.4 kg (239 lb)   SpO2 98%   BMI 41.02 kg/m     General Appearance: No acute distress, appropriate demeanor, conversant  Eyes: moist conjunctivae; EOMI; pupils symmetric; visual acuity grossly intact; no proptosis  Head: normocephalic; overall symmetric appearance without deformity  Face: overall symmetric without deformity; HB I/VI  Ears: Normal appearance of external ear; external meatus normal in appearance; TMs intact without perforation bilaterally;   Nose: No external deformity; septum midline; inferior turbinates without significant hypertrophy  Oral Cavity/oropharynx: Normal appearance of mucosa; tongue midline; no mass or lesions; oropharynx without obvious mucosal  abnormality  Neck: no palpable lymphadenopathy; thyroid without palpable nodules  Lungs: symmetric chest rise; no wheezing  CV: Good distal perfusion; normal heart rate  Extremities: No deformity  Neurologic Exam: Cranial nerves II-XII are grossly intact; no focal deficit      Procedure Note  Procedure performed: Rigid nasal endoscopy with right-sided debridement  Indication: To evaluate for sinonasal pathology not visualized on routine anterior rhinoscopy  Anesthesia: 4% topical lidocaine with 0.05% oxymetazoline  Description of procedure: A 30 degree, 3 mm rigid endoscope was inserted into bilateral nasal cavities and the nasal valve, nasal cavity, middle meatus, sphenoethmoid recess, and nasopharynx were thoroughly evaluated for evidence of obstruction, edema, purulence, polyps and/or mass/lesion.       I then used a combination of straight suction and non-cutting forceps to clear extensive crusting from the right nasal cavity    [place]-[employer] Endoscopic Scoring System  Endoscopic observation Right Left   Polyps in middle meatus (0 = absent, 1 = restricted to middle meatus, 2 = Beyond middle meatus) 0 0   Discharge (0 = absent, 1 = thin and clear, 2 = thick, purulent) 1 0   Edema (0 = absent, 1 = mild-moderate, 2 = moderate-severe) 0 0   Crusting (0 = absent, 1 = mild-moderate, 2 = moderate-severe) 2 0   Scarring (0= absent, 1 = mild-moderate, 2 = moderate-severe) 0 0   Total 3 0     Findings  RT: severe crusting/biofilm of anterior nasal cavity is debrided - early septal mucosal erosion with exposed cartilage - no perforation; MM and SER clear  LT: MM and SER    Nasopharynx clear    The patient tolerated the procedure well without complication.     Laboratory Review:  Myeloperoxidase Antibody IgG 0.0 - 0.9 AI <0.2      Proteinase 3 Antibody IgG 0.0 - 0.9 AI 4.0High       Neutrophil Cytoplasmic Antibody <1:10  1:80Abnormal       Neutrophil Cytoplasmic Antibody Pattern  Cytoplasmic ANCA (c-ANCA) staining pattern  observed and confirmed on formalin-fixed   neutrophils.          Imaging Review:  n/a    Pathology Review:  n/a    Assessment/Medical Decision Making:  Granulomatosis polyangitis with renal, upper respiratory, musculoskelal and skin involvement. I am seeing Cande today because of ongoing RT-sided sinonasal symptoms occurring in the context of her GPA    I performed nasal endoscopy and debridement of extensive crusting of the right nasal cavity today.  She has early septal mucosal erosion, but no septal perforation, nor does she have any external saddling of her nasal dorsum.      I encouraged Cande to continue with her routine nasal care, including regular saline irrigations, but I would also like for her to add a mupirocin ointment to help decrease the biofilm load inside her nasal cavity, while also encouraging moisturization  I discouraged excessive blowing/picking as well.       I did not detect any evidence of sinusitis, but should this develop, I would consider getting CT.     In addition, Cande is not exhibiting any signs of stridor to suggest laryngeal/tracheal involvement, but should this develop, I will perform laryngoscopy and refer to laryngology colleagues if any airway stenosis is present.     Cande will follow up with me in 1 month.       Henrry Vergara MD    [facility]  Rhinology  Endoscopic Skull Base Surgery  [facility]  Department of Otolaryngology - Head & Neck Surgery

## 2021-02-19 NOTE — PROGRESS NOTES
[facility]                   New Patient Visit      Encounter date: February 19, 2021    Referring Provider:   Ramses Call MD  [address] [place] [id number]    Chief Complaint: nasal crusting, GPA    History of Present Illness: Cande Shields is a pleasant 20 year-old woman with a history of GPA involving upper respiratory tract (nose), kidney, and skin.  She has had multiple hospitalizations/ER visits for GPA flares. She has received treatment for GPA with prednisone, rituxan, and now cyclophosphamide.  She reports ongoing symptoms of RT>LT nasal congestion, crusting, dryness and occasional epistaxis. She uses saline irrigations, topical emollient regularly at home. She denies purulence, facial pain/pressure, stridor, or changes in external appearance of her nose. She has no prior history of sinonasal surgery. \\    Sino-Nasal Outcome Test (SNOT - 22)  DNC        Review of systems: A 14-point review of systems has been conducted and was negative for any notable symptoms, except as dictated in the history of present illness.     Past Medical History:   Diagnosis Date     ADHD (attention deficit hyperactivity disorder)      Wegener's disease, pulmonary (H) 2010    renal biopdy        Past Surgical History:   Procedure Laterality Date     ENT SURGERY  2000    cyst     EXCISE GANGLION WRIST  2009        Family History   Problem Relation Age of Onset     Thyroid Disease Mother      Asthma No family hx of      Diabetes No family hx of         Social History     Socioeconomic History     Marital status:      Spouse name: None     Number of children: None     Years of education: None     Highest education level: None   Occupational History     None   Social Needs     Financial resource strain: None     Food insecurity     Worry: None     Inability: None     Transportation needs     Medical: None     Non-medical: None   Tobacco Use     Smoking status: Current  Every Day Smoker     Smokeless tobacco: Never Used     Tobacco comment: currently vaping   Substance and Sexual Activity     Alcohol use: Not Currently     Frequency: 2-3 times a week     Drug use: No     Sexual activity: Never   Lifestyle     Physical activity     Days per week: None     Minutes per session: None     Stress: None   Relationships     Social connections     Talks on phone: None     Gets together: None     Attends Restoration service: None     Active member of club or organization: None     Attends meetings of clubs or organizations: None     Relationship status: None     Intimate partner violence     Fear of current or ex partner: None     Emotionally abused: None     Physically abused: None     Forced sexual activity: None   Other Topics Concern     None   Social History Narrative    Lives with brother 16yo and mother. Pets: Dog Nicholas.        Physical Exam:  Vital signs: Pulse 92   Temp 98.6  F (37  C)   Wt 108.4 kg (239 lb)   SpO2 98%   BMI 41.02 kg/m     General Appearance: No acute distress, appropriate demeanor, conversant  Eyes: moist conjunctivae; EOMI; pupils symmetric; visual acuity grossly intact; no proptosis  Head: normocephalic; overall symmetric appearance without deformity  Face: overall symmetric without deformity; HB I/VI  Ears: Normal appearance of external ear; external meatus normal in appearance; TMs intact without perforation bilaterally;   Nose: No external deformity; septum midline; inferior turbinates without significant hypertrophy  Oral Cavity/oropharynx: Normal appearance of mucosa; tongue midline; no mass or lesions; oropharynx without obvious mucosal abnormality  Neck: no palpable lymphadenopathy; thyroid without palpable nodules  Lungs: symmetric chest rise; no wheezing  CV: Good distal perfusion; normal heart rate  Extremities: No deformity  Neurologic Exam: Cranial nerves II-XII are grossly intact; no focal deficit      Procedure Note  Procedure performed: Rigid  nasal endoscopy with right-sided debridement  Indication: To evaluate for sinonasal pathology not visualized on routine anterior rhinoscopy  Anesthesia: 4% topical lidocaine with 0.05% oxymetazoline  Description of procedure: A 30 degree, 3 mm rigid endoscope was inserted into bilateral nasal cavities and the nasal valve, nasal cavity, middle meatus, sphenoethmoid recess, and nasopharynx were thoroughly evaluated for evidence of obstruction, edema, purulence, polyps and/or mass/lesion.       I then used a combination of straight suction and non-cutting forceps to clear extensive crusting from the right nasal cavity    Jefferson-Kvng Endoscopic Scoring System  Endoscopic observation Right Left   Polyps in middle meatus (0 = absent, 1 = restricted to middle meatus, 2 = Beyond middle meatus) 0 0   Discharge (0 = absent, 1 = thin and clear, 2 = thick, purulent) 1 0   Edema (0 = absent, 1 = mild-moderate, 2 = moderate-severe) 0 0   Crusting (0 = absent, 1 = mild-moderate, 2 = moderate-severe) 2 0   Scarring (0= absent, 1 = mild-moderate, 2 = moderate-severe) 0 0   Total 3 0     Findings  RT: severe crusting/biofilm of anterior nasal cavity is debrided - early septal mucosal erosion with exposed cartilage - no perforation; MM and SER clear  LT: MM and SER    Nasopharynx clear    The patient tolerated the procedure well without complication.     Laboratory Review:  Myeloperoxidase Antibody IgG 0.0 - 0.9 AI <0.2      Proteinase 3 Antibody IgG 0.0 - 0.9 AI 4.0High       Neutrophil Cytoplasmic Antibody <1:10  1:80Abnormal       Neutrophil Cytoplasmic Antibody Pattern  Cytoplasmic ANCA (c-ANCA) staining pattern observed and confirmed on formalin-fixed   neutrophils.          Imaging Review:  n/a    Pathology Review:  n/a    Assessment/Medical Decision Making:  Granulomatosis polyangitis with renal, upper respiratory, musculoskelal and skin involvement. I am seeing Cande today because of ongoing RT-sided sinonasal symptoms  occurring in the context of her GPA    I performed nasal endoscopy and debridement of extensive crusting of the right nasal cavity today.  She has early septal mucosal erosion, but no septal perforation, nor does she have any external saddling of her nasal dorsum.      I encouraged Cande to continue with her routine nasal care, including regular saline irrigations, but I would also like for her to add a mupirocin ointment to help decrease the biofilm load inside her nasal cavity, while also encouraging moisturization  I discouraged excessive blowing/picking as well.       I did not detect any evidence of sinusitis, but should this develop, I would consider getting CT.     In addition, Cande is not exhibiting any signs of stridor to suggest laryngeal/tracheal involvement, but should this develop, I will perform laryngoscopy and refer to laryngology colleagues if any airway stenosis is present.     Cande will follow up with me in 1 month.       Henrry Vergara MD    [facility]  Rhinology  Endoscopic Skull Base Surgery  [facility]  Department of Otolaryngology - Head & Neck Surgery

## 2021-02-19 NOTE — NURSING NOTE
Chief Complaint   Patient presents with     Consult     ANCA associated vasculitis         Pulse 92, temperature 98.6  F (37  C), weight 108.4 kg (239 lb), SpO2 98 %, not currently breastfeeding.    Norma Mercer, EMT

## 2021-02-19 NOTE — PATIENT INSTRUCTIONS
1. You were seen in the ENT Clinic today by Dr. Vergara.      2.   Saline rinses twice a day.    3.   Mupirocin ointment both nostrils.    4.   No excess blowing.    5.   Plan to return to clinic in 1 month.        Please call our clinic for any questions, concerns, and/or worsening symptoms.      Clinic #[phone]       Option 1 for scheduling.     Thank you for allowing us to be a part of your care!     Tania RAYMUNDO LPN  [facility] ENT     If you need to reach me my direct line is: [phone].

## 2021-02-21 ENCOUNTER — HOSPITAL ENCOUNTER (EMERGENCY)
Facility: CLINIC | Age: 21
Discharge: HOME | End: 2021-02-21
Attending: EMERGENCY MEDICINE | Admitting: EMERGENCY MEDICINE
Payer: COMMERCIAL

## 2021-02-21 ENCOUNTER — HOSPITAL ENCOUNTER (EMERGENCY)
Facility: CLINIC | Age: 21
Discharge: HOME | End: 2021-02-21
Payer: COMMERCIAL

## 2021-02-21 VITALS
HEART RATE: 71 BPM | SYSTOLIC BLOOD PRESSURE: 149 MMHG | DIASTOLIC BLOOD PRESSURE: 95 MMHG | WEIGHT: 235 LBS | OXYGEN SATURATION: 97 % | RESPIRATION RATE: 16 BRPM | BODY MASS INDEX: 40.12 KG/M2 | TEMPERATURE: 97.6 F | HEIGHT: 64 IN

## 2021-02-21 VITALS
DIASTOLIC BLOOD PRESSURE: 82 MMHG | OXYGEN SATURATION: 97 % | RESPIRATION RATE: 18 BRPM | TEMPERATURE: 99 F | SYSTOLIC BLOOD PRESSURE: 144 MMHG | HEART RATE: 99 BPM

## 2021-02-21 DIAGNOSIS — M72.2 PLANTAR FASCIITIS: ICD-10-CM

## 2021-02-21 DIAGNOSIS — R10.9 ABDOMINAL PAIN, UNSPECIFIED ABDOMINAL LOCATION: ICD-10-CM

## 2021-02-21 LAB
ALBUMIN SERPL-MCNC: 3.9 G/DL (ref 3.4–5)
ALBUMIN UR-MCNC: 100 MG/DL
ALP SERPL-CCNC: 40 U/L (ref 40–150)
ALT SERPL W P-5'-P-CCNC: 35 U/L (ref 0–50)
ANION GAP SERPL CALCULATED.3IONS-SCNC: 9 MMOL/L (ref 3–14)
APPEARANCE UR: ABNORMAL
AST SERPL W P-5'-P-CCNC: 7 U/L (ref 0–45)
BACTERIA #/AREA URNS HPF: ABNORMAL /HPF
BASOPHILS # BLD AUTO: 0 10E9/L (ref 0–0.2)
BASOPHILS NFR BLD AUTO: 0.2 %
BILIRUB SERPL-MCNC: 0.3 MG/DL (ref 0.2–1.3)
BILIRUB UR QL STRIP: NEGATIVE
BUN SERPL-MCNC: 44 MG/DL (ref 7–30)
CALCIUM SERPL-MCNC: 9.9 MG/DL (ref 8.5–10.1)
CHLORIDE SERPL-SCNC: 106 MMOL/L (ref 94–109)
CO2 SERPL-SCNC: 22 MMOL/L (ref 20–32)
COLOR UR AUTO: YELLOW
CREAT SERPL-MCNC: 1.99 MG/DL (ref 0.52–1.04)
DIFFERENTIAL METHOD BLD: ABNORMAL
EOSINOPHIL # BLD AUTO: 0 10E9/L (ref 0–0.7)
EOSINOPHIL NFR BLD AUTO: 0.1 %
ERYTHROCYTE [DISTWIDTH] IN BLOOD BY AUTOMATED COUNT: 14.8 % (ref 10–15)
GFR SERPL CREATININE-BSD FRML MDRD: 35 ML/MIN/{1.73_M2}
GLUCOSE SERPL-MCNC: 96 MG/DL (ref 70–99)
GLUCOSE UR STRIP-MCNC: NEGATIVE MG/DL
HCG UR QL: NEGATIVE
HCT VFR BLD AUTO: 37.2 % (ref 35–47)
HGB BLD-MCNC: 11.8 G/DL (ref 11.7–15.7)
HGB UR QL STRIP: ABNORMAL
HYALINE CASTS #/AREA URNS LPF: 2 /LPF (ref 0–2)
IMM GRANULOCYTES # BLD: 0.1 10E9/L (ref 0–0.4)
IMM GRANULOCYTES NFR BLD: 0.8 %
KETONES UR STRIP-MCNC: 10 MG/DL
LEUKOCYTE ESTERASE UR QL STRIP: NEGATIVE
LIPASE SERPL-CCNC: 248 U/L (ref 73–393)
LYMPHOCYTES # BLD AUTO: 0.4 10E9/L (ref 0.8–5.3)
LYMPHOCYTES NFR BLD AUTO: 4 %
MCH RBC QN AUTO: 26.5 PG (ref 26.5–33)
MCHC RBC AUTO-ENTMCNC: 31.7 G/DL (ref 31.5–36.5)
MCV RBC AUTO: 84 FL (ref 78–100)
MONOCYTES # BLD AUTO: 0.5 10E9/L (ref 0–1.3)
MONOCYTES NFR BLD AUTO: 4.4 %
MUCOUS THREADS #/AREA URNS LPF: PRESENT /LPF
NEUTROPHILS # BLD AUTO: 9.3 10E9/L (ref 1.6–8.3)
NEUTROPHILS NFR BLD AUTO: 90.5 %
NITRATE UR QL: NEGATIVE
NRBC # BLD AUTO: 0 10*3/UL
NRBC BLD AUTO-RTO: 0 /100
PH UR STRIP: 5.5 PH (ref 5–7)
PLATELET # BLD AUTO: 268 10E9/L (ref 150–450)
POTASSIUM SERPL-SCNC: 4.3 MMOL/L (ref 3.4–5.3)
PROT SERPL-MCNC: 6.9 G/DL (ref 6.8–8.8)
RBC # BLD AUTO: 4.45 10E12/L (ref 3.8–5.2)
RBC #/AREA URNS AUTO: 19 /HPF (ref 0–2)
SODIUM SERPL-SCNC: 137 MMOL/L (ref 133–144)
SOURCE: ABNORMAL
SP GR UR STRIP: 1.02 (ref 1–1.03)
SQUAMOUS #/AREA URNS AUTO: 8 /HPF (ref 0–1)
UROBILINOGEN UR STRIP-MCNC: 0 MG/DL (ref 0–2)
WBC # BLD AUTO: 10.3 10E9/L (ref 4–11)
WBC #/AREA URNS AUTO: 3 /HPF (ref 0–5)

## 2021-02-21 PROCEDURE — 81025 URINE PREGNANCY TEST: CPT | Performed by: EMERGENCY MEDICINE

## 2021-02-21 PROCEDURE — 80053 COMPREHEN METABOLIC PANEL: CPT | Performed by: EMERGENCY MEDICINE

## 2021-02-21 PROCEDURE — 99283 EMERGENCY DEPT VISIT LOW MDM: CPT

## 2021-02-21 PROCEDURE — 85025 COMPLETE CBC W/AUTO DIFF WBC: CPT | Performed by: EMERGENCY MEDICINE

## 2021-02-21 PROCEDURE — 81001 URINALYSIS AUTO W/SCOPE: CPT | Performed by: EMERGENCY MEDICINE

## 2021-02-21 PROCEDURE — 83690 ASSAY OF LIPASE: CPT | Performed by: EMERGENCY MEDICINE

## 2021-02-21 ASSESSMENT — ENCOUNTER SYMPTOMS
FREQUENCY: 1
SHORTNESS OF BREATH: 0
DYSURIA: 0
COUGH: 0
BACK PAIN: 1
FEVER: 0
ABDOMINAL PAIN: 1
FLANK PAIN: 1
ABDOMINAL DISTENTION: 1

## 2021-02-21 ASSESSMENT — MIFFLIN-ST. JEOR: SCORE: 1810.95

## 2021-02-22 DIAGNOSIS — I77.82 ANCA-ASSOCIATED VASCULITIS (H): Primary | ICD-10-CM

## 2021-02-22 RX ORDER — MUPIROCIN 20 MG/G
OINTMENT TOPICAL
Qty: 1 G | Refills: 0 | Status: SHIPPED | OUTPATIENT
Start: 2021-02-22 | End: 2021-05-04

## 2021-02-22 NOTE — ED NOTES
"Pt stated she was leaving to go to another hospital. Risks were reviewed with patient and she responded \"Yep, I'm going to [place] in [place]\". Declination form signed.  "

## 2021-02-22 NOTE — ED TRIAGE NOTES
"Patient states she is having low abdominal pain and \"kidney pain.\" She is also \"feeling pulsing\" in her lower abdomen. Her last period was two months ago, she states she is on birth control.   "

## 2021-02-22 NOTE — PROGRESS NOTES
"Rheumatology Clinic Visit-in person     Cande Shields MRN# [id number]   YOB: 2000 Age: 20 year old     Date of Visit: 02/23/2021  Primary care provider: Trudy Faye          Assessment and Plan:     # Granulomatous polyangiitis with renal, upper respiratory, musculoskeletal, and cutaneous involvement;  flare in December 2020 with rash, bloody nasal discharge, and rising creatinine: Constitutional symptoms, skin rash on the legs and elbows, shortness of breath, and frequency and severity of bloody nasal discharge have greatly improved since 1 month ago. Physical exam today is remarkable for 18# weight loss, normal vitals, normal nose and throat exam;  papules over the extensor surfaces of both elbows have faded; fading erythematous papules and macules on both thighs and legs are present but are less intense. Laboratory evaluation on February 21 showed basic metabolic panel remarkable for creatinine of 1.99, unchanged from the end of January.  Albumin was 3.9 total protein was 6.9.  Transaminases were normal.  CBC showed white count of 10.3 hematocrit of 37.2.  Urinalysis showed 19 RBCs per high-powered field with 100+ protein.  Right elbow skin punch biopsy performed on January 31 showed \"perforating disorder\" such as reactive perforating collagenosis.    Patient has now completed induction therapy for GPA flare, started in late  with 1 mg/kg/day prednisone, and 4 doses of rituximab 375 mg/m  completed in late January 2021. Followup pulse methylprednisolone and Cytoxan 500 mg/m2 were given on 1- in response to rising creatinine and active urinary sediment.    Overall control of systemic inflammatory response and ANCA vasculitis flare has improved with addition of cyclophosphamide. Unfortunately, glomerular filtration rate has not improved signficantly since late January just before cytoxan infusion. We discussed high risk of chronicity/irreversibility of reduced kidney function, " "and my agreement with plans to reevaluate further cyclophosphamide after the next infusion on March 2 if kidney function does not improve. Measures to prevent steroid effects on bone density are appropriate. Gynecology has provided recommendations for measures to preserve ovarian function in the face of Cytoxan treatment.    # Plantar fasciitis, R: stable. Consider R foot imaging for stress fracture/ligamentous injury prn no improvement over next few weeks.  Continue to rest the right foot with respect to high impact weightbearing exercise for the next 1 month.    #Ongoing nasal crusting and intermittent bloody nasal discharge: I appreciate Dr. Vergara's evaluation and agree with recommendations for topical therapy and antibiotics.    Plan:  1. Second dose of Cytoxan as planned in early March. Reevaluate need for further immunotherapy based on renal function response in 2 to 3 weeks after Cytoxan dose.  2. Continue prednisone taper as directed by nephrology-10 mg/day each week. Do not taper below 10 mg daily before consulting with nephrology or rheumatology.  3. Vitamin supplementation: CaCO3 600 meq twice a day (3 TUMS twice a day); Vitamin D 800 international units daily (2 multivitamins daily). Agree with monthly pentamidine for PJP prophylaxis while on prednisone greater than 20 mg daily.  4. Recheck kidney function, urinalysis, urine protein, inflammation markers, blood counts 14 days after next Cytoxan infusion.  5. Discuss wisdom of using \"keto diet\" for weight management in the context of chronic kidney disease with nephrology. Continue to limit extra salt in the diet.    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 Last seen 1-.     Background: Granulomatosis with polyangitis dx at age 10, with PR3 positivitiy, initially treated with Methotrexate, Steroids, Rituximab infusions Q6-8 months until about 2012, since then had been in remission.    On December 26 2020, she was admitted to [facility] for for myalgias, arthralgias, pedal edema and a petechial non blanching palpable purpura rash involving her thighs, legs, elbows, chin and neck (vasculitis). She had proteinuria on UA, lung nodule on CXR, elevated inflammatory markers.  Although COVID-19 infection was also present, a flare of granulomatous polyangiitis was diagnosed.  The patient was started on Solu-Medrol 1 mg/kg/day.  She was also begun on induction protocol with rituximab 375 mg/m  IV 4 doses, given on a weekly schedule.  She improved, and was discharged on December 30, 2020 on high-dose prednisone.    Interval history 02-:    She is feeling well. Good energy level  Doing cardio, situps, jumping jacks at home.  She notes R foot pain for the past several weeks; made worse with high impact weight bearing. Pain is in the foot arch and in the pad.    After the last visit in January, creatinine returned unexpectedly elevated at 2.  Dr. Chang and the renal team recommended immediate hospitalization for possible ANCA vasculitis flare unresponsive to rituximab and prednisone.  Patient was hospitalized for steroid pulse and received Cytoxan 500 mg/m  on January 31.  She tolerated medications well and was discharged in early February.    Patient was seen in the  "emergency room on February 21, 2021 for right foot pain.  Impression was of probable plantar fasciitis; symptomatic Rx was recommended.  Pregnancy test was negative.  Left flank pain was judged mild; urinalysis showed mild hematuria, and patient was discharged with plans to monitor.    She tapered prednisone from 50 to 40 5 days ago. She will taper by 10 mg each week per Renal.      Interval history 01-:    She had more bumps on her feet,a nd her joints started to ache when she went down to 40 mg prednisone after a few days.    She notes tremors and shakinesss in her hands, and her legs are swollen on prednisone.    Skin on her legs has cleared up. \"bumps\" on her elbows persist, no larger.    She is breathless with minimal exertion, like climbing stairs. She notes hot flashes, she sometiems has sweats as well, sudden onset after being cold.    She continues to cough; there is frequently flecks of blood, usually dark.    Her nose is dry despite use of a humidifier. She also has had a bloody nose daily for the last 10 days.            Review of Systems:       Constitutional: negative  Skin: negative  Eyes: negative  Ears/Nose/Throat: negative  Respiratory: No shortness of breath, dyspnea on exertion, cough, or hemoptysis  Cardiovascular: negative  Gastrointestinal: negative  Genitourinary: negative  Musculoskeletal: negative  Neurologic: negative  Psychiatric: negative  Hematologic/Lymphatic/Immunologic: negative  Endocrine: negative          Past Medical History:     Past Medical History:   Diagnosis Date     ADHD (attention deficit hyperactivity disorder)      Wegener's disease, pulmonary (H) 2010    renal biopdy     Past Surgical History:   Procedure Laterality Date     ENT SURGERY  2000    cyst     EXCISE GANGLION WRIST  2009     # ANCA vasculitis:  GPA flare, treated with 1 mg/kg/day prednisone, and 4 doses of rituximab 375 mg/m  completed in late January 2021. Followup 3-day pulse " methylprednisolone and Cytoxan 500 mg/m2 were given on 1- in response to rising creatinine and active urinary sediment. Creatinine stable at ~ 2 as of February 23, 2021.         Social History:     Social History     Occupational History     Not on file   Tobacco Use     Smoking status: Current Every Day Smoker     Smokeless tobacco: Never Used     Tobacco comment: currently vaping   Substance and Sexual Activity     Alcohol use: Not Currently     Frequency: 2-3 times a week     Drug use: No     Sexual activity: Never            Family History:     Family History   Problem Relation Age of Onset     Thyroid Disease Mother      Asthma No family hx of      Diabetes No family hx of             Allergies:     Allergies   Allergen Reactions     Penicillins Rash     Unknown, but think rash.  Tolerated cephalexin (12/27/20), cefpodoxime (12/27/20)            Medications:     Current Outpatient Medications   Medication Sig Dispense Refill     mupirocin (BACTROBAN) 2 % external ointment Apply a small amount to left nasal 2 times a day for 14 days 1 g 0     predniSONE (DELTASONE) 20 MG tablet Take 3.5 tablets (70 mg) by mouth daily for 14 days (Patient taking differently: Take 70 mg by mouth daily Patient taking 40mg per day) 49 tablet 0     sodium chloride (OCEAN) 0.65 % nasal spray Spray 2-3 sprays into both nostrils twice daily as needed for nasal dryness       traZODone (DESYREL) 50 MG tablet Take 1 tablet (50 mg) by mouth At Bedtime for 21 doses 21 tablet 0     acetaminophen (TYLENOL) 325 MG tablet Take 2 tablets (650 mg) by mouth every 4 hours as needed for mild pain (Patient not taking: Reported on 1/26/2021) 1 Bottle 3     Calcium Carb-Cholecalciferol (HM CALCIUM-VITAMIN D) 600-800 MG-UNIT TABS Take 1 tablet by mouth daily (Patient not taking: Reported on 1/26/2021) 30 tablet 3     traZODone (DESYREL) 50 MG tablet Take 1 tablet (50 mg) by mouth nightly as needed for sleep (Patient not taking: Reported on  2/23/2021) 1 tablet 0            Physical Exam:   Blood pressure 116/79, pulse 105, temperature 98  F (36.7  C), weight 106.6 kg (235 lb), SpO2 96 %, not currently breastfeeding.  Wt Readings from Last 4 Encounters:   02/23/21 106.6 kg (235 lb)   02/21/21 106.6 kg (235 lb)   02/19/21 108.4 kg (239 lb)   02/01/21 114.9 kg (253 lb 4.9 oz)       Constitutional: well-developed, appearing stated age; cooperative  Eyes: nl EOM, PERRLA, vision, conjunctiva, sclera  ENT: nl external ears, nose, hearing, lips, teeth, gums, throat  No mucous membrane lesions, normal saliva pool  Neck: no mass or thyroid enlargement  Resp: lungs clear to auscultation, nl to palpation  CV: RRR, no murmurs, rubs or gallops, no edema  GI: no ABD mass or tenderness, no HSM  Lymph: no cervical, supraclavicular, inguinal or epitrochlear nodes  MS: The TMJ, neck, shoulder, elbow, wrist, MCP/PIP/DIP, spine, hip, knee were examined and found normal. No active synovitis or altered joint anatomy. Full joint ROM. Normal  strength. No dactylitis,  tenosynovitis, enthesopathy.  Skin: Fading, poorly marginated purplish 1 to 2 mm macules over thighs and legs; firm 1 to 2 mm papules grouped over both extensor surfaces of the elbows.  Neuro: nl cranial nerves  Psych: nl judgement, orientation, memory, affect.         Data:     No results found for any visits on 02/23/21.  RHEUM RESULTS Latest Ref Rng & Units 2/1/2021 2/12/2021 2/21/2021   COMPLEMENT C3 81 - 157 mg/dL - - -   COMPLEMENT C4 13 - 39 mg/dL - - -   SED RATE 0 - 20 mm/h - - -   CRP, INFLAMMATION 0.0 - 8.0 mg/L - - -   CK TOTAL 30 - 225 U/L - - -   AST 0 - 45 U/L - - 7   ALT 0 - 50 U/L - - 35   ALBUMIN 3.4 - 5.0 g/dL - 3.8 3.9   WBC 4.0 - 11.0 10e9/L 15.7(H) 11.9(H) 10.3   RBC 3.8 - 5.2 10e12/L 3.85 4.42 4.45   HGB 11.7 - 15.7 g/dL 10.3(L) 12.2 11.8   HCT 35.0 - 47.0 % 33.4(L) 36.6 37.2   MCV 78 - 100 fl 87 83 84   MCHC 31.5 - 36.5 g/dL 30.8(L) 33.3 31.7   RDW 10.0 - 15.0 % 13.6 14.6 14.8   PLT  150 - 450 10e9/L 219 247 268   CREATININE 0.52 - 1.04 mg/dL 2.00(H) 1.89(H) 1.99(H)   GFR ESTIMATE, IF BLACK >60 mL/min/[1.73:m2] 40(L) 43(L) 40(L)   GFR ESTIMATE >60 mL/min/[1.73:m2] 34(L) 37(L) 35(L)   HEPATITIS C ANTIBODY NR:Nonreactive - - -        ,  ,  ,  ,  ,  ,   CESARIO interpretation   Date Value Ref Range Status   12/28/2020 Negative NEG^Negative Final     Comment:                                        Reference range:  <1:40  NEGATIVE  1:40 - 1:80  BORDERLINE POSITIVE  >1:80 POSITIVE       ,  ,  ,  ,  ,  ,  ,  ,  ,   Hep B Surface Agn   Date Value Ref Range Status   12/28/2020 Nonreactive NR^Nonreactive Final   ,  ,  ,  ,  ,  ,  ,  ,  ,  ,  ,   Neutrophil Cytoplasmic IgG Antibody   Date Value Ref Range Status   02/22/2011 1:160  Final     Comment:     Reference range: <1:20  (Note)  Cytoplasmic ANCA (c-ANCA) staining pattern observed. No  perinuclear ANCA (p-ANCA) pattern seen. Presence of p-ANCA  ruled out on formalin-fixed neutrophils.  TEST INFORMATION: Anti-Neutrophil Cyto Ab, IgG    Neutrophil Cytoplasmic Antibodies (C-ANCA = granular  cytoplasmic staining, P-ANCA = perinuclear staining) are  found in the serum of over 90 percent of patients with  certain necrotizing systemic vasculitides, and usually in  less than 5 percent of patients with collagen vascular  disease or arthritis.  Performed by [facility],  [address] [place] [id number] [phone]  [email or web address], Leonie Damon MD, Lab. Director                                                                           ,   Proteinase 3 Antibody IgG   Date Value Ref Range Status   12/28/2020 >8.0 (H) 0.0 - 0.9 AI Final     Comment:     Positive  Antibody index (AI) values reflect qualitative changes in antibody   concentration that cannot be directly associated with clinical condition or   disease state.       ,   Myeloperoxidase Antibody IgG   Date Value Ref Range Status   12/28/2020 <0.2 0.0 - 0.9 AI Final     Comment:      Negative  Antibody index (AI) values reflect qualitative changes in antibody   concentration that cannot be directly associated with clinical condition or   disease state.       ,   Neutrophil Cytoplasmic Antibody   Date Value Ref Range Status   12/28/2020 1:80 (A) <1:10 [titer] Final   ,   Neutrophil Cytoplasmic Antibody Pattern   Date Value Ref Range Status   12/28/2020   Final    Cytoplasmic ANCA (c-ANCA) staining pattern observed and confirmed on formalin-fixed   neutrophils.       ,   Serine Protease 3   Date Value Ref Range Status   02/22/2011 1684 (H)  Final     Comment:     Reference range: 0 to 19  Unit: AU/mL  (Note)  REFERENCE INTERVAL: Serine Protease 3, IgG     19 AU/mL or Less ........ Negative   20-25 AU/mL ............. Equivocal   26 AU/mL or Greater ..... Positive    Approximately 90% of patients with a P-ANCA pattern by IFA  have antibodies specific for MPO.    Approximately 85% of patients with a C-ANCA pattern by IFA  have antibodies specific for PR3.  Performed by [facility],  [address] [place] [id number] [phone]  [email or web address], Leonie Damon MD, Lab. Director                                                                                                                                                                                                                                    ,   Myeloperox Antibodyys   Date Value Ref Range Status   02/22/2011 0  Final     Comment:     Reference range: 0 to 19  Unit: AU/mL  (Note)  REFERENCE INTERVAL: Myeloperoxidase Abs, IgG     19 AU/mL or Less ......... Negative   20-25 AU/mL .............. Equivocal   26 AU/mL or Greater ...... Positive    Approximately 90% of patients with a P-ANCA pattern by IFA  have antibodies specific for MPO.    Approximately 85% of patients with a C-ANCA pattern by IFA  have antibodies specific for PR3.                                                                                                        ,   ,  ,  ,  ,  ,  ,  ,  ,  ,  ,  ,

## 2021-02-22 NOTE — ED PROVIDER NOTES
History   Chief Complaint:  Abdominal Pain and Flank Pain     The history is provided by the patient.      Cande Shields is a 20 year old female with history of Carolyn's disease and granulomatosis who presents with abdominal and flank pain. The patient tells us that she has an extensive kidney and lung history. The patient had a relapse her Carolyn's disease last month after julio césar the COVID-19 virus. This resulted in the patient having to stay in the hospital for a few days and starting chemotherapy and prednisone. Today, the patient complains mainly of abdominal pain that radiates to her lower right flank or back. In addition to the pain the patient notes hot flashes and an increase in her urinary frequency, but reports she has been drinking more water recently. She also complains of right foot pain that has been bothering her for several days. Patient denies fever, shortness of breath or a history of kidney stones.     To note, the patient has been receiving Depo-Provera shots regularly over the past few years. Her most recent was last month but, she has noticed mild spotting over the last few days.     The patient also started taking Desyrel, Prednisone and Calcium Cholecalciferol on February 1st following her discharge from the hospital.  Please see that discharge summary from Dr. Cooney for additional information.    Review of Systems   Constitutional: Negative for fever.   Respiratory: Negative for cough and shortness of breath.    Gastrointestinal: Positive for abdominal distention and abdominal pain.   Genitourinary: Positive for flank pain and frequency. Negative for dysuria.   Musculoskeletal: Positive for back pain.   All other systems reviewed and are negative.    Allergies:  Penicillins     Medications:  Tylenol  Deltasone  Desyrel     Past Medical History:    ADHD  Wegener's disease  Pulmonary infiltrates  Acute kidney injury  Obesity   ANCA-Associated vasculitis  Granulomatosis  Dysthymia    "    Past Surgical History:    Bridge of nose cystectomy   Cyst removal right wrist      Family History:    Thyroid disease     Social History:  Presents to the ED alone.   Had a miscarriage at 31 weeks.     Physical Exam     Patient Vitals for the past 24 hrs:   BP Temp Temp src Pulse Resp SpO2 Height Weight   02/21/21 2010 (!) 149/95 -- -- 71 -- 99 % -- --   02/21/21 1903 (!) 164/91 97.6  F (36.4  C) Temporal 92 16 95 % 1.626 m (5' 4\") 106.6 kg (235 lb)       Physical Exam    Physical Exam   General:  Sitting on bed.    HENT:  No obvious trauma to head  Right Ear:  External ear normal.   Left Ear:  External ear normal.   Nose:  Nose normal.   Eyes:  Conjunctivae and EOM are normal. Pupils are equal, round, and reactive.   Neck: Normal range of motion. Neck supple. No tracheal deviation present.   CV:  Normal heart sounds. No murmur heard.  Pulm/Chest: Effort normal and breath sounds normal.   Abd: Soft. No distension. Very mild left CVA tenderness.  No CVA tenderness on the right.  No focal abdominal tenderness throughout all 4 quadrants.  Negative Vaca's.  Negative McBurney's.  There is no rigidity, no rebound and no guarding.   M/S: Normal range of motion. No calf pain or swelling.  DP pulses +2 bilateral.  Cap refill on the right foot is less than 2 seconds.  No petechiae or purpura are appreciated to the bilateral lower extremities.  No erythema or warmth to the lower extremities.  No ulcer or lesions appreciated.  There is mild tenderness to palpation of the plantar surface on the right foot  Neuro: Alert. GCS 15.  Skin: Skin is warm and dry. No rash noted. Not diaphoretic.   Psych: Normal mood and affect. Behavior is normal.       Emergency Department Course     Laboratory:    CBC: WBC: 10.3, HGB: 11.8, PLT: 268    CMP: Glucose 96, Urea Nitrogen 44(H); Creatinine 1.99(H); GFR 35(L), o/w WNL    UA with microscopic: Ketones 10; Blood Moderate; Protein 100; RBC 19(H); Bacteria Few; Squamous Epithelial 8(H); " Mucous Present o/w WNL    Lipase - 248    HCG Qualitative Urine - Negative      Emergency Department Course:    Reviewed:  I reviewed nursing notes, vitals, past medical history and care everywhere.  I reviewed Dr. Cooney's discharge summary from February 1 of 2021.    Assessments:  1906 I obtained history and examined the patient as noted above.   2030 I rechecked the patient and explained findings.     Disposition:  The patient was discharged to home.       Impression & Plan     Medical Decision Making:  Cande Shields is a very pleasant 20 year old year old patient who presents to the emergency department with concern of 3 main concerns.  The patient does have a very complicated history and was recently discharged from the [facility] for Wegener's disease.  The patient comes in today mainly because she has right foot pain on the plantar surface of her foot.  There has been no trauma.  There is no erythema or warmth to suggest cellulitis.  There are no petechiae purpura to suggest worsening vasculitis.  The pain is worse with ambulation and pressing distal to the calcaneus area.  I suspect her symptoms are related to plantars fasciitis.  I do not believe x-rays are indicated.  She has no focal calf pain or swelling DVT is very unlikely.  The patient was concerned about this because she remembers when she was 10 years old she had foot pain and then had an illness.  I think this is simply just plantar fasciitis and we discussed home treatments for this.    The patient is also concerned that she had very scant spotting.  She has been on Depo-Provera in the past but received a Depo shot approximately 1 month ago.  The patient has not had any heavy bleeding.  She has not taken a home pregnancy test so one was performed here and was found to be negative; therefore, I doubt conditions such as ectopic pregnancy, etc.  She has no lower abdominal pain to suggest need for pelvic ultrasound on an emergent basis.   The spotting is likely the result of the Depo-Provera shot.    The patient also had very mild left flank pain.  There is no significant CVA tenderness and she has a completely benign abdominal exam.  The patient has known renal insufficiency from her Wegener's disease with a baseline creatinine around 2.  Today's creatinine is about the same.  Urinalysis did show some microscopic hematuria but is near her baseline.  I reviewed the risk and benefit of imaging and the patient agreed against this.  The patient already has a plan to follow-up with her rheumatologist on Tuesday and agrees to follow-up with her nephrologist as outpatient.  The patient is still on prednisone over a slow taper.  The patient feels safe and desires to go home.    The treatment plan was discussed with the patient and they expressed understanding of this plan and consented to the plan.  In addition, the patient will return to the emergency department if their symptoms persist, worsen, if new symptoms arise or if there is any concern as other pathology may be present that is not evident at this time. They also understand the importance of close follow up in the clinic and if unable to do so will return to the emergency department for a reevaluation. All questions were answered.    Covid-19  Cande Shields was evaluated during a global COVID-19 pandemic, which necessitated consideration that the patient might be at risk for infection with the SARS-CoV-2 virus that causes COVID-19.   Applicable protocols for evaluation were followed during the patient's care.     Diagnosis:    ICD-10-CM    1. Abdominal pain, unspecified abdominal location  R10.9    2. Plantar fasciitis  M72.2        Discharge Medications:  New Prescriptions    No medications on file       Scribe Disclosure:  Asad MENDES, am serving as a scribe at 7:06 PM on 2/21/2021 to document services personally performed by Derrell Santiago DO, based on my observations and the  provider's statements to me.            Derrell Santiago, DO  02/21/21 2048

## 2021-02-22 NOTE — ED TRIAGE NOTES
Patient arrives from home by private vehicle. Reports flank and abdominal pain since waking this morning. Hx of Wegener's Granulomatosis (treating with chemo). Reports vaginal spotting (receiving Depo shot). Endorsing sweats and feeling feverish.    Abdomen soft, non-tender, no guarding.

## 2021-02-22 NOTE — ED PROVIDER NOTES
"    [place] EMERGENCY DEPARTMENT ([facility])  February 21, 2021  History     Chief Complaint   Patient presents with     Flank Pain     HPI  Cande Shields is a 20 year old female with a past medical history significant for Granulomatosis with polyangitis, KATERINA 2/2 underlying vasculitis, HTN, ADHD, and obesity who presents here to the Emergency Department due to flank pain.  ***      PAST MEDICAL HISTORY:   Past Medical History:   Diagnosis Date     ADHD (attention deficit hyperactivity disorder)      Wegener's disease, pulmonary (H) 2010    renal biopdy       PAST SURGICAL HISTORY:   Past Surgical History:   Procedure Laterality Date     ENT SURGERY  2000    cyst     EXCISE GANGLION WRIST  2009       Past medical history, past surgical history, medications, and allergies were reviewed with the patient. Additional pertinent items: {NONE:943510::\"None\"}    FAMILY HISTORY:   Family History   Problem Relation Age of Onset     Thyroid Disease Mother      Asthma No family hx of      Diabetes No family hx of        SOCIAL HISTORY:   Social History     Tobacco Use     Smoking status: Current Every Day Smoker     Smokeless tobacco: Never Used     Tobacco comment: currently vaping   Substance Use Topics     Alcohol use: Not Currently     Frequency: 2-3 times a week     Social history was reviewed with the patient. Additional pertinent items: {NONE:824252::\"None\"}      Patient's Medications   New Prescriptions    No medications on file   Previous Medications    ACETAMINOPHEN (TYLENOL) 325 MG TABLET    Take 2 tablets (650 mg) by mouth every 4 hours as needed for mild pain    CALCIUM CARB-CHOLECALCIFEROL (HM CALCIUM-VITAMIN D) 600-800 MG-UNIT TABS    Take 1 tablet by mouth daily    PREDNISONE (DELTASONE) 20 MG TABLET    Take 3.5 tablets (70 mg) by mouth daily for 14 days    SODIUM CHLORIDE (OCEAN) 0.65 % NASAL SPRAY    Spray 2-3 sprays into both nostrils twice daily as needed for nasal dryness    TRAZODONE (DESYREL) " "50 MG TABLET    Take 1 tablet (50 mg) by mouth nightly as needed for sleep    TRAZODONE (DESYREL) 50 MG TABLET    Take 1 tablet (50 mg) by mouth At Bedtime for 21 doses   Modified Medications    No medications on file   Discontinued Medications    No medications on file          Allergies   Allergen Reactions     Penicillins Rash     Unknown, but think rash.  Tolerated cephalexin (12/27/20), cefpodoxime (12/27/20)        Review of Systems  {Complete vs limited ROS:520026::\"A complete review of systems was performed with pertinent positives and negatives noted in the HPI, and all other systems negative.\"}    Physical Exam          Physical Exam    ED Course        Procedures        {EKG done?:657659::\" \"}    {ed addendum:710274::\" \"}  {trauma activation or Fall?:442754::\" \"}  {Sepsis/Septic Shock/Stemi/Stroke:965630::\" \"}       No results found for this or any previous visit (from the past 24 hour(s)).  Medications - No data to display          Assessments & Plan (with Medical Decision Making)   ***    I have reviewed the nursing notes.    I have reviewed the findings, diagnosis, plan and need for follow up with the patient.    New Prescriptions    No medications on file       Final diagnoses:   None       2/21/2021   [facility] EMERGENCY DEPARTMENT  "

## 2021-02-23 ENCOUNTER — OFFICE VISIT (OUTPATIENT)
Dept: RHEUMATOLOGY | Facility: CLINIC | Age: 21
End: 2021-02-23
Attending: INTERNAL MEDICINE
Payer: COMMERCIAL

## 2021-02-23 VITALS
SYSTOLIC BLOOD PRESSURE: 116 MMHG | WEIGHT: 235 LBS | HEART RATE: 105 BPM | OXYGEN SATURATION: 96 % | BODY MASS INDEX: 40.34 KG/M2 | TEMPERATURE: 98 F | DIASTOLIC BLOOD PRESSURE: 79 MMHG

## 2021-02-23 DIAGNOSIS — M31.30 GRANULOMATOSIS WITH POLYANGIITIS, UNSPECIFIED WHETHER RENAL INVOLVEMENT (H): Primary | ICD-10-CM

## 2021-02-23 PROCEDURE — G0463 HOSPITAL OUTPT CLINIC VISIT: HCPCS

## 2021-02-23 PROCEDURE — 99215 OFFICE O/P EST HI 40 MIN: CPT | Performed by: INTERNAL MEDICINE

## 2021-02-23 NOTE — PATIENT INSTRUCTIONS
"Diagnosis:  1. ANCA associated vasculitis with flare involving upper respiratory tract kidney, and skin: Rashes have improved, frequency of bloody nasal discharge has decreased, and kidney function is stable, although significantly reduced compared with Fall 2020. I agree with using another dose of Cytoxan to consolidate gains made against the vasculitis, and with a plan to reevaluate need for more Cytoxan based on kidney function measurements.  2. Right foot pain: Examination is consistent with a diagnosis of plantar fasciitis. I expect gradual improvement over weeks to months. I recommend reducing high-impact weightbearing activity for 1 month in order to hasten healing. Continue to use custom shoe inserts.  3. Nasal crusting and reported nasal mucosal ulceration: I agree with use of antibiotic and topical treatments per Dr. Vergara.  4. Chronic prednisone use: I recommend use of calcium and vitamin D supplements to help protect bones from prednisone induced leaching of calcium.    Plan:  1. Second dose of Cytoxan as planned in early March. Reevaluate need for further immunotherapy based on renal function response in 2 to 3 weeks after Cytoxan dose.  2. Continue prednisone taper as directed by nephrology-10 mg/day each week. Do not taper below 10 mg daily before consulting with nephrology or rheumatology.  3. Vitamin supplementation: CaCO3 600 meq twice a day (3 TUMS twice a day); Vitamin D 800 international units daily (2 multivitamins daily)  4. Recheck kidney function, urinalysis, urine protein, inflammation markers, blood counts 14 days after next Cytoxan infusion.  5. Discuss wisdom of using \"keto diet\" for weight management in the context of chronic kidney disease with nephrology. Continue to limit extra salt in the diet.  "

## 2021-02-23 NOTE — LETTER
"2/23/2021       RE: Cande Shields  [address] [place] [id number]     Dear Colleague,    Thank you for referring your patient, Cande Shields, to the [facility] [place] at [facility]. Please see a copy of my visit note below.    Rheumatology Clinic Visit-in person     Cande Shields MRN# [id number]   YOB: 2000 Age: 20 year old     Date of Visit: 02/23/2021  Primary care provider: Trudy Faye          Assessment and Plan:     # Granulomatous polyangiitis with renal, upper respiratory, musculoskeletal, and cutaneous involvement;  flare in December 2020 with rash, bloody nasal discharge, and rising creatinine: Constitutional symptoms, skin rash on the legs and elbows, shortness of breath, and frequency and severity of bloody nasal discharge have greatly improved since 1 month ago. Physical exam today is remarkable for 18# weight loss, normal vitals, normal nose and throat exam;  papules over the extensor surfaces of both elbows have faded; fading erythematous papules and macules on both thighs and legs are present but are less intense. Laboratory evaluation on February 21 showed basic metabolic panel remarkable for creatinine of 1.99, unchanged from the end of January.  Albumin was 3.9 total protein was 6.9.  Transaminases were normal.  CBC showed white count of 10.3 hematocrit of 37.2.  Urinalysis showed 19 RBCs per high-powered field with 100+ protein.  Right elbow skin punch biopsy performed on January 31 showed \"perforating disorder\" such as reactive perforating collagenosis.    Patient has now completed induction therapy for GPA flare, started in late  with 1 mg/kg/day prednisone, and 4 doses of rituximab 375 mg/m  completed in late January 2021. Followup pulse methylprednisolone and Cytoxan 500 mg/m2 were given on 1- in response to rising creatinine and active urinary sediment.    Overall " "control of systemic inflammatory response and ANCA vasculitis flare has improved with addition of cyclophosphamide. Unfortunately, glomerular filtration rate has not improved signficantly since late January just before cytoxan infusion. We discussed high risk of chronicity/irreversibility of reduced kidney function, and my agreement with plans to reevaluate further cyclophosphamide after the next infusion on March 2 if kidney function does not improve. Measures to prevent steroid effects on bone density are appropriate. Gynecology has provided recommendations for measures to preserve ovarian function in the face of Cytoxan treatment.    # Plantar fasciitis, R: stable. Consider R foot imaging for stress fracture/ligamentous injury prn no improvement over next few weeks.  Continue to rest the right foot with respect to high impact weightbearing exercise for the next 1 month.    #Ongoing nasal crusting and intermittent bloody nasal discharge: I appreciate Dr. Vergara's evaluation and agree with recommendations for topical therapy and antibiotics.    Plan:  1. Second dose of Cytoxan as planned in early March. Reevaluate need for further immunotherapy based on renal function response in 2 to 3 weeks after Cytoxan dose.  2. Continue prednisone taper as directed by nephrology-10 mg/day each week. Do not taper below 10 mg daily before consulting with nephrology or rheumatology.  3. Vitamin supplementation: CaCO3 600 meq twice a day (3 TUMS twice a day); Vitamin D 800 international units daily (2 multivitamins daily). Agree with monthly pentamidine for PJP prophylaxis while on prednisone greater than 20 mg daily.  4. Recheck kidney function, urinalysis, urine protein, inflammation markers, blood counts 14 days after next Cytoxan infusion.  5. Discuss wisdom of using \"keto diet\" for weight management in the context of chronic kidney disease with nephrology. Continue to limit extra salt in the diet.    RTC 6 weeks    Ramses KNUTSON" MD Jakub  Staff Rheumatologist, [facility]              Active Problem List:     Patient Active Problem List    Diagnosis Date Noted     Pulmonary infiltrates 01/28/2021     Priority: Medium     Acute kidney injury (H) 01/28/2021     Priority: Medium     Morbid obesity (H) 01/26/2021     Priority: Medium     ANCA-associated vasculitis (H) 12/31/2020     Priority: Medium     Myalgia 12/27/2020     Priority: Medium     Granulomatosis with polyangiitis, unspecified whether renal involvement (H) 12/27/2020     Priority: Medium     Wegener's granulomatosis (H) 10/04/2011     Priority: Medium            History of Present Illness:   Cande Shields presents for evaluation post hospital discharge. Last seen 1-.     Background: Granulomatosis with polyangitis dx at age 10, with PR3 positivitiy, initially treated with Methotrexate, Steroids, Rituximab infusions Q6-8 months until about 2012, since then had been in remission.    On December 26 2020, she was admitted to [facility] for for myalgias, arthralgias, pedal edema and a petechial non blanching palpable purpura rash involving her thighs, legs, elbows, chin and neck (vasculitis). She had proteinuria on UA, lung nodule on CXR, elevated inflammatory markers.  Although COVID-19 infection was also present, a flare of granulomatous polyangiitis was diagnosed.  The patient was started on Solu-Medrol 1 mg/kg/day.  She was also begun on induction protocol with rituximab 375 mg/m  IV 4 doses, given on a weekly schedule.  She improved, and was discharged on December 30, 2020 on high-dose prednisone.    Interval history 02-:    She is feeling well. Good energy level  Doing cardio, situps, jumping jacks at home.  She notes R foot pain for the past several weeks; made worse with high impact weight bearing. Pain is in the foot arch and in the pad.    After the last visit in January, creatinine returned unexpectedly elevated at 2.  Dr. Chang and the renal  "team recommended immediate hospitalization for possible ANCA vasculitis flare unresponsive to rituximab and prednisone.  Patient was hospitalized for steroid pulse and received Cytoxan 500 mg/m  on January 31.  She tolerated medications well and was discharged in early February.    Patient was seen in the emergency room on February 21, 2021 for right foot pain.  Impression was of probable plantar fasciitis; symptomatic Rx was recommended.  Pregnancy test was negative.  Left flank pain was judged mild; urinalysis showed mild hematuria, and patient was discharged with plans to monitor.    She tapered prednisone from 50 to 40 5 days ago. She will taper by 10 mg each week per Renal.      Interval history 01-:    She had more bumps on her feet,a nd her joints started to ache when she went down to 40 mg prednisone after a few days.    She notes tremors and shakinesss in her hands, and her legs are swollen on prednisone.    Skin on her legs has cleared up. \"bumps\" on her elbows persist, no larger.    She is breathless with minimal exertion, like climbing stairs. She notes hot flashes, she sometiems has sweats as well, sudden onset after being cold.    She continues to cough; there is frequently flecks of blood, usually dark.    Her nose is dry despite use of a humidifier. She also has had a bloody nose daily for the last 10 days.            Review of Systems:       Constitutional: negative  Skin: negative  Eyes: negative  Ears/Nose/Throat: negative  Respiratory: No shortness of breath, dyspnea on exertion, cough, or hemoptysis  Cardiovascular: negative  Gastrointestinal: negative  Genitourinary: negative  Musculoskeletal: negative  Neurologic: negative  Psychiatric: negative  Hematologic/Lymphatic/Immunologic: negative  Endocrine: negative          Past Medical History:     Past Medical History:   Diagnosis Date     ADHD (attention deficit hyperactivity disorder)      Wegener's disease, pulmonary (H) 2010    renal " biopdy     Past Surgical History:   Procedure Laterality Date     ENT SURGERY  2000    cyst     EXCISE GANGLION WRIST  2009     # ANCA vasculitis:  GPA flare, treated with 1 mg/kg/day prednisone, and 4 doses of rituximab 375 mg/m  completed in late January 2021. Followup 3-day pulse methylprednisolone and Cytoxan 500 mg/m2 were given on 1- in response to rising creatinine and active urinary sediment. Creatinine stable at ~ 2 as of February 23, 2021.         Social History:     Social History     Occupational History     Not on file   Tobacco Use     Smoking status: Current Every Day Smoker     Smokeless tobacco: Never Used     Tobacco comment: currently vaping   Substance and Sexual Activity     Alcohol use: Not Currently     Frequency: 2-3 times a week     Drug use: No     Sexual activity: Never            Family History:     Family History   Problem Relation Age of Onset     Thyroid Disease Mother      Asthma No family hx of      Diabetes No family hx of             Allergies:     Allergies   Allergen Reactions     Penicillins Rash     Unknown, but think rash.  Tolerated cephalexin (12/27/20), cefpodoxime (12/27/20)            Medications:     Current Outpatient Medications   Medication Sig Dispense Refill     mupirocin (BACTROBAN) 2 % external ointment Apply a small amount to left nasal 2 times a day for 14 days 1 g 0     predniSONE (DELTASONE) 20 MG tablet Take 3.5 tablets (70 mg) by mouth daily for 14 days (Patient taking differently: Take 70 mg by mouth daily Patient taking 40mg per day) 49 tablet 0     sodium chloride (OCEAN) 0.65 % nasal spray Spray 2-3 sprays into both nostrils twice daily as needed for nasal dryness       traZODone (DESYREL) 50 MG tablet Take 1 tablet (50 mg) by mouth At Bedtime for 21 doses 21 tablet 0     acetaminophen (TYLENOL) 325 MG tablet Take 2 tablets (650 mg) by mouth every 4 hours as needed for mild pain (Patient not taking: Reported on 1/26/2021) 1 Bottle 3      Calcium Carb-Cholecalciferol ( CALCIUM-VITAMIN D) 600-800 MG-UNIT TABS Take 1 tablet by mouth daily (Patient not taking: Reported on 1/26/2021) 30 tablet 3     traZODone (DESYREL) 50 MG tablet Take 1 tablet (50 mg) by mouth nightly as needed for sleep (Patient not taking: Reported on 2/23/2021) 1 tablet 0            Physical Exam:   Blood pressure 116/79, pulse 105, temperature 98  F (36.7  C), weight 106.6 kg (235 lb), SpO2 96 %, not currently breastfeeding.  Wt Readings from Last 4 Encounters:   02/23/21 106.6 kg (235 lb)   02/21/21 106.6 kg (235 lb)   02/19/21 108.4 kg (239 lb)   02/01/21 114.9 kg (253 lb 4.9 oz)       Constitutional: well-developed, appearing stated age; cooperative  Eyes: nl EOM, PERRLA, vision, conjunctiva, sclera  ENT: nl external ears, nose, hearing, lips, teeth, gums, throat  No mucous membrane lesions, normal saliva pool  Neck: no mass or thyroid enlargement  Resp: lungs clear to auscultation, nl to palpation  CV: RRR, no murmurs, rubs or gallops, no edema  GI: no ABD mass or tenderness, no HSM  Lymph: no cervical, supraclavicular, inguinal or epitrochlear nodes  MS: The TMJ, neck, shoulder, elbow, wrist, MCP/PIP/DIP, spine, hip, knee were examined and found normal. No active synovitis or altered joint anatomy. Full joint ROM. Normal  strength. No dactylitis,  tenosynovitis, enthesopathy.  Skin: Fading, poorly marginated purplish 1 to 2 mm macules over thighs and legs; firm 1 to 2 mm papules grouped over both extensor surfaces of the elbows.  Neuro: nl cranial nerves  Psych: nl judgement, orientation, memory, affect.         Data:     No results found for any visits on 02/23/21.  RHEUM RESULTS Latest Ref Rng & Units 2/1/2021 2/12/2021 2/21/2021   COMPLEMENT C3 81 - 157 mg/dL - - -   COMPLEMENT C4 13 - 39 mg/dL - - -   SED RATE 0 - 20 mm/h - - -   CRP, INFLAMMATION 0.0 - 8.0 mg/L - - -   CK TOTAL 30 - 225 U/L - - -   AST 0 - 45 U/L - - 7   ALT 0 - 50 U/L - - 35   ALBUMIN 3.4 - 5.0  g/dL - 3.8 3.9   WBC 4.0 - 11.0 10e9/L 15.7(H) 11.9(H) 10.3   RBC 3.8 - 5.2 10e12/L 3.85 4.42 4.45   HGB 11.7 - 15.7 g/dL 10.3(L) 12.2 11.8   HCT 35.0 - 47.0 % 33.4(L) 36.6 37.2   MCV 78 - 100 fl 87 83 84   MCHC 31.5 - 36.5 g/dL 30.8(L) 33.3 31.7   RDW 10.0 - 15.0 % 13.6 14.6 14.8    - 450 10e9/L 219 247 268   CREATININE 0.52 - 1.04 mg/dL 2.00(H) 1.89(H) 1.99(H)   GFR ESTIMATE, IF BLACK >60 mL/min/[1.73:m2] 40(L) 43(L) 40(L)   GFR ESTIMATE >60 mL/min/[1.73:m2] 34(L) 37(L) 35(L)   HEPATITIS C ANTIBODY NR:Nonreactive - - -        ,  ,  ,  ,  ,  ,   CESARIO interpretation   Date Value Ref Range Status   12/28/2020 Negative NEG^Negative Final     Comment:                                        Reference range:  <1:40  NEGATIVE  1:40 - 1:80  BORDERLINE POSITIVE  >1:80 POSITIVE       ,  ,  ,  ,  ,  ,  ,  ,  ,   Hep B Surface Agn   Date Value Ref Range Status   12/28/2020 Nonreactive NR^Nonreactive Final   ,  ,  ,  ,  ,  ,  ,  ,  ,  ,  ,   Neutrophil Cytoplasmic IgG Antibody   Date Value Ref Range Status   02/22/2011 1:160  Final     Comment:     Reference range: <1:20  (Note)  Cytoplasmic ANCA (c-ANCA) staining pattern observed. No  perinuclear ANCA (p-ANCA) pattern seen. Presence of p-ANCA  ruled out on formalin-fixed neutrophils.  TEST INFORMATION: Anti-Neutrophil Cyto Ab, IgG    Neutrophil Cytoplasmic Antibodies (C-ANCA = granular  cytoplasmic staining, P-ANCA = perinuclear staining) are  found in the serum of over 90 percent of patients with  certain necrotizing systemic vasculitides, and usually in  less than 5 percent of patients with collagen vascular  disease or arthritis.  Performed by [facility],  [address] [place] [address] [phone]  [email or web address], Leonie Damon MD, Lab. Director                                                                           ,   Proteinase 3 Antibody IgG   Date Value Ref Range Status   12/28/2020 >8.0 (H) 0.0 - 0.9 AI Final     Comment:     Positive  Antibody  index (AI) values reflect qualitative changes in antibody   concentration that cannot be directly associated with clinical condition or   disease state.       ,   Myeloperoxidase Antibody IgG   Date Value Ref Range Status   12/28/2020 <0.2 0.0 - 0.9 AI Final     Comment:     Negative  Antibody index (AI) values reflect qualitative changes in antibody   concentration that cannot be directly associated with clinical condition or   disease state.       ,   Neutrophil Cytoplasmic Antibody   Date Value Ref Range Status   12/28/2020 1:80 (A) <1:10 [titer] Final   ,   Neutrophil Cytoplasmic Antibody Pattern   Date Value Ref Range Status   12/28/2020   Final    Cytoplasmic ANCA (c-ANCA) staining pattern observed and confirmed on formalin-fixed   neutrophils.       ,   Serine Protease 3   Date Value Ref Range Status   02/22/2011 1684 (H)  Final     Comment:     Reference range: 0 to 19  Unit: AU/mL  (Note)  REFERENCE INTERVAL: Serine Protease 3, IgG     19 AU/mL or Less ........ Negative   20-25 AU/mL ............. Equivocal   26 AU/mL or Greater ..... Positive    Approximately 90% of patients with a P-ANCA pattern by IFA  have antibodies specific for MPO.    Approximately 85% of patients with a C-ANCA pattern by IFA  have antibodies specific for PR3.  Performed by [facility],  [address] [place] [id number] [phone]  [email or web address], Leonie Damon MD, Lab. Director                                                                                                                                                                                                                                    ,   Myeloperox Antibodyys   Date Value Ref Range Status   02/22/2011 0  Final     Comment:     Reference range: 0 to 19  Unit: AU/mL  (Note)  REFERENCE INTERVAL: Myeloperoxidase Abs, IgG     19 AU/mL or Less ......... Negative   20-25 AU/mL .............. Equivocal   26 AU/mL or Greater ...... Positive    Approximately 90%  of patients with a P-ANCA pattern by IFA  have antibodies specific for MPO.    Approximately 85% of patients with a C-ANCA pattern by IFA  have antibodies specific for PR3.                                                                                                            Again, thank you for allowing me to participate in the care of your patient.      Sincerely,    Ramses Call MD

## 2021-02-23 NOTE — NURSING NOTE
Chief Complaint   Patient presents with     RECHECK     4 week follow up     /79 (BP Location: Right arm, Patient Position: Sitting, Cuff Size: Adult Regular)   Pulse 105   Temp 98  F (36.7  C)   Wt 106.6 kg (235 lb)   LMP  (LMP Unknown)   SpO2 96%   BMI 40.34 kg/m        Cyrus Perez, EMT

## 2021-03-03 ENCOUNTER — TELEPHONE (OUTPATIENT)
Dept: RHEUMATOLOGY | Facility: CLINIC | Age: 21
End: 2021-03-03

## 2021-03-03 DIAGNOSIS — N92.6 ABNORMAL MENSTRUAL PERIODS: Primary | ICD-10-CM

## 2021-03-03 NOTE — TELEPHONE ENCOUNTER
Pt got depo shot in hospital.      She was having clots and lower back pain and abdominal pain. She has been on depo-provera since she was 10 and has never had an issue with that before.  She was just very concerned.  She does not have a OBGYN would like a referral.  Have placed one for her.  She is continuing to taper her prednisone, she is currently on 30 mg a day.  She is decreasing by 10 mg per week.  Has been instructed by Dr. Call to not go below 10 mg per day.    Eunice Lim RN  Rheumatology Clinic

## 2021-03-04 ENCOUNTER — INFUSION THERAPY VISIT (OUTPATIENT)
Dept: INFUSION THERAPY | Facility: CLINIC | Age: 21
End: 2021-03-04
Attending: INTERNAL MEDICINE
Payer: COMMERCIAL

## 2021-03-04 VITALS
SYSTOLIC BLOOD PRESSURE: 134 MMHG | HEART RATE: 67 BPM | DIASTOLIC BLOOD PRESSURE: 83 MMHG | TEMPERATURE: 98.2 F | WEIGHT: 242.1 LBS | OXYGEN SATURATION: 99 % | RESPIRATION RATE: 16 BRPM | BODY MASS INDEX: 41.56 KG/M2

## 2021-03-04 DIAGNOSIS — M31.30 WEGENER'S GRANULOMATOSIS: Primary | ICD-10-CM

## 2021-03-04 DIAGNOSIS — I77.82 ANCA-ASSOCIATED VASCULITIS (H): Primary | ICD-10-CM

## 2021-03-04 PROBLEM — Z29.89 NEED FOR PNEUMOCYSTIS PROPHYLAXIS: Status: ACTIVE | Noted: 2021-03-04

## 2021-03-04 LAB
ALBUMIN SERPL-MCNC: 3.2 G/DL (ref 3.4–5)
ALBUMIN UR-MCNC: 100 MG/DL
ALP SERPL-CCNC: 31 U/L (ref 40–150)
ALT SERPL W P-5'-P-CCNC: 25 U/L (ref 0–50)
ANION GAP SERPL CALCULATED.3IONS-SCNC: 7 MMOL/L (ref 3–14)
APPEARANCE UR: CLEAR
AST SERPL W P-5'-P-CCNC: 7 U/L (ref 0–45)
BACTERIA #/AREA URNS HPF: ABNORMAL /HPF
BILIRUB DIRECT SERPL-MCNC: <0.1 MG/DL (ref 0–0.2)
BILIRUB SERPL-MCNC: 0.2 MG/DL (ref 0.2–1.3)
BILIRUB UR QL STRIP: NEGATIVE
BUN SERPL-MCNC: 36 MG/DL (ref 7–30)
CALCIUM SERPL-MCNC: 8.8 MG/DL (ref 8.5–10.1)
CHLORIDE SERPL-SCNC: 110 MMOL/L (ref 94–109)
CO2 SERPL-SCNC: 22 MMOL/L (ref 20–32)
COLOR UR AUTO: YELLOW
CREAT SERPL-MCNC: 1.69 MG/DL (ref 0.52–1.04)
ERYTHROCYTE [DISTWIDTH] IN BLOOD BY AUTOMATED COUNT: 15.5 % (ref 10–15)
GFR SERPL CREATININE-BSD FRML MDRD: 42 ML/MIN/{1.73_M2}
GLUCOSE SERPL-MCNC: 80 MG/DL (ref 70–99)
GLUCOSE UR STRIP-MCNC: NEGATIVE MG/DL
HCG UR QL: NEGATIVE
HCT VFR BLD AUTO: 35.3 % (ref 35–47)
HGB BLD-MCNC: 11.1 G/DL (ref 11.7–15.7)
HGB UR QL STRIP: ABNORMAL
KETONES UR STRIP-MCNC: NEGATIVE MG/DL
LEUKOCYTE ESTERASE UR QL STRIP: NEGATIVE
MCH RBC QN AUTO: 26.6 PG (ref 26.5–33)
MCHC RBC AUTO-ENTMCNC: 31.4 G/DL (ref 31.5–36.5)
MCV RBC AUTO: 84 FL (ref 78–100)
MUCOUS THREADS #/AREA URNS LPF: PRESENT /LPF
NITRATE UR QL: NEGATIVE
PH UR STRIP: 5 PH (ref 5–7)
PLATELET # BLD AUTO: 205 10E9/L (ref 150–450)
POTASSIUM SERPL-SCNC: 3.9 MMOL/L (ref 3.4–5.3)
PROT SERPL-MCNC: 5.9 G/DL (ref 6.8–8.8)
RBC # BLD AUTO: 4.18 10E12/L (ref 3.8–5.2)
RBC #/AREA URNS AUTO: 9 /HPF (ref 0–2)
SODIUM SERPL-SCNC: 139 MMOL/L (ref 133–144)
SOURCE: ABNORMAL
SP GR UR STRIP: 1.02 (ref 1–1.03)
SQUAMOUS #/AREA URNS AUTO: 1 /HPF (ref 0–1)
UROBILINOGEN UR STRIP-MCNC: 0 MG/DL (ref 0–2)
WBC # BLD AUTO: 10.8 10E9/L (ref 4–11)
WBC #/AREA URNS AUTO: 2 /HPF (ref 0–5)

## 2021-03-04 PROCEDURE — 96367 TX/PROPH/DG ADDL SEQ IV INF: CPT

## 2021-03-04 PROCEDURE — 250N000011 HC RX IP 250 OP 636: Performed by: INTERNAL MEDICINE

## 2021-03-04 PROCEDURE — 96375 TX/PRO/DX INJ NEW DRUG ADDON: CPT

## 2021-03-04 PROCEDURE — 999N000127 HC STATISTIC PERIPHERAL IV START W US GUIDANCE

## 2021-03-04 PROCEDURE — 96366 THER/PROPH/DIAG IV INF ADDON: CPT

## 2021-03-04 PROCEDURE — 258N000003 HC RX IP 258 OP 636: Performed by: INTERNAL MEDICINE

## 2021-03-04 PROCEDURE — 81025 URINE PREGNANCY TEST: CPT | Performed by: INTERNAL MEDICINE

## 2021-03-04 PROCEDURE — 96413 CHEMO IV INFUSION 1 HR: CPT

## 2021-03-04 PROCEDURE — 85027 COMPLETE CBC AUTOMATED: CPT | Performed by: INTERNAL MEDICINE

## 2021-03-04 PROCEDURE — 96415 CHEMO IV INFUSION ADDL HR: CPT

## 2021-03-04 PROCEDURE — 80053 COMPREHEN METABOLIC PANEL: CPT | Performed by: INTERNAL MEDICINE

## 2021-03-04 PROCEDURE — 96417 CHEMO IV INFUS EACH ADDL SEQ: CPT

## 2021-03-04 PROCEDURE — 96365 THER/PROPH/DIAG IV INF INIT: CPT

## 2021-03-04 PROCEDURE — 81001 URINALYSIS AUTO W/SCOPE: CPT | Performed by: INTERNAL MEDICINE

## 2021-03-04 PROCEDURE — 82248 BILIRUBIN DIRECT: CPT | Performed by: INTERNAL MEDICINE

## 2021-03-04 RX ORDER — ONDANSETRON 8 MG/1
8 TABLET, FILM COATED ORAL EVERY 8 HOURS PRN
Qty: 10 TABLET | Refills: 1 | Status: SHIPPED | OUTPATIENT
Start: 2021-03-04 | End: 2021-04-06

## 2021-03-04 RX ORDER — HEPARIN SODIUM (PORCINE) LOCK FLUSH IV SOLN 100 UNIT/ML 100 UNIT/ML
5 SOLUTION INTRAVENOUS
Status: CANCELLED | OUTPATIENT
Start: 2021-04-01

## 2021-03-04 RX ORDER — HEPARIN SODIUM,PORCINE 10 UNIT/ML
5 VIAL (ML) INTRAVENOUS
Status: CANCELLED | OUTPATIENT
Start: 2021-04-01

## 2021-03-04 RX ORDER — ONDANSETRON 2 MG/ML
8 INJECTION INTRAMUSCULAR; INTRAVENOUS ONCE
Status: COMPLETED | OUTPATIENT
Start: 2021-03-04 | End: 2021-03-04

## 2021-03-04 RX ORDER — HEPARIN SODIUM (PORCINE) LOCK FLUSH IV SOLN 100 UNIT/ML 100 UNIT/ML
5 SOLUTION INTRAVENOUS
Status: DISCONTINUED | OUTPATIENT
Start: 2021-03-04 | End: 2021-03-04 | Stop reason: HOSPADM

## 2021-03-04 RX ORDER — HEPARIN SODIUM,PORCINE 10 UNIT/ML
5 VIAL (ML) INTRAVENOUS
Status: DISCONTINUED | OUTPATIENT
Start: 2021-03-04 | End: 2021-03-04 | Stop reason: HOSPADM

## 2021-03-04 RX ADMIN — CYCLOPHOSPHAMIDE 1115 MG: 1 INJECTION, POWDER, FOR SOLUTION INTRAVENOUS; ORAL at 11:20

## 2021-03-04 RX ADMIN — ONDANSETRON 8 MG: 2 INJECTION INTRAMUSCULAR; INTRAVENOUS at 10:46

## 2021-03-04 RX ADMIN — MESNA 670 MG: 100 INJECTION, SOLUTION INTRAVENOUS at 12:29

## 2021-03-04 RX ADMIN — MESNA 670 MG: 100 INJECTION, SOLUTION INTRAVENOUS at 10:25

## 2021-03-04 RX ADMIN — SODIUM CHLORIDE 1000 ML: 9 INJECTION, SOLUTION INTRAVENOUS at 08:50

## 2021-03-04 NOTE — PROGRESS NOTES
Nursing Note  Cande Shields presents today to [facility] for:   Chief Complaint   Patient presents with     Infusion     Cyclophosphamide (cytoxan)     During today's [facility] appointment, orders from Dr. Lacy were completed.  Frequency: once, six treatments ordered, no    Progress note:  Patient identification verified by name and date of birth.  Assessment completed.  Vitals recorded in Doc Flowsheets.  Patient was provided with education regarding medication/procedure and possible side effects.  Patient verbalized understanding.     present during visit today: Not Applicable.    Treatment Conditions: Patient denies fever, chills, signs of infection, recent illness, antibiotics use, productive cough or elevated temperature.  WBC   Date Value Ref Range Status   03/04/2021 10.8 4.0 - 11.0 10e9/L Final     ALT   Date Value Ref Range Status   03/04/2021 25 0 - 50 U/L Final     AST   Date Value Ref Range Status   03/04/2021 7 0 - 45 U/L Final     HCG Qual Urine   Date Value Ref Range Status   03/04/2021 Negative NEG^Negative Final     Comment:     This test is for screening purposes.  Results should be interpreted along with   the clinical picture.  Confirmation testing is available if warranted by   ordering [id number], HCG Quantitative Pregnancy.       Blood Urine   Date Value Ref Range Status   03/04/2021 Moderate (A) NEG^Negative Final     Premedications: administered per order.  Drug Waste Record: No  Infusion length and rate:  331 ml/hr., cytoxan over one hour, mesna 250 ml/hr prior to cytoxan and post cytoxan.   Labs: were drawn per orders.   Vascular access: peripheral IV was placed by vascular access nurse.    Is the next appt scheduled? no  Asymptomatic COVID test completed? no    Patient instructed to  ondansetron and mesna tablets for home use.  Patient instructed to take mesna 2 400mg tabs 4-6 hours post cytoxan, patient given  "written and oral instructions. Patient was not able to get mesna from the pharmacy due to insurance, Dr. Lacy aware.    Post Infusion Assessment:  Patient tolerated infusion without incident.  Site patent and intact, free from redness, edema or discomfort.  No evidence of extravasations.  Access discontinued per protocol.     Discharge Plan:   Follow up plan of care with: ongoing infusions at [facility].  Discharge instructions were reviewed with patient.  Patient/representative verbalized understanding of discharge instructions and all questions answered.  Patient discharged from [facility] in stable condition.    Ruby Contreras RN    Administrations This Visit     0.9% sodium chloride BOLUS     Admin Date  03/04/2021 Action  New Bag Dose  1,000 mL Rate  1,000 mL/hr Route  Intravenous Administered By  Ruby Contreras RN          cyclophosphamide (CYTOXAN) 1,115 mg in sodium chloride 0.9 % 331 mL non-oncology use     Admin Date  03/04/2021 Action  New Bag Dose  1,115 mg Rate  331 mL/hr Route  Intravenous Administered By  Ruby Contreras RN          mesna (MESNEX) 670 mg in sodium chloride 0.9 % 62 mL infusion     Admin Date  03/04/2021 Action  New Bag Dose  670 mg Rate  248 mL/hr Route  Intravenous Administered By  Ruby Contreras RN           Admin Date  03/04/2021 Action  New Bag Dose  670 mg Rate  248 mL/hr Route  Intravenous Administered By  Ruby Contreras RN          ondansetron (ZOFRAN) injection 8 mg     Admin Date  03/04/2021 Action  Given Dose  8 mg Route  Intravenous Administered By  Ruby Contreras RN                /84   Pulse 85   Temp 98.2  F (36.8  C) (Oral)   Resp 16   LMP  (LMP Unknown)   SpO2 99%     PRIOR TO INFUSION OF BIOLOGICAL MEDICATIONS OR ANY OF THESE AS LISTED: Cytoxan (cyclophosphamide) \".rheumbiologicalchecklist\"    Prior to Infusion of biological medications or any of these as listed:    1. " Elevated temperature, fever, chills, productive cough or abnormal vital signs, night sweats, coughing up blood or sputum, no appetite or abnormal vital signs : NO    2. Open wounds or new incisions: NO    3. Recent hospitalization: NO    4.  Recent surgeries:  NO    5. Any upcoming surgeries or dental procedures?:NO    6. Any current or recent bouts of illness or infection? On any antibiotics? : NO    7. Any new, sudden or worsening abdominal pain :NO    8. Vaccination within 4 weeks? Patient or someone in the household is scheduled to receive vaccination? No live virus vaccines prior to or during treatment :NO    9. Any nervous system diseases [i.e. multiple sclerosis, Guillain-[place], seizures, neurological  changes]: NO    10. Pregnant or breast feeding; or plans on pregnancy in the future: NO    11. Signs of worsening depression or suicidal ideations while taking benlysta:NO    12. New-onset medical symptoms: NO    13.  New cancer diagnosis or on chemotherapy or radiation NO    14.  Evaluate for any sign of active TB [Unexplained weight loss, Loss of appetite, Night sweats, Fever, Fatigue, Chills, Coughing for 3 weeks or longer, Hemoptysis (coughing up blood), Chest pain]: NO    **Note: If answered yes to any of the above, hold the infusion and contact ordering rheumatologist or on-call rheumatologist.

## 2021-03-04 NOTE — LETTER
3/4/2021         RE: Cande Shields  [address]  [facility] [address]        Dear Colleague,    Thank you for referring your patient, Cande Shields, to the [facility] TREATMENT [facility]. Please see a copy of my visit note below.    Nursing Note  Cande Shields [facility] for:   Chief Complaint   Patient presents with     Infusion     Cyclophosphamide (cytoxan)     During today's [facility] appointment, orders from Dr. Lacy were completed.  Frequency: once, six treatments ordered, no    Progress note:  Patient identification verified by name and date of birth.  Assessment completed.  Vitals recorded in Doc Flowsheets.  Patient was provided with education regarding medication/procedure and possible side effects.  Patient verbalized understanding.     present during visit today: Not Applicable.    Treatment Conditions: Patient denies fever, chills, signs of infection, recent illness, antibiotics use, productive cough or elevated temperature.  WBC   Date Value Ref Range Status   03/04/2021 10.8 4.0 - 11.0 10e9/L Final     ALT   Date Value Ref Range Status   03/04/2021 25 0 - 50 U/L Final     AST   Date Value Ref Range Status   03/04/2021 7 0 - 45 U/L Final     HCG Qual Urine   Date Value Ref Range Status   03/04/2021 Negative NEG^Negative Final     Comment:     This test is for screening purposes.  Results should be interpreted along with   the clinical picture.  Confirmation testing is available if warranted by   ordering HSP348, HCG Quantitative Pregnancy.       Blood Urine   Date Value Ref Range Status   03/04/2021 Moderate (A) NEG^Negative Final     Premedications: administered per order.  Drug Waste Record: No  Infusion length and rate:  331 ml/hr., cytoxan over one hour, mesna 250 ml/hr prior to cytoxan and post cytoxan.   Labs: were drawn per orders.   Vascular access: peripheral IV  was placed by vascular access nurse.    Is the next appt scheduled? no  Asymptomatic COVID test completed? no    Patient instructed to  ondansetron and mesna tablets for home use.  Patient instructed to take mesna 2 400mg tabs 4-6 hours post cytoxan, patient given written and oral instructions. Patient was not able to get mesna from the pharmacy due to insurance, Dr. Lacy aware.    Post Infusion Assessment:  Patient tolerated infusion without incident.  Site patent and intact, free from redness, edema or discomfort.  No evidence of extravasations.  Access discontinued per protocol.     Discharge Plan:   Follow up plan of care with: ongoing infusions [facility].  Discharge instructions were reviewed with patient.  Patient/representative verbalized understanding of discharge instructions and all questions answered.  Patient discharged from [facility] in stable condition.    Ruby Contreras RN    Administrations This Visit     0.9% sodium chloride BOLUS     Admin Date  03/04/2021 Action  New Bag Dose  1,000 mL Rate  1,000 mL/hr Route  Intravenous Administered By  Ruby Contreras RN          cyclophosphamide (CYTOXAN) 1,115 mg in sodium chloride 0.9 % 331 mL non-oncology use     Admin Date  03/04/2021 Action  New Bag Dose  1,115 mg Rate  331 mL/hr Route  Intravenous Administered By  Ruby Contreras RN          mesna (MESNEX) 670 mg in sodium chloride 0.9 % 62 mL infusion     Admin Date  03/04/2021 Action  New Bag Dose  670 mg Rate  248 mL/hr Route  Intravenous Administered By  Ruby Contreras RN           Admin Date  03/04/2021 Action  New Bag Dose  670 mg Rate  248 mL/hr Route  Intravenous Administered By  Ruby Contreras RN          ondansetron (ZOFRAN) injection 8 mg     Admin Date  03/04/2021 Action  Given Dose  8 mg Route  Intravenous Administered By  Ruby Contreras RN                /84   Pulse 85   Temp 98.2  F  "(36.8  C) (Oral)   Resp 16   LMP  (LMP Unknown)   SpO2 99%     PRIOR TO INFUSION OF BIOLOGICAL MEDICATIONS OR ANY OF THESE AS LISTED: Cytoxan (cyclophosphamide) \".rheumbiologicalchecklist\"    Prior to Infusion of biological medications or any of these as listed:    1. Elevated temperature, fever, chills, productive cough or abnormal vital signs, night sweats, coughing up blood or sputum, no appetite or abnormal vital signs : NO    2. Open wounds or new incisions: NO    3. Recent hospitalization: NO    4.  Recent surgeries:  NO    5. Any upcoming surgeries or dental procedures?:NO    6. Any current or recent bouts of illness or infection? On any antibiotics? : NO    7. Any new, sudden or worsening abdominal pain :NO    8. Vaccination within 4 weeks? Patient or someone in the household is scheduled to receive vaccination? No live virus vaccines prior to or during treatment :NO    9. Any nervous system diseases [i.e. multiple sclerosis, Guillain-Millry, seizures, neurological  changes]: NO    10. Pregnant or breast feeding; or plans on pregnancy in the future: NO    11. Signs of worsening depression or suicidal ideations while taking benlysta:NO    12. New-onset medical symptoms: NO    13.  New cancer diagnosis or on chemotherapy or radiation NO    14.  Evaluate for any sign of active TB [Unexplained weight loss, Loss of appetite, Night sweats, Fever, Fatigue, Chills, Coughing for 3 weeks or longer, Hemoptysis (coughing up blood), Chest pain]: NO    **Note: If answered yes to any of the above, hold the infusion and contact ordering rheumatologist or on-call rheumatologist.       Again, thank you for allowing me to participate in the care of your patient.        Sincerely,        [facility]    "

## 2021-03-04 NOTE — PATIENT INSTRUCTIONS
Dear Cande Shields    Thank you for choosing [facility] ([facility]) for your infusion.  The following information is a summary of our appointment as well as important reminders.      POST-INFUSION OF BIOLOGICAL MEDICATION:    Reviewed with patient.  Given biologic medication or medication hand-out. Inform patient if any fever, chills or signs of infection, new symptoms, abdominal pain, heart palpitations, shortness of breath, reaction, weakness, neurological changes, seek medical attention immediately and should not receive infusions. No live virus vaccines prior to or during treatment or up to 6 months post infusion. If the patient has an upcoming procedure or surgery, this should be discussed with the rheumatologist and surgeon or provider.    We look forward in seeing you on your next appointment here at [facility] ([facility]).  Please don t hesitate to call us at [phone] to reschedule any of your appointments or to speak with one of the [facility] registered nurses.  It was a pleasure taking care of you today.    Sincerely,    [facility]  [address] [place] [address]  [id number]  Phone:  [phone]    1.   zofran and mesna today at the outpatient pharmacy on 1st floor [facility].    Take Mesna (2) 400 mg tablets 4-6 hours post cytoxan, so take the mesna between 1630 - 1830 (4:30pm - 6:30pm).     Remember to get labs drawn 10 days from today, CBC with platelets, hepatic panel, UA with microscopic    Patient Education     Patient Education    Cyclophosphamide Oral capsule    Cyclophosphamide Oral tablet    Cyclophosphamide Solution for injection  Cyclophosphamide Solution for injection  What is this medicine?  CYCLOPHOSPHAMIDE (sye kloe FARHAD fa mide) is a chemotherapy drug. It slows the growth of cancer cells. This medicine is used  to treat many types of cancer like lymphoma, myeloma, leukemia, breast cancer, and ovarian cancer, to name a few.  This medicine may be used for other purposes; ask your health care provider or pharmacist if you have questions.  What should I tell my health care provider before I take this medicine?  They need to know if you have any of these conditions:    blood disorders    history of other chemotherapy    infection    kidney disease    liver disease    recent or ongoing radiation therapy    tumors in the bone marrow    an unusual or allergic reaction to cyclophosphamide, other chemotherapy, other medicines, foods, dyes, or preservatives    pregnant or trying to get pregnant    breast-feeding  How should I use this medicine?  This drug is usually given as an injection into a vein or muscle or by infusion into a vein. It is administered in a hospital or clinic by a specially trained health care professional.  Talk to your pediatrician regarding the use of this medicine in children. Special care may be needed.  Overdosage: If you think you have taken too much of this medicine contact a poison control center or emergency room at once.  NOTE: This medicine is only for you. Do not share this medicine with others.  What if I miss a dose?  It is important not to miss your dose. Call your doctor or health care professional if you are unable to keep an appointment.  What may interact with this medicine?  This medicine may interact with the following medications:    amiodarone    amphotericin B    azathioprine    certain antiviral medicines for HIV or AIDS such as protease inhibitors (e.g., indinavir, ritonavir) and zidovudine    certain blood pressure medications such as benazepril, captopril, enalapril, fosinopril, lisinopril, moexipril, monopril, perindopril, quinapril, ramipril, trandolapril    certain cancer medications such as anthracyclines (e.g., daunorubicin, doxorubicin), busulfan, cytarabine, paclitaxel,  pentostatin, tamoxifen, trastuzumab    certain diuretics such as chlorothiazide, chlorthalidone, hydrochlorothiazide, indapamide, metolazone    certain medicines that treat or prevent blood clots like warfarin    certain muscle relaxants such as succinylcholine    cyclosporine    etanercept    indomethacin    medicines to increase blood counts like filgrastim, pegfilgrastim, sargramostim    medicines used as general anesthesia    metronidazole    natalizumab  This list may not describe all possible interactions. Give your health care provider a list of all the medicines, herbs, non-prescription drugs, or dietary supplements you use. Also tell them if you smoke, drink alcohol, or use illegal drugs. Some items may interact with your medicine.  What should I watch for while using this medicine?  Visit your doctor for checks on your progress. This drug may make you feel generally unwell. This is not uncommon, as chemotherapy can affect healthy cells as well as cancer cells. Report any side effects. Continue your course of treatment even though you feel ill unless your doctor tells you to stop.  Drink water or other fluids as directed. Urinate often, even at night.  In some cases, you may be given additional medicines to help with side effects. Follow all directions for their use.  Call your doctor or health care professional for advice if you get a fever, chills or sore throat, or other symptoms of a cold or flu. Do not treat yourself. This drug decreases your body's ability to fight infections. Try to avoid being around people who are sick.  This medicine may increase your risk to bruise or bleed. Call your doctor or health care professional if you notice any unusual bleeding.  Be careful brushing and flossing your teeth or using a toothpick because you may get an infection or bleed more easily. If you have any dental work done, tell your dentist you are receiving this medicine.  You may get drowsy or dizzy. Do not  drive, use machinery, or do anything that needs mental alertness until you know how this medicine affects you.  Do not become pregnant while taking this medicine or for 1 year after stopping it. Women should inform their doctor if they wish to become pregnant or think they might be pregnant. Men should not father a child while taking thismedicine and for 4 months after stopping it. There is a potential for serious side effects to an unborn child. Talk to your health care professional or pharmacist for more information. Do not breast-feed an infant while taking this medicine.  This medicine may interfere with the ability to have a child. This medicine has caused ovarian failure in some women. This medicine has caused reduced sperm counts in some men. You should talk with your doctor or health care professional if you are concerned about your fertility.  If you are going to have surgery, tell your doctor or health care professional that you have taken this medicine.  What side effects may I notice from receiving this medicine?  Side effects that you should report to your doctor or health care professional as soon as possible:    allergic reactions like skin rash, itching or hives, swelling of the face, lips, or tongue    low blood counts - this medicine may decrease the number of white blood cells, red blood cells and platelets. You may be at increased risk for infections and bleeding.    signs of infection - fever or chills, cough, sore throat, pain or difficulty passing urine    signs of decreased platelets or bleeding - bruising, pinpoint red spots on the skin, black, tarry stools, blood in the urine    signs of decreased red blood cells - unusually weak or tired, fainting spells, lightheadedness    breathing problems    dark urine    dizziness    palpitations    swelling of the ankles, feet, hands    trouble passing urine or change in the amount of urine    weight gain    yellowing of the eyes or skin  Side  effects that usually do not require medical attention (report to your doctor or health care professional if they continue or are bothersome):    changes in nail or skin color    hair loss    missed menstrual periods    mouth sores    nausea, vomiting  This list may not describe all possible side effects. Call your doctor for medical advice about side effects. You may report side effects to FDA at 0-336-SIJ-8407.  Where should I keep my medicine?  This drug is given in a hospital or clinic and will not be stored at home.  NOTE:This sheet is a summary. It may not cover all possible information. If you have questions about this medicine, talk to your doctor, pharmacist, or health care provider. Copyright  2016 [employer] Standard

## 2021-03-04 NOTE — PROGRESS NOTES
Patient, Cande Shields, arrived to PFT lab for her Pentamadine treatment.  No orders or treatment plan were found in her chart.  Rheumatology was called and we were told that since Nephrology ordered the previous ones, they were the ones to speak to about a new order.  We spoke to Daphne Garcia MD, who deferred to Dr Call's note from 2/23/21, where he plans to order the Pentamadine treatments.  Rheumatology was called again and I was told that someone (a nurse and another MD)were already working on the order.  I left another note with the information received from nephrology.

## 2021-03-09 DIAGNOSIS — Z29.89 NEED FOR PNEUMOCYSTIS PROPHYLAXIS: Primary | ICD-10-CM

## 2021-03-09 PROCEDURE — 94642 AEROSOL INHALATION TREATMENT: CPT

## 2021-03-09 PROCEDURE — 94640 AIRWAY INHALATION TREATMENT: CPT

## 2021-03-09 RX ORDER — PENTAMIDINE ISETHIONATE 300 MG/300MG
300 INHALANT RESPIRATORY (INHALATION) ONCE
Status: CANCELLED | OUTPATIENT
Start: 2021-04-06 | End: 2021-04-06

## 2021-03-09 RX ORDER — ALBUTEROL SULFATE 0.83 MG/ML
2.5 SOLUTION RESPIRATORY (INHALATION) ONCE
Status: CANCELLED | OUTPATIENT
Start: 2021-04-06 | End: 2021-04-06

## 2021-03-09 RX ORDER — PENTAMIDINE ISETHIONATE 300 MG/300MG
300 INHALANT RESPIRATORY (INHALATION) ONCE
Status: COMPLETED | OUTPATIENT
Start: 2021-03-09 | End: 2021-03-09

## 2021-03-09 RX ORDER — ALBUTEROL SULFATE 0.83 MG/ML
2.5 SOLUTION RESPIRATORY (INHALATION) ONCE
Status: COMPLETED | OUTPATIENT
Start: 2021-03-09 | End: 2021-03-09

## 2021-03-09 RX ADMIN — ALBUTEROL SULFATE 2.5 MG: 0.83 SOLUTION RESPIRATORY (INHALATION) at 14:28

## 2021-03-09 RX ADMIN — PENTAMIDINE ISETHIONATE 300 MG: 300 INHALANT RESPIRATORY (INHALATION) at 14:29

## 2021-03-09 NOTE — PROGRESS NOTES
Patient was seen today for a Pentamidine nebulizer tx ordered by Adrián Byrd.    Patient was first given 2.5 mg Albuterol nebulizer, after which Pentamidine 300 mg (Lot # [id number]) mixed with 6cc Sterile H2O was administered through a filtered nebulizer.    Pre-treatment: SpO2 = 95%     HR = 89     BBS = clear  Post-treatment: SpO2 = 96%     HR =108     BBS = clear    No adverse side effects noted by the patient. Pt rinsed her mouth after the neb.     Procedure was completed today by Earnestine Ramirez.

## 2021-03-17 ENCOUNTER — TELEPHONE (OUTPATIENT)
Dept: NEPHROLOGY | Facility: CLINIC | Age: 21
End: 2021-03-17

## 2021-03-17 DIAGNOSIS — G47.00 INSOMNIA, UNSPECIFIED TYPE: ICD-10-CM

## 2021-03-17 RX ORDER — TRAZODONE HYDROCHLORIDE 50 MG/1
50 TABLET, FILM COATED ORAL AT BEDTIME
Qty: 21 TABLET | Refills: 0 | Status: SHIPPED | OUTPATIENT
Start: 2021-03-17 | End: 2021-06-25

## 2021-03-17 NOTE — TELEPHONE ENCOUNTER
----- Message from Adrián Lacy MD sent at 3/13/2021  2:26 PM CST -----  Im okay with refilling that for another month. But after, probably PCP should be prescribing it!    Thanks,  MN  ----- Message -----  From: Gisell Saldana LPN  Sent: 3/3/2021   4:28 PM CST  To: Eunice Lim RN, Adrián Lacy MD    Hi Adrián,    Refill request for Trazodone came through to Flavio Deutsch.  I see she was recently in the ED too  Gisell

## 2021-04-01 ENCOUNTER — TELEPHONE (OUTPATIENT)
Dept: OTOLARYNGOLOGY | Facility: CLINIC | Age: 21
End: 2021-04-01

## 2021-04-02 ENCOUNTER — VIRTUAL VISIT (OUTPATIENT)
Dept: NEPHROLOGY | Facility: CLINIC | Age: 21
End: 2021-04-02
Attending: INTERNAL MEDICINE
Payer: COMMERCIAL

## 2021-04-02 ENCOUNTER — PRE VISIT (OUTPATIENT)
Dept: NEPHROLOGY | Facility: CLINIC | Age: 21
End: 2021-04-02

## 2021-04-02 DIAGNOSIS — I77.6 VASCULITIS (H): Primary | ICD-10-CM

## 2021-04-02 NOTE — LETTER
4/2/2021       RE: Cande Shields  [address] [place] [id number]     Dear Colleague,    Thank you for referring your patient, Cande Shields, to the [facility] [place] [facility]. Please see a copy of my visit note below.    Left voicemail for patient to call back to set up telemedicine visit, will call again before appointment time.  Herlinda Lundberg CMA on 4/2/2021 at 11:27 AM      Attestation signed by Clark Chang MD at 4/13/2021  9:57 AM:  Clark Chang MD  Division of Nephrology and Hypertension  Pager: [phone]  April 13, 2021  9:57 AM      Still no answer.  Herlinda Lundberg CMA on 4/2/2021 at 11:33 AM      Attestation signed by Clark Chang MD at 4/13/2021  9:57 AM:  Clark Chang MD  Division of Nephrology and Hypertension  Pager: [phone]  April 13, 2021  9:57 AM

## 2021-04-02 NOTE — PROGRESS NOTES
Left voicemail for patient to call back to set up telemedicine visit, will call again before appointment time.  Herlinda Lundberg CMA on 4/2/2021 at 11:27 AM

## 2021-04-05 ENCOUNTER — OFFICE VISIT (OUTPATIENT)
Dept: INTERNAL MEDICINE | Facility: CLINIC | Age: 21
End: 2021-04-05
Attending: INTERNAL MEDICINE
Payer: COMMERCIAL

## 2021-04-05 VITALS
SYSTOLIC BLOOD PRESSURE: 127 MMHG | DIASTOLIC BLOOD PRESSURE: 79 MMHG | BODY MASS INDEX: 41.48 KG/M2 | OXYGEN SATURATION: 98 % | WEIGHT: 243 LBS | HEART RATE: 97 BPM | HEIGHT: 64 IN

## 2021-04-05 DIAGNOSIS — N93.8 DYSFUNCTIONAL UTERINE BLEEDING: Primary | ICD-10-CM

## 2021-04-05 DIAGNOSIS — G62.9 PERIPHERAL POLYNEUROPATHY: ICD-10-CM

## 2021-04-05 DIAGNOSIS — O03.9 SPONTANEOUS PREGNANCY LOSS: ICD-10-CM

## 2021-04-05 DIAGNOSIS — I82.413 ACUTE DEEP VEIN THROMBOSIS (DVT) OF FEMORAL VEIN OF BOTH LOWER EXTREMITIES (H): ICD-10-CM

## 2021-04-05 DIAGNOSIS — I26.94 MULTIPLE SUBSEGMENTAL PULMONARY EMBOLI WITHOUT ACUTE COR PULMONALE (H): ICD-10-CM

## 2021-04-05 DIAGNOSIS — I74.3 FEMORAL ARTERY THROMBOSIS, LEFT (H): ICD-10-CM

## 2021-04-05 DIAGNOSIS — Q21.12 PFO (PATENT FORAMEN OVALE): ICD-10-CM

## 2021-04-05 PROCEDURE — 99204 OFFICE O/P NEW MOD 45 MIN: CPT | Performed by: INTERNAL MEDICINE

## 2021-04-05 RX ORDER — HYDROMORPHONE HYDROCHLORIDE 2 MG/1
2 TABLET ORAL EVERY 6 HOURS PRN
COMMUNITY
End: 2021-04-06

## 2021-04-05 RX ORDER — MEDROXYPROGESTERONE ACETATE 150 MG/ML
150 INJECTION, SUSPENSION INTRAMUSCULAR
COMMUNITY
End: 2021-06-10

## 2021-04-05 RX ORDER — GABAPENTIN 300 MG/1
CAPSULE ORAL
Qty: 129 CAPSULE | Refills: 0 | Status: SHIPPED | OUTPATIENT
Start: 2021-04-05 | End: 2021-05-04

## 2021-04-05 RX ORDER — PREDNISONE 10 MG/1
10 TABLET ORAL DAILY
COMMUNITY
End: 2021-08-11

## 2021-04-05 ASSESSMENT — PAIN SCALES - GENERAL: PAINLEVEL: NO PAIN (0)

## 2021-04-05 ASSESSMENT — MIFFLIN-ST. JEOR: SCORE: 1847.24

## 2021-04-05 ASSESSMENT — ANXIETY QUESTIONNAIRES
GAD7 TOTAL SCORE: 1
2. NOT BEING ABLE TO STOP OR CONTROL WORRYING: NOT AT ALL
6. BECOMING EASILY ANNOYED OR IRRITABLE: SEVERAL DAYS
IF YOU CHECKED OFF ANY PROBLEMS ON THIS QUESTIONNAIRE, HOW DIFFICULT HAVE THESE PROBLEMS MADE IT FOR YOU TO DO YOUR WORK, TAKE CARE OF THINGS AT HOME, OR GET ALONG WITH OTHER PEOPLE: SOMEWHAT DIFFICULT
3. WORRYING TOO MUCH ABOUT DIFFERENT THINGS: NOT AT ALL
7. FEELING AFRAID AS IF SOMETHING AWFUL MIGHT HAPPEN: NOT AT ALL
5. BEING SO RESTLESS THAT IT IS HARD TO SIT STILL: NOT AT ALL
1. FEELING NERVOUS, ANXIOUS, OR ON EDGE: NOT AT ALL

## 2021-04-05 ASSESSMENT — PATIENT HEALTH QUESTIONNAIRE - PHQ9
5. POOR APPETITE OR OVEREATING: NOT AT ALL
SUM OF ALL RESPONSES TO PHQ QUESTIONS 1-9: 4

## 2021-04-05 NOTE — PROGRESS NOTES
"History of Present Illness:  Ms. Shields is a 20 year old female who presents for  Chief Complaint   Patient presents with     [facility] - blood clots on heart, lungs and legs. Period has been ongoing for two months.      other     previous clots in arterties in both legs - Left leg pain (severe) dilaudid is not helping \"feels like a burning sensation\"      Recently hospitalized at [facility] for bilat submassive PEs 3/23, also has bilat LE distal DVT , L femoral artery clot and RA thrombus.  Found to have PFO, pulmonary infarct R base, dysmenorrhea.  Had catheter directed thrombolysis/TPA of left femoral clot and discharged on rivaroxaban. Subsequent echo did not show RA clot.    History of PR3 ANCA vasculitis, CKD, pregnancy loss at 32 weeks (preeclampsia).  Got COVID in December and then hospitalized for flare of January. Got cytoxan last in March (previously on rituxan).  On depo provera, last dose 2 weeks ago.     Today she reports pain on L foot, sharp shooting pain, burning with allodynia.  Has had irregular bleeding for 2 months. Spotting more recently, not heavy.  Has tried OCPs previously.      Review of external notes as documented above   Review of prior external note(s) from - Care[facility]where information from [facility]  and [facility]  reviewed                  A detailed Review of Systems was performed, verified and is negative except as documented in the HPI.  All health questionnaires were reviewed, verified and relevant information documented above.    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     Multiple subsegmental pulmonary emboli without acute cor pulmonale (H) 03/2021     PFO (patent foramen ovale)      Preeclampsia, third trimester      Spontaneous pregnancy loss 2020    31 weeks " "    Stage 3b chronic kidney disease      Wegener's disease, pulmonary (H) 01/01/2010    renal biopdy       Past Surgical History:  Past Surgical History:   Procedure Laterality Date     ENT SURGERY  2000    cyst     EXCISE GANGLION WRIST  2009       Active Meds:  Current Outpatient Medications   Medication     HYDROmorphone (DILAUDID) 2 MG tablet     medroxyPROGESTERone (DEPO-PROVERA) 150 MG/ML IM injection     mupirocin (BACTROBAN) 2 % external ointment     predniSONE (DELTASONE) 10 MG tablet     Rivaroxaban ANTICOAGULANT 15 & 20 MG TBPK     sodium chloride (OCEAN) 0.65 % nasal spray     traZODone (DESYREL) 50 MG tablet     acetaminophen (TYLENOL) 325 MG tablet     mesna (MESNEX) 400 MG TABS tablet     ondansetron (ZOFRAN) 8 MG tablet     No current facility-administered medications for this visit.         Allergies:  Penicillins    Family History:  family history includes Thyroid Disease in her mother.    Social History:  Social History     Tobacco Use     Smoking status: Current Every Day Smoker     Smokeless tobacco: Never Used     Tobacco comment: currently vaping   Substance Use Topics     Alcohol use: Not Currently     Frequency: 2-3 times a week     Drug use: No       Physical Exam:  Vitals: /79 (BP Location: Right arm, Patient Position: Sitting, Cuff Size: Adult Large)   Pulse 97   Ht 1.626 m (5' 4\")   Wt 110.2 kg (243 lb)   SpO2 98%   BMI 41.71 kg/m    Constitutional: Alert, oriented, pleasant, no acute distress  Head: Normocephalic, atraumatic  Eyes: Extra-ocular movements intact, pupils equally round and reactive bilaterally, no scleral icterus  ENT: Masked  Neck: Supple, no lymphadenopathy  Cardiovascular: Regular rate and rhythm, no murmurs, rubs or gallops, peripheral pulses full/symmetric  Respiratory: slightly diminished at R base, L lung clear, no wheezes/rales/rhonchi  Musculoskeletal: No edema, normal muscle tone, normal gait, LLE warm and well perfused  Neurologic: Alert and oriented, " cranial nerves 2-12 intact, grossly non-focal  Skin: No rashes/lesions  Psychiatric: normal mentation, affect and mood      Diagnostics:  Labs reviewed in Epic          Assessment and Plan:  Cande was seen today for establish care and other.  Complicated PMH and recent course. Her recent pregnancy loss, COVID and Carolyn's flare all could have contributed to hypercoagulable state, though given the large extent of clot burden and ongoing risks given anca vasculititis, I would refer to Heme to consider w/u for hypercoagulable states to guide therapy.  She is not to get pregnant given recent cytoxan use, but will need referral to [facility] should she be interested in conceiving again.  Given PFO with presumed arterial embolism, will refer to cardiology.    Diagnoses and all orders for this visit:    Dysfunctional uterine bleeding  -     Oncology/Hematology Adult Referral; Future  -     OB/GYN REFERRAL    Spontaneous pregnancy loss  -     Oncology/Hematology Adult Referral; Future  -     OB/GYN REFERRAL    PFO (patent foramen ovale)  -     Oncology/Hematology Adult Referral; Future  -     CARDIOLOGY EVAL ADULT REFERRAL    Acute deep vein thrombosis (DVT) of femoral vein of both lower extremities (H)  -     Oncology/Hematology Adult Referral; Future    Femoral artery thrombosis, left (H)  -     Oncology/Hematology Adult Referral; Future    Multiple subsegmental pulmonary emboli without acute cor pulmonale (H)  -     Oncology/Hematology Adult Referral; Future  -     CARDIOLOGY EVAL ADULT REFERRAL    Peripheral polyneuropathy  -     gabapentin (NEURONTIN) 300 MG capsule; Take 1 capsule (300 mg) by mouth At Bedtime for 3 days, THEN 1 capsule (300 mg) 2 times daily for 3 days, THEN 2 capsules (600 mg) 2 times daily.      Cira Amanda MD  Internal Medicine      >45 minutes spent today performing chart review, history and exam, documentation and further activities as noted above.

## 2021-04-05 NOTE — NURSING NOTE
Chief Complaint   Patient presents with     Establish Care     est care - blood clots on heart, lungs and legs. Period has been ongoing for two months.        Cecelia France MA, at 12:47 PM on 4/5/2021.

## 2021-04-06 ASSESSMENT — ANXIETY QUESTIONNAIRES: GAD7 TOTAL SCORE: 1

## 2021-04-07 ENCOUNTER — TELEPHONE (OUTPATIENT)
Dept: CARDIOLOGY | Facility: CLINIC | Age: 21
End: 2021-04-07

## 2021-04-07 DIAGNOSIS — M31.30 GRANULOMATOSIS WITH POLYANGIITIS, UNSPECIFIED WHETHER RENAL INVOLVEMENT (H): ICD-10-CM

## 2021-04-07 LAB
ALBUMIN SERPL-MCNC: 3.1 G/DL (ref 3.4–5)
ALBUMIN UR-MCNC: >=300 MG/DL
ANION GAP SERPL CALCULATED.3IONS-SCNC: 11 MMOL/L (ref 3–14)
APPEARANCE UR: CLEAR
BACTERIA #/AREA URNS HPF: ABNORMAL /HPF
BASOPHILS # BLD AUTO: 0.1 10E9/L (ref 0–0.2)
BASOPHILS NFR BLD AUTO: 0.6 %
BILIRUB UR QL STRIP: NEGATIVE
BUN SERPL-MCNC: 30 MG/DL (ref 7–30)
CALCIUM SERPL-MCNC: 9.1 MG/DL (ref 8.5–10.1)
CHLORIDE SERPL-SCNC: 116 MMOL/L (ref 94–109)
CO2 SERPL-SCNC: 18 MMOL/L (ref 20–32)
COLOR UR AUTO: YELLOW
CREAT SERPL-MCNC: 1.39 MG/DL (ref 0.52–1.04)
CREAT UR-MCNC: 183 MG/DL
DIFFERENTIAL METHOD BLD: ABNORMAL
EOSINOPHIL # BLD AUTO: 0.2 10E9/L (ref 0–0.7)
EOSINOPHIL NFR BLD AUTO: 2.5 %
ERYTHROCYTE [DISTWIDTH] IN BLOOD BY AUTOMATED COUNT: 18.7 % (ref 10–15)
GFR SERPL CREATININE-BSD FRML MDRD: 53 ML/MIN/{1.73_M2}
GLUCOSE SERPL-MCNC: 80 MG/DL (ref 70–99)
GLUCOSE UR STRIP-MCNC: NEGATIVE MG/DL
HCT VFR BLD AUTO: 30.1 % (ref 35–47)
HGB BLD-MCNC: 9 G/DL (ref 11.7–15.7)
HGB UR QL STRIP: ABNORMAL
KETONES UR STRIP-MCNC: NEGATIVE MG/DL
LEUKOCYTE ESTERASE UR QL STRIP: NEGATIVE
LYMPHOCYTES # BLD AUTO: 1.9 10E9/L (ref 0.8–5.3)
LYMPHOCYTES NFR BLD AUTO: 19.7 %
MCH RBC QN AUTO: 28.1 PG (ref 26.5–33)
MCHC RBC AUTO-ENTMCNC: 29.9 G/DL (ref 31.5–36.5)
MCV RBC AUTO: 94 FL (ref 78–100)
MONOCYTES # BLD AUTO: 0.8 10E9/L (ref 0–1.3)
MONOCYTES NFR BLD AUTO: 8.6 %
NEUTROPHILS # BLD AUTO: 6.5 10E9/L (ref 1.6–8.3)
NEUTROPHILS NFR BLD AUTO: 68.6 %
NITRATE UR QL: NEGATIVE
NON-SQ EPI CELLS #/AREA URNS LPF: ABNORMAL /LPF
PH UR STRIP: 5.5 PH (ref 5–7)
PHOSPHATE SERPL-MCNC: 3.6 MG/DL (ref 2.5–4.5)
PLATELET # BLD AUTO: 351 10E9/L (ref 150–450)
POTASSIUM SERPL-SCNC: 3.8 MMOL/L (ref 3.4–5.3)
PROT UR-MCNC: 2.02 G/L
PROT/CREAT 24H UR: 1.1 G/G CR (ref 0–0.2)
RBC # BLD AUTO: 3.2 10E12/L (ref 3.8–5.2)
RBC #/AREA URNS AUTO: ABNORMAL /HPF
SODIUM SERPL-SCNC: 145 MMOL/L (ref 133–144)
SOURCE: ABNORMAL
SP GR UR STRIP: >1.03 (ref 1–1.03)
UROBILINOGEN UR STRIP-ACNC: 0.2 EU/DL (ref 0.2–1)
WBC # BLD AUTO: 9.5 10E9/L (ref 4–11)
WBC #/AREA URNS AUTO: ABNORMAL /HPF

## 2021-04-07 PROCEDURE — 84156 ASSAY OF PROTEIN URINE: CPT | Performed by: INTERNAL MEDICINE

## 2021-04-07 PROCEDURE — 85025 COMPLETE CBC W/AUTO DIFF WBC: CPT | Performed by: INTERNAL MEDICINE

## 2021-04-07 PROCEDURE — 80069 RENAL FUNCTION PANEL: CPT | Performed by: INTERNAL MEDICINE

## 2021-04-07 PROCEDURE — 83516 IMMUNOASSAY NONANTIBODY: CPT | Performed by: INTERNAL MEDICINE

## 2021-04-07 PROCEDURE — 36415 COLL VENOUS BLD VENIPUNCTURE: CPT | Performed by: INTERNAL MEDICINE

## 2021-04-07 PROCEDURE — 83876 ASSAY MYELOPEROXIDASE: CPT | Performed by: INTERNAL MEDICINE

## 2021-04-07 PROCEDURE — 81001 URINALYSIS AUTO W/SCOPE: CPT | Performed by: INTERNAL MEDICINE

## 2021-04-07 NOTE — TELEPHONE ENCOUNTER
"Pt needs to schedule an appointment with a general cardiologist per a referral.    CARDIOLOGY EVAL ADULT REFERRAL HAS BEEN CANCELLED; can be found in the \"finalized requests\" tab of the patient's appointment desk.    PLEASE REINSTATE REFERRAL and link to appointment when scheduling.   "

## 2021-04-08 ENCOUNTER — HOSPITAL ENCOUNTER (EMERGENCY)
Facility: CLINIC | Age: 21
Discharge: HOME | End: 2021-04-08
Attending: EMERGENCY MEDICINE | Admitting: EMERGENCY MEDICINE
Payer: COMMERCIAL

## 2021-04-08 ENCOUNTER — APPOINTMENT (OUTPATIENT)
Dept: CT IMAGING | Facility: CLINIC | Age: 21
End: 2021-04-08
Attending: EMERGENCY MEDICINE
Payer: COMMERCIAL

## 2021-04-08 VITALS
HEART RATE: 90 BPM | DIASTOLIC BLOOD PRESSURE: 89 MMHG | TEMPERATURE: 98.1 F | RESPIRATION RATE: 16 BRPM | SYSTOLIC BLOOD PRESSURE: 135 MMHG | BODY MASS INDEX: 41.71 KG/M2 | OXYGEN SATURATION: 16 % | HEIGHT: 64 IN

## 2021-04-08 DIAGNOSIS — I26.99 BILATERAL PULMONARY EMBOLISM (H): ICD-10-CM

## 2021-04-08 DIAGNOSIS — R07.9 CHEST PAIN, UNSPECIFIED TYPE: ICD-10-CM

## 2021-04-08 LAB
ALBUMIN SERPL-MCNC: 3.2 G/DL (ref 3.4–5)
ALP SERPL-CCNC: 48 U/L (ref 40–150)
ALT SERPL W P-5'-P-CCNC: 38 U/L (ref 0–50)
ANION GAP SERPL CALCULATED.3IONS-SCNC: 8 MMOL/L (ref 3–14)
AST SERPL W P-5'-P-CCNC: 7 U/L (ref 0–45)
BASOPHILS # BLD AUTO: 0.1 10E9/L (ref 0–0.2)
BASOPHILS NFR BLD AUTO: 0.6 %
BILIRUB SERPL-MCNC: 0.2 MG/DL (ref 0.2–1.3)
BUN SERPL-MCNC: 29 MG/DL (ref 7–30)
CALCIUM SERPL-MCNC: 8.8 MG/DL (ref 8.5–10.1)
CHLORIDE SERPL-SCNC: 114 MMOL/L (ref 94–109)
CO2 SERPL-SCNC: 21 MMOL/L (ref 20–32)
CREAT SERPL-MCNC: 1.42 MG/DL (ref 0.52–1.04)
DIFFERENTIAL METHOD BLD: ABNORMAL
EOSINOPHIL # BLD AUTO: 0.3 10E9/L (ref 0–0.7)
EOSINOPHIL NFR BLD AUTO: 2.1 %
ERYTHROCYTE [DISTWIDTH] IN BLOOD BY AUTOMATED COUNT: 18.3 % (ref 10–15)
GFR SERPL CREATININE-BSD FRML MDRD: 52 ML/MIN/{1.73_M2}
GLUCOSE SERPL-MCNC: 68 MG/DL (ref 70–99)
HCG SERPL QL: NEGATIVE
HCT VFR BLD AUTO: 30 % (ref 35–47)
HGB BLD-MCNC: 9.3 G/DL (ref 11.7–15.7)
IMM GRANULOCYTES # BLD: 0.1 10E9/L (ref 0–0.4)
IMM GRANULOCYTES NFR BLD: 0.7 %
INTERPRETATION ECG - MUSE: NORMAL
LACTATE BLD-SCNC: 1 MMOL/L (ref 0.7–2)
LYMPHOCYTES # BLD AUTO: 2.3 10E9/L (ref 0.8–5.3)
LYMPHOCYTES NFR BLD AUTO: 18.7 %
MCH RBC QN AUTO: 29.2 PG (ref 26.5–33)
MCHC RBC AUTO-ENTMCNC: 31 G/DL (ref 31.5–36.5)
MCV RBC AUTO: 94 FL (ref 78–100)
MONOCYTES # BLD AUTO: 1.2 10E9/L (ref 0–1.3)
MONOCYTES NFR BLD AUTO: 9.6 %
MYELOPEROXIDASE AB SER-ACNC: <0.2 AI (ref 0–0.9)
NEUTROPHILS # BLD AUTO: 8.4 10E9/L (ref 1.6–8.3)
NEUTROPHILS NFR BLD AUTO: 68.3 %
NRBC # BLD AUTO: 0 10*3/UL
NRBC BLD AUTO-RTO: 0 /100
PLATELET # BLD AUTO: 343 10E9/L (ref 150–450)
POTASSIUM SERPL-SCNC: 3.8 MMOL/L (ref 3.4–5.3)
PROT SERPL-MCNC: 6.3 G/DL (ref 6.8–8.8)
PROTEINASE3 IGG SER-ACNC: 0.3 AI (ref 0–0.9)
RADIOLOGIST FLAGS: ABNORMAL
RBC # BLD AUTO: 3.18 10E12/L (ref 3.8–5.2)
SODIUM SERPL-SCNC: 143 MMOL/L (ref 133–144)
TROPONIN I SERPL-MCNC: <0.015 UG/L (ref 0–0.04)
WBC # BLD AUTO: 12.3 10E9/L (ref 4–11)

## 2021-04-08 PROCEDURE — 84484 ASSAY OF TROPONIN QUANT: CPT | Performed by: EMERGENCY MEDICINE

## 2021-04-08 PROCEDURE — 84703 CHORIONIC GONADOTROPIN ASSAY: CPT | Performed by: EMERGENCY MEDICINE

## 2021-04-08 PROCEDURE — 250N000011 HC RX IP 250 OP 636: Performed by: EMERGENCY MEDICINE

## 2021-04-08 PROCEDURE — 99285 EMERGENCY DEPT VISIT HI MDM: CPT | Mod: 25 | Performed by: EMERGENCY MEDICINE

## 2021-04-08 PROCEDURE — 93005 ELECTROCARDIOGRAM TRACING: CPT | Performed by: EMERGENCY MEDICINE

## 2021-04-08 PROCEDURE — 93010 ELECTROCARDIOGRAM REPORT: CPT | Performed by: EMERGENCY MEDICINE

## 2021-04-08 PROCEDURE — 85025 COMPLETE CBC W/AUTO DIFF WBC: CPT | Performed by: EMERGENCY MEDICINE

## 2021-04-08 PROCEDURE — 71275 CT ANGIOGRAPHY CHEST: CPT

## 2021-04-08 PROCEDURE — 83605 ASSAY OF LACTIC ACID: CPT | Performed by: EMERGENCY MEDICINE

## 2021-04-08 PROCEDURE — 71275 CT ANGIOGRAPHY CHEST: CPT | Mod: 26 | Performed by: RADIOLOGY

## 2021-04-08 PROCEDURE — 250N000009 HC RX 250: Performed by: EMERGENCY MEDICINE

## 2021-04-08 PROCEDURE — 80053 COMPREHEN METABOLIC PANEL: CPT | Performed by: EMERGENCY MEDICINE

## 2021-04-08 RX ORDER — IOPAMIDOL 755 MG/ML
73 INJECTION, SOLUTION INTRAVASCULAR ONCE
Status: COMPLETED | OUTPATIENT
Start: 2021-04-08 | End: 2021-04-08

## 2021-04-08 RX ADMIN — SODIUM CHLORIDE, PRESERVATIVE FREE 101 ML: 5 INJECTION INTRAVENOUS at 06:00

## 2021-04-08 RX ADMIN — IOPAMIDOL 73 ML: 755 INJECTION, SOLUTION INTRAVENOUS at 06:00

## 2021-04-08 ASSESSMENT — ENCOUNTER SYMPTOMS
BRUISES/BLEEDS EASILY: 1
PALPITATIONS: 0
CONFUSION: 0
COUGH: 0
CHILLS: 0
WEAKNESS: 1
DYSURIA: 0
SHORTNESS OF BREATH: 1
ABDOMINAL PAIN: 0
RHINORRHEA: 0
LIGHT-HEADEDNESS: 1
FEVER: 0
BACK PAIN: 1

## 2021-04-08 NOTE — DISCHARGE INSTRUCTIONS
Thank you for your patience today.  Please follow-up with your regular doctor in the next 1-2 days for further evaluation and follow-up care.  Please call to schedule an appointment.  Please continue your own medications.  Please return to the ER if you develop chest pain, shortness of breath, weakness, syncope, or any worsening of your current symptoms.  It was a pleasure taking care of you today.  We hope you feel better soon.

## 2021-04-08 NOTE — ED TRIAGE NOTES
History of wagners granulomatosis, was diagnosed and hospitalized forblood clots, reports she started to have chest pain a few hours ago.

## 2021-04-08 NOTE — ED PROVIDER NOTES
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  Patient states that she was feeling well earlier today however her arm around 0200 a.m. she was at a friend's house and started to develop some back pain and chest pain.  Patient describes the pain is a sharp shooting pain that starts in her back and her left shoulder and goes to the front of her chest.  Patient denies any aggravating or alleviating factors, no history of similar pain in the past.  Patient reports associated mild shortness of breath along with some increase in pain when she breathes.  Patient denies any fever, chills, nausea, vomiting, abdominal pain, diarrhea, constipation, dysuria, hematuria, denies any new leg pain or swelling.  Patient states that she has been taking her medications as directed.  Patient also does report some weakness and lightheadedness but denies any syncope.  Patient concern for increasing blood clot or recurrent clot in her heart.      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     Multiple subsegmental pulmonary emboli without acute cor pulmonale (H) 03/2021     PFO (patent foramen ovale)      Preeclampsia, third trimester      Spontaneous pregnancy loss 2020    31 weeks     Stage 3b chronic kidney disease      Wegener's disease, pulmonary (H)  01/01/2010    renal biopdy     Past Surgical History:   Procedure Laterality Date     ENT SURGERY  2000    cyst     EXCISE GANGLION WRIST  2009     gabapentin (NEURONTIN) 300 MG capsule  medroxyPROGESTERone (DEPO-PROVERA) 150 MG/ML IM injection  mupirocin (BACTROBAN) 2 % external ointment  predniSONE (DELTASONE) 10 MG tablet  Rivaroxaban ANTICOAGULANT 15 & 20 MG TBPK  sodium chloride (OCEAN) 0.65 % nasal spray  traZODone (DESYREL) 50 MG tablet      Allergies   Allergen Reactions     Penicillins Rash     Unknown, but think rash.  Tolerated cephalexin (12/27/20), cefpodoxime (12/27/20)     Family History  Family History   Problem Relation Age of Onset     Thyroid Disease Mother      Cancer Maternal Grandfather      Asthma No family hx of      Diabetes No family hx of      Social History   Social History     Tobacco Use     Smoking status: Current Every Day Smoker     Smokeless tobacco: Never Used     Tobacco comment: currently vaping   Substance Use Topics     Alcohol use: Not Currently     Frequency: 2-3 times a week     Drug use: No      Past medical history, past surgical history, medications, allergies, family history, and social history were reviewed with the patient. No additional pertinent items.       Review of Systems   Constitutional: Negative for chills and fever.   HENT: Negative for rhinorrhea.    Eyes: Negative for visual disturbance.   Respiratory: Positive for shortness of breath. Negative for cough.    Cardiovascular: Positive for chest pain. Negative for palpitations.   Gastrointestinal: Negative for abdominal pain.   Endocrine: Negative for polyuria.   Genitourinary: Negative for dysuria.   Musculoskeletal: Positive for back pain.   Skin: Negative for rash.   Allergic/Immunologic: Positive for immunocompromised state.   Neurological: Positive for weakness and light-headedness.   Hematological: Bruises/bleeds easily.   Psychiatric/Behavioral: Negative for confusion.     Physical Exam   BP: (!)  "144/90  Pulse: 114  Temp: 98.1  F (36.7  C)  Resp: 16  Height: 162.6 cm (5' 4\")  SpO2: 99 %  Physical Exam  General: Afebrile, no acute distress   HEENT: Normocephalic, atraumatic, conjunctivae normal. MMM  Neck: non-tender, supple  Cardio: tachycardic rate. regular rhythm   Resp: Normal work of breathing, no respiratory distress, lungs clear bilaterally, no wheezing, rhonchi, rales  Chest/Back: no visual signs of trauma, no CVA tenderness   Abdomen: soft, non distension, no tenderness, no peritoneal signs   Neuro: alert and fully oriented. CN II-XII grossly intact. Grossly normal strength and sensation in all extremities.   MSK: no deformities. Normal range of motion  Integumentary/Skin: no rash visualized, normal color  Psych: normal affect, normal behavior    ED Course      Procedures          EKG Interpretation:      Interpreted by Natacha Toney MD  Time reviewed: 0419  Symptoms at time of EKG: chest pain   Rhythm: sinus tachycardia    Rate: Tachycardia - 113 bpm  Axis: Normal  Ectopy: none  Conduction: normal  ST Segments/ T Waves: No acute ischemic change, T wave inversion in III, aVF, V1-V5  Q Waves: none  Comparison to prior: T wave inversions new when compared to our EKG 1/2021; T wave inversion reported present at OSH EKG on 3/20/21    Clinical Impression: Sinus tachycardia with no acute ischemic change, T wave inversions in inferior, anterior leads     Results for orders placed or performed during the hospital encounter of 04/08/21   CT Chest Pulmonary Embolism w Contrast     Status: Abnormal (Preliminary result)   Result Value Ref Range    Radiologist flags Pulmonary embolism (AA)     Narrative    EXAM: CT CHEST PULMONARY EMBOLISM W CONTRAST  LOCATION: [facility]  DATE/TIME: 4/8/2021 5:57 AM    INDICATION: PE suspected, high prob. Chest pain and shortness of breath.  COMPARISON: 1/28/2021.  TECHNIQUE: CT chest pulmonary angiogram during arterial phase injection of IV contrast. " Multiplanar reformats and MIP reconstructions were performed. Dose reduction techniques were used.   CONTRAST: 73 mL Isovue 370.     FINDINGS:  ANGIOGRAM CHEST: There are emboli in branches of the right lower lobe pulmonary artery as well as left lower lobe pulmonary artery. No large central pulmonary embolus or saddle embolus. Thoracic aorta is negative for dissection. The heart size is normal.    LUNGS AND PLEURA: Mild dependent atelectasis at the lung bases. Previous pulmonary infiltrates have resolved. No pneumothorax or pleural effusion.    MEDIASTINUM/AXILLAE: Normal.    CORONARY ARTERY CALCIFICATION:    UPPER ABDOMEN: Normal.    MUSCULOSKELETAL: Normal.      Impression    IMPRESSION:  1.  Bilateral lower lobe pulmonary emboli.      [Critical Result: Pulmonary embolism]    Finding was identified on 4/8/2021 6:09 AM.     Dr. Toney was contacted by me on 4/8/2021 6:16 AM and verbalized understanding of the critical result.               CBC with platelets differential     Status: Abnormal   Result Value Ref Range    WBC 12.3 (H) 4.0 - 11.0 10e9/L    RBC Count 3.18 (L) 3.8 - 5.2 10e12/L    Hemoglobin 9.3 (L) 11.7 - 15.7 g/dL    Hematocrit 30.0 (L) 35.0 - 47.0 %    MCV 94 78 - 100 fl    MCH 29.2 26.5 - 33.0 pg    MCHC 31.0 (L) 31.5 - 36.5 g/dL    RDW 18.3 (H) 10.0 - 15.0 %    Platelet Count 343 150 - 450 10e9/L    Diff Method Automated Method     % Neutrophils 68.3 %    % Lymphocytes 18.7 %    % Monocytes 9.6 %    % Eosinophils 2.1 %    % Basophils 0.6 %    % Immature Granulocytes 0.7 %    Nucleated RBCs 0 0 /100    Absolute Neutrophil 8.4 (H) 1.6 - 8.3 10e9/L    Absolute Lymphocytes 2.3 0.8 - 5.3 10e9/L    Absolute Monocytes 1.2 0.0 - 1.3 10e9/L    Absolute Eosinophils 0.3 0.0 - 0.7 10e9/L    Absolute Basophils 0.1 0.0 - 0.2 10e9/L    Abs Immature Granulocytes 0.1 0 - 0.4 10e9/L    Absolute Nucleated RBC 0.0    Comprehensive metabolic panel     Status: Abnormal   Result Value Ref Range    Sodium 143 133 - 144  mmol/L    Potassium 3.8 3.4 - 5.3 mmol/L    Chloride 114 (H) 94 - 109 mmol/L    Carbon Dioxide 21 20 - 32 mmol/L    Anion Gap 8 3 - 14 mmol/L    Glucose 68 (L) 70 - 99 mg/dL    Urea Nitrogen 29 7 - 30 mg/dL    Creatinine 1.42 (H) 0.52 - 1.04 mg/dL    GFR Estimate 52 (L) >60 mL/min/[1.73_m2]    GFR Estimate If Black 60 (L) >60 mL/min/[1.73_m2]    Calcium 8.8 8.5 - 10.1 mg/dL    Bilirubin Total 0.2 0.2 - 1.3 mg/dL    Albumin 3.2 (L) 3.4 - 5.0 g/dL    Protein Total 6.3 (L) 6.8 - 8.8 g/dL    Alkaline Phosphatase 48 40 - 150 U/L    ALT 38 0 - 50 U/L    AST 7 0 - 45 U/L   Troponin I     Status: None   Result Value Ref Range    Troponin I ES <0.015 0.000 - 0.045 ug/L   Lactic acid     Status: None   Result Value Ref Range    Lactic Acid 1.0 0.7 - 2.0 mmol/L   HCG qualitative     Status: None   Result Value Ref Range    HCG Qualitative Serum Negative NEG^Negative   EKG 12-lead, tracing only     Status: None (Preliminary result)   Result Value Ref Range    Interpretation ECG Click View Image link to view waveform and result      Medications   iopamidol (ISOVUE-370) solution 73 mL (73 mLs Intravenous Given 4/8/21 0600)   sodium chloride 0.9 % bag 500mL for CT scan flush use (101 mLs Intravenous Given 4/8/21 0600)        Assessments & Plan (with Medical Decision Making)   Cande Shields is a 20 year old female who is a past medical history of of Granulomatosis with polyangitis, CKD3, recent hospitalization [place] [facility] [place] for bilateral submassive PE's (3/23/21), bilateral LE distal DVT, Left femoral artery clot s/p cathter direct thrombolysis/TPA, PFO, and 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  Upon arrival patient is well-appearing, afebrile, mild distress secondary to pain.  Blood pressure 144/90, heart rate elevated 114 bpm, oxygen 99% on room air, patient in no respiratory distress.  Patient here with some chest pain and  back pain that started earlier this morning, significant history for recent bilateral pulmonary embolisms along with bilateral DVTs, hand clot in her left femoral artery status post thrombolysis.  Patient concern for increased clot burden in her lungs, recurrent clot in the right atrium.    I reviewed EKG which demonstrates sinus tachycardia with a ventricular rate of 113 bpm, normal axis, no acute ischemic change, there are T wave inversions noted in the inferior anterior leads which are new when compared to her prior EKGs in January however reported T wave inversions were present at outside hospital EKG in March.  I reviewed comprehensive labs which are remarkable for leukocytosis with white blood cell count of 12.3, hemoglobin 9.3, no acute electrolyte abnormality, creatinine mildly elevated at 1.42 (1.39), BUN 29, no transaminitis, negative troponin, lactic acid 1.0.     I reviewed CT chest and discussed results with radiologist which demonstrate bilateral lower lobe pulmonary emboli.  No large central pulmonary embolus or saddle embolus.    Per chart review patient with CT PE study done on 3/20/2021 which demonstrates pulmonary emboli with very large bilateral clot burden with dilation of the main pulmonary artery, possible right heart strain. However unable to visualize the films to compare.    I discussed the case with cardiology fellow who will evaluate the patient in the emergency department, performed bedside echo, I suspect likely discharge home after cardiology consult, appreciate cardiology recommendations.     Patient requesting to be discharged, does not want to stay for cardiology consult, echocardiogram, and repeat troponin.  Patient understands risk of leaving, states that she does have follow-up with her primary care provider today, and will follow up with her care team.  I did discuss with patient to return immediately to the emergency department if any weakness, lightheadedness, syncope, chest  pain, shortness of breath, nursing symptoms.  Patient understands agrees with plan.    I have reviewed the nursing notes. I have reviewed the findings, diagnosis, plan and need for follow up with the patient.    New Prescriptions    No medications on file       Final diagnoses:   Chest pain, unspecified type   Bilateral pulmonary embolism (H)       --  Natacha Toney MD  [facility] EMERGENCY DEPARTMENT  4/8/2021     Natacha Toney MD  04/08/21 0636       Natacha Toney MD  04/08/21 0647

## 2021-04-09 ENCOUNTER — TELEPHONE (OUTPATIENT)
Dept: CARDIOLOGY | Facility: CLINIC | Age: 21
End: 2021-04-09

## 2021-04-09 DIAGNOSIS — Q21.12 PFO (PATENT FORAMEN OVALE): Primary | ICD-10-CM

## 2021-04-09 NOTE — TELEPHONE ENCOUNTER
Left Detailed message about appointment for her WILY and consultation for a possible PFO. Left my contact information to call me back to let me know if those dates work for her.

## 2021-04-13 ENCOUNTER — VIRTUAL VISIT (OUTPATIENT)
Dept: NEPHROLOGY | Facility: CLINIC | Age: 21
End: 2021-04-13
Attending: INTERNAL MEDICINE
Payer: COMMERCIAL

## 2021-04-13 DIAGNOSIS — G47.00 INSOMNIA, UNSPECIFIED TYPE: ICD-10-CM

## 2021-04-13 DIAGNOSIS — I77.6 VASCULITIS (H): Primary | ICD-10-CM

## 2021-04-13 DIAGNOSIS — I77.82 ANCA-ASSOCIATED VASCULITIS (H): ICD-10-CM

## 2021-04-13 PROCEDURE — 99214 OFFICE O/P EST MOD 30 MIN: CPT | Mod: 95 | Performed by: INTERNAL MEDICINE

## 2021-04-13 RX ORDER — DULOXETIN HYDROCHLORIDE 30 MG/1
30 CAPSULE, DELAYED RELEASE ORAL AT BEDTIME
Qty: 90 CAPSULE | Refills: 1 | Status: SHIPPED | OUTPATIENT
Start: 2021-04-13 | End: 2021-10-30

## 2021-04-13 SDOH — HEALTH STABILITY: MENTAL HEALTH: HOW OFTEN DO YOU HAVE A DRINK CONTAINING ALCOHOL?: NOT ASKED

## 2021-04-13 ASSESSMENT — PAIN SCALES - GENERAL: PAINLEVEL: MODERATE PAIN (4)

## 2021-04-13 NOTE — LETTER
4/13/2021       RE: Cande Shields  [address] [place] [id number]     Dear Colleague,    Thank you for referring your patient, Cande Shields, to the [facility] [place] at [facility]. Please see a copy of my visit note below.      Nephrology Clinic Note  04/13/2021         Assessment & Recommendations:   Cande Shields is a 20 year old female who has a history of PR3+ ANCA vasculitis We are following for management of PR3 ANCA Vasculitis.     PR3+ ANCA Vasculitis (Original Treatment course in ~2012 included Cytoxan, Ritux and methotrexate per patient report)  Pulmonary and Renal Involvement  Cr elevated at ~2.0 up from baseline ~0.6-0.7 at presentation at [facility] 1/28/2021.  s/p rituximab 375mg/m2 for 4 weekly doses and steroid taper in late 2020/early 2021, who was re admitted end of January 2021 with vasculitis flare. She additionally had epistaxis, mild hemoptysis and new pulm infiltrates on CT along with RBC casts on urine micro. Pulse dose steroids initiated and she was given 2 doses of cytoxan 500mg/m2 ending in Feb 2021. (Leuprolide not administered after discussion re: Ovarian flare).     Overall, she appears to be stable from her most recent hospital discharge. From a vasculitis standpoint, she appears to be stable and continues to improve - Cr 1.4, RBC 2-5 per HPF, Proteinuria ~1.1 gm/gm. PR3 neg at this time, and serum Albumin is 3.2. Will have her continue Prednisone 5mg Daily until 4/27, starting 4/28 she will take 5mg Prednisone every other day. We will repeat labs and have a follow up visit the week of May 10 with labs. Will need to consider Ritux for maintenance around June/July 2021.    Of note, she has gained ~20 lbs with this vasculitis flare - we encouraged her to try and exercise as able (minding her SOB) and work over the next few months to try and get back to her original weight.     Pulmonary  Embolism  Arterial Thrombus  PFO  Seen by [facility] inpatient, no identified cause of the acute PE/femoral thrombosis. APLS studies negative from  (3/21/21). Currently on Xarelto. To be seen by [facility] and [facility] for further work up. No changes to Xarelto.    Insomnia  Advised her to monitor take Duloxetine at bedtime, and discontinue the Trazodone.     Disability Questions  SW consult placed    Tobacco Use  Referred for smoking cessation counseling    RTC:1 month with Lab    Seen and discussed with Dr. Chang.    Adrián Lacy MD   [phone]  Nephrology    Interval History :   Since discharge from the hospital on 3/2021, she reports doing okay. She was admitted with acute PE (saddle) with RV strain, acute L femoral thrombosis (found to have PFO). Treated with anticoagulation, currently on Xarelto.  She continues to have some SOB (not on O2), as well as neuropathic pain in her LLE. She is on gabapentin without significant improvement. Still with some burning/sharp pain.   Otherwise, no chest pain. No fevers or rashes. No hematuria or dysuria. No abd pain.  She is taking 5mg Pred daily - sleeping okay with intermittent insomnia. Sleep much improved with Trazodone 100mg at bedtime. Appetite is normal, no nausea or vomiting. She is hopeful she can quit smoking.     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     Multiple subsegmental pulmonary emboli without acute cor pulmonale (H) 03/2021     PFO (patent foramen ovale)      Preeclampsia, third trimester      Spontaneous pregnancy loss 2020    31 weeks     Stage 3b chronic kidney disease      Wegener's disease, pulmonary (H) 01/01/2010    renal biopdy     Social History     Socioeconomic History     Marital status:      Spouse  name: Not on file     Number of children: Not on file     Years of education: Not on file     Highest education level: Not on file   Occupational History     Not on file   Social Needs     Financial resource strain: Not on file     Food insecurity     Worry: Not on file     Inability: Not on file     Transportation needs     Medical: Not on file     Non-medical: Not on file   Tobacco Use     Smoking status: Current Every Day Smoker     Packs/day: 0.25     Smokeless tobacco: Never Used     Tobacco comment: past vaping   Substance and Sexual Activity     Alcohol use: Not Currently     Drug use: No     Sexual activity: Never   Lifestyle     Physical activity     Days per week: Not on file     Minutes per session: Not on file     Stress: Not on file   Relationships     Social connections     Talks on phone: Not on file     Gets together: Not on file     Attends Synagogue service: Not on file     Active member of club or organization: Not on file     Attends meetings of clubs or organizations: Not on file     Relationship status: Not on file     Intimate partner violence     Fear of current or ex partner: Not on file     Emotionally abused: Not on file     Physically abused: Not on file     Forced sexual activity: Not on file   Other Topics Concern     Not on file   Social History Narrative    Lives with brother 14yo and mother. Pets: Dog Nicholas.    Lives with  in [place], has cat.    Mom lives next door.       Physical Exam:   GENERAL APPEARANCE: alert and no distress  HENT: Cushingoid Face  RESP: breathing appears unremarkable with normal rate, no audible wheezing or cough and no apparent shortness of breath with conversation  MS: extremities normal - no gross deformities noted  SKIN: no apparent rash and normal skin tone  NEURO: speech is clear with no obvious neurological deficits  PSYCH: mentation appears normal and affect normal    Labs:   All labs reviewed by me  Electrolytes/Renal -   Recent Labs   Lab Test  04/08/21 0439 04/07/21 1239 03/04/21  0841 02/12/21  1425 02/12/21  1425 01/31/21  0450 01/31/21  0450    145* 139   < > 137   < > 138   POTASSIUM 3.8 3.8 3.9   < > 4.7   < > 4.9   CHLORIDE 114* 116* 110*   < > 109   < > 111*   CO2 21 18* 22   < > 19*   < > 20   BUN 29 30 36*   < > 50*   < > 46*   CR 1.42* 1.39* 1.69*   < > 1.89*   < > 1.94*   GLC 68* 80 80   < > 100*   < > 117*   KRAIG 8.8 9.1 8.8   < > 9.7   < > 8.8   MAG  --   --   --   --   --   --  2.0   PHOS  --  3.6  --   --  4.4  --   --     < > = values in this interval not displayed.       CBC -   Recent Labs   Lab Test 04/08/21 0439 04/07/21 1239 03/04/21  0841   WBC 12.3* 9.5 10.8   HGB 9.3* 9.0* 11.1*    351 205       LFTs -   Recent Labs   Lab Test 04/08/21 0439 04/07/21 1239 03/04/21  0841 02/21/21  1919   ALKPHOS 48  --  31* 40   BILITOTAL 0.2  --  0.2 0.3   ALT 38  --  25 35   AST 7  --  7 7   PROTTOTAL 6.3*  --  5.9* 6.9   ALBUMIN 3.2* 3.1* 3.2* 3.9       Imaging:  CT Chest W Contrast 4/2021  PE noted    Current Medications:  Current Outpatient Medications   Medication     gabapentin (NEURONTIN) 300 MG capsule     medroxyPROGESTERone (DEPO-PROVERA) 150 MG/ML IM injection     mupirocin (BACTROBAN) 2 % external ointment     predniSONE (DELTASONE) 10 MG tablet     Rivaroxaban ANTICOAGULANT 15 & 20 MG TBPK     sodium chloride (OCEAN) 0.65 % nasal spray     traZODone (DESYREL) 50 MG tablet     No current facility-administered medications for this visit.      ------    Cande is a 20 year old who is being evaluated via a billable video visit.      How would you like to obtain your AVS? [employer]  If the video visit is dropped, the invitation should be resent by: Text to cell phone: [phone]  Will anyone else be joining your video visit? No      Video Start Time: 3:00 PM    Video-Visit Details    Type of service:  Video Visit    Video End Time:3:32 PM    Originating Location (pt. Location): Home    Distant Location (provider location):   [facility] [place]     Platform used for Video Visit: Dany      Attestation:  This patient was evaluated by me, Clark Chang MD on April 13, 2021 jointly with Dr Lacy.  Discussed with Dr Lacy and agree with the findings and plan in this note.  I have reviewed  Medications, Vital Signs and Labs.  Cushingoid facies      Results for YUMIKO LYONS (MRN [id number]) as of 4/13/2021 15:11   Ref. Range 1/29/2021 09:28 1/30/2021 05:02 4/7/2021 12:39   GBM Antibody IgG Latest Ref Range: 0.0 - 0.9 AI  <0.2    Myeloperoxidase Antibody IgG Latest Ref Range: 0.0 - 0.9 AI <0.2  <0.2   Proteinase 3 Antibody IgG Latest Ref Range: 0.0 - 0.9 AI 4.0 (H)  0.3         Clark Chang MD  [facility]  Department of Medicine  Division of Renal Disease and Hypertension  Pager

## 2021-04-13 NOTE — PROGRESS NOTES
Nephrology Clinic Note  04/13/2021         Assessment & Recommendations:   Cande Shields is a 20 year old female who has a history of PR3+ ANCA vasculitis We are following for management of PR3 ANCA Vasculitis.     PR3+ ANCA Vasculitis (Original Treatment course in ~2012 included Cytoxan, Ritux and methotrexate per patient report)  Pulmonary and Renal Involvement  Cr elevated at ~2.0 up from baseline ~0.6-0.7 at presentation at [facility] 1/28/2021.  s/p rituximab 375mg/m2 for 4 weekly doses and steroid taper in late 2020/early 2021, who was re admitted end of January 2021 with vasculitis flare. She additionally had epistaxis, mild hemoptysis and new pulm infiltrates on CT along with RBC casts on urine micro. Pulse dose steroids initiated and she was given 2 doses of cytoxan 500mg/m2 ending in Feb 2021. (Leuprolide not administered after discussion re: Ovarian flare).     Overall, she appears to be stable from her most recent hospital discharge. From a vasculitis standpoint, she appears to be stable and continues to improve - Cr 1.4, RBC 2-5 per HPF, Proteinuria ~1.1 gm/gm. PR3 neg at this time, and serum Albumin is 3.2. Will have her continue Prednisone 5mg Daily until 4/27, starting 4/28 she will take 5mg Prednisone every other day. We will repeat labs and have a follow up visit the week of May 10 with labs. Will need to consider Ritux for maintenance around June/July 2021.    Of note, she has gained ~20 lbs with this vasculitis flare - we encouraged her to try and exercise as able (minding her SOB) and work over the next few months to try and get back to her original weight.     Pulmonary Embolism  Arterial Thrombus  PFO  Seen by [facility] inpatient, no identified cause of the acute PE/femoral thrombosis. APLS studies negative from [facility] (3/21/21). Currently on Xarelto. To be seen by [facility] Cardiology and [facility] Bleeding/Clotting for further work up. No changes to Xarelto.    Insomnia  Advised her to monitor take Duloxetine at  bedtime, and discontinue the Trazodone.     Disability Questions  SW consult placed    Tobacco Use  Referred for smoking cessation counseling    RTC:1 month with Lab    Seen and discussed with Dr. Chang.    Adrián Lacy MD   [phone]  Nephrology    Interval History :   Since discharge from the hospital on 3/2021, she reports doing okay. She was admitted with acute PE (saddle) with RV strain, acute L femoral thrombosis (found to have PFO). Treated with anticoagulation, currently on Xarelto.  She continues to have some SOB (not on O2), as well as neuropathic pain in her LLE. She is on gabapentin without significant improvement. Still with some burning/sharp pain.   Otherwise, no chest pain. No fevers or rashes. No hematuria or dysuria. No abd pain.  She is taking 5mg Pred daily - sleeping okay with intermittent insomnia. Sleep much improved with Trazodone 100mg at bedtime. Appetite is normal, no nausea or vomiting. She is hopeful she can quit smoking.     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     Multiple subsegmental pulmonary emboli without acute cor pulmonale (H) 03/2021     PFO (patent foramen ovale)      Preeclampsia, third trimester      Spontaneous pregnancy loss 2020    31 weeks     Stage 3b chronic kidney disease      Wegener's disease, pulmonary (H) 01/01/2010    renal biopdy     Social History     Socioeconomic History     Marital status:      Spouse name: Not on file     Number of children: Not on file     Years of education: Not on file     Highest education level: Not on file   Occupational History     Not on file   Social Needs     Financial resource strain: Not on file     Food insecurity     Worry: Not on file     Inability: Not on file     Transportation needs     Medical:  Not on file     Non-medical: Not on file   Tobacco Use     Smoking status: Current Every Day Smoker     Packs/day: 0.25     Smokeless tobacco: Never Used     Tobacco comment: past vaping   Substance and Sexual Activity     Alcohol use: Not Currently     Drug use: No     Sexual activity: Never   Lifestyle     Physical activity     Days per week: Not on file     Minutes per session: Not on file     Stress: Not on file   Relationships     Social connections     Talks on phone: Not on file     Gets together: Not on file     Attends Mandaen service: Not on file     Active member of club or organization: Not on file     Attends meetings of clubs or organizations: Not on file     Relationship status: Not on file     Intimate partner violence     Fear of current or ex partner: Not on file     Emotionally abused: Not on file     Physically abused: Not on file     Forced sexual activity: Not on file   Other Topics Concern     Not on file   Social History Narrative    Lives with brother 14yo and mother. Pets: Dog Nicholas.    Lives with  in [place], has cat.    Mom lives next door.       Physical Exam:   GENERAL APPEARANCE: alert and no distress  HENT: Cushingoid Face  RESP: breathing appears unremarkable with normal rate, no audible wheezing or cough and no apparent shortness of breath with conversation  MS: extremities normal - no gross deformities noted  SKIN: no apparent rash and normal skin tone  NEURO: speech is clear with no obvious neurological deficits  PSYCH: mentation appears normal and affect normal    Labs:   All labs reviewed by me  Electrolytes/Renal -   Recent Labs   Lab Test 04/08/21  0439 04/07/21  1239 03/04/21  0841 02/12/21  1425 02/12/21  1425 01/31/21  0450 01/31/21  0450    145* 139   < > 137   < > 138   POTASSIUM 3.8 3.8 3.9   < > 4.7   < > 4.9   CHLORIDE 114* 116* 110*   < > 109   < > 111*   CO2 21 18* 22   < > 19*   < > 20   BUN 29 30 36*   < > 50*   < > 46*   CR 1.42* 1.39* 1.69*   < >  1.89*   < > 1.94*   GLC 68* 80 80   < > 100*   < > 117*   KRAIG 8.8 9.1 8.8   < > 9.7   < > 8.8   MAG  --   --   --   --   --   --  2.0   PHOS  --  3.6  --   --  4.4  --   --     < > = values in this interval not displayed.       CBC -   Recent Labs   Lab Test 04/08/21 0439 04/07/21  1239 03/04/21  0841   WBC 12.3* 9.5 10.8   HGB 9.3* 9.0* 11.1*    351 205       LFTs -   Recent Labs   Lab Test 04/08/21 0439 04/07/21  1239 03/04/21  0841 02/21/21  1919   ALKPHOS 48  --  31* 40   BILITOTAL 0.2  --  0.2 0.3   ALT 38  --  25 35   AST 7  --  7 7   PROTTOTAL 6.3*  --  5.9* 6.9   ALBUMIN 3.2* 3.1* 3.2* 3.9       Imaging:  CT Chest W Contrast 4/2021  PE noted    Current Medications:  Current Outpatient Medications   Medication     gabapentin (NEURONTIN) 300 MG capsule     medroxyPROGESTERone (DEPO-PROVERA) 150 MG/ML IM injection     mupirocin (BACTROBAN) 2 % external ointment     predniSONE (DELTASONE) 10 MG tablet     Rivaroxaban ANTICOAGULANT 15 & 20 MG TBPK     sodium chloride (OCEAN) 0.65 % nasal spray     traZODone (DESYREL) 50 MG tablet     No current facility-administered medications for this visit.      ------    Cande is a 20 year old who is being evaluated via a billable video visit.      How would you like to obtain your AVS? MyChart  If the video visit is dropped, the invitation should be resent by: Text to cell phone: [phone]  Will anyone else be joining your video visit? No      Video Start Time: 3:00 PM    Video-Visit Details    Type of service:  Video Visit    Video End Time:3:32 PM    Originating Location (pt. Location): Home    Distant Location (provider location):  [facility] NEPHROLOG[facility]     Platform used for Video Visit: [email or web address]

## 2021-04-13 NOTE — PROGRESS NOTES
Attestation:  This patient was evaluated by me, Clark Chang MD on April 13, 2021 jointly with Dr Lacy.  Discussed with Dr Lacy and agree with the findings and plan in this note.  I have reviewed  Medications, Vital Signs and Labs.  Cushingoid facies      Results for YUMIKO LYONS (MRN [id number]) as of 4/13/2021 15:11   Ref. Range 1/29/2021 09:28 1/30/2021 05:02 4/7/2021 12:39   GBM Antibody IgG Latest Ref Range: 0.0 - 0.9 AI  <0.2    Myeloperoxidase Antibody IgG Latest Ref Range: 0.0 - 0.9 AI <0.2  <0.2   Proteinase 3 Antibody IgG Latest Ref Range: 0.0 - 0.9 AI 4.0 (H)  0.3         Clark Chang MD  [facility]  Department of Medicine  Division of Renal Disease and Hypertension  Pager

## 2021-04-16 ENCOUNTER — TELEPHONE (OUTPATIENT)
Dept: CARDIOLOGY | Facility: CLINIC | Age: 21
End: 2021-04-16

## 2021-04-16 DIAGNOSIS — Q21.12 PFO (PATENT FORAMEN OVALE): Primary | ICD-10-CM

## 2021-04-16 NOTE — TELEPHONE ENCOUNTER
Called and left detailed VM with information about her WILY being cancelled for now and a referral being put in for hematology. Also let her know we are keeping the appointment with Dr. Valiente on 5/6/2021.

## 2021-04-16 NOTE — PROGRESS NOTES
Date: 4/16/2021    Time of Call: 9:15 AM     Diagnosis:  PFO      [ TORB ] Ordering provider: Valiente Ganesh MD  Order: Hematology referral     Order received by: Dasia Graves RN     Follow-up/additional notes:   recent hospitalization at [facility] for bilateral submassive PE's (3/23/21), bilateral LE distal DVT, Left femoral artery clot s/p cathter direct thrombolysis/TPA, PFO, and RA thrombus on anticoagulation with Rivaroxaban

## 2021-04-19 ENCOUNTER — TELEPHONE (OUTPATIENT)
Dept: RHEUMATOLOGY | Facility: CLINIC | Age: 21
End: 2021-04-19

## 2021-04-19 NOTE — TELEPHONE ENCOUNTER
Pt was taking trazadone for sleep prior to starting Duloxetine, and it worked very well. Due to switching from Gabapentin to Duloxetine, she stopped the trazedone.  Now struggling to sleep despite using melatonin.      She is wondering if there is something else she can use for sleep. She is finding the duloxetine is helpful.     Will send message to Dr. Lacy to see what he recommends.    Eunice Lim RN  Rheumatology Clinic

## 2021-04-19 NOTE — TELEPHONE ENCOUNTER
Adrián Lacy MD Beard, Madeline, RN   Caller: Unspecified (Today,  3:21 PM)             Thanks for the update and message Eunice.   Good to hear the Duloxetine is effective.     She can resume the Trazadone at 50mg at bedtime (not sure if she was on 100 or 50 before). She should monitor closely for symptoms of serotonin syndrome - hyperreflexia, sweats, fevers, muscle twitching, confusion.     Thanks!!   Best,   IN      Called and spoke with pt, relayed above information to her. She will take only 50 mg a day and will be vigilant for above symptoms.      Pt is having some clots and menstrual bleeding.  We discussed that she should contact her PCP.  She will do so.    Eunice Lim RN  Rheumatology Clinic

## 2021-04-21 ENCOUNTER — MYC MEDICAL ADVICE (OUTPATIENT)
Dept: OTOLARYNGOLOGY | Facility: CLINIC | Age: 21
End: 2021-04-21

## 2021-04-27 ENCOUNTER — PATIENT OUTREACH (OUTPATIENT)
Dept: CARE COORDINATION | Facility: CLINIC | Age: 21
End: 2021-04-27

## 2021-04-30 NOTE — PROGRESS NOTES
Social Work Telephone Message Note  [facility] [place]    Patient Name:  Cande Shields  /Age:  2000 (20 year old)    Referral Source: Rheumatology Clinic, Eunice Lim RN  Reason for Referral:  Patient was seen in Rheumatology Clinic and expressed concerns regarding social security assistance.      contacted Patient via telephone on 2021 and 2021. Unable to leave voice messages on Patient's phone, as her in box is full.  will await Patient's return phone call and will provide assistance at that time.    TANYA Esparza, LGJOSSE  [facility]   Phone: [phone]

## 2021-05-01 ASSESSMENT — ENCOUNTER SYMPTOMS
WHEEZING: 0
SEIZURES: 0
HEADACHES: 0
NECK PAIN: 1
BACK PAIN: 1
ARTHRALGIAS: 1
DYSPNEA ON EXERTION: 0
PARALYSIS: 0
STIFFNESS: 0
SPUTUM PRODUCTION: 0
HEMOPTYSIS: 0
POSTURAL DYSPNEA: 0
SPEECH CHANGE: 0
EXERCISE INTOLERANCE: 0
SHORTNESS OF BREATH: 0
MUSCLE WEAKNESS: 1
DECREASED LIBIDO: 1
JOINT SWELLING: 0
COUGH DISTURBING SLEEP: 0
WEAKNESS: 1
TREMORS: 1
COUGH: 0
MEMORY LOSS: 0
HYPERTENSION: 0
NUMBNESS: 1
BRUISES/BLEEDS EASILY: 1
MYALGIAS: 1
LOSS OF CONSCIOUSNESS: 0
SLEEP DISTURBANCES DUE TO BREATHING: 0
SYNCOPE: 0
LIGHT-HEADEDNESS: 1
DIZZINESS: 0
ORTHOPNEA: 0
DISTURBANCES IN COORDINATION: 0
HOT FLASHES: 0
TINGLING: 1
HYPOTENSION: 0
LEG PAIN: 1
MUSCLE CRAMPS: 1
SNORES LOUDLY: 1

## 2021-05-01 ASSESSMENT — ANXIETY QUESTIONNAIRES
GAD7 TOTAL SCORE: 0
4. TROUBLE RELAXING: NOT AT ALL
GAD7 TOTAL SCORE: 0
3. WORRYING TOO MUCH ABOUT DIFFERENT THINGS: NOT AT ALL
7. FEELING AFRAID AS IF SOMETHING AWFUL MIGHT HAPPEN: NOT AT ALL
1. FEELING NERVOUS, ANXIOUS, OR ON EDGE: NOT AT ALL
7. FEELING AFRAID AS IF SOMETHING AWFUL MIGHT HAPPEN: NOT AT ALL
6. BECOMING EASILY ANNOYED OR IRRITABLE: NOT AT ALL
2. NOT BEING ABLE TO STOP OR CONTROL WORRYING: NOT AT ALL
5. BEING SO RESTLESS THAT IT IS HARD TO SIT STILL: NOT AT ALL

## 2021-05-03 NOTE — PROGRESS NOTES
SUBJECTIVE   Cande Shields is a 20 year old  who presents for consultation for AUB.     She has had bleeding daily for the last 4 months, using 7 tampons per day, and recently noting heavy clots. This started after she was hospitalized for flare of vaculitis where she received cyclophosphamide, and started Depo Provera. She was then diagnosed with DVT in March, and started on anticoagulation with Xarelto for the last 1.5 months.     She denies vaginal discharge, itching, constipation, abdominal pain. Some cramping with bleeding. Also currently having dysuria, does have frequent UTIs and notes that it feels like she might have one now.     Gynecologic History  Menstrual History:  Menstrual History 2020   LAST MENSTRUAL PERIOD 2010 12/15/2020   Menses: Usually monthly prior to pregnancy, bleeding 7 days, using 10 pads per day  Current contraception: Depo Provera. Has used this before and did not have heavy bleeding  Number of partners in last year: Sexually active with , though low libido the last year    Pap smear: 2020 NILM     Obstetric History  Stillbirth at 31 weeks in 2020. Was told this was due to severe pre-eclampsia.     Past Medical History  Past Medical History:   Diagnosis Date     ADHD (attention deficit hyperactivity disorder)      DVT, lower extremity, distal (H)      Dysmenorrhea      Femoral artery thrombosis, left (H) 2021     Multiple subsegmental pulmonary emboli without acute cor pulmonale (H) 2021     PFO (patent foramen ovale)      Preeclampsia, third trimester      Spontaneous pregnancy loss     31 weeks     Stage 3b chronic kidney disease      Wegener's disease, pulmonary (H) 2010    renal biopdy   History of ANCA vasculitis  Hx PE  Hx arterial thrombus  PFO    Past Surgical History  Past Surgical History:   Procedure Laterality Date     ENT SURGERY      cyst     EXCISE GANGLION WRIST  2009       Medications  Current  "Outpatient Medications   Medication     Drospirenone (SLYND) 4 MG TABS     DULoxetine (CYMBALTA) 30 MG capsule     ferrous sulfate (FEROSUL) 325 (65 Fe) MG tablet     medroxyPROGESTERone (DEPO-PROVERA) 150 MG/ML IM injection     medroxyPROGESTERone (PROVERA) 10 MG tablet     predniSONE (DELTASONE) 10 MG tablet     rivaroxaban ANTICOAGULANT (XARELTO) 20 MG TABS tablet     Rivaroxaban ANTICOAGULANT 15 & 20 MG TBPK     traZODone (DESYREL) 50 MG tablet     No current facility-administered medications for this visit.        Allergies     Allergies   Allergen Reactions     Heparin Other (See Comments)     Reaction: HIT     Penicillins Rash     Unknown, but think rash.  Tolerated cephalexin (12/27/20), cefpodoxime (12/27/20)   PCN allergy as a child, had a rash    Social History  Social History     Tobacco Use     Smoking status: Current Every Day Smoker     Packs/day: 0.25     Smokeless tobacco: Never Used     Tobacco comment: past vaping   Substance Use Topics     Alcohol use: Not Currently     Drug use: No       Family History  Family History   Problem Relation Age of Onset     Thyroid Disease Mother      Cancer Maternal Grandfather      Asthma No family hx of      Diabetes No family hx of        Review of Systems  See ROS below     OBJECTIVE   /84   Pulse 106   Ht 1.626 m (5' 4\")   Wt 111.1 kg (245 lb)   Breastfeeding No   BMI 42.05 kg/m    BMI: Body mass index is 42.05 kg/m .  General:  Alert, no distress       Lungs:  Normal work of breathing       Abdomen:  Soft, non-tender, non-distended, bowel sounds normal   Pelvic: -nefg  -bladder wnl, well supported  -vagina normal without discharge  -cervix normal in appearance, no masses  -small mobile uterus on bimanual exam    Extremities:  normal     Labs:  UPT neg   Pap normal 2020  GC/CT pending    TSH   Date Value Ref Range Status   01/31/2021 0.42 0.40 - 4.00 mU/L Final     Lab Results   Component Value Date    WBC 12.3 04/08/2021     Lab Results   Component " Value Date    RBC 3.18 2021     Lab Results   Component Value Date    HGB 9.3 2021     Lab Results   Component Value Date    HCT 30.0 2021     No components found for: MCT  Lab Results   Component Value Date    MCV 94 2021     Lab Results   Component Value Date    MCH 29.2 2021     Lab Results   Component Value Date    MCHC 31.0 2021     Lab Results   Component Value Date    RDW 18.3 2021     Lab Results   Component Value Date     2021       Imaging:   3/22/2021  FINDINGS:    Uterus: Measures 6.0 x 2.9 x 6.4 cm. There is a bicornuate uterus.    Endometrium: Measures up to 0.8 cm in thickness.      Right Ovary: Measures 2.7 x 1.5 x 1.4 cm and appears unremarkable    Right Ovary Blood Flow: There is some blood flow in the right ovary could not be identified although this is likely technical in nature as the right ovary was somewhat difficult to visualize because of overlying bowel gas.    Left Ovary: Measures 3.9 x 1.3 x 1.6 cm and appears unremarkable    Left Ovary Blood Flow: Present.    Free Fluid: no significant free fluid.    IMPRESSION:   1. Bicornuate uterus. Endometrial stripe measures up to 0.8 cm without focal lesion.  2. Both ovaries are normal in appearance. Flow in the right ovary could not definitely be documented although this is likely technical in nature.      ASSESSMENT   Cande Shields is a 20 year old  here today for consultation for AUB, likely multifactorial. Likely due to anti-coagulation with Xarelto, also in setting of Depo Provera administration, and history of heavier menses. Recent ultrasound shows no structural cause of bleeding but does note bicornuate uterus. She has limited treatment options given history of DVT and bicornuate uterus. Discussed treatment options including short course of Provera, POPs, Nexplanon, and off-label use of Mirena IUD. She does desire future fertility, though not recommended to pursue pregnancy  until 6 months after completion of recent chemotherapy. Very low likelihood of hyperplasia given patient's age. Patient prefers POPs for management, prefers not to have implanted device. Will plan for trial of POPs with follow up to evaluate symptoms.     PLAN     #AUB-C  - Discontinue Depo Provera. Last injection in early March  - Start Provera 10mg daily for 10 days  - Following Provera course, start POPs daily - Rx sent for Slynd   - Start PO Fe, recent Hgb 9.3  - Discussed return precautions, if bleeding worsens, filling >1 pad/hr for 2 hours, RTC or present to ED  - If she has continued heavy bleeding despite POPs, consider placement of Mirena IUD under ultrasound guidance. She is open to considering this at next visit    #Dysuria  - UA not completed due to inadequate urine sample  - Called patient and left message to return to lab if dysuria is persistent for evaluation      RTC in 4 weeks for f/up AUB treatment    Discussed with Dr. Roma York MD PGY2  Obstetrics & Gynecology  05/05/21   I agree with note as above. The patient was seen in continuity clinic by the resident doctor.  Assessment and plan were jointly made.  Cira Brandon MD      Answers for HPI/ROS submitted by the patient on 5/1/2021   EB 7 TOTAL SCORE: 0  General Symptoms: No  Skin Symptoms: No  HENT Symptoms: No  EYE SYMPTOMS: No  HEART SYMPTOMS: Yes  LUNG SYMPTOMS: Yes  INTESTINAL SYMPTOMS: No  URINARY SYMPTOMS: No  GYNECOLOGIC SYMPTOMS: Yes  BREAST SYMPTOMS: No  SKELETAL SYMPTOMS: Yes  BLOOD SYMPTOMS: Yes  NERVOUS SYSTEM SYMPTOMS: Yes  MENTAL HEALTH SYMPTOMS: No  Cough: No  Sputum or phlegm: No  Coughing up blood: No  Difficulty breating or shortness of breath: No  Snoring: Yes  Wheezing: No  Difficulty breathing on exertion: No  Nighttime Cough: No  Difficulty breathing when lying flat: No  Chest pain or pressure: Yes  Pain in legs with walking: Yes  Trouble breathing while lying down: No  Fingers or toes appear blue:  No  High blood pressure: No  Low blood pressure: No  Fainting: No  Murmurs: No  Pacemaker: No  Varicose veins: No  Edema or swelling: Yes  Wake up at night with shortness of breath: No  Light-headedness: Yes  Exercise intolerance: No  Back pain: Yes  Muscle aches: Yes  Neck pain: Yes  Swollen joints: No  Joint pain: Yes  Bone pain: No  Muscle cramps: Yes  Muscle weakness: Yes  Joint stiffness: No  Bone fracture: No  Anemia: Yes  Easy bleeding or bruising: Yes  Trouble with coordination: No  Dizziness or trouble with balance: No  Fainting or black-out spells: No  Memory loss: No  Headache: No  Seizures: No  Speech problems: No  Tingling: Yes  Tremor: Yes  Weakness: Yes  Difficulty walking: No  Paralysis: No  Numbness: Yes  Bleeding or spotting between periods: Yes  Heavy or painful periods: Yes  Irregular periods: Yes  Vaginal discharge: No  Hot flashes: No  Vaginal dryness: No  Genital ulcers: No  Reduced libido: Yes  Painful intercourse: No  Difficulty with sexual arousal: Yes  Post-menopausal bleeding: No

## 2021-05-04 ENCOUNTER — OFFICE VISIT (OUTPATIENT)
Dept: OBGYN | Facility: CLINIC | Age: 21
End: 2021-05-04
Attending: INTERNAL MEDICINE
Payer: COMMERCIAL

## 2021-05-04 VITALS
DIASTOLIC BLOOD PRESSURE: 84 MMHG | SYSTOLIC BLOOD PRESSURE: 118 MMHG | WEIGHT: 245 LBS | BODY MASS INDEX: 41.83 KG/M2 | HEART RATE: 106 BPM | HEIGHT: 64 IN

## 2021-05-04 DIAGNOSIS — R30.0 DYSURIA: Primary | ICD-10-CM

## 2021-05-04 DIAGNOSIS — N93.8 DYSFUNCTIONAL UTERINE BLEEDING: ICD-10-CM

## 2021-05-04 DIAGNOSIS — O03.9 SPONTANEOUS PREGNANCY LOSS: ICD-10-CM

## 2021-05-04 PROCEDURE — 99203 OFFICE O/P NEW LOW 30 MIN: CPT | Mod: GE | Performed by: OBSTETRICS & GYNECOLOGY

## 2021-05-04 PROCEDURE — 87591 N.GONORRHOEAE DNA AMP PROB: CPT | Performed by: STUDENT IN AN ORGANIZED HEALTH CARE EDUCATION/TRAINING PROGRAM

## 2021-05-04 PROCEDURE — 87491 CHLMYD TRACH DNA AMP PROBE: CPT | Performed by: STUDENT IN AN ORGANIZED HEALTH CARE EDUCATION/TRAINING PROGRAM

## 2021-05-04 PROCEDURE — G0463 HOSPITAL OUTPT CLINIC VISIT: HCPCS

## 2021-05-04 RX ORDER — MEDROXYPROGESTERONE ACETATE 10 MG
10 TABLET ORAL DAILY
Qty: 10 TABLET | Refills: 0 | Status: SHIPPED | OUTPATIENT
Start: 2021-05-04 | End: 2021-06-25

## 2021-05-04 RX ORDER — DROSPIRENONE 4 MG/1
1 TABLET, FILM COATED ORAL DAILY
Qty: 90 TABLET | Refills: 3 | Status: SHIPPED | OUTPATIENT
Start: 2021-05-04 | End: 2021-06-25

## 2021-05-04 RX ORDER — FERROUS SULFATE 325(65) MG
325 TABLET ORAL
Qty: 40 TABLET | Refills: 0 | Status: SHIPPED | OUTPATIENT
Start: 2021-05-04 | End: 2021-06-10

## 2021-05-04 ASSESSMENT — PATIENT HEALTH QUESTIONNAIRE - PHQ9
SUM OF ALL RESPONSES TO PHQ QUESTIONS 1-9: 6
5. POOR APPETITE OR OVEREATING: NOT AT ALL

## 2021-05-04 ASSESSMENT — ANXIETY QUESTIONNAIRES
2. NOT BEING ABLE TO STOP OR CONTROL WORRYING: SEVERAL DAYS
1. FEELING NERVOUS, ANXIOUS, OR ON EDGE: NEARLY EVERY DAY
GAD7 TOTAL SCORE: 7
7. FEELING AFRAID AS IF SOMETHING AWFUL MIGHT HAPPEN: NOT AT ALL
6. BECOMING EASILY ANNOYED OR IRRITABLE: NOT AT ALL
3. WORRYING TOO MUCH ABOUT DIFFERENT THINGS: NEARLY EVERY DAY
5. BEING SO RESTLESS THAT IT IS HARD TO SIT STILL: NOT AT ALL

## 2021-05-04 ASSESSMENT — PAIN SCALES - GENERAL: PAINLEVEL: NO PAIN (0)

## 2021-05-04 ASSESSMENT — MIFFLIN-ST. JEOR: SCORE: 1856.31

## 2021-05-04 NOTE — LETTER
2021       RE: Cande Shields  [address] [place] [id number]     Dear Colleague,    Thank you for referring your patient, Cande Shields, to the [facility] [place] at [facility]. Please see a copy of my visit note below.    SUBJECTIVE   Cande Shields is a 20 year old  who presents for consultation for AUB.     She has had bleeding daily for the last 4 months, using 7 tampons per day, and recently noting heavy clots. This started after she was hospitalized for flare of vaculitis where she received cyclophosphamide, and started Depo Provera. She was then diagnosed with DVT in March, and started on anticoagulation with Xarelto for the last 1.5 months.     She denies vaginal discharge, itching, constipation, abdominal pain. Some cramping with bleeding. Also currently having dysuria, does have frequent UTIs and notes that it feels like she might have one now.     Gynecologic History  Menstrual History:  Menstrual History 2020   LAST MENSTRUAL PERIOD 2010 12/15/2020   Menses: Usually monthly prior to pregnancy, bleeding 7 days, using 10 pads per day  Current contraception: Depo Provera. Has used this before and did not have heavy bleeding  Number of partners in last year: Sexually active with , though low libido the last year    Pap smear: 2020 NILM     Obstetric History  Stillbirth at 31 weeks in 2020. Was told this was due to severe pre-eclampsia.     Past Medical History  Past Medical History:   Diagnosis Date     ADHD (attention deficit hyperactivity disorder)      DVT, lower extremity, distal (H)      Dysmenorrhea      Femoral artery thrombosis, left (H) 2021     Multiple subsegmental pulmonary emboli without acute cor pulmonale (H) 2021     PFO (patent foramen ovale)      Preeclampsia, third trimester      Spontaneous pregnancy loss     31 weeks     Stage 3b chronic  "kidney disease      Wegener's disease, pulmonary (H) 01/01/2010    renal biopdy   History of ANCA vasculitis  Hx PE  Hx arterial thrombus  PFO    Past Surgical History  Past Surgical History:   Procedure Laterality Date     ENT SURGERY  2000    cyst     EXCISE GANGLION WRIST  2009       Medications  Current Outpatient Medications   Medication     Drospirenone (SLYND) 4 MG TABS     DULoxetine (CYMBALTA) 30 MG capsule     ferrous sulfate (FEROSUL) 325 (65 Fe) MG tablet     medroxyPROGESTERone (DEPO-PROVERA) 150 MG/ML IM injection     medroxyPROGESTERone (PROVERA) 10 MG tablet     predniSONE (DELTASONE) 10 MG tablet     rivaroxaban ANTICOAGULANT (XARELTO) 20 MG TABS tablet     Rivaroxaban ANTICOAGULANT 15 & 20 MG TBPK     traZODone (DESYREL) 50 MG tablet     No current facility-administered medications for this visit.        Allergies     Allergies   Allergen Reactions     Heparin Other (See Comments)     Reaction: HIT     Penicillins Rash     Unknown, but think rash.  Tolerated cephalexin (12/27/20), cefpodoxime (12/27/20)   PCN allergy as a child, had a rash    Social History  Social History     Tobacco Use     Smoking status: Current Every Day Smoker     Packs/day: 0.25     Smokeless tobacco: Never Used     Tobacco comment: past vaping   Substance Use Topics     Alcohol use: Not Currently     Drug use: No       Family History  Family History   Problem Relation Age of Onset     Thyroid Disease Mother      Cancer Maternal Grandfather      Asthma No family hx of      Diabetes No family hx of        Review of Systems  See ROS below     OBJECTIVE   /84   Pulse 106   Ht 1.626 m (5' 4\")   Wt 111.1 kg (245 lb)   Breastfeeding No   BMI 42.05 kg/m    BMI: Body mass index is 42.05 kg/m .  General:  Alert, no distress       Lungs:  Normal work of breathing       Abdomen:  Soft, non-tender, non-distended, bowel sounds normal   Pelvic: -nefg  -bladder wnl, well supported  -vagina normal without discharge  -cervix " normal in appearance, no masses  -small mobile uterus on bimanual exam    Extremities:  normal     Labs:  UPT neg   Pap normal   GC/CT pending    TSH   Date Value Ref Range Status   2021 0.42 0.40 - 4.00 mU/L Final     Lab Results   Component Value Date    WBC 12.3 2021     Lab Results   Component Value Date    RBC 3.18 2021     Lab Results   Component Value Date    HGB 9.3 2021     Lab Results   Component Value Date    HCT 30.0 2021     No components found for: MCT  Lab Results   Component Value Date    MCV 94 2021     Lab Results   Component Value Date    MCH 29.2 2021     Lab Results   Component Value Date    MCHC 31.0 2021     Lab Results   Component Value Date    RDW 18.3 2021     Lab Results   Component Value Date     2021       Imaging:   3/22/2021  FINDINGS:    Uterus: Measures 6.0 x 2.9 x 6.4 cm. There is a bicornuate uterus.    Endometrium: Measures up to 0.8 cm in thickness.      Right Ovary: Measures 2.7 x 1.5 x 1.4 cm and appears unremarkable    Right Ovary Blood Flow: There is some blood flow in the right ovary could not be identified although this is likely technical in nature as the right ovary was somewhat difficult to visualize because of overlying bowel gas.    Left Ovary: Measures 3.9 x 1.3 x 1.6 cm and appears unremarkable    Left Ovary Blood Flow: Present.    Free Fluid: no significant free fluid.    IMPRESSION:   1. Bicornuate uterus. Endometrial stripe measures up to 0.8 cm without focal lesion.  2. Both ovaries are normal in appearance. Flow in the right ovary could not definitely be documented although this is likely technical in nature.      ASSESSMENT   Cande Shields is a 20 year old  here today for consultation for AUB, likely multifactorial. Likely due to anti-coagulation with Xarelto, also in setting of Depo Provera administration, and history of heavier menses. Recent ultrasound shows no structural cause  of bleeding but does note bicornuate uterus. She has limited treatment options given history of DVT and bicornuate uterus. Discussed treatment options including short course of Provera, POPs, Nexplanon, and off-label use of Mirena IUD. She does desire future fertility, though not recommended to pursue pregnancy until 6 months after completion of recent chemotherapy. Very low likelihood of hyperplasia given patient's age. Patient prefers POPs for management, prefers not to have implanted device. Will plan for trial of POPs with follow up to evaluate symptoms.     PLAN     #AUB-C  - Discontinue Depo Provera. Last injection in early March  - Start Provera 10mg daily for 10 days  - Following Provera course, start POPs daily - Rx sent for Slynd   - Start PO Fe, recent Hgb 9.3  - Discussed return precautions, if bleeding worsens, filling >1 pad/hr for 2 hours, RTC or present to ED  - If she has continued heavy bleeding despite POPs, consider placement of Mirena IUD under ultrasound guidance. She is open to considering this at next visit    #Dysuria  - UA not completed due to inadequate urine sample  - Called patient and left message to return to lab if dysuria is persistent for evaluation      RTC in 4 weeks for f/up AUB treatment    Discussed with Dr. Roma York MD PGY2  Obstetrics & Gynecology  05/05/21   I agree with note as above. The patient was seen in continuity clinic by the resident doctor.  Assessment and plan were jointly made.  Cira Brandon MD      Answers for HPI/ROS submitted by the patient on 5/1/2021   EB 7 TOTAL SCORE: 0  General Symptoms: No  Skin Symptoms: No  HENT Symptoms: No  EYE SYMPTOMS: No  HEART SYMPTOMS: Yes  LUNG SYMPTOMS: Yes  INTESTINAL SYMPTOMS: No  URINARY SYMPTOMS: No  GYNECOLOGIC SYMPTOMS: Yes  BREAST SYMPTOMS: No  SKELETAL SYMPTOMS: Yes  BLOOD SYMPTOMS: Yes  NERVOUS SYSTEM SYMPTOMS: Yes  MENTAL HEALTH SYMPTOMS: No  Cough: No  Sputum or phlegm: No  Coughing up blood:  No  Difficulty breating or shortness of breath: No  Snoring: Yes  Wheezing: No  Difficulty breathing on exertion: No  Nighttime Cough: No  Difficulty breathing when lying flat: No  Chest pain or pressure: Yes  Pain in legs with walking: Yes  Trouble breathing while lying down: No  Fingers or toes appear blue: No  High blood pressure: No  Low blood pressure: No  Fainting: No  Murmurs: No  Pacemaker: No  Varicose veins: No  Edema or swelling: Yes  Wake up at night with shortness of breath: No  Light-headedness: Yes  Exercise intolerance: No  Back pain: Yes  Muscle aches: Yes  Neck pain: Yes  Swollen joints: No  Joint pain: Yes  Bone pain: No  Muscle cramps: Yes  Muscle weakness: Yes  Joint stiffness: No  Bone fracture: No  Anemia: Yes  Easy bleeding or bruising: Yes  Trouble with coordination: No  Dizziness or trouble with balance: No  Fainting or black-out spells: No  Memory loss: No  Headache: No  Seizures: No  Speech problems: No  Tingling: Yes  Tremor: Yes  Weakness: Yes  Difficulty walking: No  Paralysis: No  Numbness: Yes  Bleeding or spotting between periods: Yes  Heavy or painful periods: Yes  Irregular periods: Yes  Vaginal discharge: No  Hot flashes: No  Vaginal dryness: No  Genital ulcers: No  Reduced libido: Yes  Painful intercourse: No  Difficulty with sexual arousal: Yes  Post-menopausal bleeding: No

## 2021-05-04 NOTE — NURSING NOTE
Chief Complaint   Patient presents with     Establish Care     C/O irregular bleeding   Heike Masters LPN

## 2021-05-05 LAB
C TRACH DNA SPEC QL NAA+PROBE: NEGATIVE
N GONORRHOEA DNA SPEC QL NAA+PROBE: NEGATIVE
SPECIMEN SOURCE: NORMAL
SPECIMEN SOURCE: NORMAL

## 2021-05-15 ENCOUNTER — MYC MEDICAL ADVICE (OUTPATIENT)
Dept: INTERNAL MEDICINE | Facility: CLINIC | Age: 21
End: 2021-05-15

## 2021-06-05 VITALS
SYSTOLIC BLOOD PRESSURE: 134 MMHG | WEIGHT: 240 LBS | DIASTOLIC BLOOD PRESSURE: 83 MMHG | BODY MASS INDEX: 32.51 KG/M2 | HEIGHT: 72 IN | TEMPERATURE: 98.3 F | OXYGEN SATURATION: 94 % | HEART RATE: 74 BPM | RESPIRATION RATE: 20 BRPM

## 2021-06-05 VITALS
SYSTOLIC BLOOD PRESSURE: 151 MMHG | BODY MASS INDEX: 33.25 KG/M2 | DIASTOLIC BLOOD PRESSURE: 86 MMHG | RESPIRATION RATE: 18 BRPM | WEIGHT: 245.2 LBS | HEART RATE: 74 BPM | OXYGEN SATURATION: 96 % | TEMPERATURE: 98 F

## 2021-06-05 VITALS
WEIGHT: 247.2 LBS | RESPIRATION RATE: 18 BRPM | HEART RATE: 79 BPM | DIASTOLIC BLOOD PRESSURE: 74 MMHG | OXYGEN SATURATION: 98 % | BODY MASS INDEX: 33.52 KG/M2 | TEMPERATURE: 98.2 F | SYSTOLIC BLOOD PRESSURE: 133 MMHG

## 2021-06-10 ENCOUNTER — OFFICE VISIT (OUTPATIENT)
Dept: OBGYN | Facility: CLINIC | Age: 21
End: 2021-06-10
Payer: COMMERCIAL

## 2021-06-10 VITALS — BODY MASS INDEX: 42.23 KG/M2 | SYSTOLIC BLOOD PRESSURE: 104 MMHG | DIASTOLIC BLOOD PRESSURE: 70 MMHG | WEIGHT: 246 LBS

## 2021-06-10 DIAGNOSIS — N92.1 MENOMETRORRHAGIA: Primary | ICD-10-CM

## 2021-06-10 DIAGNOSIS — N93.8 DYSFUNCTIONAL UTERINE BLEEDING: ICD-10-CM

## 2021-06-10 LAB
HGB BLD-MCNC: 11.7 G/DL (ref 11.7–15.7)
TSH SERPL DL<=0.005 MIU/L-ACNC: 2.57 MU/L (ref 0.4–4)

## 2021-06-10 PROCEDURE — 99203 OFFICE O/P NEW LOW 30 MIN: CPT | Performed by: NURSE PRACTITIONER

## 2021-06-10 PROCEDURE — 84443 ASSAY THYROID STIM HORMONE: CPT | Performed by: NURSE PRACTITIONER

## 2021-06-10 PROCEDURE — 85018 HEMOGLOBIN: CPT | Performed by: NURSE PRACTITIONER

## 2021-06-10 PROCEDURE — 36415 COLL VENOUS BLD VENIPUNCTURE: CPT | Performed by: NURSE PRACTITIONER

## 2021-06-10 RX ORDER — FERROUS SULFATE 325(65) MG
325 TABLET ORAL
Qty: 90 TABLET | Refills: 0 | Status: SHIPPED | OUTPATIENT
Start: 2021-06-10 | End: 2021-10-30

## 2021-06-10 RX ORDER — ACETAMINOPHEN AND CODEINE PHOSPHATE 120; 12 MG/5ML; MG/5ML
0.35 SOLUTION ORAL DAILY
Qty: 90 TABLET | Refills: 1 | Status: SHIPPED | OUTPATIENT
Start: 2021-06-10 | End: 2021-08-11

## 2021-06-10 ASSESSMENT — PATIENT HEALTH QUESTIONNAIRE - PHQ9
5. POOR APPETITE OR OVEREATING: SEVERAL DAYS
SUM OF ALL RESPONSES TO PHQ QUESTIONS 1-9: 8

## 2021-06-10 ASSESSMENT — ANXIETY QUESTIONNAIRES
3. WORRYING TOO MUCH ABOUT DIFFERENT THINGS: SEVERAL DAYS
6. BECOMING EASILY ANNOYED OR IRRITABLE: NOT AT ALL
GAD7 TOTAL SCORE: 6
2. NOT BEING ABLE TO STOP OR CONTROL WORRYING: SEVERAL DAYS
5. BEING SO RESTLESS THAT IT IS HARD TO SIT STILL: SEVERAL DAYS
IF YOU CHECKED OFF ANY PROBLEMS ON THIS QUESTIONNAIRE, HOW DIFFICULT HAVE THESE PROBLEMS MADE IT FOR YOU TO DO YOUR WORK, TAKE CARE OF THINGS AT HOME, OR GET ALONG WITH OTHER PEOPLE: EXTREMELY DIFFICULT
7. FEELING AFRAID AS IF SOMETHING AWFUL MIGHT HAPPEN: SEVERAL DAYS
1. FEELING NERVOUS, ANXIOUS, OR ON EDGE: SEVERAL DAYS

## 2021-06-10 NOTE — PROGRESS NOTES
SUBJECTIVE:                                                   Cande Shields is a 20 year old female who presents to clinic today for the following health issue(s):  Patient presents with:  Abnormal Uterine Bleeding: Pt has been bleeding for almost 5 months, states her bleeding began after getting her depo shot in January. Had another depo injection in March.     HPI:  new patient to me here today for evaluation of Abnormal uterine bleeding for 4 months.  She had a spontaneous stillbirth at 31 weeks in 2020.  Cycles returned to normal shortly after this.  She contracted Covid in December and had a major flare of her autoimmune disease and was started on chemotherapy.  At that time she did not want to take the risk of pregnancy while being on chemotherapy and started Depo-Provera in January.  She started bleeding heavily shortly after her first Depo-Provera.  They then gave her another injection about a month and a half later due to heavy bleeding.  She then developed a DVT in 2021 and was placed on Xarelto.  She continues to take Xarelto on a daily basis and continues to have fluctuation of dark brown to bright red bleeding.  When her bleeding is light she is sexually active with her  which causes her bleeding to increase.  She has been on Depo-Provera in the past with no issues.  She has been off of this method since 2021.  Most recently a provider cycled her with Provera and initiated Slynd progestin only birth control but her insurance did not cover this and she has not been on anything.  She has not had ultrasound evaluation since March.    Her last hemoglobin was 9.3 g% in 2021.  Hemoglobin is pending at this time.  We will also check a thyroid panel.    Patient's last menstrual period was 2021.    Patient is sexually active, .  Using none for contraception.     reports that she has been smoking. She has been smoking about 0.25 packs per day. She has never used  smokeless tobacco.  Tobacco Cessation Action Plan: Information offered: Patient not interested at this time  STD testing offered?  Declined    Health maintenance updated:  yes    Today's PHQ-2 Score:   PHQ-2 ( 1999 Pf[employer]) 5/4/2021   Q1: Little interest or pleasure in doing things 0   Q2: Feeling down, depressed or hopeless 0   PHQ-2 Score 0     Today's PHQ-9 Score:   PHQ-9 SCORE 6/10/2021   PHQ-9 Total Score 8     Today's EB-7 Score:   EB-7 SCORE 6/10/2021   Total Score -   Total Score 6     Alcohol score is 5    Problem list and histories reviewed & adjusted, as indicated.  Additional history: as documented.    Patient Active Problem List   Diagnosis     Wegener's granulomatosis (H)     Myalgia     Granulomatosis with polyangiitis, unspecified whether renal involvement (H)     ANCA-associated vasculitis (H)     Morbid obesity (H)     Pulmonary infiltrates     Acute kidney injury (H)     Need for pneumocystis prophylaxis     Past Surgical History:   Procedure Laterality Date     ENT SURGERY  2000    cyst     EXCISE GANGLION WRIST  2009      Social History     Tobacco Use     Smoking status: Current Every Day Smoker     Packs/day: 0.25     Smokeless tobacco: Never Used     Tobacco comment: past vaping   Substance Use Topics     Alcohol use: Not Currently      Problem (# of Occurrences) Relation (Name,Age of Onset)    Cancer (1) Maternal Grandfather    Thyroid Disease (1) Mother       Negative family history of: Asthma, Diabetes            Current Outpatient Medications   Medication Sig     DULoxetine (CYMBALTA) 30 MG capsule Take 1 capsule (30 mg) by mouth At Bedtime     ferrous sulfate (FEROSUL) 325 (65 Fe) MG tablet Take 1 tablet (325 mg) by mouth daily (with breakfast)     norethindrone (MICRONOR) 0.35 MG tablet Take 1 tablet (0.35 mg) by mouth daily     predniSONE (DELTASONE) 10 MG tablet Take 10 mg by mouth daily     rivaroxaban ANTICOAGULANT (XARELTO) 20 MG TABS tablet Take 1 tablet (20 mg) by mouth daily  (with dinner)     Drospirenone (SLYND) 4 MG TABS Take 1 tablet by mouth daily (Patient not taking: Reported on 6/10/2021)     medroxyPROGESTERone (PROVERA) 10 MG tablet Take 1 tablet (10 mg) by mouth daily (Patient not taking: Reported on 6/10/2021)     Rivaroxaban ANTICOAGULANT 15 & 20 MG TBPK Take 15 mg by mouth daily     traZODone (DESYREL) 50 MG tablet Take 1 tablet (50 mg) by mouth At Bedtime (Patient not taking: Reported on 6/10/2021)     No current facility-administered medications for this visit.      Allergies   Allergen Reactions     Heparin Other (See Comments)     Reaction: HIT     Penicillins Rash     Unknown, but think rash.  Tolerated cephalexin (12/27/20), cefpodoxime (12/27/20)       ROS:  12 point review of systems negative other than symptoms noted below or in the HPI.  Genitourinary: Irregular Menses  No urinary frequency or dysuria, bladder or kidney problems, POSITIVE for:, irregular menses, heavy periods      OBJECTIVE:     /70   Wt 111.6 kg (246 lb)   LMP 01/25/2021   BMI 42.23 kg/m    Body mass index is 42.23 kg/m .    Exam:  Constitutional:  Appearance: Well nourished, well developed alert, in no acute distress  Psychiatric:  Mentation appears normal and affect normal/bright.  Pelvic Exam:  External Genitalia:     Normal appearance for age, no discharge present, no tenderness present, no inflammatory lesions present, color normal  Vagina:     Normal vaginal vault without central or paravaginal defects, no discharge present, no inflammatory lesions present, no masses present  Bladder:     Nontender to palpation  Urethra:   Urethral Body:  Urethra palpation normal, urethra structural support normal   Urethral Meatus:  No erythema or lesions present  Cervix:     Appearance healthy, no lesions present, nontender to palpation, dark brown bleeding present  Uterus:     Uterus: firm, normal sized and nontender, midplane in position.   Adnexa:     No adnexal tenderness present, no adnexal  masses present  Perineum:     Perineum within normal limits, no evidence of trauma, no rashes or skin lesions present  Anus:     Anus within normal limits, no hemorrhoids present  Inguinal Lymph Nodes:     No lymphadenopathy present  Pubic Hair:     Normal pubic hair distribution for age  Genitalia and Groin:     No rashes present, no lesions present, no areas of discoloration, no masses present       In-Clinic Test Results:  Results for orders placed or performed in visit on 06/10/21 (from the past 24 hour(s))   Hemoglobin   Result Value Ref Range    Hemoglobin 11.7 11.7 - 15.7 g/dL       ASSESSMENT/PLAN:                                                        ICD-10-CM    1. Menometrorrhagia  N92.1 TSH with free T4 reflex     Hemoglobin     US Transvaginal Non OB     norethindrone (MICRONOR) 0.35 MG tablet   2. Dysfunctional uterine bleeding  N93.8 ferrous sulfate (FEROSUL) 325 (65 Fe) MG tablet     norethindrone (MICRONOR) 0.35 MG tablet       There are no Patient Instructions on file for this visit.    Normal gyn exam.   hgb WNL today. Will continue oral FE as she continues to bleed. Will start POP's as I don't feel an IUD would be appropriate given her known uterine septum. She will see anticoagulation clinic later this month and will have a better idea of when she can be done on Xarelto.  Regardless she understands that she will not be on any sort of combined contraception.  She will return to the clinic for pelvic ultrasound and further discussion of her dysfunctional bleeding.  We had a reyes conversation about realistic expectations given the dysfunctional bleeding on Xarelto.    Lidia Seth, LILIA CNP  [facility] [place]

## 2021-06-11 ASSESSMENT — ANXIETY QUESTIONNAIRES: GAD7 TOTAL SCORE: 6

## 2021-06-14 NOTE — PATIENT INSTRUCTIONS - HE
Call your MD with any concerns, questions , Call if fever, developing more red spots, increased signs for infections  Gabby Medeiros Patient Education     Rituximab Solution for injection  What is this medicine?  RITUXIMAB (ri TUX i mab) is a monoclonal antibody. This medicine changes the way the body's immune system works. It is used commonly to treat non-Hodgkin's lymphoma and other conditions. In cancer cells, this drug targets a specific protein within cancer cells and stops the cancer cells from growing. It is also used to treat rhuematoid arthritis (RA). In RA, this medicine slow the inflammatory process and help reduce joint pain and swelling. This medicine is often used with other cancer or arthritis medications.  This medicine may be used for other purposes; ask your health care provider or pharmacist if you have questions.  What should I tell my health care provider before I take this medicine?  They need to know if you have any of these conditions:    blood disorders    heart disease    history of hepatitis B    infection (especially a virus infection such as chickenpox, cold sores, or herpes)    irregular heartbeat    kidney disease    lung or breathing disease, like asthma    lupus    an unusual or allergic reaction to rituximab, mouse proteins, other medicines, foods, dyes, or preservatives    pregnant or trying to get pregnant    breast-feeding  How should I use this medicine?  This medicine is for infusion into a vein. It is administered in a hospital or clinic by a specially trained health care professional.  A special MedGuide will be given to you by the pharmacist with each prescription and refill. Be sure to read this information carefully each time.  Talk to your pediatrician regarding the use of this medicine in children. This medicine is not approved for use in children.  Overdosage: If you think you have taken too much of this medicine contact a poison control center or emergency room at  once.  NOTE: This medicine is only for you. Do not share this medicine with others.  What if I miss a dose?  It is important not to miss a dose. Call your doctor or health care professional if you are unable to keep an appointment.  What may interact with this medicine?    cisplatin    medicines for blood pressure    some other medicines for arthritis    vaccines  This list may not describe all possible interactions. Give your health care provider a list of all the medicines, herbs, non-prescription drugs, or dietary supplements you use. Also tell them if you smoke, drink alcohol, or use illegal drugs. Some items may interact with your medicine.  What should I watch for while using this medicine?  Report any side effects that you notice during your treatment right away, such as changes in your breathing, fever, chills, dizziness or lightheadedness. These effects are more common with the first dose.  Visit your prescriber or health care professional for checks on your progress. You will need to have regular blood work. Report any other side effects. The side effects of this medicine can continue after you finish your treatment. Continue your course of treatment even though you feel ill unless your doctor tells you to stop.  Call your doctor or health care professional for advice if you get a fever, chills or sore throat, or other symptoms of a cold or flu. Do not treat yourself. This drug decreases your body's ability to fight infections. Try to avoid being around people who are sick.  This medicine may increase your risk to bruise or bleed. Call your doctor or health care professional if you notice any unusual bleeding.  Be careful brushing and flossing your teeth or using a toothpick because you may get an infection or bleed more easily. If you have any dental work done, tell your dentist you are receiving this medicine.  Avoid taking products that contain aspirin, acetaminophen, ibuprofen, naproxen, or ketoprofen  unless instructed by your doctor. These medicines may hide a fever.  Do not become pregnant while taking this medicine. Women should inform their doctor if they wish to become pregnant or think they might be pregnant. There is a potential for serious side effects to an unborn child. Talk to your health care professional or pharmacist for more information. Do not breast-feed an infant while taking this medicine.  What side effects may I notice from receiving this medicine?  Side effects that you should report to your doctor or health care professional as soon as possible:    allergic reactions like skin rash, itching or hives, swelling of the face, lips, or tongue    low blood counts - this medicine may decrease the number of white blood cells, red blood cells and platelets. You may be at increased risk for infections and bleeding.    signs of infection - fever or chills, cough, sore throat, pain or difficulty passing urine    signs of decreased platelets or bleeding - bruising, pinpoint red spots on the skin, black, tarry stools, blood in the urine    signs of decreased red blood cells - unusually weak or tired, fainting spells, lightheadedness    breathing problems    confused, not responsive    chest pain    fast, irregular heartbeat    feeling faint or lightheaded, falls    mouth sores    redness, blistering, peeling or loosening of the skin, including inside the mouth    stomach pain    swelling of the ankles, feet, or hands    trouble passing urine or change in the amount of urine  Side effects that usually do not require medical attention (report to your doctor or other health care professional if they continue or are bothersome):    anxiety    headache    loss of appetite    muscle aches    nausea    night sweats  This list may not describe all possible side effects. Call your doctor for medical advice about side effects. You may report side effects to FDA at 7-824-FDA-1897.  Where should I keep my  medicine?  This drug is given in a hospital or clinic and will not be stored at home.  NOTE:This sheet is a summary. It may not cover all possible information. If you have questions about this medicine, talk to your doctor, pharmacist, or health care provider. Copyright  2015 [employer]

## 2021-06-14 NOTE — PROGRESS NOTES
Pt arrived amb for her 2nd dose of truxima, PT had the 1st dose last week, Does feel better this week, but is getting a few red dots on the top of her foot, Went over today's plan of care , questions, PT taking her prednisone, Pt ok with getting covid, test which was done Iv started in rt hand, pre med's, given. pt wanted oral benadryl because the iv made her throat scratchy, It happened before the infusion of truxima, Waited 1 hr then truxima infused with rapid infusion , anaid well,vs's throughout, Pt was observed for 1/2hr after, vs's IV was flushed with ns then discontinue after, AVS given and gone over, Pt called her ride and left amb at 1240 when her ride came  Gabby Medeiros

## 2021-06-14 NOTE — PROGRESS NOTES
Pt arrived amb for her truxima, Pt is feeling well, did have her primary MD increase her prednisone, to 60 mg, because she was aching more, had a cough, vs's Went over today's plan of care, questions and follow up, Pre med's given, Iv started in ,waited 1 hr then truxima started at rapid infusion started, vs's , IV was flushed with ns  then dcd , vs stable throughout, RTc 1 week, PT anaid well, Pt did not want the covid test  Gabby Medeiros

## 2021-06-14 NOTE — PATIENT INSTRUCTIONS - HE
Follow up with your MD on Tuesday , call with any concerns, questions ,Increase bleeding , more pain in your leg, redness   Gabby Medeiros

## 2021-06-25 ENCOUNTER — OFFICE VISIT (OUTPATIENT)
Dept: OBGYN | Facility: CLINIC | Age: 21
End: 2021-06-25
Attending: NURSE PRACTITIONER
Payer: COMMERCIAL

## 2021-06-25 ENCOUNTER — ANCILLARY PROCEDURE (OUTPATIENT)
Dept: ULTRASOUND IMAGING | Facility: CLINIC | Age: 21
End: 2021-06-25
Attending: NURSE PRACTITIONER
Payer: COMMERCIAL

## 2021-06-25 VITALS
DIASTOLIC BLOOD PRESSURE: 72 MMHG | BODY MASS INDEX: 42.92 KG/M2 | WEIGHT: 251.4 LBS | SYSTOLIC BLOOD PRESSURE: 118 MMHG | HEIGHT: 64 IN

## 2021-06-25 DIAGNOSIS — N92.1 MENOMETRORRHAGIA: ICD-10-CM

## 2021-06-25 DIAGNOSIS — Q51.3 BICORNUATE UTERUS: Primary | ICD-10-CM

## 2021-06-25 DIAGNOSIS — N93.8 DYSFUNCTIONAL UTERINE BLEEDING: ICD-10-CM

## 2021-06-25 PROCEDURE — 76830 TRANSVAGINAL US NON-OB: CPT | Performed by: OBSTETRICS & GYNECOLOGY

## 2021-06-25 PROCEDURE — 99212 OFFICE O/P EST SF 10 MIN: CPT | Performed by: NURSE PRACTITIONER

## 2021-06-25 ASSESSMENT — MIFFLIN-ST. JEOR: SCORE: 1885.34

## 2021-06-25 NOTE — PROGRESS NOTES
SUBJECTIVE:                                                   Cande Shields is a 20 year old female who presents to clinic today for the following health issue(s):  Patient presents with:  Ultrasound: US f/u for menometrorrhagia      HPI:  Patient here today for ultrasound evaluation of heavy abnormal vaginal bleeding for 5 months now.  She had a spontaneous stillbirth at 31 weeks in 2020.  Her cycles returned to normal shortly after this and they were very painful and heavy.  She has been aware of a uterine septum that was called on her first OB ultrasound in the past.  Her hemoglobin 2 and half weeks ago was 11.7 g%.  She still continues to bleed however it is not heavy.  She has no pelvic pain.  She is still taking Xarelto for a DVT that she developed in 2021.  She is also on progestin only tablets that we changed her to in 2021.  She is in contact with her anticoagulation clinic on when she can stop anticoagulation therapy.    No LMP recorded. (Menstrual status: Birth Control)..     Patient is sexually active, .  Using oral contraceptives for contraception.    reports that she has been smoking. She has been smoking about 0.25 packs per day. She has never used smokeless tobacco.    STD testing offered?  Declined    Health maintenance updated:  yes    Today's PHQ-2 Score:   PHQ-2 (  [employer]) 2021   Q1: Little interest or pleasure in doing things 0   Q2: Feeling down, depressed or hopeless 0   PHQ-2 Score 0     Today's PHQ-9 Score:   PHQ-9 SCORE 6/10/2021   PHQ-9 Total Score 8     Today's EB-7 Score:   EB-7 SCORE 6/10/2021   Total Score -   Total Score 6       Problem list and histories reviewed & adjusted, as indicated.  Additional history: as documented.    Patient Active Problem List   Diagnosis     Wegener's granulomatosis (H)     Myalgia     Granulomatosis with polyangiitis, unspecified whether renal involvement (H)     ANCA-associated vasculitis (H)     Morbid obesity  "(H)     Pulmonary infiltrates     Acute kidney injury (H)     Need for pneumocystis prophylaxis     Past Surgical History:   Procedure Laterality Date     ENT SURGERY  2000    cyst     EXCISE GANGLION WRIST  2009      Social History     Tobacco Use     Smoking status: Current Every Day Smoker     Packs/day: 0.25     Smokeless tobacco: Never Used     Tobacco comment: past vaping   Substance Use Topics     Alcohol use: Yes     Comment: Occas      Problem (# of Occurrences) Relation (Name,Age of Onset)    Cancer (1) Maternal Grandfather    Thyroid Disease (1) Mother       Negative family history of: Asthma, Diabetes            Current Outpatient Medications   Medication Sig     DULoxetine (CYMBALTA) 30 MG capsule Take 1 capsule (30 mg) by mouth At Bedtime     ferrous sulfate (FEROSUL) 325 (65 Fe) MG tablet Take 1 tablet (325 mg) by mouth daily (with breakfast)     norethindrone (MICRONOR) 0.35 MG tablet Take 1 tablet (0.35 mg) by mouth daily     predniSONE (DELTASONE) 10 MG tablet Take 10 mg by mouth daily     Rivaroxaban ANTICOAGULANT 15 & 20 MG TBPK Take 15 mg by mouth daily     rivaroxaban ANTICOAGULANT (XARELTO) 20 MG TABS tablet Take 1 tablet (20 mg) by mouth daily (with dinner)     No current facility-administered medications for this visit.      Allergies   Allergen Reactions     Heparin Other (See Comments)     Reaction: HIT     Penicillins Rash     Unknown, but think rash.  Tolerated cephalexin (12/27/20), cefpodoxime (12/27/20)       ROS:  12 point review of systems negative other than symptoms noted below or in the HPI.  Genitourinary: Heavy Bleeding with Period and Irregular Menses  No urinary frequency or dysuria, bladder or kidney problems, POSITIVE for:, irregular menses, heavy periods      OBJECTIVE:     /72   Ht 1.626 m (5' 4\")   Wt 114 kg (251 lb 6.4 oz)   Breastfeeding No   BMI 43.15 kg/m    Body mass index is 43.15 kg/m .    Exam:  Constitutional:  Appearance: Well nourished, well " developed alert, in no acute distress  Psychiatric:  Mentation appears normal and affect normal/bright.     In-Clinic Test Results:  No results found for this or any previous visit (from the past 24 hour(s)).    ASSESSMENT/PLAN:                                                        ICD-10-CM    1. Bicornuate uterus  Q51.3    2. Dysfunctional uterine bleeding  N93.8        There are no Patient Instructions on file for this visit.    20-year-old female with a bicornuate uterus noted on transvaginal ultrasound today.  Her uterine lining is thin at 6.4 mm.  Her ovaries are within normal limits.  We discussed possible consult for the bicornuate whether she is a surgical candidate or not.  She will consult with a surgeon when the timing is right for her.  She will keep us updated on her Xarelto therapy.    (5 minutes was spent on the date of the encounter doing chart review, review of outside records, review and interpretation of pertinent test results, history and exam, documentation, patient counseling, and further activities as noted above.)      LILIA Ravi [facility] [place]

## 2021-07-09 ENCOUNTER — HOSPITAL ENCOUNTER (EMERGENCY)
Facility: CLINIC | Age: 21
Discharge: HOME | End: 2021-07-10
Attending: EMERGENCY MEDICINE | Admitting: EMERGENCY MEDICINE
Payer: COMMERCIAL

## 2021-07-09 ENCOUNTER — NURSE TRIAGE (OUTPATIENT)
Dept: NURSING | Facility: CLINIC | Age: 21
End: 2021-07-09

## 2021-07-09 DIAGNOSIS — R00.2 PALPITATIONS: ICD-10-CM

## 2021-07-09 DIAGNOSIS — Z86.718 HISTORY OF ARTERIAL THROMBOSIS: ICD-10-CM

## 2021-07-09 DIAGNOSIS — Z86.718 HISTORY OF DEEP VENOUS THROMBOSIS: ICD-10-CM

## 2021-07-09 DIAGNOSIS — I26.94 MULTIPLE SUBSEGMENTAL PULMONARY EMBOLI WITHOUT ACUTE COR PULMONALE (H): ICD-10-CM

## 2021-07-09 LAB
ALBUMIN SERPL-MCNC: 3.2 G/DL (ref 3.4–5)
ALP SERPL-CCNC: 91 U/L (ref 40–150)
ALT SERPL W P-5'-P-CCNC: 42 U/L (ref 0–50)
ANION GAP SERPL CALCULATED.3IONS-SCNC: 6 MMOL/L (ref 3–14)
AST SERPL W P-5'-P-CCNC: 19 U/L (ref 0–45)
BASOPHILS # BLD AUTO: 0.1 10E9/L (ref 0–0.2)
BASOPHILS NFR BLD AUTO: 0.8 %
BILIRUB SERPL-MCNC: <0.1 MG/DL (ref 0.2–1.3)
BUN SERPL-MCNC: 29 MG/DL (ref 7–30)
CALCIUM SERPL-MCNC: 9.2 MG/DL (ref 8.5–10.1)
CHLORIDE SERPL-SCNC: 110 MMOL/L (ref 94–109)
CO2 SERPL-SCNC: 23 MMOL/L (ref 20–32)
CREAT SERPL-MCNC: 1.38 MG/DL (ref 0.52–1.04)
DIFFERENTIAL METHOD BLD: ABNORMAL
EOSINOPHIL # BLD AUTO: 0.4 10E9/L (ref 0–0.7)
EOSINOPHIL NFR BLD AUTO: 4.8 %
ERYTHROCYTE [DISTWIDTH] IN BLOOD BY AUTOMATED COUNT: 14.1 % (ref 10–15)
GFR SERPL CREATININE-BSD FRML MDRD: 54 ML/MIN/{1.73_M2}
GLUCOSE SERPL-MCNC: 78 MG/DL (ref 70–99)
HCT VFR BLD AUTO: 37.7 % (ref 35–47)
HGB BLD-MCNC: 11.7 G/DL (ref 11.7–15.7)
IMM GRANULOCYTES # BLD: 0 10E9/L (ref 0–0.4)
IMM GRANULOCYTES NFR BLD: 0.5 %
LYMPHOCYTES # BLD AUTO: 1.9 10E9/L (ref 0.8–5.3)
LYMPHOCYTES NFR BLD AUTO: 22.3 %
MCH RBC QN AUTO: 26.7 PG (ref 26.5–33)
MCHC RBC AUTO-ENTMCNC: 31 G/DL (ref 31.5–36.5)
MCV RBC AUTO: 86 FL (ref 78–100)
MONOCYTES # BLD AUTO: 0.9 10E9/L (ref 0–1.3)
MONOCYTES NFR BLD AUTO: 10.4 %
NEUTROPHILS # BLD AUTO: 5.1 10E9/L (ref 1.6–8.3)
NEUTROPHILS NFR BLD AUTO: 61.2 %
NRBC # BLD AUTO: 0 10*3/UL
NRBC BLD AUTO-RTO: 0 /100
PLATELET # BLD AUTO: 353 10E9/L (ref 150–450)
POTASSIUM SERPL-SCNC: 4.6 MMOL/L (ref 3.4–5.3)
PROT SERPL-MCNC: 7.1 G/DL (ref 6.8–8.8)
RBC # BLD AUTO: 4.39 10E12/L (ref 3.8–5.2)
SODIUM SERPL-SCNC: 139 MMOL/L (ref 133–144)
TROPONIN I SERPL-MCNC: <0.015 UG/L (ref 0–0.04)
WBC # BLD AUTO: 8.4 10E9/L (ref 4–11)

## 2021-07-09 PROCEDURE — 84703 CHORIONIC GONADOTROPIN ASSAY: CPT | Performed by: EMERGENCY MEDICINE

## 2021-07-09 PROCEDURE — 85025 COMPLETE CBC W/AUTO DIFF WBC: CPT | Performed by: EMERGENCY MEDICINE

## 2021-07-09 PROCEDURE — 84484 ASSAY OF TROPONIN QUANT: CPT | Performed by: EMERGENCY MEDICINE

## 2021-07-09 PROCEDURE — 93005 ELECTROCARDIOGRAM TRACING: CPT

## 2021-07-09 PROCEDURE — 99284 EMERGENCY DEPT VISIT MOD MDM: CPT | Mod: 25

## 2021-07-09 PROCEDURE — 80053 COMPREHEN METABOLIC PANEL: CPT | Performed by: EMERGENCY MEDICINE

## 2021-07-09 PROCEDURE — 83735 ASSAY OF MAGNESIUM: CPT | Performed by: EMERGENCY MEDICINE

## 2021-07-10 VITALS
WEIGHT: 257 LBS | RESPIRATION RATE: 18 BRPM | DIASTOLIC BLOOD PRESSURE: 79 MMHG | SYSTOLIC BLOOD PRESSURE: 141 MMHG | HEART RATE: 78 BPM | OXYGEN SATURATION: 100 % | TEMPERATURE: 97.3 F | BODY MASS INDEX: 44.11 KG/M2

## 2021-07-10 LAB
HCG SERPL QL: NEGATIVE
MAGNESIUM SERPL-MCNC: 2.6 MG/DL (ref 1.6–2.3)

## 2021-07-10 PROCEDURE — 250N000013 HC RX MED GY IP 250 OP 250 PS 637: Performed by: EMERGENCY MEDICINE

## 2021-07-10 RX ADMIN — RIVAROXABAN 20 MG: 20 TABLET, FILM COATED ORAL at 01:00

## 2021-07-10 ASSESSMENT — ENCOUNTER SYMPTOMS
PALPITATIONS: 1
SHORTNESS OF BREATH: 0

## 2021-07-10 NOTE — ED PROVIDER NOTES
"  History     Chief Complaint:  Palpitations     HPI   Cande Shields is a 20 year old female with history of Patent foramen ovale, Femoral artery thrombosis, left, DVT, COVID-19, ANCA-associated vasculitis, CKD, who presents for evaluation of palpitations. Per chart review the patient presented to [facility] March 20 for several days of progressive shortness of breath, decreased exercise tolerance, and right leg cramps. She was found to have an extensive bilateral pulmonary emboli, bilateral DVT's, and RA thrombus with right heart strain. The patient became hypoxic, was transferred to the ICU and started on IV Heparin. However, 3 days into her course, she developed left leg pain and numness, found to have a femoral artery thrombus requiring catheter directed thrombolysis. The patient was consulted by vascular, pulmonary, and heme. IV heparin was discontinued and started on argatroban drip with [place] [facility]. Upon admission to [facility], she was transitioned to anticoagulated with fondaparinux. Hematology recommended at least 3 months of anticoagulated and outpatient follow up for continuation of this. On 3/27, patient was transferred to [facility] and was discharged to home with prescriptions for continued Xarelto. Patient underwent testing to determine if thrombi was provoked.     Here, the patient reports that discontinued taking her Xarelto 1 week ago as she had no more refills, is unsure if she needed to continue them, and has not followed up with [facility] regarding this yet. The patient has been feeling palpitations daily over the past 3 weeks, describing that a heart beat will randomly \"hit\" her stronger than normal and she will feel lightheaded for a second before her symptoms resolve. She has not had a Holter monitor placed for these symptoms or been seen prior. The patient denies current chest pain, shortness of breath, stroke like symptoms, or leg pain.     Review of Systems   Respiratory: Negative for " shortness of breath.    Cardiovascular: Positive for palpitations. Negative for chest pain.   Musculoskeletal:        No leg pain   All other systems reviewed and are negative.    Allergies:  Heparin  Penicillins    Medications:  Cymbalta     Past Medical History:    ADHD  DVT  Dysmenorrhea   Femoral artery thrombosis, left   Multiple subsegmental pulmonary emboli without acute cor pulmonale  Patent foramen ovale  Preeclampsia  Spontaneous pregnancy loss   Stage 3b chronic kidney disease   Wegener's disease, pulmonary   ANCA-associated vasculitis  Pulmonary infiltrates  KATERINA   Obesity, morbid   Wegener's granulomatosis    Past Surgical History:    ENT surgery   Excise ganglion wrist    Family History:    Mother: thyroid disease  Maternal grandfather: cancer    Social History:  The patient was accompanied to the ED by .  Marital Status: .     Physical Exam     Patient Vitals for the past 24 hrs:   BP Temp Temp src Pulse Resp SpO2 Weight   07/10/21 0054 (!) 141/79 -- -- 78 18 100 % --   07/09/21 2229 -- 97.3  F (36.3  C) Temporal -- -- -- --   07/09/21 2221 (!) 158/85 -- -- 98 18 100 % --   07/09/21 2220 -- -- -- -- -- -- 116.6 kg (257 lb)     Physical Exam  General: Well-nourished, appears to be resting comfortably when I enter the room  Eyes: PERRL, conjunctivae pink no scleral icterus or conjunctival injection  ENT:  Moist mucus membranes, posterior oropharynx clear without erythema or exudates  Respiratory:  Lungs clear to auscultation bilaterally, no crackles/rubs/wheezes.  Good air movement  CV: Normal rate and rhythm, no murmurs/rubs/gallops. +DP pulses slightly decreased on left vs right  GI:  Abdomen soft and non-distended.  Normoactive BS.  No tenderness, guarding or rebound  Skin: Warm, dry.  No rashes or petechiae. Good capillary refill and normal temp bilateral feet  Musculoskeletal: No peripheral edema or calf tenderness  Neuro: Alert and oriented to person/place/time  Psychiatric: Normal  affect    Emergency Department Course     ECG:  ECG taken at 2234, ECG read at 2340  Normal sinus rhythm  Normal ECG  Rate 83 bpm. KS interval 148 ms. QRS duration 78 ms. QT/QTc 352/413 ms. P-R-T axes 56 29 25.    Laboratory:    CBC: WBC 8.4, HGB 11.7,   CMP: Chloride 110 (H), GFR 54 (L), Bilirubin Total <0.1 (L), Albumin 3.2 (L), o/w WNL (Creatinine 1.38 (H))    Troponin (Collected 2238): <0.015    Magnesium: 2.6 (H)    HCG Qualitative: Negative      Emergency Department Course:    Reviewed:    I reviewed the patient's nursing notes, vitals, past medical records, Care Everywhere.     Assessments:    2339 I performed an exam of the patient as documented above.     0042 Patient rechecked and updated.      Interventions:  Xarelto 20 mg PO    Disposition:  The patient was discharged to home.     Impression & Plan      Medical Decision Making:    Cande Shields is a 20 year old female presented with a sensation of palpitations.  The work up in the Emergency Department is negative, monitoring and EKG have not shown any abnormal heart rhythms or concerning morphologies.  I considered a broad differential diagnosis including acute coronary syndrome, myocardial infarction, pulmonary embolism, electrolyte abnormalities, drug reaction, anxiety, amongst others.Electrolytes are normal and the patient is not anemic.  The patient is not pregnant.  No EKG changes or troponin elevation concerning for acute coronary syndrome.  No symptoms at this time to suggest PE and no hypoxia or tachycardia. A Holter monitor was ordered.  I was quite concerned that the patient has stopped taking her Xarelto.  Is not clear how long she needs to be on this and she has yet to follow-up with hematology for more definitive guidance.  She does not have symptoms to suggest recurrent DVT or new PE at this time.  No signs of arterial insufficiency.  We will restart her on the Xarelto and referred her to our [place] for vascular medicine.  First  dose of Xarelto was given in the ED.    No serious etiology for the palpitations were detected today during this visit and I feel the patient is safe for discharge. Close follow up with primary care is indicated should the symptoms continue, as further work up may be performed; this was made clear to the patient, who understands.     Diagnosis:    ICD-10-CM    1. Palpitations  R00.2 Holter Monitor 48 hour Adult Pediatric   2. History of deep venous thrombosis  Z86.718    3. History of arterial thrombosis  Z86.718      Discharge Medications:  New Prescriptions    No medications on file     Scribe Disclosure:  I, Orla Severson, am serving as a scribe at 11:33 PM on 7/9/2021 to document services personally performed by Sofie Ferrari MD based on my observations and the provider's statements to me.     [facility] EMERGENCY DEPT         Sofie Ferrari MD  07/10/21 1904

## 2021-07-10 NOTE — TELEPHONE ENCOUNTER
Pt called in states she has irregular heart beat.  The symptom started 3 weeks ago.  Pt states she has it today.  The symptom come for 3 sec and feel lightheaded.  The Pt states happened every 10 min today.  Pt was diagnosed with DVT 3 month.  Not have  history heart disease.  No dizziness, no chest pain, sometimes has heavy breathing sometimes.  Pt is not pregnant.  Pt states she is experiencing the symptom at this time.  The disposition is to be seen at the ED.  Care advice given per protocol.  Patient agrees with care advice given.   Agreed to call back if he has additional symptoms or questions.    Yg Orr [place] Nurse Advisor 7/9/2021 9:46 PM      Reason for Disposition    Dizziness, lightheadedness, or weakness    Additional Information    Negative: Passed out (i.e., lost consciousness, collapsed and was not responding)    Negative: Shock suspected (e.g., cold/pale/clammy skin, too weak to stand, low BP, rapid pulse)    Negative: Difficult to awaken or acting confused (e.g., disoriented, slurred speech)    Negative: Visible sweat on face or sweat dripping down face    Negative: Unable to walk, or can only walk with assistance (e.g., requires support)    Negative: [1] Received SHOCK from implantable cardiac defibrillator AND [2] persisting symptoms (i.e., palpitations, lightheadedness)    Negative: [1] Dizziness, lightheadedness, or weakness AND [2] heart beating very rapidly (e.g., > 140 / minute)    Negative: [1] Dizziness, lightheadedness, or weakness AND [2] heart beating very slowly  (e.g., < 50 / minute)    Negative: Sounds like a life-threatening emergency to the triager    Negative: Chest pain    Negative: Difficulty breathing    Protocols used: HEART RATE AND HEARTBEAT [id number]

## 2021-07-10 NOTE — ED TRIAGE NOTES
hx of PFO and PE in march, stopped bloodthinners a week ago. pt has been having episodes of heart beating hard followed by lightheadedness lasting couple of seconds for past couple weeks.

## 2021-07-10 NOTE — DISCHARGE INSTRUCTIONS
*You may resume diet and activities as tolerated.  Drink plenty of fluids.  Avoid caffeine and alcohol.  *Restart the xarelto as directed.  *Follow-up with your primary doctor in 2-3 days.  Recommend that you establish care with Dr. Guillen or his partner at the [facility] [place] or with hematology. Follow-up for Holter monitor placement.  An order for this has been made in the computer system and you should get a call.  *Return if you become worse in any way.      Discharge Instructions  Palpitations    Palpitations are an unusual awareness of your heartbeat. People often describe this as the heart skipping, fluttering, racing, irregular, or pounding. At this time, your doctor has found no signs that your palpitations are due to a serious or life-threatening condition. However, sometimes there is a serious problem that does not show up right away. It is important that you follow up with your doctor within 1 week, or as directed by your doctor today, to check for other serious problems. You may need more blood tests, a stress test, heart monitoring, or other tests.    Palpitations can be caused by caffeine, cigarettes, diet pills, energy drinks or supplements, other stimulants, and medications and street drugs. They can also be caused by anxiety, hormone conditions such as high thyroid, and other medical conditions. Sometimes they are a sign of abnormal rhythm in the heart, so you may need your heart checked.    Return to the Emergency Department if:  You get chest pain or tightness.  You are short of breath.  You get very weak or tired.  You pass out or faint.  Your heart rate is over 120 beats per minute for more than 10 minutes while you are resting.  You have any new symptoms, like fever, cough, numb legs, or you cough up blood.  You have anything else that worries you.    What can I do to help myself?  Fill any prescriptions the doctor gave you and take them right away.   Follow your doctor s  instructions about the prescription medicines you are on. Sometimes the doctor may tell you to stop taking a medicine or change the dose.  If you smoke, this may be a good time to quit! The less you can smoke, the better.  Do not use energy drinks, diet pills, or stimulants. Limit your use of caffeine.    Follow up with your doctor:  Within 1 week, or sooner if instructed.  If you keep having palpitations.  If you need help to quit smoking.  If you were given a prescription for medicine here today, be sure to read all of the information (including the package insert) that comes with your prescription.  This will include important information about the medicine, its side effects, and any warnings that you need to know about.  The pharmacist who fills the prescription can provide more information and answer questions you may have about the medicine.  If you have questions or concerns that the pharmacist cannot address, please call or return to the Emergency Department.         Opioid Medication Information    Pain medications are among the most commonly prescribed medicines, so we are including this information for all our patients. If you did not receive pain medication or get a prescription for pain medicine, you can ignore it.     You may have been given a prescription for an opioid (narcotic) pain medicine and/or have received a pain medicine while here in the Emergency Department. These medicines can make you drowsy or impaired. You must not drive, operate dangerous equipment, or engage in any other dangerous activities while taking these medications. If you drive while taking these medications, you could be arrested for DUI, or driving under the influence. Do not drink any alcohol while you are taking these medications.     Opioid pain medications can cause addiction. If you have a history of chemical dependency of any type, you are at a higher risk of becoming addicted to pain medications.  Only take these  prescribed medications to treat your pain when all other options have been tried. Take it for as short a time and as few doses as possible. Store your pain pills in a secure place, as they are frequently stolen and provide a dangerous opportunity for children or visitors in your house to start abusing these powerful medications. We will not replace any lost or stolen medicine.  As soon as your pain is better, you should flush all your remaining medication.     Many prescription pain medications contain Tylenol  (acetaminophen), including Vicodin , Tylenol #3 , Norco , Lortab , and Percocet .  You should not take any extra pills of Tylenol  if you are using these prescription medications or you can get very sick.  Do not ever take more than 3000 mg of acetaminophen in any 24 hour period.    All opioids tend to cause constipation. Drink plenty of water and eat foods that have a lot of fiber, such as fruits, vegetables, prune juice, apple juice and high fiber cereal.  Take a laxative if you don t move your bowels at least every other day. Miralax , Milk of Magnesia, Colace , or Senna  can be used to keep you regular.      Remember that you can always come back to the Emergency Department if you are not able to see your regular doctor in the amount of time listed above, if you get any new symptoms, or if there is anything that worries you.

## 2021-07-11 LAB
ATRIAL RATE - MUSE: 83 BPM
DIASTOLIC BLOOD PRESSURE - MUSE: NORMAL MMHG
INTERPRETATION ECG - MUSE: NORMAL
P AXIS - MUSE: 56 DEGREES
PR INTERVAL - MUSE: 148 MS
QRS DURATION - MUSE: 78 MS
QT - MUSE: 352 MS
QTC - MUSE: 413 MS
R AXIS - MUSE: 29 DEGREES
SYSTOLIC BLOOD PRESSURE - MUSE: NORMAL MMHG
T AXIS - MUSE: 25 DEGREES
VENTRICULAR RATE- MUSE: 83 BPM

## 2021-08-05 ENCOUNTER — HOSPITAL ENCOUNTER (EMERGENCY)
Facility: CLINIC | Age: 21
Discharge: LEFT BEFORE DISCHARGE | End: 2021-08-05
Payer: COMMERCIAL

## 2021-08-05 ENCOUNTER — NURSE TRIAGE (OUTPATIENT)
Dept: NURSING | Facility: CLINIC | Age: 21
End: 2021-08-05

## 2021-08-05 ENCOUNTER — TELEPHONE (OUTPATIENT)
Dept: RHEUMATOLOGY | Facility: CLINIC | Age: 21
End: 2021-08-05

## 2021-08-05 VITALS
TEMPERATURE: 99.1 F | DIASTOLIC BLOOD PRESSURE: 83 MMHG | OXYGEN SATURATION: 99 % | RESPIRATION RATE: 16 BRPM | HEART RATE: 95 BPM | SYSTOLIC BLOOD PRESSURE: 132 MMHG

## 2021-08-05 NOTE — TELEPHONE ENCOUNTER
Patient calling to ask who the OB surgeon was that was discussed during her OB visit on 6-25-21.  When RN named surgeons, she feels it was Dr. Whitmore, and will call clinic in the morning.    Lexis Topete RN  [facility]    Reason for Disposition    [1] Caller requesting NON-URGENT health information AND [2] PCP's office is the best resource    Protocols used: INFORMATION ONLY CALL - NO TRIAGE-A-

## 2021-08-05 NOTE — TELEPHONE ENCOUNTER
Called and spoke with pt, she would like to set follow up appts, if possible at [place].  Not able to see Dr. Chang there, but she is fine with that  She will come to [facility] to see him. Will see him in person in September.  Need to have  call pt for Dr. Call appt in [place].    She will schedule for cardiology, hematology and gyn surgery.  Would like to see MFM, will place referral when ready.    Eunice Lim RN  [facility]

## 2021-08-11 ENCOUNTER — OFFICE VISIT (OUTPATIENT)
Dept: CARDIOLOGY | Facility: CLINIC | Age: 21
End: 2021-08-11
Payer: COMMERCIAL

## 2021-08-11 VITALS
SYSTOLIC BLOOD PRESSURE: 109 MMHG | HEART RATE: 93 BPM | BODY MASS INDEX: 45.58 KG/M2 | WEIGHT: 267 LBS | OXYGEN SATURATION: 97 % | DIASTOLIC BLOOD PRESSURE: 78 MMHG | HEIGHT: 64 IN

## 2021-08-11 DIAGNOSIS — Q21.12 PFO (PATENT FORAMEN OVALE): Primary | ICD-10-CM

## 2021-08-11 PROCEDURE — 99215 OFFICE O/P EST HI 40 MIN: CPT | Performed by: INTERNAL MEDICINE

## 2021-08-11 ASSESSMENT — MIFFLIN-ST. JEOR: SCORE: 1956.1

## 2021-08-11 NOTE — Clinical Note
8/11/2021    Cira Amanda MD  [address] [facility] [address] [facility] [address]    RE: Cande Shields       Dear Colleague,    I had the pleasure of seeing Cande Shields in the [facility].    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     Multiple subsegmental pulmonary emboli without acute cor pulmonale (H) 03/2021     PFO (patent foramen ovale)      Preeclampsia, third trimester      Spontaneous pregnancy loss 2020    31 weeks     Stage 3b chronic kidney disease      Wegener's disease, pulmonary (H) 01/01/2010    renal biopdy       CURRENT MEDICATIONS:  Current Outpatient Medications   Medication Sig Dispense Refill     DULoxetine (CYMBALTA) 30 MG capsule Take 1 capsule (30 mg) by mouth At Bedtime 90 capsule 1     ferrous sulfate (FEROSUL) 325 (65 Fe) MG tablet Take 1 tablet (325 mg) by mouth daily (with breakfast) 90 tablet 0     rivaroxaban ANTICOAGULANT (XARELTO) 20 MG TABS tablet Take 1 tablet (20 mg) by mouth daily (with dinner) 30 tablet 1     norethindrone (MICRONOR) 0.35 MG tablet Take 1 tablet (0.35 mg) by mouth daily 90 tablet 1     predniSONE (DELTASONE) 10 MG tablet Take 10 mg by mouth daily         PAST SURGICAL HISTORY:  Past Surgical History:   Procedure Laterality Date     ENT SURGERY  2000    cyst     EXCISE GANGLION WRIST  2009       ALLERGIES  Heparin and Penicillins    FAMILY HX:  Family History   Problem Relation Age of Onset     Thyroid Disease Mother      Cancer Maternal Grandfather      Asthma No family hx of      Diabetes No family hx of        SOCIAL HX:  Social History     Socioeconomic History     Marital status:      Spouse name: None     Number of children: None     Years of education: None      Highest education level: None   Occupational History     None   Tobacco Use     Smoking status: Current Every Day Smoker     Packs/day: 0.25     Smokeless tobacco: Never Used     Tobacco comment: past vaping   Substance and Sexual Activity     Alcohol use: Yes     Comment: Occas     Drug use: No     Sexual activity: Yes     Partners: Male     Birth control/protection: Pill   Other Topics Concern     Parent/sibling w/ CABG, MI or angioplasty before 65F 55M? Not Asked   Social History Narrative    Lives with brother 16yo and mother. Pets: Dog Nicholas.    Lives with  in [place], has cat.    Mom lives next door.     Social Determinants of Health     Financial Resource Strain:      Difficulty of Paying Living Expenses:    Food Insecurity:      Worried About Running Out of Food in the Last Year:      Ran Out of Food in the Last Year:    Transportation Needs:      Lack of Transportation (Medical):      Lack of Transportation (Non-Medical):    Physical Activity:      Days of Exercise per Week:      Minutes of Exercise per Session:    Stress:      Feeling of Stress :    Social Connections:      Frequency of Communication with Friends and Family:      Frequency of Social Gatherings with Friends and Family:      Attends [employer] Services:      Active Member of Clubs or Organizations:      Attends Club or Organization Meetings:      Marital Status:    Intimate Partner Violence:      Fear of Current or Ex-Partner:      Emotionally Abused:      Physically Abused:      Sexually Abused:        ROS:  Constitutional: No fever, chills, or sweats. No weight gain/loss.   ENT: No visual disturbance, ear ache, epistaxis, sore throat.   Allergies/Immunologic: Negative.   Respiratory: No cough, hemoptysis.   Cardiovascular: As per HPI.   GI: No nausea, vomiting, hematemesis, melena, or hematochezia.   : No urinary frequency, dysuria, or hematuria.   Integument: Negative.   Psychiatric: Negative.   Neuro: Negative.   Endocrinology:  "Negative.   Musculoskeletal: No myalgia.    VITAL SIGNS:  /78 (BP Location: Right arm, Cuff Size: Adult Large)   Pulse 93   Ht 1.626 m (5' 4\")   Wt 121.1 kg (267 lb)   SpO2 97%   BMI 45.83 kg/m    Body mass index is 45.83 kg/m .  Wt Readings from Last 2 Encounters:   08/11/21 121.1 kg (267 lb)   07/09/21 116.6 kg (257 lb)       PHYSICAL EXAM  Cande Shields IS A 20 year old female.in no acute distress.  HEENT: Unremarkable.  Neck: JVP normal.  Carotids +4/4 bilaterally without bruits.  Lungs: CTA.  Cor: RRR. Normal S1 and S2.  No murmur, rub, or gallop.  PMI in Lf 5th ICS.  Abd: Soft, nontender, nondistended.  NABS.  No pulsatile mass.  Extremities: No C/C/E.  Pulses +4/4 symmetric in upper and lower extremities.  Neuro: Grossly intact.    LABS    Lab Results   Component Value Date    WBC 8.4 07/09/2021     Lab Results   Component Value Date    RBC 4.39 07/09/2021     Lab Results   Component Value Date    HGB 11.7 07/09/2021     Lab Results   Component Value Date    HCT 37.7 07/09/2021     No components found for: MCT  Lab Results   Component Value Date    MCV 86 07/09/2021     Lab Results   Component Value Date    MCH 26.7 07/09/2021     Lab Results   Component Value Date    MCHC 31.0 07/09/2021     Lab Results   Component Value Date    RDW 14.1 07/09/2021     Lab Results   Component Value Date     07/09/2021      Recent Labs   Lab Test 07/09/21  2228 04/08/21  0439    143   POTASSIUM 4.6 3.8   CHLORIDE 110* 114*   CO2 23 21   ANIONGAP 6 8   GLC 78 68*   BUN 29 29   CR 1.38* 1.42*   KRAIG 9.2 8.8     FLAKITA Mccall MD     Service Date: 08/11/2021    HISTORY OF PRESENT ILLNESS: Ms. Shields is a 20-year-old woman who has a past medical history significant for Wegener's that was diagnosed at 10 years of age.  She has associated renal, upper respiratory, musculoskeletal and cutaneous involvement.  She was apparently in remission between age 11 and 12/2020.  She also has a history of most recently " a DVT and saddle pulmonary embolus.  This was diagnosed on 03/20/2021.  She states she had had several days of shortness of breath that culminated in she could not even walk up one stair before feeling like she needed to call an ambulance.  She was taken into [facility], where they diagnosed a saddle PE on CT scan involving the main pulmonary artery and they found a DVT involving the right proximal calf, posterior tibial and peroneal and then the left proximal calf and peroneal veins.  She was treated with thrombolysis on 03/21/2021 and was discharged from [facility] on 03/23/2021 and [place] [facility] eventually discharged on 03/27/2021.  She was discharged on Xarelto.  It is also notable that she had COVID in 12/2020 as well and there was question as to whether or not all of her symptoms in 12/2020 were actually related to her Wegener's flare versus COVID.  She is now seeing Dr. Call in Rheumatology and receives Cytoxan and is now off of that.  Her creatinine now is baseline around 1.4.    When she had her pulmonary embolus, they did do an echocardiogram initially at [facility] on 03/21/2021.  It showed an ejection fraction of 55% with mild RV dilatation.  They did note a clot in transit measuring 2 x 1.5 cm from the IVC into the RA and then on followup [place] on [place] [facility] apparently that was not seen and they did note bubble-positive PFO.  She reports to me that she was not on estrogen-containing birth control when she had her blood clot.  She, however, was smoking and does continue to smoke.  It does not appear that a hypercoagulable workup was done.  She is going to see a hematologist at the [facility] in October.  She is .  She has worked as a , but currently is not working.  She has been smoking since she was 15 and is interested in quitting before she has another pregnancy.  Notably, she did carry a pregnancy.  She has only been pregnant  once and lost the baby at 32 weeks due to fetal demise in the setting of undiagnosed severe preeclampsia.  She did not have a stroke or TIA with her DVT.*** She is with her  today and they have been  since 07/04/2020, and he is supportive.    IMPRESSION/PLAN:  1.  Saddle pulmonary embolism, 03/20/2021, treated with thrombolysis, 03/21/2021.  2.  Bilateral DVT.  3.  Mild RV dilatation with preserved ejection fraction on echocardiogram with normal valve function on echo 03/21/2021 with PFO.  4.  Wegener's diagnosed at age 10 with flare 12/2020.  5.  Nicotine dependence, ongoing.  6.  COVID positive 12/2020.  7.  History of severe preeclampsia with pregnancy that lasted 32 weeks.  8.  Renal disease secondary to Wegener's with a creatinine of 1.4.    DISCUSSION:  It was a pleasure to be involved in the care of Mrs. Shields.  I discussed her history and symptoms in detail and reviewed her testing that is available.  I discussed her DVT and PE and explained what happened when she was in the hospital, as it sounds like that was not explained before, which she was thankful for.  We discussed that seeing Hematology at this point will be very important to help determine need for long-term anticoagulation.  It will be important for her to have a hypercoagulable workup, which I cannot see has been done, and also to get his opinion as to the role that the Wegener's flare versus COVID had in this being provoked and these diagnoses, especially the Wegener's, playing a role in potential clots in the future.    We discussed the importance of nicotine cessation and its role in clotting.  I recommended she never be on any estrogens containing birth control, which she understands.  We further discussed that we will also need to have a discussion with the hematologist, his opinion as she does plan additional pregnancies, if he does not feel that she needs long-term anticoagulation, whether the additional risk of clotting  with pregnancy would make him consider anticoagulation in that setting and that we would help with maternal fetal medicine in that regard.  We do not recommend at this point that she becomes pregnant until we have additional information.  I will plan to see her back with an echocardiogram in a couple of months once she sees the hematologist and be able to make further recommendations at that time.    With regards to the PFO, which she came to see me for, I would not recommend closing it at this point.  She did not have a TIA or stroke, which would be an indication to close it, which she understands.  All questions were answered.  It was a pleasure being involved in her care.  There was 60 minutes of face-to-face documentation and review of records on the date of visit.    Eduardo Mccall MD        D: 2021   T: 2021   MT: al    Name:     YUMIKO LYONS  MRN:      [id number]        Account:      [id number]   :      2000           Service Date: 2021       Document: [id number]      Thank you for allowing me to participate in the care of your patient.      Sincerely,     Eduardo Mccall MD     [facility]  cc:   No referring provider defined for this encounter.

## 2021-08-11 NOTE — PROGRESS NOTES
Service Date: 08/11/2021    HISTORY OF PRESENT ILLNESS: Ms. Shields is a 20-year-old woman who has a past medical history significant for Wegener's that was diagnosed at 10 years of age.  She has associated renal, upper respiratory, musculoskeletal and cutaneous involvement.  She was apparently in remission between age 11 and 12/2020.  She also has a history of most recently a DVT and saddle pulmonary embolus.  This was diagnosed on 03/20/2021.  She states she had had several days of shortness of breath that culminated in she could not even walk up one stair before feeling like she needed to call an ambulance.  She was taken into [facility], where they diagnosed a saddle PE on CT scan involving the main pulmonary artery and they found a DVT involving the right proximal calf, posterior tibial and peroneal and then the left proximal calf and peroneal veins.  She was treated with thrombolysis on 03/21/2021 and was discharged from [facility] on 03/23/2021 and transferred to [facility] where she was eventually discharged on 03/27/2021.  She was discharged on Xarelto.  It is also notable that she had COVID in 12/2020 as well and there was question as to whether or not all of her symptoms in 12/2020 were actually related to her Wegener's flare versus COVID.  She is now seeing Dr. Call in Rheumatology and receives Cytoxan and is now off of that.  Her creatinine now is baseline around 1.4.    When she had her pulmonary embolus, they did do an echocardiogram initially at [facility] on 03/21/2021.  It showed an ejection fraction of 55% with mild RV dilatation.  They did note a clot in transit measuring 2 x 1.5 cm from the IVC into the RA and then on followup echo on 03/25/2021 at [facility], apparently that was not seen and they did note bubble-positive PFO.  She reports to me that she was not on estrogen-containing birth control when she had her blood clot.  She, however, was smoking and does continue to smoke.  It does not  appear that a hypercoagulable workup was done.  She is going to see a hematologist at the [facility] in October.  She is .  She has worked as a , but currently is not working.  She has been smoking since she was 15 and is interested in quitting before she has another pregnancy.  Notably, she did carry a pregnancy.  She has only been pregnant once and lost the baby at 32 weeks due to fetal demise in the setting of undiagnosed severe preeclampsia.  She did not have a stroke or TIA with her DVT.   She is with her  today and they have been  since 07/04/2020, and he is supportive.    IMPRESSION/PLAN:  1.  Saddle pulmonary embolism, 03/20/2021, treated with thrombolysis, 03/21/2021.  2.  Bilateral DVT.  3.  Mild RV dilatation with preserved ejection fraction on echocardiogram with normal valve function on echo 03/21/2021 with PFO.  4.  Wegener's diagnosed at age 10 with flare 12/2020.  5.  Nicotine dependence, ongoing.  6.  COVID positive 12/2020.  7.  History of severe preeclampsia with pregnancy that lasted 32 weeks.  8.  Renal disease secondary to Wegener's with a creatinine of 1.4.    DISCUSSION:  It was a pleasure to be involved in the care of Mrs. Shields.  I discussed her history and symptoms in detail and reviewed her testing that is available.  I discussed her DVT and PE and explained what happened when she was in the hospital, as it sounds like that was not explained before.  We discussed that seeing Hematology at this point will be very important to help determine need for long-term anticoagulation.  It will be important for her to have a hypercoagulable workup, which I cannot see has been done, and also to get his opinion as to the role that the Wegener's flare versus COVID had in this being provoked and these diagnoses, especially the Wegener's, playing a role in potential clots in the future.    We discussed the importance of nicotine cessation and its role  in clotting.  I recommended she never be on any estrogen containing birth control, which she understands.  We further discussed that we will also need to have a discussion with the hematologist regarding pregnancy management.  If anticoagulation is not recommended long-term, would the additional risk factor of pregnancy push him to anticoagulate her during that time.      She understands that we do not recommend at this point that she become pregnant until we have additional information.  I will plan to see her back with an echocardiogram in a couple of months once she sees the hematologist and be able to make further recommendations at that time.    With regards to the PFO, which she came to see me for, I would not recommend closing it at this point.  She did not have a TIA or stroke, which would be an indication to close it, which she understands.  All questions were answered.  It was a pleasure being involved in her care.  There was 60 minutes of face-to-face documentation and review of records on the date of visit.    Eduardo Mccall MD        D: 2021   T: 2021   MT: al    Name:     YUMIKO LYONSYenny  MRN:      [id number]        Account:      [id number]   :      2000           Service Date: 2021       Document: [id number]

## 2021-08-11 NOTE — PROGRESS NOTES
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     Multiple subsegmental pulmonary emboli without acute cor pulmonale (H) 03/2021     PFO (patent foramen ovale)      Preeclampsia, third trimester      Spontaneous pregnancy loss 2020    31 weeks     Stage 3b chronic kidney disease      Wegener's disease, pulmonary (H) 01/01/2010    renal biopdy       CURRENT MEDICATIONS:  Current Outpatient Medications   Medication Sig Dispense Refill     DULoxetine (CYMBALTA) 30 MG capsule Take 1 capsule (30 mg) by mouth At Bedtime 90 capsule 1     ferrous sulfate (FEROSUL) 325 (65 Fe) MG tablet Take 1 tablet (325 mg) by mouth daily (with breakfast) 90 tablet 0     rivaroxaban ANTICOAGULANT (XARELTO) 20 MG TABS tablet Take 1 tablet (20 mg) by mouth daily (with dinner) 30 tablet 1     norethindrone (MICRONOR) 0.35 MG tablet Take 1 tablet (0.35 mg) by mouth daily 90 tablet 1     predniSONE (DELTASONE) 10 MG tablet Take 10 mg by mouth daily         PAST SURGICAL HISTORY:  Past Surgical History:   Procedure Laterality Date     ENT SURGERY  2000    cyst     EXCISE GANGLION WRIST  2009       ALLERGIES  Heparin and Penicillins    FAMILY HX:  Family History   Problem Relation Age of Onset     Thyroid Disease Mother      Cancer Maternal Grandfather      Asthma No family hx of      Diabetes No family hx of        SOCIAL HX:  Social History     Socioeconomic History     Marital status:      Spouse name: None     Number of children: None     Years of education: None     Highest education level: None   Occupational History     None   Tobacco Use     Smoking status: Current Every Day Smoker     Packs/day: 0.25     Smokeless tobacco: Never Used     Tobacco comment: past vaping   Substance and Sexual Activity      "Alcohol use: Yes     Comment: Occas     Drug use: No     Sexual activity: Yes     Partners: Male     Birth control/protection: Pill   Other Topics Concern     Parent/sibling w/ CABG, MI or angioplasty before 65F 55M? Not Asked   Social History Narrative    Lives with brother 14yo and mother. Pets: Dog Nicholas.    Lives with  in [place], has cat.    Mom lives next door.     Social Determinants of Health     Financial Resource Strain:      Difficulty of Paying Living Expenses:    Food Insecurity:      Worried About Running Out of Food in the Last Year:      Ran Out of Food in the Last Year:    Transportation Needs:      Lack of Transportation (Medical):      Lack of Transportation (Non-Medical):    Physical Activity:      Days of Exercise per Week:      Minutes of Exercise per Session:    Stress:      Feeling of Stress :    Social Connections:      Frequency of Communication with Friends and Family:      Frequency of Social Gatherings with Friends and Family:      Attends [employer] Services:      Active Member of Clubs or Organizations:      Attends Club or Organization Meetings:      Marital Status:    Intimate Partner Violence:      Fear of Current or Ex-Partner:      Emotionally Abused:      Physically Abused:      Sexually Abused:        ROS:  Constitutional: No fever, chills, or sweats. No weight gain/loss.   ENT: No visual disturbance, ear ache, epistaxis, sore throat.   Allergies/Immunologic: Negative.   Respiratory: No cough, hemoptysis.   Cardiovascular: As per HPI.   GI: No nausea, vomiting, hematemesis, melena, or hematochezia.   : No urinary frequency, dysuria, or hematuria.   Integument: Negative.   Psychiatric: Negative.   Neuro: Negative.   Endocrinology: Negative.   Musculoskeletal: No myalgia.    VITAL SIGNS:  /78 (BP Location: Right arm, Cuff Size: Adult Large)   Pulse 93   Ht 1.626 m (5' 4\")   Wt 121.1 kg (267 lb)   SpO2 97%   BMI 45.83 kg/m    Body mass index is 45.83 kg/m .  Wt " Readings from Last 2 Encounters:   08/11/21 121.1 kg (267 lb)   07/09/21 116.6 kg (257 lb)       PHYSICAL EXAM  Cande Shields IS A 20 year old female.in no acute distress.  HEENT: Unremarkable.  Neck: JVP normal.  Carotids +4/4 bilaterally without bruits.  Lungs: CTA.  Cor: RRR. Normal S1 and S2.  No murmur, rub, or gallop.  PMI in Lf 5th ICS.  Abd: Soft, nontender, nondistended.  NABS.  No pulsatile mass.  Extremities: No C/C/E.  Pulses +4/4 symmetric in upper and lower extremities.  Neuro: Grossly intact.    LABS    Lab Results   Component Value Date    WBC 8.4 07/09/2021     Lab Results   Component Value Date    RBC 4.39 07/09/2021     Lab Results   Component Value Date    HGB 11.7 07/09/2021     Lab Results   Component Value Date    HCT 37.7 07/09/2021     No components found for: MCT  Lab Results   Component Value Date    MCV 86 07/09/2021     Lab Results   Component Value Date    MCH 26.7 07/09/2021     Lab Results   Component Value Date    MCHC 31.0 07/09/2021     Lab Results   Component Value Date    RDW 14.1 07/09/2021     Lab Results   Component Value Date     07/09/2021      Recent Labs   Lab Test 07/09/21  2228 04/08/21  0439    143   POTASSIUM 4.6 3.8   CHLORIDE 110* 114*   CO2 23 21   ANIONGAP 6 8   GLC 78 68*   BUN 29 29   CR 1.38* 1.42*   KRAIG 9.2 8.8     FLAKITA Mccall MD

## 2021-08-18 ENCOUNTER — OFFICE VISIT (OUTPATIENT)
Dept: DERMATOLOGY | Facility: CLINIC | Age: 21
End: 2021-08-18
Payer: COMMERCIAL

## 2021-08-18 DIAGNOSIS — L72.9 CYST OF SKIN: ICD-10-CM

## 2021-08-18 DIAGNOSIS — B07.9 VERRUCA: ICD-10-CM

## 2021-08-18 DIAGNOSIS — D22.9 BENIGN NEVUS: ICD-10-CM

## 2021-08-18 DIAGNOSIS — R21 RASH: Primary | ICD-10-CM

## 2021-08-18 PROCEDURE — 17110 DESTRUCTION B9 LES UP TO 14: CPT | Mod: GC | Performed by: DERMATOLOGY

## 2021-08-18 PROCEDURE — 99204 OFFICE O/P NEW MOD 45 MIN: CPT | Mod: 25 | Performed by: DERMATOLOGY

## 2021-08-18 RX ORDER — TRIAMCINOLONE ACETONIDE 1 MG/G
OINTMENT TOPICAL
Qty: 30 G | Refills: 1 | Status: SHIPPED | OUTPATIENT
Start: 2021-08-18 | End: 2021-10-30

## 2021-08-18 ASSESSMENT — PAIN SCALES - GENERAL: PAINLEVEL: NO PAIN (0)

## 2021-08-18 NOTE — PROGRESS NOTES
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 Patient desires removal. Low suspicion for HS given no hx of lesions in the inguinal folds, axillae, or inframammary folds.   - derm surg referral  - photo 08/18/21  - start BPO wash daily for prevention of new lesions    # Verruca x 2, R plantar hallux  - Paring and Cryotherapy performed today (see procedure note(s) below).    # Ill-defined scaly rash on L extensor forearm  Unclear etiology and only a small area involved. Will treat empirically and consider bx if not resolved with triam.   - start triam 0.1% oint BID to rash until resolved. Instructed patient to let us know if the rash doesn't resolved over the next 6 weeks. If not responsive to triam, might need bx.     # Intradermal nevus, vertex scalp   - Reassured patient of the benign nature/appearance of this lesion(s). No treatment is necessary at this time unless the lesion(s) changes or becomes symptomatic.     Procedures Performed:   - Cryotherapy procedure note, location(s): R plantar hallux. After verbal consent, 2 lesion(s) were pared and was(were) treated with 1-2 mm freeze border for 2 cycles with liquid nitrogen. Post cryotherapy instructions were provided.    Follow-up: 4-6 weeks with PA for repeat cryo if warts not resolved or in 6-8 weeks if rash on L  forearm not resolved     Staff and Resident:     Karolyn Ward MD  Dermatology Resident, PGY3  I was present for the entire procedure. Zee Beaucahmp MD  I, Zee Beauchamp MD, saw this patient with the resident and agree with the resident s findings and plan of care as documented in the resident s note.      ____________________________________________    CC: Derm Problem (Cande states that she is being seen today for a concerning bump on her thigh - can be periodically painful - also has a spot on her arm)    HPI:  Ms. Cande Shields is a(n) 20 year old female who presents today as a return patient for several issues:    Bumps on legs:   - one present for a year and other popped up more recently   - occassionaly get painful  - sometimes white put drains out   - denies every getting bumps in the inguinal folds, armpits, or beneath the breasts   - desires removal of these spots    Wart on toe:  - not sure how long its been there  - has had warts on the feet before. Says the sal acid didn't work but freezing did     Spot on L forearm:   - first noticed three months ago  - not painful nor itchy     Bump on scalp:  - noticed a few months ago  - not painful     Patient is otherwise feeling well, without additional skin concerns.    Has GPA but says it is not active. Not currently on any immunosuppressives.     Labs Reviewed:  None    Physical Exam:  Vitals: There were no vitals taken for this visit.  SKIN: Focused examination of scalp, legs, R foot, L arm was performed.  - On the right medial thigh there is a small skin-colored cystic papule   - On the left medial thigh, there is a roughly 0.8 cm pink fluctuant nodule   - On the left extensor forearm, there is a roughly 2 cm pink ill-defined scaly thin plaque  - On the vertex scalp, there is a flesh colored soft papule with regularly spaced looped vessels under dermoscopy  - Two verrucous papules on R plantar hallux   - No other lesions of concern on areas  examined.             Medications:  Current Outpatient Medications   Medication     DULoxetine (CYMBALTA) 30 MG capsule     ferrous sulfate (FEROSUL) 325 (65 Fe) MG tablet     rivaroxaban ANTICOAGULANT (XARELTO) 20 MG TABS tablet     No current facility-administered medications for this visit.      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

## 2021-08-18 NOTE — PROGRESS NOTES
SUBJECTIVE:                                                   Cande Shields is a 20 year old female who presents to clinic today for the following health issue(s):  Patient presents with:  Consult: Surgical consult for bicornuate uterus  Breast Problem: Pt stopped lactating for about 8 months and then got her nipples pierced in  and now has been lactating for 3 months.      HPI: The patient is seen at this time for further determine as to whether or not she has a septate uterus that would require surgery or bicornuate uterus.  The patient had a pregnancy loss at 31 weeks but this was due to severe preeclampsia and hypertensive crisis.  An ultrasound was performed in  that showed a bicornuate uterus with fairly generous size cavities on both sides.  This was not a septate situation at all.  Her cycles are irregular but she just had a menses last week.      No LMP recorded. (Menstrual status: Irregular Periods)..     Patient is sexually active, .  Using none for contraception.    reports that she has been smoking. She has been smoking about 0.25 packs per day. She has never used smokeless tobacco.    STD testing offered?  Declined    Health maintenance updated:  yes    Today's PHQ-2 Score:   PHQ-2 (  [employer]) 2021   Q1: Little interest or pleasure in doing things 0   Q2: Feeling down, depressed or hopeless 0   PHQ-2 Score 0     Today's PHQ-9 Score:   PHQ-9 SCORE 6/10/2021   PHQ-9 Total Score 8     Today's EB-7 Score:   EB-7 SCORE 6/10/2021   Total Score -   Total Score 6       Problem list and histories reviewed & adjusted, as indicated.  Additional history: as documented.    Patient Active Problem List   Diagnosis     Wegener's granulomatosis (H)     Myalgia     Granulomatosis with polyangiitis, unspecified whether renal involvement (H)     ANCA-associated vasculitis (H)     Morbid obesity (H)     Pulmonary infiltrates     Acute kidney injury (H)     Need for pneumocystis prophylaxis      "Past Surgical History:   Procedure Laterality Date     ENT SURGERY  2000    cyst     EXCISE GANGLION WRIST  2009      Social History     Tobacco Use     Smoking status: Current Every Day Smoker     Packs/day: 0.25     Smokeless tobacco: Never Used     Tobacco comment: past vaping   Substance Use Topics     Alcohol use: Yes     Comment: Occas      Problem (# of Occurrences) Relation (Name,Age of Onset)    Cancer (1) Maternal Grandfather    Thyroid Disease (1) Mother       Negative family history of: Asthma, Diabetes            Current Outpatient Medications   Medication Sig     DULoxetine (CYMBALTA) 30 MG capsule Take 1 capsule (30 mg) by mouth At Bedtime     ferrous sulfate (FEROSUL) 325 (65 Fe) MG tablet Take 1 tablet (325 mg) by mouth daily (with breakfast)     rivaroxaban ANTICOAGULANT (XARELTO) 20 MG TABS tablet Take 1 tablet (20 mg) by mouth daily (with dinner)     triamcinolone (KENALOG) 0.1 % external ointment Apply to rash on left arm twice daily until resolved.     No current facility-administered medications for this visit.     Allergies   Allergen Reactions     Heparin Other (See Comments)     Reaction: HIT     Penicillins Rash     Unknown, but think rash.  Tolerated cephalexin (12/27/20), cefpodoxime (12/27/20)       ROS:  12 point review of systems negative other than symptoms noted below or in the HPI.  Breast: Nipple Discharge  Genitourinary: Irregular Menses  No urinary frequency or dysuria, bladder or kidney problems      OBJECTIVE:     /74   Ht 1.626 m (5' 4\")   Wt 120.6 kg (265 lb 12.8 oz)   Breastfeeding No   BMI 45.62 kg/m    Body mass index is 45.62 kg/m .    Exam:  Constitutional:  Appearance: Well nourished, well developed alert, in no acute distress  Neurologic:  Mental Status:  Oriented X3.  Normal strength and tone, sensory exam grossly normal, mentation intact and speech normal.    Psychiatric:  Mentation appears normal and affect normal/bright.  Pelvic Exam:  External " Genitalia:     Normal appearance for age, no discharge present, no tenderness present, no inflammatory lesions present, color normal  Vagina:     Normal vaginal vault without central or paravaginal defects, no discharge present, no inflammatory lesions present, no masses present  Bladder:     Nontender to palpation  Urethra:   Urethral Body:  Urethra palpation normal, urethra structural support normal   Urethral Meatus:  No erythema or lesions present  Cervix:     Appearance healthy, no lesions present, nontender to palpation, no bleeding present  Uterus:     Uterus: firm, normal sized and nontender, midplane in position.   Adnexa:     No adnexal tenderness present, no adnexal masses present  Perineum:     Perineum within normal limits, no evidence of trauma, no rashes or skin lesions present  Anus:     Anus within normal limits, no hemorrhoids present  Inguinal Lymph Nodes:     No lymphadenopathy present  Pubic Hair:     Normal pubic hair distribution for age  Genitalia and Groin:     No rashes present, no lesions present, no areas of discoloration, no masses present       In-Clinic Test Results:PACS Images     Show images for US Transvaginal Non OB   Study Result    Narrative & Impression   US Transvaginal Non OB  Order #: [id number] Accession #: [id number]  Study Notes         Aleena Yo on 6/25/2021 12:47 PM       Gynecological Ultrasound Report  Pelvic U/S - Transvaginal  [facility]  Referring Provider: Lidia Seth NP  Sonographer:  Aleena Yo RDMS  Indication: Bleeding/Menses- Dysfunctional uterine bleeding (DUB)  LMP: No LMP recorded. Patient has had an injection.  History:   Gynecological Ultrasonography:   Uterus: anteverted. Contour is bicornuate.  Size: 6.36 x 3.55 x 6.86 cm  Endometrium: Thickness Total  Right 8.95 mm, Left 3.86mm  Findings: Bicornuate  Right Ovary: 2.54 x 1.99 x 1.62 cm. Wnl  Left Ovary: 2.89 x 1.37 x 1.24 cm. Wnl  Cul de Sac Free Fluid: No free  fluid     Impression: Bicornuate uterus, normal ovaries     YELENA ANTOINE                   ASSESSMENT/PLAN:                                                        ICD-10-CM    1. Bicornuate uterus  Q51.3        21-year-old patient with history of pregnancy loss at 31 weeks from severe hypertensive crisis as part of preeclampsia.  She does have a bicornuate uterus but I feel that there are generous cavities on both sides.  She is not a candidate for a septum resection.  The patient has irregular cycles right now and is probably not ovulatory.  She will monitor her cycles and track them carefully.  I believe that she can move ahead with another pregnancy but she does need to see maternal-fetal medicine carefully as her risk of recurrent hypertension is very high.  They can follow the progression of the pregnancy within a bicornuate uterus with ultrasounds very easily.        Micky Whitmore MD  [facility] [place]

## 2021-08-18 NOTE — LETTER
8/18/2021       RE: Cande Shields  [address] [place] [id number]     Dear Colleague,    Thank you for referring your patient, Cande Shields, to the [facility] [place] at [facility]. Please see a copy of my visit note below.    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 Patient desires removal. Low suspicion for HS given no hx of lesions in the inguinal folds, axillae, or inframammary folds.   - derm surg referral  - photo 08/18/21  - start BPO wash daily for prevention of new lesions    # Verruca x 2, R plantar hallux  - Paring and Cryotherapy performed today (see procedure note(s) below).    # Ill-defined scaly rash on L extensor forearm  Unclear etiology and only a small area involved. Will treat empirically and consider bx if not resolved with triam.   - start triam 0.1% oint BID to rash until resolved. Instructed patient to let us know if the rash doesn't resolved over the next 6 weeks. If not responsive to triam, might need bx.     # Intradermal nevus, vertex scalp   - Reassured patient of the benign nature/appearance of this lesion(s). No treatment is necessary at this time unless the lesion(s) changes or becomes symptomatic.     Procedures Performed:   - Cryotherapy  procedure note, location(s): R plantar hallux. After verbal consent, 2 lesion(s) were pared and was(were) treated with 1-2 mm freeze border for 2 cycles with liquid nitrogen. Post cryotherapy instructions were provided.    Follow-up: 4-6 weeks with PA for repeat cryo if warts not resolved or in 6-8 weeks if rash on L forearm not resolved     Staff and Resident:     Karolyn Ward MD  Dermatology Resident, PGY3  I was present for the entire procedure. Zee Beauchamp MD  I, Zee Beauchamp MD, saw this patient with the resident and agree with the resident s findings and plan of care as documented in the resident s note.      ____________________________________________    CC: Derm Problem (Cande states that she is being seen today for a concerning bump on her thigh - can be periodically painful - also has a spot on her arm)    HPI:  Ms. Cande Shields is a(n) 20 year old female who presents today as a return patient for several issues:    Bumps on legs:   - one present for a year and other popped up more recently   - occassionaly get painful  - sometimes white put drains out   - denies every getting bumps in the inguinal folds, armpits, or beneath the breasts   - desires removal of these spots    Wart on toe:  - not sure how long its been there  - has had warts on the feet before. Says the sal acid didn't work but freezing did     Spot on L forearm:   - first noticed three months ago  - not painful nor itchy     Bump on scalp:  - noticed a few months ago  - not painful     Patient is otherwise feeling well, without additional skin concerns.    Has GPA but says it is not active. Not currently on any immunosuppressives.     Labs Reviewed:  None    Physical Exam:  Vitals: There were no vitals taken for this visit.  SKIN: Focused examination of scalp, legs, R foot, L arm was performed.  - On the right medial thigh there is a small skin-colored cystic papule   - On the left medial thigh, there is a roughly 0.8 cm  pink fluctuant nodule   - On the left extensor forearm, there is a roughly 2 cm pink ill-defined scaly thin plaque  - On the vertex scalp, there is a flesh colored soft papule with regularly spaced looped vessels under dermoscopy  - Two verrucous papules on R plantar hallux   - No other lesions of concern on areas examined.             Medications:  Current Outpatient Medications   Medication     DULoxetine (CYMBALTA) 30 MG capsule     ferrous sulfate (FEROSUL) 325 (65 Fe) MG tablet     rivaroxaban ANTICOAGULANT (XARELTO) 20 MG TABS tablet     No current facility-administered medications for this visit.      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

## 2021-08-18 NOTE — PATIENT INSTRUCTIONS
For the cysts on the thighs:   - we will put a referral in for dermatologic surgery to get them removed  - use benzoyl peroxide wash in the shower. Leave it on for 2-3 minutes before washing it off. Wash it off thoroughly so it doesn't bleach your towels or clothes. You can find this over the counter (Neutrogena ClearPore, PanOxyl).     For the spot on the left arm:   - use the triamcinolone 0.1% ointment twice daily until resolved. If the rash doesn't go away after using 6 weeks of using the ointment, let us know.     For the warts:   - let us know if the warts don't go away in the next 6 weeks and we can get you in with one of our PA to have them frozen again

## 2021-08-18 NOTE — NURSING NOTE
Dermatology Rooming Note    Cande Shields's goals for this visit include:   Chief Complaint   Patient presents with     Derm Problem     Cande states that she is being seen today for a concerning bump on her thigh - can be periodically painful - also has a spot on her arm     Adela Santiago LPN

## 2021-08-19 ENCOUNTER — TELEPHONE (OUTPATIENT)
Dept: DERMATOLOGY | Facility: CLINIC | Age: 21
End: 2021-08-19

## 2021-08-19 NOTE — TELEPHONE ENCOUNTER
LVM w/pt to call and schedule appointment with Resident for procedure.    Simone Graff, Procedure

## 2021-08-25 ENCOUNTER — OFFICE VISIT (OUTPATIENT)
Dept: OBGYN | Facility: CLINIC | Age: 21
End: 2021-08-25
Payer: COMMERCIAL

## 2021-08-25 VITALS
BODY MASS INDEX: 45.38 KG/M2 | SYSTOLIC BLOOD PRESSURE: 104 MMHG | DIASTOLIC BLOOD PRESSURE: 74 MMHG | HEIGHT: 64 IN | WEIGHT: 265.8 LBS

## 2021-08-25 DIAGNOSIS — Q51.3 BICORNUATE UTERUS: Primary | ICD-10-CM

## 2021-08-25 PROCEDURE — 99212 OFFICE O/P EST SF 10 MIN: CPT | Performed by: OBSTETRICS & GYNECOLOGY

## 2021-08-25 ASSESSMENT — MIFFLIN-ST. JEOR: SCORE: 1945.66

## 2021-09-08 ENCOUNTER — OFFICE VISIT (OUTPATIENT)
Dept: OBGYN | Facility: CLINIC | Age: 21
End: 2021-09-08
Payer: COMMERCIAL

## 2021-09-08 VITALS
HEIGHT: 64 IN | WEIGHT: 265 LBS | SYSTOLIC BLOOD PRESSURE: 118 MMHG | HEART RATE: 76 BPM | BODY MASS INDEX: 45.24 KG/M2 | DIASTOLIC BLOOD PRESSURE: 72 MMHG

## 2021-09-08 DIAGNOSIS — N64.52 NIPPLE DISCHARGE: ICD-10-CM

## 2021-09-08 DIAGNOSIS — N93.9 ABNORMAL UTERINE BLEEDING (AUB): ICD-10-CM

## 2021-09-08 DIAGNOSIS — R30.0 DYSURIA: ICD-10-CM

## 2021-09-08 DIAGNOSIS — N89.8 VAGINAL DISCHARGE: Primary | ICD-10-CM

## 2021-09-08 LAB
ALBUMIN UR-MCNC: 100 MG/DL
APPEARANCE UR: CLEAR
BILIRUB UR QL STRIP: NEGATIVE
CLUE CELLS: ABNORMAL
COLOR UR AUTO: YELLOW
ERYTHROCYTE [DISTWIDTH] IN BLOOD BY AUTOMATED COUNT: 14.4 % (ref 10–15)
GLUCOSE UR STRIP-MCNC: NEGATIVE MG/DL
HCT VFR BLD AUTO: 37.7 % (ref 35–47)
HGB BLD-MCNC: 12 G/DL (ref 11.7–15.7)
HGB UR QL STRIP: ABNORMAL
INR PPP: 1.02 (ref 0.85–1.15)
KETONES UR STRIP-MCNC: NEGATIVE MG/DL
LEUKOCYTE ESTERASE UR QL STRIP: NEGATIVE
MCH RBC QN AUTO: 27.2 PG (ref 26.5–33)
MCHC RBC AUTO-ENTMCNC: 31.8 G/DL (ref 31.5–36.5)
MCV RBC AUTO: 86 FL (ref 78–100)
NITRATE UR QL: NEGATIVE
PH UR STRIP: 5.5 [PH] (ref 5–7)
PLATELET # BLD AUTO: 318 10E3/UL (ref 150–450)
PROLACTIN SERPL-MCNC: 17 UG/L (ref 3–27)
RBC # BLD AUTO: 4.41 10E6/UL (ref 3.8–5.2)
SP GR UR STRIP: >=1.03 (ref 1–1.03)
TRICHOMONAS, WET PREP: ABNORMAL
UROBILINOGEN UR STRIP-ACNC: 0.2 E.U./DL
WBC # BLD AUTO: 8.1 10E3/UL (ref 4–11)
WBC'S/HIGH POWER FIELD, WET PREP: ABNORMAL
YEAST, WET PREP: ABNORMAL

## 2021-09-08 PROCEDURE — 99213 OFFICE O/P EST LOW 20 MIN: CPT | Performed by: NURSE PRACTITIONER

## 2021-09-08 PROCEDURE — 87210 SMEAR WET MOUNT SALINE/INK: CPT | Performed by: NURSE PRACTITIONER

## 2021-09-08 PROCEDURE — 85027 COMPLETE CBC AUTOMATED: CPT | Performed by: NURSE PRACTITIONER

## 2021-09-08 PROCEDURE — 36415 COLL VENOUS BLD VENIPUNCTURE: CPT | Performed by: NURSE PRACTITIONER

## 2021-09-08 PROCEDURE — 85610 PROTHROMBIN TIME: CPT | Performed by: NURSE PRACTITIONER

## 2021-09-08 PROCEDURE — 81003 URINALYSIS AUTO W/O SCOPE: CPT | Performed by: NURSE PRACTITIONER

## 2021-09-08 PROCEDURE — 84702 CHORIONIC GONADOTROPIN TEST: CPT | Performed by: NURSE PRACTITIONER

## 2021-09-08 PROCEDURE — 84146 ASSAY OF PROLACTIN: CPT | Performed by: NURSE PRACTITIONER

## 2021-09-08 RX ORDER — PREDNISONE 5 MG/1
5 TABLET ORAL DAILY
COMMUNITY
End: 2021-10-30

## 2021-09-08 ASSESSMENT — MIFFLIN-ST. JEOR: SCORE: 1942.03

## 2021-09-08 NOTE — PATIENT INSTRUCTIONS
[employer] is for any [facility] looking for free support to quit smoking, vaping or chewing.   [employer] will offer many quit support options and resources so [place] residents can continue to find the way to quit that works best for them.   Free support includes personalized coaching, email and text support, educational materials, and quit medication (nicotine patches, gum or lozenges) delivered by mail.     Contact [employer] at 1-800-QUIT-NOW or online at [employer] to receive support on your quit journey.

## 2021-09-09 LAB — B-HCG SERPL-ACNC: <1 IU/L (ref 0–5)

## 2021-09-12 ENCOUNTER — MYC MEDICAL ADVICE (OUTPATIENT)
Dept: NEPHROLOGY | Facility: CLINIC | Age: 21
End: 2021-09-12
Payer: COMMERCIAL

## 2021-09-13 ENCOUNTER — TELEPHONE (OUTPATIENT)
Dept: HEMATOLOGY | Facility: CLINIC | Age: 21
End: 2021-09-13

## 2021-09-13 ENCOUNTER — MEDICAL CORRESPONDENCE (OUTPATIENT)
Dept: HEALTH INFORMATION MANAGEMENT | Facility: CLINIC | Age: 21
End: 2021-09-13

## 2021-09-14 ENCOUNTER — MEDICAL CORRESPONDENCE (OUTPATIENT)
Dept: HEALTH INFORMATION MANAGEMENT | Facility: CLINIC | Age: 21
End: 2021-09-14

## 2021-09-16 ENCOUNTER — TRANSCRIBE ORDERS (OUTPATIENT)
Dept: MATERNAL FETAL MEDICINE | Facility: CLINIC | Age: 21
End: 2021-09-16

## 2021-09-16 DIAGNOSIS — O26.90 PREGNANCY RELATED CONDITION, ANTEPARTUM: Primary | ICD-10-CM

## 2021-09-19 ENCOUNTER — HEALTH MAINTENANCE LETTER (OUTPATIENT)
Age: 21
End: 2021-09-19

## 2021-10-04 ENCOUNTER — TELEPHONE (OUTPATIENT)
Dept: DERMATOLOGY | Facility: CLINIC | Age: 21
End: 2021-10-04

## 2021-10-04 NOTE — TELEPHONE ENCOUNTER
Called and left message for patient to return call to schedule excision with Derm Surg.    Simone Graff, Procedure

## 2021-10-07 ENCOUNTER — VIRTUAL VISIT (OUTPATIENT)
Dept: HEMATOLOGY | Facility: CLINIC | Age: 21
End: 2021-10-07
Attending: INTERNAL MEDICINE
Payer: COMMERCIAL

## 2021-10-07 VITALS — BODY MASS INDEX: 43.77 KG/M2 | WEIGHT: 255 LBS

## 2021-10-07 DIAGNOSIS — O03.9 SPONTANEOUS PREGNANCY LOSS: ICD-10-CM

## 2021-10-07 DIAGNOSIS — I26.94 MULTIPLE SUBSEGMENTAL PULMONARY EMBOLI WITHOUT ACUTE COR PULMONALE (H): ICD-10-CM

## 2021-10-07 DIAGNOSIS — I74.3 FEMORAL ARTERY THROMBOSIS, LEFT (H): ICD-10-CM

## 2021-10-07 DIAGNOSIS — I82.413 ACUTE DEEP VEIN THROMBOSIS (DVT) OF FEMORAL VEIN OF BOTH LOWER EXTREMITIES (H): Primary | ICD-10-CM

## 2021-10-07 DIAGNOSIS — N93.8 DYSFUNCTIONAL UTERINE BLEEDING: ICD-10-CM

## 2021-10-07 DIAGNOSIS — Q21.12 PFO (PATENT FORAMEN OVALE): ICD-10-CM

## 2021-10-07 PROCEDURE — 99205 OFFICE O/P NEW HI 60 MIN: CPT | Mod: 95 | Performed by: INTERNAL MEDICINE

## 2021-10-07 NOTE — PROGRESS NOTES
[facility]  [address] [place] [id number]  Phone: [phone], Fax: [phone]    Outpatient Visit Note:    Patient: Cande Shields  MRN: [id number]  : 2000  GRACIE: Oct 7, 2021     Reason for Consultation:  Cande Shields is a referred by Dr. Amanda for evaluation and treatment of acute DVT and saddle PE    Assessment: Cande Shields is a 21 year old woman with history of Wegener's granulomatosis and recent spontaneous late , with unprovoked VTE who has completed treatment with anticoagulation.  This could be APS given that she meets clinical criteria and if so would increase her risk of recurrence, otherwise she is at intermediate risk based on other risk factors.    She does have risk factors which predispose her to VTE (smoking hx, obesity, hx of COVID). However, given hx of premature birth <34th week of gestation and hx of VTE, a hypercoagulable condition as in APS certainly a possibility. We discussed testing her for APS. Depending on the results, we can then discuss whether or not she needs further anticoagulation. It would be reasonable for her to stay off ac while APS testing is back given she has completed 5 months of Xarelto. She also expressed her interest in pursuing another pregnancy at this time. We don't see any contraindication to do so. If she were to become pregnant, she may need to be on lovenox, which she is open to. Again, we can discuss further details once test results are back.      Recommendations:  1. Test for APS with lupus anticoagulant, anticardiolipin, antibeta-2 glycoprotein antibody  2. Okay to stay off anticoagulation while APS testing is back  3. If positive then confirm testing in 3 months  4. If negative then consider D Dimer testing, smoking cessation and discuss risks and benefits of long-term anticoagulation  5. Discussed management of menorrhagia while on anticoagulation  6. Will plan for pregnancy management    7. Plan to RTC in 1 month    Kori Johnson MD  Hematology/Oncology Fellow    Physician Attestation   I saw this patient with the resident and agree with the resident s findings and plan of care as documented in the resident s note.  She will remain off anticoagulation for now, attempt smoking cessation, check APS.  Discussed risks of recurrence that are known and management of VTE prophylaxis during pregnancy.    65 minutes spent on the date of the encounter doing chart review, review of outside records, review of test results, interpretation of tests, patient visit and documentation      Kodi Dye MD   of Medicine  [employer]     -------------------------------      History: Cande Shields is a 21 year old with past medical history of Wegener's granulomatosis presenting for the management of bilateral LE DVTs and a saddle embolism.    She had COVID in Dec 2020 while pregnancy and then had a stillbirth at 32 weeks gestation in October 2020.     Cande presented to [facility] on 3/20/21 with shortness of breath, CTA was c/w a saddle embolism. She also had a doppler US done c/w DVT in the right proximal calf posterior tibial and peroneal veins and in the left proximal calf peroneal vein. She received thrombolysis on 3/21/21, was then discharged on Xarelto which she took from March 2021- August 2021.    She had menorrhagia with Xarelto and stopped taking 1.5 mo ago for this reason.At the time of her PE and DVT, she was not on estrogen, did not report prolonged immobility. She does smoke 1/2 a pack per day and has a hx of obesity. She has not had a hypercoagulable work up done in the past.  She follows with rheumatology for her Wegener's but is currently not on any medications.  She is following with cardiology as she does have a PFO diagnosed during admission to the hospital.  No plans for surgery at this time.    Today, she states she feels well.   She denies any further bleeding since stopping the Xarelto.  She is concerned about future blood clots given she would like to be pregnant and is planning to start trying in the next month.  She denies any shortness of breath, no chest pain, no bilateral lower extremity swelling, no fevers, no night sweats, no weight loss.  She does continue to smoke cigarettes daily.    her medical history is notable for:  Carolyn's granulomatosis  Obesity  Smoker    her surgical history is reviewed in the EMR and is notable for  Thrombolysis for saddle PE    her medications are reviewed in the EMR and notable   Prenatal vitamins    her family history is notable for   No blood clots/bleeding disorders  Grandfather on mother's side with colon cancer at 55    Social hx: 1/2 pack per day for 5 years  No drug use  Works as teacher    Physical Exam:  Virtual visit  Exam:   Gen: Appears well, no distress  Pulm: nl WOB  Neuro:alert, oriented x 3    Labs:  Lab Results   Component Value Date    WBC 8.1 09/08/2021    WBC 8.4 07/09/2021     Lab Results   Component Value Date    RBC 4.41 09/08/2021    RBC 4.39 07/09/2021     Lab Results   Component Value Date    HGB 12.0 09/08/2021    HGB 11.7 07/09/2021     Lab Results   Component Value Date    HCT 37.7 09/08/2021    HCT 37.7 07/09/2021     No components found for: MCT  Lab Results   Component Value Date    MCV 86 09/08/2021    MCV 86 07/09/2021     Lab Results   Component Value Date    MCH 27.2 09/08/2021    MCH 26.7 07/09/2021     Lab Results   Component Value Date    MCHC 31.8 09/08/2021    MCHC 31.0 07/09/2021     Lab Results   Component Value Date    RDW 14.4 09/08/2021    RDW 14.1 07/09/2021     Lab Results   Component Value Date     09/08/2021     07/09/2021     Imaging:  3/20/21 Doppler US  IMPRESSION:   1. Positive for DVT in the right proximal calf posterior tibial and peroneal veins, and in the left proximal calf peroneal vein.   2. No DVT above the knee in either  lower extremity.    3/20/21 CTA  IMPRESSION: Positive for pulmonary emboli with a very large bilateral clot burden as above, dilation of the main pulmonary artery, and possible right heart strain. A wedge-shaped pleural-based opacity at the right lung base most likely represents a small infarct.

## 2021-10-07 NOTE — PROGRESS NOTES
Patient was contacted to complete the pre-visit call prior to their video visit with the provider.      Allergies and medications were reviewed.    I thanked them for their time to cover this information     Bola Moscoso CMA

## 2021-10-14 ENCOUNTER — PRE VISIT (OUTPATIENT)
Dept: MATERNAL FETAL MEDICINE | Facility: CLINIC | Age: 21
End: 2021-10-14

## 2021-10-16 NOTE — PROGRESS NOTES
Maternal-Fetal Medicine Consultation    Cande Shields  : 2000  MRN: [id number]    REFERRAL:  Cande Shields is a 21 year old sent by Dr. Faye for MFM consultation.    HPI:  Cande Shields is a 21 year old  here for MFM preconception consultation regarding medical comorbidities.    She is here alone today.    She presents to discuss that she has not gotten her period in several months. She is not on contraception and although is not actively attempting conception, is not trying to prevent it. She has not had an evaluation for this yet. She has a history of abnormal bleeding with periods of heavy menstrual bleeding.     She has a history of Wegener's granulomatosis (currently referred to a GPA - granulomatous polyangiitis, which was diagnosed at age 10.  She has associated renal, pulmonary, musculoskeletal, and cutaneous involvement. During her flares, she notes fatigue, join pain, cutaneous nodules, and hemoptysis. Denies any of these symptoms currently. She reports that she was in remission by age 11 up until 2020, at which time she was diagnosed with Covid in 2020 with subsequent findings concerning for flare, and responding to GPA recommended treatment..  She states that since this time, she has been on remission. She has previously been on Cytoxan and methotrexate in the past.  Her baseline creatinine is 1.4 last 2021.  She follows with nephrology, and her last appointment was in 2021. She notes baseline proteinuria which is persistent.     She also has a history of DVT and saddle pulmonary embolism, diagnosed 2021.  She presented to [facility] with progressive dyspnea, at which time she was severely dyspneic after taking a step.  She was found to have a main pulmonary artery embolus with associated right DVT in the proximal calf, posterior tibial vein, and peroneal vein.  At the time of diagnosis, she was not on any estrogen-containing contraceptives,  "though was smoking.  She was initially treated with heparin, though this was discontinued as there was concern for heparin-induced thrombocytopenia as she developed a femoral artery thrombosis while on heparin anticoagulation. HIT antibody testing was negative as was serotonin release assay, making the diagnosis less likely.  She was treated on Xarelto and is currently not on anticoagulation.  She was seen by hematology, who recommended antiphospholipid antibody testing. Though, she previously had testing upon diagnosis for APAS, that was negative. She also had other thrombophilia testing, including Prothrombin, Factor V, and Protein C, which were negative. She was also seen by Dr. Mccall on 8/2021, given previous echocardiogram findings of mild right ventricular dilation with preserved EF as well as PFO. Dr. Mccall recommended a follow up prior to attempting pregnancy, which is scheduled for early November.     She also has a history of stillbirth as well as HELLP syndrome.  During her first pregnancy, in 10/20/2021, at 30 weeks 6 days, she presented [facility] with a complaint of right sided chest pain. She notes that she was scheduled for an US two days prior to presentation, but was not allowed to take time off of work and so was unable to go. On presentation to the ED, she was found to have sustained severe range blood pressures, with systolics in the 170s to 180s.  Additionally, no fetal heart tones were identified on ultrasound.  Her labs were notable for proteinuria, as well as elevated liver enzymes and thrombocytopenia.  Given the diagnosis IUFD, she underwent induction of labor, and delivered a stillborn female, \"Yasmin\" weight 2 lbs at birth. She denies previous diagnosis of FGR in the pregnancy before this, but was told that her fetus looked small for gestational age at birth. She hadn't had an US since 20 weeks.     Of note, Ms. Shields has not yet been vaccinated against COVID-19. She is " interested in becoming vaccinated, but was waiting until she was recovered from being hospitalized. Today is open to proceeding with vaccination, which we recommended.       Obstetrics History:  OB History    Para Term  AB Living   1 1 0 1 0 0   SAB TAB Ectopic Multiple Live Births   0 0 0 0 0      # Outcome Date GA Lbr Tobin/2nd Weight Sex Delivery Anes PTL Lv   1  10/2020 31w0d    STILLBIRTH   FD       Past Medical History:  Past Medical History:   Diagnosis Date     ADHD (attention deficit hyperactivity disorder)      DVT, lower extremity, distal (H)      Dysmenorrhea      Femoral artery thrombosis, left (H) 2021     Multiple subsegmental pulmonary emboli without acute cor pulmonale (H) 2021     PFO (patent foramen ovale)      Preeclampsia, third trimester      Spontaneous pregnancy loss     31 weeks     Stage 3b chronic kidney disease      Wegener's disease, pulmonary (H) 2010    renal biopdy       Past Surgical History:  Past Surgical History:   Procedure Laterality Date     ENT SURGERY      cyst     EXCISE GANGLION WRIST         Current Medications:  Prior to Admission medications    Medication Sig Last Dose Taking? Auth Provider   DULoxetine (CYMBALTA) 30 MG capsule Take 1 capsule (30 mg) by mouth At Bedtime  Patient not taking: Reported on 10/7/2021   Adrián Lacy MD   ferrous sulfate (FEROSUL) 325 (65 Fe) MG tablet Take 1 tablet (325 mg) by mouth daily (with breakfast)  Patient not taking: Reported on 10/7/2021   Lidia Seth APRN CNP   predniSONE (DELTASONE) 5 MG tablet Take 5 mg by mouth daily   Reported, Patient   rivaroxaban ANTICOAGULANT (XARELTO) 20 MG TABS tablet Take 1 tablet (20 mg) by mouth daily (with dinner)   Sofie Ferrari MD   triamcinolone (KENALOG) 0.1 % external ointment Apply to rash on left arm twice daily until resolved.   Zee Beauchamp MD       Allergies:  Heparin and Penicillins    Social History:   Smokes 1/2 pack  cigarettes per day, but was previously smoking 1 pack per day. Is working on quitting.    Family History:  Denies history of genetic disorders, preeclampsia, thromboembolic disease, bleeding disorders, developmental delay.    ROS:  10-point ROS negative except as in HPI     PHYSICAL EXAM:  Deferred     Component      Latest Ref Rng & Units 2021   Sodium      133 - 144 mmol/L 139   Potassium      3.4 - 5.3 mmol/L 4.6   Chloride      94 - 109 mmol/L 110 (H)   Carbon Dioxide      20 - 32 mmol/L 23   Anion Gap      3 - 14 mmol/L 6   Glucose      70 - 99 mg/dL 78   Urea Nitrogen      7 - 30 mg/dL 29   Creatinine      0.52 - 1.04 mg/dL 1.38 (H)   GFR Estimate      >60 mL/min/1.73:m2 54 (L)   GFR Estimate If Black      >60 mL/min/1.73:m2 62   Calcium      8.5 - 10.1 mg/dL 9.2   Bilirubin Total      0.2 - 1.3 mg/dL <0.1 (L)   Albumin      3.4 - 5.0 g/dL 3.2 (L)   Protein Total      6.8 - 8.8 g/dL 7.1   Alkaline Phosphatase      40 - 150 U/L 91   ALT      0 - 50 U/L 42   AST      0 - 45 U/L 19       ASSESSMENT/PLAN:  Cande Shields is a 21 year old  here for [facility] consultation regarding:       Wegener's Granulomatosis/Granulomatosis with polyangiitis  Wegener's granulomatosis, also known as granulomatosis with polyangiitis is an antineutrophil cytoplasmic antibody (ANCA) associated vasculitis with unclear etiology.  It predominantly involves the respiratory tract and kidneys, though can involve other organ systems.  Wegener's granulomatosis during pregnancy has unique considerations. Outcome, as with many other conditions in pregnancy, is dependent upon disease control at the time of conception. Upon reviewing the literature, Wegener's granulomatosis is rare, but has been reported in pregnancies. Its risks include miscarriage,  birth, stillbirth. Maternal risks include flare during pregnancy as well as preeclampsia. In one review, almost 40% of patients will flare during pregnancy. Furthermore, given the  patient's chronic kidney disease, with baseline creatinine of 1.4, this further predisposes her to developing hypertensive disorders in pregnancy including preeclampsia.  We also discussed, that worsening of her kidney disease, which could be irreversible, is possible.    History of stillbirth  The most common causes of fetal death in developed countries are congenital or karyotypic anomalies, placental problems associated with growth restriction, and maternal medical diseases such as infection, diabetes mellitus, or hypertensive disorders. Many cases of fetal death, especially at term, are attributed to umbilical cord accidents. In approximately 25% of cases, however, the cause of fetal death cannot be explained.    If a specific cause of a previous fetal death is identified, the recurrence risk can be better estimated.  However, if the cause of fetal death in a low-risk individual was not discovered, the risk of recurrence is estimated to be 7.8 to 10.5 per 1000 births.  The subsequent pregnancy is also at risk for abruption,  birth, and fetal growth restriction.       Antepartum fetal monitoring in the third trimester may help to identify fetuses at risk for death, however fetal deaths do occur in pregnancies receiving regular  testing.  Delivery timing should balance the risk between prematurity and the increasing risk of fetal death due to advancing gestational age.     History of early onset preeclampsia, HELLP syndrome  A history of preeclampsia in the past confers a recurrence risk of up to 25-30%. A history of severe preeclampsia remote from term may have a recurrence risk of almost 70%.  Subsequent pregnancies in women with a history of severe preeclampsia or eclampsia are also at increased risk of other obstetric complications compared to women with no such history. These problems include:  placental abruption,  delivery, intrauterine growth restriction, and increased   mortality.  Low dose aspirin is recommended in women with previous preeclampsia to prevent recurrence.  It should be initiated late in the first trimester. Women who have experienced preeclampsia are at a lifelong increased risk for cardiovascular disease and healthy lifestyle should be emphasized.    Eclampsia has a recurrence risk of approximately 2 percent in subsequent pregnancy.  Those with a history of eclampsia have a 9-30% risk of subsequent preeclampsia.  In general, the earlier the eclampsia the higher the risk.    History of DTV, saddle pulmonary embolism  Pregnant and postpartum women have up to a five-fold increased risk for venous thromboembolism (VTE); they have higher rates of stasis, hypercoagulability and vascular damage (Virchow Triad). Decreased mobility may also play a role.   The prevention of deep vein thrombosis (DVT) is paramount because pulmonary embolism (PE) is a leading cause of maternal death, responsible for 9% of maternal deaths.  The most important risk factor for VTE in pregnancy is a personal history of VTE, and up to 25% of cases of VTE in pregnancy are recurrent events.  The second most important risk factor is the presence of a thrombophilia, with up to 50% of women experiencing VTE in pregnancy having a thrombophilia.  ACOG recommends that all women with a VTE be evaluated for both antiphospholipid antibody syndrome and inherited thrombophilias.  These results might  of a future pregnancy.      Women with a history of VTE that is estrogen or pregnancy related or was idiopathic (i.e. unprovoked), in the absence of a thrombophilia, are managed with prophylactic anticoagulation during pregnancy and for 6 weeks postpartum.  For most patients enoxaparin 40 mg daily is sufficient.      Most expert recommend waiting 10-12 hours after a dose of LMWH before placing regional anesthesia.  Similarly, a dose should be given no sooner than four hours after removal of an  epidural and 12 hours after the regional anesthesia placement.  The exact timing should be discussed with the anesthesiologist performing the procedure. From an obstetrics stand point, assuming no significant bleeding anticoagulation can usually be resumed 12 hours after a  birth or six hours after a vaginal birth.  Again, pneumatic compression devices are recommended.  Avoidance of estrogen containing birth control is recommended.    In Ms. Shields's chart, there is documented allergy to heparin due to HIT, though HIT antibody and serotonin release assays were negative. Would recommend discussing with hematology regarding whether she truly has a history of HIT and whether heparin products would be contraindicated and use of Fondaparinux would be preferable in pregnancy.    Bicornuate uterus  The overall prevalence of uterine malformations in the general population and the population of fertile women is 5.5%. The incidence of Mullerian defects in infertile women is 8.0%. Diagnosis is usually made after evaluation for recurrent pregnancy losses.  Traditionally, the diagnosis was made with hysteroscopy, chromotubation, or sonohysterography to evaluate the internal uterine cavity with laparoscopy used to evaluate the external uterine contour.  Three dimensional ultrasound and saline contrast sonohysterography are being used more frequently for a noninvasive diagnosis.  Maternal renal ultrasound as Mullerian defects can be associated with additional genitourinary malformations.     Certain complications seem to be more common with certain types of malformations.  Bicornuate uteri are associated with increased rates of  delivery 2-3 times higher than expected in the general pregnant population.  There is also an increased risk for fetal malpresentation, growth restriction, and uterine rupture with the bicornuate uterus.  Thus, induction of labor should be undertaken cautiously.    Uterine anomalies can also  be associated with an increased risk of short cervix associated with  birth and therefore we recommend a TVUS for cervical length in the mid second trimester.     Recommendations:  Preconception:  - Follow-up with primary OBGYN provider for infertility, amenorrhea evaluation given patient report of absent menses with negative pregnancy tests.   - Continue efforts in smoking cessation. Discussed using multimodal nicotine replacement, including patches, gum, lozenge.   - Assessment for thrombophilias Protein S deficiency and antithrombin III. Order placed today. This testing will help stratify appropriate anticoagulation during a pregnancy, though would recommend at a minimum prophylactic anticoagulation during pregnancy and postpartum.  - Prior to initiation anticoagulation in pregnancy, should discuss use of Lovenox given documented history of HIT. Appears suspicion is low given negative assays. If concern for history of HIT, Fondaparinux may be used in pregnancy.Recommend follow up with hematology to discuss potential anticoagulation in a future pregnancy.   - Discussed and recommended COVID vaccination. Given history of massive clot, Pfizer or Moderna vaccines would be preferable given theoretical risk of rare blood clot with Ruslan and Ruslan, although this risk is very small and likely not related to her VTE history. She plans on getting vaccinated.    Pregnancy recommendations:  - Early ultrasound to establish LUIS  - Baseline preeclampsia labs (platelet count, creatinine, liver function tests and protein/creatinine ratio).   - Low dose aspirin started at 12-16 weeks  - Nuchal translucency ultrasound at 11-13 weeks with aneuploidy screening, if desired  - Comprehensive anatomy ultrasound at 18-20 weeks with MFM, with baseline cervical length given bicornuate uterus.  - Serial ultrasounds every 4-6 weeks to assess growth may be used to assess placental function.  If growth is lagging or interval growth  is suboptimal ultrasounds should be done more frequently  -  testing with weekly BPP (or NST and MVP) starting at 30 weeks given history of prior stillbirth.   - Daily fetal movement counts after 28 weeks  gestation   - Frequent visits, documentation of fetal heart tones and fetal well-being and lots of positive reinforcement are invaluable.   - Anesthesia consultation in third trimester due to anticoagulation  - Close monitoring for signs and/or symptoms of evolving preeclampsia.  - Close monitoring of blood pressure both prenatally and in the postpartum period  - Confirmation of fetal presentation at time of labor given bicornuate uterus.  - Delivery at 39 weeks   - Delivery between 37&0-38&6 can be considered in women with significant anxiety related to the fetal demise after a discussion of the risks of early term delivery  - given her complex history, we would be happy to take on Cande's pregnancy care in our [facility] clinic. Please refer her back when pregnant.     The patient was seen and evaluated with Dr. Crump.    Thank you for allowing us to participate in the care of your patient. Please do not hesitate to contact us if you have further questions regarding the management of your patient.       Nirmala Lozano MD  Maternal Fetal Medicine Fellow    I spent a total of 30 min face to face with Cande during today's office visit with over 50% of this time spent counseling the patient on comorbidities of pregnancy.      Please see note for details.        Kayden Crump MD  Maternal Fetal Medicine

## 2021-10-19 ENCOUNTER — LAB (OUTPATIENT)
Dept: LAB | Facility: CLINIC | Age: 21
End: 2021-10-19
Attending: FAMILY MEDICINE
Payer: COMMERCIAL

## 2021-10-19 ENCOUNTER — OFFICE VISIT (OUTPATIENT)
Dept: MATERNAL FETAL MEDICINE | Facility: CLINIC | Age: 21
End: 2021-10-19
Attending: FAMILY MEDICINE
Payer: COMMERCIAL

## 2021-10-19 DIAGNOSIS — O26.90 PREGNANCY RELATED CONDITION, ANTEPARTUM: ICD-10-CM

## 2021-10-19 DIAGNOSIS — I82.413 ACUTE DEEP VEIN THROMBOSIS (DVT) OF FEMORAL VEIN OF BOTH LOWER EXTREMITIES (H): ICD-10-CM

## 2021-10-19 LAB — D DIMER PPP FEU-MCNC: 0.3 UG/ML FEU (ref 0–0.5)

## 2021-10-19 PROCEDURE — G0463 HOSPITAL OUTPT CLINIC VISIT: HCPCS | Mod: 25

## 2021-10-19 PROCEDURE — 85300 ANTITHROMBIN III ACTIVITY: CPT

## 2021-10-19 PROCEDURE — 85306 CLOT INHIBIT PROT S FREE: CPT

## 2021-10-19 PROCEDURE — 99241 PR OFFICE CONSULTATION,LEVEL I: CPT | Mod: GC | Performed by: OBSTETRICS & GYNECOLOGY

## 2021-10-19 PROCEDURE — 86146 BETA-2 GLYCOPROTEIN ANTIBODY: CPT

## 2021-10-19 PROCEDURE — 85390 FIBRINOLYSINS SCREEN I&R: CPT | Mod: 26 | Performed by: PATHOLOGY

## 2021-10-19 PROCEDURE — 85613 RUSSELL VIPER VENOM DILUTED: CPT

## 2021-10-19 PROCEDURE — 84999 UNLISTED CHEMISTRY PROCEDURE: CPT

## 2021-10-19 PROCEDURE — 36415 COLL VENOUS BLD VENIPUNCTURE: CPT

## 2021-10-19 PROCEDURE — 85379 FIBRIN DEGRADATION QUANT: CPT

## 2021-10-19 PROCEDURE — 86147 CARDIOLIPIN ANTIBODY EA IG: CPT

## 2021-10-19 NOTE — NURSING NOTE
Cande here alone for MFM Consult. Meds and allergies reviewed.  Pawan Crump and Doniif in to meet with pt (see consult note for recommendations). S contact card given, Transport requested to walk pt to lab.   Kortney Washington RN

## 2021-10-20 LAB
AT III ACT/NOR PPP CHRO: 110 % (ref 85–135)
DRVVT SCREEN RATIO: 1.07
INR PPP: 1.04 (ref 0.85–1.15)
LA PPP-IMP: NEGATIVE
LUPUS INTERPRETATION: NORMAL
PTT RATIO: 0.87
THROMBIN TIME: 16.4 SECONDS (ref 13–19)

## 2021-10-21 LAB
B2 GLYCOPROT1 IGG SERPL IA-ACNC: 0.9 U/ML
B2 GLYCOPROT1 IGM SERPL IA-ACNC: <2.4 U/ML
CARDIOLIPIN IGG SER IA-ACNC: <2 GPL-U/ML
CARDIOLIPIN IGG SER IA-ACNC: NEGATIVE
CARDIOLIPIN IGM SER IA-ACNC: <2 MPL-U/ML
CARDIOLIPIN IGM SER IA-ACNC: NEGATIVE

## 2021-10-27 LAB
Lab: 1041
PERFORMING LABORATORY: NORMAL
TEST NAME: NORMAL

## 2021-10-30 PROBLEM — L88 PYODERMA GANGRENOSUM (H): Status: ACTIVE | Noted: 2021-01-10

## 2021-10-30 PROBLEM — O36.4XX0 FETAL DEMISE, GREATER THAN 22 WEEKS, ANTEPARTUM: Status: ACTIVE | Noted: 2020-10-28

## 2021-10-30 PROBLEM — N18.30 STAGE 3 CHRONIC KIDNEY DISEASE, UNSPECIFIED WHETHER STAGE 3A OR 3B CKD (H): Status: ACTIVE | Noted: 2021-03-20

## 2021-10-30 PROBLEM — Z86.16 PERSONAL HISTORY OF COVID-19: Status: ACTIVE | Noted: 2021-01-10

## 2021-10-30 PROBLEM — I74.3: Status: ACTIVE | Noted: 2021-10-30

## 2021-10-30 PROBLEM — I26.02 ACUTE SADDLE PULMONARY EMBOLISM WITH ACUTE COR PULMONALE (H): Status: ACTIVE | Noted: 2021-03-20

## 2021-10-30 PROBLEM — I77.82 ANCA-ASSOCIATED VASCULITIS (H): Status: ACTIVE | Noted: 2020-12-31

## 2021-10-30 PROBLEM — Q51.3 BICORNUATE UTERUS: Status: ACTIVE | Noted: 2020-10-28

## 2021-10-30 PROBLEM — I70.209: Status: ACTIVE | Noted: 2021-03-21

## 2021-10-30 PROBLEM — E66.01 MORBID OBESITY (H): Status: RESOLVED | Noted: 2021-01-26 | Resolved: 2021-10-30

## 2021-10-30 PROBLEM — I82.4Y3 ACUTE DEEP VEIN THROMBOSIS (DVT) OF PROXIMAL VEIN OF BOTH LOWER EXTREMITIES (H): Status: ACTIVE | Noted: 2021-10-30

## 2021-10-30 PROBLEM — I82.90 THROMBOSIS: Status: ACTIVE | Noted: 2021-03-23

## 2021-11-17 NOTE — TELEPHONE ENCOUNTER
Had attempted to reach pt several times in another encounter.    Eunice Lim RN  Rheumatology Clinic

## 2021-11-30 ENCOUNTER — MYC MEDICAL ADVICE (OUTPATIENT)
Dept: OBGYN | Facility: CLINIC | Age: 21
End: 2021-11-30
Payer: COMMERCIAL

## 2022-01-26 ENCOUNTER — OFFICE VISIT (OUTPATIENT)
Dept: INTERNAL MEDICINE | Facility: CLINIC | Age: 22
End: 2022-01-26
Payer: COMMERCIAL

## 2022-01-26 ENCOUNTER — TELEPHONE (OUTPATIENT)
Dept: RHEUMATOLOGY | Facility: CLINIC | Age: 22
End: 2022-01-26

## 2022-01-26 ENCOUNTER — TELEPHONE (OUTPATIENT)
Dept: BEHAVIORAL HEALTH | Facility: CLINIC | Age: 22
End: 2022-01-26

## 2022-01-26 VITALS
DIASTOLIC BLOOD PRESSURE: 80 MMHG | SYSTOLIC BLOOD PRESSURE: 116 MMHG | WEIGHT: 264 LBS | HEART RATE: 77 BPM | HEIGHT: 64 IN | BODY MASS INDEX: 45.07 KG/M2 | OXYGEN SATURATION: 99 % | TEMPERATURE: 97.9 F

## 2022-01-26 DIAGNOSIS — Z31.9 PATIENT DESIRES PREGNANCY: ICD-10-CM

## 2022-01-26 DIAGNOSIS — F41.1 GENERALIZED ANXIETY DISORDER: Primary | ICD-10-CM

## 2022-01-26 DIAGNOSIS — M31.31 GRANULOMATOSIS WITH POLYANGIITIS WITH RENAL INVOLVEMENT (H): ICD-10-CM

## 2022-01-26 DIAGNOSIS — M31.30 GRANULOMATOSIS WITH POLYANGIITIS, UNSPECIFIED WHETHER RENAL INVOLVEMENT (H): Primary | ICD-10-CM

## 2022-01-26 DIAGNOSIS — Z86.711 HISTORY OF PULMONARY EMBOLISM: ICD-10-CM

## 2022-01-26 DIAGNOSIS — Z23 NEED FOR INFLUENZA VACCINATION: ICD-10-CM

## 2022-01-26 DIAGNOSIS — I77.82 ANCA-ASSOCIATED VASCULITIS (H): ICD-10-CM

## 2022-01-26 DIAGNOSIS — Z86.718 PERSONAL HISTORY OF DVT (DEEP VEIN THROMBOSIS): ICD-10-CM

## 2022-01-26 PROCEDURE — 99214 OFFICE O/P EST MOD 30 MIN: CPT | Performed by: NURSE PRACTITIONER

## 2022-01-26 RX ORDER — HYDROXYZINE PAMOATE 25 MG/1
25 CAPSULE ORAL 3 TIMES DAILY PRN
Qty: 30 CAPSULE | Refills: 0 | Status: ON HOLD | OUTPATIENT
Start: 2022-01-26 | End: 2022-12-21

## 2022-01-26 RX ORDER — METHOCARBAMOL 750 MG/1
750 TABLET, FILM COATED ORAL
Status: ON HOLD | COMMUNITY
Start: 2021-12-22 | End: 2022-12-21

## 2022-01-26 ASSESSMENT — MIFFLIN-ST. JEOR: SCORE: 1937.5

## 2022-01-26 ASSESSMENT — ANXIETY QUESTIONNAIRES
7. FEELING AFRAID AS IF SOMETHING AWFUL MIGHT HAPPEN: MORE THAN HALF THE DAYS
1. FEELING NERVOUS, ANXIOUS, OR ON EDGE: NEARLY EVERY DAY
IF YOU CHECKED OFF ANY PROBLEMS ON THIS QUESTIONNAIRE, HOW DIFFICULT HAVE THESE PROBLEMS MADE IT FOR YOU TO DO YOUR WORK, TAKE CARE OF THINGS AT HOME, OR GET ALONG WITH OTHER PEOPLE: NOT DIFFICULT AT ALL
2. NOT BEING ABLE TO STOP OR CONTROL WORRYING: MORE THAN HALF THE DAYS
5. BEING SO RESTLESS THAT IT IS HARD TO SIT STILL: NOT AT ALL
6. BECOMING EASILY ANNOYED OR IRRITABLE: NOT AT ALL
3. WORRYING TOO MUCH ABOUT DIFFERENT THINGS: NEARLY EVERY DAY
GAD7 TOTAL SCORE: 10

## 2022-01-26 ASSESSMENT — PATIENT HEALTH QUESTIONNAIRE - PHQ9
SUM OF ALL RESPONSES TO PHQ QUESTIONS 1-9: 2
5. POOR APPETITE OR OVEREATING: NOT AT ALL

## 2022-01-26 NOTE — Clinical Note
Hi- I saw this patient in clinic for the first time.  She has an incredibly complex PMH.  Are you ok if she gets a COVID vaccine?  Sounds like her autoimmune issues are under control.    Thanks,  LILIA Deal CNP

## 2022-01-26 NOTE — PROGRESS NOTES
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  Saw Hematology; hypercoag work up was unrevealing.  Per chart review, unclear if thrombosis was r/t prior COVID infection, pregnancy vs other.  Per Heme/Onc- future recs include starting subcutaneous heparin with future pregnancies.    Patient desires pregnancy  - Patient was advised to f/u with OB GYN to discuss  - I placed a general specialty referral and patient can check with her insurance co re: fertility clinics.  But overall, encouraged patient to f/u with OB/GYN  - Other Specialty Referral    Need for influenza vaccination  - INFLUENZA VACCINE IM >6 MO VALENT IIV4 (ALFURIA/FLUZONE)  01775}     BMI:   Estimated body mass index is 45.32 kg/m  as calculated from the following:    Height as of this encounter: 1.626 m (5' 4\").    Weight as of this encounter: 119.7 kg (264 lb).       See Patient Instructions    Return in about 1 month (around 2/26/2022) for Mood check.    LILIA Deal CNP  [facility]   Cande is a 21 year old who presents for the following health issues     HPI   Establish care  Discuss health history  Anxiety  Dysmenorrhea-  Infertility     Desires pregnancy:  Main concern today is concern re: infertility.  Patient reports that she had severe pre-eclampsia when she was pregnant in 2020, that resulted " in fetal demise.  States she has had issues with painful, irregular periods since.  Was previously on Depo- states this was not helpful.  She is confused as she doesn't know where to go- states she went to [facility] and was told they don't help with this concern, but would care for her when she becomes pregnant.      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  States she received Cytoxan last year, but states she's in remission; currently not on treatment.    Hx PE/DVT:  States she was diagnosed in ; presented with SOB. Took anticoagulation for a while. Seen by Heme Onc; advised subcutaneous heparin if she becomes pregnant.  No recurrence of symptoms.     Anxiety:  Patient reports that she's had anxiety since her child .  States when she is sleeping, she wakens and feels like she can't breathe.  Had behavioral issues as a teen- which she attributes to age/being diagnosed with a chronic disease.  Does not feel like she is depressed.  Has a good support system. Took Wellbutrin in the past, which was helpful for mood.      PATIENT HEALTH QUESTIONNAIRE-9 (PHQ - 9)    Over the last 2 weeks, how often have you been bothered by any of the following problems?    1. Little interest or pleasure in doing things -  Not at all   2. Feeling down, depressed, or hopeless -  Not at all   3. Trouble falling or staying asleep, or sleeping too much - More than half the days   4. Feeling tired or having little energy -  Not at all   5. Poor appetite or overeating -  Not at all   6. Feeling bad about yourself - or that you are a failure or have let yourself or your family down -  Not at all   7. Trouble concentrating on things, such as reading the newspaper or watching television - Not at all   8. Moving or speaking so slowly that other people could have noticed? Or the opposite - being  "so fidgety or restless that you have been moving around a lot more than usual Not at all   9. Thoughts that you would be better off dead or of hurting  yourself in some way Not at all   Total Score: 2     If you checked off any problems, how difficult have these problems made it for you to do your work, take care of things at home, or get along with other people? Not difficult at all    Developed by Pawan Madsen, Kenia Gonzalez, Kwame Villalobos and colleagues, with an educational tavia from [employer] No permission required to reproduce, translate, display or distribute. permission required to reproduce, translate, display or distribute.    EB-7 SCORE 5/4/2021 6/10/2021 1/26/2022   Total Score - - -   Total Score 7 6 10       Review of Systems   CONSTITUTIONAL:NEGATIVE for fever, chills, change in weight  RESP:NEGATIVE for significant cough or SOB  CV: NEGATIVE for chest pain, palpitations or peripheral edema  : heavy prolonged periods  MUSCULOSKELETAL: NEGATIVE for significant arthralgias or myalgia  PSYCHIATRIC: POSITIVE foranxiety      Objective    /80   Pulse 77   Temp 97.9  F (36.6  C) (Oral)   Ht 1.626 m (5' 4\")   Wt 119.7 kg (264 lb)   LMP 01/08/2022   SpO2 99%   Breastfeeding No   BMI 45.32 kg/m    Body mass index is 45.32 kg/m .     Physical Exam   GENERAL: healthy, alert and no distress  EYES: Eyes grossly normal to inspection, PERRL and conjunctivae and sclerae normal  HENT: ear canals and TM's normal, nose and mouth without ulcers or lesions  NECK: no adenopathy, no asymmetry, masses, or scars and thyroid normal to palpation  RESP: lungs clear to auscultation - no rales, rhonchi or wheezes  CV: regular rate and rhythm, normal S1 S2, no S3 or S4, no murmur, click or rub, no peripheral edema and peripheral pulses strong  ABDOMEN: soft, nontender, no hepatosplenomegaly, no masses and bowel sounds normal  MS: no gross musculoskeletal defects noted, no edema  SKIN: no suspicious " lesions or rashes  NEURO: Normal strength and tone, mentation intact and speech normal  PSYCH: mentation appears normal, affect normal/bright    -Review of Chart

## 2022-01-26 NOTE — TELEPHONE ENCOUNTER
1/26/22 WQ referral received, [facility] ZENIAT

## 2022-01-26 NOTE — PATIENT INSTRUCTIONS
"-Try the hydroxyzine 1 pill every 6 hours as needed for anxiety- may make you sleepy  -Referral for Psychological testing and counseling  -Referral \"general\" for fertility clinic  -Follow up with OB of your choice for irregular periods    "

## 2022-01-26 NOTE — TELEPHONE ENCOUNTER
[facility] Call Center    Phone Message    May a detailed message be left on voicemail: yes     Reason for Call: Other: Pt is wondering if it would be possible to get in for an earlier appt with Dr. Call? His first available opening isn't until June 24th. Pt and her  would like to have a baby, and she was advised by her vascular doctor that she should get the 'ok' from Dr. Call due to the fact that she just had chemo done last year. She also has some questions about whether or not the chemo could have affected her fertility in any way? She reports they have been trying to get pregnant for the last year with no success, her periods are very irregular now (up to 200 days between cycles), and when she tries to track her ovulation-sometimes it seems like she is not ovulating at all. She would like to discuss with Dr. Call that way if she does need to see a fertility specialist or look into IVF, then she can go ahead and get that started?     Action Taken: Message routed to:  [facility] ([facility]HEUMATOLOGY [facility]    Travel Screening: Not Applicable

## 2022-01-27 ASSESSMENT — ANXIETY QUESTIONNAIRES: GAD7 TOTAL SCORE: 10

## 2022-01-28 NOTE — TELEPHONE ENCOUNTER
I am sorry about the difficult access to Rheumatology, and that our scheduled appointment in  did not work out for patient.  From the description, periods have become irregular recently; this certainly could be an indicator of altered fertility, which in turn could be influenced by cyclophosphamide treatment.   I agree that patient should be seen in MFM to discuss potential fertility issues and pregnancy planning, and that Hematology should be involved to discuss appropriate clot prevention during hoped-for pregnancy.   I would be happy to see patient again regarding vasculitis management, but recommend that Rheum appointment would be made only if Vasculitis clinic providers feel that my input would give added value to patient care.

## 2022-01-28 NOTE — TELEPHONE ENCOUNTER
Called and spoke with pt, she understands that Dr. Call is not going to be the one to help her with her pregnancy. She needs to follow up with MFM and OB.  She did establish with a new PCP who is closer to home, and she did have a good connection with her.  She will call MFM today and make an appt.  Have sent a message to hematology asking them to call her to discuss clotting prophylaxis when she gets pregnant.  She is going to get vasculitis labs and will follow up with Dr. Chang, will see how soon is needed when labs return.    Eunice Lim RN  Rheumatology Clinic

## 2022-01-31 ENCOUNTER — LAB (OUTPATIENT)
Dept: LAB | Facility: CLINIC | Age: 22
End: 2022-01-31
Payer: COMMERCIAL

## 2022-01-31 ENCOUNTER — TELEPHONE (OUTPATIENT)
Dept: NURSING | Facility: CLINIC | Age: 22
End: 2022-01-31

## 2022-01-31 DIAGNOSIS — M31.30 GRANULOMATOSIS WITH POLYANGIITIS, UNSPECIFIED WHETHER RENAL INVOLVEMENT (H): ICD-10-CM

## 2022-01-31 LAB
ALBUMIN UR-MCNC: 100 MG/DL
APPEARANCE UR: CLEAR
BACTERIA #/AREA URNS HPF: ABNORMAL /HPF
BASOPHILS # BLD AUTO: 0 10E3/UL (ref 0–0.2)
BASOPHILS NFR BLD AUTO: 1 %
BILIRUB UR QL STRIP: NEGATIVE
COLOR UR AUTO: YELLOW
CREAT UR-MCNC: 122 MG/DL
EOSINOPHIL # BLD AUTO: 0.3 10E3/UL (ref 0–0.7)
EOSINOPHIL NFR BLD AUTO: 3 %
ERYTHROCYTE [DISTWIDTH] IN BLOOD BY AUTOMATED COUNT: 13.9 % (ref 10–15)
GLUCOSE UR STRIP-MCNC: NEGATIVE MG/DL
HCT VFR BLD AUTO: 37.5 % (ref 35–47)
HGB BLD-MCNC: 12.1 G/DL (ref 11.7–15.7)
HGB UR QL STRIP: ABNORMAL
KETONES UR STRIP-MCNC: NEGATIVE MG/DL
LEUKOCYTE ESTERASE UR QL STRIP: NEGATIVE
LYMPHOCYTES # BLD AUTO: 1.9 10E3/UL (ref 0.8–5.3)
LYMPHOCYTES NFR BLD AUTO: 22 %
MCH RBC QN AUTO: 26.8 PG (ref 26.5–33)
MCHC RBC AUTO-ENTMCNC: 32.3 G/DL (ref 31.5–36.5)
MCV RBC AUTO: 83 FL (ref 78–100)
MONOCYTES # BLD AUTO: 0.7 10E3/UL (ref 0–1.3)
MONOCYTES NFR BLD AUTO: 8 %
NEUTROPHILS # BLD AUTO: 5.7 10E3/UL (ref 1.6–8.3)
NEUTROPHILS NFR BLD AUTO: 66 %
NITRATE UR QL: NEGATIVE
PH UR STRIP: 5 [PH] (ref 5–7)
PLATELET # BLD AUTO: 323 10E3/UL (ref 150–450)
PROT UR-MCNC: 0.73 G/L
PROT/CREAT 24H UR: 0.6 G/G CR (ref 0–0.2)
RBC # BLD AUTO: 4.51 10E6/UL (ref 3.8–5.2)
RBC #/AREA URNS AUTO: ABNORMAL /HPF
SP GR UR STRIP: 1.02 (ref 1–1.03)
SQUAMOUS #/AREA URNS AUTO: ABNORMAL /LPF
UROBILINOGEN UR STRIP-ACNC: 0.2 E.U./DL
WBC # BLD AUTO: 8.6 10E3/UL (ref 4–11)
WBC #/AREA URNS AUTO: ABNORMAL /HPF

## 2022-01-31 PROCEDURE — 84156 ASSAY OF PROTEIN URINE: CPT

## 2022-01-31 PROCEDURE — 85025 COMPLETE CBC W/AUTO DIFF WBC: CPT

## 2022-01-31 PROCEDURE — 86256 FLUORESCENT ANTIBODY TITER: CPT

## 2022-01-31 PROCEDURE — 81001 URINALYSIS AUTO W/SCOPE: CPT

## 2022-01-31 PROCEDURE — 80069 RENAL FUNCTION PANEL: CPT

## 2022-01-31 PROCEDURE — 83516 IMMUNOASSAY NONANTIBODY: CPT

## 2022-01-31 PROCEDURE — 86036 ANCA SCREEN EACH ANTIBODY: CPT

## 2022-01-31 PROCEDURE — 83876 ASSAY MYELOPEROXIDASE: CPT

## 2022-01-31 PROCEDURE — 36415 COLL VENOUS BLD VENIPUNCTURE: CPT

## 2022-01-31 NOTE — TELEPHONE ENCOUNTER
----- Message from LILIA Deal CNP sent at 1/29/2022 10:52 AM CST -----  Hi~    Can you please call patient to help her schedule mRNA covid Vaccine.  I touched base with her Rheumatologist who supports and strongly recommends starting vaccine series.    Thanks !!!  Criselda

## 2022-02-01 LAB
ALBUMIN SERPL-MCNC: 3.4 G/DL (ref 3.4–5)
ANCA AB PATTERN SER IF-IMP: ABNORMAL
ANION GAP SERPL CALCULATED.3IONS-SCNC: 7 MMOL/L (ref 3–14)
BUN SERPL-MCNC: 20 MG/DL (ref 7–30)
C-ANCA TITR SER IF: ABNORMAL {TITER}
CALCIUM SERPL-MCNC: 9.6 MG/DL (ref 8.5–10.1)
CHLORIDE BLD-SCNC: 112 MMOL/L (ref 94–109)
CO2 SERPL-SCNC: 22 MMOL/L (ref 20–32)
CREAT SERPL-MCNC: 1.11 MG/DL (ref 0.52–1.04)
GFR SERPL CREATININE-BSD FRML MDRD: 71 ML/MIN/1.73M2
GLUCOSE BLD-MCNC: 86 MG/DL (ref 70–99)
PHOSPHATE SERPL-MCNC: 3.4 MG/DL (ref 2.5–4.5)
POTASSIUM BLD-SCNC: 4.4 MMOL/L (ref 3.4–5.3)
SODIUM SERPL-SCNC: 141 MMOL/L (ref 133–144)

## 2022-02-01 NOTE — TELEPHONE ENCOUNTER
Spoke with patient to give providers message. Scheduled patient for 1st Pfizer dose 2/3 and patient will receive 2nd dose during 2/24 OV.     2/3 vaccine double book OK'd per Vaccine RN.

## 2022-02-02 LAB
MYELOPEROXIDASE AB SER IA-ACNC: <0.3 U/ML
MYELOPEROXIDASE AB SER IA-ACNC: NEGATIVE
PROTEINASE3 AB SER IA-ACNC: 9.7 U/ML
PROTEINASE3 AB SER IA-ACNC: POSITIVE

## 2022-02-03 ENCOUNTER — IMMUNIZATION (OUTPATIENT)
Dept: NURSING | Facility: CLINIC | Age: 22
End: 2022-02-03
Payer: COMMERCIAL

## 2022-02-03 PROCEDURE — 0051A COVID-19,PF,PFIZER (12+ YRS): CPT

## 2022-02-03 PROCEDURE — 91305 COVID-19,PF,PFIZER (12+ YRS): CPT

## 2022-02-16 NOTE — TELEPHONE ENCOUNTER
Called and spoke with pt on 1/31, we discussed that she had reached out and made an appt with another provider.  She was quite happy with that provider.  We discussed that she needs to be more available for appts and needs to be more reliable with follow up.     She is aware that she really needs to follow up with Hematology and if she is not happy with OB, she should see another provider.  After she meets with Hematology, we can discuss setting up an appt with either Dr. Chang or Dr. Call, as hematology and OB will give her more of the information that she is looking for.    Eunice iLm RN  Rheumatology Clinic

## 2022-03-06 ENCOUNTER — HEALTH MAINTENANCE LETTER (OUTPATIENT)
Age: 22
End: 2022-03-06

## 2022-04-07 ENCOUNTER — TRANSFERRED RECORDS (OUTPATIENT)
Dept: HEALTH INFORMATION MANAGEMENT | Facility: CLINIC | Age: 22
End: 2022-04-07

## 2022-09-20 ENCOUNTER — E-VISIT (OUTPATIENT)
Dept: INTERNAL MEDICINE | Facility: CLINIC | Age: 22
End: 2022-09-20
Payer: COMMERCIAL

## 2022-09-20 DIAGNOSIS — M31.31 GRANULOMATOSIS WITH POLYANGIITIS WITH RENAL INVOLVEMENT (H): Primary | ICD-10-CM

## 2022-09-20 PROCEDURE — 99207 PR NON-BILLABLE SERV PER CHARTING: CPT | Performed by: INTERNAL MEDICINE

## 2022-09-20 NOTE — PATIENT INSTRUCTIONS
Thank you for choosing us for your care. I think an in-clinic visit would be best next steps based on your symptoms. Please schedule a clinic appointment; you won t be charged for this eVisit.      You can schedule an appointment right here in [place], or call [phone].

## 2022-10-29 ENCOUNTER — HOSPITAL ENCOUNTER (EMERGENCY)
Facility: CLINIC | Age: 22
Discharge: LEFT BEFORE DISCHARGE | End: 2022-10-29
Payer: COMMERCIAL

## 2022-10-29 VITALS
RESPIRATION RATE: 12 BRPM | HEART RATE: 88 BPM | DIASTOLIC BLOOD PRESSURE: 71 MMHG | OXYGEN SATURATION: 97 % | SYSTOLIC BLOOD PRESSURE: 118 MMHG

## 2022-10-29 RX ORDER — SULFAMETHOXAZOLE/TRIMETHOPRIM 800-160 MG
1 TABLET ORAL 2 TIMES DAILY
Status: ON HOLD | COMMUNITY
Start: 2022-10-27 | End: 2022-12-21

## 2022-10-30 NOTE — ED PROVIDER NOTES
"    [facility] [place] [facility] ([facility])    10/29/22      ED Provider Note      History     Chief Complaint   Patient presents with     Flank Pain     left     HPI  Cande Shields is a 22 year old female with a past medical history of severe preeclampsia, UTIs, granulomatosis with polyangiitis with renal involvement, PE, and EB who presents to the emergency department for evaluation of  ***    {Past History:910900}      Review of Systems  {Complete vs limited ROS:143254::\"A complete review of systems was performed with pertinent positives and negatives noted in the HPI, and all other systems negative.\"}    Physical Exam   BP: 118/71  Pulse: 88  Resp: 12  SpO2: 97 %  Physical Exam  ***  ED Course      Procedures       {ED Course Selections:476586}              No results found for any visits on 10/29/22.  Medications - No data to display     Assessments & Plan (with Medical Decision Making)   ***    I have reviewed the nursing notes. I have reviewed the findings, diagnosis, plan and need for follow up with the patient.    New Prescriptions    No medications on file       Final diagnoses:   None       --  ***  [facility] EMERGENCY DEPARTMENT  10/29/2022  "

## 2022-10-30 NOTE — ED TRIAGE NOTES
Pt  Has been having left kidney pains for past week; pt went UC last week and pt states symptoms have worsened.  Pt states pain is now radiating to her chest and causing difficulty breathing.  Pt was told to come to ER if symptoms worsen.

## 2022-10-31 ENCOUNTER — HOSPITAL ENCOUNTER (EMERGENCY)
Facility: CLINIC | Age: 22
Discharge: HOME | End: 2022-11-01
Payer: COMMERCIAL

## 2022-10-31 VITALS
OXYGEN SATURATION: 100 % | RESPIRATION RATE: 16 BRPM | TEMPERATURE: 98.1 F | SYSTOLIC BLOOD PRESSURE: 125 MMHG | HEART RATE: 84 BPM | DIASTOLIC BLOOD PRESSURE: 57 MMHG

## 2022-11-21 ENCOUNTER — HEALTH MAINTENANCE LETTER (OUTPATIENT)
Age: 22
End: 2022-11-21

## 2022-12-19 ENCOUNTER — VIRTUAL VISIT (OUTPATIENT)
Dept: FAMILY MEDICINE | Facility: CLINIC | Age: 22
End: 2022-12-19
Payer: COMMERCIAL

## 2022-12-19 ENCOUNTER — TELEPHONE (OUTPATIENT)
Dept: RHEUMATOLOGY | Facility: CLINIC | Age: 22
End: 2022-12-19

## 2022-12-19 DIAGNOSIS — M31.31 GRANULOMATOSIS WITH POLYANGIITIS WITH RENAL INVOLVEMENT (H): ICD-10-CM

## 2022-12-19 DIAGNOSIS — Z86.718 HISTORY OF BLOOD CLOTS: ICD-10-CM

## 2022-12-19 DIAGNOSIS — R21 ACUTE PURPURIC ERUPTION: ICD-10-CM

## 2022-12-19 DIAGNOSIS — U07.1 INFECTION DUE TO 2019 NOVEL CORONAVIRUS: Primary | ICD-10-CM

## 2022-12-19 DIAGNOSIS — N18.32 STAGE 3B CHRONIC KIDNEY DISEASE (H): ICD-10-CM

## 2022-12-19 DIAGNOSIS — M31.30 GRANULOMATOSIS WITH POLYANGIITIS, UNSPECIFIED WHETHER RENAL INVOLVEMENT (H): Primary | ICD-10-CM

## 2022-12-19 PROBLEM — N18.30 STAGE 3 CHRONIC KIDNEY DISEASE, UNSPECIFIED WHETHER STAGE 3A OR 3B CKD (H): Chronic | Status: ACTIVE | Noted: 2021-03-20

## 2022-12-19 PROCEDURE — 99214 OFFICE O/P EST MOD 30 MIN: CPT | Mod: 95 | Performed by: FAMILY MEDICINE

## 2022-12-19 RX ORDER — NIRMATRELVIR AND RITONAVIR 150-100 MG
2 KIT ORAL 2 TIMES DAILY
Qty: 20 TABLET | Refills: 0 | Status: SHIPPED | OUTPATIENT
Start: 2022-12-19 | End: 2023-01-19

## 2022-12-19 NOTE — PROGRESS NOTES
Cande is a 22 year old who is being evaluated via a billable video visit.      How would you like to obtain your AVS? MyChart  If the video visit is dropped, the invitation should be resent by: Text to cell phone: [phone]  Will anyone else be joining your video visit? No        Infection due to 2019 novel coronavirus  Because this patient has important risk factors due to her Wegener's I am going to treat her with Paxlovid, renally dosed.  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  She has subsequently only been immunized on 2/3/2022 and never had further immunization according to her chart.    Stage 3b chronic kidney disease (H)  Last GFR was 40.  Pending labs for tomorrow for rheumatology group to check again.    Acute purpuric eruption  We discussed the fact that she may need to be treated with prednisone again and that this should be directed by her rheumatology group.    History of blood clots  She also may need to have Xarelto treatment for a while again if they believe she is at risk for blood clots, but this would also be directed by her rheumatology group.        She was told to start her antiviral and soon as possible and to make sure she gets her lab draw tomorrow so that her team at the rheumatology clinic can treat her accordingly.  She tells me she does not have a primary provider.  The patient lives in [place].        Subjective   Cande is a 22 year old, presenting for the following health issues:  Covid Concern      HPI   This is a medically complicated patient and that she has Wegener's with polyangiitis history and renal involvement.  She has chronic kidney disease stage IIIb.  She tells me that she has a history of having prior COVID infection causing a flare of her Wegener's and purpuric rash and then got blood clots  in her heart which needed to be removed by catheter.  She is seeing the rheumatology clinic at the [facility] although she says she has not had a rheumatologist appointment for 2 years.  In fact she says tomorrow she has lab work that needs to be drawn.  When she called to tell them that she was positive for COVID again they told her to talk to a COVID doctor for treatment.  We discussed how she was treated before when she was dealing with COVID infection.  She said her rash was quite a bit worse at that time than it is now and she was much sicker.  But she was treated with prednisone and Xarelto and at one point sitotoxin.  Currently she has some mild cough with phlegm and feels a bit wheezy and she is having the rash on her legs and elbows at this time.    COVID-19 Symptom Review  How many days ago did these symptoms start? yesterday    Are any of the following symptoms significant for you?    New or worsening difficulty breathing? No    Worsening cough? Yes, I am coughing up mucus.    Fever or chills? Yes, I felt feverish or had chills.    Headache: YES    Sore throat: No    Chest pain: No    Diarrhea: No    Body aches? YES    What treatments has patient tried? nyquil   Does patient live in a nursing home, group home, or shelter? No  Does patient have a way to get food/medications during quarantined? Yes, I have a friend or family member who can help me.      Objective           Vitals:  No vitals were obtained today due to virtual visit.    Physical Exam   GENERAL: Healthy, alert and no distress  EYES: Eyes grossly normal to inspection.  No discharge or erythema, or obvious scleral/conjunctival abnormalities.  RESP: No audible wheezing noted but does have mild intermittent productive cough which has been observed  SKIN: Visible skin clear.  Purpuric rash on lower legs is reported by patient.  NEURO: Cranial nerves grossly intact.  Mentation and speech appropriate for age.  PSYCH: Mentation appears  normal, affect normal/bright, judgement and insight intact, normal speech and appearance well-groomed.          Video-Visit Details    Video Start Time: 3:49 PM    Type of service:  Video Visit    Video End Time:4:14 PM    Originating Location (pt. Location): Home    Distant Location (provider location):  On-site    Platform used for Video Visit: Dany

## 2022-12-19 NOTE — TELEPHONE ENCOUNTER
Pt called and left a message stating that she had been diagnosed with COVID and now has a purpuric rash on her legs, she is requesting a call back.    Pt was taking a shower last night, noticed a few spots on leg that are purpuric in nature.  She has symptoms of a cold, but last time she had covid that same thing happened, so she decided to take another test, it came back positive.      Discussed that she should make an appt with the virtual covid clinic, and then make a lab appt so we can see what is happening with her vasculitis labs.  Pt understands, has indicated that she will do so immediately.    Discussed briefly with Dr. Call, he had labs that he recommended that she get, have ordered them.    Eunice Lim RN  Rheumatology Clinic

## 2022-12-20 ENCOUNTER — HOSPITAL ENCOUNTER (INPATIENT)
Facility: CLINIC | Age: 22
LOS: 1 days | Discharge: HOME | End: 2022-12-21
Attending: FAMILY MEDICINE | Admitting: STUDENT IN AN ORGANIZED HEALTH CARE EDUCATION/TRAINING PROGRAM
Payer: COMMERCIAL

## 2022-12-20 ENCOUNTER — LAB (OUTPATIENT)
Dept: LAB | Facility: CLINIC | Age: 22
End: 2022-12-20
Payer: COMMERCIAL

## 2022-12-20 ENCOUNTER — APPOINTMENT (OUTPATIENT)
Dept: GENERAL RADIOLOGY | Facility: CLINIC | Age: 22
End: 2022-12-20
Attending: FAMILY MEDICINE
Payer: COMMERCIAL

## 2022-12-20 DIAGNOSIS — I82.4Y3 ACUTE DEEP VEIN THROMBOSIS (DVT) OF PROXIMAL VEIN OF BOTH LOWER EXTREMITIES (H): ICD-10-CM

## 2022-12-20 DIAGNOSIS — I26.02 ACUTE SADDLE PULMONARY EMBOLISM WITH ACUTE COR PULMONALE (H): ICD-10-CM

## 2022-12-20 DIAGNOSIS — M31.30 GRANULOMATOSIS WITH POLYANGIITIS, UNSPECIFIED WHETHER RENAL INVOLVEMENT (H): ICD-10-CM

## 2022-12-20 DIAGNOSIS — M31.31 GRANULOMATOSIS WITH POLYANGIITIS WITH RENAL INVOLVEMENT (H): ICD-10-CM

## 2022-12-20 DIAGNOSIS — Z72.0 NICOTINE USE: ICD-10-CM

## 2022-12-20 DIAGNOSIS — U07.1 INFECTION DUE TO 2019 NOVEL CORONAVIRUS: ICD-10-CM

## 2022-12-20 DIAGNOSIS — Z87.39 HISTORY OF GRANULOMATOSIS WITH POLYANGIITIS: Primary | Chronic | ICD-10-CM

## 2022-12-20 DIAGNOSIS — I74.3 FEMORAL ARTERY THROMBOSIS, LEFT (H): ICD-10-CM

## 2022-12-20 DIAGNOSIS — Z79.52 CURRENT CHRONIC USE OF SYSTEMIC STEROIDS: ICD-10-CM

## 2022-12-20 LAB
ALBUMIN SERPL-MCNC: 3.3 G/DL (ref 3.4–5)
ALBUMIN UR-MCNC: 200 MG/DL
ALP SERPL-CCNC: 109 U/L (ref 40–150)
ALT SERPL W P-5'-P-CCNC: 35 U/L (ref 0–50)
ANION GAP SERPL CALCULATED.3IONS-SCNC: 2 MMOL/L (ref 3–14)
APPEARANCE UR: ABNORMAL
AST SERPL W P-5'-P-CCNC: 22 U/L (ref 0–45)
BACTERIA #/AREA URNS HPF: ABNORMAL /HPF
BASOPHILS # BLD AUTO: 0 10E3/UL (ref 0–0.2)
BASOPHILS NFR BLD AUTO: 1 %
BILIRUB SERPL-MCNC: 0.2 MG/DL (ref 0.2–1.3)
BILIRUB UR QL STRIP: NEGATIVE
BUN SERPL-MCNC: 33 MG/DL (ref 7–30)
CALCIUM SERPL-MCNC: 9.5 MG/DL (ref 8.5–10.1)
CHLORIDE BLD-SCNC: 111 MMOL/L (ref 94–109)
CO2 SERPL-SCNC: 27 MMOL/L (ref 20–32)
COLOR UR AUTO: YELLOW
CREAT SERPL-MCNC: 2.4 MG/DL (ref 0.52–1.04)
CRP SERPL-MCNC: 31.6 MG/L (ref 0–8)
EOSINOPHIL # BLD AUTO: 0.3 10E3/UL (ref 0–0.7)
EOSINOPHIL NFR BLD AUTO: 5 %
ERYTHROCYTE [DISTWIDTH] IN BLOOD BY AUTOMATED COUNT: 14.8 % (ref 10–15)
ERYTHROCYTE [SEDIMENTATION RATE] IN BLOOD BY WESTERGREN METHOD: 25 MM/HR (ref 0–20)
GFR SERPL CREATININE-BSD FRML MDRD: 28 ML/MIN/1.73M2
GLUCOSE BLD-MCNC: 87 MG/DL (ref 70–99)
GLUCOSE UR STRIP-MCNC: NEGATIVE MG/DL
GRAN CASTS #/AREA URNS LPF: ABNORMAL /LPF
HCT VFR BLD AUTO: 39 % (ref 35–47)
HGB BLD-MCNC: 12.2 G/DL (ref 11.7–15.7)
HGB UR QL STRIP: ABNORMAL
HOLD SPECIMEN: NORMAL
HYALINE CASTS #/AREA URNS LPF: ABNORMAL /LPF
IMM GRANULOCYTES # BLD: 0 10E3/UL
IMM GRANULOCYTES NFR BLD: 1 %
KETONES UR STRIP-MCNC: NEGATIVE MG/DL
LEUKOCYTE ESTERASE UR QL STRIP: NEGATIVE
LYMPHOCYTES # BLD AUTO: 1.1 10E3/UL (ref 0.8–5.3)
LYMPHOCYTES NFR BLD AUTO: 19 %
MCH RBC QN AUTO: 25.7 PG (ref 26.5–33)
MCHC RBC AUTO-ENTMCNC: 31.3 G/DL (ref 31.5–36.5)
MCV RBC AUTO: 82 FL (ref 78–100)
MONOCYTES # BLD AUTO: 0.8 10E3/UL (ref 0–1.3)
MONOCYTES NFR BLD AUTO: 13 %
MUCOUS THREADS #/AREA URNS LPF: PRESENT /LPF
NEUTROPHILS # BLD AUTO: 3.8 10E3/UL (ref 1.6–8.3)
NEUTROPHILS NFR BLD AUTO: 61 %
NITRATE UR QL: NEGATIVE
NRBC # BLD AUTO: 0 10E3/UL
NRBC BLD AUTO-RTO: 0 /100
PH UR STRIP: 6 [PH] (ref 5–7)
PLATELET # BLD AUTO: 140 10E3/UL (ref 150–450)
POTASSIUM BLD-SCNC: 5 MMOL/L (ref 3.4–5.3)
PROT SERPL-MCNC: 8.1 G/DL (ref 6.8–8.8)
RBC # BLD AUTO: 4.75 10E6/UL (ref 3.8–5.2)
RBC #/AREA URNS AUTO: ABNORMAL /HPF
SARS-COV-2 RNA RESP QL NAA+PROBE: NEGATIVE
SKIP: ABNORMAL
SODIUM SERPL-SCNC: 140 MMOL/L (ref 133–144)
SP GR UR STRIP: 1.02 (ref 1–1.03)
SQUAMOUS #/AREA URNS AUTO: ABNORMAL /LPF
UROBILINOGEN UR STRIP-MCNC: NORMAL MG/DL
WBC # BLD AUTO: 6.1 10E3/UL (ref 4–11)
WBC #/AREA URNS AUTO: ABNORMAL /HPF

## 2022-12-20 PROCEDURE — 86803 HEPATITIS C AB TEST: CPT | Performed by: FAMILY MEDICINE

## 2022-12-20 PROCEDURE — 86160 COMPLEMENT ANTIGEN: CPT | Performed by: FAMILY MEDICINE

## 2022-12-20 PROCEDURE — U0005 INFEC AGEN DETEC AMPLI PROBE: HCPCS | Performed by: FAMILY MEDICINE

## 2022-12-20 PROCEDURE — 84703 CHORIONIC GONADOTROPIN ASSAY: CPT | Performed by: STUDENT IN AN ORGANIZED HEALTH CARE EDUCATION/TRAINING PROGRAM

## 2022-12-20 PROCEDURE — 71046 X-RAY EXAM CHEST 2 VIEWS: CPT

## 2022-12-20 PROCEDURE — 80053 COMPREHEN METABOLIC PANEL: CPT

## 2022-12-20 PROCEDURE — 120N000002 HC R&B MED SURG/OB UMMC

## 2022-12-20 PROCEDURE — 85025 COMPLETE CBC W/AUTO DIFF WBC: CPT

## 2022-12-20 PROCEDURE — 87389 HIV-1 AG W/HIV-1&-2 AB AG IA: CPT | Performed by: FAMILY MEDICINE

## 2022-12-20 PROCEDURE — 86140 C-REACTIVE PROTEIN: CPT

## 2022-12-20 PROCEDURE — 99285 EMERGENCY DEPT VISIT HI MDM: CPT | Performed by: FAMILY MEDICINE

## 2022-12-20 PROCEDURE — 86036 ANCA SCREEN EACH ANTIBODY: CPT | Performed by: FAMILY MEDICINE

## 2022-12-20 PROCEDURE — 82595 ASSAY OF CRYOGLOBULIN: CPT | Performed by: STUDENT IN AN ORGANIZED HEALTH CARE EDUCATION/TRAINING PROGRAM

## 2022-12-20 PROCEDURE — 36415 COLL VENOUS BLD VENIPUNCTURE: CPT | Performed by: FAMILY MEDICINE

## 2022-12-20 PROCEDURE — 81001 URINALYSIS AUTO W/SCOPE: CPT

## 2022-12-20 PROCEDURE — 87340 HEPATITIS B SURFACE AG IA: CPT | Performed by: FAMILY MEDICINE

## 2022-12-20 PROCEDURE — 85652 RBC SED RATE AUTOMATED: CPT

## 2022-12-20 PROCEDURE — 250N000011 HC RX IP 250 OP 636: Performed by: FAMILY MEDICINE

## 2022-12-20 PROCEDURE — 83516 IMMUNOASSAY NONANTIBODY: CPT | Performed by: FAMILY MEDICINE

## 2022-12-20 PROCEDURE — 84156 ASSAY OF PROTEIN URINE: CPT

## 2022-12-20 PROCEDURE — 83516 IMMUNOASSAY NONANTIBODY: CPT

## 2022-12-20 PROCEDURE — 36415 COLL VENOUS BLD VENIPUNCTURE: CPT

## 2022-12-20 PROCEDURE — 86038 ANTINUCLEAR ANTIBODIES: CPT | Performed by: FAMILY MEDICINE

## 2022-12-20 PROCEDURE — 83876 ASSAY MYELOPEROXIDASE: CPT

## 2022-12-20 PROCEDURE — 99285 EMERGENCY DEPT VISIT HI MDM: CPT | Mod: 25 | Performed by: FAMILY MEDICINE

## 2022-12-20 PROCEDURE — 86225 DNA ANTIBODY NATIVE: CPT | Performed by: FAMILY MEDICINE

## 2022-12-20 PROCEDURE — 258N000003 HC RX IP 258 OP 636: Performed by: FAMILY MEDICINE

## 2022-12-20 PROCEDURE — 86706 HEP B SURFACE ANTIBODY: CPT | Performed by: FAMILY MEDICINE

## 2022-12-20 PROCEDURE — 36415 COLL VENOUS BLD VENIPUNCTURE: CPT | Performed by: STUDENT IN AN ORGANIZED HEALTH CARE EDUCATION/TRAINING PROGRAM

## 2022-12-20 PROCEDURE — 250N000011 HC RX IP 250 OP 636: Performed by: STUDENT IN AN ORGANIZED HEALTH CARE EDUCATION/TRAINING PROGRAM

## 2022-12-20 RX ORDER — ACETAMINOPHEN 325 MG/1
650 TABLET ORAL EVERY 6 HOURS PRN
Status: DISCONTINUED | OUTPATIENT
Start: 2022-12-20 | End: 2022-12-21 | Stop reason: HOSPADM

## 2022-12-20 RX ORDER — AMOXICILLIN 250 MG
1 CAPSULE ORAL 2 TIMES DAILY PRN
Status: DISCONTINUED | OUTPATIENT
Start: 2022-12-20 | End: 2022-12-21 | Stop reason: HOSPADM

## 2022-12-20 RX ORDER — SODIUM CHLORIDE 9 MG/ML
INJECTION, SOLUTION INTRAVENOUS CONTINUOUS
Status: DISCONTINUED | OUTPATIENT
Start: 2022-12-20 | End: 2022-12-20

## 2022-12-20 RX ORDER — METHYLPREDNISOLONE SODIUM SUCCINATE 125 MG/2ML
125 INJECTION, POWDER, LYOPHILIZED, FOR SOLUTION INTRAMUSCULAR; INTRAVENOUS ONCE
Status: COMPLETED | OUTPATIENT
Start: 2022-12-20 | End: 2022-12-20

## 2022-12-20 RX ORDER — ONDANSETRON 2 MG/ML
4 INJECTION INTRAMUSCULAR; INTRAVENOUS EVERY 6 HOURS PRN
Status: DISCONTINUED | OUTPATIENT
Start: 2022-12-20 | End: 2022-12-21 | Stop reason: HOSPADM

## 2022-12-20 RX ORDER — PROCHLORPERAZINE MALEATE 10 MG
10 TABLET ORAL EVERY 6 HOURS PRN
Status: DISCONTINUED | OUTPATIENT
Start: 2022-12-20 | End: 2022-12-21 | Stop reason: HOSPADM

## 2022-12-20 RX ORDER — PROCHLORPERAZINE 25 MG
25 SUPPOSITORY, RECTAL RECTAL EVERY 12 HOURS PRN
Status: DISCONTINUED | OUTPATIENT
Start: 2022-12-20 | End: 2022-12-21 | Stop reason: HOSPADM

## 2022-12-20 RX ORDER — FONDAPARINUX SODIUM 2.5 MG/.5ML
2.5 INJECTION SUBCUTANEOUS EVERY 24 HOURS
Status: DISCONTINUED | OUTPATIENT
Start: 2022-12-20 | End: 2022-12-21

## 2022-12-20 RX ORDER — ACETAMINOPHEN 650 MG/1
650 SUPPOSITORY RECTAL EVERY 6 HOURS PRN
Status: DISCONTINUED | OUTPATIENT
Start: 2022-12-20 | End: 2022-12-21 | Stop reason: HOSPADM

## 2022-12-20 RX ORDER — AMOXICILLIN 250 MG
2 CAPSULE ORAL 2 TIMES DAILY PRN
Status: DISCONTINUED | OUTPATIENT
Start: 2022-12-20 | End: 2022-12-21 | Stop reason: HOSPADM

## 2022-12-20 RX ORDER — LIDOCAINE 40 MG/G
CREAM TOPICAL
Status: DISCONTINUED | OUTPATIENT
Start: 2022-12-20 | End: 2022-12-21 | Stop reason: HOSPADM

## 2022-12-20 RX ORDER — ONDANSETRON 4 MG/1
4 TABLET, ORALLY DISINTEGRATING ORAL EVERY 6 HOURS PRN
Status: DISCONTINUED | OUTPATIENT
Start: 2022-12-20 | End: 2022-12-21 | Stop reason: HOSPADM

## 2022-12-20 RX ADMIN — METHYLPREDNISOLONE SODIUM SUCCINATE 125 MG: 125 INJECTION, POWDER, FOR SOLUTION INTRAMUSCULAR; INTRAVENOUS at 19:13

## 2022-12-20 RX ADMIN — SODIUM CHLORIDE 1000 ML: 9 INJECTION, SOLUTION INTRAVENOUS at 19:12

## 2022-12-20 ASSESSMENT — ACTIVITIES OF DAILY LIVING (ADL)
DRESSING/BATHING_DIFFICULTY: NO
DIFFICULTY_EATING/SWALLOWING: NO
ADLS_ACUITY_SCORE: 37
CHANGE_IN_FUNCTIONAL_STATUS_SINCE_ONSET_OF_CURRENT_ILLNESS/INJURY: NO
WALKING_OR_CLIMBING_STAIRS_DIFFICULTY: NO
WEAR_GLASSES_OR_BLIND: NO
ADLS_ACUITY_SCORE: 37
TOILETING_ISSUES: NO
DOING_ERRANDS_INDEPENDENTLY_DIFFICULTY: NO
CONCENTRATING,_REMEMBERING_OR_MAKING_DECISIONS_DIFFICULTY: NO
FALL_HISTORY_WITHIN_LAST_SIX_MONTHS: NO
ADLS_ACUITY_SCORE: 20

## 2022-12-20 NOTE — TELEPHONE ENCOUNTER
Called and spoken with pt, she is having sputum that is streaked with blood.  She is feeling the same as yesterday, states that she does not feel that bad.    We discussed labs, current fact that she is not being treated for her GPA, increased Creatinine, and Dr. Call's recommendation for ER. Pt is agreeable to going, will go to [facility] ER.     Have updated Dr. Call, as he is the one who recommended that she go to the ER, he will update ER with her plan of care.    Eunice Lim RN  Rheumatology Clinic

## 2022-12-20 NOTE — ED PROVIDER NOTES
[facility] EMERGENCY DEPARTMENT ([facility])    12/20/22      ED PROVIDER NOTE  ED 9  History     Chief Complaint   Patient presents with     Abnormal Labs     Reports tested positive for covid yesterday, has seen spots that look like purpura to feet and elbows, reports a history of blood clots and autoimmune disease, had labs today that showed elevated creatinine and crp levels, rheumatologist recommended for her to come in     HPI  Cande Shields is a 22 year old female with history of granulomatosis with polyangiitis (Wegener's disease), stage III chronic kidney disease, prior complex COVID-19 infection in December 2020 triggering a flare of Wegener's with purpuric rash and polyarthritis followed by DVTs who presents with cough with blood-streaked sputum with positive COVID test yesterday and untreated granulomatosis with polyangiitis.  She had had a virtual visit with family practice clinic yesterday for cough.  She had lab work done included COVID testing, CBC, CMP, CRP, sed rate and UA.  She also has a myeloperoxidase proteinase 3 panel which is currently pending.  Lab work was remarkable for increased creatinine and positive COVID testing.  Given her history of significant COVID infection in the past in setting of Wegener's, she was directed to the ED for further evaluation and management of this.    Epic records reviewed, she has required prednisone and even Cytoxan to treat her Wegener's flare with purpuric rash and polyarthritis when she last had COVID.    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     Multiple subsegmental pulmonary emboli without acute cor pulmonale (H) 03/2021     PFO (patent foramen ovale)      Preeclampsia, third trimester      Spontaneous pregnancy loss 2020    31 weeks     Stage 3b chronic kidney  disease (H)      Wegener's disease, pulmonary 01/01/2010    renal biopdy     Past Surgical History:   Procedure Laterality Date     ENT SURGERY  2000    cyst     EXCISE GANGLION WRIST  2009     apixaban ANTICOAGULANT (ELIQUIS) 2.5 MG tablet  calcium carbonate-vitamin D (OSCAL) 500-5 MG-MCG tablet  methylPREDNISolone (MEDROL) 4 MG tablet  nicotine (COMMIT) 2 MG lozenge  [START ON 12/22/2022] nicotine (NICODERM CQ) 7 MG/24HR 24 hr patch  omeprazole (PRILOSEC) 20 MG DR capsule  Avacopan 10 MG CAPS  nirmatrelvir and ritonavir (PAXLOVID, 150/100,) therapy pack      Allergies   Allergen Reactions     Heparin Other (See Comments)     Reaction: HIT     Penicillins Rash     Unknown, but think rash.  Tolerated cephalexin (12/27/20), cefpodoxime (12/27/20)     Family History  Family History   Problem Relation Age of Onset     Thyroid Disease Mother      Cancer Maternal Grandfather      Asthma No family hx of      Diabetes No family hx of      Social History   Social History     Tobacco Use     Smoking status: Every Day     Packs/day: 0.25     Types: Cigarettes     Smokeless tobacco: Never     Tobacco comments:     vaping   Vaping Use     Vaping Use: Some days     Substances: Nicotine, Flavoring     Devices: Disposable   Substance Use Topics     Alcohol use: Yes     Comment: Occas     Drug use: No      Past medical history, past surgical history, medications, allergies, family history, and social history were reviewed with the patient. No additional pertinent items.       Review of Systems  A complete review of systems was performed with pertinent positives and negatives noted in the HPI, and all other systems negative.    Physical Exam   BP: 105/76  Pulse: 94  Temp: 98.1  F (36.7  C)  Resp: 16  Weight: 98.4 kg (217 lb)  SpO2: 97 %  Physical Exam  Constitutional:       General: She is not in acute distress.     Appearance: She is not diaphoretic.   HENT:      Head: Atraumatic.      Mouth/Throat:      Pharynx: No oropharyngeal  exudate.   Eyes:      General: No scleral icterus.     Pupils: Pupils are equal, round, and reactive to light.   Cardiovascular:      Heart sounds: Normal heart sounds.   Pulmonary:      Effort: No respiratory distress.      Breath sounds: Normal breath sounds.   Abdominal:      General: Bowel sounds are normal.      Palpations: Abdomen is soft.      Tenderness: There is no abdominal tenderness.   Musculoskeletal:         General: No tenderness.   Skin:     General: Skin is warm.      Findings: No rash.      Comments: Patient noted 2 small pinpoint possibly purpuric lesions on her left ankle.   Neurological:      General: No focal deficit present.      Mental Status: She is oriented to person, place, and time.      Sensory: No sensory deficit.      Motor: No weakness.      Coordination: Coordination normal.         ED Course      Procedures    The medical record was reviewed and interpreted.  Current labs reviewed and interpreted.  Current images reviewed and interpreted: Chest x-ray was negative.              Results for orders placed or performed during the hospital encounter of 12/20/22   XR Chest 2 Views     Status: None    Narrative    EXAM: XR CHEST 2 VIEWS  LOCATION: [facility]  DATE/TIME: 12/20/2022 5:58 PM    INDICATION: COVID infection. History of Wegener's.   COMPARISON: CTA chest 12/20/2022      Impression    IMPRESSION: Lungs clear.  Pulmonary vascularity normal.  No effusion.    CONCLUSION: Negative chest.   US Renal Complete Non-Vascular     Status: None    Narrative    EXAMINATION: US RENAL COMPLETE NON-VASCULAR  12/21/2022 11:50 AM      CLINICAL HISTORY: GPA flare, KATERINA on CKD III    COMPARISON: 1/29/2021    FINDINGS:  Right renal length: 10.4 cm.   Left renal length: 10.6 cm.     The kidneys are normal in position and echogenicity. There is no  evident calculus or renal scarring. There is no significant urinary  tract dilation. Bladder is well-distended  and normal in appearance. No  abnormal wall thickening.      Impression    IMPRESSION:  Renal ultrasound is within normal limits.    TIN AGUIAR MD         SYSTEM ID:  [id number]   Hepatitis B surface antigen     Status: Normal   Result Value Ref Range    Hepatitis B Surface Antigen Nonreactive Nonreactive   Hepatitis B Surface Antibody     Status: None   Result Value Ref Range    Hepatitis B Surface Antibody Instrument Value 0.76 <8.00 m[IU]/mL    Hepatitis B Surface Antibody Nonreactive    Hepatitis C Screen Reflex to HCV RNA Quant and Genotype     Status: Normal   Result Value Ref Range    Hepatitis C Antibody Nonreactive Nonreactive    Narrative    Assay performance characteristics have not been established for newborns, infants, and children.   Hepatitis C antibody     Status: Normal   Result Value Ref Range    Hepatitis C Antibody Nonreactive Nonreactive    Narrative    Assay performance characteristics have not been established for newborns, infants, and children.   HIV Antigen Antibody Combo     Status: Normal   Result Value Ref Range    HIV Antigen Antibody Combo Nonreactive Nonreactive   Symptomatic COVID-19 Virus (Coronavirus) by PCR Nasopharyngeal     Status: Normal    Specimen: Nasopharyngeal; Swab   Result Value Ref Range    SARS CoV2 PCR Negative Negative    Narrative    Testing was performed using the Xpert Xpress SARS-CoV-2 Assay on the Cepheid Gene-Xpert Instrument Systems. Additional information about this Emergency Use Authorization (EUA) assay can be found via the Lab Guide. This test should be ordered for the detection of SARS-CoV-2 in individuals who meet SARS-CoV-2 clinical and/or epidemiological criteria as well as from individuals without symptoms or other reasons to suspect COVID-19. Test performance for asymptomatic patients has only been established in anterior nasal swab specimens. This test is for in vitro diagnostic use under the FDA EUA for laboratories certified under CLIA to  perform high complexity testing. This test has not been FDA cleared or approved. A negative result does not rule out the presence of PCR inhibitors in the specimen or target RNA concentration below the limit of detection for the assay. The possibility of a false negative should be considered if the patient's recent exposure or clinical presentation suggests COVID-19. This test was validated by the [facility]. This laboratory is certified under the Clinical Laboratory Improvement Amendments (CLIA) as qualified to perform high complexity laboratory testing.     Extra Tube     Status: None    Narrative    The following orders were created for panel order Extra Tube.  Procedure                               Abnormality         Status                     ---------                               -----------         ------                     Extra Red Top Tube[[id number]]                               Final result               Extra Green Top (Lithium...[[id number]]                      Final result               Extra Purple Top Tube[[id number]]                                                         Please view results for these tests on the individual orders.   Extra Red Top Tube     Status: None   Result Value Ref Range    Hold Specimen JIC    Extra Green Top (Lithium Heparin) Tube     Status: None   Result Value Ref Range    Hold Specimen JIC    Extra Tube     Status: None    Narrative    The following orders were created for panel order Extra Tube.  Procedure                               Abnormality         Status                     ---------                               -----------         ------                     Extra Blue Top Tube[[id number]]                              Final result                 Please view results for these tests on the individual orders.   Extra Blue Top Tube     Status: None   Result Value Ref Range    Hold Specimen JIC    Comprehensive metabolic  panel     Status: Abnormal   Result Value Ref Range    Sodium 139 133 - 144 mmol/L    Potassium 5.3 3.4 - 5.3 mmol/L    Chloride 114 (H) 94 - 109 mmol/L    Carbon Dioxide (CO2) 20 20 - 32 mmol/L    Anion Gap 5 3 - 14 mmol/L    Urea Nitrogen 40 (H) 7 - 30 mg/dL    Creatinine 1.48 (H) 0.52 - 1.04 mg/dL    Calcium 8.8 8.5 - 10.1 mg/dL    Glucose 122 (H) 70 - 99 mg/dL    Alkaline Phosphatase 100 40 - 150 U/L    AST 11 0 - 45 U/L    ALT 28 0 - 50 U/L    Protein Total 7.6 6.8 - 8.8 g/dL    Albumin 3.0 (L) 3.4 - 5.0 g/dL    Bilirubin Total 0.2 0.2 - 1.3 mg/dL    GFR Estimate 50 (L) >60 mL/min/1.73m2   CBC with platelets     Status: Abnormal   Result Value Ref Range    WBC Count 5.5 4.0 - 11.0 10e3/uL    RBC Count 4.59 3.80 - 5.20 10e6/uL    Hemoglobin 11.9 11.7 - 15.7 g/dL    Hematocrit 37.6 35.0 - 47.0 %    MCV 82 78 - 100 fL    MCH 25.9 (L) 26.5 - 33.0 pg    MCHC 31.6 31.5 - 36.5 g/dL    RDW 14.7 10.0 - 15.0 %    Platelet Count 156 150 - 450 10e3/uL   Asymptomatic COVID-19 Virus (Coronavirus) by PCR Nasopharyngeal     Status: Abnormal    Specimen: Nasopharyngeal; Swab   Result Value Ref Range    SARS CoV2 PCR Positive (A) Negative    Narrative    Testing was performed using the Xpert Xpress SARS-CoV-2 Assay on the Cepheid Gene-Xpert Instrument Systems. Additional information about this Emergency Use Authorization (EUA) assay can be found via the Lab Guide. This test should be ordered for the detection of SARS-CoV-2 in individuals who meet SARS-CoV-2 clinical and/or epidemiological criteria as well as from individuals without symptoms or other reasons to suspect COVID-19. Test performance for asymptomatic patients has only been established in anterior nasal swab specimens. This test is for in vitro diagnostic use under the FDA EUA for laboratories certified under CLIA to perform high complexity testing. This test has not been FDA cleared or approved. A negative result does not rule out the presence of PCR inhibitors in  the specimen or target RNA concentration below the limit of detection for the assay. The possibility of a false negative should be considered if the patient's recent exposure or clinical presentation suggests COVID-19. This test was validated by the [facility]. This laboratory is certified under the Clinical Laboratory Improvement Amendments (CLIA) as qualified to perform high complexity laboratory testing.     HCG qualitative     Status: Normal   Result Value Ref Range    hCG Serum Qualitative Negative Negative   CD19 B Cell Count     Status: Normal   Result Value Ref Range    CD19% B Cells 22 6 - 27 %    Absolute CD19, B Cells 175 107 - 698 cells/uL     Medications   lidocaine 1 % 0.1-1 mL (has no administration in time range)   lidocaine (LMX4) cream (has no administration in time range)   sodium chloride (PF) 0.9% PF flush 3 mL (3 mLs Intracatheter Given 12/21/22 1353)   sodium chloride (PF) 0.9% PF flush 3 mL (has no administration in time range)   melatonin tablet 1 mg (has no administration in time range)   acetaminophen (TYLENOL) tablet 650 mg (has no administration in time range)     Or   acetaminophen (TYLENOL) Suppository 650 mg (has no administration in time range)   senna-docusate (SENOKOT-S/PERICOLACE) 8.6-50 MG per tablet 1 tablet (has no administration in time range)     Or   senna-docusate (SENOKOT-S/PERICOLACE) 8.6-50 MG per tablet 2 tablet (has no administration in time range)   ondansetron (ZOFRAN ODT) ODT tab 4 mg (has no administration in time range)     Or   ondansetron (ZOFRAN) injection 4 mg (has no administration in time range)   prochlorperazine (COMPAZINE) injection 10 mg (has no administration in time range)     Or   prochlorperazine (COMPAZINE) tablet 10 mg (has no administration in time range)     Or   prochlorperazine (COMPAZINE) suppository 25 mg (has no administration in time range)   Patient is already receiving anticoagulation with heparin,  enoxaparin (LOVENOX), warfarin (COUMADIN)  or other anticoagulant medication (has no administration in time range)   nicotine Patch in Place ( Transdermal Patch in Place 12/21/22 1217)   nicotine (NICODERM CQ) 7 MG/24HR 24 hr patch 1 patch (1 patch Transdermal Patch/Med Applied 12/21/22 1216)   nicotine (COMMIT) lozenge 2 mg (has no administration in time range)   0.9% sodium chloride BOLUS (1,000 mLs Intravenous New Bag 12/20/22 1912)   methylPREDNISolone sodium succinate (solu-MEDROL) injection 125 mg (125 mg Intravenous Given 12/20/22 1913)   methylPREDNISolone sodium succinate (solu-MEDROL) injection 32 mg (32 mg Intravenous Given 12/21/22 1630)        Assessments & Plan (with Medical Decision Making)       I have reviewed the nursing notes. I have reviewed the findings, diagnosis, plan and need for follow up with the patient.    Discharge Medication List as of 12/21/2022  4:09 PM      START taking these medications    Details   apixaban ANTICOAGULANT (ELIQUIS) 2.5 MG tablet Take 1 tablet (2.5 mg) by mouth 2 times daily for 30 days Take until you are seen by hematology doctors, Disp-60 tablet, R-0, E-Prescribe      calcium carbonate-vitamin D (OSCAL) 500-5 MG-MCG tablet Take 1 tablet by mouth 2 times daily, Disp-60 tablet, R-1, E-Prescribe      methylPREDNISolone (MEDROL) 4 MG tablet Take 8 tablets (32 mg) by mouth daily for 6 days, THEN 6 tablets (24 mg) daily for 7 days, THEN 4 tablets (16 mg) daily for 7 days, THEN 3 tablets (12 mg) daily for 14 days., Disp-160 tablet, R-0, E-Prescribe      nicotine (COMMIT) 2 MG lozenge Place 1 lozenge (2 mg) inside cheek every hour as needed for smoking cessation, Disp-168 lozenge, R-1, E-Prescribe      nicotine (NICODERM CQ) 7 MG/24HR 24 hr patch Place 1 patch onto the skin daily, Disp-14 patch, R-1, E-Prescribe      omeprazole (PRILOSEC) 20 MG DR capsule Take 1 capsule (20 mg) by mouth daily, Disp-30 capsule, R-0, E-Prescribe         Patient with history of Wegener's  granulomatosis now presenting with COVID-positive diagnosis as well as possible exacerbation of Wegener's with significant change in her renal function creatinine is now more than double her baseline patient will be admitted to the medicine service she was discussed with both rheumatology and nephrology both of whom will be seeing her as an inpatient.    Final diagnoses:   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)   Infection due to 2019 novel coronavirus       --  Sharad Galeana  [facility] EMERGENCY DEPARTMENT  12/20/2022     Sharad Galeana MD  12/21/22 2026

## 2022-12-20 NOTE — ED TRIAGE NOTES
Triage Assessment     Row Name 12/20/22 0621       Triage Assessment (Adult)    Airway WDL WDL       Respiratory WDL    Respiratory WDL cough;X    Cough Frequency frequent    Cough Type productive       Skin Circulation/Temperature WDL    Skin Circulation/Temperature WDL WDL       Cardiac WDL    Cardiac WDL WDL       Peripheral/Neurovascular WDL    Peripheral Neurovascular WDL WDL       Cognitive/Neuro/Behavioral WDL    Cognitive/Neuro/Behavioral WDL WDL

## 2022-12-21 ENCOUNTER — APPOINTMENT (OUTPATIENT)
Dept: ULTRASOUND IMAGING | Facility: CLINIC | Age: 22
End: 2022-12-21
Attending: STUDENT IN AN ORGANIZED HEALTH CARE EDUCATION/TRAINING PROGRAM
Payer: COMMERCIAL

## 2022-12-21 VITALS
TEMPERATURE: 97.2 F | WEIGHT: 217 LBS | DIASTOLIC BLOOD PRESSURE: 75 MMHG | RESPIRATION RATE: 16 BRPM | SYSTOLIC BLOOD PRESSURE: 108 MMHG | OXYGEN SATURATION: 98 % | HEART RATE: 69 BPM | BODY MASS INDEX: 37.25 KG/M2

## 2022-12-21 DIAGNOSIS — I77.82 ANCA-ASSOCIATED VASCULITIS (H): Primary | ICD-10-CM

## 2022-12-21 DIAGNOSIS — M31.31 GRANULOMATOSIS WITH POLYANGIITIS WITH RENAL INVOLVEMENT (H): Primary | ICD-10-CM

## 2022-12-21 DIAGNOSIS — Z29.89 NEED FOR PNEUMOCYSTIS PROPHYLAXIS: ICD-10-CM

## 2022-12-21 PROBLEM — N17.0 ATN (ACUTE TUBULAR NECROSIS) (H): Status: ACTIVE | Noted: 2022-12-21

## 2022-12-21 LAB
ALBUMIN MFR UR ELPH: 185 MG/DL
ALBUMIN SERPL-MCNC: 3 G/DL (ref 3.4–5)
ALP SERPL-CCNC: 100 U/L (ref 40–150)
ALT SERPL W P-5'-P-CCNC: 28 U/L (ref 0–50)
ANION GAP SERPL CALCULATED.3IONS-SCNC: 5 MMOL/L (ref 3–14)
AST SERPL W P-5'-P-CCNC: 11 U/L (ref 0–45)
BILIRUB SERPL-MCNC: 0.2 MG/DL (ref 0.2–1.3)
BUN SERPL-MCNC: 40 MG/DL (ref 7–30)
CALCIUM SERPL-MCNC: 8.8 MG/DL (ref 8.5–10.1)
CD19 CELLS # BLD: 175 CELLS/UL (ref 107–698)
CD19 CELLS NFR BLD: 22 % (ref 6–27)
CHLORIDE BLD-SCNC: 114 MMOL/L (ref 94–109)
CO2 SERPL-SCNC: 20 MMOL/L (ref 20–32)
CREAT SERPL-MCNC: 1.48 MG/DL (ref 0.52–1.04)
CREAT UR-MCNC: 247 MG/DL
ERYTHROCYTE [DISTWIDTH] IN BLOOD BY AUTOMATED COUNT: 14.7 % (ref 10–15)
GFR SERPL CREATININE-BSD FRML MDRD: 50 ML/MIN/1.73M2
GLUCOSE BLD-MCNC: 122 MG/DL (ref 70–99)
HBV SURFACE AB SERPL IA-ACNC: 0.76 M[IU]/ML
HBV SURFACE AB SERPL IA-ACNC: NONREACTIVE M[IU]/ML
HBV SURFACE AG SERPL QL IA: NONREACTIVE
HCG SERPL QL: NEGATIVE
HCT VFR BLD AUTO: 37.6 % (ref 35–47)
HCV AB SERPL QL IA: NONREACTIVE
HCV AB SERPL QL IA: NONREACTIVE
HGB BLD-MCNC: 11.9 G/DL (ref 11.7–15.7)
HIV 1+2 AB+HIV1 P24 AG SERPL QL IA: NONREACTIVE
MCH RBC QN AUTO: 25.9 PG (ref 26.5–33)
MCHC RBC AUTO-ENTMCNC: 31.6 G/DL (ref 31.5–36.5)
MCV RBC AUTO: 82 FL (ref 78–100)
PLATELET # BLD AUTO: 156 10E3/UL (ref 150–450)
POTASSIUM BLD-SCNC: 5.3 MMOL/L (ref 3.4–5.3)
PROT SERPL-MCNC: 7.6 G/DL (ref 6.8–8.8)
PROT/CREAT 24H UR: 0.75 MG/MG CR (ref 0–0.2)
RBC # BLD AUTO: 4.59 10E6/UL (ref 3.8–5.2)
SARS-COV-2 RNA RESP QL NAA+PROBE: POSITIVE
SODIUM SERPL-SCNC: 139 MMOL/L (ref 133–144)
WBC # BLD AUTO: 5.5 10E3/UL (ref 4–11)

## 2022-12-21 PROCEDURE — 83516 IMMUNOASSAY NONANTIBODY: CPT | Performed by: INTERNAL MEDICINE

## 2022-12-21 PROCEDURE — 99207 PR NO CHARGE LOS: CPT | Performed by: STUDENT IN AN ORGANIZED HEALTH CARE EDUCATION/TRAINING PROGRAM

## 2022-12-21 PROCEDURE — 76770 US EXAM ABDO BACK WALL COMP: CPT | Mod: 26 | Performed by: RADIOLOGY

## 2022-12-21 PROCEDURE — U0003 INFECTIOUS AGENT DETECTION BY NUCLEIC ACID (DNA OR RNA); SEVERE ACUTE RESPIRATORY SYNDROME CORONAVIRUS 2 (SARS-COV-2) (CORONAVIRUS DISEASE [COVID-19]), AMPLIFIED PROBE TECHNIQUE, MAKING USE OF HIGH THROUGHPUT TECHNOLOGIES AS DESCRIBED BY CMS-2020-01-R: HCPCS | Performed by: STUDENT IN AN ORGANIZED HEALTH CARE EDUCATION/TRAINING PROGRAM

## 2022-12-21 PROCEDURE — 80053 COMPREHEN METABOLIC PANEL: CPT | Performed by: STUDENT IN AN ORGANIZED HEALTH CARE EDUCATION/TRAINING PROGRAM

## 2022-12-21 PROCEDURE — 99254 IP/OBS CNSLTJ NEW/EST MOD 60: CPT | Performed by: PHYSICIAN ASSISTANT

## 2022-12-21 PROCEDURE — 36415 COLL VENOUS BLD VENIPUNCTURE: CPT | Performed by: STUDENT IN AN ORGANIZED HEALTH CARE EDUCATION/TRAINING PROGRAM

## 2022-12-21 PROCEDURE — 250N000011 HC RX IP 250 OP 636: Performed by: STUDENT IN AN ORGANIZED HEALTH CARE EDUCATION/TRAINING PROGRAM

## 2022-12-21 PROCEDURE — 86355 B CELLS TOTAL COUNT: CPT | Performed by: INTERNAL MEDICINE

## 2022-12-21 PROCEDURE — 82040 ASSAY OF SERUM ALBUMIN: CPT | Performed by: STUDENT IN AN ORGANIZED HEALTH CARE EDUCATION/TRAINING PROGRAM

## 2022-12-21 PROCEDURE — 85027 COMPLETE CBC AUTOMATED: CPT | Performed by: STUDENT IN AN ORGANIZED HEALTH CARE EDUCATION/TRAINING PROGRAM

## 2022-12-21 PROCEDURE — 99207 PR SC NO CHARGE VISIT: CPT | Performed by: INTERNAL MEDICINE

## 2022-12-21 PROCEDURE — 99223 1ST HOSP IP/OBS HIGH 75: CPT | Mod: AI | Performed by: STUDENT IN AN ORGANIZED HEALTH CARE EDUCATION/TRAINING PROGRAM

## 2022-12-21 PROCEDURE — 99223 1ST HOSP IP/OBS HIGH 75: CPT | Performed by: INTERNAL MEDICINE

## 2022-12-21 PROCEDURE — 76770 US EXAM ABDO BACK WALL COMP: CPT

## 2022-12-21 PROCEDURE — 99223 1ST HOSP IP/OBS HIGH 75: CPT | Mod: GC | Performed by: STUDENT IN AN ORGANIZED HEALTH CARE EDUCATION/TRAINING PROGRAM

## 2022-12-21 PROCEDURE — 250N000013 HC RX MED GY IP 250 OP 250 PS 637: Performed by: STUDENT IN AN ORGANIZED HEALTH CARE EDUCATION/TRAINING PROGRAM

## 2022-12-21 RX ORDER — POLYETHYLENE GLYCOL 3350 17 G
2 POWDER IN PACKET (EA) ORAL
Qty: 168 LOZENGE | Refills: 1 | Status: SHIPPED | OUTPATIENT
Start: 2022-12-21 | End: 2023-01-19

## 2022-12-21 RX ORDER — ALBUTEROL SULFATE 0.83 MG/ML
2.5 SOLUTION RESPIRATORY (INHALATION)
Status: CANCELLED | OUTPATIENT
Start: 2022-12-28

## 2022-12-21 RX ORDER — ACETAMINOPHEN 325 MG/1
650 TABLET ORAL ONCE
Status: CANCELLED
Start: 2022-12-28

## 2022-12-21 RX ORDER — DIPHENHYDRAMINE HCL 25 MG
50 CAPSULE ORAL ONCE
Status: CANCELLED
Start: 2022-12-28

## 2022-12-21 RX ORDER — EPINEPHRINE 1 MG/ML
0.3 INJECTION, SOLUTION, CONCENTRATE INTRAVENOUS EVERY 5 MIN PRN
Status: CANCELLED | OUTPATIENT
Start: 2022-12-28

## 2022-12-21 RX ORDER — DIPHENHYDRAMINE HYDROCHLORIDE 50 MG/ML
50 INJECTION INTRAMUSCULAR; INTRAVENOUS
Status: CANCELLED
Start: 2022-12-28

## 2022-12-21 RX ORDER — POLYETHYLENE GLYCOL 3350 17 G
2 POWDER IN PACKET (EA) ORAL
Status: DISCONTINUED | OUTPATIENT
Start: 2022-12-21 | End: 2022-12-21 | Stop reason: HOSPADM

## 2022-12-21 RX ORDER — METHYLPREDNISOLONE SODIUM SUCCINATE 125 MG/2ML
125 INJECTION, POWDER, LYOPHILIZED, FOR SOLUTION INTRAMUSCULAR; INTRAVENOUS
Status: CANCELLED
Start: 2022-12-28

## 2022-12-21 RX ORDER — METHYLPREDNISOLONE 4 MG/1
TABLET ORAL
Qty: 160 TABLET | Refills: 0 | Status: SHIPPED | OUTPATIENT
Start: 2022-12-21 | End: 2023-01-24

## 2022-12-21 RX ORDER — ALBUTEROL SULFATE 90 UG/1
1-2 AEROSOL, METERED RESPIRATORY (INHALATION)
Status: CANCELLED
Start: 2022-12-28

## 2022-12-21 RX ORDER — METHYLPREDNISOLONE SODIUM SUCCINATE 40 MG/ML
32 INJECTION, POWDER, LYOPHILIZED, FOR SOLUTION INTRAMUSCULAR; INTRAVENOUS ONCE
Status: COMPLETED | OUTPATIENT
Start: 2022-12-21 | End: 2022-12-21

## 2022-12-21 RX ORDER — METHYLPREDNISOLONE SODIUM SUCCINATE 125 MG/2ML
100 INJECTION, POWDER, LYOPHILIZED, FOR SOLUTION INTRAMUSCULAR; INTRAVENOUS ONCE
Status: CANCELLED | OUTPATIENT
Start: 2022-12-28

## 2022-12-21 RX ADMIN — NICOTINE 7 MG/24 HR DAILY TRANSDERMAL PATCH 1 PATCH: at 12:16

## 2022-12-21 RX ADMIN — METHYLPREDNISOLONE SODIUM SUCCINATE 32 MG: 40 INJECTION, POWDER, FOR SOLUTION INTRAMUSCULAR; INTRAVENOUS at 16:30

## 2022-12-21 ASSESSMENT — ACTIVITIES OF DAILY LIVING (ADL)
ADLS_ACUITY_SCORE: 20

## 2022-12-21 NOTE — CONSULTS
[facility] Rheumatology IP Consult    Consult for: ?GPA  Consult by: Dr. Henley    ASSESSMENT AND RECOMMENDATIONS:  Cande Shields is a 22 year old female admitted on 12/20/2022. She has a history of GPA, (un)provoked DVT and is admitted for elevated Cr and possible hemoptysis concerning for GPA reactivation, though now found to be COVID-19 positive.     DIAGNOSIS:    Granulomatosus with Polygangiitis    COVID-19 infection, acute, mild    Recurrent, unprovoked VTE    Mrs. Shields arrives with Cr elevation, questionable concern for hemoptysis that prompted question of GPA activity. She was last treated for this ~2y ago. At present, many, but not all, of her symptoms can be ascribed to a new COVID-19 infection. This also stymies are ability to treat any active vasculitis, for which there is some concern (casts on UA). Instead, we can start a solumedrol taper and Avacopan (per Nephro) that can hopefully assist with her disease, and await resolution of her COVID-19 infection before starting more definitive induction therapy with Rituximab. I will place outpatient infusion orders now in the hopes of facilitating those appointments. She will have renal follow-up 01/19/23.     RECOMMENDATIONS:  -- Given recent positive COVID-19, would not start Rituximab at present, she can start this therapy in ~2 weeks / after she has fully recovered  -- We can start a medrol dose taper today, with continuation outpatient for concerns related to GPA  -- START 32mg x7d (total, 1 dose today), 24mg x7d, 16mg x7d, 12mg x14d (until seen in outpatient follow-up in 1 month with either Rheum or Renal  -- Recommend PPI and Vitamin D/Calcium supplementation with high-dose steroids  -- START omeprazole 20mg qday  -- I will place outpatient RTX infusion orders: 375mg/m2 weekly x4  -- Appreciate input from Nephrology and Hematology for Avacopan and DOAC initiation     I discussed the findings and recommendations with the patient.  I communicated the  "assessment and plan to the consulting team.    Case seen and discussed with Dr. Munira Ferris \"BJ\" MD Gordo, PhD  PGY-3 Rheumatology Fellow  [phone] [text page]    History of Present Illness   Cande Shields is a 22 year old female with Wegener's granulomatosis, spontaneous late , DVT/PE and left femoral artery thrombus without clear provoking factor. She reports that she developed new petechial lesions and  blood streaked sputum on .     On admission, CRP elevated to 31, and ESR elevated to 25. UA significant for hematuria and proteinuria. CXR wnl. GPA (AL-3+) was diagnosed at age 10, with renal, upper/lower respiratory tract, musculoskeletal, and cutaneous involvement. During her flares, she notes fatigue, joint pain, cutaneous nodules, and hemoptysis. Previously treated with methotrexate, steroids and RTX, with most recent 4-week RTX treatment course in 2020.     She describes a recent positive COVID-19 test at home. She has notable fatigue at present and over the past few days. She has had some epistaxis she associates with the dry weather, but no real hemoptysis. She notes no arthralgias, myalgias. She has some mild petechiael rash on the L ankle and R elbow, which concerned her for reactivation of her GPA. She has now received solumedrol IV 125mg, and her Cr has improved. She expresses wariness of prednisone, but would be willing to try a different steroid in the future.     Review of Systems    Associated symptoms:bleeding/clotting problems, fatigue and rashes/photosensitive. cough   Denies: alopecia, arthralgia, depression, fevers, joint pain, memory loss, morning stiffness, muscle weakness, nausea, new headache, oral ulcers, palpitations, pleurisy, Raynaud's and seizures.      Unless stated above, a 14-point review of systems was otherwise normal.     HISTORY REVIEW:  Past Medical History   Past Medical History:   Diagnosis Date     ADHD (attention deficit " hyperactivity disorder)      DVT, lower extremity, distal (H)      Dysmenorrhea      Femoral artery thrombosis, left (H) 03/2021     Multiple subsegmental pulmonary emboli without acute cor pulmonale (H) 03/2021     PFO (patent foramen ovale)      Preeclampsia, third trimester      Spontaneous pregnancy loss 2020    31 weeks     Stage 3b chronic kidney disease (H)      Wegener's disease, pulmonary 01/01/2010    renal biopdy     Past Surgical History:   Procedure Laterality Date     ENT SURGERY  2000    cyst     EXCISE GANGLION WRIST  2009     Family History   Problem Relation Age of Onset     Thyroid Disease Mother      Cancer Maternal Grandfather      Asthma No family hx of      Diabetes No family hx of      Social History     Socioeconomic History     Marital status: Legally      Spouse name: Not on file     Number of children: Not on file     Years of education: Not on file     Highest education level: Not on file   Occupational History     Not on file   Tobacco Use     Smoking status: Every Day     Packs/day: 0.25     Types: Cigarettes     Smokeless tobacco: Never     Tobacco comments:     vaping   Vaping Use     Vaping Use: Some days     Substances: Nicotine, Flavoring     Devices: Disposable   Substance and Sexual Activity     Alcohol use: Yes     Comment: Occas     Drug use: No     Sexual activity: Yes     Partners: Male     Birth control/protection: None   Other Topics Concern     Parent/sibling w/ CABG, MI or angioplasty before 65F 55M? Not Asked   Social History Narrative    Lives with brother 14yo and mother. Pets: Dog Nicholas.    Lives with  in [place], has cat.    Mom lives next door.     Social Determinants of Health     Financial Resource Strain: Not on file   Food Insecurity: Not on file   Transportation Needs: Not on file   Physical Activity: Not on file   Stress: Not on file   Social Connections: Not on file   Intimate Partner Violence: Not on file   Housing Stability: Not on file      Patient Active Problem List   Diagnosis     Wegener's granulomatosis     Myalgia     Granulomatosis with polyangiitis, unspecified whether renal involvement (H)     ANCA-associated vasculitis     Pulmonary infiltrates     Acute kidney injury (H)     Need for pneumocystis prophylaxis     Acute deep vein thrombosis (DVT) of proximal vein of both lower extremities (H)     ADHD (attention deficit hyperactivity disorder), inattentive type     Alcohol use disorder, mild, in sustained remission     Bicornuate uterus     Binge-eating disorder, moderate     BMI 40.0-44.9, adult (H)     Cannabis use disorder, mild, in early remission     Femoral artery thrombosis, left (H)     Fetal demise, greater than 22 weeks, antepartum     Generalized anxiety disorder     History of granulomatosis with polyangiitis     Depression     Occlusion of common femoral artery (H)     Performance anxiety     Personal history of COVID-19     Pyoderma gangrenosum     Self-injurious behavior     Stage 3 chronic kidney disease, unspecified whether stage 3a or 3b CKD (H)     Acute saddle pulmonary embolism with acute cor pulmonale (H)     Thrombosis     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)     Infection due to 2019 novel coronavirus     Allergies   Allergen Reactions     Heparin Other (See Comments)     Reaction: HIT     Penicillins Rash     Unknown, but think rash.  Tolerated cephalexin (12/27/20), cefpodoxime (12/27/20)       OBJECTIVE:  Physical Exam   PHYSICAL EXAM  /75 (BP Location: Left arm)   Pulse 69   Temp 97.2  F (36.2  C) (Oral)   Resp 16   Wt 98.4 kg (217 lb)   LMP 12/15/2022 (Approximate)   SpO2 98%   BMI 37.25 kg/m    Wt Readings from Last 4 Encounters:   12/20/22 98.4 kg (217 lb)   01/26/22 119.7 kg (264 lb)   10/07/21 115.7 kg (255 lb)   09/08/21 120.2 kg (265 lb)     Physical Exam  Constitutional:       Appearance: She is obese.   HENT:      Head: Normocephalic and atraumatic.      Nose:  Congestion present.      Mouth/Throat:      Mouth: Mucous membranes are moist.   Cardiovascular:      Rate and Rhythm: Normal rate and regular rhythm.      Pulses: Normal pulses.      Heart sounds: Normal heart sounds.   Pulmonary:      Effort: Pulmonary effort is normal.      Comments: Mild crackles cleared after cough  Abdominal:      General: Abdomen is flat.      Palpations: Abdomen is soft.   Musculoskeletal:         General: No swelling or tenderness. Normal range of motion.      Cervical back: Normal range of motion and neck supple.   Skin:     General: Skin is warm and dry.      Findings: Rash (small petechiae on R medial ankle) present.   Neurological:      Mental Status: She is alert.   Psychiatric:         Mood and Affect: Mood normal.         Behavior: Behavior normal.         Data   Outside Records: NO  Outside Xrays: NO  CBC:  Recent Labs   Lab Test 12/21/22  0706 12/20/22  1251 01/31/22  1347   WBC 5.5 6.1 8.6   RBC 4.59 4.75 4.51   HGB 11.9 12.2 12.1   HCT 37.6 39.0 37.5   MCV 82 82 83   MCH 25.9* 25.7* 26.8   MCHC 31.6 31.3* 32.3   RDW 14.7 14.8 13.9    140* 323       BMP:  Recent Labs   Lab Test 12/21/22  0706 12/20/22  1251 01/31/22  1347    140 141   POTASSIUM 5.3 5.0 4.4   CHLORIDE 114* 111* 112*   CO2 20 27 22   ANIONGAP 5 2* 7   * 87 86   BUN 40* 33* 20   CR 1.48* 2.40* 1.11*   GFRESTIMATED 50* 28* 71   KRAIG 8.8 9.5 9.6       LFT:  Recent Labs   Lab Test 12/21/22  0706 12/20/22  1251 01/31/22  1347 07/09/21  2228   PROTTOTAL 7.6 8.1  --  7.1   ALBUMIN 3.0* 3.3* 3.4 3.2*   BILITOTAL 0.2 0.2  --  <0.1*   ALKPHOS 100 109  --  91   AST 11 22  --  19   ALT 28 35  --  42       No results found for: CKTOTAL  TSH   Date Value Ref Range Status   06/10/2021 2.57 0.40 - 4.00 mU/L Final       Inflammatory markers  Lab Results   Component Value Date    CRP 31.6 12/20/2022    CRP 7.9 01/30/2021    CRP 29.7 01/27/2021    CRP 27.0 12/29/2020     Lab Results   Component Value Date    SED 25  12/20/2022    SED 16 01/30/2021    SED 38 12/27/2020     No results found for: WILFREDO    UA RESULTS:  Recent Labs   Lab Test 12/20/22  1251 01/31/22  1351 09/08/21  1027 04/07/21  1238   COLOR Yellow Yellow Yellow Yellow   APPEARANCE Slightly Cloudy* Clear Clear Clear   URINEGLC Negative Negative Negative Negative   URINEBILI Negative Negative Negative Negative   URINEKETONE Negative Negative Negative Negative   SG 1.022 1.025 >=1.030 >1.030   UBLD Large* Moderate* Moderate* Moderate*   URINEPH 6.0 5.0 5.5 5.5   PROTEIN 200* 100* 100* >=300*   UROBILINOGEN  --  0.2 0.2 0.2   NITRITE Negative Negative Negative Negative   LEUKEST Negative Negative Negative Negative   RBCU 5-10* 2-5*  --  2-5*   WBCU 0-5 0-5  --  0 - 5       Autoimmunity labs:  No results found for: RHF  No results found for: CCPIGG  Lab Results   Component Value Date    ANCA 1:160 02/22/2011     Lab Results   Component Value Date    R9YILER 150 12/28/2020     Lab Results   Component Value Date    S7QEWBD 30 12/28/2020     No results found for: KARISSA  No results found for: DNA  No results found for: RNPIGG, SMIGG, SSAIGG, SSBIGG, SCLIGG  Neutrophil Cytoplasmic IgG Antibody   Date Value Ref Range Status   02/22/2011 1:160  Final     Comment:     Reference range: <1:20  (Note)  Cytoplasmic ANCA (c-ANCA) staining pattern observed. No  perinuclear ANCA (p-ANCA) pattern seen. Presence of p-ANCA  ruled out on formalin-fixed neutrophils.  TEST INFORMATION: Anti-Neutrophil Cyto Ab, IgG    Neutrophil Cytoplasmic Antibodies (C-ANCA = granular  cytoplasmic staining, P-ANCA = perinuclear staining) are  found in the serum of over 90 percent of patients with  certain necrotizing systemic vasculitides, and usually in  less than 5 percent of patients with collagen vascular  disease or arthritis.  Performed by [facility],  [address] [place] [id number] [phone]  [email or web address], Leonie Damon MD, Lab. Director                                                                            IMAGING:    US Renal Complete Non-Vascular  Narrative: EXAMINATION: US RENAL COMPLETE NON-VASCULAR  12/21/2022 11:50 AM      CLINICAL HISTORY: GPA flare, KATERINA on CKD III    COMPARISON: 1/29/2021    FINDINGS:  Right renal length: 10.4 cm.   Left renal length: 10.6 cm.     The kidneys are normal in position and echogenicity. There is no  evident calculus or renal scarring. There is no significant urinary  tract dilation. Bladder is well-distended and normal in appearance. No  abnormal wall thickening.  Impression: IMPRESSION:  Renal ultrasound is within normal limits.    TIN AGUIAR MD         SYSTEM ID:  [id number]      Attending Attestation     Patient seen and discussed with Dr. Caro. I confirmed key aspects of history and physical examination and personally reviewed the vital signs, medications, labs, and imaging. I was directly involved in medical decision making and management of this patient, and I agree with the documentation, findings, and plan.    Ms Shields with PMH of c-ANCA vasculitis presented with mild petechial rash, blood streaked sputum along with KATERINA which raised concern about GPA flare. She has not been on immunosuppression, and has not followed up with Rheumatology or Nephrology since early 2021. Due to active urine sediment and KATERINA, we decided to treat her with Rituximab but her latest COVID PCR done today is positive. Hence will hold off on giving her Rituximab today or tomorrow. Will arrange for Rituximab infusions as an outpatient in 7 - 10 days if no symptoms of active COVID infection present. She will start Methylprednisolone taper plan and follow up with Nephrology and Rheumatology in 4 - 6 weeks. Agree with use of Avacopan as per Nephrology recommendation.     Munira Domínguez MD  , [facility]   Division of Rheumatic and Autoimmune diseases   Pager - [phone]

## 2022-12-21 NOTE — CONSULTS
Nephrology Initial Consult  December 21, 2022      Cande Shields MRN:[id number] YOB: 2000  Date of Admission:12/20/2022  Primary care provider: Cira Amanda  Requesting physician: Hallie Henley DO    ASSESSMENT AND RECOMMENDATIONS:   # Likely mini flare of GPA: petechial rash and mild KATERINA  # Bx proven ANCA-associated GN; PR3 (Bx in 3/2011)  # COVID-19; mild symptomatic  The patient was diagnosed with GPA since 2011 and was treated with oral cytoxan, methrotrexate and prednisone and maintain with RTX until 2012. She 1st flared in 12/2020 post COVID. At that time, it was mainly ENT symptoms and muscle. She was treated with RTX 375mg/m2 x4 but then developed KATERINA from baseline of 0.7 to 2.0 in 2/21. She was then Rx with Cytoxan 500 mg/m2 (unclear why using this regimen) x3 doses with total doses of 3.5 gm cumulative. Steroid was tapered off since 4/21. While on steroid, she had large weight gain, Cushingoid and also insomnia. Sine then she lost to follow up and did not receive any maintenance. Her KY-3 was 0.3 in 4/7/21 (after the 1st flare) but then was noted reappearance in 1/31/22 with PR3 9.7 but then never received treatment.     Now presented with COVID-19 positive, KATERINA, mild cough with blood streak and petechial rash at ankle. Cr was 2.4 with hematuria (worsened) and also renal tubular cells. US kidney is normal. It is likely that she has ATN from COVID along with small flare of PR3 ANCA as evidence by worsening hematuria and elevated CRP. Extensive discussion with her, Rheum and heme. Plan is to induce her and place her on maintenance. Given serious SE with steroid, we start her on Avacopan 30 mg BID for steroid sparing effect.   - Agree with  mg/m2 weekly x 4 arranged by Rheum  - Steroid by rheum  - Prophylaxis would defer to rheumatology as well  - Check PR3, CD19 (added by me this morning)  - Avacopan 30 mg BID-> filled the form today. Discussed risk and benefit with her: SE  such as infection, liver problem: transaminitis  - Will follow her on 1/19/23-> send msg to [facility] for helping scheduling  - Continue to Rx COVID; placed on Paxlovid  # Hx of multiple blood clot; not on AC  # Hx of DFIU (3rd trim)  # Hx of severe preeclampsia  APL was neg in 10/21. Heme is consulted and contemplating warfarin.  # Poor adherence  Missed appt several times.    Recommendations were communicated to primary team via note and phone call.   Jolie Dozeir MD   Division of Renal Disease and Hypertension  [facility]  myairmail  Vocera Web Console      REASON FOR CONSULT: KATERINA stage 1 on CKD stage 3    HISTORY OF PRESENT ILLNESS:  Admitting provider and nursing notes reviewed  Cande Shields is a 22 year old with Hx of GOA (PR3 ANCA vasculitis) (Bx confirmed in 3/2011), DVT and PE, ADHD, Hx of pre-eclampsia and DFIU who was admitted for concerning of GPA flare.    The patient was initially diagnosed with OH-3 ANCA vascultis. 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 She was treated with . She was treated in the past by Dr. Velásquez. She was treated with methotrexate, high dose steroid.  Subsequently she was placed on oral cytoxan (unclear how long) then she had been followed by Dr. Enriqueta Alcantara, pediatric rheumatologist at [facility]. Chronic immune suppressive therapy with rituximab infusions every 6-8 months. Infusion was stopped since 2012. Her Cr was 0.6-0.8 in 2020. She was pregnant and had severe pre-eclampsia and noted to have IUFD at 30 weeks in October 2020. In December 2020, she was developed several days of myalgia, joint pain, pedal edema and was admitted to [facility] between 12/26-12/30. She was though to have a flare of GPA. Of note, she had COVID 1 mo preceding this event. She was given solumedrol 80 mg per day and 1 dose of RTX and was supposed to continue 3 more doses of  mg/m2. She was also prescribed Ibuprofen at discharge. She  received the last dose of the series on 1/20/21 ([facility]). Cr during that flare was 0.77. However, she was readmitted again on 1/27/22 after noted Cr 2.05. She received IV cytoxan 500 mg/m2 x 3 doses (total dose cumulation about 3.5 g) .Gynecology input on ovarian preservation in setting of cyclophosphamide - did not give leuprolide. Eventually steroid was tapered down in 4/21. She was then lost follow-up and did not receive maintenance therapy.     Now she came in with cold symptoms with positive home COVID test and was treated with Paxlovid. She also noted to have petechial rashes that similar to previous flare hence she was instructed to present to ED. Upon presentation, she has Cr of 2.05 but after IV fluid and 1 dose of methlyprednisolone 125 mg Cr down to 1.48 the next day.  She has no leg swelling. She has some phlegm with blood tinge and denies any SOB. Today, she feels better. She is wanting to go home due to financial issue. She is also apprehend about prednisone.     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 Auth Provider Long Term End Date   hydrOXYzine (VISTARIL) 25 MG capsule Take 1 capsule (25 mg) by mouth 3 times daily as needed for anxiety  Patient not taking: Reported on  12/19/2022   Ruby Mercer, APRN CNP     methocarbamol (ROBAXIN) 750 MG tablet Take 750 mg by mouth  Patient not taking: Reported on 1/26/2022   Reported, Patient     nirmatrelvir and ritonavir (PAXLOVID, 150/100,) therapy pack Take 2 tablets by mouth 2 times daily   Neena Gonzalez MD     Prenatal MV-Min-Fe Fum-FA-DHA (PRENATAL 1 PO)    Reported, Patient     sulfamethoxazole-trimethoprim (BACTRIM DS) 800-160 MG tablet Take 1 tablet by mouth 2 times daily  Patient not taking: Reported on 12/19/2022   Reported, Patient No       Current Meds    fondaparinux ANTICOAGULANT  2.5 mg Subcutaneous Q24H     nicotine  1 patch Transdermal Daily     nicotine   Transdermal Q8H     sodium chloride (PF)  3 mL Intracatheter Q8H     Infusion Meds    - MEDICATION INSTRUCTIONS -         ALLERGIES:    Allergies   Allergen Reactions     Heparin Other (See Comments)     Reaction: HIT     Penicillins Rash     Unknown, but think rash.  Tolerated cephalexin (12/27/20), cefpodoxime (12/27/20)       REVIEW OF SYSTEMS:  A comprehensive of systems was negative except as noted above.    SOCIAL HISTORY:   Social History     Socioeconomic History     Marital status: Legally      Spouse name: Not on file     Number of children: Not on file     Years of education: Not on file     Highest education level: Not on file   Occupational History     Not on file   Tobacco Use     Smoking status: Every Day     Packs/day: 0.25     Types: Cigarettes     Smokeless tobacco: Never     Tobacco comments:     vaping   Vaping Use     Vaping Use: Some days     Substances: Nicotine, Flavoring     Devices: Disposable   Substance and Sexual Activity     Alcohol use: Yes     Comment: Occas     Drug use: No     Sexual activity: Yes     Partners: Male     Birth control/protection: None   Other Topics Concern     Parent/sibling w/ CABG, MI or angioplasty before 65F 55M? Not Asked   Social History Narrative    Lives with brother 14yo and mother. Pets: Dog  Nicholas.    Lives with  in [place], has cat.    Mom lives next door.     Social Determinants of Health     Financial Resource Strain: Not on file   Food Insecurity: Not on file   Transportation Needs: Not on file   Physical Activity: Not on file   Stress: Not on file   Social Connections: Not on file   Intimate Partner Violence: Not on file   Housing Stability: Not on file     Reviewed with patient       FAMILY MEDICAL HISTORY:   Family History   Problem Relation Age of Onset     Thyroid Disease Mother      Cancer Maternal Grandfather      Asthma No family hx of      Diabetes No family hx of      Reviewed with patient     PHYSICAL EXAM:   Temp  Av.6  F (36.4  C)  Min: 96.9  F (36.1  C)  Max: 98.1  F (36.7  C)      Pulse  Av.5  Min: 68  Max: 94 Resp  Av.8  Min: 16  Max: 18  SpO2  Av %  Min: 97 %  Max: 99 %       /75 (BP Location: Left arm)   Pulse 69   Temp 97.2  F (36.2  C) (Oral)   Resp 16   Wt 98.4 kg (217 lb)   LMP 12/15/2022 (Approximate)   SpO2 98%   BMI 37.25 kg/m        Admit Weight: 98.4 kg (217 lb)     GENERAL APPEARANCE: No distress,  awake  EYES: No scleral icterus, pupils equal  Lymphatics: no cervical or supraclavicular LAD  Pulmonary: lungs clear to auscultation with equal breath sounds bilaterally, no clubbing  CV: regular rhythm, normal rate, no rub   - Edema: no  GI: soft, nontender, normal bowel sounds  MS: no evidence of inflammation in joints, no muscle tenderness  : No arambula  SKIN: no rash, warm, dry, no cyanosis; + mild petechial rashes at ankle.  NEURO: face symmetric, no asterixis     LABS:   I have reviewed the following labs:  CMP  Recent Labs   Lab 22  0706 22  1251    140   POTASSIUM 5.3 5.0   CHLORIDE 114* 111*   CO2 20 27   ANIONGAP 5 2*   * 87   BUN 40* 33*   CR 1.48* 2.40*   GFRESTIMATED 50* 28*   KRAIG 8.8 9.5   PROTTOTAL 7.6 8.1   ALBUMIN 3.0* 3.3*   BILITOTAL 0.2 0.2   ALKPHOS 100 109   AST 11 22   ALT 28 35      CBC  Recent Labs   Lab 12/21/22  0706 12/20/22  1251   HGB 11.9 12.2   WBC 5.5 6.1   RBC 4.59 4.75   HCT 37.6 39.0   MCV 82 82   MCH 25.9* 25.7*   MCHC 31.6 31.3*   RDW 14.7 14.8    140*     INRNo lab results found in last 7 days.  ABGNo lab results found in last 7 days.   URINE STUDIES  Recent Labs   Lab Test 12/20/22  1251 01/31/22  1351 09/08/21  1027 04/07/21  1238 03/04/21  0825 02/21/21  1919 02/12/21  1425   COLOR Yellow Yellow Yellow Yellow Yellow   < > Yellow   APPEARANCE Slightly Cloudy* Clear Clear Clear Clear   < > Clear   URINEGLC Negative Negative Negative Negative Negative   < > Negative   URINEBILI Negative Negative Negative Negative Negative   < > Negative   URINEKETONE Negative Negative Negative Negative Negative   < > Negative   SG 1.022 1.025 >=1.030 >1.030 1.016   < > 1.010   UBLD Large* Moderate* Moderate* Moderate* Moderate*   < > Large*   URINEPH 6.0 5.0 5.5 5.5 5.0   < > 5.5   PROTEIN 200* 100* 100* >=300* 100*   < > 100*   UROBILINOGEN  --  0.2 0.2 0.2  --   --  0.2   NITRITE Negative Negative Negative Negative Negative   < > Negative   LEUKEST Negative Negative Negative Negative Negative   < > Trace*   RBCU 5-10* 2-5*  --  2-5* 9*   < > 2-5*   WBCU 0-5 0-5  --  0 - 5 2   < > 10-25*    < > = values in this interval not displayed.     Recent Labs   Lab Test 01/31/22  1351 04/07/21  1238 02/12/21  1425 01/31/21  1450 12/27/20  1630   UTPG 0.60* 1.10* 1.89* 1.64* 1.46*     PTH  No lab results found.  IRON STUDIES  No lab results found.    IMAGING:  All imaging studies reviewed by me.     Jolie Dozier MD

## 2022-12-21 NOTE — H&P
[facility]    History and Physical - [facility]       Date of Admission:  12/20/2022    Assessment & Plan      Cande Shields is a 22 year old female admitted on 12/20/2022. She has a history of Granulomatosis with polyangiitis (GPA), with resultant CKDIII, in addition to a history of left femoral artery thrombosis, and multiple DVTs and PEs. She is admitted for concern of a recurrent GPA flare in the setting of new petechiae, hemoptysis and an KATERINA.      #Granulomatosis with polyangiitis, w/ concern for acute flare  #Hemoptysis  #Nephritic syndrome  #Petechial lesions  Patient developed new petechial lesions and hemoptysis on 12/19, and was advised to present to the ED for further management. CRP elevated to 31, and ESR elevated to 25. UA significant for hematuria and proteinuria. CXR wnl. GPA (CT-3+) was diagnosed at age 10, with renal, upper/lower respiratory tract, musculoskeletal, and cutaneous involvement. During her flares, she notes fatigue, joint pain, cutaneous nodules, and hemoptysis. Previously treated with methotrexate, steroids and RTX, with most recent 4-week RTX treatment course in December 2020. Case was discussed with Rheumatology in the ED, who will follow patient during her admission, and guide work up and treatment for possible GPA flare.   - rheumatology consult   - 1x IV solumedrol 125mg   - labs: MPO, CESARIO, ANCA, GBM, dsDNA, Hep B, Hep C, HIV, C3A, cryoglobulins  - urine random protein  - daily CBC    #KATERINA on CKDIII  Baseline creatinine of 1.4, currently elevated to 2.11. BUN:Cr 13. Likely intra-renal pathology in the setting of GPA flare.   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 Ultimately, HIT antibodies and serotonin release assays were negative, though HIT remains listed in patient's chart as contra-indication to heparin use. Patient was encouraged to follow up with Hematology during a recent [facility] consult to determine validity of HIT concern when making decisions about anti-coagulation in future pregnancies. Patient has not as yet been able to see hematology outpatient. Given patient's high risk for blood clots, especially in the setting of a possible GPA flare, will initiate anti-coagulation with fondaparinux for now. Plan to consult Hematology in the AM for further evaluation.    - Hematology consult to determine if lovenox is appropriate  - DVT ppx with fondaparinux for now    #Thrombocytopenia  Platelet count of 140 on admission. Has always been >200 previously. Possibly secondary to GPA flare. Low concern for TTP or DIC in the setting of normal Hgb. Low concern for acute infection at this time in the absence of leukocytosis, fever, or acute respiratory sx.   - daily CBC  - consider peripheral smear if AM plt count is also low compared to patient's baseline    #Positive home covid test  When patient developed petechial lesions on her foot, she was concerned that she might have covid because her last GPA flare correlated with a covid infection in December 2020. She denies any acute symptoms concerning for covid, though endorses a chronic cough, ongoing for several weeks. Petechial lesions prompted patient to take a home covid test on 12/19, which was positive. At time of admission, hospital covid test was negative.   - repeat covid test on 12/21AM        Diet: Combination Diet Regular Diet Adult  DVT Prophylaxis: Fondaparinux  Guillen Catheter: Not present  Fluids: PO  Central Lines: None  Cardiac Monitoring: None  Code Status: Full Code    Clinically Significant Risk Factors Present on Admission               # Hypoalbuminemia: Lowest albumin = 3.3 g/dL at 12/20/2022 12:51 PM, will monitor as appropriate                  Disposition Plan      Expected Discharge Date: 12/22/2022                The patient's care was discussed with the Attending Physician, Dr. Henley.    Batool Isidro MD  [facility]  [facility]  Securely message with the Vocera Web Console (learn more here)  Text page via [facility] Paging/Directory   Please see signed in provider for up to date coverage information    ______________________________________________________________________    Chief Complaint     GPA flare    History of Present Illness     Cande Shields is a 22 year old female admitted on 12/20/2022. She has a history of Granulomatosis with polyangiitis (GPA), with resultant CKDIII, in addition to a history of left femoral artery thrombosis, and multiple DVTs and PEs. She is admitted for concern of a recurrent GPA flare in the setting of new petechiae, hemoptysis and an KATERINA.      Patient noticed small petechial lesions on her left foot yesterday, and has also had episodes of hemoptysis. Due to her last GPA flare being in the context of a covid infection, she decided to take a covid test. She notes that she has had a chronic cough for several weeks, but no other respiratory symptoms. She discussed her symptoms with her Rheumatology clinic, as well as her positive home covid test, and was directed to present to the ED.    Patient is denying any myalgias, lower extremity swelling, dyspnea, chest pain.    Review of Systems    The 10 point Review of Systems is negative other than noted in the HPI or here.     Past Medical History    I have reviewed this patient's medical history and updated it with pertinent information if needed.   Past Medical History:   Diagnosis Date     ADHD (attention deficit hyperactivity disorder)      DVT, lower extremity, distal (H)       Dysmenorrhea      Femoral artery thrombosis, left (H) 03/2021     Multiple subsegmental pulmonary emboli without acute cor pulmonale (H) 03/2021     PFO (patent foramen ovale)      Preeclampsia, third trimester      Spontaneous pregnancy loss 2020    31 weeks     Stage 3b chronic kidney disease (H)      Wegener's disease, pulmonary 01/01/2010    renal biopdy        Past Surgical History   I have reviewed this patient's surgical history and updated it with pertinent information if needed.  Past Surgical History:   Procedure Laterality Date     ENT SURGERY  2000    cyst     EXCISE GANGLION WRIST  2009      Social History   I have reviewed this patient's social history and updated it with pertinent information if needed. Cande Shields  reports that she has been smoking. She has been smoking an average of .25 packs per day. She has never used smokeless tobacco. She reports current alcohol use. She reports that she does not use drugs.    Family History   I have reviewed this patient's family history and updated it with pertinent information if needed.  Family History   Problem Relation Age of Onset     Thyroid Disease Mother      Cancer Maternal Grandfather      Asthma No family hx of      Diabetes No family hx of        Prior to Admission Medications   Prior to Admission Medications   Prescriptions Last Dose Informant Patient Reported? Taking?   Prenatal MV-Min-Fe Fum-FA-DHA (PRENATAL 1 PO)   Yes No   Patient not taking: Reported on 1/26/2022   hydrOXYzine (VISTARIL) 25 MG capsule   No No   Sig: Take 1 capsule (25 mg) by mouth 3 times daily as needed for anxiety   Patient not taking: Reported on 12/19/2022   methocarbamol (ROBAXIN) 750 MG tablet   Yes No   Sig: Take 750 mg by mouth   Patient not taking: Reported on 1/26/2022   nirmatrelvir and ritonavir (PAXLOVID, 150/100,) therapy pack   No No   Sig: Take 2 tablets by mouth 2 times daily   sulfamethoxazole-trimethoprim (BACTRIM DS) 800-160 MG tablet   Yes No   Sig:  Take 1 tablet by mouth 2 times daily   Patient not taking: Reported on 12/19/2022      Facility-Administered Medications: None     Allergies   Allergies   Allergen Reactions     Heparin Other (See Comments)     Reaction: HIT     Penicillins Rash     Unknown, but think rash.  Tolerated cephalexin (12/27/20), cefpodoxime (12/27/20)       Physical Exam   Vital Signs: Temp: 98  F (36.7  C) Temp src: Oral BP: 114/76 Pulse: 68   Resp: 18 SpO2: 99 % O2 Device: None (Room air)    Weight: 217 lbs 0 oz    Physical Exam  Constitutional:       General: She is not in acute distress.     Appearance: Normal appearance.   Eyes:      Extraocular Movements: Extraocular movements intact.      Conjunctiva/sclera: Conjunctivae normal.      Pupils: Pupils are equal, round, and reactive to light.   Cardiovascular:      Rate and Rhythm: Normal rate and regular rhythm.   Pulmonary:      Effort: Pulmonary effort is normal. No respiratory distress.      Breath sounds: Normal breath sounds.   Musculoskeletal:      Right lower leg: No edema.      Left lower leg: No edema.   Skin:     General: Skin is warm and dry.      Findings: Lesion (3 small petechial lesions noted on left foot) present.   Neurological:      General: No focal deficit present.      Mental Status: She is alert and oriented to person, place, and time.   Psychiatric:         Mood and Affect: Mood normal.         Behavior: Behavior normal.         Thought Content: Thought content normal.           Data   Data reviewed today: I reviewed all medications, new labs and imaging results over the last 24 hours.     Recent Labs   Lab 12/20/22  1251   WBC 6.1   HGB 12.2   MCV 82   *      POTASSIUM 5.0   CHLORIDE 111*   CO2 27   BUN 33*   CR 2.40*   ANIONGAP 2*   KRAIG 9.5   GLC 87   ALBUMIN 3.3*   PROTTOTAL 8.1   BILITOTAL 0.2   ALKPHOS 109   ALT 35   AST 22

## 2022-12-21 NOTE — PROGRESS NOTES
VS: /75 (BP Location: Left arm)   Pulse 69   Temp 97.2  F (36.2  C) (Oral)   Resp 16   Wt 98.4 kg (217 lb)   LMP 12/15/2022 (Approximate)   SpO2 98%   BMI 37.25 kg/m       O2: 98% RA    Output: Voiding without difficulty    Last BM: 12/20   Activity: Independent    Skin: CDI    Pain: Pt denies pain at this time    CMS: A/Ox4    Dressing: CDI   Diet: Regular thin liquid    LDA: PIV R upper forearm    Plan: Continue with care. Pt pending discharge 12/22. Will go home with a plan from care team and follow up instructions once clear of covid.   Additional Info: Pt very anxious about going home. Worried about getting to work. Pt COVID +.

## 2022-12-21 NOTE — PLAN OF CARE
Patient sent up with visitor, will dismiss visitor shortly (COVID status)    Arrived to unit around 2120. Visitor sent home, Alesha's team seeing pt at this time (2130)

## 2022-12-21 NOTE — PLAN OF CARE
Patient A/Ox4. VSS. Denies CP, SOB, dizziness/LH. LSCTA. Congested cough. +fl/BS. Voiding well in bathroom. CMS intact. Tolerating regular diet without NV. Activity level is fair, pt sleeping for most of the night. Rashy legs. IV SL. Denied pain. Patient has demonstrated ability to call appropriately. Patient is resting with call light within reach. Will continue to monitor.

## 2022-12-21 NOTE — CONSULTS
Hematology Consult Note   Date of Service: 2022    Patient: Cande Shields  MRN: [id number]  Admission Date: 2022  Hospital Day # Hospital Day: 2  Primary Outpatient Hematologist: Dr. Kodi Dye    Reason for Consult: ? Of ability to use heparin for DVT ppx as ? 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  It is ok to use heparin for prophylaxis, although patient prefers not to given prior concern for HIT.     Regarding significant clot history without clear provoking factors (possibly due to recent pregnancy and COVID) and negative APS and genetic hypercoagulable work-up and now admitted with GPA flare and questionable re-infection with COVID would recommend anticoagulation for at least the next 30 days (can change to DOAC to avoid injection) and follow-up in clinic with Dr. Dye in 1 month to discuss if anticoagulation should be continued long-term.     #Hx of late spontaneous  10/2020  #H/O RA thrombus 3/2021  -seen on TTE at [facility], but not on TTE at [facility]  -Found to have PFO   #thrombosis of left common femoral artery 3/21/21  -Occurred while on heparin for DVT/PE (during 1st day of hospitalization for DVT/PE, HIT considered but HIT ab and RAUL negative)   #unprovoked submassive saddle PE 3/20/21  #unprovoked B/L LE DVT 3/20/21  -Status post thrombolysis + xarelto x 6 months (3/21-), discontinued 2/2 menorrhagia   -? Provoking factors: COVID  12/2020, miscarriage in 10/2020, GPA, + smoker   -Negative APS testing x 2 (3/21/21 + 10/19/21), negative for factor V Leiden and prothrombin gene mutation     Recommendations:   -Change fondaparinux to apixaban 2.5 mg PO BID (to avoid injections and dose reduced due to KATERINA) and continue at least for the next 30 days given hypercoagulability (?COVID, flare of GPA, hospitalization)   -Follow-up in [facility] with Dr. Dye to determine if indefinite or prolonged anticoagulation indicated (I have messaged clinic to set this up)    Patient was seen and plan of care was discussed with attending physician Dr. Mcneill     We will continue to follow this patient. Please don't hesitate to contact the Fellow On-Call with questions.    I spent 40 minutes face-to-face or coordinating care of Cande Shields.  Over 50% of our time on the unit was spent counseling the patient and/or coordinating care regarding ? HIT, anticoagulation recommendations     Eva Vivas PA-C  [facility]  [phone]    Attending Attestation    I, Derrell Mcneill MD, saw by video and evaluated Cande Shields as part of a shared APRN/PA visit. I personally reviewed the vital signs, medications, labs, imaging.     Key management decisions made by me and carried out under my direction include:  Cande has GPA PRA ANCA vasculitis.with hx of numerous venous and one arterial thrombus as well as Spon Ab Testing for anit phospholipid has been negative    Counseling and/or coordination of care performed by me:   Pt could benefit from corticosteroids and/or rituxan or a [id number] antibody Avacopan  Agree to anticoagulate with apixaban for one month and follow up with Dr Dye    I spent 40 minutes face-to-face and/or coordinating care.  Over 50% of the time on the unit was spent counseling the patient and/or coordinating care as documented above.     Date of service (when I saw the patient): 12/21/22      Derrell Mcneill MD 2022        History of Present Illness:    Cande Shields is a 22 year old female with Wegener's granulomatosis, spontaneous late , DVT/PE and left femoral artery thrombus without clear provoking factor. She reports that she developed new petechial lesions and  blood streaked sputum on . On admission, CRP elevated to 31, and ESR elevated to 25. UA significant for hematuria and proteinuria. CXR wnl. GPA (WI-3+) was diagnosed at age 10, with renal, upper/lower respiratory tract, musculoskeletal, and cutaneous involvement. During her flares, she notes fatigue, joint pain, cutaneous nodules, and hemoptysis. Previously treated with methotrexate, steroids and RTX, with most recent 4-week RTX treatment course in 2020.    Today she was seen via video visit due to being located on [facility].  She reports no significant bleeding currently.  She describes nose bleeds more of as dried blood in the back of nares she thinks due to weather.        Hematologic History:  3/20/2021: presented to [facility] with shortness of breath  -CTA :   -B/L LE doppler:  1. Positive for DVT in the right proximal calf posterior tibial and peroneal veins, and in the left proximal calf peroneal vein.   2. No DVT above the knee in either lower extremity.  -Initially started on heparin gtt   3/21/21: Developed acute pain and coolness in her left leg and was without pulses   --IR arteriogram and thrombolysis   -Occlusion of common femoral, profunda and superficial femoral arteries, distal popliteal artery   -TPA infusion started due to unable to suction thrombus or mechanical thrombectomy   --Due to development while on heparin gtt HIT ab sent and negative, RAUL sent and also returned negative but she was on argatroban while this was pending   --APS panel sent and was negative   --Echo: Mobile echodensity (~2 x 1.5cm) in right atrium, likely thrombus.  Cannot confirm  attachment location, but most likely originating from or near IVC (also based on CT images).  -Seen by hematology: while primary arterial thrombosis is of concern, a follow up echo with bubble study is recommended to rule out right to left intracardiac shunt given her RA thrombus  3/23/21: Transferred to [facility] due to insurance, was changed to fondaparinux and ultimately xarelto  3/25/21: FVL mutation + prothrombin gene mutation testing negative, protein C >150 (elevated, on ac)   10/7/2021: Seen in clinic by Dr. Kodi Dye for evaluation and treatment of acute DVT and saddle PE.  Had completed 6 months of xarelto 3-8/2021, stopped because of menorrhagia   -APS testing repeated and was negative  -She was requested to follow-up in clinic in 1 month but has not been seen since initial appointment     Review of Systems: Pertinent positive and negative systems described in HPI; the remainder of the 14 systems are negative    Past Medical History:  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    Multiple subsegmental pulmonary emboli without acute cor pulmonale (H) 03/2021    PFO (patent foramen ovale)     Preeclampsia, third trimester     Spontaneous pregnancy loss 2020    31 weeks    Stage 3b chronic kidney disease (H)     Wegener's disease, pulmonary 01/01/2010    renal biopdy       Past Surgical History:  Past Surgical History:   Procedure Laterality Date    ENT SURGERY  2000    cyst    EXCISE GANGLION WRIST  2009       Social History:  Social History     Socioeconomic History    Marital status: Legally    Tobacco Use    Smoking status: Every Day     Packs/day: 0.25     Types: Cigarettes    Smokeless tobacco: Never    Tobacco comments:     vaping   Vaping Use    Vaping Use: Some days    Substances: Nicotine, Flavoring    Devices: Disposable   Substance and  Sexual Activity    Alcohol use: Yes     Comment: Occas    Drug use: No    Sexual activity: Yes     Partners: Male     Birth control/protection: None   Social History Narrative    Lives with brother 14yo and mother. Pets: Dog Nicholas.    Lives with  in [place], has cat.    Mom lives next door.        Family History  Family History   Problem Relation Age of Onset    Thyroid Disease Mother     Cancer Maternal Grandfather     Asthma No family hx of     Diabetes No family hx of        Outpatient Medications:  No current facility-administered medications on file prior to encounter.  hydrOXYzine (VISTARIL) 25 MG capsule, Take 1 capsule (25 mg) by mouth 3 times daily as needed for anxiety (Patient not taking: Reported on 12/19/2022)  methocarbamol (ROBAXIN) 750 MG tablet, Take 750 mg by mouth (Patient not taking: Reported on 1/26/2022)  nirmatrelvir and ritonavir (PAXLOVID, 150/100,) therapy pack, Take 2 tablets by mouth 2 times daily  Prenatal MV-Min-Fe Fum-FA-DHA (PRENATAL 1 PO),   sulfamethoxazole-trimethoprim (BACTRIM DS) 800-160 MG tablet, Take 1 tablet by mouth 2 times daily (Patient not taking: Reported on 12/19/2022)         Physical Exam:    /75 (BP Location: Left arm)   Pulse 69   Temp 97.2  F (36.2  C) (Oral)   Resp 16   Wt 98.4 kg (217 lb)   LMP 12/15/2022 (Approximate)   SpO2 98%   BMI 37.25 kg/m    Constitutional: Awake, alert, cooperative, in NAD. Sitting up in bed.   Eyes: EOMI, sclera clear, conjunctiva normal.   ENT: Normocephalic, without obvious abnormality, moist mucus membranes   Respiratory: Non-labored breathing.   Cardiovascular: no edema, warm well perfused   GI: soft, non-distended   Skin: No concerning lesions or rash on exposed areas.   Musculoskeletal: Normal bulk.   Neurologic: Awake, alert & oriented x 3, clear speech, moves all 4 extremities   Psych: appropriate affect     Labs & Studies: I personally reviewed the following studies:  ROUTINE LABS (Last four  results):  CMP  Recent Labs   Lab 12/21/22  0706 12/20/22  1251    140   POTASSIUM 5.3 5.0   CHLORIDE 114* 111*   CO2 20 27   ANIONGAP 5 2*   * 87   BUN 40* 33*   CR 1.48* 2.40*   GFRESTIMATED 50* 28*   KRAIG 8.8 9.5   PROTTOTAL 7.6 8.1   ALBUMIN 3.0* 3.3*   BILITOTAL 0.2 0.2   ALKPHOS 100 109   AST 11 22   ALT 28 35     CBC  Recent Labs   Lab 12/21/22  0706 12/20/22  1251   WBC 5.5 6.1   RBC 4.59 4.75   HGB 11.9 12.2   HCT 37.6 39.0   MCV 82 82   MCH 25.9* 25.7*   MCHC 31.6 31.3*   RDW 14.7 14.8    140*     INRNo lab results found in last 7 days.

## 2022-12-21 NOTE — DISCHARGE SUMMARY
[facility]  Discharge Summary - Medicine & Pediatrics       Date of Admission:  2022  Date of Discharge:  2022  Discharging Provider: Dr. 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 If renal function back at baseline consider increase Apixaban dose  to 5 mg PO BID.    - Recommend CBC to monitor platelets     Follow-up with Nephrology 2023    Follow-up with Rheumatology in 1 month     Follow-up in [facility] with Dr. Dye to determine if indefinite or prolonged anticoagulation indicated in 1 Month         Unresulted Labs Ordered in the Past 30 Days of this Admission     Date and Time Order Name Status Description    2022  8:31 AM Proteinase 3 Antibody IgG In process     2022  6:13 PM Cryoglobulin quantitative In process     2022  6:06 PM DNA double stranded antibodies In process     2022  6:06 PM GBM Antibody IgG In process     2022  6:06 PM Complement C3A Level In process     2022  6:06 PM ANCA IgG by IFA with Reflex to Titer In process     2022  6:06 PM Anti Nuclear Ilene IgG by IFA with Reflex In process     2022 12:47 PM MYELOPEROXIDASE AND PROTEINASE 3 PANEL In process       These results will be followed up by Rheumatology.     Discharge Disposition   Discharged to home  Condition at discharge: Stable    Hospital Course   Cande Shields was  admitted on 12/20/2022 - 12/21/2022. She has a history of Granulomatosis with polyangiitis (GPA), with resultant CKDIII, in addition to a history of left femoral artery thrombosis, and multiple DVTs and PEs. She was admitted for concern of a recurrent GPA flare in the setting of new petechiae, blood streaked sputum and an KATERINA. Confirmed to have COVID-19 infection, mild asymptomatic. While hospitalized heumatology, Nephrology and Hematology consulted for care plan.     The following problems were addressed during her hospitalization:    #Granulomatosis with polyangiitis, possible small flare  #Petechial lesions  #Blood tinged sputum   Patient noted new petechial lesions and  blood streaked sputum on 12/19. She was informed by her rheumatology outpatient team to come to the ED. Of note, she tested positive for COVID-19 12/19 at home. On admission, CRP elevated to 31, and ESR elevated to 25. UA significant for hematuria and proteinuria and casts concerning for active vasulitis. CXR wnl. Rheumatology was consulted, full lab work-up pending, they feel most of her symptoms at the present time could be contributed to new active COVID-19 infection. Due to active COVID-19 cannot start RTX. Pt has had side effects to steroids in the past and would like to avoid them (weight gain), Cushingoid and insomnia. Pt received x1 doses of IV Solumedrol 125 mg 12/20 and IV methylprednisolone 32 mg 12/21. She will be started on PO steroid taper, Avacopan for steroid sparing affects with plan to start RTX infusions in ~ 2 weeks pending improvement and resolution of COVID-19 infection. Hematology consulted as below for anticoagulation consideration.     Remote hx per Nephrology documentation: Diagnosed with GPA in 2011, treated w/ oral cytoxan and methotrexate and prednisone and maintained with RTX until 2012. She had a flare post COVID-19 12/2020.  She was treated with RTX 375mg/m2 x4 but then developed KATERINA from baseline of 0.7 to 2.0 in  . She was then Rx with Cytoxan 500 mg/m2 (unclear why using this regimen) x3 doses with total doses of 3.5 gm cumulative. Steroid was tapered off since . She was then lost to follow-up.     - Rheumatology consult  - Start Medrol dose taper: 32mg x7d (total, 1 dose today), 24mg x7d, 16mg x7d, 12mg x14d (until seen in outpatient follow-up in 1 month with either Rheum or Nephrology   - Start omeprazole 20mg qday and Vitamin D/Calcium while on high dose steroids   - Rheumatology team placed outpatient RTX infusion orders: 375mg/m2 weekly x4              - Labs: MPO, CESARIO, ANCA, GBM, dsDNA, Hep B, Hep C, HIV, C3A, cryoglobulins, CD19 B cell count, proteinase 3 antibody IGG pending    #KATERINA on CKDIII  #ATN  #Hemautria   #Protineuria   # Bx proven ANCA-associated GN; PR3 (Bx in 3/2011)  Baseline creatinine of 1.4. On admission elevated to 2.11 -> s/p IV Solumedrol 125 mg  and 1L IVF, creatinine 1.48 . BUN:Cr 13. Renal US normal. Nephrology consulted, likely intra-renal pathology in the setting of GPA flare (as seen by worsening hematuria and elevated CRP) and COVID-19 with resulting ATN.   - Plan for GPA as above  - Nephrology appointment 23    - Recommend starting Avacopan 30 mg BID, ordered by Nephrology for 1 year supply   - Follow-up labs (PR3, CD19)     #COVID-19, mild symptomatic    When patient developed petechial lesions on her foot, she was concerned that she might have covid because her last GPA flare correlated with a covid infection in 2020. Took COVID-19 test 2021 positive. She endorses cough, ongoing for several weeks, unclear if chronic from tobacco use vs. acute. At time of admission, hospital covid test was negative but repeat here 2022 was positive.   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 Negative APS testing x 2 (3/21/21 + 10/19/21), negative for factor V Leiden and prothrombin gene mutation)   Patient has \"listed heparin allergy\" noted 3/23/2021 however her HIT abs screen and RAUL were both negative 3/21/2021, not consistent with HIT. Hematology consulted and noted heparin okay to use for heparin prophylaxis in the future. With her significant clot history (see above), current COVID-19 infection and possible GPA flare hematology recommended starting anticoagulation for at least the next 30 days. Pt did not want injectable anticoagulation and was nervous regarding heparin due previous experience, will go ahead with DOAC. Pt with recent KATERINA and in light of her wanting to discharge this evening, will go ahead with renal dosing for now until outpatient follow-up.   - Outpatient follow-up with Hematology, Dr. Dye in 1 month to determine in indefinite or portioned anticoagulation is indicated    - Start Apixaban 2.5 mg PO BID  - Recommend follow-up with PCP in 7 days.    - Recommend BMP. If renal function back at baseline consider increase Apixaban dose  to 5 mg PO BID.    #Thrombocytopenia, resolved   Platelet count of 140 on admission, 12/20 -> 156. Has always been >200 previously. Possibly secondary to GPA flare and COVID-19 infection. Low concern for TTP or DIC in the setting of normal Hgb.    - Recommend follow-up with PCP in 7 days   - Repeat CBC     #Tobacco use  Has " cut back from ~ pack a day to 3 cigarettes a day. Trying to quit. Will discharge home with nicotine patch and lozenge.     Consultations This Hospital Stay   RHEUMATOLOGY IP CONSULT  NEPHROLOGY GENERAL ADULT IP CONSULT  HEMATOLOGY ADULT IP CONSULT    Code Status   Full Code       The patient was discussed with Dr. Didi Nicholas, DO  Family Medicine PGY-3  [facility]   Pronouns: She/Hers    [facility] SUR[facility]THOPED[facility]  [address] [address]  Phone: [phone]  Fax: [phone]  ______________________________________________________________________    Physical Exam   Vital Signs: Temp: 97.2  F (36.2  C) Temp src: Oral BP: 108/75 Pulse: 69   Resp: 16 SpO2: 98 % O2 Device: None (Room air)    Weight: 217 lbs 0 oz    General: Alert and oriented, in no acute distress.  Skin: Warm and dry, petechia on left foot, one lesion on right foot and right elbow.   Eyes: Extra-ocular muscles grossly intact, pupils equal.  ENT: Speech intact, nasal passages open, no hearing impairment noted.  CV: S1 S2 RRR. No mumur. No cyanosis or pallor, warm and well perfused.  Respiratory: CTAB. No w/r/r. No respiratory distress, no accessory muscle use.  Abdomen: Soft, NT, ND.   Neuro: Comprehension intact. Gross and fine motor skills intact.   Psychiatric: Mood and affect appear normal.   Extremities: Warm, able to move all four extremities at will.         Primary Care Physician   Cira Amanda    Discharge Orders      Reason for your hospital stay    You were hospitalized due to a concern for a flare of your granulomatosis with polyangiitis disease. While here the rheumatology doctors saw you and believe that it's mostly your COVID-19 infection causing most of your symptoms. They recommend starting prednisone taper for now until your COVID resolves and you can start rituximab. Your rheumatology team is working on ordering those infusions for when  your COVID-19 gets better. The Nephrology team also recommends starting a new medication called Acovapan which is a special medication that used long term can mean less steroids as it can help treat your vasculitis symptoms. The Hematology doctors feel it is best to be on a medication to prevent blood clots for at least the next month to prevent blood clots.     Activity    Your activity upon discharge: activity as tolerated     Follow Up and recommended labs and tests    Follow-up with PCP in 7 days for hospital discharge visit    - Recommend BMP lab. If renal function back at baseline consider increase Apixaban dose to 5 mg PO BID.    - Recommend CBC lab to monitor platelets     Follow-up with Nephrology 1/19/2023    Follow-up with Rheumatology in 1 month     Follow-up in [facility] with Dr. Dye to determine if indefinite or prolonged anticoagulation indicated.     Diet    Follow this diet upon discharge:   Diet Regular Diet Adult       Significant Results and Procedures   Most Recent 3 CBC's:  Recent Labs   Lab Test 12/21/22  0706 12/20/22  1251 01/31/22  1347   WBC 5.5 6.1 8.6   HGB 11.9 12.2 12.1   MCV 82 82 83    140* 323     Most Recent 3 BMP's:  Recent Labs   Lab Test 12/21/22  0706 12/20/22  1251 01/31/22  1347    140 141   POTASSIUM 5.3 5.0 4.4   CHLORIDE 114* 111* 112*   CO2 20 27 22   BUN 40* 33* 20   CR 1.48* 2.40* 1.11*   ANIONGAP 5 2* 7   KRAIG 8.8 9.5 9.6   * 87 86     Most Recent Urinalysis:  Recent Labs   Lab Test 12/20/22  1251 01/31/22  1351   COLOR Yellow Yellow   APPEARANCE Slightly Cloudy* Clear   URINEGLC Negative Negative   URINEBILI Negative Negative   URINEKETONE Negative Negative   SG 1.022 1.025   UBLD Large* Moderate*   URINEPH 6.0 5.0   PROTEIN 200* 100*   UROBILINOGEN  --  0.2   NITRITE Negative Negative   LEUKEST Negative Negative   RBCU 5-10* 2-5*   WBCU 0-5 0-5     Most Recent ESR & CRP:  Recent Labs   Lab Test  12/20/22  1251   SED 25*   CRP 31.6*   ,   Results for orders placed or performed during the hospital encounter of 12/20/22   XR Chest 2 Views    Narrative    EXAM: XR CHEST 2 VIEWS  LOCATION: [facility]  DATE/TIME: 12/20/2022 5:58 PM    INDICATION: COVID infection. History of Wegener's.   COMPARISON: CTA chest 12/20/2022      Impression    IMPRESSION: Lungs clear.  Pulmonary vascularity normal.  No effusion.    CONCLUSION: Negative chest.   US Renal Complete Non-Vascular    Narrative    EXAMINATION: US RENAL COMPLETE NON-VASCULAR  12/21/2022 11:50 AM      CLINICAL HISTORY: GPA flare, KATERINA on CKD III    COMPARISON: 1/29/2021    FINDINGS:  Right renal length: 10.4 cm.   Left renal length: 10.6 cm.     The kidneys are normal in position and echogenicity. There is no  evident calculus or renal scarring. There is no significant urinary  tract dilation. Bladder is well-distended and normal in appearance. No  abnormal wall thickening.      Impression    IMPRESSION:  Renal ultrasound is within normal limits.    TIN AGUIAR MD         SYSTEM ID:  [id number]       Discharge Medications   Current Discharge Medication List      START taking these medications    Details   apixaban ANTICOAGULANT (ELIQUIS) 2.5 MG tablet Take 1 tablet (2.5 mg) by mouth 2 times daily for 30 days Take until you are seen by hematology doctors  Qty: 60 tablet, Refills: 0    Associated Diagnoses: Infection due to 2019 novel coronavirus; History of granulomatosis with polyangiitis; Acute saddle pulmonary embolism with acute cor pulmonale (H); Femoral artery thrombosis, left (H); Acute deep vein thrombosis (DVT) of proximal vein of both lower extremities (H)      calcium carbonate-vitamin D (OSCAL) 500-5 MG-MCG tablet Take 1 tablet by mouth 2 times daily  Qty: 60 tablet, Refills: 1    Associated Diagnoses: Current chronic use of systemic steroids      methylPREDNISolone (MEDROL) 4 MG tablet Take 8 tablets  (32 mg) by mouth daily for 6 days, THEN 6 tablets (24 mg) daily for 7 days, THEN 4 tablets (16 mg) daily for 7 days, THEN 3 tablets (12 mg) daily for 14 days.  Qty: 160 tablet, Refills: 0    Associated Diagnoses: History of granulomatosis with polyangiitis      nicotine (COMMIT) 2 MG lozenge Place 1 lozenge (2 mg) inside cheek every hour as needed for smoking cessation  Qty: 168 lozenge, Refills: 1    Associated Diagnoses: Nicotine use      nicotine (NICODERM CQ) 7 MG/24HR 24 hr patch Place 1 patch onto the skin daily  Qty: 14 patch, Refills: 1    Associated Diagnoses: Nicotine use      omeprazole (PRILOSEC) 20 MG DR capsule Take 1 capsule (20 mg) by mouth daily  Qty: 30 capsule, Refills: 0    Associated Diagnoses: Current chronic use of systemic steroids         CONTINUE these medications which have NOT CHANGED    Details   Avacopan 10 MG CAPS Take 30 mg by mouth 2 times daily  Qty: 180 capsule, Refills: 11    Associated Diagnoses: ANCA-associated vasculitis      nirmatrelvir and ritonavir (PAXLOVID, 150/100,) therapy pack Take 2 tablets by mouth 2 times daily  Qty: 20 tablet, Refills: 0    Comments: Date of symptom onset: 12/18/22; Risk criteria met: Yes; Positive Covid-19 test: Yes; Weight >40 kg Yes; Renal fxn: abnormal-dose adjusted;  Drug-Drug interactions reviewed & addressed: No  Associated Diagnoses: Infection due to 2019 novel coronavirus         STOP taking these medications       hydrOXYzine (VISTARIL) 25 MG capsule Comments:   Reason for Stopping:         methocarbamol (ROBAXIN) 750 MG tablet Comments:   Reason for Stopping:         Prenatal MV-Min-Fe Fum-FA-DHA (PRENATAL 1 PO) Comments:   Reason for Stopping:         sulfamethoxazole-trimethoprim (BACTRIM DS) 800-160 MG tablet Comments:   Reason for Stopping:             Allergies   Allergies   Allergen Reactions     Heparin Other (See Comments)     Reaction: HIT     Penicillins Rash     Unknown, but think rash.  Tolerated cephalexin (12/27/20),  cefpodoxime (12/27/20)

## 2022-12-21 NOTE — PROGRESS NOTES
[facility]    Progress Note - [facility]       Date of Admission:  12/20/2022    Today:  - Consults: Rheumatology, Nephrology, Hematology  - Renal US     Assessment & Plan   Cande Shields is a 22 year old female admitted on 12/20/2022. She has a history of Granulomatosis with polyangiitis (GPA), with resultant CKDIII, in addition to a history of left femoral artery thrombosis, and multiple DVTs and PEs. She is admitted for concern of a recurrent GPA flare in the setting of new petechiae, blood streaked sputum and an KATERINA.       #Granulomatosis with polyangiitis, w/ concern for acute flare  #?Hemoptysis vs. Blood streaked sputum   #Petechial lesions (L foot)  Patient developed new petechial lesions and  blood streaked sputum on 12/19. On admission, CRP elevated to 31, and ESR elevated to 25. UA significant for hematuria and proteinuria. CXR wnl. GPA (CT-3+) was diagnosed at age 10, with renal, upper/lower respiratory tract, musculoskeletal, and cutaneous involvement. During her flares, she notes fatigue, joint pain, cutaneous nodules, and hemoptysis. Previously treated with methotrexate, steroids and RTX, with most recent 4-week RTX treatment course in December 2020. Case was discussed with Rheumatology in the ED, who will follow patient during her admission, and guide work up and treatment for possible GPA flare.  - Rheumatology consult              - 1x IV solumedrol 125mg              - labs: MPO, CESARIO, ANCA, GBM, dsDNA, Hep B, Hep C, HIV, C3A, cryoglobulins, CD19 B cell count, proteinase 3 antibody IGG pending  - Daily CBC     #KATERINA on CKDIII  #ATN  #Hemautria   #Protineuria   Baseline creatinine of 1.4. On admission elevated to 2.11 -> s/p IV Solumedrol 125 mg 12/20 and 1L IVF, creatinine 1.48 12/21. BUN:Cr 13. Likely intra-renal pathology in the setting of GPA flare. Protein levels not consistent with nephrotic syndrome, no peripheral edema.  No HTN to suggest nephritic syndrome, add strict Is/Os to monitor urine output.   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 Ultimately, HIT antibodies and serotonin release assays were negative, though HIT remains listed in patient's chart as contra-indication to heparin use. Patient was encouraged to follow up with Hematology during a recent [facility] consult to determine validity of HIT concern when making decisions about anti-coagulation in future pregnancies. Patient has not as yet been able to see hematology outpatient. Given patient's high risk for blood clots, especially in the setting of a possible GPA flare, will initiate anti-coagulation with fondaparinux for now  - Hematology consult:   - Is she able to use Lovenox?    - Should she be on long-term anticoagulation? If so, best option considering she is considering potential pregnancy?    - Previously has followed with Dr. Dye  - DVT ppx with fondaparinux for now     #Thrombocytopenia, resolved   Platelet count of 140 on admission, 12/20 -> 156. Has always been >200 previously. Possibly secondary to GPA flare. Low concern for TTP or DIC in the setting of normal Hgb. Low concern for acute infection at this time in the absence of leukocytosis, fever, or acute respiratory sx.   - Daily CBC     #Positive home covid test  When patient developed petechial lesions on her foot, she was concerned that she might have covid because her last GPA flare correlated with a covid infection in December 2020. She denies any acute symptoms concerning for covid, though endorses a chronic cough, ongoing for several weeks. Petechial lesions prompted patient to take a home covid test on  "12/19, which was positive. At time of admission, hospital covid test was negative.   - Repeat covid test on 12/21AM    #Tobacco use  Has cut back from ~ pack a day to 3 cigarettes a day. She is interested in trying patch and lozenge while here.  - Nicotine patch  - Nicotine lozenge        Diet: Combination Diet Regular Diet Adult    DVT Prophylaxis: Pneumatic Compression Devices  Guillen Catheter: Not present  Fluids: PO  Central Lines: None  Cardiac Monitoring: None  Code Status: Full Code      Disposition Plan      Expected Discharge Date: 12/22/2022                The patient's care was discussed with the Attending Physician, Dr. Tolbert.      Marci Nicholas, DO  Family Medicine PGY-3  Pronouns: She/Hers  [facility]  Securely message with the Vocera Web Console (learn more here)  Text page via [facility] Paging/Directory   Please see signed in provider for up to date coverage information      Clinically Significant Risk Factors Present on Admission              # Hypoalbuminemia: Lowest albumin = 3.3 g/dL at 12/20/2022 12:51 PM, will monitor as appropriate                  ______________________________________________________________________    Interval History   Admitted overnight.   This morning patient seen at bedside. Clarified history her \"hemoptyisis\" was more tinged streaked sputum than reyes hemoptysis. She has petechia on her right elbow and bilateral feet. She is very worried about working and needing to go to work tomorrow. Discussed at length need for consultation work-up to see about eligibility for going home and she may need inpatient cares. She is planning on a trip to [place] end of January.     4 point ROS negative unless noted here.     Data reviewed today: I reviewed all medications, new labs and imaging results over the last 24 hours. I personally reviewed no images or EKG's today.    Physical Exam   Vital Signs: Temp: 96.9  F (36.1  C) Temp src: Oral BP: " 115/50 Pulse: 75   Resp: 17 SpO2: 99 % O2 Device: None (Room air)    Weight: 217 lbs 0 oz     General: Alert and oriented, in no acute distress.  Skin: Warm and dry, petechia on left foot, one lesion on right foot and right elbow.   Eyes: Extra-ocular muscles grossly intact, pupils equal.  ENT: Speech intact, nasal passages open, no hearing impairment noted.  CV: S1 S2 RRR. No mumur. No cyanosis or pallor, warm and well perfused.  Respiratory: CTAB. No w/r/r. No respiratory distress, no accessory muscle use.  Abdomen: Soft, NT, ND.   Neuro: Comprehension intact. Gross and fine motor skills intact.   Psychiatric: Mood and affect appear normal.   Extremities: Warm, able to move all four extremities at will.    Data   Recent Labs   Lab 12/21/22  0706 12/20/22  1251   WBC 5.5 6.1   HGB 11.9 12.2   MCV 82 82    140*    140   POTASSIUM 5.3 5.0   CHLORIDE 114* 111*   CO2  --  27   BUN  --  33*   CR  --  2.40*   ANIONGAP  --  2*   KRAIG  --  9.5   GLC  --  87   ALBUMIN  --  3.3*   PROTTOTAL  --  8.1   BILITOTAL  --  0.2   ALKPHOS  --  109   ALT  --  35   AST  --  22     Recent Results (from the past 24 hour(s))   XR Chest 2 Views    Narrative    EXAM: XR CHEST 2 VIEWS  LOCATION: [facility]  DATE/TIME: 12/20/2022 5:58 PM    INDICATION: COVID infection. History of Wegener's.   COMPARISON: CTA chest 12/20/2022      Impression    IMPRESSION: Lungs clear.  Pulmonary vascularity normal.  No effusion.    CONCLUSION: Negative chest.

## 2022-12-22 ENCOUNTER — TELEPHONE (OUTPATIENT)
Dept: RHEUMATOLOGY | Facility: CLINIC | Age: 22
End: 2022-12-22

## 2022-12-22 ENCOUNTER — PATIENT OUTREACH (OUTPATIENT)
Dept: CARE COORDINATION | Facility: CLINIC | Age: 22
End: 2022-12-22

## 2022-12-22 ENCOUNTER — TELEPHONE (OUTPATIENT)
Dept: MULTI SPECIALTY CLINIC | Facility: CLINIC | Age: 22
End: 2022-12-22
Payer: COMMERCIAL

## 2022-12-22 LAB
ANA SER QL IF: NEGATIVE
ANCA AB PATTERN SER IF-IMP: ABNORMAL
C-ANCA TITR SER IF: ABNORMAL {TITER}
DSDNA AB SER-ACNC: 1.1 IU/ML

## 2022-12-22 NOTE — PROGRESS NOTES
"Clinic Care Coordination Contact  [facility]: Post-Discharge Note  SITUATION                                                      Admission:    Admission Date: 12/20/22   Reason for Admission: Infection due to 2019 novel coronavirus  Discharge:   Discharge Date: 12/21/22  Discharge Diagnosis: Infection due to 2019 novel coronavirus    BACKGROUND                                                      Per hospital discharge summary and inpatient provider notes:  Granulomatosis with polyangiitis with renal involvement       ASSESSMENT           Discharge Assessment  How are you doing now that you are home?: \" I'm doing well \"  How are your symptoms? (Red Flag symptoms escalate to triage hotline per guidelines): Improved  Do you feel your condition is stable enough to be safe at home until your provider visit?: Yes  Does the patient have their discharge instructions? : Yes  Does the patient have questions regarding their discharge instructions? : No  Were you started on any new medications or were there changes to any of your previous medications? : Yes  Does the patient have all of their medications?: Yes  Do you have questions regarding any of your medications? : No  Do you have all of your needed medical supplies or equipment (DME)?  (i.e. oxygen tank, CPAP, cane, etc.): Yes  Discharge follow-up appointment scheduled within 14 calendar days? : No  Is patient agreeable to assistance with scheduling? : No    Post-op ([facility] Only)  If the patient had a surgery or procedure, do they have any questions for a nurse?: No             PLAN                                                      Outpatient Plan: Follow-up with PCP in 7 days for hospital discharge visit               - Recommend BMP. If renal function back at baseline consider increase Apixaban dose           to 5 mg PO BID.               - Recommend CBC to monitor platelets      Follow-up with Nephrology 1/19/2023     Follow-up with Rheumatology in 1 month "      [facility] with Dr. Dye to determine if indefinite or prolonged anticoagulation indicated in 1 Month      Future Appointments   Date Time Provider Department Center   1/26/2023 11:00 AM Kodi Dye MD [facility]   3/1/2023  9:15 AM Zee Beauchamp MD [facility]         For any urgent concerns, please contact our 24 hour nurse triage line: [phone] ([phone])         Kirstie Blakely MA

## 2022-12-22 NOTE — LETTER
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    Ms. Shields is currently experiencing a flare-up of their GPA and requires re-induction therapy. Unfortunately, Ms. Shields has a history of poor tolerance to steroid treatment and has experienced significant side effects, including large weight gain, cushingoid, and insomnia. As a result, we believe that avacopan, a non-steroidal anti-inflammatory drug, would be the most appropriate and safe treatment option for Ms. Shields.    Clinical Study below:    1.  Tenisha OTERO, Paco CHILDERS, Chago ONTIVEROS, et al. Avacopan for the Treatment of ANCA-Associated Vasculitis. N Engl J Med 2021; 384:599.     has demonstrated the effectiveness of avacopan in the treatment of GPA, particularly in patients who have experienced adverse effects with steroid therapy. In this study the Avacopan group achieve higher rate of remission (66% versus 55% with P 0.007) compared to placebo group (which on conventional steroid protocol). This has met criterion for superiority. Relapse rate is also lower with 54% reduction in relapse during the study period. This is important as \" renal relapse\" is a significant predictor for renal failure. In addition, avacopan has been shown to be well-tolerated with a favorable safety profile.     In order to further optimize Ms. Shields's treatment and improve their chances of remission, we will be administering rituximab in conjunction with avacopan in two weeks, once Ms. Shields's COVID-19 symptoms have resolved. This combination has been shown to be effective in achieving and maintaining remission in patients with GPA.    I understand that avacopan is a newer and potentially more expensive treatment option, but I " strongly believe that it is medically necessary for Ms. Shields given their unique circumstances and treatment history. Without adequately control of diseases, WA-3 ANCA can progress to ESKD and that would be an outcome that we definitely wan to avoid. As you aware that I request that you reconsider the denial of coverage for avacopan and approve this medication for Ms. Shields's care.    If you have any further questions or require additional information, please do not hesitate to contact me.    Jolie Dozier MD, SYDNI   of Medicine  Division of Nephrology and Hypertension  [facility]

## 2022-12-22 NOTE — TELEPHONE ENCOUNTER
Left message requesting return call.    PA started for Tavneos.  Form sent to [employer] for Quick start to get pt on medication now.  Pt has been discharged from hospital.  Needs to follow up with Rheum and Neph.    Eunice Lim RN  Rheumatology Clinic

## 2022-12-22 NOTE — TELEPHONE ENCOUNTER
PRIOR AUTHORIZATION DENIED    Medication: Tavneos (avacopan) 10 MG CAPS    Denial Date: 2022    Denial Rationale: Received call from Karthik at [facility]- patient cannot have a current infection (patient currently has Covid) and patient will have to first try/fail rituximab. I will attach DHS initial approval criteria below.    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 AND   --Patient has autoantibodies for proteinase 3 (PR3) or myeloperoxidase (MPO), as detected using indirect immunofluorescence (IIF) assay or antigen-specific enzymelinked immunosorbent assays (ELISAs) OR   --Disease is confirmed by tissue biopsy at the site of active disease AND  -Patient has been evaluated and screened for the presence of hepatitis B virus (HBV) prior to initiating treatment AND  -Physician has assessed disease severity utilizing an objective measure/tool (e.g., Crump Vasculitis Activity Score [BVAS]) AND  -Patient does NOT have an active infection, including clinically important localized infections AND  -Patient does NOT have severe hepatic impairment (e.g., Child-Frey C) or active, untreated, and/or uncontrolled chronic liver disease (e.g., chronic active hepatitis B, untreated hepatitis C, uncontrolled autoimmune hepatitis, cirrhosis) AND  -Patient will avoid concomitant therapy with strong and moderate CY inducers (e.g., rifampin, carbamazepine, McFarlan s wort) AND  -Patient will avoid concomitant therapy with CY inhibitors (e.g., ketoconazole, itraconazole), or if therapy is unavoidable, the patient will be monitored closely for adverse reaction and/or dose modifications will be implemented AND  Patient has failed on 1 of the following regimens (if failure occurred during induction) OR patient  failed on both of the following regimens (if failure occurred during maintenance) [Note: may be used with or without glucocorticoids]:     --Patient has failed immunosuppressant therapy (e.g., cyclophosphamide, azathioprine, methotrexate, mycophenolate), unless contraindicated or not tolerated    --Patient has failed on anti-CD20 monoclonal antibody therapy (e.g., rituximab), unless contraindicated or not tolerated AND  -Avacopan (Tavneos) will be used as adjunctive therapy in combination with standard therapy (e.g., corticosteroids, cyclophosphamide, azathioprine, mycophenolate, rituximab)  -Initial approval is for 6 months

## 2022-12-22 NOTE — TELEPHONE ENCOUNTER
PA Initiation    Medication: Tavneos (avacopan) 10 MG CAPS  Insurance Company: [employer] - Phone [phone] Fax [phone]  Pharmacy Filling the Rx: [facility] [place], [place] - [address] [place] [address] [id number]  Filling Pharmacy Phone: [phone]  Filling Pharmacy Fax: [phone]  Start Date: 12/22/2022

## 2022-12-23 LAB
GBM IGG SER IA-ACNC: <2.4 U/ML
GBM IGG SER IA-ACNC: NEGATIVE
MYELOPEROXIDASE AB SER IA-ACNC: <0.3 U/ML
MYELOPEROXIDASE AB SER IA-ACNC: NEGATIVE
PROTEINASE3 AB SER IA-ACNC: 25 U/ML
PROTEINASE3 AB SER IA-ACNC: 27 U/ML
PROTEINASE3 AB SER IA-ACNC: POSITIVE
PROTEINASE3 AB SER IA-ACNC: POSITIVE

## 2022-12-23 NOTE — TELEPHONE ENCOUNTER
Called and spoke with pt regarding infusions and avacopan.  Discussed that we need to hold Rituximab and Avacopan for 2 weeks until she is over covid. Currently she feels pretty good. But discussed that we do need to give her that time to recover.  She understands. Discussed starting with re-induction therapy and why.  Discussed reason for avacopan and what it does.    Pt is going on vacation to [place] from 1/25-2/2. Will need to work Rituximab around that.      Pt scheduled to see Dr. Dozier on 1/19.    Eunice Lim RN  Rheumatology Clinic

## 2022-12-26 LAB — CRYOGLOB SER QL: NEGATIVE

## 2022-12-26 NOTE — TELEPHONE ENCOUNTER
Thank you for followup and for emphasizing importance of avacopan and steroid taper.    I am sorry that that the 375 mg/m2 rituxan weekly X 4 will conflict with the 1-25 vacation.  That regimen is the best studied and gives the optimal chance for long term overall and kidney health.    This has been a serious health event with recurrence of inflamed kidneys and potential for damage in them.  Is there any possibility of rescheduling vacation for after rituxan?    If not, can [facility] work the schedule so that the vacation occur between the 3rd and 4 doses of rituxan?    Please check Monika for a ROSALINA between 1-15-23 and 2-15 in my schedule.  Thanks.

## 2022-12-27 NOTE — TELEPHONE ENCOUNTER
Medication Appeal Initiation    We have initiated an appeal for the requested medication:  Medication: Tavneos (avacopan) 10 MG CAPS  Appeal Start Date:  12/27/2022  Insurance Company: [email or web address] - Phone [phone] Fax [phone]  Comments:       Faxed LMN to [email or web address]

## 2022-12-28 NOTE — TELEPHONE ENCOUNTER
Called and spoke with pt regarding infusions, need to have 3 before she leaves for [place], discussed that with the last one, she will only be delayed for 3 days before getting the last, as long as she does not get ill on her trip.  She is aware of the reason why. She is taking her steroids as directed, still waiting for PA on Avacopan. Will call [employer] [facility] on the quick start program tomorrow.      Pt aware of appt with Dr. Dozier and Dr. Call. Will call to reschedule appt with Dr. Dye.    Eunice Lim RN  Rheumatology Clinic

## 2023-01-05 NOTE — TELEPHONE ENCOUNTER
Pt called into clinic today, she is getting her Avacopan on Monday, she will start it then.  She was wondering if she should stop her medrol at that time, we discussed that she should continue on her taper.  She is not happy about that as she is developing moon face and weight gain.  She is struggling with all of the steroids.     We discussed and she calmed down a little bit.      Eunice Lim RN  Rheumatology Clinic

## 2023-01-09 NOTE — TELEPHONE ENCOUNTER
Called Karthik at [facility]- he stated this is still in process (they have up to 30 days to reach decision).

## 2023-01-09 NOTE — TELEPHONE ENCOUNTER
Received message from [employer] this am on request for our fax number to send us their prior auth documents they started.  Returned call and spoke with Satish at [employer] [phone], who confirmed they have started a prior auth request to get approval of med for the patient on Cover My Med.  Confirmed with Satish that we have already initiated a prior auth and request they stop their process to prevent further confusion with having multiple auth requests from different providers. Repeated back to Satish that we submitted to [facility] and that  [facility] is the provider of the med.  Satish verbalized that she will notify their team to stop further auth steps on their side at this time.

## 2023-01-10 ENCOUNTER — INFUSION THERAPY VISIT (OUTPATIENT)
Dept: INFUSION THERAPY | Facility: CLINIC | Age: 23
End: 2023-01-10
Attending: STUDENT IN AN ORGANIZED HEALTH CARE EDUCATION/TRAINING PROGRAM
Payer: COMMERCIAL

## 2023-01-10 VITALS
WEIGHT: 221.9 LBS | OXYGEN SATURATION: 98 % | BODY MASS INDEX: 37.88 KG/M2 | SYSTOLIC BLOOD PRESSURE: 124 MMHG | HEIGHT: 64 IN | TEMPERATURE: 98 F | HEART RATE: 68 BPM | RESPIRATION RATE: 16 BRPM | DIASTOLIC BLOOD PRESSURE: 84 MMHG

## 2023-01-10 DIAGNOSIS — M31.31 GRANULOMATOSIS WITH POLYANGIITIS WITH RENAL INVOLVEMENT (H): ICD-10-CM

## 2023-01-10 DIAGNOSIS — Z29.89 NEED FOR PNEUMOCYSTIS PROPHYLAXIS: Primary | ICD-10-CM

## 2023-01-10 LAB
CD19 CELLS # BLD: 555 CELLS/UL (ref 107–698)
CD19 CELLS NFR BLD: 20 % (ref 6–27)
IGA SERPL-MCNC: 120 MG/DL (ref 84–499)
IGG SERPL-MCNC: 829 MG/DL (ref 610–1616)
IGM SERPL-MCNC: 33 MG/DL (ref 35–242)

## 2023-01-10 PROCEDURE — 258N000003 HC RX IP 258 OP 636: Performed by: STUDENT IN AN ORGANIZED HEALTH CARE EDUCATION/TRAINING PROGRAM

## 2023-01-10 PROCEDURE — 250N000013 HC RX MED GY IP 250 OP 250 PS 637: Performed by: STUDENT IN AN ORGANIZED HEALTH CARE EDUCATION/TRAINING PROGRAM

## 2023-01-10 PROCEDURE — 82784 ASSAY IGA/IGD/IGG/IGM EACH: CPT | Performed by: STUDENT IN AN ORGANIZED HEALTH CARE EDUCATION/TRAINING PROGRAM

## 2023-01-10 PROCEDURE — 96375 TX/PRO/DX INJ NEW DRUG ADDON: CPT

## 2023-01-10 PROCEDURE — 36415 COLL VENOUS BLD VENIPUNCTURE: CPT | Performed by: STUDENT IN AN ORGANIZED HEALTH CARE EDUCATION/TRAINING PROGRAM

## 2023-01-10 PROCEDURE — 86355 B CELLS TOTAL COUNT: CPT | Performed by: STUDENT IN AN ORGANIZED HEALTH CARE EDUCATION/TRAINING PROGRAM

## 2023-01-10 PROCEDURE — 96415 CHEMO IV INFUSION ADDL HR: CPT

## 2023-01-10 PROCEDURE — 96413 CHEMO IV INFUSION 1 HR: CPT

## 2023-01-10 PROCEDURE — 999N000128 HC STATISTIC PERIPHERAL IV START W/O US GUIDANCE

## 2023-01-10 PROCEDURE — 999N000285 HC STATISTIC VASC ACCESS LAB DRAW WITH PIV START

## 2023-01-10 PROCEDURE — 96367 TX/PROPH/DG ADDL SEQ IV INF: CPT

## 2023-01-10 PROCEDURE — 250N000011 HC RX IP 250 OP 636: Performed by: STUDENT IN AN ORGANIZED HEALTH CARE EDUCATION/TRAINING PROGRAM

## 2023-01-10 RX ORDER — METHYLPREDNISOLONE SODIUM SUCCINATE 125 MG/2ML
125 INJECTION, POWDER, LYOPHILIZED, FOR SOLUTION INTRAMUSCULAR; INTRAVENOUS
Status: CANCELLED
Start: 2023-01-17

## 2023-01-10 RX ORDER — ALBUTEROL SULFATE 0.83 MG/ML
2.5 SOLUTION RESPIRATORY (INHALATION)
Status: CANCELLED | OUTPATIENT
Start: 2023-01-17

## 2023-01-10 RX ORDER — DIPHENHYDRAMINE HYDROCHLORIDE 50 MG/ML
50 INJECTION INTRAMUSCULAR; INTRAVENOUS
Status: CANCELLED
Start: 2023-01-17

## 2023-01-10 RX ORDER — DIPHENHYDRAMINE HCL 25 MG
50 CAPSULE ORAL ONCE
Status: CANCELLED
Start: 2023-01-17

## 2023-01-10 RX ORDER — ACETAMINOPHEN 325 MG/1
650 TABLET ORAL ONCE
Status: COMPLETED | OUTPATIENT
Start: 2023-01-10 | End: 2023-01-10

## 2023-01-10 RX ORDER — EPINEPHRINE 1 MG/ML
0.3 INJECTION, SOLUTION INTRAMUSCULAR; SUBCUTANEOUS EVERY 5 MIN PRN
Status: CANCELLED | OUTPATIENT
Start: 2023-01-17

## 2023-01-10 RX ORDER — METHYLPREDNISOLONE SODIUM SUCCINATE 125 MG/2ML
100 INJECTION, POWDER, LYOPHILIZED, FOR SOLUTION INTRAMUSCULAR; INTRAVENOUS ONCE
Status: CANCELLED | OUTPATIENT
Start: 2023-01-17

## 2023-01-10 RX ORDER — ALBUTEROL SULFATE 90 UG/1
1-2 AEROSOL, METERED RESPIRATORY (INHALATION)
Status: CANCELLED
Start: 2023-01-17

## 2023-01-10 RX ORDER — METHYLPREDNISOLONE SODIUM SUCCINATE 125 MG/2ML
100 INJECTION, POWDER, LYOPHILIZED, FOR SOLUTION INTRAMUSCULAR; INTRAVENOUS ONCE
Status: COMPLETED | OUTPATIENT
Start: 2023-01-10 | End: 2023-01-10

## 2023-01-10 RX ORDER — ACETAMINOPHEN 325 MG/1
650 TABLET ORAL ONCE
Status: CANCELLED
Start: 2023-01-17

## 2023-01-10 RX ADMIN — DIPHENHYDRAMINE HYDROCHLORIDE 50 MG: 50 INJECTION, SOLUTION INTRAMUSCULAR; INTRAVENOUS at 08:13

## 2023-01-10 RX ADMIN — RITUXIMAB 800 MG: 10 INJECTION, SOLUTION INTRAVENOUS at 08:50

## 2023-01-10 RX ADMIN — METHYLPREDNISOLONE SODIUM SUCCINATE 100 MG: 125 INJECTION, POWDER, FOR SOLUTION INTRAMUSCULAR; INTRAVENOUS at 08:09

## 2023-01-10 RX ADMIN — ACETAMINOPHEN 650 MG: 325 TABLET ORAL at 08:06

## 2023-01-10 NOTE — PROGRESS NOTES
Nursing Note  Cande Shields [facility] for:   Chief Complaint   Patient presents with     Infusion     Rituximab     During [facility]'s [facility] appointment, orders from Dr. Barrington Caro were completed.  Frequency: weekly x 4. Today is dose 1/4 for this round of infusions (has received previously at different infusion clinic).    Progress note:  Patient identification verified by name and date of birth.  Assessment completed.  Vitals recorded in Doc Flowsheets.  Patient was provided with education regarding medication/procedure and possible side effects.  Patient verbalized understanding.     present during visit today: Not Applicable.    Treatment Conditions: ~~~ NOTE: If the patient answers yes to any of the questions below, hold the infusion and contact ordering provider or on-call provider.    1. Have you recently had an elevated temperature, fever, chills, productive cough, coughing for 3 weeks or longer or hemoptysis, abnormal vital signs, night sweats,  chest pain or have you noticed a decrease in your appetite, unexplained weight loss or fatigue? Patient diagnosed with covid 12/21/22. Reports minor symptoms at onset, that have resolved. Feeling well today; pt denies any recent fevers or resp concerns. Per EMR communication, rheum team aware of patient's covid diagnosis and rituximab infusions; see chart notes.  2. Do you have any open wounds or new incisions? No  3. Do you have any recent or upcoming hospitalizations, surgeries or dental procedures? No  4. Do you currently have or recently have had any signs of illness or infection or are you on any antibiotics? No  5. Have you had any new, sudden or worsening abdominal pain? No  6. Have you or anyone in your household received a live vaccination in the past 4 weeks? Please note:  No live vaccines while on biologic/chemotherapy until 6 months after the last treatment.   Patient can receive the flu vaccine (shot only) and the pneumovax.  It is optimal for the patient to get these vaccines mid cycle, but they can be given at any time as long as it is not on the day of the infusion. No  7. Have you recently been diagnosed with any new nervous system diseases (ie. Multiple sclerosis, Guillain [place], seizures, neurological changes) or cancer diagnosis? No  8. Are you on any form of radiation or chemotherapy? No  9. Are you pregnant or breast feeding or do you have plans of pregnancy in the future? No  10. Have you been having any signs of worsening depression or suicidal ideations?  (benlysta only) NA  11. Have there been any other new onset medical symptoms? No      Premedications:   -650mg tylenol  -50mg IV benadryl  -100mg Solumedrol    Drug Waste Record: Yes      Drug Name: Solumedrol  Dose: 100mg (1.6mL)  Route administered: IV  Amount of waste(mL):0.4mL  Reason for waste: Single use vial    Infusion length and rate: Rituximab infusion starts at 50 ml/hr, then increased by 50 ml/hr every 30 minutes to final rate of 400 ml/hr. Total approximate infusion time of 3 hours and 45 minutes. Patient has received rituximab at previous infusion clinic, and denies hx of reactions. Since first time receiving rituxumab in this clinic, pt to infuse at slow infusion rates per pharmacy.    Labs: were drawn per orders.     Vascular access: peripheral IV was placed by vascular access nurse.    Is the next appt scheduled? 1/17     Post Infusion Assessment:  Patient tolerated infusion without incident.     Discharge Plan:   Follow up plan of care with: ongoing infusions at [facility] ordering provider as scheduled. and [facility]  Discharge instructions were reviewed with patient.  Patient/representative verbalized understanding of discharge instructions and all questions answered.  Patient discharged from [facility] in stable  "condition.    Roxana Dickson RN       Administrations This Visit     acetaminophen (TYLENOL) tablet 650 mg     Admin Date  01/10/2023 Action  Given Dose  650 mg Route  Oral Administered By  Roxana Dickson RN          diphenhydrAMINE (BENADRYL) 50 mg in sodium chloride 0.9 % 56 mL intermittent infusion     Admin Date  01/10/2023 Action  New Bag Dose  50 mg Rate  224 mL/hr Route  Intravenous Administered By  Roxana Dickson RN          methylPREDNISolone sodium succinate (solu-MEDROL) injection 100 mg     Admin Date  01/10/2023 Action  Given Dose  100 mg Route  Intravenous Administered By  Roxana Dickson RN          riTUXimab (RITUXAN) 800 mg in sodium chloride 0.9 % 800 mL NON-oncology infusion     Admin Date  01/10/2023 Action  New Bag Dose  800 mg Route  Intravenous Administered By  Roxana Dickson RN                /83 (BP Location: Left arm, Patient Position: Semi-Alfaro's, Cuff Size: Adult Large)   Pulse 70   Temp 98  F (36.7  C) (Oral)   Resp 16   Ht 1.626 m (5' 4\")   Wt 100.7 kg (221 lb 14.4 oz)   LMP 12/15/2022 (Approximate)   SpO2 96%   BMI 38.09 kg/m      "

## 2023-01-10 NOTE — LETTER
1/10/2023         RE: Cande Shields  [address]  [place] [address]        Dear Colleague,    Thank you for referring your patient, Cande Shields, to the [facility]. Please see a copy of my visit note below.    Nursing Note  Cande Shields [facility] for:   Chief Complaint   Patient presents with     Infusion     Rituximab     During [facility] appointment, orders from Dr. Barrington Caro were completed.  Frequency: weekly x 4. Today is dose 1/4 for this round of infusions (has received previously at different infusion clinic).    Progress note:  Patient identification verified by name and date of birth.  Assessment completed.  Vitals recorded in Doc Flowsheets.  Patient was provided with education regarding medication/procedure and possible side effects.  Patient verbalized understanding.     present during visit today: Not Applicable.    Treatment Conditions: ~~~ NOTE: If the patient answers yes to any of the questions below, hold the infusion and contact ordering provider or on-call provider.    1. Have you recently had an elevated temperature, fever, chills, productive cough, coughing for 3 weeks or longer or hemoptysis, abnormal vital signs, night sweats,  chest pain or have you noticed a decrease in your appetite, unexplained weight loss or fatigue? Patient diagnosed with covid 12/21/22. Reports minor symptoms at onset, that have resolved. Feeling well today; pt denies any recent fevers or resp concerns. Per EMR communication, rheum team aware of patient's covid diagnosis and rituximab infusions; see chart notes.  2. Do you have any open wounds or new incisions? No  3. Do you have any recent or upcoming hospitalizations, surgeries or dental procedures? No  4. Do you currently have or recently have had any signs of illness or infection or are you  on any antibiotics? No  5. Have you had any new, sudden or worsening abdominal pain? No  6. Have you or anyone in your household received a live vaccination in the past 4 weeks? Please note:  No live vaccines while on biologic/chemotherapy until 6 months after the last treatment.  Patient can receive the flu vaccine (shot only) and the pneumovax.  It is optimal for the patient to get these vaccines mid cycle, but they can be given at any time as long as it is not on the day of the infusion. No  7. Have you recently been diagnosed with any new nervous system diseases (ie. Multiple sclerosis, Guillain [place], seizures, neurological changes) or cancer diagnosis? No  8. Are you on any form of radiation or chemotherapy? No  9. Are you pregnant or breast feeding or do you have plans of pregnancy in the future? No  10. Have you been having any signs of worsening depression or suicidal ideations?  (benlysta only) NA  11. Have there been any other new onset medical symptoms? No      Premedications:   -650mg tylenol  -50mg IV benadryl  -100mg Solumedrol    Drug Waste Record: Yes      Drug Name: Solumedrol  Dose: 100mg (1.6mL)  Route administered: IV  Amount of waste(mL):0.4mL  Reason for waste: Single use vial    Infusion length and rate: Rituximab infusion starts at 50 ml/hr, then increased by 50 ml/hr every 30 minutes to final rate of 400 ml/hr. Total approximate infusion time of 3 hours and 45 minutes. Patient has received rituximab at previous infusion clinic, and denies hx of reactions. Since first time receiving rituxumab in this clinic, pt to infuse at slow infusion rates per pharmacy.    Labs: were drawn per orders.     Vascular access: peripheral IV was placed by vascular access nurse.    Is the next appt scheduled? 1/17     Post Infusion Assessment:  Patient tolerated infusion without incident.     Discharge Plan:   Follow up plan of care with: ongoing infusions [facility] ordering  "provider as scheduled. and AVS to [facility]  Discharge instructions were reviewed with patient.  Patient/representative verbalized understanding of discharge instructions and all questions answered.  Patient discharged [facility] in stable condition.    Roxana Dickson RN       Administrations This Visit     acetaminophen (TYLENOL) tablet 650 mg     Admin Date  01/10/2023 Action  Given Dose  650 mg Route  Oral Administered By  Roxana Dickson RN          diphenhydrAMINE (BENADRYL) 50 mg in sodium chloride 0.9 % 56 mL intermittent infusion     Admin Date  01/10/2023 Action  New Bag Dose  50 mg Rate  224 mL/hr Route  Intravenous Administered By  Roxana Dickson RN          methylPREDNISolone sodium succinate (solu-MEDROL) injection 100 mg     Admin Date  01/10/2023 Action  Given Dose  100 mg Route  Intravenous Administered By  Roxana Dickson RN          riTUXimab (RITUXAN) 800 mg in sodium chloride 0.9 % 800 mL NON-oncology infusion     Admin Date  01/10/2023 Action  New Bag Dose  800 mg Route  Intravenous Administered By  Roxana Dickson RN                /83 (BP Location: Left arm, Patient Position: Semi-Alfaro's, Cuff Size: Adult Large)   Pulse 70   Temp 98  F (36.7  C) (Oral)   Resp 16   Ht 1.626 m (5' 4\")   Wt 100.7 kg (221 lb 14.4 oz)   LMP 12/15/2022 (Approximate)   SpO2 96%   BMI 38.09 kg/m          Again, thank you for allowing me to participate in the care of your patient.        Sincerely,        Specialty Infusion Nurse    "

## 2023-01-10 NOTE — LETTER
Date:January 12, 2023      Provider requested that no letter be sent. Do not send.       [facility]

## 2023-01-10 NOTE — PATIENT INSTRUCTIONS
Dear Cande Shields    Thank you for choosing [facility] ([facility]) for your Rituximab infusion.  The following information is a summary of our appointment as well as important reminders.      EDUCATION POST BIOLOGICAL/CHEMOTHERAPY INFUSION  Call the triage nurse at your clinic or seek medical attention if you have chills and/or temperature greater than or equal to 100.5, uncontrolled nausea/vomiting, diarrhea, constipation, dizziness, shortness of breath, chest pain, heart palpitations, weakness or any other new or concerning symptoms, questions or concerns.  You can not have any live virus vaccines prior to or during treatment or up to 6 months post infusion.  If you have an upcoming surgery, medical procedure or dental procedure during treatment, this should be discussed with your ordering physician and your surgeon/dentist.  If you are having any concerning symptom, if you are unsure if you should get your next infusion or wish to speak to a provider before your next infusion, please call your care coordinator or triage nurse at your clinic to notify them so we can adequately serve you.     We look forward in seeing you on your next appointment here at [facility] ([facility]).  Please don t hesitate to call us at [phone] to reschedule any of your appointments or to speak with one of the [facility] registered nurses.  It was a pleasure taking care of you today.    Sincerely,    [facility]  [address] [place] [address]  [address]  Phone:  [phone]

## 2023-01-16 DIAGNOSIS — I77.6 VASCULITIS (H): Primary | ICD-10-CM

## 2023-01-16 DIAGNOSIS — I77.82 ANCA-ASSOCIATED VASCULITIS (H): Primary | ICD-10-CM

## 2023-01-16 NOTE — TELEPHONE ENCOUNTER
Called to check status of appeal but the appeals office is closed today- left message with Mario Orourke. Will call back tomorrow if I don't hear back from her

## 2023-01-16 NOTE — TELEPHONE ENCOUNTER
Received a call from [facility] requesting an update on her PA, called them back at [phone], and let them know that they are likely going to take the full 30 days to decide on the appeal.  They have until 1/21 to decide.  Will update them when we have a decision.    Eunice Lim RN  Rheumatology Clinic

## 2023-01-17 ENCOUNTER — OFFICE VISIT (OUTPATIENT)
Dept: DERMATOLOGY | Facility: CLINIC | Age: 23
End: 2023-01-17
Payer: COMMERCIAL

## 2023-01-17 ENCOUNTER — INFUSION THERAPY VISIT (OUTPATIENT)
Dept: INFUSION THERAPY | Facility: CLINIC | Age: 23
End: 2023-01-17
Attending: STUDENT IN AN ORGANIZED HEALTH CARE EDUCATION/TRAINING PROGRAM
Payer: COMMERCIAL

## 2023-01-17 VITALS
BODY MASS INDEX: 38.31 KG/M2 | HEART RATE: 72 BPM | DIASTOLIC BLOOD PRESSURE: 79 MMHG | WEIGHT: 223.2 LBS | SYSTOLIC BLOOD PRESSURE: 122 MMHG | OXYGEN SATURATION: 97 % | RESPIRATION RATE: 16 BRPM | TEMPERATURE: 98.1 F

## 2023-01-17 DIAGNOSIS — Z29.89 NEED FOR PNEUMOCYSTIS PROPHYLAXIS: Primary | ICD-10-CM

## 2023-01-17 DIAGNOSIS — M31.31 GRANULOMATOSIS WITH POLYANGIITIS WITH RENAL INVOLVEMENT (H): ICD-10-CM

## 2023-01-17 DIAGNOSIS — I77.82 ANCA-ASSOCIATED VASCULITIS (H): ICD-10-CM

## 2023-01-17 DIAGNOSIS — L73.9 FOLLICULITIS: Primary | ICD-10-CM

## 2023-01-17 LAB
ALBUMIN MFR UR ELPH: 136 MG/DL (ref 1–14)
ALBUMIN SERPL BCG-MCNC: 3.8 G/DL (ref 3.5–5.2)
ALBUMIN UR-MCNC: 100 MG/DL
ANION GAP SERPL CALCULATED.3IONS-SCNC: 7 MMOL/L (ref 7–15)
APPEARANCE UR: CLEAR
BILIRUB UR QL STRIP: NEGATIVE
BUN SERPL-MCNC: 22.3 MG/DL (ref 6–20)
CALCIUM SERPL-MCNC: 9.5 MG/DL (ref 8.6–10)
CHLORIDE SERPL-SCNC: 108 MMOL/L (ref 98–107)
COLOR UR AUTO: ABNORMAL
CREAT SERPL-MCNC: 1.41 MG/DL (ref 0.51–0.95)
CREAT UR-MCNC: 136 MG/DL
CRP SERPL-MCNC: 16.4 MG/L
DEPRECATED HCO3 PLAS-SCNC: 25 MMOL/L (ref 22–29)
ERYTHROCYTE [SEDIMENTATION RATE] IN BLOOD BY WESTERGREN METHOD: 19 MM/HR (ref 0–20)
GFR SERPL CREATININE-BSD FRML MDRD: 53 ML/MIN/1.73M2
GLUCOSE SERPL-MCNC: 77 MG/DL (ref 70–99)
GLUCOSE UR STRIP-MCNC: NEGATIVE MG/DL
HGB UR QL STRIP: ABNORMAL
HYALINE CASTS: 3 /LPF
KETONES UR STRIP-MCNC: NEGATIVE MG/DL
LEUKOCYTE ESTERASE UR QL STRIP: NEGATIVE
MUCOUS THREADS #/AREA URNS LPF: PRESENT /LPF
NITRATE UR QL: NEGATIVE
PH UR STRIP: 5.5 [PH] (ref 5–7)
PHOSPHATE SERPL-MCNC: 4.1 MG/DL (ref 2.5–4.5)
POTASSIUM SERPL-SCNC: 5 MMOL/L (ref 3.4–5.3)
PROT/CREAT 24H UR: 1 MG/MG CR (ref 0–0.2)
RBC URINE: 5 /HPF
SODIUM SERPL-SCNC: 140 MMOL/L (ref 136–145)
SP GR UR STRIP: 1.02 (ref 1–1.03)
SQUAMOUS EPITHELIAL: 4 /HPF
TRANSITIONAL EPI: <1 /HPF
UROBILINOGEN UR STRIP-MCNC: NORMAL MG/DL
WBC URINE: 1 /HPF

## 2023-01-17 PROCEDURE — 250N000011 HC RX IP 250 OP 636: Performed by: STUDENT IN AN ORGANIZED HEALTH CARE EDUCATION/TRAINING PROGRAM

## 2023-01-17 PROCEDURE — 99214 OFFICE O/P EST MOD 30 MIN: CPT | Mod: GC | Performed by: DERMATOLOGY

## 2023-01-17 PROCEDURE — 84156 ASSAY OF PROTEIN URINE: CPT

## 2023-01-17 PROCEDURE — 86140 C-REACTIVE PROTEIN: CPT

## 2023-01-17 PROCEDURE — 81001 URINALYSIS AUTO W/SCOPE: CPT

## 2023-01-17 PROCEDURE — 80069 RENAL FUNCTION PANEL: CPT

## 2023-01-17 PROCEDURE — 96413 CHEMO IV INFUSION 1 HR: CPT

## 2023-01-17 PROCEDURE — 999N000128 HC STATISTIC PERIPHERAL IV START W/O US GUIDANCE

## 2023-01-17 PROCEDURE — 36415 COLL VENOUS BLD VENIPUNCTURE: CPT

## 2023-01-17 PROCEDURE — 999N000285 HC STATISTIC VASC ACCESS LAB DRAW WITH PIV START

## 2023-01-17 PROCEDURE — 96375 TX/PRO/DX INJ NEW DRUG ADDON: CPT

## 2023-01-17 PROCEDURE — 250N000013 HC RX MED GY IP 250 OP 250 PS 637: Performed by: STUDENT IN AN ORGANIZED HEALTH CARE EDUCATION/TRAINING PROGRAM

## 2023-01-17 PROCEDURE — 86355 B CELLS TOTAL COUNT: CPT

## 2023-01-17 PROCEDURE — 96415 CHEMO IV INFUSION ADDL HR: CPT

## 2023-01-17 PROCEDURE — 258N000003 HC RX IP 258 OP 636: Performed by: STUDENT IN AN ORGANIZED HEALTH CARE EDUCATION/TRAINING PROGRAM

## 2023-01-17 PROCEDURE — 85652 RBC SED RATE AUTOMATED: CPT

## 2023-01-17 PROCEDURE — 96367 TX/PROPH/DG ADDL SEQ IV INF: CPT

## 2023-01-17 PROCEDURE — 83516 IMMUNOASSAY NONANTIBODY: CPT

## 2023-01-17 RX ORDER — METHYLPREDNISOLONE SODIUM SUCCINATE 125 MG/2ML
125 INJECTION, POWDER, LYOPHILIZED, FOR SOLUTION INTRAMUSCULAR; INTRAVENOUS
Status: CANCELLED
Start: 2023-01-24

## 2023-01-17 RX ORDER — DIPHENHYDRAMINE HCL 25 MG
50 CAPSULE ORAL ONCE
Status: CANCELLED
Start: 2023-01-24

## 2023-01-17 RX ORDER — METHYLPREDNISOLONE SODIUM SUCCINATE 125 MG/2ML
100 INJECTION, POWDER, LYOPHILIZED, FOR SOLUTION INTRAMUSCULAR; INTRAVENOUS ONCE
Status: CANCELLED | OUTPATIENT
Start: 2023-01-24

## 2023-01-17 RX ORDER — ACETAMINOPHEN 325 MG/1
650 TABLET ORAL ONCE
Status: COMPLETED | OUTPATIENT
Start: 2023-01-17 | End: 2023-01-17

## 2023-01-17 RX ORDER — METHYLPREDNISOLONE SODIUM SUCCINATE 125 MG/2ML
100 INJECTION, POWDER, LYOPHILIZED, FOR SOLUTION INTRAMUSCULAR; INTRAVENOUS ONCE
Status: COMPLETED | OUTPATIENT
Start: 2023-01-17 | End: 2023-01-17

## 2023-01-17 RX ORDER — EPINEPHRINE 1 MG/ML
0.3 INJECTION, SOLUTION INTRAMUSCULAR; SUBCUTANEOUS EVERY 5 MIN PRN
Status: CANCELLED | OUTPATIENT
Start: 2023-01-24

## 2023-01-17 RX ORDER — CLINDAMYCIN PHOSPHATE 10 UG/ML
LOTION TOPICAL
Qty: 60 ML | Refills: 11 | Status: SHIPPED | OUTPATIENT
Start: 2023-01-17 | End: 2023-03-10

## 2023-01-17 RX ORDER — ALBUTEROL SULFATE 90 UG/1
1-2 AEROSOL, METERED RESPIRATORY (INHALATION)
Status: CANCELLED
Start: 2023-01-24

## 2023-01-17 RX ORDER — DIPHENHYDRAMINE HYDROCHLORIDE 50 MG/ML
50 INJECTION INTRAMUSCULAR; INTRAVENOUS
Status: CANCELLED
Start: 2023-01-24

## 2023-01-17 RX ORDER — ALBUTEROL SULFATE 0.83 MG/ML
2.5 SOLUTION RESPIRATORY (INHALATION)
Status: CANCELLED | OUTPATIENT
Start: 2023-01-24

## 2023-01-17 RX ORDER — ACETAMINOPHEN 325 MG/1
650 TABLET ORAL ONCE
Status: CANCELLED
Start: 2023-01-24

## 2023-01-17 RX ADMIN — RITUXIMAB 800 MG: 10 INJECTION, SOLUTION INTRAVENOUS at 12:57

## 2023-01-17 RX ADMIN — ACETAMINOPHEN 650 MG: 325 TABLET ORAL at 12:14

## 2023-01-17 RX ADMIN — METHYLPREDNISOLONE SODIUM SUCCINATE 100 MG: 125 INJECTION, POWDER, FOR SOLUTION INTRAMUSCULAR; INTRAVENOUS at 12:15

## 2023-01-17 RX ADMIN — DIPHENHYDRAMINE HYDROCHLORIDE 50 MG: 50 INJECTION, SOLUTION INTRAMUSCULAR; INTRAVENOUS at 12:17

## 2023-01-17 ASSESSMENT — PAIN SCALES - GENERAL: PAINLEVEL: NO PAIN (0)

## 2023-01-17 NOTE — NURSING NOTE
Chief Complaint   Patient presents with     Derm Problem     Pt reports she has been braking out on her face and body     Shamar Weeks, EMT

## 2023-01-17 NOTE — PATIENT INSTRUCTIONS
Dear Cande Shields    Thank you for choosing [facility] ([facility]) for your infusion.  The following information is a summary of our appointment as well as important reminders.      EDUCATION POST BIOLOGICAL/CHEMOTHERAPY INFUSION  Call the triage nurse at your clinic or seek medical attention if you have chills and/or temperature greater than or equal to 100.5, uncontrolled nausea/vomiting, diarrhea, constipation, dizziness, shortness of breath, chest pain, heart palpitations, weakness or any other new or concerning symptoms, questions or concerns.  You can not have any live virus vaccines prior to or during treatment or up to 6 months post infusion.  If you have an upcoming surgery, medical procedure or dental procedure during treatment, this should be discussed with your ordering physician and your surgeon/dentist.  If you are having any concerning symptom, if you are unsure if you should get your next infusion or wish to speak to a provider before your next infusion, please call your care coordinator or triage nurse at your clinic to notify them so we can adequately serve you.     We look forward in seeing you on your next appointment here at [facility] ([facility]).  Please don t hesitate to call us at [phone] to reschedule any of your appointments or to speak with one of the [facility] registered nurses.  It was a pleasure taking care of you today.    Sincerely,    [facility]  [address] [place] [address]  [address]  Phone:  [phone]

## 2023-01-17 NOTE — TELEPHONE ENCOUNTER
Received call from Mario- this appeal was sent out for external review but she is expecting the answer back tomorrow (1/18). We will receive fax or call when determination has been made.

## 2023-01-17 NOTE — PROGRESS NOTES
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  Discussed that occlusion and heat/sweat can exacerbate this condition.  Will trial benzyl peroxide wash nightly with clindamycin lotion as spot treatments.  Additionally recommended at least 2-3 times weekly dilute bleach baths for decreasing colonization and antiinflammatory purposes.  Can consider other topical antimicrobials if continues to flare despite current therapy and behavioral measures.  - benzoyl peroxide 5 % external liquid; Use daily as directed  Dispense: 226 g; Refill: 11  - clindamycin (CLEOCIN T) 1 % external lotion; 1-2 times daily as spot treatment for pus bumps.  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  Upcoming rituximab infusion.  Continues on Avacopan.  No cutaneous manifestations reported and otherwise feeling clinically well.  - Continue follow-up with rheumatology and nephrology    # Acne vulgaris, mild  - Start OTC adapalene at bedtime, SE discussed including dryness and irritation    Procedures Performed:   None    Follow-up:  prn for new or changing lesions    Staff and Resident:     Adelfo Corado MD  PGY-3 Dermatology  Pager: [phone]  I, Zee Beauchamp MD, saw this  patient with the resident and agree with the resident s findings and plan of care as documented in the resident s note.    ____________________________________________    CC: Derm Problem (Pt reports she has been braking out on her face and body)    HPI:  Ms. Cande Shields is a(n) 22 year old female who presents today as a return patient for skin concerns.    Patient was last seen in our dermatology in August 2021.  She states that her skin has been getting some bumps on the chest, medial thighs, and buttocks. She thinks this is related to her steroid therapy for her vasculitis.  She currently is using over-the-counter cleansing lashes such as CeraVe and Cetaphil with some mild improvement.  The spots are not itchy.  She has mostly bothered by the dark marks daily behind.  Spots on her chest and face resolve after couple days but the lesion on the buttock and groin last longer. Spots on her elbows come and go.  She otherwise has been feeling well without any additional skin concerns.  She specifically denies any fevers, chills, chest pain, shortness of breath, abdominal pain, nosebleeds, hematochezia.       - works as      Labs Reviewed:  SPECIMEN(S):   Skin, right elbow, punch     FINAL DIAGNOSIS:   Skin, right elbow, punch:   - Features consistent with a perforating disorder - (see comment)     COMMENT:   The features appear to support a diagnosis of perforating disorder, such   as reactive perforating collagenosis.   Clinical correlation is recommended.     Physical Exam:  Vitals: LMP 12/15/2022 (Approximate)   SKIN: Total skin excluding the undergarment areas was performed. The exam included the head/face, neck, both arms, chest, back, abdomen, both legs, digits and/or nails.   - Few follicular-based papules on the upper back and chest, worse in the bilateral medial thighs and upper buttock with areas of postinflammatory hyperpigmentation.   - Bilateral elbows clear  - No acneiform papules  noted on face however mildly increased oily skin noted  - No other lesions of concern on areas examined.     Medications:  Current Outpatient Medications   Medication     apixaban ANTICOAGULANT (ELIQUIS) 2.5 MG tablet     Avacopan 10 MG CAPS     methylPREDNISolone (MEDROL) 4 MG tablet     calcium carbonate-vitamin D (OSCAL) 500-5 MG-MCG tablet     nicotine (COMMIT) 2 MG lozenge     nicotine (NICODERM CQ) 7 MG/24HR 24 hr patch     nirmatrelvir and ritonavir (PAXLOVID, 150/100,) therapy pack     omeprazole (PRILOSEC) 20 MG DR capsule     No current facility-administered medications for this visit.      Past Medical History:   Patient Active Problem List   Diagnosis     Wegener's granulomatosis     Myalgia     Granulomatosis with polyangiitis, unspecified whether renal involvement (H)     ANCA-associated vasculitis     Pulmonary infiltrates     Acute kidney injury (H)     Need for pneumocystis prophylaxis     Acute deep vein thrombosis (DVT) of proximal vein of both lower extremities (H)     ADHD (attention deficit hyperactivity disorder), inattentive type     Alcohol use disorder, mild, in sustained remission     Bicornuate uterus     Binge-eating disorder, moderate     BMI 40.0-44.9, adult (H)     Cannabis use disorder, mild, in early remission     Femoral artery thrombosis, left (H)     Fetal demise, greater than 22 weeks, antepartum     Generalized anxiety disorder     History of granulomatosis with polyangiitis     Depression     Occlusion of common femoral artery (H)     Performance anxiety     Personal history of COVID-19     Pyoderma gangrenosum     Self-injurious behavior     Stage 3 chronic kidney disease, unspecified whether stage 3a or 3b CKD (H)     Acute saddle pulmonary embolism with acute cor pulmonale (H)     Thrombosis     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)     Infection due to 2019 novel coronavirus     ATN (acute tubular necrosis) (H)     Past Medical History:   Diagnosis  Date     ADHD (attention deficit hyperactivity disorder)      DVT, lower extremity, distal (H)      Dysmenorrhea      Femoral artery thrombosis, 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 on close of this encounter.

## 2023-01-17 NOTE — PROGRESS NOTES
Breaking out  On steroids right now  Buttock, back, face  Current tx:   - cleansing washes   - cerave and some cetaphil  - spots not itchy   - heal with dark doyle  - chets and face resolve after couplk dys  - bothes her `9/10       SH  - works as

## 2023-01-17 NOTE — PROGRESS NOTES
Chief Complaint   Patient presents with     Infusion     IV rituximab     Infusion Nursing Note:  Cande Shields presents today for IV rituximab- weekly dose 2 of 4.    Patient seen by provider today: No   present during visit today: Not Applicable.    Note:  Premeds administered per orders.   Patient tolerated last infusion well.   Rituximab rates: Infuse 20 % (200 mL/hr) over the first 30 minutes and the remainder (400 mL/hr) over 60 minutes. Total infusion 90 minutes.    Intravenous Access:  Peripheral IV placed by vascular access.   Labs collected per orders.     Treatment Conditions:  HBV negative.     Biological Infusion Checklist:  ~~~ NOTE: If the patient answers yes to any of the questions below, hold the infusion and contact ordering provider or on-call provider.    1. Have you recently had an elevated temperature, fever, chills, productive cough, coughing for 3 weeks or longer or hemoptysis, abnormal vital signs, night sweats,  chest pain or have you noticed a decrease in your appetite, unexplained weight loss or fatigue? No  2. Do you have any open wounds or new incisions? No  3. Do you have any recent or upcoming hospitalizations, surgeries or dental procedures? No  4. Do you currently have or recently have had any signs of illness or infection or are you on any antibiotics? No  5. Have you had any new, sudden or worsening abdominal pain? No  6. Have you or anyone in your household received a live vaccination in the past 4 weeks? Please note:  No live vaccines while on biologic/chemotherapy until 6 months after the last treatment.  Patient can receive the flu vaccine (shot only) and the pneumovax.  It is optimal for the patient to get these vaccines mid cycle, but they can be given at any time as long as it is not on the day of the infusion. No  7. Have you recently been diagnosed with any new nervous system diseases (ie. Multiple sclerosis, Guillain [place], seizures, neurological changes) or  cancer diagnosis? No  8. Are you on any form of radiation or chemotherapy? No  9. Are you pregnant or breast feeding or do you have plans of pregnancy in the future? No  10. Have you been having any signs of worsening depression or suicidal ideations?  (benlysta only) No  11. Have there been any other new onset medical symptoms? No    Post Infusion Assessment:  Patient tolerated infusion without incident.  Blood return noted pre and post infusion.  Site patent and intact, free from redness, edema or discomfort.  No evidence of extravasations.  Access discontinued per protocol.     POST-INFUSION OF BIOLOGICAL MEDICATION:  Reviewed with patient.  Given biologic medication or medication hand-out. Inform patient if any fever, chills or signs of infection, new symptoms, abdominal pain, heart palpitations, shortness of breath, reaction, weakness, neurological changes, seek medical attention immediately and should not receive infusions. No live virus vaccines prior to or during treatment or up to 6 months post infusion. If the patient has an upcoming procedure or surgery, this should be discussed with the rheumatologist and surgeon or provider.    Discharge Plan:   [facility]  Patient will return 1/24 for next appointment.   Patient discharged in stable condition accompanied by: self.  Departure Mode: Ambulatory.    Administrations This Visit     acetaminophen (TYLENOL) tablet 650 mg     Admin Date  01/17/2023 Action  Given Dose  650 mg Route  Oral Administered By  Ander Tena RN          diphenhydrAMINE (BENADRYL) 50 mg in sodium chloride 0.9 % 56 mL intermittent infusion     Admin Date  01/17/2023 Action  New Bag Dose  50 mg Rate  224 mL/hr Route  Intravenous Administered By  Ander Tena RN          methylPREDNISolone sodium succinate (solu-MEDROL) injection 100 mg     Admin Date  01/17/2023 Action  Given Dose  100 mg Route  Intravenous Administered By  Ander Tena RN          riTUXimab  "(RITUXAN) 800 mg in sodium chloride 0.9 % 500 mL NON-oncology infusion     Admin Date  01/17/2023 Action  New Bag Dose  800 mg Route  Intravenous Administered By  Ander Tena RN              Vital signs:  Temp: 98.1  F (36.7  C) Temp src: Oral BP: 111/78 Pulse: 69   Resp: 16 SpO2: 97 % O2 Device: None (Room air)     Weight: 101.2 kg (223 lb 3.2 oz)  Estimated body mass index is 38.31 kg/m  as calculated from the following:    Height as of 1/10/23: 1.626 m (5' 4\").    Weight as of this encounter: 101.2 kg (223 lb 3.2 oz).      Drug Name: Solu-Medrol  Dose: 100 m  Route administered: IV  NDC #: 4860-3549-02  Amount of waste(mL): 0.4 ml  Reason for waste: Single use vial                                    "

## 2023-01-17 NOTE — LETTER
1/17/2023       RE: Cande Shields  [address]  [facility] [address]     Dear Colleague,    Thank you for referring your patient, Cande Shields, to the [facility] [place] [facility]. Please see a copy of my visit note below.    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  Discussed that occlusion and heat/sweat can exacerbate this condition.  Will trial benzyl peroxide wash nightly with clindamycin lotion as spot treatments.  Additionally recommended at least 2-3 times weekly dilute bleach baths for decreasing colonization and antiinflammatory purposes.  Can consider other topical antimicrobials if continues to flare despite current therapy and behavioral measures.  - benzoyl peroxide 5 % external liquid; Use daily as directed  Dispense: 226 g; Refill: 11  - clindamycin (CLEOCIN T) 1 % external lotion; 1-2 times daily as spot treatment for pus bumps.  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  Upcoming rituximab infusion.  Continues on Avacopan.  No cutaneous manifestations reported and otherwise feeling clinically well.  - Continue follow-up with  rheumatology and nephrology    # Acne vulgaris, mild  - Start OTC adapalene at bedtime, SE discussed including dryness and irritation    Procedures Performed:   None    Follow-up:  prn for new or changing lesions    Staff and Resident:     Adelfo Corado MD  PGY-3 Dermatology  Pager: [phone]  I, Zee Beauchamp MD, saw this patient with the resident and agree with the resident s findings and plan of care as documented in the resident s note.    ____________________________________________    CC: Derm Problem (Pt reports she has been braking out on her face and body)    HPI:  Ms. Cande Shields is a(n) 22 year old female who presents today as a return patient for skin concerns.    Patient was last seen in our dermatology in August 2021.  She states that her skin has been getting some bumps on the chest, medial thighs, and buttocks. She thinks this is related to her steroid therapy for her vasculitis.  She currently is using over-the-counter cleansing lashes such as CeraVe and Cetaphil with some mild improvement.  The spots are not itchy.  She has mostly bothered by the dark marks daily behind.  Spots on her chest and face resolve after couple days but the lesion on the buttock and groin last longer. Spots on her elbows come and go.  She otherwise has been feeling well without any additional skin concerns.  She specifically denies any fevers, chills, chest pain, shortness of breath, abdominal pain, nosebleeds, hematochezia.       - works as      Labs Reviewed:  SPECIMEN(S):   Skin, right elbow, punch     FINAL DIAGNOSIS:   Skin, right elbow, punch:   - Features consistent with a perforating disorder - (see comment)     COMMENT:   The features appear to support a diagnosis of perforating disorder, such   as reactive perforating collagenosis.   Clinical correlation is recommended.     Physical Exam:  Vitals: LMP 12/15/2022 (Approximate)   SKIN: Total skin excluding the undergarment areas was performed.  The exam included the head/face, neck, both arms, chest, back, abdomen, both legs, digits and/or nails.   - Few follicular-based papules on the upper back and chest, worse in the bilateral medial thighs and upper buttock with areas of postinflammatory hyperpigmentation.   - Bilateral elbows clear  - No acneiform papules noted on face however mildly increased oily skin noted  - No other lesions of concern on areas examined.     Medications:  Current Outpatient Medications   Medication     apixaban ANTICOAGULANT (ELIQUIS) 2.5 MG tablet     Avacopan 10 MG CAPS     methylPREDNISolone (MEDROL) 4 MG tablet     calcium carbonate-vitamin D (OSCAL) 500-5 MG-MCG tablet     nicotine (COMMIT) 2 MG lozenge     nicotine (NICODERM CQ) 7 MG/24HR 24 hr patch     nirmatrelvir and ritonavir (PAXLOVID, 150/100,) therapy pack     omeprazole (PRILOSEC) 20 MG DR capsule     No current facility-administered medications for this visit.      Past Medical History:   Patient Active Problem List   Diagnosis     Wegener's granulomatosis     Myalgia     Granulomatosis with polyangiitis, unspecified whether renal involvement (H)     ANCA-associated vasculitis     Pulmonary infiltrates     Acute kidney injury (H)     Need for pneumocystis prophylaxis     Acute deep vein thrombosis (DVT) of proximal vein of both lower extremities (H)     ADHD (attention deficit hyperactivity disorder), inattentive type     Alcohol use disorder, mild, in sustained remission     Bicornuate uterus     Binge-eating disorder, moderate     BMI 40.0-44.9, adult (H)     Cannabis use disorder, mild, in early remission     Femoral artery thrombosis, left (H)     Fetal demise, greater than 22 weeks, antepartum     Generalized anxiety disorder     History of granulomatosis with polyangiitis     Depression     Occlusion of common femoral artery (H)     Performance anxiety     Personal history of COVID-19     Pyoderma gangrenosum     Self-injurious behavior     Stage 3 chronic  kidney disease, unspecified whether stage 3a or 3b CKD (H)     Acute saddle pulmonary embolism with acute cor pulmonale (H)     Thrombosis     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)     Infection due to 2019 novel coronavirus     ATN (acute tubular necrosis) (H)     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 on close of this encounter.

## 2023-01-17 NOTE — PATIENT INSTRUCTIONS
"- Dilute bleach baths 2-3 times per week, instructions below  - Clindamycin lotion as spot treatment to lesions  - Benzoyl peroxide wash nightly  - Diferin gel to the face nightly as tolerated    Dilute Bleach Baths:  Note: must use only pure bleach (sodium hypochlorite). Other cleansers are NOT a substitute. Do NOT \"splash free,\" \"concentrated,\" or any other special bleach. Can try Clorox brand.     Instructions for bleach bath in a tub:    1. Fill tub with warm water:  2. Add 1/4 to 1/2 cup of liquid bleach into the water, mix well.  3. Soak in the chlorinated water for about 10-15 minutes.  4. Rinse the skin thoroughly with fresh water at the end of the bath. You may use a gentle cleanser as well.  5. Gently pat the skin dry.  6. Apply medication (if directed) and/or moisturizer as directed while the skin is still moist.    Instructions for a soak to either submerge a limb or to make a compress:  1. Fill basin or large bowl with warm water (~4 gallons)  2. Add 1/2 teaspoons to 1 teaspoon of liquid bleach into the water and mix well.  3. Soak or perform compress for about 10-15 minutes.  4. Rinse the skin thoroughly with fresh water at the end. You may use a gentle cleanser as well.  5. Gently pat dry  6. Apply medication (if directed) and/or moisturizer while the skin is still moist.    **Note: you can add few teaspoons of salt to lessen irritation of the bleach bath.     Recommend bleach baths 1-3 times a week as maintenance.       Over the Counter adapelene (Differin) 0.1% gel.       PHOTO: [email or web address].[employer]/      Dry Skin    What is dry skin?  Common skin problem  Can be worse during the winter   Affects all ages  Occurs in people with or without other skin problems    What does it look like?  Fine lines in the skin become more visible   Rough feeling skin   Flaky skin  Most common on the arms and legs  Skin can become cracked, especially on the hands and feet    What are some problems caused by dry " skin?   Itching  Rubbing or scratching can cause thickened, rough skin patches  Cracks in skin can be painful  Red, itchy, scaly skin (called eczema) can occur  Yellow crusting or pus could be signs of an infection    What causes dry skin?  A lack of water in the top layer of the skin  Too much soapy water,  hot water, or harsh chemicals  Aging and sun damage    How do I treat dry skin?  Shower or bathe daily for under ten minutes with lukewarm water and mild soap.  Pat yourself dry with a towel gently and leave your skin slightly damp.  Use moisturizing cream or ointment right away.  Avoid lotions.    What kind of mild soap should I be using?  Camay , Dove , Tone , Neutrogena , Purpose , or Oil of Olay   A non-detergent cleanser, like Cetaphil , can be used.    What should I stay away from?  Scented soaps   Bath oils    What moisturizers should I be using?  Cetaphil Cream,CeraVe Cream, Vanicream, Aquaphilic, Eucerin, Aquaphor, or Vaseline   Always apply after showering or bathing.  Reapply throughout the day, if possible.  If dry skin affects your hands, always reapply after handwashing.    What else should I know?  Using a humidifier during winter months may help.  If dry skin gets worse or if eczema develops, a steroid cream may be needed.

## 2023-01-17 NOTE — LETTER
1/17/2023         RE: Cande Shields  [address]  [place] [address]        Dear Colleague,    Thank you for referring your patient, Cande Shields, to the [facility]. Please see a copy of my visit note below.    Chief Complaint   Patient presents with     Infusion     IV rituximab     Infusion Nursing Note:  Cande Shields presents today for IV rituximab- weekly dose 2 of 4.    Patient seen by provider today: No   present during visit today: Not Applicable.    Note:  Premeds administered per orders.   Patient tolerated last infusion well.   Rituximab rates: Infuse 20 % (200 mL/hr) over the first 30 minutes and the remainder (400 mL/hr) over 60 minutes. Total infusion 90 minutes.    Intravenous Access:  Peripheral IV placed by vascular access.   Labs collected per orders.     Treatment Conditions:  HBV negative.     Biological Infusion Checklist:  ~~~ NOTE: If the patient answers yes to any of the questions below, hold the infusion and contact ordering provider or on-call provider.    1. Have you recently had an elevated temperature, fever, chills, productive cough, coughing for 3 weeks or longer or hemoptysis, abnormal vital signs, night sweats,  chest pain or have you noticed a decrease in your appetite, unexplained weight loss or fatigue? No  2. Do you have any open wounds or new incisions? No  3. Do you have any recent or upcoming hospitalizations, surgeries or dental procedures? No  4. Do you currently have or recently have had any signs of illness or infection or are you on any antibiotics? No  5. Have you had any new, sudden or worsening abdominal pain? No  6. Have you or anyone in your household received a live vaccination in the past 4 weeks? Please note:  No live vaccines while on biologic/chemotherapy until 6 months after the last treatment.  Patient can receive the flu vaccine (shot only) and the pneumovax.  It is optimal  for the patient to get these vaccines mid cycle, but they can be given at any time as long as it is not on the day of the infusion. No  7. Have you recently been diagnosed with any new nervous system diseases (ie. Multiple sclerosis, Guillain [place], seizures, neurological changes) or cancer diagnosis? No  8. Are you on any form of radiation or chemotherapy? No  9. Are you pregnant or breast feeding or do you have plans of pregnancy in the future? No  10. Have you been having any signs of worsening depression or suicidal ideations?  (benlysta only) No  11. Have there been any other new onset medical symptoms? No    Post Infusion Assessment:  Patient tolerated infusion without incident.  Blood return noted pre and post infusion.  Site patent and intact, free from redness, edema or discomfort.  No evidence of extravasations.  Access discontinued per protocol.     POST-INFUSION OF BIOLOGICAL MEDICATION:  Reviewed with patient.  Given biologic medication or medication hand-out. Inform patient if any fever, chills or signs of infection, new symptoms, abdominal pain, heart palpitations, shortness of breath, reaction, weakness, neurological changes, seek medical attention immediately and should not receive infusions. No live virus vaccines prior to or during treatment or up to 6 months post infusion. If the patient has an upcoming procedure or surgery, this should be discussed with the rheumatologist and surgeon or provider.    Discharge Plan:   [facility]  Patient will return 1/24 for next appointment.   Patient discharged in stable condition accompanied by: self.  Departure Mode: Ambulatory.    Administrations This Visit     acetaminophen (TYLENOL) tablet 650 mg     Admin Date  01/17/2023 Action  Given Dose  650 mg Route  Oral Administered By  Ander Tena, RN          diphenhydrAMINE (BENADRYL) 50 mg in sodium chloride 0.9 % 56 mL intermittent infusion     Admin Date  01/17/2023 Action  New Bag  "Dose  50 mg Rate  224 mL/hr Route  Intravenous Administered By  Ander Tena, RN          methylPREDNISolone sodium succinate (solu-MEDROL) injection 100 mg     Admin Date  01/17/2023 Action  Given Dose  100 mg Route  Intravenous Administered By  Ander Tena, GERHARD          riTUXimab (RITUXAN) 800 mg in sodium chloride 0.9 % 500 mL NON-oncology infusion     Admin Date  01/17/2023 Action  New Bag Dose  800 mg Route  Intravenous Administered By  Ander Tena, RN              Vital signs:  Temp: 98.1  F (36.7  C) Temp src: Oral BP: 111/78 Pulse: 69   Resp: 16 SpO2: 97 % O2 Device: None (Room air)     Weight: 101.2 kg (223 lb 3.2 oz)  Estimated body mass index is 38.31 kg/m  as calculated from the following:    Height as of 1/10/23: 1.626 m (5' 4\").    Weight as of this encounter: 101.2 kg (223 lb 3.2 oz).      Drug Name: Solu-Medrol  Dose: 100 m  Route administered: IV  NDC #: 6641-8885-39  Amount of waste(mL): 0.4 ml  Reason for waste: Single use vial                                        Again, thank you for allowing me to participate in the care of your patient.        Sincerely,        Specialty Infusion Nurse    "

## 2023-01-18 LAB
CD19 CELLS # BLD: 1 CELLS/UL (ref 107–698)
CD19 CELLS NFR BLD: <1 % (ref 6–27)

## 2023-01-19 ENCOUNTER — LAB (OUTPATIENT)
Dept: LAB | Facility: CLINIC | Age: 23
End: 2023-01-19
Payer: COMMERCIAL

## 2023-01-19 ENCOUNTER — OFFICE VISIT (OUTPATIENT)
Dept: NEPHROLOGY | Facility: CLINIC | Age: 23
End: 2023-01-19
Attending: INTERNAL MEDICINE
Payer: COMMERCIAL

## 2023-01-19 VITALS
TEMPERATURE: 98.2 F | DIASTOLIC BLOOD PRESSURE: 84 MMHG | BODY MASS INDEX: 38.88 KG/M2 | WEIGHT: 226.5 LBS | SYSTOLIC BLOOD PRESSURE: 133 MMHG | HEART RATE: 73 BPM

## 2023-01-19 DIAGNOSIS — I77.82 ANCA-ASSOCIATED VASCULITIS (H): Primary | ICD-10-CM

## 2023-01-19 DIAGNOSIS — I77.6 VASCULITIS (H): ICD-10-CM

## 2023-01-19 LAB
ALBUMIN MFR UR ELPH: 109 MG/DL (ref 1–14)
ALBUMIN SERPL BCG-MCNC: 4 G/DL (ref 3.5–5.2)
ALBUMIN SERPL BCG-MCNC: 4 G/DL (ref 3.5–5.2)
ALBUMIN UR-MCNC: 100 MG/DL
ALP SERPL-CCNC: 87 U/L (ref 35–104)
ALT SERPL W P-5'-P-CCNC: 24 U/L (ref 10–35)
ANION GAP SERPL CALCULATED.3IONS-SCNC: 10 MMOL/L (ref 7–15)
APPEARANCE UR: CLEAR
AST SERPL W P-5'-P-CCNC: 14 U/L (ref 10–35)
BILIRUB DIRECT SERPL-MCNC: <0.2 MG/DL (ref 0–0.3)
BILIRUB SERPL-MCNC: 0.2 MG/DL
BILIRUB UR QL STRIP: NEGATIVE
BUN SERPL-MCNC: 30.2 MG/DL (ref 6–20)
CALCIUM SERPL-MCNC: 9.5 MG/DL (ref 8.6–10)
CHLORIDE SERPL-SCNC: 107 MMOL/L (ref 98–107)
COLOR UR AUTO: ABNORMAL
CREAT SERPL-MCNC: 1.63 MG/DL (ref 0.51–0.95)
CREAT UR-MCNC: 98.2 MG/DL
DEPRECATED HCO3 PLAS-SCNC: 23 MMOL/L (ref 22–29)
ERYTHROCYTE [DISTWIDTH] IN BLOOD BY AUTOMATED COUNT: 16.1 % (ref 10–15)
GFR SERPL CREATININE-BSD FRML MDRD: 45 ML/MIN/1.73M2
GLUCOSE SERPL-MCNC: 81 MG/DL (ref 70–99)
GLUCOSE UR STRIP-MCNC: NEGATIVE MG/DL
HCT VFR BLD AUTO: 36 % (ref 35–47)
HGB BLD-MCNC: 11.3 G/DL (ref 11.7–15.7)
HGB UR QL STRIP: ABNORMAL
KETONES UR STRIP-MCNC: NEGATIVE MG/DL
LEUKOCYTE ESTERASE UR QL STRIP: NEGATIVE
MCH RBC QN AUTO: 26.1 PG (ref 26.5–33)
MCHC RBC AUTO-ENTMCNC: 31.4 G/DL (ref 31.5–36.5)
MCV RBC AUTO: 83 FL (ref 78–100)
MUCOUS THREADS #/AREA URNS LPF: PRESENT /LPF
NITRATE UR QL: NEGATIVE
PH UR STRIP: 5.5 [PH] (ref 5–7)
PHOSPHATE SERPL-MCNC: 4.7 MG/DL (ref 2.5–4.5)
PLATELET # BLD AUTO: 208 10E3/UL (ref 150–450)
POTASSIUM SERPL-SCNC: 5.1 MMOL/L (ref 3.4–5.3)
PROT SERPL-MCNC: 6.8 G/DL (ref 6.4–8.3)
PROT/CREAT 24H UR: 1.11 MG/MG CR (ref 0–0.2)
PTH-INTACT SERPL-MCNC: 230 PG/ML (ref 15–65)
RBC # BLD AUTO: 4.33 10E6/UL (ref 3.8–5.2)
RBC URINE: 5 /HPF
SODIUM SERPL-SCNC: 140 MMOL/L (ref 136–145)
SP GR UR STRIP: 1.02 (ref 1–1.03)
SQUAMOUS EPITHELIAL: 2 /HPF
TRANSITIONAL EPI: <1 /HPF
UROBILINOGEN UR STRIP-MCNC: NORMAL MG/DL
WBC # BLD AUTO: 8.1 10E3/UL (ref 4–11)
WBC URINE: 2 /HPF

## 2023-01-19 PROCEDURE — 99215 OFFICE O/P EST HI 40 MIN: CPT | Performed by: INTERNAL MEDICINE

## 2023-01-19 PROCEDURE — 99000 SPECIMEN HANDLING OFFICE-LAB: CPT | Performed by: PATHOLOGY

## 2023-01-19 PROCEDURE — 99417 PROLNG OP E/M EACH 15 MIN: CPT | Performed by: INTERNAL MEDICINE

## 2023-01-19 PROCEDURE — 83970 ASSAY OF PARATHORMONE: CPT | Performed by: PATHOLOGY

## 2023-01-19 PROCEDURE — 36415 COLL VENOUS BLD VENIPUNCTURE: CPT | Performed by: PATHOLOGY

## 2023-01-19 PROCEDURE — 81001 URINALYSIS AUTO W/SCOPE: CPT | Performed by: PATHOLOGY

## 2023-01-19 PROCEDURE — 85027 COMPLETE CBC AUTOMATED: CPT | Performed by: PATHOLOGY

## 2023-01-19 PROCEDURE — G0463 HOSPITAL OUTPT CLINIC VISIT: HCPCS | Performed by: INTERNAL MEDICINE

## 2023-01-19 PROCEDURE — 82306 VITAMIN D 25 HYDROXY: CPT | Mod: 90 | Performed by: PATHOLOGY

## 2023-01-19 PROCEDURE — 82248 BILIRUBIN DIRECT: CPT | Performed by: INTERNAL MEDICINE

## 2023-01-19 PROCEDURE — 84100 ASSAY OF PHOSPHORUS: CPT | Performed by: PATHOLOGY

## 2023-01-19 PROCEDURE — 80053 COMPREHEN METABOLIC PANEL: CPT | Performed by: PATHOLOGY

## 2023-01-19 PROCEDURE — 84156 ASSAY OF PROTEIN URINE: CPT | Performed by: PATHOLOGY

## 2023-01-19 RX ORDER — METHYLPREDNISOLONE 4 MG
TABLET, DOSE PACK ORAL
Qty: 70 TABLET | Refills: 0 | Status: ON HOLD | OUTPATIENT
Start: 2023-01-19 | End: 2023-02-27

## 2023-01-19 ASSESSMENT — PAIN SCALES - GENERAL: PAINLEVEL: NO PAIN (0)

## 2023-01-19 NOTE — TELEPHONE ENCOUNTER
MEDICATION APPEAL DENIED    Medication: Tavneos (avacopan) 10 MG CAPS    Denial Date: 1/19/2023    Denial Rationale:           Second Level Appeal Information: Second level appeals will be managed by the clinic staff and provider. Please contact the [employer] Prior Authorization Team if additional information about the denial is needed.

## 2023-01-19 NOTE — TELEPHONE ENCOUNTER
Dr. Dozier requested we initiate patient on [facility]' Patient on Assistance Program.  Spoke with Gary at [employer] ([phone]) to inquire what steps are needed to get patient enrolled in the patient assistance program.  Gary updated that after [facility] scheduled follow up on 1/23/23, [facility] will reach out to the patient to enroll patient in this program. [facility] will take this on if patient insurance still not covering.

## 2023-01-19 NOTE — PROGRESS NOTES
Nephrology Progress Note  01/19/2023   Chief complaint: Follow-up RI-3 ANCA associated GN  History of Present Illness:    Cande Shields is a 22 year old with Hx of GOA (PR3 ANCA vasculitis) (Bx confirmed in 3/2011), DVT and PE, ADHD, Hx of pre-eclampsia and DFIU who is here for PR3 ANCA GN follow-up.      The patient was initially diagnosed with RI-3 ANCA vascultis. 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 She was treated with . She was treated in the past by Dr. Velásquez. She was treated with methotrexate, high dose steroid.  Subsequently she was placed on oral cytoxan (unclear how long) then she had been followed by Dr. Enriqueta Alcantara, pediatric rheumatologist at [facility]. Chronic immune suppressive therapy with rituximab infusions every 6-8 months. Infusion was stopped since 2012. Her Cr was 0.6-0.8 in 2020. She was pregnant and had severe pre-eclampsia and noted to have IUFD at 30 weeks in October 2020. In December 2020, she was developed several days of myalgia, joint pain, pedal edema and was admitted to [facility] between 12/26-12/30. She was though to have a flare of GPA. Of note, she had COVID 1 mo preceding this event. She was given solumedrol 80 mg per day and 1 dose of RTX and was supposed to continue 3 more doses of  mg/m2. She was also prescribed Ibuprofen at discharge. She received the last dose of the series on 1/20/21 ([facility]). Cr during that flare was 0.77. However, she was readmitted again on 1/27/22 after noted Cr 2.05. She received IV cytoxan 500 mg/m2 x 3 doses (total dose cumulation about 3.5 g) .Gynecology input on ovarian preservation in setting of cyclophosphamide - did not give leuprolide. Eventually steroid was tapered down in 4/21. She was then lost follow-up and did not receive maintenance therapy.      She was admitted between 12/20-12/21. She came in with cold symptoms with positive home COVID test and was treated  with Paxlovid. She also noted to have petechial rashes that similar to previous flare hence she was instructed to present to ED. Upon presentation, she has Cr of 2.05 but after IV fluid and 1 dose of methlyprednisolone 125 mg Cr down to 1.48 the next day.  She has no leg swelling. She has some phlegm with blood tinge and denies any SOB. We thought that she has flare as her PR3 was elevated at 27. She received tapering dose of methylprednisone 125 mg IV x1-> 32 mg IV x1 and was started on oral Medrol tapering dose: Take 8 tablets (32 mg) by mouth daily for 6 days, THEN 6 tablets (24 mg) daily for 7 days, THEN 4 tablets (16 mg) daily for 7 days, THEN 3 tablets (12 mg) daily for 14 days. We also started her on Avacopan 30 mg BID through emergency program. She received Rituximab 375 mg/m2 1st dose on 1/10/23 and 2nd dose on 1/17/23. She still has 2 more doses left.     Today, she feels fine. She has no joint pain. She has no problem with urination. She just started taking avacopan about a week ago and reported missing some doses.  She also misses some doses of Medrol but not frequent. She has some SE from Medrol including acne, weight gain and sleep problem. She just drank 10 cans of beer yesterday. She is going to [place] between 1/25-2/2/23. Last dose of RTX will be on 2/3/23.   Labs: Creatinine 1.63, BUN 30.2, potassium 5.1.  This is interesting because creatinine 2 days ago was 1.41.  Hemoglobin 11.3.  UA showed RBC 5 cells per high-power field. UPCR is 1.11. PTH is 230. Vit D is in process.     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   Current Medications:  Current Outpatient Medications   Medication     apixaban ANTICOAGULANT (ELIQUIS) 2.5 MG tablet     Avacopan 10 MG CAPS     benzoyl peroxide 5 % external liquid     clindamycin (CLEOCIN T) 1 % external lotion     methylPREDNISolone (MEDROL DOSEPAK) 4 MG tablet therapy pack     methylPREDNISolone (MEDROL) 4 MG tablet     calcium carbonate-vitamin D (OSCAL) 500-5 MG-MCG tablet     omeprazole (PRILOSEC) 20 MG DR capsule     No current facility-administered medications for this visit.       Physical Exam:   /84   Pulse 73   Temp 98.2  F (36.8  C)   Wt 102.7 kg (226 lb 8 oz)   LMP 12/15/2022 (Approximate)   BMI 38.88 kg/m     Body mass index is 38.88 kg/m .    GENERAL APPEARANCE: Alert, not in acute distress  EYES:  Not pale conjunctiva, pupils equal  HENT: Mouth without ulcers or lesions  PULM: lungs clear to auscultation bilaterally, equal air movement, no clubbing  CV: regular rhythm, normal rate, no rub     -JVD no distended.      -edema: none  GI: soft,  - tender, no distended, bowel sounds are present  INTEGUMENT: No rash, no petechia.   NEURO:  Non focal. No asterixis.     Labs:   All labs reviewed by me  Last Renal Panel:  Sodium   Date Value Ref Range Status   01/19/2023 140 136 - 145 mmol/L Final   07/09/2021 139 133 - 144 mmol/L Final     Potassium   Date Value Ref Range Status   01/19/2023 5.1 3.4 - 5.3 mmol/L Final   12/21/2022 5.3 3.4 - 5.3 mmol/L Final   07/09/2021 4.6 3.4 - 5.3 mmol/L Final     Chloride   Date Value Ref Range Status   01/19/2023 107 98 - 107 mmol/L Final   12/21/2022 114 (H) 94 - 109 mmol/L Final   07/09/2021 110 (H) 94 - 109 mmol/L  Final     Carbon Dioxide   Date Value Ref Range Status   07/09/2021 23 20 - 32 mmol/L Final     Carbon Dioxide (CO2)   Date Value Ref Range Status   01/19/2023 23 22 - 29 mmol/L Final   12/21/2022 20 20 - 32 mmol/L Final     Anion Gap   Date Value Ref Range Status   01/19/2023 10 7 - 15 mmol/L Final   12/21/2022 5 3 - 14 mmol/L Final   07/09/2021 6 3 - 14 mmol/L Final     Glucose   Date Value Ref Range Status   01/19/2023 81 70 - 99 mg/dL Final   12/21/2022 122 (H) 70 - 99 mg/dL Final   07/09/2021 78 70 - 99 mg/dL Final     Urea Nitrogen   Date Value Ref Range Status   01/19/2023 30.2 (H) 6.0 - 20.0 mg/dL Final   12/21/2022 40 (H) 7 - 30 mg/dL Final   07/09/2021 29 7 - 30 mg/dL Final     Creatinine   Date Value Ref Range Status   01/19/2023 1.63 (H) 0.51 - 0.95 mg/dL Final   07/09/2021 1.38 (H) 0.52 - 1.04 mg/dL Final     GFR Estimate   Date Value Ref Range Status   01/19/2023 45 (L) >60 mL/min/1.73m2 Final     Comment:     Effective December 21, 2021 eGFRcr in adults is calculated using the 2021 CKD-EPI creatinine equation which includes age and gender (Karlene preston al., NEJ, DOI: 10.1056/FSFBlt6545002)   07/09/2021 54 (L) >60 mL/min/[1.73_m2] Final     Comment:     Non  GFR Calc  Starting 12/18/2018, serum creatinine based estimated GFR (eGFR) will be   calculated using the Chronic Kidney Disease Epidemiology Collaboration   (CKD-EPI) equation.       Calcium   Date Value Ref Range Status   01/19/2023 9.5 8.6 - 10.0 mg/dL Final   07/09/2021 9.2 8.5 - 10.1 mg/dL Final     Phosphorus   Date Value Ref Range Status   01/19/2023 4.7 (H) 2.5 - 4.5 mg/dL Final   04/07/2021 3.6 2.5 - 4.5 mg/dL Final     Albumin   Date Value Ref Range Status   01/19/2023 4.0 3.5 - 5.2 g/dL Final   12/21/2022 3.0 (L) 3.4 - 5.0 g/dL Final   07/09/2021 3.2 (L) 3.4 - 5.0 g/dL Final       Imaging:  I reviewed imaging studies.     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 She 1st flared in 12/2020 post COVID. At that time, it was mainly ENT symptoms and muscle. She was treated with RTX 375mg/m2 x4 but then developed KATERINA from baseline of 0.7 to 2.0 in 2/21. She was then Rx with Cytoxan 500 mg/m2 (unclear why using this regimen) x3 doses with total doses of 3.5 gm cumulative. Steroid was tapered off since 4/21. While on steroid, she had large weight gain, Cushingoid and also insomnia. Sine then she lost to follow up and did not receive any maintenance. Her [place]-3 was 0.3 in 4/7/21 (after the 1st flare) but then was noted reappearance in 1/31/22 with PR3 9.7 but then never received treatment.      She then presented with COVID-19 positive in December 2022, then developed KATERINA, mild cough with blood streak and petechial rash at ankle. Cr was 2.4 with hematuria (worsened) and also renal tubular cells. US kidney is normal. PR3 was elevated at 27 along with increased inflammatory markers, ESR and CRP.  This episode suggested a \"flare\". Given the previous horrible SE of steroid and her preference, we decided to start her on lower dose CS with  Methylprednisone 125 mg IV x1-> 32 mg IV then tapering dose of oral Medrol. Now she is on 12 mg per day until 1/24/22. We also started her on Avacopan to assist with rapid tapering of steroid. She just started Avacopan 30 mg BID last week. Now, she feels overall ok except some sinus symptoms but she though she has a cold. No rashes or joint pain. Energy is good. Cr on 1/17 was 1.41 but today is 1.63 but nothing change between these 2 days except that she drank 10 cans of beer yesterday. CRP improved to 16.4 " 1/17/23 from 31.6 on 12/20/22. [place] 3 is pending. Hence, I plan to slowly taper her Methylprednisone so I will do 12 mg/d starting 1/25-2/7/23 then 8 mg per day 2/8/23-2/22/23 then see me on 2/23/23. CD19 <1 on 1/17/23.     - Continue  mg/m2 (1st dose on 1/10/23) weekly x 4 ; 2 more doses to go  - Check LFT today given on Avacopan and next visit  - Methylprednisone 12 mg x 2 weeks then 8 mg x2 weeks until she sees me on 2/23/23  - Check PR3, CD19 (added by me this morning)  - Avacopan 30 mg BID-> approval get denied so will need to appeal again for the second time  - Labs on 2/3/23 and again before next visit on 2/23/23  # Hx of multiple blood clot; not on AC  # Hx of DFIU (3rd trim)  # Hx of severe preeclampsia  APL was neg in 10/21. They recommended Eliquis.   # Bad SE with steroids  Try to limit steroid as much as we can by using Avacopan.     Follow-up in 2/23/23; labs also on 2/3/23    I spent 70  minutes on the date of the encounter doing chart review, history and exam, documentation and further activities as noted above. 30 minutes of this visit is dedicated to direct patient interaction via face-to-face.    Jolie Dozier MD on 01/19/2023

## 2023-01-19 NOTE — TELEPHONE ENCOUNTER
Patricia Rahman, updated on status of denial ([phone]) and requested another month supply of med to patient.  Patricia updated that after initial shipment all future shipments are sent for 15day supply only.  [facility] will update their pharmacy to contact patient on delivery in the next 48hrs.  [facility] will be updated by [facility] when they call back for their scheduled collaboration update on 1/23/23 when [facility] will update them on prior auth status.    Camryn Rahman will be calling you this week to arrange for a new shipment of medication this week.  Med is for 15days only.  We have connected with our PA Department on next steps to appeal and will update you on status when we can.  Sincerely    Marielena Jaquez RN, BSN, PHN  [facility] Nurse Navigator  [phone]

## 2023-01-19 NOTE — PATIENT INSTRUCTIONS
Will continue slow tapering Methylprednisone  Continue Rituximab  Get labs on 2/3/23 and 2/23/23  Please take omeprazole to prevent stomach irritation from steroid

## 2023-01-19 NOTE — LETTER
1/19/2023       RE: Cande Shields  [address]  [place] [address]     Dear Colleague,    Thank you for referring your patient, Cande Shields, to the [facility] [place] [facility]. Please see a copy of my visit note below.      Nephrology Progress Note  01/19/2023   Chief complaint: Follow-up CO-3 ANCA associated GN  History of Present Illness:    Cande Shields is a 22 year old with Hx of GOA (PR3 ANCA vasculitis) (Bx confirmed in 3/2011), DVT and PE, ADHD, Hx of pre-eclampsia and DFIU who is here for PR3 ANCA GN follow-up.      The patient was initially diagnosed with CO-3 ANCA vascultis. 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 She was treated with . She was treated in the past by Dr. Velásquez. She was treated with methotrexate, high dose steroid.  Subsequently she was placed on oral cytoxan (unclear how long) then she had been followed by Dr. Enriqueta Alcantara, pediatric rheumatologist at [facility]. Chronic immune suppressive therapy with rituximab infusions every 6-8 months. Infusion was stopped since 2012. Her Cr was 0.6-0.8 in 2020. She was pregnant and had severe pre-eclampsia and noted to have IUFD at 30 weeks in October 2020. In December 2020, she was developed several days of myalgia, joint pain, pedal edema and was admitted to [facility] between 12/26-12/30. She was though to have a flare of GPA. Of note, she had COVID 1 mo preceding this event. She was given solumedrol 80 mg per day and 1 dose of RTX and was supposed to continue 3 more doses of  mg/m2. She was also prescribed Ibuprofen at discharge. She received the last dose of the series on 1/20/21 ([facility]). Cr during that flare was 0.77. However, she was readmitted again on 1/27/22 after noted Cr 2.05. She received IV cytoxan 500 mg/m2 x 3 doses (total dose cumulation about 3.5 g)  .Gynecology input on ovarian preservation in setting of cyclophosphamide - did not give leuprolide. Eventually steroid was tapered down in 4/21. She was then lost follow-up and did not receive maintenance therapy.      She was admitted between 12/20-12/21. She came in with cold symptoms with positive home COVID test and was treated with Paxlovid. She also noted to have petechial rashes that similar to previous flare hence she was instructed to present to ED. Upon presentation, she has Cr of 2.05 but after IV fluid and 1 dose of methlyprednisolone 125 mg Cr down to 1.48 the next day.  She has no leg swelling. She has some phlegm with blood tinge and denies any SOB. We thought that she has flare as her PR3 was elevated at 27. She received tapering dose of methylprednisone 125 mg IV x1-> 32 mg IV x1 and was started on oral Medrol tapering dose: Take 8 tablets (32 mg) by mouth daily for 6 days, THEN 6 tablets (24 mg) daily for 7 days, THEN 4 tablets (16 mg) daily for 7 days, THEN 3 tablets (12 mg) daily for 14 days. We also started her on Avacopan 30 mg BID through emergency program. She received Rituximab 375 mg/m2 1st dose on 1/10/23 and 2nd dose on 1/17/23. She still has 2 more doses left.     Today, she feels fine. She has no joint pain. She has no problem with urination. She just started taking avacopan about a week ago and reported missing some doses.  She also misses some doses of Medrol but not frequent. She has some SE from Medrol including acne, weight gain and sleep problem. She just drank 10 cans of beer yesterday. She is going to [place] between 1/25-2/2/23. Last dose of RTX will be on 2/3/23.   Labs: Creatinine 1.63, BUN 30.2, potassium 5.1.  This is interesting because creatinine 2 days ago was 1.41.  Hemoglobin 11.3.  UA showed RBC 5 cells per high-power field. UPCR is 1.11. PTH is 230. Vit D is in process.     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   Current Medications:  Current Outpatient Medications   Medication     apixaban ANTICOAGULANT (ELIQUIS) 2.5 MG tablet     Avacopan 10 MG CAPS     benzoyl peroxide 5 % external liquid     clindamycin (CLEOCIN T) 1 % external lotion     methylPREDNISolone (MEDROL DOSEPAK) 4 MG tablet therapy pack     methylPREDNISolone (MEDROL) 4 MG tablet     calcium carbonate-vitamin D (OSCAL) 500-5 MG-MCG tablet     omeprazole (PRILOSEC) 20 MG DR capsule     No current facility-administered medications for this visit.       Physical Exam:   /84   Pulse 73   Temp 98.2  F (36.8  C)   Wt 102.7 kg (226 lb 8 oz)   LMP 12/15/2022 (Approximate)   BMI 38.88 kg/m     Body mass index is 38.88 kg/m .    GENERAL APPEARANCE: Alert, not in acute distress  EYES:  Not pale conjunctiva, pupils equal  HENT: Mouth without ulcers or lesions  PULM: lungs clear to auscultation bilaterally, equal air movement, no clubbing  CV: regular rhythm, normal rate, no rub     -JVD no distended.      -edema: none  GI: soft,  - tender, no distended, bowel sounds are present  INTEGUMENT: No rash, no petechia.   NEURO:  Non focal. No asterixis.     Labs:   All labs reviewed by me  Last Renal Panel:  Sodium   Date Value Ref Range Status   01/19/2023 140 136 - 145 mmol/L Final   07/09/2021 139 133 - 144  mmol/L Final     Potassium   Date Value Ref Range Status   01/19/2023 5.1 3.4 - 5.3 mmol/L Final   12/21/2022 5.3 3.4 - 5.3 mmol/L Final   07/09/2021 4.6 3.4 - 5.3 mmol/L Final     Chloride   Date Value Ref Range Status   01/19/2023 107 98 - 107 mmol/L Final   12/21/2022 114 (H) 94 - 109 mmol/L Final   07/09/2021 110 (H) 94 - 109 mmol/L Final     Carbon Dioxide   Date Value Ref Range Status   07/09/2021 23 20 - 32 mmol/L Final     Carbon Dioxide (CO2)   Date Value Ref Range Status   01/19/2023 23 22 - 29 mmol/L Final   12/21/2022 20 20 - 32 mmol/L Final     Anion Gap   Date Value Ref Range Status   01/19/2023 10 7 - 15 mmol/L Final   12/21/2022 5 3 - 14 mmol/L Final   07/09/2021 6 3 - 14 mmol/L Final     Glucose   Date Value Ref Range Status   01/19/2023 81 70 - 99 mg/dL Final   12/21/2022 122 (H) 70 - 99 mg/dL Final   07/09/2021 78 70 - 99 mg/dL Final     Urea Nitrogen   Date Value Ref Range Status   01/19/2023 30.2 (H) 6.0 - 20.0 mg/dL Final   12/21/2022 40 (H) 7 - 30 mg/dL Final   07/09/2021 29 7 - 30 mg/dL Final     Creatinine   Date Value Ref Range Status   01/19/2023 1.63 (H) 0.51 - 0.95 mg/dL Final   07/09/2021 1.38 (H) 0.52 - 1.04 mg/dL Final     GFR Estimate   Date Value Ref Range Status   01/19/2023 45 (L) >60 mL/min/1.73m2 Final     Comment:     Effective December 21, 2021 eGFRcr in adults is calculated using the 2021 CKD-EPI creatinine equation which includes age and gender (Karlene et al., NEJM, DOI: 10.1056/EXLVxl7371608)   07/09/2021 54 (L) >60 mL/min/[1.73_m2] Final     Comment:     Non  GFR Calc  Starting 12/18/2018, serum creatinine based estimated GFR (eGFR) will be   calculated using the Chronic Kidney Disease Epidemiology Collaboration   (CKD-EPI) equation.       Calcium   Date Value Ref Range Status   01/19/2023 9.5 8.6 - 10.0 mg/dL Final   07/09/2021 9.2 8.5 - 10.1 mg/dL Final     Phosphorus   Date Value Ref Range Status   01/19/2023 4.7 (H) 2.5 - 4.5 mg/dL Final   04/07/2021 3.6  "2.5 - 4.5 mg/dL Final     Albumin   Date Value Ref Range Status   01/19/2023 4.0 3.5 - 5.2 g/dL Final   12/21/2022 3.0 (L) 3.4 - 5.0 g/dL Final   07/09/2021 3.2 (L) 3.4 - 5.0 g/dL Final       Imaging:  I reviewed imaging studies.     Assessment & Recommendations:   Problem list  # Recent flare of GPA: petechial rash and mild KATERINA; elevated PR3  -> Rx with Rituximab along with Avacopan and Medrol rapid tapering  # Bx proven ANCA-associated GN; PR3 (Bx in 3/2011)  # COVID-19; mild symptomatic s/p completely Rx with Paxlovid x5 days  The patient was diagnosed with GPA since 2011 and was treated with oral cytoxan, methrotrexate and prednisone and maintain with RTX until 2012. She 1st flared in 12/2020 post COVID. At that time, it was mainly ENT symptoms and muscle. She was treated with RTX 375mg/m2 x4 but then developed KATERINA from baseline of 0.7 to 2.0 in 2/21. She was then Rx with Cytoxan 500 mg/m2 (unclear why using this regimen) x3 doses with total doses of 3.5 gm cumulative. Steroid was tapered off since 4/21. While on steroid, she had large weight gain, Cushingoid and also insomnia. Sine then she lost to follow up and did not receive any maintenance. Her LA-3 was 0.3 in 4/7/21 (after the 1st flare) but then was noted reappearance in 1/31/22 with PR3 9.7 but then never received treatment.      She then presented with COVID-19 positive in December 2022, then developed KATERINA, mild cough with blood streak and petechial rash at ankle. Cr was 2.4 with hematuria (worsened) and also renal tubular cells. US kidney is normal. PR3 was elevated at 27 along with increased inflammatory markers, ESR and CRP.  This episode suggested a \"flare\". Given the previous horrible SE of steroid and her preference, we decided to start her on lower dose CS with  Methylprednisone 125 mg IV x1-> 32 mg IV then tapering dose of oral Medrol. Now she is on 12 mg per day until 1/24/22. We also started her on Avacopan to assist with rapid tapering of " steroid. She just started Avacopan 30 mg BID last week. Now, she feels overall ok except some sinus symptoms but she though she has a cold. No rashes or joint pain. Energy is good. Cr on 1/17 was 1.41 but today is 1.63 but nothing change between these 2 days except that she drank 10 cans of beer yesterday. CRP improved to 16.4 1/17/23 from 31.6 on 12/20/22. OH 3 is pending. Hence, I plan to slowly taper her Methylprednisone so I will do 12 mg/d starting 1/25-2/7/23 then 8 mg per day 2/8/23-2/22/23 then see me on 2/23/23. CD19 <1 on 1/17/23.     - Continue  mg/m2 (1st dose on 1/10/23) weekly x 4 ; 2 more doses to go  - Check LFT today given on Avacopan and next visit  - Methylprednisone 12 mg x 2 weeks then 8 mg x2 weeks until she sees me on 2/23/23  - Check PR3, CD19 (added by me this morning)  - Avacopan 30 mg BID-> approval get denied so will need to appeal again for the second time  - Labs on 2/3/23 and again before next visit on 2/23/23  # Hx of multiple blood clot; not on AC  # Hx of DFIU (3rd trim)  # Hx of severe preeclampsia  APL was neg in 10/21. They recommended Eliquis.   # Bad SE with steroids  Try to limit steroid as much as we can by using Avacopan.     Follow-up in 2/23/23; labs also on 2/3/23    I spent 70  minutes on the date of the encounter doing chart review, history and exam, documentation and further activities as noted above. 30 minutes of this visit is dedicated to direct patient interaction via face-to-face.    Jolie Dozire MD on 01/19/2023

## 2023-01-20 LAB
DEPRECATED CALCIDIOL+CALCIFEROL SERPL-MC: 26 UG/L (ref 20–75)
PROTEINASE3 AB SER IA-ACNC: 12 U/ML
PROTEINASE3 AB SER IA-ACNC: POSITIVE

## 2023-01-24 ENCOUNTER — INFUSION THERAPY VISIT (OUTPATIENT)
Dept: INFUSION THERAPY | Facility: CLINIC | Age: 23
End: 2023-01-24
Attending: STUDENT IN AN ORGANIZED HEALTH CARE EDUCATION/TRAINING PROGRAM
Payer: COMMERCIAL

## 2023-01-24 VITALS
HEART RATE: 82 BPM | RESPIRATION RATE: 16 BRPM | SYSTOLIC BLOOD PRESSURE: 116 MMHG | WEIGHT: 223.7 LBS | BODY MASS INDEX: 38.4 KG/M2 | DIASTOLIC BLOOD PRESSURE: 76 MMHG | OXYGEN SATURATION: 95 % | TEMPERATURE: 98.3 F

## 2023-01-24 DIAGNOSIS — Z29.89 NEED FOR PNEUMOCYSTIS PROPHYLAXIS: Primary | ICD-10-CM

## 2023-01-24 DIAGNOSIS — M31.31 GRANULOMATOSIS WITH POLYANGIITIS WITH RENAL INVOLVEMENT (H): ICD-10-CM

## 2023-01-24 PROCEDURE — 250N000011 HC RX IP 250 OP 636: Performed by: STUDENT IN AN ORGANIZED HEALTH CARE EDUCATION/TRAINING PROGRAM

## 2023-01-24 PROCEDURE — 96415 CHEMO IV INFUSION ADDL HR: CPT

## 2023-01-24 PROCEDURE — 258N000003 HC RX IP 258 OP 636: Performed by: STUDENT IN AN ORGANIZED HEALTH CARE EDUCATION/TRAINING PROGRAM

## 2023-01-24 PROCEDURE — 96413 CHEMO IV INFUSION 1 HR: CPT

## 2023-01-24 PROCEDURE — 250N000013 HC RX MED GY IP 250 OP 250 PS 637: Performed by: STUDENT IN AN ORGANIZED HEALTH CARE EDUCATION/TRAINING PROGRAM

## 2023-01-24 PROCEDURE — 999N000127 HC STATISTIC PERIPHERAL IV START W US GUIDANCE

## 2023-01-24 PROCEDURE — 96367 TX/PROPH/DG ADDL SEQ IV INF: CPT

## 2023-01-24 PROCEDURE — 96375 TX/PRO/DX INJ NEW DRUG ADDON: CPT

## 2023-01-24 RX ORDER — ACETAMINOPHEN 325 MG/1
650 TABLET ORAL ONCE
Status: CANCELLED
Start: 2023-01-31

## 2023-01-24 RX ORDER — EPINEPHRINE 1 MG/ML
0.3 INJECTION, SOLUTION INTRAMUSCULAR; SUBCUTANEOUS EVERY 5 MIN PRN
Status: CANCELLED | OUTPATIENT
Start: 2023-01-31

## 2023-01-24 RX ORDER — METHYLPREDNISOLONE SODIUM SUCCINATE 125 MG/2ML
100 INJECTION, POWDER, LYOPHILIZED, FOR SOLUTION INTRAMUSCULAR; INTRAVENOUS ONCE
Status: CANCELLED | OUTPATIENT
Start: 2023-01-31

## 2023-01-24 RX ORDER — ALBUTEROL SULFATE 90 UG/1
1-2 AEROSOL, METERED RESPIRATORY (INHALATION)
Status: CANCELLED
Start: 2023-01-31

## 2023-01-24 RX ORDER — DIPHENHYDRAMINE HYDROCHLORIDE 50 MG/ML
50 INJECTION INTRAMUSCULAR; INTRAVENOUS
Status: CANCELLED
Start: 2023-01-31

## 2023-01-24 RX ORDER — ACETAMINOPHEN 325 MG/1
650 TABLET ORAL ONCE
Status: COMPLETED | OUTPATIENT
Start: 2023-01-24 | End: 2023-01-24

## 2023-01-24 RX ORDER — METHYLPREDNISOLONE SODIUM SUCCINATE 125 MG/2ML
125 INJECTION, POWDER, LYOPHILIZED, FOR SOLUTION INTRAMUSCULAR; INTRAVENOUS
Status: CANCELLED
Start: 2023-01-31

## 2023-01-24 RX ORDER — METHYLPREDNISOLONE SODIUM SUCCINATE 125 MG/2ML
100 INJECTION, POWDER, LYOPHILIZED, FOR SOLUTION INTRAMUSCULAR; INTRAVENOUS ONCE
Status: COMPLETED | OUTPATIENT
Start: 2023-01-24 | End: 2023-01-24

## 2023-01-24 RX ORDER — ALBUTEROL SULFATE 0.83 MG/ML
2.5 SOLUTION RESPIRATORY (INHALATION)
Status: CANCELLED | OUTPATIENT
Start: 2023-01-31

## 2023-01-24 RX ORDER — DIPHENHYDRAMINE HCL 25 MG
50 CAPSULE ORAL ONCE
Status: CANCELLED
Start: 2023-01-31

## 2023-01-24 RX ADMIN — METHYLPREDNISOLONE SODIUM SUCCINATE 100 MG: 125 INJECTION, POWDER, FOR SOLUTION INTRAMUSCULAR; INTRAVENOUS at 12:47

## 2023-01-24 RX ADMIN — DIPHENHYDRAMINE HYDROCHLORIDE 50 MG: 50 INJECTION, SOLUTION INTRAMUSCULAR; INTRAVENOUS at 12:47

## 2023-01-24 RX ADMIN — ACETAMINOPHEN 650 MG: 325 TABLET ORAL at 12:47

## 2023-01-24 RX ADMIN — RITUXIMAB 800 MG: 10 INJECTION, SOLUTION INTRAVENOUS at 13:09

## 2023-01-24 ASSESSMENT — PAIN SCALES - GENERAL: PAINLEVEL: NO PAIN (0)

## 2023-01-24 NOTE — PROGRESS NOTES
Infusion Nursing Note:  Cande Shields presents today for   Chief Complaint   Patient presents with     Infusion     riTUXimab (RITUXAN)       Patient seen by provider today: No   present during visit today: Not Applicable.    Note:   -Orders from Barrington Caro MD completed. Frequency: weekly x4; today is dose 3/4.   -Premeds: 650mg Tylenol po, 50mg Benadryl IV, 100mg Solu-medrol IV.  -Rapid Infusion Rituximab: Infuse 20 % (200 mL/hr) over the first 30 minutes and the remainder (400 mL/hr) over 60 minutes     Intravenous Access:  Peripheral IV placed in Lt forearm by vascular access team.    Treatment Conditions:  Biological Infusion Checklist:  ~~~ NOTE: If the patient answers yes to any of the questions below, hold the infusion and contact ordering provider or on-call provider.    1. Have you recently had an elevated temperature, fever, chills, productive cough, coughing for 3 weeks or longer or hemoptysis, abnormal vital signs, night sweats,  chest pain or have you noticed a decrease in your appetite, unexplained weight loss or fatigue? No  2. Do you have any open wounds or new incisions? No  3. Do you have any recent or upcoming hospitalizations, surgeries or dental procedures? No  4. Do you currently have or recently have had any signs of illness or infection or are you on any antibiotics? No  5. Have you had any new, sudden or worsening abdominal pain? No  6. Have you or anyone in your household received a live vaccination in the past 4 weeks? Please note:  No live vaccines while on biologic/chemotherapy until 6 months after the last treatment.  Patient can receive the flu vaccine (shot only) and the pneumovax.  It is optimal for the patient to get these vaccines mid cycle, but they can be given at any time as long as it is not on the day of the infusion. No  7. Have you recently been diagnosed with any new nervous system diseases (ie. Multiple sclerosis, Guillain Warren, seizures, neurological  changes) or cancer diagnosis? No  8. Are you on any form of radiation or chemotherapy? No  9. Are you pregnant or breast feeding or do you have plans of pregnancy in the future? No  10. Have you been having any signs of worsening depression or suicidal ideations?  (benlysta only) N/A  11. Have there been any other new onset medical symptoms? No    Post Infusion Assessment:  Patient tolerated infusion without incident.  Blood return noted pre and post infusion.  Site patent and intact, free from redness, edema or discomfort.  No evidence of extravasations.  Access discontinued per protocol.     Discharge Plan:   Discharge instructions reviewed with: Patient.  Patient and/or family verbalized understanding of discharge instructions and all questions answered.  AVS to patient via [employer].  Patient will return 2/3/23 for next appointment.   Patient discharged in stable condition accompanied by: self.  Departure Mode: Ambulatory.      Ruby Conteh RN    /86 (BP Location: Left arm, Patient Position: Sitting, Cuff Size: Adult Large)   Pulse 82   Temp 98.3  F (36.8  C)   Resp 16   Wt 101.5 kg (223 lb 11.2 oz)   LMP 12/15/2022 (Approximate)   SpO2 95%   BMI 38.40 kg/m      Administrations This Visit     acetaminophen (TYLENOL) tablet 650 mg     Admin Date  01/24/2023 Action  Given Dose  650 mg Route  Oral Administered By  Ruby Conteh RN          diphenhydrAMINE (BENADRYL) 50 mg in sodium chloride 0.9 % 56 mL intermittent infusion     Admin Date  01/24/2023 Action  New Bag Dose  50 mg Rate  224 mL/hr Route  Intravenous Administered By  Ruby Conteh RN          methylPREDNISolone sodium succinate (solu-MEDROL) injection 100 mg     Admin Date  01/24/2023 Action  Given Dose  100 mg Route  Intravenous Administered By  Ruby Conteh RN          riTUXimab (RITUXAN) 800 mg in sodium chloride 0.9 % 500 mL NON-oncology infusion     Admin Date  01/24/2023 Action  New Bag Dose  800 mg Route  Intravenous Administered  By  Ruby Conteh, RN              Drug Waste Record  Drug Name: Solu-medrol  Dose: 100mg  Route Administered: IV  NDC#: 7669-8821-68  Amount of waste (mL): 0.4ml  Reason for waste: single-dose vial

## 2023-01-24 NOTE — PATIENT INSTRUCTIONS
Dear Cande Shields    Thank you for choosing [facility] ([facility]) for your Rituximab infusion.  The following information is a summary of your appointment as well as important reminders.      Last dose of Tylenol was  1/24/23 at 12:45 pm.    EDUCATION POST BIOLOGICAL/CHEMOTHERAPY INFUSION  Call the triage nurse at your clinic or seek medical attention if you have chills and/or temperature greater than or equal to 100.5, uncontrolled nausea/vomiting, diarrhea, constipation, dizziness, shortness of breath, chest pain, heart palpitations, weakness or any other new or concerning symptoms, questions or concerns.  You can not have any live virus vaccines prior to or during treatment or up to 6 months post infusion.  If you have an upcoming surgery, medical procedure or dental procedure during treatment, this should be discussed with your ordering physician and your surgeon/dentist.  If you are having any concerning symptom, if you are unsure if you should get your next infusion or wish to speak to a provider before your next infusion, please call your care coordinator or triage nurse at your clinic to notify them so we can adequately serve you.     We look forward to seeing you on 2/3/23 for your next appointment here at [facility] ([facility]).  Please don t hesitate to call us at [phone] to reschedule any of your appointments or to speak with one of the [facility] registered nurses.  It was a pleasure taking care of you today.    Sincerely,    [facility]  [address] [place] [address]  [id number]  Phone:  [phone]

## 2023-01-24 NOTE — LETTER
Date:January 25, 2023      Provider requested that no letter be sent. Do not send.       [facility]

## 2023-01-24 NOTE — LETTER
1/24/2023         RE: Cande Shields  [address]  [place] [address]        Dear Colleague,    Thank you for referring your patient, Cande Shields, to the [facility]. Please see a copy of my visit note below.    Infusion Nursing Note:  Cande Shields presents today for   Chief Complaint   Patient presents with     Infusion     riTUXimab (RITUXAN)       Patient seen by provider today: No   present during visit today: Not Applicable.    Note:   -Orders from Barrington Caro MD completed. Frequency: weekly x4; today is dose 3/4.   -Premeds: 650mg Tylenol po, 50mg Benadryl IV, 100mg Solu-medrol IV.  -Rapid Infusion Rituximab: Infuse 20 % (200 mL/hr) over the first 30 minutes and the remainder (400 mL/hr) over 60 minutes     Intravenous Access:  Peripheral IV placed in Lt forearm by vascular access team.    Treatment Conditions:  Biological Infusion Checklist:  ~~~ NOTE: If the patient answers yes to any of the questions below, hold the infusion and contact ordering provider or on-call provider.    1. Have you recently had an elevated temperature, fever, chills, productive cough, coughing for 3 weeks or longer or hemoptysis, abnormal vital signs, night sweats,  chest pain or have you noticed a decrease in your appetite, unexplained weight loss or fatigue? No  2. Do you have any open wounds or new incisions? No  3. Do you have any recent or upcoming hospitalizations, surgeries or dental procedures? No  4. Do you currently have or recently have had any signs of illness or infection or are you on any antibiotics? No  5. Have you had any new, sudden or worsening abdominal pain? No  6. Have you or anyone in your household received a live vaccination in the past 4 weeks? Please note:  No live vaccines while on biologic/chemotherapy until 6 months after the last treatment.  Patient can receive the flu vaccine (shot only) and the pneumovax.  It is  optimal for the patient to get these vaccines mid cycle, but they can be given at any time as long as it is not on the day of the infusion. No  7. Have you recently been diagnosed with any new nervous system diseases (ie. Multiple sclerosis, Guillain Cla[place], seizures, neurological changes) or cancer diagnosis? No  8. Are you on any form of radiation or chemotherapy? No  9. Are you pregnant or breast feeding or do you have plans of pregnancy in the future? No  10. Have you been having any signs of worsening depression or suicidal ideations?  (benlysta only) N/A  11. Have there been any other new onset medical symptoms? No    Post Infusion Assessment:  Patient tolerated infusion without incident.  Blood return noted pre and post infusion.  Site patent and intact, free from redness, edema or discomfort.  No evidence of extravasations.  Access discontinued per protocol.     Discharge Plan:   Discharge instructions reviewed with: Patient.  Patient and/or family verbalized understanding of discharge instructions and all questions answered.  AVS to patient via [employer].  Patient will return 2/3/23 for next appointment.   Patient discharged in stable condition accompanied by: self.  Departure Mode: Ambulatory.      Ruby Conteh RN    /86 (BP Location: Left arm, Patient Position: Sitting, Cuff Size: Adult Large)   Pulse 82   Temp 98.3  F (36.8  C)   Resp 16   Wt 101.5 kg (223 lb 11.2 oz)   LMP 12/15/2022 (Approximate)   SpO2 95%   BMI 38.40 kg/m      Administrations This Visit     acetaminophen (TYLENOL) tablet 650 mg     Admin Date  01/24/2023 Action  Given Dose  650 mg Route  Oral Administered By  Ruby Conteh RN          diphenhydrAMINE (BENADRYL) 50 mg in sodium chloride 0.9 % 56 mL intermittent infusion     Admin Date  01/24/2023 Action  New Bag Dose  50 mg Rate  224 mL/hr Route  Intravenous Administered By  Ruby Conteh RN          methylPREDNISolone sodium succinate (solu-MEDROL) injection 100 mg      Admin Date  01/24/2023 Action  Given Dose  100 mg Route  Intravenous Administered By  Ruby Conteh, RN          riTUXimab (RITUXAN) 800 mg in sodium chloride 0.9 % 500 mL NON-oncology infusion     Admin Date  01/24/2023 Action  New Bag Dose  800 mg Route  Intravenous Administered By  Ruby Conteh, RN              Drug Waste Record  Drug Name: Solu-medrol  Dose: 100mg  Route Administered: IV  NDC#: 1858-4034-47  Amount of waste (mL): 0.4ml  Reason for waste: single-dose vial              Again, thank you for allowing me to participate in the care of your patient.        Sincerely,        Specialty Infusion Nurse

## 2023-01-27 LAB — SCANNED LAB RESULT: ABNORMAL

## 2023-02-02 ENCOUNTER — MYC MEDICAL ADVICE (OUTPATIENT)
Dept: NEPHROLOGY | Facility: CLINIC | Age: 23
End: 2023-02-02

## 2023-02-06 NOTE — PROGRESS NOTES
Rheumatology Clinic Visit     Cande Shields MRN# [id number]   YOB: 2000 Age: 22 year old     Date of Visit: 02/09/2023  Primary care provider: Trudy Faye          Assessment and Plan:     # Granulomatous polyangiitis with glomerulonephritis, upper respiratory, musculoskeletal, and cutaneous involvement;   Constitutional symptoms, skin rash on the legs and elbows, shortness of breath, and frequency and severity of bloody nasal discharge have greatly improved since 1 month ago. Physical exam today is remarkable for 10 lb wt gain since 12-22, otherwise normal vitals; normal nose and throat exam; no edema and pulmonary, cutaneous, and musculoskeletal exams are all unremarkable.    Patient has now completed induction therapy for GPA flare, started in January 2023 with steroid burst/taper and 3 doses of rituximab 375 mg/m  last on 1-. She last had Cytoxan 500 mg/m2 on 1- in response to rising creatinine and active urinary sediment.    Data: On January 19, 2023, basic metabolic panel revealed creatinine of 1.63 with a GFR of 45, decreased from 53 noted in January 17.  Phosphorus was elevated at 4.7.  Hemoglobin was 11.3 with normal MCV.  Urinalysis showed 100+ protein, 5 red cells per high-powered field.  Total urine protein was 1.11 g/g of creatinine.  NM-3 antibodies were positive at 12 units.  Absolute CD19 counts on January 17 were less than 1.  Repeat antinuclear antibody in December 2022 was negative.  Chest x-ray was negative.    Discussion: GPA flare with rising creatinine and rash, associated with COVID-19 infection in 2022 is now much better controlled with minimal symptoms.  Patient had completed 3 out of planned 4 weekly doses of rituximab treatment.  I agree with withholding further Rituxan given the discovery of first trimester pregnancy in the past week.  I agree with continuing prednisone (12.5 mg current dose) with taper under direction of Dr. Dozier.    Patient  describes intention to carry pregnancy to term.  During pregnancy, I recommend collaboration with high risk maternal-fetal medicine service, and monitoring for worsening renal function and recurrence of GPA with creatinine, CBC with platelets, inflammatory markers monthly.  I recommend continued use of prenatal vitamins, avoidance of nonsteroidal anti-inflammatory medications, smoking.  Immunomodulatory medications that might be considered during pregnancy include corticosteroids and azathioprine; rituximab and Cytoxan should not be used.    Recommendation:  1.  Continue prednisone 12 mg daily, follow recommendations from nephrology/vasculitis team regarding prednisone taper.  2.  Urinalysis, spot urine protein in the next week  3.  Follow through with OB entry telephone call and follow-up for fetal monitoring for high risk pregnancy.  4.  Monitor blood pressure weekly at home; report change in upper or lower blood pressure number by 20 units to vasculitis service or rheumatology  5.  Continue off cigarettes, but use prenatal vitamin 1 daily    RTC 6 weeks    Ramses Call MD  [facility]              Active Problem List:     Patient Active Problem List    Diagnosis Date Noted     ATN (acute tubular necrosis) (H) 12/21/2022     Priority: Medium     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) 12/20/2022     Priority: Medium     Infection due to 2019 novel coronavirus 12/20/2022     Priority: Medium     Acute deep vein thrombosis (DVT) of proximal vein of both lower extremities (H) 10/30/2021     Priority: Medium     BMI 40.0-44.9, adult (H) 10/30/2021     Priority: Medium     Femoral artery thrombosis, left (H) 10/30/2021     Priority: Medium     Thrombosis 03/23/2021     Priority: Medium     Occlusion of common femoral artery (H) 03/21/2021     Priority: Medium     Stage 3 chronic kidney disease, unspecified whether stage 3a or 3b CKD (H) 03/20/2021     Priority: Medium      "Acute saddle pulmonary embolism with acute cor pulmonale (H) 03/20/2021     Priority: Medium     Need for pneumocystis prophylaxis 03/04/2021     Priority: Medium     Pulmonary infiltrates 01/28/2021     Priority: Medium     Acute kidney injury (H) 01/28/2021     Priority: Medium     Personal history of COVID-19 01/10/2021     Priority: Medium     Pyoderma gangrenosum 01/10/2021     Priority: Medium     ANCA-associated vasculitis 12/31/2020     Priority: Medium     Myalgia 12/27/2020     Priority: Medium     Granulomatosis with polyangiitis, unspecified whether renal involvement (H) 12/27/2020     Priority: Medium     Bicornuate uterus 10/28/2020     Priority: Medium     Fetal demise, greater than 22 weeks, antepartum 10/28/2020     Priority: Medium     10/20 30w6d presented to [facility] with right sided chest pain, BP in severe range, No fetal heart tones auscultated. Induced, NSVB, \"Yasmin\" 2lbs       Alcohol use disorder, mild, in sustained remission 02/11/2015     Priority: Medium     Formatting of this note might be different from the original.  sober since 01/2014.       Binge-eating disorder, moderate 02/11/2015     Priority: Medium     Cannabis use disorder, mild, in early remission 02/11/2015     Priority: Medium     Formatting of this note might be different from the original.  sober since 06/2014.       Depression 02/11/2015     Priority: Medium     ADHD (attention deficit hyperactivity disorder), inattentive type 07/09/2014     Priority: Medium     Generalized anxiety disorder 07/09/2014     Priority: Medium     Performance anxiety 07/09/2014     Priority: Medium     Self-injurious behavior 07/09/2014     Priority: Medium     History of granulomatosis with polyangiitis 02/15/2012     Priority: Medium     Wegener's granulomatosis 10/04/2011     Priority: Medium     Replacing diagnoses that were inactivated after the 10/1/2021 regulatory import.              History of Present Illness:   Cande Shields " presents for followup vasculitis. Last seen 2-.     Background: Granulomatosis with polyangitis dx at age 10, with PR3 positivitiy, initially treated with Methotrexate, Steroids, Rituximab infusions Q6-8 months until about 2012, since then had been in remission.    On December 26 2020, she was admitted to [facility] for for myalgias, arthralgias, pedal edema and a petechial non blanching palpable purpura rash involving her thighs, legs, elbows, chin and neck (vasculitis). She had proteinuria on UA, lung nodule on CXR, elevated inflammatory markers.  Although COVID-19 infection was also present, a flare of granulomatous polyangiitis was diagnosed.  The patient was started on Solu-Medrol 1 mg/kg/day.  She was also begun on induction protocol with rituximab 375 mg/m  IV 4 doses, given on a weekly schedule.  She improved, and was discharged on December 30, 2020 on high-dose prednisone. Last seen in 2-2021, when she was improving after hospitalization.    Interval history February 9, 2023    Since last visit in 20221:  Patient saw Dr. Chang in nephrology in April 2021 in follow-up of [place]-3+ ANCA vasculitis.  Creatinine was noted to be improving and patient was feeling well several weeks after a single course of intravenous cyclophosphamide.  Plan was to continue prednisone through the month of April, and then start tapering to off.  Plan was to consider maintenance Rituxan in June or July 2021. She was lost to Renal and Rheumatology followup.    She was next admitted between 12/20-12/21 of 2022. She came in with positive home COVID test and  petechial rashes that were similar to GPA previous flare and some phlegm with blood tinge her PR3 was elevated at 27, her Cr was 2.05 but after IV fluid and 1 dose of methlyprednisolone 125 mg Cr down to 1.48 the next day. She received tapering dose of methylprednisone IV and oral, as well as avacopan. She received Rituximab 375 mg/m2 1st dose on 1/10/23 and 2nd dose  on 1/17/23, 1-.    Patient saw Dr. Dozier in nephrology on January 19, 2023.  Impression was of ANCA vasculitis flare, partially completed induction treatment with rituximab, and concerning decrease in GFR compared to 1 month earlier.  Nevertheless, GPA flare was considered to be under improving control.  A plan for tapering Medrol to 8 mg/day as of February 8 was given, and a plan for a total of 4 weekly doses of rituximab was discussed.    Today, patient reports that she is 4 weeks pregnant, testing positive just 4 days ago. She will meet with OB/Gyn tomorrow.  She feels well overall; no joint pain, skin rash, shortness of breath, fever. She quit smoking 2 weeks ago; she still has a cough (dry, chronic). She has not had recurrence of blood-tinged phlegm. No nosebleeds or facial pain/sinus drainage.    She has continued avacopan, but stopped on learning that she is pregnant, per vasculitis service. She is taking prenatal vitamins.  prednisone 12 mg daily has continued. She missed a 4th rituxan dose scheduled for 2-3-2023.  She still has low back pain, every day. Pain is constant, but modest.  She works full time at a day care.    No recurrence of COVID symptoms that she had in .     Interval history 02-:    She is feeling well. Good energy level  Doing cardio, situps, jumping jacks at home.  She notes R foot pain for the past several weeks; made worse with high impact weight bearing. Pain is in the foot arch and in the pad.    After the last visit in January, creatinine returned unexpectedly elevated at 2.  Dr. Chang and the renal team recommended immediate hospitalization for possible ANCA vasculitis flare unresponsive to rituximab and prednisone.  Patient was hospitalized for steroid pulse and received Cytoxan 500 mg/m  on January 31.  She tolerated medications well and was discharged in early February.    Patient was seen in the emergency room on February 21, 2021 for right foot pain.   "Impression was of probable plantar fasciitis; symptomatic Rx was recommended.  Pregnancy test was negative.  Left flank pain was judged mild; urinalysis showed mild hematuria, and patient was discharged with plans to monitor.    She tapered prednisone from 50 to 40 5 days ago. She will taper by 10 mg each week per Renal.      Interval history 01-:    She had more bumps on her feet,a nd her joints started to ache when she went down to 40 mg prednisone after a few days.    She notes tremors and shakinesss in her hands, and her legs are swollen on prednisone.    Skin on her legs has cleared up. \"bumps\" on her elbows persist, no larger.    She is breathless with minimal exertion, like climbing stairs. She notes hot flashes, she sometiems has sweats as well, sudden onset after being cold.    She continues to cough; there is frequently flecks of blood, usually dark.    Her nose is dry despite use of a humidifier. She also has had a bloody nose daily for the last 10 days.            Review of Systems:       Constitutional: negative  Skin: negative  Eyes: negative  Ears/Nose/Throat: negative  Respiratory: No shortness of breath, dyspnea on exertion, cough, or hemoptysis  Cardiovascular: negative  Gastrointestinal: negative  Genitourinary: negative  Musculoskeletal: negative  Neurologic: negative  Psychiatric: negative  Hematologic/Lymphatic/Immunologic: negative  Endocrine: negative          Past Medical History:     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     Multiple subsegmental pulmonary emboli without acute cor pulmonale (H) 03/2021     PFO (patent foramen ovale)      Preeclampsia, third trimester      Spontaneous pregnancy loss 2020    31 weeks     Stage 3b chronic kidney disease (H)      Wegener's disease, pulmonary 01/01/2010    renal biopdy     Past " Surgical History:   Procedure Laterality Date     ENT SURGERY  2000    cyst     EXCISE GANGLION WRIST  2009     # ANCA vasculitis:  GPA flare, treated with 1 mg/kg/day prednisone, and 4 doses of rituximab 375 mg/m  completed in late January 2021. Followup 3-day pulse methylprednisolone and Cytoxan 500 mg/m2 were given on 1- in response to rising creatinine and active urinary sediment. Creatinine stable at ~ 2 as of February 23, 2021.         Social History:     Social History     Occupational History     Not on file   Tobacco Use     Smoking status: Former     Packs/day: 0.25     Types: Cigarettes     Smokeless tobacco: Current     Tobacco comments:     vaping   Vaping Use     Vaping Use: Some days     Substances: Nicotine, Flavoring     Devices: Disposable   Substance and Sexual Activity     Alcohol use: Not Currently     Drug use: No     Sexual activity: Yes     Partners: Male     Birth control/protection: None            Family History:     Family History   Problem Relation Age of Onset     Thyroid Disease Mother      Cancer Maternal Grandfather      Asthma No family hx of      Diabetes No family hx of             Allergies:     Allergies   Allergen Reactions     Heparin Other (See Comments)     Reaction: HIT     Penicillins Rash     Unknown, but think rash.  Tolerated cephalexin (12/27/20), cefpodoxime (12/27/20)            Medications:     Current Outpatient Medications   Medication Sig Dispense Refill     benzoyl peroxide 5 % external liquid Use daily as directed 226 g 11     clindamycin (CLEOCIN T) 1 % external lotion 1-2 times daily as spot treatment for pus bumps. 60 mL 11     predniSONE (DELTASONE) 10 MG tablet Take 10 mg by mouth daily       Avacopan 10 MG CAPS Take 30 mg by mouth 2 times daily 180 capsule 11     calcium carbonate-vitamin D (OSCAL) 500-5 MG-MCG tablet Take 1 tablet by mouth 2 times daily (Patient not taking: Reported on 1/10/2023) 60 tablet 1     methylPREDNISolone  (MEDROL DOSEPAK) 4 MG tablet therapy pack Follow Package Directions 70 tablet 0     omeprazole (PRILOSEC) 20 MG DR capsule Take 1 capsule (20 mg) by mouth daily (Patient not taking: Reported on 1/10/2023) 30 capsule 0            Physical Exam:   Blood pressure 102/69, pulse 83, weight 102.5 kg (226 lb), last menstrual period 12/15/2022, SpO2 97 %, not currently breastfeeding.  Wt Readings from Last 4 Encounters:   02/09/23 102.5 kg (226 lb)   01/24/23 101.5 kg (223 lb 11.2 oz)   01/19/23 102.7 kg (226 lb 8 oz)   01/17/23 101.2 kg (223 lb 3.2 oz)       Constitutional: well-developed, appearing stated age; cooperative  Eyes: nl EOM, PERRLA, vision, conjunctiva, sclera  ENT: nl external ears, nose, hearing, lips, teeth, gums, throat  No mucous membrane lesions, normal saliva pool  Neck: no mass or thyroid enlargement  Resp: lungs clear to auscultation, nl to palpation  CV: RRR, no murmurs, rubs or gallops, no edema  MS: The TMJ, neck, shoulder, elbow, wrist, MCP/PIP/DIP, spine, hip, knee were examined and found normal. No active synovitis or altered joint anatomy. Full joint ROM. Normal  strength. No dactylitis,  tenosynovitis, enthesopathy.  Skin: No evidence of former rash over thighs legs elbows or hands.  Neuro: nl cranial nerves  Psych: nl judgement, orientation, memory, affect.         Data:     No results found for any visits on 02/09/23.  RHEUM RESULTS Latest Ref Rng & Units 2/13/2007 1/11/2011 2/22/2011   ALBUMIN 3.4 - 5.0 g/dL - - 4.2   ALT 10 - 35 U/L - - -   AST 10 - 35 U/L - - -   ANCA - - - 1:160   CESARIO INTERPRETATION Negative - - -   COMPLEMENT C3 81 - 157 mg/dL - - -   COMPLEMENT C4 13 - 39 mg/dL - - -   CK TOTAL 30 - 225 U/L - - -   CREATININE 0.52 - 1.04 mg/dL - - 0.65   CRP 0.0 - 8.0 mg/L - 33.9(H) <5.0   DNA <10.0 IU/mL - - -   GFR ESTIMATE, IF BLACK >60 mL/min/[1.73:m2] - - GFR not calculated, patient <16 years old.   GFR ESTIMATE >60 mL/min/1.73m2 - - GFR not calculated, patient <16 years old.    HEMATOCRIT 35.0 - 47.0 % - 32.4(L) 35.6   HEMOGLOBIN 11.7 - 15.7 g/dL - 9.9(L) 10.8(L)   HEPBANG Nonreactive - - -   HCVAB Nonreactive - - -   IGA 84 - 499 mg/dL - - -   IGM 35 - 242 mg/dL - - -    - 1,616 mg/dL - - -   WBC 4.0 - 11.0 10e3/uL 6.3 8.4 16.1(H)   RBC 3.80 - 5.20 10e6/uL - 3.98 4.35   RDW 10.0 - 15.0 % - 14.1 19.3(H)   MCHC 31.5 - 36.5 g/dL - 30.6(L) 30.3(L)   MCV 78 - 100 fL - 81 82   PLATELET COUNT 150 - 450 10e3/uL - 348 294   ESR 0 - 20 mm/hr - - -        ,  ,  ,  ,  ,  ,   CESARIO interpretation   Date Value Ref Range Status   12/28/2020 Negative NEG^Negative Final     Comment:                                        Reference range:  <1:40  NEGATIVE  1:40 - 1:80  BORDERLINE POSITIVE  >1:80 POSITIVE       ,  ,  ,  ,  ,  ,  ,  ,  ,   Hep B Surface Agn   Date Value Ref Range Status   12/28/2020 Nonreactive NR^Nonreactive Final   ,  ,  ,  ,  ,  ,  ,  ,  ,  ,  ,   Neutrophil Cytoplasmic IgG Antibody   Date Value Ref Range Status   02/22/2011 1:160  Final     Comment:     Reference range: <1:20  (Note)  Cytoplasmic ANCA (c-ANCA) staining pattern observed. No  perinuclear ANCA (p-ANCA) pattern seen. Presence of p-ANCA  ruled out on formalin-fixed neutrophils.  TEST INFORMATION: Anti-Neutrophil Cyto Ab, IgG    Neutrophil Cytoplasmic Antibodies (C-ANCA = granular  cytoplasmic staining, P-ANCA = perinuclear staining) are  found in the serum of over 90 percent of patients with  certain necrotizing systemic vasculitides, and usually in  less than 5 percent of patients with collagen vascular  disease or arthritis.  Performed by [facility],  [address] [place] [id number] [phone]  [email or web address], Leonie Damon MD, Lab. Director                                                                           ,   Proteinase 3 Antibody IgG   Date Value Ref Range Status   12/28/2020 >8.0 (H) 0.0 - 0.9 AI Final     Comment:     Positive  Antibody index (AI) values reflect qualitative changes in  antibody   concentration that cannot be directly associated with clinical condition or   disease state.       ,   Myeloperoxidase Antibody IgG   Date Value Ref Range Status   12/28/2020 <0.2 0.0 - 0.9 AI Final     Comment:     Negative  Antibody index (AI) values reflect qualitative changes in antibody   concentration that cannot be directly associated with clinical condition or   disease state.       ,   Neutrophil Cytoplasmic Antibody   Date Value Ref Range Status   12/28/2020 1:80 (A) <1:10 [titer] Final   ,   Neutrophil Cytoplasmic Antibody Pattern   Date Value Ref Range Status   12/28/2020   Final    Cytoplasmic ANCA (c-ANCA) staining pattern observed and confirmed on formalin-fixed   neutrophils.       ,   Serine Protease 3   Date Value Ref Range Status   02/22/2011 1684 (H)  Final     Comment:     Reference range: 0 to 19  Unit: AU/mL  (Note)  REFERENCE INTERVAL: Serine Protease 3, IgG     19 AU/mL or Less ........ Negative   20-25 AU/mL ............. Equivocal   26 AU/mL or Greater ..... Positive    Approximately 90% of patients with a P-ANCA pattern by IFA  have antibodies specific for MPO.    Approximately 85% of patients with a C-ANCA pattern by IFA  have antibodies specific for PR3.  Performed by [facility],  [address] [place] [id number] [phone]  [email or web address], Leonie Damon MD, Lab. Director                                                                                                                                                                                                                                    ,   Myeloperox Antibodyys   Date Value Ref Range Status   02/22/2011 0  Final     Comment:     Reference range: 0 to 19  Unit: AU/mL  (Note)  REFERENCE INTERVAL: Myeloperoxidase Abs, IgG     19 AU/mL or Less ......... Negative   20-25 AU/mL .............. Equivocal   26 AU/mL or Greater ...... Positive    Approximately 90% of patients with a P-ANCA pattern by IFA  have  antibodies specific for MPO.    Approximately 85% of patients with a C-ANCA pattern by IFA  have antibodies specific for PR3.                                                                                                        ,  ,  ,  ,  ,  ,  ,  ,  ,  ,  ,  ,

## 2023-02-07 ENCOUNTER — TELEPHONE (OUTPATIENT)
Dept: RHEUMATOLOGY | Facility: CLINIC | Age: 23
End: 2023-02-07
Payer: COMMERCIAL

## 2023-02-07 DIAGNOSIS — M31.31 GRANULOMATOSIS WITH POLYANGIITIS WITH RENAL INVOLVEMENT (H): Primary | ICD-10-CM

## 2023-02-07 NOTE — TELEPHONE ENCOUNTER
Collaborated with Eunice on health status and Dr. Dozier orders.    Call to patient instructed on Dr. Dozier order to please hold Avocopan at this time.  Continue to take Prednisone 4mg 3 tabs (12mg) daily do not taper.  Dr. Dozier would like labs drawn today.  Reminded patient of Dr. Call appt at [facility] location, Thursday morning at 0830.  Continue to hold Rituximab at this time.  Patient verbalized understanding and request assistance to book lab appt at [facility] tomorrow.    Called to [facility] [phone], spoke with Gary to update her of Dr. Dozier's order to hold avocopan at this time.  Gary will update their system and alert [facility] to hold on enrolling patient in their patient assistance program at this time.  [facility] will circling back mid April on status of patient therapy.    Message left on patient voicemail that she is scheduled for an appt tomorrow at the [facility] 730am, first floor.     Updated [facility] Charge Nurse that Rituximab Infusion is on hold at this time.

## 2023-02-07 NOTE — TELEPHONE ENCOUNTER
Called and discussed with Dr. Dozier, he would like her to discontinue [facility]/Avacopan, do not get the last dose of Rituximab. He would like her to stay on current dose of Medrol.  Wants her to keep her appt with Dr. Call on Thursday.  Would like her to get labs done as well.      Will update Dr. Call. Dr. Dozier is thinking that due to pregnancy Azathioprine would be a good option for her, but will need a TPMT before starting, this has been added to her labs for today.      Will send to Dr. Call and Dr. Dozier to review.    Eunice Lim RN  Rheumatology Clinic

## 2023-02-07 NOTE — TELEPHONE ENCOUNTER
Pt called into clinic today, states she got home from vacation and took a home pregnancy test and it was positive.  Let her know that I will call Dr. Dozier and let him know.  I will call her back with his response.      Have advised pt to make an appt with her PCP, next available in clinic with whoever is available.    Eunice Lim RN  Rheumatology Clinic

## 2023-02-08 ENCOUNTER — LAB (OUTPATIENT)
Dept: LAB | Facility: CLINIC | Age: 23
End: 2023-02-08
Attending: INTERNAL MEDICINE
Payer: COMMERCIAL

## 2023-02-08 DIAGNOSIS — I77.82 ANCA-ASSOCIATED VASCULITIS (H): ICD-10-CM

## 2023-02-08 DIAGNOSIS — M31.31 GRANULOMATOSIS WITH POLYANGIITIS WITH RENAL INVOLVEMENT (H): ICD-10-CM

## 2023-02-08 LAB
ALBUMIN SERPL-MCNC: 3.4 G/DL (ref 3.4–5)
ANION GAP SERPL CALCULATED.3IONS-SCNC: 8 MMOL/L (ref 3–14)
BASOPHILS # BLD AUTO: 0.1 10E3/UL (ref 0–0.2)
BASOPHILS NFR BLD AUTO: 1 %
BUN SERPL-MCNC: 31 MG/DL (ref 7–30)
CALCIUM SERPL-MCNC: 9.2 MG/DL (ref 8.5–10.1)
CHLORIDE BLD-SCNC: 114 MMOL/L (ref 94–109)
CO2 SERPL-SCNC: 19 MMOL/L (ref 20–32)
CREAT SERPL-MCNC: 1.36 MG/DL (ref 0.52–1.04)
CRP SERPL-MCNC: 7.6 MG/L (ref 0–8)
EOSINOPHIL # BLD AUTO: 0.3 10E3/UL (ref 0–0.7)
EOSINOPHIL NFR BLD AUTO: 5 %
ERYTHROCYTE [DISTWIDTH] IN BLOOD BY AUTOMATED COUNT: 16.3 % (ref 10–15)
ERYTHROCYTE [SEDIMENTATION RATE] IN BLOOD BY WESTERGREN METHOD: 15 MM/HR (ref 0–20)
GFR SERPL CREATININE-BSD FRML MDRD: 56 ML/MIN/1.73M2
GLUCOSE BLD-MCNC: 84 MG/DL (ref 70–99)
HCT VFR BLD AUTO: 34.8 % (ref 35–47)
HGB BLD-MCNC: 11.3 G/DL (ref 11.7–15.7)
IMM GRANULOCYTES # BLD: 0 10E3/UL
IMM GRANULOCYTES NFR BLD: 0 %
LYMPHOCYTES # BLD AUTO: 1.7 10E3/UL (ref 0.8–5.3)
LYMPHOCYTES NFR BLD AUTO: 28 %
MCH RBC QN AUTO: 26.5 PG (ref 26.5–33)
MCHC RBC AUTO-ENTMCNC: 32.5 G/DL (ref 31.5–36.5)
MCV RBC AUTO: 82 FL (ref 78–100)
MONOCYTES # BLD AUTO: 0.7 10E3/UL (ref 0–1.3)
MONOCYTES NFR BLD AUTO: 11 %
NEUTROPHILS # BLD AUTO: 3.3 10E3/UL (ref 1.6–8.3)
NEUTROPHILS NFR BLD AUTO: 55 %
NRBC # BLD AUTO: 0 10E3/UL
NRBC BLD AUTO-RTO: 0 /100
PHOSPHATE SERPL-MCNC: 4 MG/DL (ref 2.5–4.5)
PLATELET # BLD AUTO: 140 10E3/UL (ref 150–450)
POTASSIUM BLD-SCNC: 4.5 MMOL/L (ref 3.4–5.3)
RBC # BLD AUTO: 4.26 10E6/UL (ref 3.8–5.2)
SODIUM SERPL-SCNC: 141 MMOL/L (ref 133–144)
WBC # BLD AUTO: 6 10E3/UL (ref 4–11)

## 2023-02-08 PROCEDURE — 80069 RENAL FUNCTION PANEL: CPT

## 2023-02-08 PROCEDURE — 99000 SPECIMEN HANDLING OFFICE-LAB: CPT

## 2023-02-08 PROCEDURE — 84433 ASY THIOPURIN S-MTHYLTRNSFRS: CPT | Mod: 90

## 2023-02-08 PROCEDURE — 85025 COMPLETE CBC W/AUTO DIFF WBC: CPT

## 2023-02-08 PROCEDURE — 36415 COLL VENOUS BLD VENIPUNCTURE: CPT

## 2023-02-08 PROCEDURE — 86140 C-REACTIVE PROTEIN: CPT

## 2023-02-08 PROCEDURE — 83516 IMMUNOASSAY NONANTIBODY: CPT

## 2023-02-08 PROCEDURE — 85652 RBC SED RATE AUTOMATED: CPT

## 2023-02-09 ENCOUNTER — OFFICE VISIT (OUTPATIENT)
Dept: RHEUMATOLOGY | Facility: CLINIC | Age: 23
End: 2023-02-09
Payer: COMMERCIAL

## 2023-02-09 VITALS
DIASTOLIC BLOOD PRESSURE: 69 MMHG | SYSTOLIC BLOOD PRESSURE: 102 MMHG | WEIGHT: 226 LBS | BODY MASS INDEX: 38.79 KG/M2 | OXYGEN SATURATION: 97 % | HEART RATE: 83 BPM

## 2023-02-09 DIAGNOSIS — M31.31 GRANULOMATOSIS WITH POLYANGIITIS WITH RENAL INVOLVEMENT (H): Primary | ICD-10-CM

## 2023-02-09 PROCEDURE — 99214 OFFICE O/P EST MOD 30 MIN: CPT | Performed by: INTERNAL MEDICINE

## 2023-02-09 RX ORDER — PREDNISONE 10 MG/1
10 TABLET ORAL DAILY
COMMUNITY
End: 2023-03-10

## 2023-02-09 ASSESSMENT — PAIN SCALES - GENERAL: PAINLEVEL: NO PAIN (0)

## 2023-02-09 NOTE — NURSING NOTE
Chief Complaint   Patient presents with     RECHECK     Granulomatosis with polyangiitis, unspecified whether renal involvement       Vitals:    02/09/23 0825   BP: 102/69   BP Location: Left arm   Patient Position: Sitting   Cuff Size: Adult Large   Pulse: 83   SpO2: 97%   Weight: 102.5 kg (226 lb)       Body mass index is 38.79 kg/m .      Little Jones, [facility]

## 2023-02-09 NOTE — PATIENT INSTRUCTIONS
Diagnosis:  1.  ANCA associated vasculitis, improved after treatment with rituximab, prednisone, and avacopan from December 2022 to the present.  Kidney filtering function as of February 8 is stable/improved compared to December 2022.  I recommend checking urinalysis and urine protein, and monitoring for recurrence of symptoms (bloody sputum production, fever, skin rash).  2.  Pregnancy, 4 weeks gestation: I agree with having discontinued medications other than prednisone.    Recommendation:  1.  Continue prednisone 12 mg daily, follow recommendations from nephrology/vasculitis team regarding prednisone taper.  2.  Urinalysis, spot urine protein in the next week  3.  Follow through with OB entry telephone call and follow-up for fetal monitoring for high risk pregnancy.  4.  Monitor blood pressure weekly at home; report change in upper or lower blood pressure number by 20 units to vasculitis service or rheumatology  5.  Continue off cigarettes, but use prenatal vitamin 1 daily

## 2023-02-10 ENCOUNTER — TELEPHONE (OUTPATIENT)
Dept: OBGYN | Facility: CLINIC | Age: 23
End: 2023-02-10

## 2023-02-10 ENCOUNTER — PRENATAL OFFICE VISIT (OUTPATIENT)
Dept: OBGYN | Facility: CLINIC | Age: 23
End: 2023-02-10
Payer: COMMERCIAL

## 2023-02-10 VITALS — HEIGHT: 64 IN | BODY MASS INDEX: 37.9 KG/M2 | WEIGHT: 222 LBS

## 2023-02-10 DIAGNOSIS — Z34.80 PRENATAL CARE, SUBSEQUENT PREGNANCY, UNSPECIFIED TRIMESTER: Primary | ICD-10-CM

## 2023-02-10 DIAGNOSIS — Q51.3 BICORNUATE UTERUS: ICD-10-CM

## 2023-02-10 LAB
PROTEINASE3 AB SER IA-ACNC: 6.6 U/ML
PROTEINASE3 AB SER IA-ACNC: POSITIVE

## 2023-02-10 PROCEDURE — 99207 PR NO CHARGE NURSE ONLY: CPT

## 2023-02-10 NOTE — PROGRESS NOTES
Telephone visit with patient for New Prenatal Intake and Education. This is patient's 2nd pregnancy. Pt had a fetal demise at 30w6d. Handouts reviewed and will be provided at next prenatal appointment. Scheduled for New Prenatal with Dr. Sorensen  on 3/2/23.       Prenatal OB Questionnaire  Patient supplied answers from flow sheet for:  Prenatal OB Questionnaire.  Past Medical History  Have you ever recieved care for your mental health? : (!) Yes (pt has been on medications, has had inpatient and outpatient care)  Have you ever been in a major accident or suffered serious trauma?: (P) No  Within the last year, has anyone hit, slapped, kicked or otherwise hurt you?: (P) No  In the last year, has anyone forced you to have sex when you didn't want to?: (P) No    Past Medical History 2   Have you ever received a blood transfusion?: (P) No  Would you accept a blood transfusion if was medically recommended?: (P) Yes  Does anyone in your home smoke?: (P) No   Is your blood type Rh negative?: (P) Unknown  Have you ever ?: (P) No  Have you been hospitalized for a nonsurgical reason excluding normal delivery?: (!) Yes (pt has had several hospital visits due to her autoimmune disease)  Have you ever had an abnormal pap smear?: (P) No    Past Medical History (Continued)  Do you have a history of abnormalities of the uterus?: (P) No  Did your mother take ZAHIDA or any other hormones when she was pregnant with you?: (P) No  Do you have any other problems we have not asked about which you feel may be important to this pregnancy?: (!) Yes        Allergies as of 2/10/2023:    Allergies as of 02/10/2023 - Reviewed 02/09/2023   Allergen Reaction Noted     Heparin Other (See Comments) 03/23/2021     Penicillins Rash 02/07/2006       Current medications are:  Current Outpatient Medications   Medication Sig Dispense Refill     Avacopan 10 MG CAPS Take 30 mg by mouth 2 times daily 180 capsule 11     benzoyl peroxide 5 % external  liquid Use daily as directed 226 g 11     calcium carbonate-vitamin D (OSCAL) 500-5 MG-MCG tablet Take 1 tablet by mouth 2 times daily (Patient not taking: Reported on 1/10/2023) 60 tablet 1     clindamycin (CLEOCIN T) 1 % external lotion 1-2 times daily as spot treatment for pus bumps. 60 mL 11     methylPREDNISolone (MEDROL DOSEPAK) 4 MG tablet therapy pack Follow Package Directions 70 tablet 0     omeprazole (PRILOSEC) 20 MG DR capsule Take 1 capsule (20 mg) by mouth daily (Patient not taking: Reported on 1/10/2023) 30 capsule 0     predniSONE (DELTASONE) 10 MG tablet Take 10 mg by mouth daily           Early ultrasound screening tool:    Does patient have irregular periods?  No  Did patient use hormonal birth control in the three months prior to positive urine pregnancy test? No  Is the patient breastfeeding?  No  Is the patient 10 weeks or greater at time of education visit?  No    Latasha Benjamin, RN

## 2023-02-10 NOTE — TELEPHONE ENCOUNTER
, 4w3d.    Had a prenatal intake with pt today.  She is scheduled for a New Prenatal visit with Dr. Sorensen on 3/2/23.  Pt will only be 7w2d at this appointment.      Pt has a history of a fetal demise at 30w6d.  Pt has a bicornuate uterus (per snapshot).  Pt has an autoimmune disease and other health risks.    Routing to Dr. Sorensen to see if pt should keep her New Prenatal visit on 3/2 or if it is okay to push back a few weeks.  If she should be seen at 7w2d days should she wait to do the dating ultrasound until she is around 8 weeks?    Latasha Benjamin, RN

## 2023-02-12 ENCOUNTER — HOSPITAL ENCOUNTER (EMERGENCY)
Facility: CLINIC | Age: 23
Discharge: HOME | End: 2023-02-13
Attending: EMERGENCY MEDICINE | Admitting: EMERGENCY MEDICINE
Payer: COMMERCIAL

## 2023-02-12 ENCOUNTER — APPOINTMENT (OUTPATIENT)
Dept: ULTRASOUND IMAGING | Facility: CLINIC | Age: 23
End: 2023-02-12
Attending: EMERGENCY MEDICINE
Payer: COMMERCIAL

## 2023-02-12 ENCOUNTER — NURSE TRIAGE (OUTPATIENT)
Dept: NURSING | Facility: CLINIC | Age: 23
End: 2023-02-12
Payer: COMMERCIAL

## 2023-02-12 ENCOUNTER — APPOINTMENT (OUTPATIENT)
Dept: CT IMAGING | Facility: CLINIC | Age: 23
End: 2023-02-12
Attending: EMERGENCY MEDICINE
Payer: COMMERCIAL

## 2023-02-12 DIAGNOSIS — M31.30 GRANULOMATOSIS WITH POLYANGIITIS, UNSPECIFIED WHETHER RENAL INVOLVEMENT (H): ICD-10-CM

## 2023-02-12 DIAGNOSIS — R07.9 ACUTE CHEST PAIN: ICD-10-CM

## 2023-02-12 DIAGNOSIS — M31.31 GRANULOMATOSIS WITH POLYANGIITIS WITH RENAL INVOLVEMENT (H): ICD-10-CM

## 2023-02-12 DIAGNOSIS — J02.0 STREPTOCOCCAL PHARYNGITIS: ICD-10-CM

## 2023-02-12 DIAGNOSIS — Z34.90 PREGNANCY AT EARLY STAGE: ICD-10-CM

## 2023-02-12 DIAGNOSIS — I26.94 MULTIPLE SUBSEGMENTAL PULMONARY EMBOLI WITHOUT ACUTE COR PULMONALE (H): ICD-10-CM

## 2023-02-12 LAB
ALBUMIN SERPL BCG-MCNC: 3.6 G/DL (ref 3.5–5.2)
ALP SERPL-CCNC: 91 U/L (ref 35–104)
ALT SERPL W P-5'-P-CCNC: 50 U/L (ref 10–35)
ANION GAP SERPL CALCULATED.3IONS-SCNC: 14 MMOL/L (ref 7–15)
AST SERPL W P-5'-P-CCNC: 22 U/L (ref 10–35)
BASOPHILS # BLD MANUAL: 0 10E3/UL (ref 0–0.2)
BASOPHILS NFR BLD MANUAL: 1 %
BILIRUB SERPL-MCNC: 0.4 MG/DL
BUN SERPL-MCNC: 18.6 MG/DL (ref 6–20)
CALCIUM SERPL-MCNC: 9 MG/DL (ref 8.6–10)
CHLORIDE SERPL-SCNC: 100 MMOL/L (ref 98–107)
CREAT SERPL-MCNC: 1.6 MG/DL (ref 0.51–0.95)
D DIMER PPP FEU-MCNC: 3.78 UG/ML FEU (ref 0–0.5)
DEPRECATED HCO3 PLAS-SCNC: 19 MMOL/L (ref 22–29)
DEPRECATED S PYO AG THROAT QL EIA: POSITIVE
EOSINOPHIL # BLD MANUAL: 0 10E3/UL (ref 0–0.7)
EOSINOPHIL NFR BLD MANUAL: 1 %
ERYTHROCYTE [DISTWIDTH] IN BLOOD BY AUTOMATED COUNT: 15.8 % (ref 10–15)
GFR SERPL CREATININE-BSD FRML MDRD: 46 ML/MIN/1.73M2
GLUCOSE SERPL-MCNC: 108 MG/DL (ref 70–99)
HCG INTACT+B SERPL-ACNC: 4961 MIU/ML
HCT VFR BLD AUTO: 36 % (ref 35–47)
HGB BLD-MCNC: 11.4 G/DL (ref 11.7–15.7)
LYMPHOCYTES # BLD MANUAL: 1 10E3/UL (ref 0.8–5.3)
LYMPHOCYTES NFR BLD MANUAL: 30 %
MCH RBC QN AUTO: 26 PG (ref 26.5–33)
MCHC RBC AUTO-ENTMCNC: 31.7 G/DL (ref 31.5–36.5)
MCV RBC AUTO: 82 FL (ref 78–100)
MONOCYTES # BLD MANUAL: 1.4 10E3/UL (ref 0–1.3)
MONOCYTES NFR BLD MANUAL: 45 %
NEUTROPHILS # BLD MANUAL: 0.7 10E3/UL (ref 1.6–8.3)
NEUTROPHILS NFR BLD MANUAL: 23 %
PLAT MORPH BLD: ABNORMAL
PLATELET # BLD AUTO: 195 10E3/UL (ref 150–450)
POTASSIUM SERPL-SCNC: 4.5 MMOL/L (ref 3.4–5.3)
PROT SERPL-MCNC: 6.7 G/DL (ref 6.4–8.3)
RBC # BLD AUTO: 4.39 10E6/UL (ref 3.8–5.2)
RBC MORPH BLD: ABNORMAL
SODIUM SERPL-SCNC: 133 MMOL/L (ref 136–145)
TPMT BLD-CCNC: 24 U/ML
VARIANT LYMPHS BLD QL SMEAR: PRESENT
WBC # BLD AUTO: 3.2 10E3/UL (ref 4–11)

## 2023-02-12 PROCEDURE — 85007 BL SMEAR W/DIFF WBC COUNT: CPT | Performed by: EMERGENCY MEDICINE

## 2023-02-12 PROCEDURE — 96365 THER/PROPH/DIAG IV INF INIT: CPT | Mod: 59 | Performed by: FAMILY MEDICINE

## 2023-02-12 PROCEDURE — 93010 ELECTROCARDIOGRAM REPORT: CPT | Performed by: FAMILY MEDICINE

## 2023-02-12 PROCEDURE — 96376 TX/PRO/DX INJ SAME DRUG ADON: CPT | Mod: 59 | Performed by: FAMILY MEDICINE

## 2023-02-12 PROCEDURE — 93005 ELECTROCARDIOGRAM TRACING: CPT | Performed by: FAMILY MEDICINE

## 2023-02-12 PROCEDURE — 99285 EMERGENCY DEPT VISIT HI MDM: CPT | Mod: 25 | Performed by: EMERGENCY MEDICINE

## 2023-02-12 PROCEDURE — 250N000009 HC RX 250: Performed by: EMERGENCY MEDICINE

## 2023-02-12 PROCEDURE — 36415 COLL VENOUS BLD VENIPUNCTURE: CPT | Performed by: EMERGENCY MEDICINE

## 2023-02-12 PROCEDURE — 71275 CT ANGIOGRAPHY CHEST: CPT

## 2023-02-12 PROCEDURE — 250N000011 HC RX IP 250 OP 636: Performed by: EMERGENCY MEDICINE

## 2023-02-12 PROCEDURE — 85027 COMPLETE CBC AUTOMATED: CPT | Performed by: EMERGENCY MEDICINE

## 2023-02-12 PROCEDURE — 96375 TX/PRO/DX INJ NEW DRUG ADDON: CPT | Mod: 59 | Performed by: FAMILY MEDICINE

## 2023-02-12 PROCEDURE — 99285 EMERGENCY DEPT VISIT HI MDM: CPT | Mod: 25 | Performed by: FAMILY MEDICINE

## 2023-02-12 PROCEDURE — 80053 COMPREHEN METABOLIC PANEL: CPT | Performed by: EMERGENCY MEDICINE

## 2023-02-12 PROCEDURE — 76801 OB US < 14 WKS SINGLE FETUS: CPT

## 2023-02-12 PROCEDURE — 85379 FIBRIN DEGRADATION QUANT: CPT | Performed by: EMERGENCY MEDICINE

## 2023-02-12 PROCEDURE — 87880 STREP A ASSAY W/OPTIC: CPT | Performed by: EMERGENCY MEDICINE

## 2023-02-12 PROCEDURE — 84484 ASSAY OF TROPONIN QUANT: CPT | Performed by: FAMILY MEDICINE

## 2023-02-12 PROCEDURE — 84702 CHORIONIC GONADOTROPIN TEST: CPT | Performed by: EMERGENCY MEDICINE

## 2023-02-12 RX ORDER — IOPAMIDOL 755 MG/ML
200 INJECTION, SOLUTION INTRAVASCULAR ONCE
Status: COMPLETED | OUTPATIENT
Start: 2023-02-12 | End: 2023-02-12

## 2023-02-12 RX ORDER — CEFTRIAXONE 2 G/1
2 INJECTION, POWDER, FOR SOLUTION INTRAMUSCULAR; INTRAVENOUS ONCE
Status: COMPLETED | OUTPATIENT
Start: 2023-02-12 | End: 2023-02-12

## 2023-02-12 RX ORDER — MORPHINE SULFATE 4 MG/ML
4 INJECTION, SOLUTION INTRAMUSCULAR; INTRAVENOUS
Status: DISCONTINUED | OUTPATIENT
Start: 2023-02-12 | End: 2023-02-13 | Stop reason: HOSPADM

## 2023-02-12 RX ORDER — METHYLPREDNISOLONE SODIUM SUCCINATE 125 MG/2ML
125 INJECTION, POWDER, LYOPHILIZED, FOR SOLUTION INTRAMUSCULAR; INTRAVENOUS ONCE
Status: COMPLETED | OUTPATIENT
Start: 2023-02-12 | End: 2023-02-12

## 2023-02-12 RX ADMIN — METHYLPREDNISOLONE SODIUM SUCCINATE 125 MG: 125 INJECTION, POWDER, FOR SOLUTION INTRAMUSCULAR; INTRAVENOUS at 23:18

## 2023-02-12 RX ADMIN — CEFTRIAXONE SODIUM 2 G: 2 INJECTION, POWDER, FOR SOLUTION INTRAMUSCULAR; INTRAVENOUS at 23:26

## 2023-02-12 RX ADMIN — MORPHINE SULFATE 4 MG: 4 INJECTION, SOLUTION INTRAMUSCULAR; INTRAVENOUS at 23:16

## 2023-02-12 RX ADMIN — SODIUM CHLORIDE 70 ML: 9 INJECTION, SOLUTION INTRAVENOUS at 23:34

## 2023-02-12 RX ADMIN — IOPAMIDOL 75 ML: 755 INJECTION, SOLUTION INTRAVENOUS at 23:34

## 2023-02-12 ASSESSMENT — ACTIVITIES OF DAILY LIVING (ADL)
ADLS_ACUITY_SCORE: 37
ADLS_ACUITY_SCORE: 35

## 2023-02-13 ENCOUNTER — TELEPHONE (OUTPATIENT)
Dept: NEPHROLOGY | Facility: CLINIC | Age: 23
End: 2023-02-13
Payer: COMMERCIAL

## 2023-02-13 VITALS
BODY MASS INDEX: 38.31 KG/M2 | SYSTOLIC BLOOD PRESSURE: 118 MMHG | OXYGEN SATURATION: 93 % | WEIGHT: 224.4 LBS | HEART RATE: 97 BPM | RESPIRATION RATE: 20 BRPM | DIASTOLIC BLOOD PRESSURE: 72 MMHG | TEMPERATURE: 98.3 F | HEIGHT: 64 IN

## 2023-02-13 LAB
RADIOLOGIST FLAGS: ABNORMAL
TROPONIN T SERPL HS-MCNC: 8 NG/L

## 2023-02-13 PROCEDURE — 96372 THER/PROPH/DIAG INJ SC/IM: CPT | Performed by: FAMILY MEDICINE

## 2023-02-13 PROCEDURE — 250N000011 HC RX IP 250 OP 636: Performed by: FAMILY MEDICINE

## 2023-02-13 PROCEDURE — 250N000013 HC RX MED GY IP 250 OP 250 PS 637: Performed by: FAMILY MEDICINE

## 2023-02-13 PROCEDURE — 250N000011 HC RX IP 250 OP 636: Performed by: EMERGENCY MEDICINE

## 2023-02-13 RX ORDER — OXYCODONE HYDROCHLORIDE 5 MG/1
10 TABLET ORAL ONCE
Status: COMPLETED | OUTPATIENT
Start: 2023-02-13 | End: 2023-02-13

## 2023-02-13 RX ORDER — ENOXAPARIN SODIUM 100 MG/ML
100 INJECTION SUBCUTANEOUS EVERY 12 HOURS
Qty: 60 ML | Refills: 0 | Status: ON HOLD | OUTPATIENT
Start: 2023-02-13 | End: 2023-02-27

## 2023-02-13 RX ORDER — CEPHALEXIN 500 MG/1
500 CAPSULE ORAL 3 TIMES DAILY
Qty: 30 CAPSULE | Refills: 0 | Status: ON HOLD | OUTPATIENT
Start: 2023-02-13 | End: 2023-02-27

## 2023-02-13 RX ORDER — ENOXAPARIN SODIUM 100 MG/ML
1 INJECTION SUBCUTANEOUS EVERY 12 HOURS
Status: DISCONTINUED | OUTPATIENT
Start: 2023-02-13 | End: 2023-02-13 | Stop reason: HOSPADM

## 2023-02-13 RX ORDER — OXYCODONE HYDROCHLORIDE 5 MG/1
5 TABLET ORAL EVERY 6 HOURS PRN
Qty: 12 TABLET | Refills: 0 | Status: SHIPPED | OUTPATIENT
Start: 2023-02-13 | End: 2023-03-10

## 2023-02-13 RX ADMIN — OXYCODONE HYDROCHLORIDE 10 MG: 5 TABLET ORAL at 00:40

## 2023-02-13 RX ADMIN — MORPHINE SULFATE 4 MG: 4 INJECTION, SOLUTION INTRAMUSCULAR; INTRAVENOUS at 00:31

## 2023-02-13 RX ADMIN — ENOXAPARIN SODIUM 100 MG: 100 INJECTION SUBCUTANEOUS at 01:37

## 2023-02-13 ASSESSMENT — ACTIVITIES OF DAILY LIVING (ADL): ADLS_ACUITY_SCORE: 37

## 2023-02-13 NOTE — ED PROVIDER NOTES
I was asked to take over the care of this patient at shift change by Dr. Andrade.  Dr. Andrade stated the patient is a 22-year-old female who presented to the ER today with concerns of cough, chest pain, and low-grade fever symptoms.  She has also had sore throat symptoms.  She has a significant past medical history including the fact that she is approximately 4 weeks pregnant.  She has a history of Wegener's granulomatosis and vasculitis with kidney function issues.  She apparently stopped her immunologic medications about 1 month ago in order to get pregnant.  Dr. Andrade stated that the patient was treated with a dose of Solu-Medrol as well as given a dose of ceftriaxone for a positive strep test.  She ordered an ultrasound to verify intrauterine pregnancy.  She also discussed the possibility of pulmonary embolism with the chest pain issues.  Patient has a history for a saddle embolism in the past.  I spoke to the patient about this history and she states that she was on a blood thinner for couple months but then was stopped and has not been on any blood thinning medication since.  She stated that they could not find any specific reason for her coagulation issues associated with that clot.    I reviewed the lab test results performed on the patient and followed up on the D-dimer level.  The ultrasound report was also reviewed and the patient with evidence of a intrauterine pregnancy.  Her beta hCG level was 4961.  Patient's D-dimer level did come back elevated and a CT PE study was ordered.  Lab and imaging test results as listed below.    Results for orders placed or performed during the hospital encounter of 02/12/23   CT Chest Pulmonary Embolism w Contrast     Status: Abnormal   Result Value Ref Range    Radiologist flags (AA)      Acute pulmonary embolism and borderline right heart strain    Narrative    EXAM: CT CHEST PULMONARY EMBOLISM W CONTRAST  LOCATION: [facility]  CENTER  DATE/TIME: 2/12/2023 11:58 PM    INDICATION: Left chest pain, history of Wegener's granulomatosis, history of pulmonary embolism, 5 weeks pregnant; Female sex; Pregnant; No DVT symptoms; No imaging to r o PE in the last 24 hours; Chest x ray not available  COMPARISON: 04/08/2021  TECHNIQUE: CT chest pulmonary angiogram during arterial phase injection of IV contrast. Multiplanar reformats and MIP reconstructions were performed. Dose reduction techniques were used.   CONTRAST: 75 mL Isovue 370    FINDINGS:  ANGIOGRAM CHEST: There are lobar and segmental pulmonary emboli in the right upper, middle, and lower lobes. There are segmental and subsegmental pulmonary emboli in the left lower lobe basilar segments. The main pulmonary artery is mildly dilated   measuring 3.6 cm. Borderline CT evidence of right heart strain with RV to LV ratio measuring 1.0. Thoracic aorta is negative for dissection.     LUNGS AND PLEURA: Scattered ill-defined nodular opacities in the bilateral upper lobes and right middle lobe. 3 adjacent subpleural nodules in the left lower lobe measuring 1.7 cm, 0.8 cm, and 0.7 cm. No cavitation. No pleural effusion or pneumothorax.    MEDIASTINUM/AXILLAE: No lymphadenopathy. No pericardial effusion.    CORONARY ARTERY CALCIFICATION: None.    UPPER ABDOMEN: Unremarkable.    MUSCULOSKELETAL: Multilevel Schmorl's nodes.      Impression    IMPRESSION:  1.  Acute right lobar and segmental and left segmental and subsegmental pulmonary emboli. Borderline CT evidence of right heart strain.    2.  Scattered bilateral ill-defined nodular opacities with a few left lower lobe subpleural nodules. Findings may represent multifocal atypical infection or inflammatory related to granulomatosis with polyangiitis. No cavitation.      [Critical Result: Acute pulmonary embolism and borderline right heart strain]    Finding was identified on 2/13/2023 12:05 AM.     Dr. Valle was contacted by me on 2/13/2023 12:12 AM  and verbalized understanding of the critical result.    US OB < 14 Weeks w Transvaginal     Status: None    Narrative    EXAM: US OB <14 WEEKS WITH TRANSVAGINAL SINGLE  LOCATION: [facility]  DATE/TIME: 2/12/2023 10:31 PM    INDICATION: Pregnant; spotting and cramping.  COMPARISON: Pelvic ultrasound 06/25/2021.  TECHNIQUE: Transabdominal scans were performed. Endovaginal ultrasound was performed to better visualize the embryo.    FINDINGS:  UTERUS: Bicornuate uterus, with intrauterine gestational sac in the left horn. Mean sac diameter (MSD) measures 0.6 cm, corresponding to an estimated gestational age of 5 weeks 1 day. Yolk sac is present, with possible 1 mm fetal pole. No fetal heart   tones are identified.    RIGHT OVARY: Normal.  LEFT OVARY: Normal; corpus luteal cyst is present.      Impression    IMPRESSION:   1.  Intrauterine pregnancy of uncertain viability, likely due to early gestational age. Continued trending of beta-hCG levels and short-term follow-up sonography is recommended.  2.  Bicornuate uterus, with the gestational sac located in the left horn.       HCG quantitative pregnancy     Status: Abnormal   Result Value Ref Range    hCG Quantitative 4,961 (H) <5 mIU/mL   D dimer quantitative     Status: Abnormal   Result Value Ref Range    D-Dimer Quantitative 3.78 (H) 0.00 - 0.50 ug/mL FEU    Narrative    This D-dimer assay is intended for use in conjunction with a clinical pretest probability assessment model to exclude pulmonary embolism (PE) and deep venous thrombosis (DVT) in outpatients suspected of PE or DVT. The cut-off value is 0.50 ug/mL FEU.   Comprehensive metabolic panel     Status: Abnormal   Result Value Ref Range    Sodium 133 (L) 136 - 145 mmol/L    Potassium 4.5 3.4 - 5.3 mmol/L    Chloride 100 98 - 107 mmol/L    Carbon Dioxide (CO2) 19 (L) 22 - 29 mmol/L    Anion Gap 14 7 - 15 mmol/L    Urea Nitrogen 18.6 6.0 - 20.0 mg/dL    Creatinine 1.60 (H) 0.51 -  0.95 mg/dL    Calcium 9.0 8.6 - 10.0 mg/dL    Glucose 108 (H) 70 - 99 mg/dL    Alkaline Phosphatase 91 35 - 104 U/L    AST 22 10 - 35 U/L    ALT 50 (H) 10 - 35 U/L    Protein Total 6.7 6.4 - 8.3 g/dL    Albumin 3.6 3.5 - 5.2 g/dL    Bilirubin Total 0.4 <=1.2 mg/dL    GFR Estimate 46 (L) >60 mL/min/1.73m2   CBC with platelets and differential     Status: Abnormal   Result Value Ref Range    WBC Count 3.2 (L) 4.0 - 11.0 10e3/uL    RBC Count 4.39 3.80 - 5.20 10e6/uL    Hemoglobin 11.4 (L) 11.7 - 15.7 g/dL    Hematocrit 36.0 35.0 - 47.0 %    MCV 82 78 - 100 fL    MCH 26.0 (L) 26.5 - 33.0 pg    MCHC 31.7 31.5 - 36.5 g/dL    RDW 15.8 (H) 10.0 - 15.0 %    Platelet Count 195 150 - 450 10e3/uL   Manual Differential     Status: Abnormal   Result Value Ref Range    % Neutrophils 23 %    % Lymphocytes 30 %    % Monocytes 45 %    % Eosinophils 1 %    % Basophils 1 %    Absolute Neutrophils 0.7 (L) 1.6 - 8.3 10e3/uL    Absolute Lymphocytes 1.0 0.8 - 5.3 10e3/uL    Absolute Monocytes 1.4 (H) 0.0 - 1.3 10e3/uL    Absolute Eosinophils 0.0 0.0 - 0.7 10e3/uL    Absolute Basophils 0.0 0.0 - 0.2 10e3/uL    RBC Morphology Confirmed RBC Indices     Platelet Assessment  Automated Count Confirmed. Platelet morphology is normal.     Automated Count Confirmed. Platelet morphology is normal.    Reactive Lymphocytes Present (A) None Seen   Troponin T, High Sensitivity (now)     Status: Normal   Result Value Ref Range    Troponin T, High Sensitivity 8 <=14 ng/L   Streptococcus A Rapid Screen w/Reflex to PCR     Status: Abnormal    Specimen: Throat; Swab   Result Value Ref Range    Group A Strep antigen Positive (A) Negative   CBC with platelets differential     Status: Abnormal    Narrative    The following orders were created for panel order CBC with platelets differential.  Procedure                               Abnormality         Status                     ---------                               -----------         ------                      CBC with platelets and d...[[id number]]  Abnormal            Final result               Manual Differential[[id number]]          Abnormal            Final result                 Please view results for these tests on the individual orders.          EKG Interpretation:      Interpreted by Hank Valle DO  Time reviewed:00:50   Symptoms at time of EKG: chest pain, pleuritic   Rhythm: normal sinus   Rate: 107  Axis: NORMAL  Ectopy: none  Conduction: normal  ST Segments/ T Waves: No ST-T wave changes  Q Waves: none  Comparison to prior: No old EKG available    Clinical Impression: normal EKG, sinus tach      I spent time with the patient and discussion of treatment options available to her for the pulmonary embolism.  Her EKG and troponin were reassuring in regards to any significant heart strain associated with a PE.  She was oxygenating normally in the ER on room air.  Complicating issue with anticoagulation is the fact that she is pregnant.  She also has a listed allergy for heparin in which she apparently had a heparin-induced thrombocytopenia.  I spoke with pharmacy as well as consulted Dr. Salmon, on-call for OB.  We have elected to initiate enoxaparin treatment.  Given her renal function I discussed dosing with pharmacy and was recommended to start at 1 mg/kg every 12 hours.  Patient was instructed on the injection and dosing of enoxaparin.  Patient was also given additional antibiotic for the treatment of her strep throat.  She was given strict instructions that should she develop any increasing or worsening shortness of breath or chest pain issues not controlled with the pain medications prescribed for her that she return to the ER emergently.  She is also encouraged to contact her clinic physician and her obstetrician to discuss the findings today and the fact that she was initiated on these medications.  She was in agreement with the plan of care.  She was discharged in the care of her significant  other.    Dx:  1. Multiple subsegmental pulmonary emboli without acute cor pulmonale (H)    2. Acute chest pain    3. Pregnancy at early stage    4. Streptococcal pharyngitis    5. Granulomatosis with polyangiitis, unspecified whether renal involvement (H)    6. 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

## 2023-02-13 NOTE — TELEPHONE ENCOUNTER
"4 weeks pregnant, Temp 102, onset today, was chilling, sore throat present, Just now had small amount red blood after wiping.. Has has had mild cramps periodically. Has cough, Nasal discharge. Protocol for fever /pregnancy reviewed, Page placed to on call OB provider at 715pm. On call provider Dr. Sorensen called back at 720pm and said no need for patient to go to ED . To call back if Increased bleeding/cramping or persistent Temperature. Patient notified.     Upon notifying patient of above, she said she is now having Left sided Chest pain with every breath in. Rating pain as a \"5\".   Pain Left sided, Above nipple line, extends some into back. Has Granulomatosis, an autoimmune disorder, did have covid a month ago also.    Protocol for Chest pain reviewed with disposition to go to ED now. Patient will go to [place] ED.     JANEY SHANNON RN  [facility] nurse advisors  2/12/2023  7:35 PM    Reason for Disposition    [1] Vaginal bleeding AND [2] pregnant < 20 weeks    Fever > 100.4 F (38.0 C)    [1] Chest pain (or \"angina\") comes and goes AND [2] is happening more often (increasing in frequency) or getting worse (increasing in severity) (Exception: chest pains that last only a few seconds)    Additional Information    Negative: Shock suspected (e.g., cold/pale/clammy skin, too weak to stand, low BP, rapid pulse)    Negative: Difficult to awaken or acting confused (e.g., disoriented, slurred speech)    Negative: Passed out (i.e., lost consciousness, collapsed and was not responding)    Negative: Sounds like a life-threatening emergency to the triager    Negative: SEVERE abdominal pain    Negative: [1] SEVERE vaginal bleeding (e.g., soaking 2 pads / hour, large blood clots) AND [2] present 2 or more hours    Negative: SEVERE dizziness (e.g., unable to stand, requires support to walk, feels like passing out)    Negative: [1] MODERATE vaginal bleeding (e.g., soaking 1 pad per hour; clots) AND [2] present > 6 " hours    Negative: [1] MODERATE vaginal bleeding (e.g., soaking 1 pad per hour; clots) AND [2] pregnant > 12 weeks    Negative: Passed tissue (e.g., gray-white)    Negative: Shoulder pain    Negative: Pale skin (pallor) of new-onset or worsening    Negative: Patient sounds very sick or weak to the triager    Negative: [1] Constant abdominal pain AND [2] present > 2 hours    Negative: SEVERE difficulty breathing (e.g., struggling for each breath, speaks in single words)    Negative: Difficult to awaken or acting confused (e.g., disoriented, slurred speech)    Negative: Shock suspected (e.g., cold/pale/clammy skin, too weak to stand, low BP, rapid pulse)    Negative: Passed out (i.e., lost consciousness, collapsed and was not responding)    Negative: [1] Chest pain lasts > 5 minutes AND [2] age > 44    Negative: [1] Chest pain lasts > 5 minutes AND [2] age > 30 AND [3] one or more cardiac risk factors (e.g., diabetes, high blood pressure, high cholesterol, smoker, or strong family history of heart disease)    Negative: [1] Chest pain lasts > 5 minutes AND [2] history of heart disease (i.e., angina, heart attack, heart failure, bypass surgery, takes nitroglycerin)    Negative: [1] Chest pain lasts > 5 minutes AND [2] described as crushing, pressure-like, or heavy    Negative: Heart beating < 50 beats per minute OR > 140 beats per minute    Negative: Visible sweat on face or sweat dripping down face    Negative: Sounds like a life-threatening emergency to the triager    Negative: SEVERE chest pain    Protocols used: PREGNANCY - VAGINAL [id number], PREGNANCY - VAGINAL BLEEDING LESS THAN 20 WEEKS EGA-A-AH, CHEST PAIN-A-AH

## 2023-02-13 NOTE — ED PROVIDER NOTES
History     Chief Complaint   Patient presents with     Fever     Cough     HPI  History per patient, medical records    This is a 22-year-old female, complex past medical history, currently approximately 4+ weeks pregnant, G2, P0, presenting with fever and cough.  Patient has history of Wegener's granulomatosis diagnosed at age 10.  She was in remission until she had COVID in 2020 and again in December 2022.  Each of these episodes flared her symptoms including coughing up blood, body aches, kidney issues.  She was managed on Rituxan, prednisone, avacopan but quit taking these when she found out she was pregnant a couple of weeks ago.  She unfortunately lost her first child at 32 weeks with intrauterine fetal demise.  Patient started having cold symptoms a couple of days ago.  She has had congestion, cough, sore throat.  She has developed sharp pleuritic chest pain in her left upper chest.  She has had significant fatigue.  She notes fevers up to 102.1 and she has been consistently taking Tylenol.  She has been trying to drink her fluids.  When she urinated today she noted some pink on the toilet paper with some spotting.  She has had a couple of intermittent abdominal cramps.  Patient works in .  She denies nausea, vomiting, diarrhea, rash.  No calf pain or swelling.  She vapes occasionally.    Allergies:  Allergies   Allergen Reactions     Heparin Other (See Comments)     Reaction: HIT     Penicillins Rash     Unknown, but think rash.  Tolerated cephalexin (12/27/20), cefpodoxime (12/27/20)       Problem List:    Patient Active Problem List    Diagnosis Date Noted     ATN (acute tubular necrosis) (H) 12/21/2022     Priority: Medium     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) 12/20/2022     Priority: Medium     Infection due to 2019 novel coronavirus 12/20/2022     Priority: Medium     Acute deep vein thrombosis (DVT) of proximal vein of both lower extremities (H) 10/30/2021      "Priority: Medium     BMI 40.0-44.9, adult (H) 10/30/2021     Priority: Medium     Femoral artery thrombosis, left (H) 10/30/2021     Priority: Medium     Thrombosis 03/23/2021     Priority: Medium     Occlusion of common femoral artery (H) 03/21/2021     Priority: Medium     Stage 3 chronic kidney disease, unspecified whether stage 3a or 3b CKD (H) 03/20/2021     Priority: Medium     Acute saddle pulmonary embolism with acute cor pulmonale (H) 03/20/2021     Priority: Medium     Need for pneumocystis prophylaxis 03/04/2021     Priority: Medium     Pulmonary infiltrates 01/28/2021     Priority: Medium     Acute kidney injury (H) 01/28/2021     Priority: Medium     Personal history of COVID-19 01/10/2021     Priority: Medium     Pyoderma gangrenosum 01/10/2021     Priority: Medium     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 with right sided chest pain, BP in severe range, No fetal heart tones auscultated. Induced, NSVB, \"Yasmin\" 2lbs       Alcohol use disorder, mild, in sustained remission 02/11/2015     Priority: Medium     Formatting of this note might be different from the original.  sober since 01/2014.       Binge-eating disorder, moderate 02/11/2015     Priority: Medium     Cannabis use disorder, mild, in early remission 02/11/2015     Priority: Medium     Formatting of this note might be different from the original.  sober since 06/2014.       Depression 02/11/2015     Priority: Medium     ADHD (attention deficit hyperactivity disorder), inattentive type 07/09/2014     Priority: Medium     Generalized anxiety disorder 07/09/2014     Priority: Medium     " Performance anxiety 07/09/2014     Priority: Medium     Self-injurious behavior 07/09/2014     Priority: Medium     History of granulomatosis with polyangiitis 02/15/2012     Priority: Medium     Wegener's granulomatosis 10/04/2011     Priority: Medium     Replacing diagnoses that were inactivated after the 10/1/2021 regulatory import.          Past Medical History:    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     Multiple subsegmental pulmonary emboli without acute cor pulmonale (H) 03/2021     PFO (patent foramen ovale)      Preeclampsia, third trimester      Spontaneous pregnancy loss 2020     Stage 3b chronic kidney disease (H)      Wegener's disease, pulmonary 01/01/2010       Past Surgical History:    Past Surgical History:   Procedure Laterality Date     ENT SURGERY  2000    cyst     EXCISE GANGLION WRIST  2009       Family History:    Family History   Problem Relation Age of Onset     Thyroid Disease Mother      Cancer Maternal Grandfather      Asthma No family hx of      Diabetes No family hx of        Social History:  Marital Status:  Legally  [3]  Social History     Tobacco Use     Smoking status: Former     Packs/day: 0.25     Types: Cigarettes     Smokeless tobacco: Current     Tobacco comments:     vaping   Vaping Use     Vaping Use: Some days     Substances: Nicotine, Flavoring     Devices: Disposable   Substance Use Topics     Alcohol use: Not Currently     Drug use: No        Medications:    cephALEXin (KEFLEX) 500 MG capsule  enoxaparin ANTICOAGULANT (LOVENOX) 100 MG/ML syringe  oxyCODONE (ROXICODONE) 5 MG tablet  Avacopan 10 MG CAPS  benzoyl peroxide 5 % external liquid  calcium carbonate-vitamin D (OSCAL) 500-5 MG-MCG tablet  clindamycin (CLEOCIN T) 1 % external lotion  methylPREDNISolone (MEDROL DOSEPAK) 4 MG tablet therapy  "pack  omeprazole (PRILOSEC) 20 MG DR capsule  predniSONE (DELTASONE) 10 MG tablet  Prenatal Vit-Fe Fumarate-FA (PRENATAL VITAMINS PO)          Review of Systems   All other ROS reviewed and are negative or non-contributory except as stated in HPI.     Physical Exam   BP: 117/70  Pulse: 117  Temp: 98.3  F (36.8  C)  Resp: 20  Height: 162.6 cm (5' 4\")  Weight: 101.8 kg (224 lb 6.4 oz)  SpO2: 96 %      Physical Exam  Vitals and nursing note reviewed.   Constitutional:       Appearance: Normal appearance. She is obese.      Comments: Pleasant, slightly ill-appearing female sitting in the bed.  She sounds congested.   HENT:      Head: Normocephalic.      Right Ear: Tympanic membrane and ear canal normal.      Left Ear: Tympanic membrane and ear canal normal.      Nose: Congestion present.      Mouth/Throat:      Mouth: Mucous membranes are moist.      Pharynx: Oropharynx is clear. Posterior oropharyngeal erythema present. No oropharyngeal exudate.   Eyes:      General: No scleral icterus.     Extraocular Movements: Extraocular movements intact.      Conjunctiva/sclera: Conjunctivae normal.   Cardiovascular:      Rate and Rhythm: Regular rhythm. Tachycardia present.      Pulses: Normal pulses.      Heart sounds: Normal heart sounds.   Pulmonary:      Effort: Pulmonary effort is normal.      Breath sounds: Normal breath sounds.   Abdominal:      Palpations: Abdomen is soft.      Tenderness: There is no abdominal tenderness.   Musculoskeletal:         General: No tenderness. Normal range of motion.      Cervical back: Normal range of motion and neck supple.      Right lower leg: No edema.      Left lower leg: No edema.   Skin:     General: Skin is warm and dry.      Findings: No rash.   Neurological:      General: No focal deficit present.      Mental Status: She is alert and oriented to person, place, and time.   Psychiatric:         Mood and Affect: Mood normal.         Behavior: Behavior normal.         ED Course (with " "Medical Decision Making)    Pt seen and examined by me.  RN and EPIC notes reviewed.       Patient with fevers, sore throat, congestion.  She has had some cough.  She has pleuritic chest pain on the upper left.  She is also pregnant with some spotting.  With her significant history of autoimmune disease/Wegener's granulomatosis I am quite concerned for PE and also concern for the cause of her fevers.  Strep swab was done via triage.  I did review medical records and note patient has history of saddle emboli PE.  For the spotting I am going to do an ultrasound and check hCG.    Strep swab positive.  Plan to give patient ceftriaxone IV.    IV and fluids ordered.  When to give her Solu-Medrol for her sore throat.  Per medical record she is supposed to continue to be on prednisone.  She notes that she \"forgets to take it often\".  She has stopped all of her other autoimmune medications after checking with her rheumatologist.  Patient also given a dose of morphine for her pain.    Ultrasound shows IUP with bicornate uterus.  hCG 4961.    Patient signed out to Dr. Valle pending lab studies.  We checked and her D-dimer has been normal in the past.  If it is abnormal plan to do CT scan to evaluate for possible PE.        Procedures    Results for orders placed or performed during the hospital encounter of 02/12/23 (from the past 24 hour(s))   Streptococcus A Rapid Screen w/Reflex to PCR    Specimen: Throat; Swab   Result Value Ref Range    Group A Strep antigen Positive (A) Negative   US OB < 14 Weeks w Transvaginal    Narrative    EXAM: US OB <14 WEEKS WITH TRANSVAGINAL SINGLE  [facility]  DATE/TIME: 2/12/2023 10:31 PM    INDICATION: Pregnant; spotting and cramping.  COMPARISON: Pelvic ultrasound 06/25/2021.  TECHNIQUE: Transabdominal scans were performed. Endovaginal ultrasound was performed to better visualize the embryo.    FINDINGS:  UTERUS: Bicornuate uterus, with " intrauterine gestational sac in the left horn. Mean sac diameter (MSD) measures 0.6 cm, corresponding to an estimated gestational age of 5 weeks 1 day. Yolk sac is present, with possible 1 mm fetal pole. No fetal heart   tones are identified.    RIGHT OVARY: Normal.  LEFT OVARY: Normal; corpus luteal cyst is present.      Impression    IMPRESSION:   1.  Intrauterine pregnancy of uncertain viability, likely due to early gestational age. Continued trending of beta-hCG levels and short-term follow-up sonography is recommended.  2.  Bicornuate uterus, with the gestational sac located in the left horn.       HCG quantitative pregnancy   Result Value Ref Range    hCG Quantitative 4,961 (H) <5 mIU/mL   D dimer quantitative   Result Value Ref Range    D-Dimer Quantitative 3.78 (H) 0.00 - 0.50 ug/mL FEU    Narrative    This D-dimer assay is intended for use in conjunction with a clinical pretest probability assessment model to exclude pulmonary embolism (PE) and deep venous thrombosis (DVT) in outpatients suspected of PE or DVT. The cut-off value is 0.50 ug/mL FEU.   CBC with platelets differential    Narrative    The following orders were created for panel order CBC with platelets differential.  Procedure                               Abnormality         Status                     ---------                               -----------         ------                     CBC with platelets and d...[[id number]]  Abnormal            Final result               Manual Differential[[id number]]          Abnormal            Final result                 Please view results for these tests on the individual orders.   Comprehensive metabolic panel   Result Value Ref Range    Sodium 133 (L) 136 - 145 mmol/L    Potassium 4.5 3.4 - 5.3 mmol/L    Chloride 100 98 - 107 mmol/L    Carbon Dioxide (CO2) 19 (L) 22 - 29 mmol/L    Anion Gap 14 7 - 15 mmol/L    Urea Nitrogen 18.6 6.0 - 20.0 mg/dL    Creatinine 1.60 (H) 0.51 - 0.95 mg/dL    Calcium 9.0  8.6 - 10.0 mg/dL    Glucose 108 (H) 70 - 99 mg/dL    Alkaline Phosphatase 91 35 - 104 U/L    AST 22 10 - 35 U/L    ALT 50 (H) 10 - 35 U/L    Protein Total 6.7 6.4 - 8.3 g/dL    Albumin 3.6 3.5 - 5.2 g/dL    Bilirubin Total 0.4 <=1.2 mg/dL    GFR Estimate 46 (L) >60 mL/min/1.73m2   CBC with platelets and differential   Result Value Ref Range    WBC Count 3.2 (L) 4.0 - 11.0 10e3/uL    RBC Count 4.39 3.80 - 5.20 10e6/uL    Hemoglobin 11.4 (L) 11.7 - 15.7 g/dL    Hematocrit 36.0 35.0 - 47.0 %    MCV 82 78 - 100 fL    MCH 26.0 (L) 26.5 - 33.0 pg    MCHC 31.7 31.5 - 36.5 g/dL    RDW 15.8 (H) 10.0 - 15.0 %    Platelet Count 195 150 - 450 10e3/uL   Manual Differential   Result Value Ref Range    % Neutrophils 23 %    % Lymphocytes 30 %    % Monocytes 45 %    % Eosinophils 1 %    % Basophils 1 %    Absolute Neutrophils 0.7 (L) 1.6 - 8.3 10e3/uL    Absolute Lymphocytes 1.0 0.8 - 5.3 10e3/uL    Absolute Monocytes 1.4 (H) 0.0 - 1.3 10e3/uL    Absolute Eosinophils 0.0 0.0 - 0.7 10e3/uL    Absolute Basophils 0.0 0.0 - 0.2 10e3/uL    RBC Morphology Confirmed RBC Indices     Platelet Assessment  Automated Count Confirmed. Platelet morphology is normal.     Automated Count Confirmed. Platelet morphology is normal.    Reactive Lymphocytes Present (A) None Seen   Troponin T, High Sensitivity (now)   Result Value Ref Range    Troponin T, High Sensitivity 8 <=14 ng/L   CT Chest Pulmonary Embolism w Contrast   Result Value Ref Range    Radiologist flags (AA)      Acute pulmonary embolism and borderline right heart strain    Narrative    EXAM: CT CHEST PULMONARY EMBOLISM W CONTRAST  LOCATION: [facility]  DATE/TIME: 2/12/2023 11:58 PM    INDICATION: Left chest pain, history of Wegener's granulomatosis, history of pulmonary embolism, 5 weeks pregnant; Female sex; Pregnant; No DVT symptoms; No imaging to r o PE in the last 24 hours; Chest x ray not available  COMPARISON: 04/08/2021  TECHNIQUE: CT chest  pulmonary angiogram during arterial phase injection of IV contrast. Multiplanar reformats and MIP reconstructions were performed. Dose reduction techniques were used.   CONTRAST: 75 mL Isovue 370    FINDINGS:  ANGIOGRAM CHEST: There are lobar and segmental pulmonary emboli in the right upper, middle, and lower lobes. There are segmental and subsegmental pulmonary emboli in the left lower lobe basilar segments. The main pulmonary artery is mildly dilated   measuring 3.6 cm. Borderline CT evidence of right heart strain with RV to LV ratio measuring 1.0. Thoracic aorta is negative for dissection.     LUNGS AND PLEURA: Scattered ill-defined nodular opacities in the bilateral upper lobes and right middle lobe. 3 adjacent subpleural nodules in the left lower lobe measuring 1.7 cm, 0.8 cm, and 0.7 cm. No cavitation. No pleural effusion or pneumothorax.    MEDIASTINUM/AXILLAE: No lymphadenopathy. No pericardial effusion.    CORONARY ARTERY CALCIFICATION: None.    UPPER ABDOMEN: Unremarkable.    MUSCULOSKELETAL: Multilevel Schmorl's nodes.      Impression    IMPRESSION:  1.  Acute right lobar and segmental and left segmental and subsegmental pulmonary emboli. Borderline CT evidence of right heart strain.    2.  Scattered bilateral ill-defined nodular opacities with a few left lower lobe subpleural nodules. Findings may represent multifocal atypical infection or inflammatory related to granulomatosis with polyangiitis. No cavitation.      [Critical Result: Acute pulmonary embolism and borderline right heart strain]    Finding was identified on 2/13/2023 12:05 AM.     Dr. Valle was contacted by me on 2/13/2023 12:12 AM and verbalized understanding of the critical result.        Medications   morphine (PF) injection 4 mg (4 mg Intravenous Given 2/13/23 0031)   enoxaparin ANTICOAGULANT (LOVENOX) injection 100 mg (100 mg Subcutaneous Given 2/13/23 0137)   iopamidol (ISOVUE-370) solution 200 mL (75 mLs Intravenous Given 2/12/23  0874)   sodium chloride 0.9 % bag 100mL for CT scan flush use (70 mLs Intravenous Given 2/12/23 2334)   cefTRIAXone (ROCEPHIN) 2 g vial to attach to  ml bag for ADULTS or NS 50 ml bag for PEDS (0 g Intravenous Stopped 2/12/23 2356)   methylPREDNISolone sodium succinate (solu-MEDROL) injection 125 mg (125 mg Intravenous Given 2/12/23 2318)   oxyCODONE (ROXICODONE) tablet 10 mg (10 mg Oral Given 2/13/23 0040)       Assessments & Plan      I have reviewed the findings, diagnosis, plan and need for follow up with the patient.  Discharge Medication List as of 2/13/2023  2:41 AM      START taking these medications    Details   cephALEXin (KEFLEX) 500 MG capsule Take 1 capsule (500 mg) by mouth 3 times daily for 10 days, Disp-30 capsule, R-0, E-Prescribe      enoxaparin ANTICOAGULANT (LOVENOX) 100 MG/ML syringe Inject 1 mL (100 mg) Subcutaneous every 12 hours for 30 days, Disp-60 mL, R-0, E-Prescribe      oxyCODONE (ROXICODONE) 5 MG tablet Take 1 tablet (5 mg) by mouth every 6 hours as needed for severe pain (7-10), Disp-12 tablet, R-0, E-Prescribe             Final diagnoses:   Multiple subsegmental pulmonary emboli without acute cor pulmonale (H)   Acute chest pain   Pregnancy at early stage   Streptococcal pharyngitis   Granulomatosis with polyangiitis, unspecified whether renal involvement (H)   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)     Disposition: Pending    Note: Chart documentation done in part with Dragon Voice Recognition software. Although reviewed after completion, some word and grammatical errors may remain.     2/12/2023   [facility] EMERGENCY DEPT     Claire Andrade MD  02/13/23 0740

## 2023-02-13 NOTE — TELEPHONE ENCOUNTER
Jolie Dozier MD Weiss, Tina M, GERHARD; Ramses Call MD  Caller: Unspecified (Today,  9:11 AM)  Hollie Peguero,     I think she has hematology that follow her for that? Below is the recommend from Heme when she was admitted.. IT is odd that they recommend that she was on Apixaban but somehow it was not continued.     -Change fondaparinux to apixaban 2.5 mg PO BID (to avoid injections and dose reduced due to KATERINA) and continue at least for the next 30 days given hypercoagulability (?COVID, flare of GPA, hospitalization)   -Follow-up in [facility] with Dr. Dye to determine if indefinite or prolonged anticoagulation indicated (I have messaged clinic to set this up)     Thank you,   NK

## 2023-02-13 NOTE — TELEPHONE ENCOUNTER
Patient left a message this morning at 0330 reporting she was in the ER with Blood Clot in her lungs.  Reading ER reports show patient has strep as well as blood clot.  Initiated Lovenox Injection 100mg q 12 x 30 days and Keflex 500mg TID x 10 days.    Patient reports she is needing Platelets ordered in 2 days, do you want anything else added?  Let me know,    Thanks  Marielena Jaquez RN, BSN, PHN  [facility] Nurse Navigator  [phone]    CT Results:  EXAM: CT CHEST PULMONARY EMBOLISM W CONTRAST  LOCATION: [facility]  DATE/TIME: 2/12/2023 11:58 PM     FINDINGS:  ANGIOGRAM CHEST: There are lobar and segmental pulmonary emboli in the right upper, middle, and lower lobes. There are segmental and subsegmental pulmonary emboli in the left lower lobe basilar segments. The main pulmonary artery is mildly dilated   measuring 3.6 cm. Borderline CT evidence of right heart strain with RV to LV ratio measuring 1.0. Thoracic aorta is negative for dissection.      LUNGS AND PLEURA: Scattered ill-defined nodular opacities in the bilateral upper lobes and right middle lobe. 3 adjacent subpleural nodules in the left lower lobe measuring 1.7 cm, 0.8 cm, and 0.7 cm. No cavitation. No pleural effusion or pneumothorax.     MEDIASTINUM/AXILLAE: No lymphadenopathy. No pericardial effusion.     CORONARY ARTERY CALCIFICATION: None.     UPPER ABDOMEN: Unremarkable.     MUSCULOSKELETAL: Multilevel Schmorl's nodes.         Impression     IMPRESSION:  1.  Acute right lobar and segmental and left segmental and subsegmental pulmonary emboli. Borderline CT evidence of right heart strain.     2.  Scattered bilateral ill-defined nodular opacities with a few left lower lobe subpleural nodules. Findings may represent multifocal atypical infection or inflammatory related to granulomatosis with polyangiitis. No cavitation.

## 2023-02-13 NOTE — DISCHARGE INSTRUCTIONS
Please read and follow the handout(s) instructions. Return, if needed, for increased or worsening symptoms and as directed by the handout(s).    It is important that you contact your clinic for recheck of your platelet count and on your condition in the next few days.  They can guide you as to any changes to the blood thinning medications and also guide you as to how long you need to take them.  It is also important to contact your obstetrician to make them aware of your current health condition.    Please return to the ER should you have any worsening of your symptoms especially if you develop shortness of breath or worsening chest pain.    You may want to eat a little yogurt each day to avoid yeast infection and diarrhea caused by the antibiotic for the strep throat.

## 2023-02-13 NOTE — ED TRIAGE NOTES
States developed a cold yesterday, which has worsened today. Comes in with fever, cough, sore throat, and some chest pain that worsens with deep breathing. Patient is 4 week pregnant and has some spotting.      Triage Assessment     Row Name 02/12/23 2053       Triage Assessment (Adult)    Airway WDL WDL       Respiratory WDL    Respiratory WDL WDL       Skin Circulation/Temperature WDL    Skin Circulation/Temperature WDL WDL       Cardiac WDL    Cardiac WDL WDL       Peripheral/Neurovascular WDL    Peripheral Neurovascular WDL WDL       Cognitive/Neuro/Behavioral WDL    Cognitive/Neuro/Behavioral WDL WDL

## 2023-02-14 ENCOUNTER — TRANSFERRED RECORDS (OUTPATIENT)
Dept: HEALTH INFORMATION MANAGEMENT | Facility: CLINIC | Age: 23
End: 2023-02-14
Payer: COMMERCIAL

## 2023-02-14 ENCOUNTER — TELEPHONE (OUTPATIENT)
Dept: HEMATOLOGY | Facility: CLINIC | Age: 23
End: 2023-02-14
Payer: COMMERCIAL

## 2023-02-14 NOTE — TELEPHONE ENCOUNTER
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  [facility] Provider: Hardik    Situation: Need for urgent hematology appointment to manage blood thinning in light of complicated history/acute PE/current pregnancy.    Background: Had COVID in Dec 2020 while pregnant - had a stillbirth at 31 weeks gestation. Was diagnosed with unprovoked saddle PE and DVT within the right proximal calf posterior tibial and peroneal veins and in the left proximal calf peroneal vein. Received thrombolysis on 3/21/21 and was discharged on Xarelto. She took 20 mg daily until 2021 when she stopped this medication due to significant menorrhagia. Saw Dr. Dye in 10/2021- was recommended to start low molecular weight heparin upon pregnancy and to return to clinic for long term anticoagulation discussion. Follow up appointment attempts to schedule were unsuccessful. Was admitted to [facility] on 2023 for GPA flare, petechial lesions, and blood tinged sputum. Due to extensive thrombosis hx was recommended to start Apixaban 5 mg BID and see hematology within 1 month. Patient no showed follow up visit as she was out of town. Cande was then seen in [facility] ED on 23 for fevers and chest pain. No mention of Eliquis at this ED visit- CT scan completed and patient was started on Lovenox 1 mg/kg BID as she noted she was also 4+ weeks pregnant.     Assessment: RN called patient to assess how she is doing. She reports she is currently in the ER for increased chest pain. She would like to switch her blood thinner to oral option suggested by outside team as she does not feel as though the Lovenox is working. She is also concerned about drop in platelets.     Recommendation: Appropriate decision was made by Cande to be seen in ER for increased chest pain. RN did review that Apixaban is not an option if patient is pregnant.  Anticoagulation options at this time would be unfractionated heparin or LMWH - she will need to see Dr. Dye in clinic to decide best course. We have her scheduled for next Thursday- February 23rd. It is okay for her to wait until that time for a platelet recheck as long as she is not having any bleeding symptoms.    Follow up: RN team will call Cande to follow up with her tomorrow to see how her ED visit went. We will make sure that an appointment next week is still the appropriate plan. She has our contact information for any further questions.     Juliette Santana  MSN, RN, PHN  [facility]  Office: [phone]  Fax: [phone]    Addendum on 2/16/2023:  Reached out to Cande Shields in follow up.  She was seen in the ED 2/12/2023 and was started on Lovenox and says the shots are going well. She is pregnant, early in her first trimester. She has adequate supply of medication.     She says that she is still having lots of chest pain, but not worse than when she sought care.     Confirmed her appt with Dr. Dye for 2/23/2023.  She says she has no further questions at this time.  Did give her our number for any questions as they arise.    Susan RUANON, RN   Nurse Clinician  [facility]  Office: [phone]  Main Office: [phone] (ask to speak with a nurse)  Fax: [phone]

## 2023-02-17 ENCOUNTER — TELEPHONE (OUTPATIENT)
Dept: NEPHROLOGY | Facility: CLINIC | Age: 23
End: 2023-02-17

## 2023-02-17 ENCOUNTER — APPOINTMENT (OUTPATIENT)
Dept: GENERAL RADIOLOGY | Facility: CLINIC | Age: 23
End: 2023-02-17
Attending: EMERGENCY MEDICINE
Payer: COMMERCIAL

## 2023-02-17 ENCOUNTER — TELEPHONE (OUTPATIENT)
Dept: HEMATOLOGY | Facility: CLINIC | Age: 23
End: 2023-02-17
Payer: COMMERCIAL

## 2023-02-17 ENCOUNTER — HOSPITAL ENCOUNTER (INPATIENT)
Facility: CLINIC | Age: 23
LOS: 10 days | Discharge: HOME | End: 2023-02-27
Attending: EMERGENCY MEDICINE | Admitting: INTERNAL MEDICINE
Payer: COMMERCIAL

## 2023-02-17 DIAGNOSIS — U07.1 LAB TEST POSITIVE FOR DETECTION OF COVID-19 VIRUS: ICD-10-CM

## 2023-02-17 DIAGNOSIS — J18.9 PNEUMONIA DUE TO INFECTIOUS ORGANISM, UNSPECIFIED LATERALITY, UNSPECIFIED PART OF LUNG: ICD-10-CM

## 2023-02-17 DIAGNOSIS — R07.9 CHEST PAIN, UNSPECIFIED TYPE: ICD-10-CM

## 2023-02-17 DIAGNOSIS — Z87.74 S/P PATENT FORAMEN OVALE CLOSURE: ICD-10-CM

## 2023-02-17 DIAGNOSIS — I26.99 OTHER PULMONARY EMBOLISM WITHOUT ACUTE COR PULMONALE, UNSPECIFIED CHRONICITY (H): Primary | ICD-10-CM

## 2023-02-17 LAB
ACANTHOCYTES BLD QL SMEAR: SLIGHT
ANION GAP SERPL CALCULATED.3IONS-SCNC: 14 MMOL/L (ref 7–15)
ATRIAL RATE - MUSE: 108 BPM
BASE EXCESS BLDV CALC-SCNC: -4.5 MMOL/L (ref -7.7–1.9)
BASOPHILS # BLD MANUAL: 0 10E3/UL (ref 0–0.2)
BASOPHILS NFR BLD MANUAL: 0 %
BUN SERPL-MCNC: 26.4 MG/DL (ref 6–20)
CALCIUM SERPL-MCNC: 9.8 MG/DL (ref 8.6–10)
CHLORIDE SERPL-SCNC: 107 MMOL/L (ref 98–107)
CREAT SERPL-MCNC: 1.37 MG/DL (ref 0.51–0.95)
CRP SERPL-MCNC: 322 MG/L
DEPRECATED HCO3 PLAS-SCNC: 19 MMOL/L (ref 22–29)
DIASTOLIC BLOOD PRESSURE - MUSE: NORMAL MMHG
EOSINOPHIL # BLD MANUAL: 0.9 10E3/UL (ref 0–0.7)
EOSINOPHIL NFR BLD MANUAL: 4 %
ERYTHROCYTE [DISTWIDTH] IN BLOOD BY AUTOMATED COUNT: 17.1 % (ref 10–15)
FLUAV RNA SPEC QL NAA+PROBE: NEGATIVE
FLUBV RNA RESP QL NAA+PROBE: NEGATIVE
GFR SERPL CREATININE-BSD FRML MDRD: 55 ML/MIN/1.73M2
GLUCOSE SERPL-MCNC: 123 MG/DL (ref 70–99)
HCO3 BLDV-SCNC: 21 MMOL/L (ref 21–28)
HCT VFR BLD AUTO: 37.5 % (ref 35–47)
HGB BLD-MCNC: 11.6 G/DL (ref 11.7–15.7)
HOLD SPECIMEN: NORMAL
INTERPRETATION ECG - MUSE: NORMAL
LACTATE SERPL-SCNC: 0.7 MMOL/L (ref 0.7–2)
LYMPHOCYTES # BLD MANUAL: 1.7 10E3/UL (ref 0.8–5.3)
LYMPHOCYTES NFR BLD MANUAL: 8 %
MCH RBC QN AUTO: 25.9 PG (ref 26.5–33)
MCHC RBC AUTO-ENTMCNC: 30.9 G/DL (ref 31.5–36.5)
MCV RBC AUTO: 84 FL (ref 78–100)
MONOCYTES # BLD MANUAL: 1.5 10E3/UL (ref 0–1.3)
MONOCYTES NFR BLD MANUAL: 7 %
NEUTROPHILS # BLD MANUAL: 17.7 10E3/UL (ref 1.6–8.3)
NEUTROPHILS NFR BLD MANUAL: 81 %
O2/TOTAL GAS SETTING VFR VENT: 21 %
P AXIS - MUSE: 57 DEGREES
PCO2 BLDV: 38 MM HG (ref 40–50)
PH BLDV: 7.35 [PH] (ref 7.32–7.43)
PLAT MORPH BLD: ABNORMAL
PLATELET # BLD AUTO: 332 10E3/UL (ref 150–450)
PO2 BLDV: 40 MM HG (ref 25–47)
POTASSIUM SERPL-SCNC: 4.6 MMOL/L (ref 3.4–5.3)
PR INTERVAL - MUSE: 146 MS
PROCALCITONIN SERPL IA-MCNC: 14 NG/ML
QRS DURATION - MUSE: 78 MS
QT - MUSE: 322 MS
QTC - MUSE: 431 MS
R AXIS - MUSE: 11 DEGREES
RBC # BLD AUTO: 4.48 10E6/UL (ref 3.8–5.2)
RBC MORPH BLD: ABNORMAL
RSV RNA SPEC NAA+PROBE: NEGATIVE
SARS-COV-2 RNA RESP QL NAA+PROBE: POSITIVE
SODIUM SERPL-SCNC: 140 MMOL/L (ref 136–145)
SYSTOLIC BLOOD PRESSURE - MUSE: NORMAL MMHG
T AXIS - MUSE: 14 DEGREES
TROPONIN T SERPL HS-MCNC: <6 NG/L
VENTRICULAR RATE- MUSE: 108 BPM
WBC # BLD AUTO: 21.8 10E3/UL (ref 4–11)

## 2023-02-17 PROCEDURE — 250N000011 HC RX IP 250 OP 636: Performed by: EMERGENCY MEDICINE

## 2023-02-17 PROCEDURE — 258N000003 HC RX IP 258 OP 636: Performed by: EMERGENCY MEDICINE

## 2023-02-17 PROCEDURE — 99285 EMERGENCY DEPT VISIT HI MDM: CPT | Mod: 25,CS | Performed by: EMERGENCY MEDICINE

## 2023-02-17 PROCEDURE — 36415 COLL VENOUS BLD VENIPUNCTURE: CPT | Performed by: INTERNAL MEDICINE

## 2023-02-17 PROCEDURE — 71046 X-RAY EXAM CHEST 2 VIEWS: CPT | Mod: 26 | Performed by: RADIOLOGY

## 2023-02-17 PROCEDURE — 80048 BASIC METABOLIC PNL TOTAL CA: CPT | Performed by: EMERGENCY MEDICINE

## 2023-02-17 PROCEDURE — 258N000003 HC RX IP 258 OP 636

## 2023-02-17 PROCEDURE — 83605 ASSAY OF LACTIC ACID: CPT | Performed by: INTERNAL MEDICINE

## 2023-02-17 PROCEDURE — 82803 BLOOD GASES ANY COMBINATION: CPT

## 2023-02-17 PROCEDURE — 93010 ELECTROCARDIOGRAM REPORT: CPT | Performed by: EMERGENCY MEDICINE

## 2023-02-17 PROCEDURE — 71046 X-RAY EXAM CHEST 2 VIEWS: CPT

## 2023-02-17 PROCEDURE — 87040 BLOOD CULTURE FOR BACTERIA: CPT

## 2023-02-17 PROCEDURE — 99223 1ST HOSP IP/OBS HIGH 75: CPT | Mod: AI | Performed by: INTERNAL MEDICINE

## 2023-02-17 PROCEDURE — 93005 ELECTROCARDIOGRAM TRACING: CPT | Performed by: EMERGENCY MEDICINE

## 2023-02-17 PROCEDURE — 84145 PROCALCITONIN (PCT): CPT

## 2023-02-17 PROCEDURE — 87581 M.PNEUMON DNA AMP PROBE: CPT

## 2023-02-17 PROCEDURE — 96374 THER/PROPH/DIAG INJ IV PUSH: CPT | Performed by: EMERGENCY MEDICINE

## 2023-02-17 PROCEDURE — 99285 EMERGENCY DEPT VISIT HI MDM: CPT | Mod: 25 | Performed by: EMERGENCY MEDICINE

## 2023-02-17 PROCEDURE — 250N000013 HC RX MED GY IP 250 OP 250 PS 637: Performed by: EMERGENCY MEDICINE

## 2023-02-17 PROCEDURE — 87637 SARSCOV2&INF A&B&RSV AMP PRB: CPT

## 2023-02-17 PROCEDURE — 36415 COLL VENOUS BLD VENIPUNCTURE: CPT

## 2023-02-17 PROCEDURE — 84484 ASSAY OF TROPONIN QUANT: CPT | Performed by: EMERGENCY MEDICINE

## 2023-02-17 PROCEDURE — 250N000013 HC RX MED GY IP 250 OP 250 PS 637

## 2023-02-17 PROCEDURE — 85027 COMPLETE CBC AUTOMATED: CPT | Performed by: EMERGENCY MEDICINE

## 2023-02-17 PROCEDURE — 200N000002 HC R&B ICU UMMC

## 2023-02-17 PROCEDURE — 36415 COLL VENOUS BLD VENIPUNCTURE: CPT | Performed by: EMERGENCY MEDICINE

## 2023-02-17 PROCEDURE — C9803 HOPD COVID-19 SPEC COLLECT: HCPCS | Performed by: EMERGENCY MEDICINE

## 2023-02-17 PROCEDURE — 86140 C-REACTIVE PROTEIN: CPT | Performed by: INTERNAL MEDICINE

## 2023-02-17 PROCEDURE — 250N000011 HC RX IP 250 OP 636

## 2023-02-17 PROCEDURE — 85007 BL SMEAR W/DIFF WBC COUNT: CPT | Performed by: EMERGENCY MEDICINE

## 2023-02-17 RX ORDER — CEFTRIAXONE 2 G/1
2 INJECTION, POWDER, FOR SOLUTION INTRAMUSCULAR; INTRAVENOUS EVERY 24 HOURS
Status: DISCONTINUED | OUTPATIENT
Start: 2023-02-18 | End: 2023-02-18

## 2023-02-17 RX ORDER — ENOXAPARIN SODIUM 100 MG/ML
100 INJECTION SUBCUTANEOUS 2 TIMES DAILY
Status: DISCONTINUED | OUTPATIENT
Start: 2023-02-17 | End: 2023-02-18

## 2023-02-17 RX ORDER — POLYETHYLENE GLYCOL 3350 17 G/17G
17 POWDER, FOR SOLUTION ORAL DAILY PRN
Status: DISCONTINUED | OUTPATIENT
Start: 2023-02-17 | End: 2023-02-18

## 2023-02-17 RX ORDER — FENTANYL CITRATE 50 UG/ML
50 INJECTION, SOLUTION INTRAMUSCULAR; INTRAVENOUS ONCE
Status: COMPLETED | OUTPATIENT
Start: 2023-02-17 | End: 2023-02-17

## 2023-02-17 RX ORDER — LIDOCAINE 40 MG/G
CREAM TOPICAL
Status: DISCONTINUED | OUTPATIENT
Start: 2023-02-17 | End: 2023-02-18

## 2023-02-17 RX ORDER — OXYCODONE HYDROCHLORIDE 10 MG/1
10 TABLET ORAL ONCE
Status: COMPLETED | OUTPATIENT
Start: 2023-02-17 | End: 2023-02-17

## 2023-02-17 RX ORDER — CEFTRIAXONE 2 G/1
2 INJECTION, POWDER, FOR SOLUTION INTRAMUSCULAR; INTRAVENOUS EVERY 24 HOURS
Status: DISCONTINUED | OUTPATIENT
Start: 2023-02-18 | End: 2023-02-17

## 2023-02-17 RX ORDER — FAMOTIDINE 10 MG
10 TABLET ORAL 2 TIMES DAILY
Status: DISCONTINUED | OUTPATIENT
Start: 2023-02-17 | End: 2023-02-18

## 2023-02-17 RX ORDER — LIDOCAINE 4 G/G
3 PATCH TOPICAL
Status: DISCONTINUED | OUTPATIENT
Start: 2023-02-17 | End: 2023-02-18

## 2023-02-17 RX ORDER — ACETAMINOPHEN 325 MG/1
975 TABLET ORAL EVERY 8 HOURS
Status: DISCONTINUED | OUTPATIENT
Start: 2023-02-17 | End: 2023-02-18

## 2023-02-17 RX ORDER — CEFTRIAXONE 2 G/1
2 INJECTION, POWDER, FOR SOLUTION INTRAMUSCULAR; INTRAVENOUS ONCE
Status: COMPLETED | OUTPATIENT
Start: 2023-02-17 | End: 2023-02-17

## 2023-02-17 RX ORDER — OXYCODONE HYDROCHLORIDE 10 MG/1
10 TABLET ORAL EVERY 6 HOURS PRN
Status: DISCONTINUED | OUTPATIENT
Start: 2023-02-17 | End: 2023-02-18

## 2023-02-17 RX ORDER — DEXAMETHASONE 1.5 MG/1
1.5 TABLET ORAL DAILY
Status: DISCONTINUED | OUTPATIENT
Start: 2023-02-17 | End: 2023-02-18

## 2023-02-17 RX ORDER — PRENATAL VIT/IRON FUM/FOLIC AC 27MG-0.8MG
1 TABLET ORAL DAILY
Status: DISCONTINUED | OUTPATIENT
Start: 2023-02-18 | End: 2023-02-18

## 2023-02-17 RX ORDER — OXYCODONE HYDROCHLORIDE 5 MG/1
5 TABLET ORAL EVERY 4 HOURS PRN
Status: DISCONTINUED | OUTPATIENT
Start: 2023-02-17 | End: 2023-02-18

## 2023-02-17 RX ADMIN — FENTANYL CITRATE 50 MCG: 50 INJECTION, SOLUTION INTRAMUSCULAR; INTRAVENOUS at 19:48

## 2023-02-17 RX ADMIN — OXYCODONE HYDROCHLORIDE 10 MG: 10 TABLET ORAL at 21:48

## 2023-02-17 RX ADMIN — ENOXAPARIN SODIUM 100 MG: 100 INJECTION SUBCUTANEOUS at 23:09

## 2023-02-17 RX ADMIN — AZITHROMYCIN MONOHYDRATE 500 MG: 500 INJECTION, POWDER, LYOPHILIZED, FOR SOLUTION INTRAVENOUS at 22:29

## 2023-02-17 RX ADMIN — ACETAMINOPHEN 975 MG: 325 TABLET, FILM COATED ORAL at 21:49

## 2023-02-17 RX ADMIN — SODIUM CHLORIDE 1000 ML: 9 INJECTION, SOLUTION INTRAVENOUS at 19:40

## 2023-02-17 RX ADMIN — CEFTRIAXONE SODIUM 2 G: 2 INJECTION, POWDER, FOR SOLUTION INTRAMUSCULAR; INTRAVENOUS at 19:40

## 2023-02-17 RX ADMIN — OXYCODONE HYDROCHLORIDE 10 MG: 10 TABLET ORAL at 17:11

## 2023-02-17 ASSESSMENT — ACTIVITIES OF DAILY LIVING (ADL)
ADLS_ACUITY_SCORE: 37
ADLS_ACUITY_SCORE: 35
ADLS_ACUITY_SCORE: 37
ADLS_ACUITY_SCORE: 37

## 2023-02-17 NOTE — TELEPHONE ENCOUNTER
Patient left message at 308pm wanting follow up with Dr. Dye team on 5 day excruciating pain when she is breathing.  Reports she left a message in [facility] clinic but has not heard back.  Reviewed chart and patient did connect with clinic who recommended ER for assessment.    Marielena Jaquez RN, BSN, PHN  [facility] Nurse Navigator  [phone]

## 2023-02-17 NOTE — ED PROVIDER NOTES
ED Provider Note  [facility]      History     Chief Complaint   Patient presents with     Chest Pain     HPI  Cande Shields is a 22 year old female who is currently 5 weeks gestation by LMP arriving today to the emergency department due to concern of ongoing chest pain which she associates due to known pulmonary emboli.  The patient does have Wegener's granulomatosis and history of blood clot with recently diagnosed pulmonary embolus on Lovenox.  She reports since that diagnosis now 5 days ago she has had persistent left chest wall pain.  This has been persistent.  Pain exacerbated by taking a deep breath.  She reports no worsening of pain.  No new or other pain on contralateral side of chest.  Patient reports no shortness of breath or syncopal symptoms.  Patient has been intermittently taking acetaminophen oxycodone which is now out of.  This is made minimal difference.  She is been taking oxycodone 5 mg.  No other chest trauma.  Patient did have coronavirus 1 month ago.  She has had a cough, nasal congestion.  Cough has been getting worse.    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     Multiple subsegmental pulmonary emboli without acute cor pulmonale (H) 03/2021     PFO (patent foramen ovale)      Preeclampsia, third trimester      Spontaneous pregnancy loss 2020    31 weeks     Stage 3b chronic kidney disease (H)      Wegener's disease, pulmonary 01/01/2010    renal biopdy     Past Surgical History:   Procedure Laterality Date     ENT SURGERY  2000    cyst     EXCISE GANGLION WRIST  2009     Avacopan 10 MG CAPS  benzoyl peroxide 5 % external liquid  calcium carbonate-vitamin D (OSCAL) 500-5 MG-MCG tablet  cephALEXin (KEFLEX) 500 MG capsule  clindamycin (CLEOCIN T) 1 % external lotion  enoxaparin  "ANTICOAGULANT (LOVENOX) 100 MG/ML syringe  methylPREDNISolone (MEDROL DOSEPAK) 4 MG tablet therapy pack  omeprazole (PRILOSEC) 20 MG DR capsule  oxyCODONE (ROXICODONE) 5 MG tablet  predniSONE (DELTASONE) 10 MG tablet  Prenatal Vit-Fe Fumarate-FA (PRENATAL VITAMINS PO)      Allergies   Allergen Reactions     Heparin Other (See Comments)     Reaction: HIT     Penicillins Rash     Unknown, but think rash.  Tolerated cephalexin (12/27/20), cefpodoxime (12/27/20)     Family History  Family History   Problem Relation Age of Onset     Thyroid Disease Mother      Cancer Maternal Grandfather      Asthma No family hx of      Diabetes No family hx of      Social History   Social History     Tobacco Use     Smoking status: Former     Packs/day: 0.25     Types: Cigarettes     Smokeless tobacco: Current     Tobacco comments:     vaping   Vaping Use     Vaping Use: Some days     Substances: Nicotine, Flavoring     Devices: Disposable   Substance Use Topics     Alcohol use: Not Currently     Drug use: No      Past medical history, past surgical history, medications, allergies, family history, and social history were reviewed with the patient. No additional pertinent items.      A complete review of systems was performed with pertinent positives and negatives noted in the HPI, and all other systems negative.    Physical Exam   BP: 119/83  Pulse: 109  Temp: 98.4  F (36.9  C)  Resp: 16  Height: 162.6 cm (5' 4\")  Weight: 99.8 kg (220 lb)  SpO2: 95 %  Physical Exam  Vitals and nursing note reviewed.   Constitutional:       General: She is in acute distress.      Appearance: Normal appearance. She is not ill-appearing, toxic-appearing or diaphoretic.   HENT:      Head: Normocephalic and atraumatic.      Right Ear: External ear normal.      Left Ear: External ear normal.      Nose: Nose normal. No congestion.      Mouth/Throat:      Mouth: Mucous membranes are moist.      Pharynx: Oropharynx is clear. No oropharyngeal exudate.   Eyes:     "  Extraocular Movements: Extraocular movements intact.      Conjunctiva/sclera: Conjunctivae normal.      Pupils: Pupils are equal, round, and reactive to light.   Cardiovascular:      Rate and Rhythm: Tachycardia present.      Pulses: Normal pulses.      Heart sounds: Normal heart sounds. No murmur heard.    No friction rub.   Pulmonary:      Effort: Pulmonary effort is normal. No respiratory distress.      Breath sounds: No stridor. No wheezing, rhonchi or rales.   Musculoskeletal:         General: Normal range of motion.      Cervical back: Normal range of motion.   Skin:     General: Skin is warm.      Capillary Refill: Capillary refill takes less than 2 seconds.      Coloration: Skin is not pale.      Findings: No bruising or erythema.   Neurological:      General: No focal deficit present.      Mental Status: She is alert and oriented to person, place, and time.      Cranial Nerves: No cranial nerve deficit.   Psychiatric:         Mood and Affect: Mood normal.         Behavior: Behavior normal.           ED Course, Procedures, & Data      Procedures                     Results for orders placed or performed during the hospital encounter of 02/17/23   XR Chest 2 Views     Status: None (Preliminary result)    Impression    RESIDENT PRELIMINARY INTERPRETATION  IMPRESSION:   1. Rounded appearing opacity along the left lateral pleural surface.  Findings favor the appearance of a subpleural infarct in the setting  of recently diagnosed pulmonary embolus, less likely a loculated  effusion.  2. Airspace opacities projecting over the right midlung, which are  suspicious for infection. Difficult to tell whether this opacity  represents superimposed nodular opacities that were seen on prior CT  versus new consolidative changes.  3. Streaky retrocardiac opacities with elevation of the left  hemidiaphragm, likely atelectasis.   Basic metabolic panel     Status: Abnormal   Result Value Ref Range    Sodium 140 136 - 145  mmol/L    Potassium 4.6 3.4 - 5.3 mmol/L    Chloride 107 98 - 107 mmol/L    Carbon Dioxide (CO2) 19 (L) 22 - 29 mmol/L    Anion Gap 14 7 - 15 mmol/L    Urea Nitrogen 26.4 (H) 6.0 - 20.0 mg/dL    Creatinine 1.37 (H) 0.51 - 0.95 mg/dL    Calcium 9.8 8.6 - 10.0 mg/dL    Glucose 123 (H) 70 - 99 mg/dL    GFR Estimate 55 (L) >60 mL/min/1.73m2   Troponin T, High Sensitivity     Status: Normal   Result Value Ref Range    Troponin T, High Sensitivity <6 <=14 ng/L   CBC with platelets and differential     Status: Abnormal   Result Value Ref Range    WBC Count 21.8 (H) 4.0 - 11.0 10e3/uL    RBC Count 4.48 3.80 - 5.20 10e6/uL    Hemoglobin 11.6 (L) 11.7 - 15.7 g/dL    Hematocrit 37.5 35.0 - 47.0 %    MCV 84 78 - 100 fL    MCH 25.9 (L) 26.5 - 33.0 pg    MCHC 30.9 (L) 31.5 - 36.5 g/dL    RDW 17.1 (H) 10.0 - 15.0 %    Platelet Count 332 150 - 450 10e3/uL   Manual Differential     Status: Abnormal   Result Value Ref Range    % Neutrophils 81 %    % Lymphocytes 8 %    % Monocytes 7 %    % Eosinophils 4 %    % Basophils 0 %    Absolute Neutrophils 17.7 (H) 1.6 - 8.3 10e3/uL    Absolute Lymphocytes 1.7 0.8 - 5.3 10e3/uL    Absolute Monocytes 1.5 (H) 0.0 - 1.3 10e3/uL    Absolute Eosinophils 0.9 (H) 0.0 - 0.7 10e3/uL    Absolute Basophils 0.0 0.0 - 0.2 10e3/uL    RBC Morphology Confirmed RBC Indices     Platelet Assessment  Automated Count Confirmed. Platelet morphology is normal.     Automated Count Confirmed. Platelet morphology is normal.    Acanthocytes Slight (A) None Seen   EKG 12-lead, tracing only     Status: None (Preliminary result)   Result Value Ref Range    Systolic Blood Pressure  mmHg    Diastolic Blood Pressure  mmHg    Ventricular Rate 108 BPM    Atrial Rate 108 BPM    OK Interval 146 ms    QRS Duration 78 ms     ms    QTc 431 ms    P Axis 57 degrees    R AXIS 11 degrees    T Axis 14 degrees    Interpretation ECG       Sinus tachycardia  Minimal voltage criteria for LVH, may be normal variant  Borderline ECG      CBC with platelets differential     Status: Abnormal    Narrative    The following orders were created for panel order CBC with platelets differential.  Procedure                               Abnormality         Status                     ---------                               -----------         ------                     CBC with platelets and d...[[id number]]  Abnormal            Final result               Manual Differential[[id number]]          Abnormal            Final result                 Please view results for these tests on the individual orders.     Medications   cefTRIAXone (ROCEPHIN) 2 g vial to attach to  ml bag for ADULTS or NS 50 ml bag for PEDS (2 g Intravenous New Bag 2/17/23 1940)   0.9% sodium chloride BOLUS (1,000 mLs Intravenous New Bag 2/17/23 1940)   oxyCODONE IR (ROXICODONE) tablet 10 mg (10 mg Oral Given 2/17/23 1711)     Labs Ordered and Resulted from Time of ED Arrival to Time of ED Departure   BASIC METABOLIC PANEL - Abnormal       Result Value    Sodium 140      Potassium 4.6      Chloride 107      Carbon Dioxide (CO2) 19 (*)     Anion Gap 14      Urea Nitrogen 26.4 (*)     Creatinine 1.37 (*)     Calcium 9.8      Glucose 123 (*)     GFR Estimate 55 (*)    CBC WITH PLATELETS AND DIFFERENTIAL - Abnormal    WBC Count 21.8 (*)     RBC Count 4.48      Hemoglobin 11.6 (*)     Hematocrit 37.5      MCV 84      MCH 25.9 (*)     MCHC 30.9 (*)     RDW 17.1 (*)     Platelet Count 332     DIFFERENTIAL - Abnormal    % Neutrophils 81      % Lymphocytes 8      % Monocytes 7      % Eosinophils 4      % Basophils 0      Absolute Neutrophils 17.7 (*)     Absolute Lymphocytes 1.7      Absolute Monocytes 1.5 (*)     Absolute Eosinophils 0.9 (*)     Absolute Basophils 0.0      RBC Morphology Confirmed RBC Indices      Platelet Assessment        Value: Automated Count Confirmed. Platelet morphology is normal.    Acanthocytes Slight (*)    TROPONIN T, HIGH SENSITIVITY - Normal    Troponin T, High  Sensitivity <6       XR Chest 2 Views   Preliminary Result   RESIDENT PRELIMINARY INTERPRETATION   IMPRESSION:    1. Rounded appearing opacity along the left lateral pleural surface.   Findings favor the appearance of a subpleural infarct in the setting   of recently diagnosed pulmonary embolus, less likely a loculated   effusion.   2. Airspace opacities projecting over the right midlung, which are   suspicious for infection. Difficult to tell whether this opacity   represents superimposed nodular opacities that were seen on prior CT   versus new consolidative changes.   3. Streaky retrocardiac opacities with elevation of the left   hemidiaphragm, likely atelectasis.             Medical Decision Making  The patient's presentation is strongly suggestive of an acute complicated injury.    The patient's evaluation involved:  review of external note(s) from 2 sources (see separate area of note for details)  ordering and/or review of 2 test(s) in this encounter (see separate area of note for details)  review of 2 test result(s) ordered prior to this encounter (see separate area of note for details)    The patient's management involved a decision regarding hospitalization.      Assessment & Plan      Cande Shields is a 22 year old female who is currently 5 weeks gestation by LMP arriving today to the emergency department due to concern of ongoing chest pain which she associates due to known pulmonary emboli.   Upon arrival patient will be alert.  She is presently afebrile and hemodynamically stable.  She appears to be uncomfortable is nontoxic.  She she has mild tachycardia.  She is otherwise speaking in full sentences with SPO2 in the mid upper 90s on room air.  No new external trauma.  No reproducible pain.  By auscultation she is moving air well without signs of increased work of breathing.  I did review recent ED documentation from November 14 and 12th with diagnosis of bilateral PEs with associated left lateral rib  pain (pleuritic).  At this time I suspect this is consistent with above diagnosis.  Low suspicion for other pathology such as ACS, dysrhythmia, pneumothorax, development of secondary infectious etiology such as pneumonia but possible.  I would plan for basic screening laboratory studies to include troponin to ensure no sign of cardiac strain.  We will plan for chest x-ray to evaluate for other potential pathology as per above.    EKG on my interpretation demonstrating a sinus rhythm with mild tachycardia, current heart rate 108 bpm.  No sign of acute ST changes, interval abnormality or PVCs.    Laboratory studies otherwise demonstrate new elevation white blood count with left shift.  I do see patient was patient placed on steroids possibly contributing to this however concern at this time with some increasing cough, shortness of breath, tachycardia and white count in setting of current pregnancy I would favor treatment for possible pneumonia as indicated by opacities by chest x-ray.  Discussed plan for antibiotics, gentle hydration, overnight observation.    I have reviewed the nursing notes. I have reviewed the findings, diagnosis, plan and need for follow up with the patient.    New Prescriptions    No medications on file       Final diagnoses:   Chest pain, unspecified type   Pneumonia due to infectious organism, unspecified laterality, unspecified part of lung   Other pulmonary embolism without acute cor pulmonale, unspecified chronicity (H)       Phi Saldana  [facility] EMERGENCY DEPARTMENT  2/17/2023     Phi Saldana MD  02/17/23 1943

## 2023-02-17 NOTE — ED TRIAGE NOTES
Presents with complaints of worsening left sided chest pain. Diagnosed with PE this week and started on Lovenox injections. Patient reports being compliant with injections, but feels her pain and shortness of breath is worse.      Triage Assessment     Row Name 02/17/23 0702       Triage Assessment (Adult)    Airway WDL WDL       Respiratory WDL    Respiratory WDL X;rhythm/pattern    Rhythm/Pattern, Respiratory shortness of breath       Skin Circulation/Temperature WDL    Skin Circulation/Temperature WDL WDL       Cardiac WDL    Cardiac WDL X;chest pain       Chest Pain Assessment    Chest Pain Location anterior chest, left    Chest Pain Radiation back    Character sharp    Precipitating Factors at rest       Peripheral/Neurovascular WDL    Peripheral Neurovascular WDL WDL       Cognitive/Neuro/Behavioral WDL    Cognitive/Neuro/Behavioral WDL WDL

## 2023-02-17 NOTE — TELEPHONE ENCOUNTER
[id number]  Cande Shields  22 year old female  [facility] Diagnosis: Hx of PE  [facility] Provider: Hardik Castellon called and stated that her chest pain & shortness of breath continue to be bad and haven't improved.  She states that she has not missed any Lovenox doses. I told her she should be assessed in the ER. She plans to go to the [facility] ER.  LISETH Angel agreed with plan. Alerted Dr. Ng who is on service.  Ruby Freeman, MSN, RN, PHN -Nurse Clinician, [facility] [phone]

## 2023-02-18 ENCOUNTER — APPOINTMENT (OUTPATIENT)
Dept: GENERAL RADIOLOGY | Facility: CLINIC | Age: 23
End: 2023-02-18
Attending: STUDENT IN AN ORGANIZED HEALTH CARE EDUCATION/TRAINING PROGRAM
Payer: COMMERCIAL

## 2023-02-18 ENCOUNTER — APPOINTMENT (OUTPATIENT)
Dept: CARDIOLOGY | Facility: CLINIC | Age: 23
End: 2023-02-18
Payer: COMMERCIAL

## 2023-02-18 ENCOUNTER — ANESTHESIA EVENT (OUTPATIENT)
Dept: SURGERY | Facility: CLINIC | Age: 23
End: 2023-02-18
Payer: COMMERCIAL

## 2023-02-18 ENCOUNTER — ANESTHESIA (OUTPATIENT)
Dept: SURGERY | Facility: CLINIC | Age: 23
End: 2023-02-18
Payer: COMMERCIAL

## 2023-02-18 LAB
ABO/RH(D): NORMAL
ALBUMIN MFR UR ELPH: 99.7 MG/DL (ref 1–14)
ALBUMIN SERPL BCG-MCNC: 2.7 G/DL (ref 3.5–5.2)
ALBUMIN UR-MCNC: 70 MG/DL
ALLEN'S TEST: ABNORMAL
ALP SERPL-CCNC: 93 U/L (ref 35–104)
ALT SERPL W P-5'-P-CCNC: 13 U/L (ref 10–35)
ANION GAP SERPL CALCULATED.3IONS-SCNC: 14 MMOL/L (ref 7–15)
ANION GAP SERPL CALCULATED.3IONS-SCNC: 16 MMOL/L (ref 7–15)
ANTIBODY SCREEN: NEGATIVE
APPEARANCE UR: CLEAR
APTT PPP: 33 SECONDS (ref 22–38)
APTT PPP: 39 SECONDS (ref 22–38)
AST SERPL W P-5'-P-CCNC: 25 U/L (ref 10–35)
BACTERIA #/AREA URNS HPF: ABNORMAL /HPF
BASE EXCESS BLDA CALC-SCNC: -1.7 MMOL/L (ref -9.6–2)
BASE EXCESS BLDA CALC-SCNC: -2.1 MMOL/L (ref -9.6–2)
BASE EXCESS BLDA CALC-SCNC: -4.3 MMOL/L (ref -9.6–2)
BASE EXCESS BLDA CALC-SCNC: -5.5 MMOL/L (ref -9.6–2)
BASE EXCESS BLDA CALC-SCNC: -5.7 MMOL/L (ref -9.6–2)
BASE EXCESS BLDA CALC-SCNC: -6.6 MMOL/L (ref -9–1.8)
BASE EXCESS BLDV CALC-SCNC: -4.4 MMOL/L (ref -7.7–1.9)
BASOPHILS # BLD MANUAL: 0 10E3/UL (ref 0–0.2)
BASOPHILS NFR BLD MANUAL: 0 %
BILIRUB SERPL-MCNC: 0.2 MG/DL
BILIRUB UR QL STRIP: NEGATIVE
BLD PROD TYP BPU: NORMAL
BLOOD COMPONENT TYPE: NORMAL
BUN SERPL-MCNC: 20.3 MG/DL (ref 6–20)
BUN SERPL-MCNC: 23 MG/DL (ref 6–20)
C PNEUM DNA SPEC QL NAA+PROBE: NOT DETECTED
CA-I BLD-MCNC: 3.7 MG/DL (ref 4.4–5.2)
CA-I BLD-MCNC: 3.8 MG/DL (ref 4.4–5.2)
CA-I BLD-MCNC: 4.6 MG/DL (ref 4.4–5.2)
CA-I BLD-MCNC: 4.7 MG/DL (ref 4.4–5.2)
CA-I BLD-MCNC: 5 MG/DL (ref 4.4–5.2)
CA-I BLD-MCNC: 5.1 MG/DL (ref 4.4–5.2)
CALCIUM SERPL-MCNC: 8.4 MG/DL (ref 8.6–10)
CALCIUM SERPL-MCNC: 9.1 MG/DL (ref 8.6–10)
CHLORIDE SERPL-SCNC: 104 MMOL/L (ref 98–107)
CHLORIDE SERPL-SCNC: 107 MMOL/L (ref 98–107)
CODING SYSTEM: NORMAL
COLOR UR AUTO: ABNORMAL
CREAT SERPL-MCNC: 1.1 MG/DL (ref 0.51–0.95)
CREAT SERPL-MCNC: 1.15 MG/DL (ref 0.51–0.95)
CREAT UR-MCNC: 80.4 MG/DL
CROSSMATCH: NORMAL
CRP SERPL-MCNC: 242 MG/L
DEPRECATED HCO3 PLAS-SCNC: 13 MMOL/L (ref 22–29)
DEPRECATED HCO3 PLAS-SCNC: 17 MMOL/L (ref 22–29)
EOSINOPHIL # BLD MANUAL: 0.7 10E3/UL (ref 0–0.7)
EOSINOPHIL NFR BLD MANUAL: 4 %
ERYTHROCYTE [DISTWIDTH] IN BLOOD BY AUTOMATED COUNT: 16.9 % (ref 10–15)
ERYTHROCYTE [DISTWIDTH] IN BLOOD BY AUTOMATED COUNT: 17 % (ref 10–15)
ERYTHROCYTE [SEDIMENTATION RATE] IN BLOOD BY WESTERGREN METHOD: 85 MM/HR (ref 0–20)
FIBRINOGEN PPP-MCNC: 825 MG/DL (ref 170–490)
FIBRINOGEN PPP-MCNC: 827 MG/DL (ref 170–490)
FLUAV H1 2009 PAND RNA SPEC QL NAA+PROBE: NOT DETECTED
FLUAV H1 RNA SPEC QL NAA+PROBE: NOT DETECTED
FLUAV H3 RNA SPEC QL NAA+PROBE: NOT DETECTED
FLUAV RNA SPEC QL NAA+PROBE: NOT DETECTED
FLUBV RNA SPEC QL NAA+PROBE: NOT DETECTED
GFR SERPL CREATININE-BSD FRML MDRD: 68 ML/MIN/1.73M2
GFR SERPL CREATININE-BSD FRML MDRD: 72 ML/MIN/1.73M2
GLUCOSE BLD-MCNC: 101 MG/DL (ref 70–99)
GLUCOSE BLD-MCNC: 101 MG/DL (ref 70–99)
GLUCOSE BLD-MCNC: 102 MG/DL (ref 70–99)
GLUCOSE BLD-MCNC: 105 MG/DL (ref 70–99)
GLUCOSE BLD-MCNC: 132 MG/DL (ref 70–99)
GLUCOSE BLDC GLUCOMTR-MCNC: 161 MG/DL (ref 70–99)
GLUCOSE BLDC GLUCOMTR-MCNC: 166 MG/DL (ref 70–99)
GLUCOSE SERPL-MCNC: 188 MG/DL (ref 70–99)
GLUCOSE SERPL-MCNC: 79 MG/DL (ref 70–99)
GLUCOSE UR STRIP-MCNC: NEGATIVE MG/DL
GRAM STAIN RESULT: NORMAL
GRAM STAIN RESULT: NORMAL
HADV DNA SPEC QL NAA+PROBE: NOT DETECTED
HCG INTACT+B SERPL-ACNC: 9907 MIU/ML
HCO3 BLD-SCNC: 19 MMOL/L (ref 21–28)
HCO3 BLDA-SCNC: 20 MMOL/L (ref 21–28)
HCO3 BLDA-SCNC: 20 MMOL/L (ref 21–28)
HCO3 BLDA-SCNC: 21 MMOL/L (ref 21–28)
HCO3 BLDA-SCNC: 22 MMOL/L (ref 21–28)
HCO3 BLDA-SCNC: 23 MMOL/L (ref 21–28)
HCO3 BLDV-SCNC: 20 MMOL/L (ref 21–28)
HCOV PNL SPEC NAA+PROBE: NOT DETECTED
HCT VFR BLD AUTO: 30.4 % (ref 35–47)
HCT VFR BLD AUTO: 33.6 % (ref 35–47)
HGB BLD-MCNC: 10.3 G/DL (ref 11.7–15.7)
HGB BLD-MCNC: 10.6 G/DL (ref 11.7–15.7)
HGB BLD-MCNC: 6 G/DL (ref 11.7–15.7)
HGB BLD-MCNC: 6.1 G/DL (ref 11.7–15.7)
HGB BLD-MCNC: 8 G/DL (ref 11.7–15.7)
HGB BLD-MCNC: 9.4 G/DL (ref 11.7–15.7)
HGB BLD-MCNC: 9.4 G/DL (ref 11.7–15.7)
HGB UR QL STRIP: ABNORMAL
HMPV RNA SPEC QL NAA+PROBE: NOT DETECTED
HOLD SPECIMEN: NORMAL
HPIV1 RNA SPEC QL NAA+PROBE: NOT DETECTED
HPIV2 RNA SPEC QL NAA+PROBE: NOT DETECTED
HPIV3 RNA SPEC QL NAA+PROBE: NOT DETECTED
HPIV4 RNA SPEC QL NAA+PROBE: NOT DETECTED
HYALINE CASTS: 1 /LPF
INR PPP: 1.26 (ref 0.85–1.15)
INR PPP: 1.26 (ref 0.85–1.15)
ISSUE DATE AND TIME: NORMAL
ISSUE DATE AND TIME: NORMAL
KETONES UR STRIP-MCNC: ABNORMAL MG/DL
LACTATE BLD-SCNC: 0.4 MMOL/L
LACTATE BLD-SCNC: 0.4 MMOL/L
LACTATE BLD-SCNC: 0.6 MMOL/L
LACTATE BLD-SCNC: 0.6 MMOL/L
LACTATE BLD-SCNC: 0.7 MMOL/L
LACTATE SERPL-SCNC: 1.1 MMOL/L (ref 0.7–2)
LEUKOCYTE ESTERASE UR QL STRIP: NEGATIVE
LVEF ECHO: NORMAL
LYMPHOCYTES # BLD MANUAL: 1.2 10E3/UL (ref 0.8–5.3)
LYMPHOCYTES NFR BLD MANUAL: 7 %
M PNEUMO DNA SPEC QL NAA+PROBE: NOT DETECTED
MAGNESIUM SERPL-MCNC: 2.5 MG/DL (ref 1.7–2.3)
MCH RBC QN AUTO: 25.4 PG (ref 26.5–33)
MCH RBC QN AUTO: 25.5 PG (ref 26.5–33)
MCHC RBC AUTO-ENTMCNC: 30.7 G/DL (ref 31.5–36.5)
MCHC RBC AUTO-ENTMCNC: 30.9 G/DL (ref 31.5–36.5)
MCV RBC AUTO: 82 FL (ref 78–100)
MCV RBC AUTO: 83 FL (ref 78–100)
MONOCYTES # BLD MANUAL: 0.9 10E3/UL (ref 0–1.3)
MONOCYTES NFR BLD MANUAL: 5 %
MRSA DNA SPEC QL NAA+PROBE: NEGATIVE
MUCOUS THREADS #/AREA URNS LPF: PRESENT /LPF
NEUTROPHILS # BLD MANUAL: 14.9 10E3/UL (ref 1.6–8.3)
NEUTROPHILS NFR BLD MANUAL: 84 %
NITRATE UR QL: NEGATIVE
NT-PROBNP SERPL-MCNC: 307 PG/ML (ref 0–450)
O2/TOTAL GAS SETTING VFR VENT: 0 %
O2/TOTAL GAS SETTING VFR VENT: 10 %
O2/TOTAL GAS SETTING VFR VENT: 100 %
O2/TOTAL GAS SETTING VFR VENT: 21 %
O2/TOTAL GAS SETTING VFR VENT: 50 %
O2/TOTAL GAS SETTING VFR VENT: 60 %
O2/TOTAL GAS SETTING VFR VENT: 87 %
OXYHGB MFR BLD: 99 % (ref 92–100)
PCO2 BLD: 37 MM HG (ref 35–45)
PCO2 BLDA: 34 MM HG (ref 35–45)
PCO2 BLDA: 38 MM HG (ref 35–45)
PCO2 BLDA: 38 MM HG (ref 35–45)
PCO2 BLDA: 39 MM HG (ref 35–45)
PCO2 BLDA: 41 MM HG (ref 35–45)
PCO2 BLDV: 35 MM HG (ref 40–50)
PH BLD: 7.32 [PH] (ref 7.35–7.45)
PH BLDA: 7.32 [PH] (ref 7.35–7.45)
PH BLDA: 7.33 [PH] (ref 7.35–7.45)
PH BLDA: 7.33 [PH] (ref 7.35–7.45)
PH BLDA: 7.39 [PH] (ref 7.35–7.45)
PH BLDA: 7.43 [PH] (ref 7.35–7.45)
PH BLDV: 7.37 [PH] (ref 7.32–7.43)
PH UR STRIP: 5.5 [PH] (ref 5–7)
PHOSPHATE SERPL-MCNC: 4 MG/DL (ref 2.5–4.5)
PLAT MORPH BLD: ABNORMAL
PLATELET # BLD AUTO: 144 10E3/UL (ref 150–450)
PLATELET # BLD AUTO: 241 10E3/UL (ref 150–450)
PLATELET # BLD AUTO: 276 10E3/UL (ref 150–450)
PO2 BLD: 195 MM HG (ref 80–105)
PO2 BLDA: 114 MM HG (ref 80–105)
PO2 BLDA: 164 MM HG (ref 80–105)
PO2 BLDA: 394 MM HG (ref 80–105)
PO2 BLDA: 55 MM HG (ref 80–105)
PO2 BLDA: >600 MM HG (ref 80–105)
PO2 BLDV: 64 MM HG (ref 25–47)
POTASSIUM BLD-SCNC: 5.1 MMOL/L (ref 3.5–5)
POTASSIUM BLD-SCNC: 5.2 MMOL/L (ref 3.5–5)
POTASSIUM BLD-SCNC: 5.2 MMOL/L (ref 3.5–5)
POTASSIUM BLD-SCNC: 5.3 MMOL/L (ref 3.5–5)
POTASSIUM BLD-SCNC: 5.6 MMOL/L (ref 3.5–5)
POTASSIUM SERPL-SCNC: 5 MMOL/L (ref 3.4–5.3)
POTASSIUM SERPL-SCNC: 5.6 MMOL/L (ref 3.4–5.3)
PROT SERPL-MCNC: 5.2 G/DL (ref 6.4–8.3)
PROT/CREAT 24H UR: 1.24 MG/MG CR (ref 0–0.2)
RBC # BLD AUTO: 3.69 10E6/UL (ref 3.8–5.2)
RBC # BLD AUTO: 4.05 10E6/UL (ref 3.8–5.2)
RBC MORPH BLD: ABNORMAL
RBC URINE: 9 /HPF
RSV RNA SPEC QL NAA+PROBE: NOT DETECTED
RSV RNA SPEC QL NAA+PROBE: NOT DETECTED
RV+EV RNA SPEC QL NAA+PROBE: NOT DETECTED
SA TARGET DNA: NEGATIVE
SODIUM BLD-SCNC: 136 MMOL/L (ref 133–144)
SODIUM BLD-SCNC: 137 MMOL/L (ref 133–144)
SODIUM BLD-SCNC: 137 MMOL/L (ref 133–144)
SODIUM SERPL-SCNC: 134 MMOL/L (ref 136–145)
SODIUM SERPL-SCNC: 137 MMOL/L (ref 136–145)
SP GR UR STRIP: 1.02 (ref 1–1.03)
SPECIMEN EXPIRATION DATE: NORMAL
SQUAMOUS EPITHELIAL: 1 /HPF
TRANSITIONAL EPI: <1 /HPF
UFH PPP CHRO-ACNC: 0.64 IU/ML
UNIT ABO/RH: NORMAL
UNIT NUMBER: NORMAL
UNIT STATUS: NORMAL
UNIT TYPE ISBT: 5100
UROBILINOGEN UR STRIP-MCNC: NORMAL MG/DL
WBC # BLD AUTO: 17.7 10E3/UL (ref 4–11)
WBC # BLD AUTO: 29.7 10E3/UL (ref 4–11)
WBC URINE: 2 /HPF

## 2023-02-18 PROCEDURE — 36415 COLL VENOUS BLD VENIPUNCTURE: CPT

## 2023-02-18 PROCEDURE — 86923 COMPATIBILITY TEST ELECTRIC: CPT

## 2023-02-18 PROCEDURE — 93306 TTE W/DOPPLER COMPLETE: CPT | Mod: 26 | Performed by: INTERNAL MEDICINE

## 2023-02-18 PROCEDURE — 71045 X-RAY EXAM CHEST 1 VIEW: CPT

## 2023-02-18 PROCEDURE — 93010 ELECTROCARDIOGRAM REPORT: CPT | Mod: 59 | Performed by: INTERNAL MEDICINE

## 2023-02-18 PROCEDURE — 250N000013 HC RX MED GY IP 250 OP 250 PS 637: Performed by: STUDENT IN AN ORGANIZED HEALTH CARE EDUCATION/TRAINING PROGRAM

## 2023-02-18 PROCEDURE — 250N000009 HC RX 250: Performed by: INTERNAL MEDICINE

## 2023-02-18 PROCEDURE — 272N000088 HC PUMP APP ADULT PERFUSION: Performed by: THORACIC SURGERY (CARDIOTHORACIC VASCULAR SURGERY)

## 2023-02-18 PROCEDURE — 250N000009 HC RX 250: Performed by: NURSE ANESTHETIST, CERTIFIED REGISTERED

## 2023-02-18 PROCEDURE — 36415 COLL VENOUS BLD VENIPUNCTURE: CPT | Performed by: INTERNAL MEDICINE

## 2023-02-18 PROCEDURE — 83880 ASSAY OF NATRIURETIC PEPTIDE: CPT | Performed by: HOSPITALIST

## 2023-02-18 PROCEDURE — 360N000080 HC SURGERY LEVEL 7, PER MIN: Performed by: THORACIC SURGERY (CARDIOTHORACIC VASCULAR SURGERY)

## 2023-02-18 PROCEDURE — 71045 X-RAY EXAM CHEST 1 VIEW: CPT | Mod: 26 | Performed by: RADIOLOGY

## 2023-02-18 PROCEDURE — 250N000011 HC RX IP 250 OP 636

## 2023-02-18 PROCEDURE — C1781 MESH (IMPLANTABLE): HCPCS | Performed by: THORACIC SURGERY (CARDIOTHORACIC VASCULAR SURGERY)

## 2023-02-18 PROCEDURE — 83735 ASSAY OF MAGNESIUM: CPT | Performed by: STUDENT IN AN ORGANIZED HEALTH CARE EDUCATION/TRAINING PROGRAM

## 2023-02-18 PROCEDURE — C1769 GUIDE WIRE: HCPCS | Performed by: THORACIC SURGERY (CARDIOTHORACIC VASCULAR SURGERY)

## 2023-02-18 PROCEDURE — 93005 ELECTROCARDIOGRAM TRACING: CPT

## 2023-02-18 PROCEDURE — 84132 ASSAY OF SERUM POTASSIUM: CPT

## 2023-02-18 PROCEDURE — 250N000013 HC RX MED GY IP 250 OP 250 PS 637

## 2023-02-18 PROCEDURE — 84100 ASSAY OF PHOSPHORUS: CPT | Performed by: STUDENT IN AN ORGANIZED HEALTH CARE EDUCATION/TRAINING PROGRAM

## 2023-02-18 PROCEDURE — 258N000003 HC RX IP 258 OP 636: Performed by: SURGERY

## 2023-02-18 PROCEDURE — C1898 LEAD, PMKR, OTHER THAN TRANS: HCPCS | Performed by: THORACIC SURGERY (CARDIOTHORACIC VASCULAR SURGERY)

## 2023-02-18 PROCEDURE — 82330 ASSAY OF CALCIUM: CPT

## 2023-02-18 PROCEDURE — 250N000024 HC ISOFLURANE, PER MIN: Performed by: THORACIC SURGERY (CARDIOTHORACIC VASCULAR SURGERY)

## 2023-02-18 PROCEDURE — 85049 AUTOMATED PLATELET COUNT: CPT | Performed by: NURSE ANESTHETIST, CERTIFIED REGISTERED

## 2023-02-18 PROCEDURE — 82330 ASSAY OF CALCIUM: CPT | Performed by: STUDENT IN AN ORGANIZED HEALTH CARE EDUCATION/TRAINING PROGRAM

## 2023-02-18 PROCEDURE — 85007 BL SMEAR W/DIFF WBC COUNT: CPT | Performed by: INTERNAL MEDICINE

## 2023-02-18 PROCEDURE — 99233 SBSQ HOSP IP/OBS HIGH 50: CPT | Performed by: HOSPITALIST

## 2023-02-18 PROCEDURE — 250N000013 HC RX MED GY IP 250 OP 250 PS 637: Performed by: INTERNAL MEDICINE

## 2023-02-18 PROCEDURE — 85027 COMPLETE CBC AUTOMATED: CPT | Performed by: NURSE ANESTHETIST, CERTIFIED REGISTERED

## 2023-02-18 PROCEDURE — 85049 AUTOMATED PLATELET COUNT: CPT | Performed by: STUDENT IN AN ORGANIZED HEALTH CARE EDUCATION/TRAINING PROGRAM

## 2023-02-18 PROCEDURE — 85652 RBC SED RATE AUTOMATED: CPT | Performed by: INTERNAL MEDICINE

## 2023-02-18 PROCEDURE — 272N000001 HC OR GENERAL SUPPLY STERILE: Performed by: THORACIC SURGERY (CARDIOTHORACIC VASCULAR SURGERY)

## 2023-02-18 PROCEDURE — 87070 CULTURE OTHR SPECIMN AEROBIC: CPT | Performed by: THORACIC SURGERY (CARDIOTHORACIC VASCULAR SURGERY)

## 2023-02-18 PROCEDURE — 87102 FUNGUS ISOLATION CULTURE: CPT | Performed by: THORACIC SURGERY (CARDIOTHORACIC VASCULAR SURGERY)

## 2023-02-18 PROCEDURE — 5A1221Z PERFORMANCE OF CARDIAC OUTPUT, CONTINUOUS: ICD-10-PCS | Performed by: THORACIC SURGERY (CARDIOTHORACIC VASCULAR SURGERY)

## 2023-02-18 PROCEDURE — 250N000011 HC RX IP 250 OP 636: Performed by: NURSE ANESTHETIST, CERTIFIED REGISTERED

## 2023-02-18 PROCEDURE — 93306 TTE W/DOPPLER COMPLETE: CPT

## 2023-02-18 PROCEDURE — 84156 ASSAY OF PROTEIN URINE: CPT | Performed by: INTERNAL MEDICINE

## 2023-02-18 PROCEDURE — 85610 PROTHROMBIN TIME: CPT | Performed by: STUDENT IN AN ORGANIZED HEALTH CARE EDUCATION/TRAINING PROGRAM

## 2023-02-18 PROCEDURE — 33315 EXPLORATORY HEART SURGERY: CPT | Mod: GC | Performed by: THORACIC SURGERY (CARDIOTHORACIC VASCULAR SURGERY)

## 2023-02-18 PROCEDURE — 85610 PROTHROMBIN TIME: CPT | Performed by: NURSE ANESTHETIST, CERTIFIED REGISTERED

## 2023-02-18 PROCEDURE — 87205 SMEAR GRAM STAIN: CPT | Performed by: THORACIC SURGERY (CARDIOTHORACIC VASCULAR SURGERY)

## 2023-02-18 PROCEDURE — 85730 THROMBOPLASTIN TIME PARTIAL: CPT | Performed by: NURSE ANESTHETIST, CERTIFIED REGISTERED

## 2023-02-18 PROCEDURE — 87641 MR-STAPH DNA AMP PROBE: CPT | Performed by: INTERNAL MEDICINE

## 2023-02-18 PROCEDURE — XW033E5 INTRODUCTION OF REMDESIVIR ANTI-INFECTIVE INTO PERIPHERAL VEIN, PERCUTANEOUS APPROACH, NEW TECHNOLOGY GROUP 5: ICD-10-PCS | Performed by: INTERNAL MEDICINE

## 2023-02-18 PROCEDURE — 85384 FIBRINOGEN ACTIVITY: CPT | Performed by: SURGERY

## 2023-02-18 PROCEDURE — 87070 CULTURE OTHR SPECIMN AEROBIC: CPT | Performed by: HOSPITALIST

## 2023-02-18 PROCEDURE — 83605 ASSAY OF LACTIC ACID: CPT | Performed by: STUDENT IN AN ORGANIZED HEALTH CARE EDUCATION/TRAINING PROGRAM

## 2023-02-18 PROCEDURE — 250N000011 HC RX IP 250 OP 636: Performed by: STUDENT IN AN ORGANIZED HEALTH CARE EDUCATION/TRAINING PROGRAM

## 2023-02-18 PROCEDURE — 85384 FIBRINOGEN ACTIVITY: CPT | Performed by: NURSE ANESTHETIST, CERTIFIED REGISTERED

## 2023-02-18 PROCEDURE — 250N000009 HC RX 250: Performed by: THORACIC SURGERY (CARDIOTHORACIC VASCULAR SURGERY)

## 2023-02-18 PROCEDURE — 87205 SMEAR GRAM STAIN: CPT | Performed by: HOSPITALIST

## 2023-02-18 PROCEDURE — C1894 INTRO/SHEATH, NON-LASER: HCPCS | Performed by: THORACIC SURGERY (CARDIOTHORACIC VASCULAR SURGERY)

## 2023-02-18 PROCEDURE — 272N000085 HC PACK CELL SAVER CSP: Performed by: THORACIC SURGERY (CARDIOTHORACIC VASCULAR SURGERY)

## 2023-02-18 PROCEDURE — 86901 BLOOD TYPING SEROLOGIC RH(D): CPT | Performed by: HOSPITALIST

## 2023-02-18 PROCEDURE — 99223 1ST HOSP IP/OBS HIGH 75: CPT | Mod: GC | Performed by: INTERNAL MEDICINE

## 2023-02-18 PROCEDURE — 85730 THROMBOPLASTIN TIME PARTIAL: CPT | Performed by: STUDENT IN AN ORGANIZED HEALTH CARE EDUCATION/TRAINING PROGRAM

## 2023-02-18 PROCEDURE — 84702 CHORIONIC GONADOTROPIN TEST: CPT | Performed by: STUDENT IN AN ORGANIZED HEALTH CARE EDUCATION/TRAINING PROGRAM

## 2023-02-18 PROCEDURE — 87075 CULTR BACTERIA EXCEPT BLOOD: CPT | Performed by: THORACIC SURGERY (CARDIOTHORACIC VASCULAR SURGERY)

## 2023-02-18 PROCEDURE — 200N000002 HC R&B ICU UMMC

## 2023-02-18 PROCEDURE — 370N000017 HC ANESTHESIA TECHNICAL FEE, PER MIN: Performed by: THORACIC SURGERY (CARDIOTHORACIC VASCULAR SURGERY)

## 2023-02-18 PROCEDURE — 02C63ZZ EXTIRPATION OF MATTER FROM RIGHT ATRIUM, PERCUTANEOUS APPROACH: ICD-10-PCS | Performed by: INTERNAL MEDICINE

## 2023-02-18 PROCEDURE — 86850 RBC ANTIBODY SCREEN: CPT | Performed by: HOSPITALIST

## 2023-02-18 PROCEDURE — 83605 ASSAY OF LACTIC ACID: CPT

## 2023-02-18 PROCEDURE — 86140 C-REACTIVE PROTEIN: CPT | Performed by: INTERNAL MEDICINE

## 2023-02-18 PROCEDURE — 250N000012 HC RX MED GY IP 250 OP 636 PS 637: Performed by: INTERNAL MEDICINE

## 2023-02-18 PROCEDURE — 88304 TISSUE EXAM BY PATHOLOGIST: CPT | Mod: 26 | Performed by: PATHOLOGY

## 2023-02-18 PROCEDURE — 410N000003 HC PER-PERFUSION 1ST 30 MIN: Performed by: THORACIC SURGERY (CARDIOTHORACIC VASCULAR SURGERY)

## 2023-02-18 PROCEDURE — 88304 TISSUE EXAM BY PATHOLOGIST: CPT | Mod: TC | Performed by: THORACIC SURGERY (CARDIOTHORACIC VASCULAR SURGERY)

## 2023-02-18 PROCEDURE — P9016 RBC LEUKOCYTES REDUCED: HCPCS

## 2023-02-18 PROCEDURE — 250N000011 HC RX IP 250 OP 636: Performed by: THORACIC SURGERY (CARDIOTHORACIC VASCULAR SURGERY)

## 2023-02-18 PROCEDURE — 85027 COMPLETE CBC AUTOMATED: CPT | Performed by: INTERNAL MEDICINE

## 2023-02-18 PROCEDURE — 82803 BLOOD GASES ANY COMBINATION: CPT | Performed by: INTERNAL MEDICINE

## 2023-02-18 PROCEDURE — 85520 HEPARIN ASSAY: CPT | Performed by: SURGERY

## 2023-02-18 PROCEDURE — 82435 ASSAY OF BLOOD CHLORIDE: CPT

## 2023-02-18 PROCEDURE — 02C60ZZ EXTIRPATION OF MATTER FROM RIGHT ATRIUM, OPEN APPROACH: ICD-10-PCS | Performed by: THORACIC SURGERY (CARDIOTHORACIC VASCULAR SURGERY)

## 2023-02-18 PROCEDURE — 99255 IP/OBS CONSLTJ NEW/EST HI 80: CPT | Mod: GC | Performed by: INTERNAL MEDICINE

## 2023-02-18 PROCEDURE — 83516 IMMUNOASSAY NONANTIBODY: CPT | Performed by: INTERNAL MEDICINE

## 2023-02-18 PROCEDURE — 99223 1ST HOSP IP/OBS HIGH 75: CPT | Mod: 25 | Performed by: INTERNAL MEDICINE

## 2023-02-18 PROCEDURE — 84132 ASSAY OF SERUM POTASSIUM: CPT | Performed by: STUDENT IN AN ORGANIZED HEALTH CARE EDUCATION/TRAINING PROGRAM

## 2023-02-18 PROCEDURE — 81001 URINALYSIS AUTO W/SCOPE: CPT | Performed by: INTERNAL MEDICINE

## 2023-02-18 PROCEDURE — 258N000003 HC RX IP 258 OP 636: Performed by: NURSE ANESTHETIST, CERTIFIED REGISTERED

## 2023-02-18 PROCEDURE — 250N000009 HC RX 250: Performed by: STUDENT IN AN ORGANIZED HEALTH CARE EDUCATION/TRAINING PROGRAM

## 2023-02-18 PROCEDURE — 82805 BLOOD GASES W/O2 SATURATION: CPT | Performed by: STUDENT IN AN ORGANIZED HEALTH CARE EDUCATION/TRAINING PROGRAM

## 2023-02-18 PROCEDURE — 250N000011 HC RX IP 250 OP 636: Performed by: SURGERY

## 2023-02-18 PROCEDURE — 85396 CLOTTING ASSAY WHOLE BLOOD: CPT

## 2023-02-18 RX ORDER — LIDOCAINE HYDROCHLORIDE 10 MG/ML
INJECTION, SOLUTION INFILTRATION; PERINEURAL
Status: COMPLETED | OUTPATIENT
Start: 2023-02-18 | End: 2023-02-18

## 2023-02-18 RX ORDER — OXYCODONE HYDROCHLORIDE 10 MG/1
10 TABLET ORAL EVERY 4 HOURS PRN
Status: DISCONTINUED | OUTPATIENT
Start: 2023-02-18 | End: 2023-02-27 | Stop reason: HOSPADM

## 2023-02-18 RX ORDER — PROPOFOL 10 MG/ML
INJECTION, EMULSION INTRAVENOUS PRN
Status: DISCONTINUED | OUTPATIENT
Start: 2023-02-18 | End: 2023-02-18

## 2023-02-18 RX ORDER — PANTOPRAZOLE SODIUM 40 MG/1
40 TABLET, DELAYED RELEASE ORAL DAILY
Status: DISCONTINUED | OUTPATIENT
Start: 2023-02-18 | End: 2023-02-18

## 2023-02-18 RX ORDER — FAMOTIDINE 20 MG/1
20 TABLET, FILM COATED ORAL 2 TIMES DAILY
Status: DISCONTINUED | OUTPATIENT
Start: 2023-02-19 | End: 2023-02-23

## 2023-02-18 RX ORDER — ATROPINE SULFATE 0.4 MG/ML
AMPUL (ML) INJECTION PRN
Status: DISCONTINUED | OUTPATIENT
Start: 2023-02-18 | End: 2023-02-18

## 2023-02-18 RX ORDER — NICOTINE POLACRILEX 4 MG
15-30 LOZENGE BUCCAL
Status: DISCONTINUED | OUTPATIENT
Start: 2023-02-18 | End: 2023-02-27 | Stop reason: HOSPADM

## 2023-02-18 RX ORDER — HYDROMORPHONE HYDROCHLORIDE 1 MG/ML
0.5 INJECTION, SOLUTION INTRAMUSCULAR; INTRAVENOUS; SUBCUTANEOUS ONCE
Status: COMPLETED | OUTPATIENT
Start: 2023-02-18 | End: 2023-02-18

## 2023-02-18 RX ORDER — ASPIRIN 81 MG/1
81 TABLET, CHEWABLE ORAL DAILY
Status: DISCONTINUED | OUTPATIENT
Start: 2023-02-19 | End: 2023-02-23

## 2023-02-18 RX ORDER — LIDOCAINE HYDROCHLORIDE 10 MG/ML
INJECTION, SOLUTION EPIDURAL; INFILTRATION; INTRACAUDAL; PERINEURAL PRN
Status: DISCONTINUED | OUTPATIENT
Start: 2023-02-18 | End: 2023-02-18 | Stop reason: HOSPADM

## 2023-02-18 RX ORDER — DEXTROSE MONOHYDRATE 25 G/50ML
25-50 INJECTION, SOLUTION INTRAVENOUS
Status: DISCONTINUED | OUTPATIENT
Start: 2023-02-18 | End: 2023-02-27 | Stop reason: HOSPADM

## 2023-02-18 RX ORDER — NALOXONE HYDROCHLORIDE 0.4 MG/ML
0.2 INJECTION, SOLUTION INTRAMUSCULAR; INTRAVENOUS; SUBCUTANEOUS
Status: DISCONTINUED | OUTPATIENT
Start: 2023-02-18 | End: 2023-02-27 | Stop reason: HOSPADM

## 2023-02-18 RX ORDER — NEOSTIGMINE METHYLSULFATE 1 MG/ML
VIAL (ML) INJECTION PRN
Status: DISCONTINUED | OUTPATIENT
Start: 2023-02-18 | End: 2023-02-18

## 2023-02-18 RX ORDER — OXYCODONE HYDROCHLORIDE 5 MG/1
5 TABLET ORAL EVERY 4 HOURS PRN
Status: DISCONTINUED | OUTPATIENT
Start: 2023-02-18 | End: 2023-02-27 | Stop reason: HOSPADM

## 2023-02-18 RX ORDER — NALOXONE HYDROCHLORIDE 0.4 MG/ML
0.4 INJECTION, SOLUTION INTRAMUSCULAR; INTRAVENOUS; SUBCUTANEOUS
Status: DISCONTINUED | OUTPATIENT
Start: 2023-02-18 | End: 2023-02-27 | Stop reason: HOSPADM

## 2023-02-18 RX ORDER — SODIUM CHLORIDE, SODIUM GLUCONATE, SODIUM ACETATE, POTASSIUM CHLORIDE AND MAGNESIUM CHLORIDE 526; 502; 368; 37; 30 MG/100ML; MG/100ML; MG/100ML; MG/100ML; MG/100ML
INJECTION, SOLUTION INTRAVENOUS CONTINUOUS PRN
Status: DISCONTINUED | OUTPATIENT
Start: 2023-02-18 | End: 2023-02-18

## 2023-02-18 RX ORDER — HYDRALAZINE HYDROCHLORIDE 20 MG/ML
10 INJECTION INTRAMUSCULAR; INTRAVENOUS EVERY 30 MIN PRN
Status: DISCONTINUED | OUTPATIENT
Start: 2023-02-18 | End: 2023-02-27 | Stop reason: HOSPADM

## 2023-02-18 RX ORDER — LIDOCAINE HYDROCHLORIDE 10 MG/ML
INJECTION, SOLUTION INFILTRATION; PERINEURAL PRN
Status: DISCONTINUED | OUTPATIENT
Start: 2023-02-18 | End: 2023-02-18

## 2023-02-18 RX ORDER — NOREPINEPHRINE BITARTRATE 0.06 MG/ML
.01-.4 INJECTION, SOLUTION INTRAVENOUS CONTINUOUS PRN
Status: DISCONTINUED | OUTPATIENT
Start: 2023-02-18 | End: 2023-02-18

## 2023-02-18 RX ORDER — CALCIUM GLUCONATE 20 MG/ML
2 INJECTION, SOLUTION INTRAVENOUS
Status: ACTIVE | OUTPATIENT
Start: 2023-02-18 | End: 2023-02-24

## 2023-02-18 RX ORDER — DEXMEDETOMIDINE HYDROCHLORIDE 4 UG/ML
.2-.7 INJECTION, SOLUTION INTRAVENOUS CONTINUOUS
Status: DISCONTINUED | OUTPATIENT
Start: 2023-02-18 | End: 2023-02-18

## 2023-02-18 RX ORDER — AMOXICILLIN 250 MG
1 CAPSULE ORAL 2 TIMES DAILY
Status: DISCONTINUED | OUTPATIENT
Start: 2023-02-18 | End: 2023-02-23

## 2023-02-18 RX ORDER — CEFTRIAXONE 2 G/1
2 INJECTION, POWDER, FOR SOLUTION INTRAMUSCULAR; INTRAVENOUS EVERY 24 HOURS
Status: DISCONTINUED | OUTPATIENT
Start: 2023-02-18 | End: 2023-02-19

## 2023-02-18 RX ORDER — VASOPRESSIN IN 0.9 % NACL 2 UNIT/2ML
SYRINGE (ML) INTRAVENOUS PRN
Status: DISCONTINUED | OUTPATIENT
Start: 2023-02-18 | End: 2023-02-18

## 2023-02-18 RX ORDER — NOREPINEPHRINE BITARTRATE 0.06 MG/ML
.01-.4 INJECTION, SOLUTION INTRAVENOUS CONTINUOUS
Status: DISCONTINUED | OUTPATIENT
Start: 2023-02-18 | End: 2023-02-19

## 2023-02-18 RX ORDER — FENTANYL CITRATE 50 UG/ML
INJECTION, SOLUTION INTRAMUSCULAR; INTRAVENOUS PRN
Status: DISCONTINUED | OUTPATIENT
Start: 2023-02-18 | End: 2023-02-18

## 2023-02-18 RX ORDER — HEPARIN SODIUM 1000 [USP'U]/ML
INJECTION, SOLUTION INTRAVENOUS; SUBCUTANEOUS PRN
Status: DISCONTINUED | OUTPATIENT
Start: 2023-02-18 | End: 2023-02-18

## 2023-02-18 RX ORDER — ACETAMINOPHEN 325 MG/1
650 TABLET ORAL EVERY 4 HOURS PRN
Status: DISCONTINUED | OUTPATIENT
Start: 2023-02-21 | End: 2023-02-27 | Stop reason: HOSPADM

## 2023-02-18 RX ORDER — BISACODYL 10 MG
10 SUPPOSITORY, RECTAL RECTAL DAILY PRN
Status: DISCONTINUED | OUTPATIENT
Start: 2023-02-18 | End: 2023-02-22

## 2023-02-18 RX ORDER — HYDROMORPHONE HCL IN WATER/PF 6 MG/30 ML
0.2 PATIENT CONTROLLED ANALGESIA SYRINGE INTRAVENOUS
Status: DISCONTINUED | OUTPATIENT
Start: 2023-02-18 | End: 2023-02-22

## 2023-02-18 RX ORDER — SODIUM CHLORIDE, SODIUM LACTATE, POTASSIUM CHLORIDE, CALCIUM CHLORIDE 600; 310; 30; 20 MG/100ML; MG/100ML; MG/100ML; MG/100ML
INJECTION, SOLUTION INTRAVENOUS CONTINUOUS PRN
Status: DISCONTINUED | OUTPATIENT
Start: 2023-02-18 | End: 2023-02-18

## 2023-02-18 RX ORDER — HYDROMORPHONE HCL IN WATER/PF 6 MG/30 ML
0.4 PATIENT CONTROLLED ANALGESIA SYRINGE INTRAVENOUS
Status: DISCONTINUED | OUTPATIENT
Start: 2023-02-18 | End: 2023-02-22

## 2023-02-18 RX ORDER — CALCIUM GLUCONATE 20 MG/ML
1 INJECTION, SOLUTION INTRAVENOUS
Status: ACTIVE | OUTPATIENT
Start: 2023-02-18 | End: 2023-02-24

## 2023-02-18 RX ORDER — LIDOCAINE 40 MG/G
CREAM TOPICAL
Status: DISCONTINUED | OUTPATIENT
Start: 2023-02-18 | End: 2023-02-27 | Stop reason: HOSPADM

## 2023-02-18 RX ORDER — DEXTROMETHORPHAN POLISTIREX 30 MG/5ML
30 SUSPENSION ORAL EVERY 12 HOURS PRN
Status: DISCONTINUED | OUTPATIENT
Start: 2023-02-18 | End: 2023-02-18

## 2023-02-18 RX ORDER — HEPARIN SODIUM 5000 [USP'U]/.5ML
5000 INJECTION, SOLUTION INTRAVENOUS; SUBCUTANEOUS EVERY 8 HOURS
Status: DISCONTINUED | OUTPATIENT
Start: 2023-02-19 | End: 2023-02-19

## 2023-02-18 RX ORDER — MUPIROCIN 20 MG/G
0.5 OINTMENT TOPICAL 2 TIMES DAILY
Status: DISCONTINUED | OUTPATIENT
Start: 2023-02-18 | End: 2023-02-20

## 2023-02-18 RX ORDER — PROTAMINE SULFATE 10 MG/ML
INJECTION, SOLUTION INTRAVENOUS PRN
Status: DISCONTINUED | OUTPATIENT
Start: 2023-02-18 | End: 2023-02-18

## 2023-02-18 RX ORDER — POLYETHYLENE GLYCOL 3350 17 G/17G
17 POWDER, FOR SOLUTION ORAL DAILY
Status: DISCONTINUED | OUTPATIENT
Start: 2023-02-19 | End: 2023-02-23

## 2023-02-18 RX ORDER — HYDROMORPHONE HCL IN WATER/PF 6 MG/30 ML
PATIENT CONTROLLED ANALGESIA SYRINGE INTRAVENOUS
Status: COMPLETED
Start: 2023-02-18 | End: 2023-02-18

## 2023-02-18 RX ORDER — CEFAZOLIN SODIUM/WATER 2 G/20 ML
SYRINGE (ML) INTRAVENOUS PRN
Status: DISCONTINUED | OUTPATIENT
Start: 2023-02-18 | End: 2023-02-18

## 2023-02-18 RX ORDER — ACETAMINOPHEN 325 MG/1
975 TABLET ORAL EVERY 8 HOURS
Status: COMPLETED | OUTPATIENT
Start: 2023-02-18 | End: 2023-02-21

## 2023-02-18 RX ORDER — METHOCARBAMOL 500 MG/1
500 TABLET, FILM COATED ORAL 4 TIMES DAILY
Status: DISCONTINUED | OUTPATIENT
Start: 2023-02-18 | End: 2023-02-18

## 2023-02-18 RX ADMIN — FAMOTIDINE 10 MG: 10 TABLET, FILM COATED ORAL at 08:05

## 2023-02-18 RX ADMIN — LIDOCAINE HYDROCHLORIDE 100 MG: 10 INJECTION, SOLUTION INFILTRATION; PERINEURAL at 13:45

## 2023-02-18 RX ADMIN — FENTANYL CITRATE 100 MCG: 50 INJECTION, SOLUTION INTRAMUSCULAR; INTRAVENOUS at 13:45

## 2023-02-18 RX ADMIN — REMDESIVIR 200 MG: 100 INJECTION, POWDER, LYOPHILIZED, FOR SOLUTION INTRAVENOUS at 22:36

## 2023-02-18 RX ADMIN — HYDROMORPHONE HYDROCHLORIDE 0.2 MG: 0.2 INJECTION, SOLUTION INTRAMUSCULAR; INTRAVENOUS; SUBCUTANEOUS at 19:48

## 2023-02-18 RX ADMIN — Medication 5 G: at 16:35

## 2023-02-18 RX ADMIN — SENNOSIDES AND DOCUSATE SODIUM 1 TABLET: 50; 8.6 TABLET ORAL at 20:24

## 2023-02-18 RX ADMIN — OXYCODONE HYDROCHLORIDE 10 MG: 10 TABLET ORAL at 10:05

## 2023-02-18 RX ADMIN — LIDOCAINE HYDROCHLORIDE 3 ML: 10 INJECTION, SOLUTION INFILTRATION; PERINEURAL at 13:30

## 2023-02-18 RX ADMIN — MUPIROCIN 0.5 G: 20 OINTMENT TOPICAL at 21:56

## 2023-02-18 RX ADMIN — SODIUM CHLORIDE, SODIUM GLUCONATE, SODIUM ACETATE, POTASSIUM CHLORIDE AND MAGNESIUM CHLORIDE: 526; 502; 368; 37; 30 INJECTION, SOLUTION INTRAVENOUS at 16:05

## 2023-02-18 RX ADMIN — HYDROMORPHONE HYDROCHLORIDE 0.5 MG: 1 INJECTION, SOLUTION INTRAMUSCULAR; INTRAVENOUS; SUBCUTANEOUS at 18:08

## 2023-02-18 RX ADMIN — NOREPINEPHRINE BITARTRATE 6.4 MCG: 1 INJECTION, SOLUTION, CONCENTRATE INTRAVENOUS at 17:08

## 2023-02-18 RX ADMIN — HEPARIN SODIUM 4000 UNITS: 1000 INJECTION INTRAVENOUS; SUBCUTANEOUS at 15:55

## 2023-02-18 RX ADMIN — Medication 100 MG: at 13:45

## 2023-02-18 RX ADMIN — DEXAMETHASONE 1.5 MG: 1.5 TABLET ORAL at 08:05

## 2023-02-18 RX ADMIN — PROTAMINE SULFATE 20 MG: 10 INJECTION, SOLUTION INTRAVENOUS at 17:28

## 2023-02-18 RX ADMIN — FENTANYL CITRATE 50 MCG: 50 INJECTION, SOLUTION INTRAMUSCULAR; INTRAVENOUS at 16:53

## 2023-02-18 RX ADMIN — Medication 20 MG: at 17:20

## 2023-02-18 RX ADMIN — NOREPINEPHRINE BITARTRATE 6.4 MCG: 1 INJECTION, SOLUTION, CONCENTRATE INTRAVENOUS at 14:34

## 2023-02-18 RX ADMIN — NEOSTIGMINE METHYLSULFATE 2.5 MG: 1 INJECTION, SOLUTION INTRAVENOUS at 18:55

## 2023-02-18 RX ADMIN — NOREPINEPHRINE BITARTRATE 0.03 MCG/KG/MIN: 1 INJECTION, SOLUTION, CONCENTRATE INTRAVENOUS at 14:32

## 2023-02-18 RX ADMIN — NEOSTIGMINE METHYLSULFATE 2.5 MG: 1 INJECTION, SOLUTION INTRAVENOUS at 18:50

## 2023-02-18 RX ADMIN — OXYCODONE HYDROCHLORIDE 10 MG: 10 TABLET ORAL at 03:35

## 2023-02-18 RX ADMIN — Medication 30 MG: at 14:59

## 2023-02-18 RX ADMIN — NOREPINEPHRINE BITARTRATE 6.4 MCG: 1 INJECTION, SOLUTION, CONCENTRATE INTRAVENOUS at 17:10

## 2023-02-18 RX ADMIN — ENOXAPARIN SODIUM 100 MG: 100 INJECTION SUBCUTANEOUS at 10:00

## 2023-02-18 RX ADMIN — FENTANYL CITRATE 100 MCG: 50 INJECTION, SOLUTION INTRAMUSCULAR; INTRAVENOUS at 16:34

## 2023-02-18 RX ADMIN — NOREPINEPHRINE BITARTRATE 6.4 MCG: 1 INJECTION, SOLUTION, CONCENTRATE INTRAVENOUS at 14:29

## 2023-02-18 RX ADMIN — NOREPINEPHRINE BITARTRATE 6.4 MCG: 1 INJECTION, SOLUTION, CONCENTRATE INTRAVENOUS at 14:30

## 2023-02-18 RX ADMIN — DEXAMETHASONE 1.5 MG: 1.5 TABLET ORAL at 00:42

## 2023-02-18 RX ADMIN — HEPARIN SODIUM 12000 UNITS: 1000 INJECTION INTRAVENOUS; SUBCUTANEOUS at 14:45

## 2023-02-18 RX ADMIN — NOREPINEPHRINE BITARTRATE 6.4 MCG: 1 INJECTION, SOLUTION, CONCENTRATE INTRAVENOUS at 14:36

## 2023-02-18 RX ADMIN — SODIUM CHLORIDE, SODIUM GLUCONATE, SODIUM ACETATE, POTASSIUM CHLORIDE AND MAGNESIUM CHLORIDE: 526; 502; 368; 37; 30 INJECTION, SOLUTION INTRAVENOUS at 18:31

## 2023-02-18 RX ADMIN — PROPOFOL 140 MG: 10 INJECTION, EMULSION INTRAVENOUS at 13:45

## 2023-02-18 RX ADMIN — OXYCODONE HYDROCHLORIDE 10 MG: 10 TABLET ORAL at 20:24

## 2023-02-18 RX ADMIN — PROTAMINE SULFATE 180 MG: 10 INJECTION, SOLUTION INTRAVENOUS at 17:29

## 2023-02-18 RX ADMIN — Medication 1 UNITS: at 17:14

## 2023-02-18 RX ADMIN — NOREPINEPHRINE BITARTRATE 6.4 MCG: 1 INJECTION, SOLUTION, CONCENTRATE INTRAVENOUS at 14:27

## 2023-02-18 RX ADMIN — FENTANYL CITRATE 50 MCG: 50 INJECTION, SOLUTION INTRAMUSCULAR; INTRAVENOUS at 19:22

## 2023-02-18 RX ADMIN — PRENATAL VIT W/ FE FUMARATE-FA TAB 27-0.8 MG 1 TABLET: 27-0.8 TAB at 08:05

## 2023-02-18 RX ADMIN — NOREPINEPHRINE BITARTRATE 6.4 MCG: 1 INJECTION, SOLUTION, CONCENTRATE INTRAVENOUS at 17:01

## 2023-02-18 RX ADMIN — ATROPINE SULFATE 1.2 MG: 0.4 INJECTION, SOLUTION INTRAMUSCULAR; INTRAVENOUS; SUBCUTANEOUS at 18:50

## 2023-02-18 RX ADMIN — FAMOTIDINE 10 MG: 10 TABLET, FILM COATED ORAL at 00:42

## 2023-02-18 RX ADMIN — NOREPINEPHRINE BITARTRATE 6.4 MCG: 1 INJECTION, SOLUTION, CONCENTRATE INTRAVENOUS at 13:56

## 2023-02-18 RX ADMIN — ATROPINE SULFATE 1 MG: 0.4 INJECTION, SOLUTION INTRAMUSCULAR; INTRAVENOUS; SUBCUTANEOUS at 18:55

## 2023-02-18 RX ADMIN — HYDROMORPHONE HYDROCHLORIDE 0.25 MG: 1 INJECTION, SOLUTION INTRAMUSCULAR; INTRAVENOUS; SUBCUTANEOUS at 19:11

## 2023-02-18 RX ADMIN — NOREPINEPHRINE BITARTRATE 6.4 MCG: 1 INJECTION, SOLUTION, CONCENTRATE INTRAVENOUS at 14:23

## 2023-02-18 RX ADMIN — HYDROMORPHONE HYDROCHLORIDE 0.5 MG: 1 INJECTION, SOLUTION INTRAMUSCULAR; INTRAVENOUS; SUBCUTANEOUS at 22:14

## 2023-02-18 RX ADMIN — HEPARIN SODIUM 30000 UNITS: 1000 INJECTION INTRAVENOUS; SUBCUTANEOUS at 16:34

## 2023-02-18 RX ADMIN — HYDROMORPHONE HYDROCHLORIDE 0.25 MG: 1 INJECTION, SOLUTION INTRAMUSCULAR; INTRAVENOUS; SUBCUTANEOUS at 18:41

## 2023-02-18 RX ADMIN — SODIUM CHLORIDE 50 ML: 9 INJECTION, SOLUTION INTRAVENOUS at 22:38

## 2023-02-18 RX ADMIN — Medication 1 UNITS: at 17:13

## 2023-02-18 RX ADMIN — Medication 20 MG: at 15:40

## 2023-02-18 RX ADMIN — CEFTRIAXONE SODIUM 2 G: 2 INJECTION, POWDER, FOR SOLUTION INTRAMUSCULAR; INTRAVENOUS at 21:55

## 2023-02-18 RX ADMIN — ACETAMINOPHEN 975 MG: 325 TABLET, FILM COATED ORAL at 20:23

## 2023-02-18 RX ADMIN — HYDROMORPHONE HYDROCHLORIDE 0.2 MG: 0.2 INJECTION, SOLUTION INTRAMUSCULAR; INTRAVENOUS; SUBCUTANEOUS at 21:15

## 2023-02-18 RX ADMIN — SODIUM CHLORIDE, SODIUM GLUCONATE, SODIUM ACETATE, POTASSIUM CHLORIDE AND MAGNESIUM CHLORIDE: 526; 502; 368; 37; 30 INJECTION, SOLUTION INTRAVENOUS at 13:21

## 2023-02-18 RX ADMIN — HUMAN INSULIN 1 UNITS/HR: 100 INJECTION, SOLUTION SUBCUTANEOUS at 21:56

## 2023-02-18 RX ADMIN — Medication 2 G: at 14:19

## 2023-02-18 RX ADMIN — Medication 30 MG: at 16:24

## 2023-02-18 RX ADMIN — SODIUM CHLORIDE, POTASSIUM CHLORIDE, SODIUM LACTATE AND CALCIUM CHLORIDE: 600; 310; 30; 20 INJECTION, SOLUTION INTRAVENOUS at 14:00

## 2023-02-18 ASSESSMENT — ACTIVITIES OF DAILY LIVING (ADL)
ADLS_ACUITY_SCORE: 39
ADLS_ACUITY_SCORE: 39
ADLS_ACUITY_SCORE: 37
ADLS_ACUITY_SCORE: 45
ADLS_ACUITY_SCORE: 37
ADLS_ACUITY_SCORE: 39
ADLS_ACUITY_SCORE: 45
ADLS_ACUITY_SCORE: 37
ADLS_ACUITY_SCORE: 39

## 2023-02-18 NOTE — H&P
Critical Care Cardiology: History and Physical  Cande Shields MRN: [id number]  Age: 22 year old, : 2000  Date: 2023    History of Present Illness     Cande Shields is a 22 year old female being admitted to the CICU on 2023. 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     Per chart review, the patient first presented to the ED on 2023 with cough, fever of 102F, shortness of breath.  At the time, due to her symptoms and prior history of PE, she underwent a CTA chest which revealed acute right lobar, segmental and left segmental and subsegmental PE.  She also underwent a rapid screen Streptococcus test which was positive.  Ultimately, she was discharged from the ER on twice daily Lovenox and cephalexin.  The patient presented back to the ED yesterday, with worsening symptoms, following which she was admitted to the medicine service.  She underwent a transthoracic echocardiogram this morning, which revealed a large highly mobile thrombus in the right atrium, at least 2.2 cm in length.  Normal right ventricular size and function normal.  LVEF 60 to 65%.  Due to concern for embolism of the large, mobile thrombus in the right atrium, treatment options such as AngioVAC versus Inari FlowTrieve versus sternotomy with thrombectomy discussed with the patient and her mother.  She was transferred from the ED straight for the AngioVAC, following which she will be admitted to the cardiac ICU for further management.     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     Multiple subsegmental pulmonary emboli without acute cor pulmonale (H) 03/2021     PFO (patent foramen ovale)      Preeclampsia, third trimester      Spontaneous pregnancy loss 2020    31 weeks     Stage 3b chronic kidney disease (H)      Wegener's disease, pulmonary 01/01/2010    renal biopdy       Family History   Problem Relation Age of Onset     Thyroid Disease Mother      Cancer Maternal Grandfather      Asthma No family hx of      Diabetes No family hx of      Social History     Socioeconomic History     Marital status: Legally      Spouse name: Not on file     Number of children: Not on file     Years of education: Not on file     Highest education level: Not on file   Occupational History     Not on file   Tobacco Use     Smoking status: Former     Packs/day: 0.25     Types: Cigarettes     Smokeless tobacco: Current     Tobacco comments:     vaping   Vaping Use     Vaping Use: Some days     Substances: Nicotine, Flavoring     Devices: Disposable   Substance and Sexual Activity     Alcohol use: Not Currently     Drug use: No     Sexual activity: Yes     Partners: Male     Birth control/protection: None   Other Topics Concern     Parent/sibling w/ CABG, MI or angioplasty before 65F 55M? Not Asked   Social History Narrative    Lives with brother 14yo and mother. Pets: Dog Nicholas.    Lives with  in [place], has cat.    Mom lives next door.     Social Determinants of Health     Financial Resource Strain: Not on file   Food Insecurity: Not on file   Transportation Needs: Not on file   Physical Activity: Not on file   Stress: Not on file   Social Connections: Not on file   Intimate Partner Violence: Not on file   Housing Stability: Not on file     Physical Exam     @Vitals: /84 (BP Location: Left arm, Cuff Size: Adult Regular)   Pulse 78   Temp  "98.8  F (37.1  C) (Oral)   Resp 18   Ht 1.626 m (5' 4\")   Wt 99.8 kg (220 lb)   LMP 01/10/2023 (Approximate)   SpO2 96%   BMI 37.76 kg/m      BMI= Body mass index is 37.76 kg/m .   GENERAL APPEARANCE: Awake, not in acute distress..  HEENT: No icterus, PERRL3 mm.  JVP less than 8-10 mm Hg.  CARDIOVASCULAR: Regular rate and rhythm, normal S1/S2, no S3/S4 and no murmur, click or rub.   RESP: Coarse bilaterally.  Soft, distant crackles in bases.  GASTRO: Soft.  EXTREMITIES: Cool, no edema.  NEURO: Alert and oriented x3, no acute deficits.  INTEGUMENTARY: No rashes.     Arterial Blood Gas:   Recent Labs   Lab 02/18/23  1420 02/18/23  0757 02/17/23 2237   PH 7.33*  --   --    PCO2 38  --   --    PO2 114*  --   --    HCO3 20*  --   --    O2PER 60.0 0 21     Vitals:    02/17/23 1633   Weight: 99.8 kg (220 lb)   No intake/output data recorded.  Recent Labs   Lab 02/18/23  1420 02/18/23  0711 02/17/23  1720    134* 140   POTASSIUM 5.2* 5.6* 4.6   CHLORIDE  --  107 107   CO2  --  13* 19*   ANIONGAP  --  14 14   * 79 123*   BUN  --  23.0* 26.4*   CR  --  1.15* 1.37*   KRAIG  --  9.1 9.8     No components found for: URINE   Recent Labs   Lab 02/12/23 2237   AST 22   ALT 50*   BILITOTAL 0.4   ALBUMIN 3.6   PROTTOTAL 6.7   ALKPHOS 91     Temp: 98.8  F (37.1  C) Temp src: OralTemp  Min: 98.1  F (36.7  C)  Max: 98.8  F (37.1  C)   Recent Labs   Lab 02/18/23  1420 02/18/23  0814 02/17/23  1720 02/12/23 2237   WBC  --  17.7* 21.8* 3.2*   HGB 10.6* 10.3* 11.6* 11.4*   HCT  --  33.6* 37.5 36.0   MCV  --  83 84 82   RDW  --  17.0* 17.1* 15.8*   PLT  --  276 332 195     No lab results found in last 7 days.  Recent Labs   Lab 02/18/23  1420 02/18/23  0711 02/17/23  1720 02/12/23  2237   * 79 123* 108*       Assessment and Plan     Cande ARMAS Cornelius is a 22 year old female with PMHx of Granulomatosis with polyangiitis, CKD stage III, FL-3 positive ANCA vasculitis (kidney bx proven in March 2011), prior saddle PE w/ " B/L DVT w/ pulmonary infarct & mild RV dilation on 03/20/2021- treated with thrombolysis, Hx of pre-eclampsia, HELLP with IUFD at 31 weeks in October 2020, who presented to the ED on 2/17/2023 with progressively worsening cough and sharp, pleuritic chest pain.  Patient being admitted to the CICU on 2/17/2023 for further management of mobile right atrial thrombus potentially embolizing and worsening pulmonary clot burden.    Neurology:   # No active concerns.    Cardiovascular:   # Acute R lobar, segmental and left segmental and subsegmental PE  # Large, mobile right atrial thrombus    # History of submassive saddle PE 3/20/21  # History of bilateral LL DVT 3/20/21  The patient received thrombolytics + Xarelto for 6 months (3/21/2021-8/2021), discontinued 2/2 menorrhagia. Possible provoking factors: COVID 12/2020, miscarriage in 10/2020, GPA, + smoker , Negative APS testing x 2 (3/21/21 + 10/19/21), negative for factor V Leiden and prothrombin gene mutation.     TTE dated 2/18/2023 concerning for a large highly mobile thrombus in the right atrium, IVC 0.2 cm in length.  Fortunately right ventricular size and function normal.  LVEF 60 to 65%.     Vasoactive/antiarrhythmic infusions: None     Plan:  1. Given high likelihood of embolism of the large, mobile thrombus in the right atrium, treatment options such as AngioVAC versus Inari  FlowTrieve versus sternotomy with thrombectomy discussed with the patient and her mother.  Will continue with angioVAC for clot aspiration with back-up sternotomy.  2. Continue high intensity heparin GTT.  3. Monitor closely for RV dysfunction/hemodynamic stability.   4. q6 lactate, CMP checks.       Pulmonary:  # COVID-19 infection  # Elective intubation for Angiovac    The patient presented with cough and left sided chest pain. CT chest 2/17/2023 showed sattered bilateral ill-defined nodular opacities with a few left lower lobe subpleural nodules. Findings may represent multifocal  atypical infection or inflammatory related to granulomatosis with polyangiitis. No cavitation.    Plan:  - Wean vent as able.  Will attempt pressure support trials with extubation following the procedure.  - Daily CXR.    GI and Nutrition:   # NPO     Plan:   - Monitor q6 LFTs given concern for RV dysfunction.  - Bowel regimen - on hold for now.  - GI prophylaxis: Protonix 40 mg IV daily.    Renal, Fluid, and Electrolytes:   # CKD stage III  # AK-3 positive ANCA vasculitis (kidney bx proven in March 2011)        Creatinine 1.37 mg/dL on arrival (baseline 1.3-1.4 mg/dL).    Plan:   - Monitor urine output hourly.   - Guillen for strict I/O's postprocedure.     Infectious Disease:   # Atypical pneumonia  # COVID-19 infection    Plan:   1. Check sputum and blood cultures, check pneumococcal and Legionella antigens.   2. Continue azithromycin and Rocephin given findings of atypical pneumonia on CT chest.  3. Consult infectious diseases team for recommendations with respect to need for treating current COVID-19 test results (persistent positive vs new infection in a pregnant female).    Hematology and Oncology:   The patient was previously seen by hematology, who recommended antiphospholipid antibody testing, which was negative. She also had other thrombophilia testing, including Prothrombin, Factor V, and Protein C, which were negative.      Plan:   1. Continue heparin GTT for now.  2. Appreciate MFM input with regard to acceptable/safe oral anticoagulants during pregnancy.    OB-GYN:     # Hx of preeclampsia, HELLP syndrome & IUFD:   During her first pregnancy, in 10/20/2021, at 30 weeks 6 days, she presented to [facility] with a complaint of right sided chest pain. On presentation to the ED, she was found to have sustained severe range blood pressures, with systolics in the 170s to 180s.  Additionally, no fetal heart tones were identified on ultrasound.  Her labs were notable for proteinuria, as well as  elevated liver enzymes and thrombocytopenia- concerning for HELLP.  Given the diagnosis IUFD, she underwent induction of labor, and delivered a stillborn female.     # Pregnancy, 5 weeks 4 days  - Appreciate [facility] recommendations and involvement.   - Check US transvaginal post-procedure.    Endocrine:   No known medical history.    Plan:   - Dexamethasone per rheumatology recs.   - q6 glucose checks post-procedure.     Family update by me today: Yes   Code Status: Full    The pt was discussed and evaluated with Dr. Lopez, German Gama, *, attending physician, who agrees with the assessment and plan above.     Valerie Thurman MD,   Cardiovascular Disease Fellow  Pager [phone]

## 2023-02-18 NOTE — ANESTHESIA PROCEDURE NOTES
Airway       Patient location during procedure: OR       Procedure Start/Stop Times: 2/18/2023 1:48 PM  Staff -        CRNA: Amira Morris APRN CRNA       Performed By: CRNA  Consent for Airway        Urgency: elective  Indications and Patient Condition       Indications for airway management: tova-procedural       Induction type:intravenous      Final Airway Details       Final airway type: endotracheal airway       Successful airway: ETT - single and Oral  Endotracheal Airway Details        ETT size (mm): 6.5       Cuffed: yes       Successful intubation technique: direct laryngoscopy       DL Blade Type: Agarwal 2       Grade View of Cords: 1       Position: Right       Measured from: lips       Secured at (cm): 20    Post intubation assessment        Placement verified by: capnometry, equal breath sounds and chest rise        Number of attempts at approach: 1       Secured with: silk tape       Ease of procedure: easy       Dentition: Intact and Unchanged    Medication(s) Administered   Medication Administration Time: 2/18/2023 1:48 PM

## 2023-02-18 NOTE — PLAN OF CARE
"Goal Outcome Evaluation:    /85 (BP Location: Left arm, Patient Position: Supine, Cuff Size: Adult Regular)   Pulse 93   Temp 98.7  F (37.1  C) (Oral)   Resp 18   Ht 1.626 m (5' 4\")   Wt 99.8 kg (220 lb)   LMP 01/10/2023 (Approximate)   SpO2 96%   BMI 37.76 kg/m      1114-2023    Admitted for cough and left chest pain. Hx of granulomatous polyangiitis, recurrent PE and DVT found to be positive for COVID-19. Pt is coughing and pain with cough. Oxycodone 10 mg q6h, scheduled Tylenol q8h. Pt is 5 weeks pregnant. VS stable and afebrile. PIV saline locked.   "

## 2023-02-18 NOTE — H&P
CV ICU H&P    ASSESSMENT: Cande Shields is a 22 year old severely obese female admitted on 2/17/2023 with PMH of obesity BMI 38, granulomatous polyangiitis, recent PE, prior DVT complicated by PE with possible pregnancy, COVID-19+ with RA thrombus noted on TTE, s/p attempted angiovac for thrombectomy with Dr. Monterroso and s/p Emergency Sternotomy, Right Atrial Thrombectomy, Central Cannulation, Bilateral Femoral Cannulation, Closure of PFO; Transesophageal Echocardiogram performed by anesthesia staff with Dr. Elmore on 2/15.    CO-MORBIDITIES:   Patient Active Problem List   Diagnosis     Wegener's granulomatosis     Myalgia     Granulomatosis with polyangiitis, unspecified whether renal involvement (H)     ANCA-associated vasculitis     Pulmonary infiltrates     Acute kidney injury (H)     Need for pneumocystis prophylaxis     Acute deep vein thrombosis (DVT) of proximal vein of both lower extremities (H)     ADHD (attention deficit hyperactivity disorder), inattentive type     Alcohol use disorder, mild, in sustained remission     Bicornuate uterus     Binge-eating disorder, moderate     BMI 40.0-44.9, adult (H)     Cannabis use disorder, mild, in early remission     Femoral artery thrombosis, left (H)     Fetal demise, greater than 22 weeks, antepartum     Generalized anxiety disorder     History of granulomatosis with polyangiitis     Depression     Occlusion of common femoral artery (H)     Performance anxiety     Personal history of COVID-19     Pyoderma gangrenosum     Self-injurious behavior     Stage 3 chronic kidney disease, unspecified whether stage 3a or 3b CKD (H)     Acute saddle pulmonary embolism with acute cor pulmonale (H)     Thrombosis     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)     Infection due to 2019 novel coronavirus     ATN (acute tubular necrosis) (H)     Other pulmonary embolism without acute cor pulmonale, unspecified chronicity (H)     Chest pain, unspecified type      Pneumonia due to infectious organism, unspecified laterality, unspecified part of lung     Today's Plan:    PLAN:  Neuro/ pain/ sedation:  - Monitor neurological status. Notify the MD for any acute changes in exam.  #Prior ETOH use disorder  #Chronic pain  #Costochondritis, 2nd ICS; L>R  - pain mangement as above  #Acute postoperative pain  - Scheduled: Tylenol 975mg TID  - PRN: Tylenol 650mg q4H, Oxycodone, Dilaudid    Pulmonary care:   #Oxymask 8L/min per mask  Resp: 18  - Wean O2 as tolerated  - Goal SpO2 > 92%  - Encourage IS q15-30 minutes when awake.    Cardiovascular:    #Cardiogenic shock  #Prior saddle PE with BL DVT with pulm infarct and mild RV dilation  #Pre-eclampsia  #Mobile thrombus in right atrium on TTE 2/18 AM, s/p thrombectomy    Pre-op echo 2/18 AM: large highly mobile thrombus in the right atrium, at least 2.2 cm in length.  Normal right ventricular size and function normal.  LVEF 60 to 65%.  Post-op echo 2/18 PM: completed by Anesthesia, Post CPB: normal biventricular function, valves unchanged.  PFO has been closed, no flow evident.  Thrombus previously noted in RAA is no longer present.  No aortic dissection noted.      - Epi, NE, Vaso gtts for inotropic and pressor support, wean as able  - Monitor hemodynamic status.   - Hydralazine PRN 10mg q30min, SBP>140, DBP>90  - Goal MAP >65, SBP <140  - Hold Statin   - Hold BB   - ASA: start 81mg daily tomorrow     GI care / Nutrition:   - ADAT to regular diet  - Nutrition consulted, appreciate recommendations  - PPI for bowel prophylaxis - Famotidine 20mg BID  - Bowel regimen: MiraLAX, senna    Renal / Fluids / Electrolytes:  # CKD3  BL creat appears to be ~ 1.4 - 1.5  - Strict I/O, daily weights  - Avoid/limit nephrotoxins as able  - Replete lytes PRN per protocol    Endocrine/:    #Stress hyperglycemia  Preop A1c NA  - Insulin gtt, weaned to 0 units/hr  - Goal BG <180 for optimal healing    Rheumatology  #Granulomatous polyangiitis with renal,  upper respiratory, musculoskeletal, and cutaneous involvement  Dx of granulomatous polyangiitis in , and she has completed induction therapy with prednisone and rituximab  - Rheum consult in place  - Dexa 1.5mg IV once daily    ID / Antibiotics:  #COVID-19 positive   #Pneumonia, R middle lung  #Concern for sepsis  #Stress induced leukocytosis  - Per ID, Remdesevir 200 mg IV today and  100 mg IV daily x 4 days   - Perioperative antibiotics, Rocephin 2g daily 48 hours  - Continue to monitor fever curve, WBC, and inflammatory markers as appropriate    Heme:  #Prior PE, occlusion of CFA     #Acute PE, bilateral lobar R, seg and subseg left/R   #Hx of DVT  #Concern for HIT, resolved  #Acute blood loss anemia  #Acute blood loss thrombocytopenia  No s/sx active bleeding  - Continue to monitor  - CBC   - Hgb goal > 7.0  - Hemoglobin post-op    MSK / Skin:  #Sternotomy  #Surgical Incision  - Sternal precautions  - Postoperative incision management per protocol  - PT/OT/CR    Other:  Obstetrics and Gynecology:  #Possible Pregnancy,   #Bicornate Uterus  - Possible 5 weeks pregnant per ?LMP; with IUFD at 32 weeks.  - MFM will follow when considered to be viable pregnancy, with TV US pending  - Limit radiation  - Will avoid teratogenic medications    Prophylaxis:     - Mechanical ppx for DVT  - Chemical DVT ppx: start SQH tomorrow noon 5000 TID  - PPI  - Bowel regimen: PPI, MiraLAX, senna    Lines / Tubes / Drains:  - RIJ CVC, PA catheter  - Arterial Line  - CTs x 2 mediastinal, x2 pleural bilateral  - Guillen    Disposition:  - CVICU    Patient seen, findings and plan discussed with CVICU staff.    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      Per EMR, the patient first presented to the ED on 2/12/2023 with cough, fever of 102F, shortness of breath.  At the time, due to her symptoms and prior history of PE, she underwent a CTA chest which revealed acute right lobar, segmental and left segmental and subsegmental PE.  She also underwent a rapid screen Streptococcus test which was positive.  Ultimately, she was discharged from the ER on twice daily Lovenox and Xephalexin.      The patient presented back to the ED yesterday, with worsening symptoms, following which she was admitted to the medicine service.  She underwent a TTE this morning, which revealed a large highly mobile thrombus in the right atrium, at least 2.2 cm in length.  Normal right ventricular size and function normal. LVEF 60 to 65%. Also noted to be COVID-19 positive, also suspected 5 weeks pregnant     Due to concern for embolism of the large, mobile thrombus in the right atrium, treatment options such as AngioVAC versus Inari FlowTrieve with possible sternotomy with thrombectomy discussed with the patient and her mother. She was transferred from the ED straight to the OR for the procedure, presents to CVICU intubated and sedated.      PAST MEDICAL HISTORY:   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     Multiple subsegmental pulmonary emboli without acute cor pulmonale (H) 03/2021     PFO (patent foramen ovale)      Preeclampsia, third trimester      Spontaneous pregnancy loss 2020    31 weeks     Stage 3b chronic kidney disease (H)      Wegener's disease, pulmonary  01/01/2010    renal biopdy       PAST SURGICAL HISTORY:   Past Surgical History:   Procedure Laterality Date     ENT SURGERY  2000    cyst     EXCISE GANGLION WRIST  2009       FAMILY HISTORY:   Family History   Problem Relation Age of Onset     Thyroid Disease Mother      Cancer Maternal Grandfather      Asthma No family hx of      Diabetes No family hx of        SOCIAL HISTORY:   Social History     Tobacco Use     Smoking status: Former     Packs/day: 0.25     Types: Cigarettes     Smokeless tobacco: Current     Tobacco comments:     vaping   Substance Use Topics     Alcohol use: Not Currently         OBJECTIVE:  1. VITAL SIGNS:   Temp:  [98.1  F (36.7  C)-98.8  F (37.1  C)] 98.8  F (37.1  C)  Pulse:  [] 78  Resp:  [18] 18  BP: (115-152)/() 122/84  SpO2:  [95 %-100 %] 96 %    Resp: 18        2. INTAKE/ OUTPUT:   I/O last 3 completed shifts:  In: 121 [P.O.:120; I.V.:1]  Out: -       3. PHYSICAL EXAMINATION:   General: alert  Neuro: awake  Resp: non-labored on 2L NC  CV:  RRR, no m/r/g   Abdomen: Soft, non-distended, non-tender  Incisions: c/d/i  Extremities: warm and well perfused  CT: To suction, serosang output, no airleak, crepitus      4. INVESTIGATIONS:   Arterial Blood Gases   Recent Labs   Lab 02/18/23  1638 02/18/23  1420   PH 7.33* 7.33*   PCO2 38 38   PO2 164* 114*   HCO3 20* 20*     Complete Blood Count   Recent Labs   Lab 02/18/23  1638 02/18/23  1420 02/18/23  0814 02/17/23  1720 02/12/23  2237   WBC  --   --  17.7* 21.8* 3.2*   HGB 9.4* 10.6* 10.3* 11.6* 11.4*   PLT  --   --  276 332 195     Basic Metabolic Panel  Recent Labs   Lab 02/18/23  1638 02/18/23  1420 02/18/23  0711 02/17/23  1720 02/12/23  2237    137 134* 140 133*   POTASSIUM 5.6* 5.2* 5.6* 4.6 4.5   CHLORIDE  --   --  107 107 100   CO2  --   --  13* 19* 19*   BUN  --   --  23.0* 26.4* 18.6   CR  --   --  1.15* 1.37* 1.60*   * 101* 79 123* 108*     Liver Function Tests  Recent Labs   Lab 02/12/23  2237   AST 22    ALT 50*   ALKPHOS 91   BILITOTAL 0.4   ALBUMIN 3.6     Pancreatic Enzymes  No lab results found in last 7 days.  Coagulation Profile  No lab results found in last 7 days.      5. RADIOLOGY:   Recent Results (from the past 24 hour(s))   XR Chest 2 Views    Narrative    EXAM: XR CHEST 2 VIEWS  2023 5:31 PM      HISTORY: L chest pain    COMPARISON: Chest CT 2023.    FINDINGS: Two views of the chest. Trachea is midline. Normal  cardiomediastinal silhouette. Airspace opacities projecting over the  right midlung. Rounded appearing density along the left lateral  pleural surface. Streaky retrocardiac opacities with slight elevation  of the left hemidiaphragm. No definite pleural effusion or  pneumothorax. No acute osseous abnormalities.      Impression    IMPRESSION:   1. Rounded appearing opacity along the left lateral pleural surface.  Findings favor the appearance of a subpleural infarct in the setting  of recently diagnosed pulmonary embolus, less likely a loculated  effusion.  2. Airspace opacities projecting over the right midlung, also probably  related to a pulmonary infarct.   3. Nodular opacities noted on the CT of 2023 are not appreciated  on this chest x-ray.   4. Streaky retrocardiac opacities with elevation of the left  hemidiaphragm, likely atelectasis.    I have personally reviewed the examination and initial interpretation  and I agree with the findings.    DELILAH VILLARREAL MD         SYSTEM ID:  [id number]   Echo Complete   Result Value    LVEF  60-65%    Narrative    [id number]  [id number]  [id number]  [id number]^MYRNA BIRD^ILA     [facility]  Echocardiography Laboratory  [address] [place] [id number]     Name: YUMIKO LYONS  MRN: [id number]  : 2000  Study Date: 2023 07:20 AM  Age: 22 yrs  Gender: Female  Patient Location: [facility]  Reason For Study: Pulmonary Embolism  Ordering Physician: ILA HENDERSON  Performed By:  Geeta Oneal     BSA: 2.0 m2  Height: 64 in  Weight: 220 lb  BP: 115/85 mmHg  ______________________________________________________________________________  Procedure  Complete Portable Echo Adult. No contrast used per echo order. Poor acoustic  windows. The final echo results were communicated to Dr. Lopez. The final echo  results were communicated to the ordering physician by phone .  ______________________________________________________________________________  Interpretation Summary  Large highly mobile thrombus in transit in the right atrium, at least 2.2 cm  in length.  Right ventricular function, chamber size, wall motion, and thickness are  normal.  Known patent foramen ovale, suboptimally imaged on this study. Intermittent  left to right atrial-level shunting is seen on some color Doppler images.  Pulmonary artery systolic pressure cannot be assessed.  Previous study not available for comparison.  ______________________________________________________________________________  Left Ventricle  Global and regional left ventricular function is normal with an EF of 60-65%.  Left ventricular size is normal. Left ventricular wall thickness is normal.  Left ventricular diastolic function is normal. Diastolic Doppler findings  (E/E' ratio and/or other parameters) suggest left ventricular filling  pressures are normal.     Right Ventricle  Right ventricular function, chamber size, wall motion, and thickness are  normal.     Atria  Both atria appear normal. Known patent foramen ovale, suboptimally imaged on  this study. Intermittent left to right atrial-level shunting is seen on some  color Doppler images. Large highly mobile thrombus in transit in the right  atrium, at least 2.2 cm in length.     Mitral Valve  The mitral valve is normal.     Aortic Valve  The valve leaflets are not well visualized. On Doppler interrogation, there is  no significant stenosis or regurgitation.     Tricuspid Valve  The tricuspid  valve is normal. Mild tricuspid insufficiency is present. The  peak velocity of the tricuspid regurgitant jet is not obtainable. Pulmonary  artery systolic pressure cannot be assessed.     Pulmonic Valve  The pulmonic valve is normal. Trace pulmonic insufficiency is present.     Vessels  The aorta root is normal. The inferior vena cava was normal in size with  preserved respiratory variability. IVC diameter <2.1 cm collapsing >50% with  sniff suggests a normal RA pressure of 3 mmHg.     Pericardium  No pericardial effusion is present.     Compared to Previous Study  Previous study not available for comparison.  ______________________________________________________________________________  MMode/2D Measurements & Calculations  IVSd: 0.93 cm  LVIDd: 4.6 cm  LVIDs: 2.9 cm  LVPWd: 0.92 cm  FS: 36.5 %  LV mass(C)d: 140.8 grams  LV mass(C)dI: 69.1 grams/m2  Ao root diam: 3.2 cm  asc Aorta Diam: 2.5 cm  LVOT diam: 2.2 cm  LVOT area: 3.7 cm2  RWT: 0.40     Doppler Measurements & Calculations  MV E max laisha: 58.1 cm/sec  MV A max laisha: 72.9 cm/sec  MV E/A: 0.80  MV dec time: 0.13 sec  E/E' av.8  Lateral E/e': 3.5  Medial E/e': 6.1     ______________________________________________________________________________  Report approved by: Natacha Rodriguez 2023 11:21 AM             =========================================

## 2023-02-18 NOTE — CONSULTS
Rheumatology Consult Note-Fellow    Cande Shields MRN# [id number]   Age: 22 year old YOB: 2000     Date of Admission: 2/17/2023    Reason for consult: ANCA positive vasculitis    Requesting Physician: Dr. CANDELARIA      Assessment & Plan:     ASSESSMENT:  Cande Shields is  22 year old female with PMH significant for reactive positive GPA, history of prior DVT complicated by PE 2 years ago and another recent PE diagnosed on 2/12 presented to the ED today with complaints of worsening cough, chest pain and episodes of hemoptysis.  Rheumatology is consulted for concerns of potential active vasculitis being responsible for some of her symptoms.  Very complex history, recent vasculitis flareup in 12/22 which was treated with 3 doses of rituximab and she was started on avacopan with rapid steroid taper.  However, in the interim, she tested positive for pregnancy which led to discontinuation of avacopan and holding off on the fourth dose of rituximab. Had a significant ED visit on 2/12 where she was diagnosed with saddle pulmonary embolus with mild right heart strain.  She was discharged on therapeutic enoxaparin. Now presented to the ED again with complaints of worsening chest pain, cough and new onset hemoptysis since yesterday.  Work-up in the ED showed significantly elevated CRP at 322 which improved to 242 today.  Otherwise normal creatinine, GFR with WBC count elevated 21.8 and Pro-Dedrick elevated at 14.  Differential at this time for chest pain, shortness of breath and new onset hemoptysis includes changes secondary to pulmonary infarction, new infection and vasculitis.  Patient's chest pain and tachycardia has been present since the diagnosis of PE and could very well be secondary to PE.  Hemoptysis although new, could also be secondary to PE but vasculitis and DAH secondary to vasculitis should definitely be considered. Her significant elevated CRP and procalcitonin could indicate an underlying  infection or flareup of vasculitis.  She does have mild nosebleed which she blames on dryness otherwise does not have any myalgias, joint pains, purpuric rash, ear, eye or sinus symptoms.  Her GFR and creatinine are significantly better compared to when she was having a flareup at her last CD19 count is <1 which would go against an active vasculitis.   Echocardiogram done today shows a 2.2 cm mobile thrombus in right atrium for which cardiology and interventional radiology has been brought on board and are planning for a intervention. Her hypercoagulability state could be secondary to vasculitis or COVID infection.     DIAGNOSIS:  1.  AR-3 positive ANCA vasculitis  2.  Saddle pulmonary embolus and history of DVT with PE  3.  Cardiac thrombus  4.  COVID-positive  5.  Elevated inflammatory markers    PLAN:  -- Although there is concern for active vasculitis in the setting of elevated CRP, new hemoptysis and recent CT chest done on 2/12 showing nodular opacities(although this might not indicate an active disease), we think infection and symptoms secondary to his PE are higher in the differential, we recommend further investigations for sepsis.  -- Currently on dexamethasone 1.5 mg daily and we can continue at the current dose.  -- In view of her pregnancy, holding off on repeat CT chest imaging as we continue to monitor clinical progression.  -- She was found to have a new right atrial thrombus for which cardiology and interventional radiology are involved, will continue to follow changes.  --Hemoptysis could be secondary to PE.  However, active vasculitis and DAH secondary to vasculitis are certainly in the differential.  As she is saturating well on room air, will continue to follow closely.  -- We will repeat UA, TP/CR, AR-3 antibodies for further evaluation.  -- Rest of the case as per primary    I discussed the findings and recommendations with the patient.  I communicated the assessment and plan to the consulting  "team.    Case seen and discussed with Dr. Plaza who agrees with above assessment and plan.     Thank you Hector Lopez MD for for this interesting consult. Please contact us if there are any questions.     Shaggy Nunez,  Rheumatology Fellow.    \"This note was generated using dragon software and reviewed by myself.  Please excuse any grammatical or spelling errors above\".     Staff addendum  I performed the history and physical examination of the patient and discussed the management with the fellow. I reviewed the available lab and imaging studies. I reviewed the fellow's note and agree with the documented findings and plan of care.    Christopher Plaza MD  Rheumatology      HPI     Cande Shields is a 22 year old with PMH significant for reactive positive GPA, history of prior DVT complicated by PE 2 years ago and another recent PE diagnosed on 2/12 presented to the ED today with complaints of worsening cough, chest pain and episodes of hemoptysis.  Rheumatology is consulted for concerns of potential active vasculitis being responsible for some of her symptoms.    Rheumatologic history:  Granulomatosis with polyangitis dx at age 10, with PR3 positivitiy, initially treated with Methotrexate, Steroids, Rituximab infusions Q6-8 months until about 2012, since then had been in remission.  On December 26 2020, she was admitted to [facility] for for myalgias, arthralgias, pedal edema and a petechial non blanching palpable purpura rash involving her thighs, legs, elbows, chin and neck (vasculitis). She had proteinuria on UA, lung nodule on CXR, elevated inflammatory markers.  Although COVID-19 infection was also present, a flare of granulomatous polyangiitis was diagnosed.  The patient was started on Solu-Medrol 1 mg/kg/day.  She was also begun on induction protocol with rituximab 375 mg/m  IV 4 doses, given on a weekly schedule.  She improved, and was discharged on December 30, 2020 on high-dose " prednisone. Last seen in 2-2021, when she was improving after hospitalization  Patient saw Dr. Chang in nephrology in April 2021 in follow-up of WA-3+ ANCA vasculitis.  Creatinine was noted to be improving and patient was feeling well several weeks after a single course of intravenous cyclophosphamide.  Plan was to continue prednisone through the month of April, and then start tapering to off.  Plan was to consider maintenance Rituxan in June or July 2021. She was lost to Renal and Rheumatology followup.  She was next admitted between 12/20-12/21 of 2022. She came in with positive home COVID test and  petechial rashes that were similar to GPA previous flare and some phlegm with blood tinge her PR3 was elevated at 27, her Cr was 2.05 but after IV fluid and 1 dose of methlyprednisolone 125 mg Cr down to 1.48 the next day. She received tapering dose of methylprednisone IV and oral, as well as avacopan. She received Rituximab 375 mg/m2 1st dose on 1/10/23 and 2nd dose on 1/17/23, 1-.  Patient saw Dr. Dozier in nephrology on January 19, 2023.  Impression was of ANCA vasculitis flare, partially completed induction treatment with rituximab, and concerning decrease in GFR compared to 1 month earlier.  Nevertheless, GPA flare was considered to be under improving control.  A plan for tapering Medrol to 8 mg/day as of February 8 was given, and a plan for a total of 4 weekly doses of rituximab was discussed.  Last seen by Dr. Call in the clinic on 2/9 and reported that she tested positive in a pregnancy test 4 days ago and that she has plans to carry the current pregnancy to term.  Due to her pregnancy status, she missed her fourth dose of Rituxan scheduled for 2/3/2023 and also discontinued avacopan.  We advised her to continue prednisone at 12 mg daily and discussed about potentially starting Imuran in view of her pregnancy status.  On 2/12, she had a ER visit with complaints of chest pain, cough and low-grade  fever and was found to have a saddle embolus with mild right heart strain.  She was started on enoxaparin and was discharged on same.  We did advise her to see high risk OB in the last clinic visit, which she was not able to do yet.    Today's history:  Patient reports that since her discharge on 2/12 from the ER, she continued to have some cough but chest pain continued to get significantly worse.  Reports that she have chest pain extending from the lateral side of the chest on left side to the upper abdomen.  Sharp, intense, constant pain which seems to slightly improved since presentation to the ED today.  She did receive IV pain medication in the ED.  Reports that she started noticing blood in her sputum starting yesterday which is a reyes, bright red blood when she is having small amounts of blood with every expectoration since yesterday.  She does report she has some nasal dryness and crusting with bleeding but denies any significant profuse epistaxis.  Nose but denies any dports mild runny significant nasal congestion or facial pain.  Denies any visual changes.  She reports that she has not been compliant with steroid therapy and has not been taking her home prednisone for the last week.  Denies any shortness of breath, fever, chills or night sweats.  Does report some subjective fever at home.  She otherwise denies any joint pains, myalgias, blurry vision, headaches, blood in the urine or stools.  Denies any abdominal pain or pain in the extremities.    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    Multiple subsegmental pulmonary emboli without acute cor pulmonale (H) 03/2021     PFO (patent foramen ovale)     Preeclampsia, third trimester     Spontaneous pregnancy loss 2020    31 weeks    Stage 3b chronic kidney disease (H)     Wegener's disease, pulmonary 01/01/2010    renal biopdy     Past Surgical History:   Procedure Laterality Date    ENT SURGERY  2000    cyst    EXCISE GANGLION WRIST  2009     Family History   Problem Relation Age of Onset    Thyroid Disease Mother     Cancer Maternal Grandfather     Asthma No family hx of     Diabetes No family hx of      Social History     Socioeconomic History    Marital status: Legally      Spouse name: Not on file    Number of children: Not on file    Years of education: Not on file    Highest education level: Not on file   Occupational History    Not on file   Tobacco Use    Smoking status: Former     Packs/day: 0.25     Types: Cigarettes    Smokeless tobacco: Current    Tobacco comments:     vaping   Vaping Use    Vaping Use: Some days    Substances: Nicotine, Flavoring    Devices: Disposable   Substance and Sexual Activity    Alcohol use: Not Currently    Drug use: No    Sexual activity: Yes     Partners: Male     Birth control/protection: None   Other Topics Concern    Parent/sibling w/ CABG, MI or angioplasty before 65F 55M? Not Asked   Social History Narrative    Lives with brother 14yo and mother. Pets: Dog Nicholas.    Lives with  in [place], has cat.    Mom lives next door.     Social Determinants of Health     Financial Resource Strain: Not on file   Food Insecurity: Not on file   Transportation Needs: Not on file   Physical Activity: Not on file   Stress: Not on file   Social Connections: Not on file   Intimate Partner Violence: Not on file   Housing Stability: Not on file     Patient Active Problem List   Diagnosis    Wegener's granulomatosis    Myalgia    Granulomatosis with polyangiitis, unspecified whether renal involvement (H)    ANCA-associated vasculitis    Pulmonary infiltrates    Acute kidney injury (H)    Need for  "pneumocystis prophylaxis    Acute deep vein thrombosis (DVT) of proximal vein of both lower extremities (H)    ADHD (attention deficit hyperactivity disorder), inattentive type    Alcohol use disorder, mild, in sustained remission    Bicornuate uterus    Binge-eating disorder, moderate    BMI 40.0-44.9, adult (H)    Cannabis use disorder, mild, in early remission    Femoral artery thrombosis, left (H)    Fetal demise, greater than 22 weeks, antepartum    Generalized anxiety disorder    History of granulomatosis with polyangiitis    Depression    Occlusion of common femoral artery (H)    Performance anxiety    Personal history of COVID-19    Pyoderma gangrenosum    Self-injurious behavior    Stage 3 chronic kidney disease, unspecified whether stage 3a or 3b CKD (H)    Acute saddle pulmonary embolism with acute cor pulmonale (H)    Thrombosis    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)    Infection due to 2019 novel coronavirus    ATN (acute tubular necrosis) (H)    Other pulmonary embolism without acute cor pulmonale, unspecified chronicity (H)    Chest pain, unspecified type    Pneumonia due to infectious organism, unspecified laterality, unspecified part of lung     Allergies   Allergen Reactions    Penicillins Rash     Unknown, but think rash.  Tolerated cephalexin (12/27/20), cefpodoxime (12/27/20)         ROS     A 14 point ROS was performed with pertinent findings listed above.      Objective     PHYSICAL EXAM  /84 (BP Location: Left arm, Cuff Size: Adult Regular)   Pulse 78   Temp 98.8  F (37.1  C) (Oral)   Resp 18   Ht 1.626 m (5' 4\")   Wt 99.8 kg (220 lb)   LMP 01/10/2023 (Approximate)   SpO2 96%   BMI 37.76 kg/m    Wt Readings from Last 4 Encounters:   02/17/23 99.8 kg (220 lb)   02/12/23 101.8 kg (224 lb 6.4 oz)   02/10/23 100.7 kg (222 lb)   02/09/23 102.5 kg (226 lb)     Constitutional: WD-WN-WG cooperative  Eyes: nl EOM, PERRLA, vision, conjunctiva, sclera  ENT: nl " external ears, nose, hearing, lips, teeth, gums, throat  No mucous membrane lesions, normal saliva pool  Resp: lungs clear to auscultation, nl to palpation  CV: RRR, no murmurs, rubs or gallops, no edema  MS: All TMJ, neck, shoulder, elbow, wrist, MCP/PIP/DIP, spine, hip, knee, ankle, and foot MTP/IP joints were examined and  found normal.. No active synovitis or deformity. Full ROM.  Normal  strength.  Skin: no nail pitting, alopecia, rash, nodules or lesions.  Psych: nl judgement, orientation, memory, affect.    DATA:  CBC:  Recent Labs   Lab Test 02/18/23  0814 02/17/23  1720 02/12/23 2237   WBC 17.7* 21.8* 3.2*   RBC 4.05 4.48 4.39   HGB 10.3* 11.6* 11.4*   HCT 33.6* 37.5 36.0   MCV 83 84 82   MCH 25.4* 25.9* 26.0*   MCHC 30.7* 30.9* 31.7   RDW 17.0* 17.1* 15.8*    332 195       BMP:  Recent Labs   Lab Test 02/18/23  0711 02/17/23  1720 02/12/23 2237   * 140 133*   POTASSIUM 5.6* 4.6 4.5   CHLORIDE 107 107 100   CO2 13* 19* 19*   ANIONGAP 14 14 14   GLC 79 123* 108*   BUN 23.0* 26.4* 18.6   CR 1.15* 1.37* 1.60*   GFRESTIMATED 68 55* 46*   KRAIG 9.1 9.8 9.0       LFT:  Recent Labs   Lab Test 02/12/23 2237 02/08/23  0758 01/19/23  1201 01/17/23  1219 12/21/22  0706   PROTTOTAL 6.7  --  6.8  --  7.6   ALBUMIN 3.6 3.4 4.0  4.0   < > 3.0*   BILITOTAL 0.4  --  0.2  --  0.2   ALKPHOS 91  --  87  --  100   AST 22  --  14  --  11   ALT 50*  --  24  --  28    < > = values in this interval not displayed.       No results found for: CKTOTAL  TSH   Date Value Ref Range Status   06/10/2021 2.57 0.40 - 4.00 mU/L Final     No results found for: URIC    Inflammatory markers  Lab Results   Component Value Date    CRP 7.6 02/08/2023    CRP 31.6 12/20/2022    CRP 7.9 01/30/2021    CRP 29.7 01/27/2021    CRP 27.0 12/29/2020     Lab Results   Component Value Date    SED 15 02/08/2023    SED 19 01/17/2023    SED 25 12/20/2022    SED 16 01/30/2021    SED 38 12/27/2020     No results found for: WILFREDO    UA  RESULTS:  Recent Labs   Lab Test 01/19/23  1221 01/17/23  1441 12/20/22  1251 01/31/22  1351 09/08/21  1027 09/08/21  1027 04/07/21  1238   COLOR Light Yellow Light Yellow Yellow Yellow   < > Yellow Yellow   APPEARANCE Clear Clear Slightly Cloudy* Clear   < > Clear Clear   URINEGLC Negative Negative Negative Negative   < > Negative Negative   URINEBILI Negative Negative Negative Negative   < > Negative Negative   URINEKETONE Negative Negative Negative Negative   < > Negative Negative   SG 1.017 1.019 1.022 1.025   < > >=1.030 >1.030   UBLD Moderate* Moderate* Large* Moderate*   < > Moderate* Moderate*   URINEPH 5.5 5.5 6.0 5.0   < > 5.5 5.5   PROTEIN 100* 100* 200* 100*   < > 100* >=300*   UROBILINOGEN  --   --   --  0.2  --  0.2 0.2   NITRITE Negative Negative Negative Negative   < > Negative Negative   LEUKEST Negative Negative Negative Negative   < > Negative Negative   RBCU 5* 5* 5-10* 2-5*   < >  --  2-5*   WBCU 2 1 0-5 0-5   < >  --  0 - 5    < > = values in this interval not displayed.         Autoimmunity labs:     No results found for: RHF  No results found for: CCPIGG  Lab Results   Component Value Date    ANCA 1:160 02/22/2011     Lab Results   Component Value Date    G7YSIKL 150 12/28/2020     Lab Results   Component Value Date    S6NAGKH 30 12/28/2020     No results found for: KARISSA  Lab Results   Component Value Date    DNA 1.1 12/20/2022     No results found for: RNPIGG, SMIGG, SSAIGG, SSBIGG, SCLIGG    IMAGING:  reviewed

## 2023-02-18 NOTE — ANESTHESIA PROCEDURE NOTES
Central Line/PA Catheter Placement    Pre-Procedure   Staff -        Anesthesiologist:  Leticia Holcomb MD       Resident/Fellow: Felicity Schaefer MD       Performed By: resident       Location: OR       Pre-Anesthestic Checklist: patient identified, IV checked, site marked, risks and benefits discussed, informed consent, monitors and equipment checked, pre-op evaluation and at physician/surgeon's request  Timeout:       Correct Patient: Yes        Correct Procedure: Yes        Correct Site: Yes        Correct Position: Yes        Correct Laterality: Yes   Line Placement:   This line was placed Post Induction    Procedure   Procedure: central line       Laterality: right       Insertion Site: internal jugular.       Patient Position: Trendelenburg and supine  Sterile Prep        All elements of maximal sterile barrier technique followed       Patient Prep/Sterile Barriers: draped, hand hygiene, gloves , hat , mask , draped, gown, sterile gel and probe cover       Skin prep: Chloraprep  Insertion/Injection        Technique: ultrasound guided and Seldinger Technique        1. Ultrasound was used to evaluate the access site.       2. Vein evaluated via ultrasound for patency/adequacy.       3. Using real-time ultrasound the needle/catheter was observed entering the artery/vein.       Introducer Type: 9 Fr, 2-lumen MAC        Type: CVC       Catheter Size: 9 Fr       Catheter Length: 11.5       Number of Lumens: double lumen  Narrative         Secured by: suture       Tegaderm and Biopatch dressing used.       Complications: None apparent,        blood aspirated from all lumens,        All lumens flushed: Yes       Verification method: Ultrasound, Placement to be verified post-op and WILY

## 2023-02-18 NOTE — ANESTHESIA PROCEDURE NOTES
Arterial Line Procedure Note    Pre-Procedure   Staff -        Anesthesiologist:  Leticia Holcomb MD       Resident/Fellow: Felicity Schaefer MD       Performed By: resident       Location: OR       Pre-Anesthestic Checklist: patient identified, IV checked, risks and benefits discussed, informed consent, monitors and equipment checked, pre-op evaluation and at physician/surgeon's request  Timeout:       Correct Patient: Yes        Correct Procedure: Yes        Correct Site: Yes        Correct Position: Yes   Line Placement:   This line was placed Pre Induction starting at 2/18/2023 1:30 PM and ending at 2/18/2023 1:40 PM  Procedure   Procedure: arterial line       Diagnosis: hemodynamic monitoring       Laterality: left       Insertion Site: radial.  Sterile Prep        Standard elements of sterile barrier followed       Skin prep: Chloraprep  Insertion/Injection        Technique: ultrasound guided        1. Ultrasound was used to evaluate the access site.       2. Artery evaluated via ultrasound for patency/adequacy.       3. Using real-time ultrasound the needle/catheter was observed entering the artery/vein.       Catheter Type/Size: 20 G, 12 cm  Narrative         Secured by: suture       Tegaderm dressing used.       Complications: None apparent,        Arterial waveform: Yes        IBP within 10% of NIBP: Yes

## 2023-02-18 NOTE — H&P
[facility]    History and Physical - Medicine Service, MAROON TEAM        Date of Admission:  2/17/2023    Assessment & Plan      Cande Shields is a 22 year old female admitted on 2/17/2023. She has hx of Granulomatous polyangiitis, recent PE, prior DVT complicated by PE now presents with cough and left sided chest pain.    #Pneumonia, R middle lung  #Sepsis  #Hemoptysis, minimal   Presented with 1 week of progressive cough and intermittent fever following a diagnosis of strep throat. At admission, patient is afebrile, normotensive, and is satting well on room air. Meets SIRS criteria due to tachy cardia and tachypnea and leukocytosis.CRP is 322; lactic acid normal. CXR suggestive pf R middle lung consolidation. Procal elevated at 14.  - IV CTX 2 g Q24H X 7 d and IV azithromycin 500 mg q24H X 3 days  - Sputum and blood culture pending  - O2 for target SpO2 target 95+  - VBG  - CBC daily    #Recent PE, left (second episode)  #Hx of DVT  #Chest pain   #Concern for HIT, resolved  Was recently diagnosed having Left sided PE 1 week back. Chest pain is left sided and pleuritic in nature. Trop and EKG umremarkable. Has hx of previous PE and L leg DVT 2 years back, and was not on anticoagulation. Notably, there was concern for HIT in the past, however, HIT and RAUL tests were negative since then..  - Continue lovenox  - TTE to assess cardiac function  - Pain - tylenol 1 Q8H  + oxy 10 mg Q6H PRN + lidocaine patches  - Avoid NSAIDS given pregnancy  - steroid for pain as below  - bowel regimen prn    #Costochondritis, 2nd ICS; L>R  - pain mangement as above    #Granulomatous polyangiitis with renal, upper respiratory, musculoskeletal, and cutaneous involvement  #CKD3a  #Chronic Anemia  #Hx of sinusitis, recurrent  # Hemoptysis and nose bleeds  Pt was diagnosed with granulomatous polyangiitis in 2020 , and she has completed induction therapy with prednisone and rituximab.  Patient is actively being monitored for her sx and progressive CKD; per rheym notes, pulse methyl prednisone and cytoxan did not improve kidney function. There is a plan to give second dose of cytoxan in March and evaluate for immunotherapy - it is unclear if these recs were revaluated in the context of pregnancy-. Currently no flares - although notably, patient has one episode of hemoptysis (after repeated coughing) in the ED.  - Rheum consult in place  - dexa 1.5mg IV once daily    #Pregnancy,   5 weeks pregnant; hx of preeclampsia in the last pregnancy and loss of fetus at 32 weeks.  - Obgyn consult placed    #Hyperglcyemia  Mild, CTM    Diet:  Regular diet  DVT Prophylaxis: Enoxaparin (Lovenox) SQ  Guillen Catheter: Not present  Fluids: None  Lines: None     Cardiac Monitoring: None  Code Status:  Full    Disposition Plan  TBD     Expected Discharge Date: 2023                The patient's care was discussed with the Attending Physician, Dr. Kristie MD.    MARGE NORRIS MD  Medicine Service, [facility]  Securely message with Vocera (more info)  Text page via [facility] Paging/Directory   See signed in provider for up to date coverage information  ______________________________________________________________________    Chief Complaint   SOB and Left sided chest pain    History is obtained from the patient    History of Present Illness   Cande Shields is a 22 year old female admitted on 2023. She has hx of Granulomatous polyangiitis, recent PE, prior DVT complicated by PE now presents with cough and left sided chest pain.    Patient presenting with a 7 days of progressive cough and continuing chest pain on the left side.  Notably patient was diagnosed having PE on the left side 7 days back and was started on Lovenox.  Chest pain is nonexertional and rated as 10 out of 10, stabbing in nature; worse when laying down/cough/deep  breaths and better with sitting up.  Patient says pain used to be better with recently prescribed oxycodone, but she ran out of it.     Cough started about 7 days back. At that time she had fever as high as 102  F and her sore throat.  Fever since resolved. Cough is minimally productive and is associated with shortness of breath. Patient noticed some blood with cough today in the ED according to patient this happens during her autoimmune disease flare.  Denies abdominal pain, dysuria, chills, vomiting, headache, confusion, and neck pain.  No history of sick contact.    Notably patient is 5 wk pregnant.  Before today she was only taking Lovenox 2 times daily and prenatal vitamins.  It does not look like patient was on other meds in her med list.    ED course: vitally stable except tachy; O2 95 on room air. Received 1L bolus and 2 g ceftriaxone IV. CRP and WBC elevated significantly. CXR concerning for R mid lobe consolidation; it also showed findings consistent with PE on the left side.    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 Taking?   [place] 10 MG CAPS   No No   Sig: Take 30 mg by mouth 2 times daily    Prenatal Vit-Fe Fumarate-FA (PRENATAL VITAMINS PO)   Yes No   benzoyl peroxide 5 % external liquid   No No   Sig: Use daily as directed   calcium carbonate-vitamin D (OSCAL) 500-5 MG-MCG tablet   No No   Sig: Take 1 tablet by mouth 2 times daily   Patient not taking: Reported on 1/10/2023   cephALEXin (KEFLEX) 500 MG capsule   No No   Sig: Take 1 capsule (500 mg) by mouth 3 times daily for 10 days   clindamycin (CLEOCIN T) 1 % external lotion   No No   Si-2 times daily as spot treatment for pus bumps.   enoxaparin ANTICOAGULANT (LOVENOX) 100 MG/ML syringe   No No   Sig: Inject 1 mL (100 mg) Subcutaneous every 12 hours for 30 days   methylPREDNISolone (MEDROL DOSEPAK) 4 MG tablet therapy pack   No No   Sig: Follow Package Directions   omeprazole (PRILOSEC) 20 MG DR capsule   No No   Sig: Take 1 capsule (20 mg) by mouth daily   Patient not taking: Reported on 1/10/2023   oxyCODONE (ROXICODONE) 5 MG tablet   No No   Sig: Take 1 tablet (5 mg) by mouth every 6 hours as needed for severe pain (7-10)   predniSONE (DELTASONE) 10 MG tablet   Yes No   Sig: Take 10 mg by mouth daily      Facility-Administered Medications: None        Physical Exam   Vital Signs: Temp: 98.4  F (36.9  C) Temp src: Oral BP: 119/83 Pulse: 114   Resp: 16 SpO2: 95 % O2 Device: None (Room air)    Weight: 220 lbs 0 oz    General: Pt NAD,  Sitting up in bed; seems to be in severe pain  HEENT: PERRLA  Card: RRR, normal S1 and S2, no murmurs/rubs or gallops; no JVD  Lung: tachypneic; Clear; equal BS bilaterally; trying to hold breath due to pain  Abdomen:  soft and nontender, nondistended, no organomegaly, BS+  Neuro: Alert and oriented X 3  Psych: appear anxious; normal responses  Skin: No rashes in the visible areas  Extremities: No edema, pulse 2+ DP bilaterally  MSK: tenderness in R and L costochondral joints      Data     I have personally reviewed the following data over the past 24 hrs:    21.8 (H)  \   11.6 (L)   / 332     140 107 26.4 (H)  /  123 (H)   4.6 19 (L) 1.37 (H) \       Trop: <6 BNP: N/A       Procal: N/A CRP: 322.00 (H) Lactic Acid: 0.7         Imaging results reviewed over the past 24 hrs:   Recent Results (from the past 24 hour(s))   XR Chest 2 Views    Narrative    EXAM: XR CHEST 2 VIEWS  2/17/2023 5:31 PM      HISTORY: L chest pain    COMPARISON: Chest CT 2/12/2023.    FINDINGS: Two views of the chest. Trachea is midline. Normal  cardiomediastinal silhouette. Airspace opacities projecting over the  right midlung. Rounded appearing density along the left lateral  pleural surface. Streaky retrocardiac opacities with slight elevation  of the left hemidiaphragm. No definite pleural effusion or  pneumothorax. No acute osseous abnormalities.      Impression    IMPRESSION:   1. Rounded appearing opacity along the left lateral pleural surface.  Findings favor the appearance of a subpleural infarct in the setting  of recently diagnosed pulmonary embolus, less likely a loculated  effusion.  2. Airspace opacities projecting over the right midlung, also probably  related to a pulmonary infarct.   3. Nodular opacities noted on the CT of 2/12/2023 are not appreciated  on this chest x-ray.   4. Streaky retrocardiac opacities with elevation of the left  hemidiaphragm, likely atelectasis.    I have personally reviewed the examination and initial interpretation  and I agree with the findings.    DELILAH VILLARREAL MD         SYSTEM ID:  [id number]

## 2023-02-18 NOTE — PROGRESS NOTES
[facility]    Medicine Progress Note - Hospitalist Service, GOLD TEAM 10    Date of Admission:  2/17/2023    Assessment & Plan      Cande Shields is a 22 year old female admitted on 2/17/2023. She has hx of Granulomatous polyangiitis, recent PE, prior DVT complicated by PE now presents with cough, suspected 5 weeks pregnant, and left sided chest pain.    #RA thrombus in transit --> PERT activated  - found on TTE this morning. Activated PERT immediately through SOC and patient transferring to ICU for close monitoring --> IR/cards/surgery deciding optimal procedure with consideration of pregnancy    #Acute PE, bilateral lobar R, seg and subseg left/R   #Hx of DVT  #Concern for HIT, resolved  Was recently diagnosed having Left sided PE 1 week back. Chest pain is left sided and pleuritic in nature. Trop and EKG umremarkable. Has hx of previous PE and L leg DVT 2 years back, and was not on anticoagulation. Notably, there was concern for HIT in the past, however, HIT and RAUL tests were negative since then..  - Continue lovenox  - Pain - tylenol 1 Q8H  + oxy 10 mg Q6H PRN + lidocaine patches  - Avoid NSAIDS given pregnancy  - steroid for pain as below  - bowel regimen prn    #Pneumonia, R middle lung  #Sepsis  #Hemoptysis, minimal   Presented with 1 week of progressive cough and intermittent fever following a diagnosis of strep throat. At admission, patient is afebrile, normotensive, and is satting well on room air. Meets SIRS criteria due to tachy cardia and tachypnea and leukocytosis.CRP is 322; lactic acid normal. CXR suggestive pf R middle lung consolidation. Procal elevated at 14.  - IV CTX 2 g Q24H X 7 d and IV azithromycin 500 mg q24H X 3 days  - Sputum and blood culture pending    #Costochondritis, 2nd ICS; L>R  - pain mangement as above    #Granulomatous polyangiitis with renal, upper respiratory, musculoskeletal, and cutaneous involvement  #CKD3a  #Chronic  Anemia  #Hx of sinusitis, recurrent  # Hemoptysis and nose bleeds  Pt was diagnosed with granulomatous polyangiitis in  , and she has completed induction therapy with prednisone and rituximab. Patient is actively being monitored for her sx and progressive CKD; per rheym notes, pulse methyl prednisone and cytoxan did not improve kidney function. There is a plan to give second dose of cytoxan in March and evaluate for immunotherapy - it is unclear if these recs were revaluated in the context of pregnancy-. Currently no flares - although notably, patient has one episode of hemoptysis (after repeated coughing) in the ED.  - Rheum consult in place  - dexa 1.5mg IV once daily    #Pregnancy,   5 weeks pregnant per ?LMP; hx of preeclampsia in the last pregnancy and loss of fetus at 32 weeks.  - Obgyn consult placed --> TVUS when stable to confirm pregnancy    #Hyperglcyemia  Mild, CTM    Diet:  Regular diet  DVT Prophylaxis: Enoxaparin (Lovenox) SQ  Guillen Catheter: Not present  Fluids: None  Lines: None     Cardiac Monitoring: None  Code Status:  Full         Diet: NPO per Anesthesia Guidelines for Procedure/Surgery Except for: Meds    DVT Prophylaxis: Defer to primary service  Guillen Catheter: Not present  Lines: None     Cardiac Monitoring: None  Code Status: Full Code      Clinically Significant Risk Factors Present on Admission        # Hyperkalemia: Highest K = 5.6 mmol/L in last 2 days, will monitor as appropriate        # Drug Induced Coagulation Defect: home medication list includes an anticoagulant medication                 Disposition Plan      Expected Discharge Date: 2023                  Hector Lopez MD  Hospitalist Service, GOLD TEAM [address] [place] [address]  Securely message with [email or web address] (more info)  Text page via [facility] Paging/Directory   See signed in provider for up to date coverage  information  ______________________________________________________________________    Interval History   Severe bothersome cough. No bright red blood in sputum this monring. Discussed TTE findings w/ patient and mother at bedside and need for escalation of care. Pt understandably very concerned for her baby and self. Denies lower extremityg swelling    4 point ROS otherwise negative.     Physical Exam   Vital Signs: Temp: 98.8  F (37.1  C) Temp src: Oral BP: 122/84 Pulse: 78   Resp: 18 SpO2: 96 % O2 Device: None (Room air)    Weight: 220 lbs 0 oz      Physical Exam   Constitutional: tearful w/ news, many coughs  HEENT: EOMI  Neck: Symmetric  Cardiovascular: regular tachycardia without murmurs or gallops  Pulmonary/Chest: bilateral coarse rhonchi. No respiratory distress.  GI: Soft, non-tender , non-distended    Musculoskeletal:  No lower extremity edema   Skin: Skin is warm and dry  Neurological: Alert and oriented   Psychiatric:  euthymic      Medical Decision Making       60 MINUTES SPENT BY ME on the date of service doing chart review, history, exam, documentation & further activities per the note.      Data     I have personally reviewed the following data over the past 24 hrs:    17.7 (H)  \   10.3 (L)   / 276     134 (L) 107 23.0 (H) /  79   5.6 (H) 13 (L) 1.15 (H) \       Trop: <6 BNP: 307       Procal: 14.00 (HH) CRP: 242.00 (H) Lactic Acid: 0.7

## 2023-02-18 NOTE — PROGRESS NOTES
Brief Progress Note    Asked for formal consult  (both MFM and OB teams consulted). This patient has not yet been confirmed to have a viable pregnancy. Thus, placed order for transvaginal US to be performed when pt is clinically stable. We will do the formal consult once the imaging is completed. Also uncertain if this pregnancy is desired or if patient wishes to terminate pregnancy.    As of now, patient should receive all treatments necessary (just as nonpregnant patient would) until ultrasound confirms viability. Also placed beta hcg to see if has risen appropriately (was 4961 on 2/12).      Patient's status is critical and pregnancy is the least of concerns at this point with her hx of saddle PE, R atrial thrombus, CKD3, chronic anemia, granulomatous polyangiitis, covid PNA, etc    Preeclampsia does not present this early (diagnosed at 20w+).     Try to avoid teratogenic medications if there is an equal alternative. Lead as able.     Did have an MFM consult 10/19/2021 - M plans to see patient on 2/19.     Kristi Khalil MD   OBGYN PGY-4  MFM/G3 pager: [phone]  02/18/23

## 2023-02-19 ENCOUNTER — APPOINTMENT (OUTPATIENT)
Dept: ULTRASOUND IMAGING | Facility: CLINIC | Age: 23
End: 2023-02-19
Payer: COMMERCIAL

## 2023-02-19 ENCOUNTER — APPOINTMENT (OUTPATIENT)
Dept: OCCUPATIONAL THERAPY | Facility: CLINIC | Age: 23
End: 2023-02-19
Attending: STUDENT IN AN ORGANIZED HEALTH CARE EDUCATION/TRAINING PROGRAM
Payer: COMMERCIAL

## 2023-02-19 ENCOUNTER — APPOINTMENT (OUTPATIENT)
Dept: GENERAL RADIOLOGY | Facility: CLINIC | Age: 23
End: 2023-02-19
Attending: STUDENT IN AN ORGANIZED HEALTH CARE EDUCATION/TRAINING PROGRAM
Payer: COMMERCIAL

## 2023-02-19 ENCOUNTER — APPOINTMENT (OUTPATIENT)
Dept: ULTRASOUND IMAGING | Facility: CLINIC | Age: 23
End: 2023-02-19
Attending: STUDENT IN AN ORGANIZED HEALTH CARE EDUCATION/TRAINING PROGRAM
Payer: COMMERCIAL

## 2023-02-19 LAB
ALBUMIN SERPL BCG-MCNC: 2.5 G/DL (ref 3.5–5.2)
ALBUMIN SERPL BCG-MCNC: 2.5 G/DL (ref 3.5–5.2)
ALLEN'S TEST: NORMAL
ALP SERPL-CCNC: 75 U/L (ref 35–104)
ALP SERPL-CCNC: 77 U/L (ref 35–104)
ALT SERPL W P-5'-P-CCNC: 12 U/L (ref 10–35)
ALT SERPL W P-5'-P-CCNC: 12 U/L (ref 10–35)
ANION GAP SERPL CALCULATED.3IONS-SCNC: 11 MMOL/L (ref 7–15)
ANION GAP SERPL CALCULATED.3IONS-SCNC: 12 MMOL/L (ref 7–15)
AST SERPL W P-5'-P-CCNC: 21 U/L (ref 10–35)
AST SERPL W P-5'-P-CCNC: 22 U/L (ref 10–35)
BASE EXCESS BLDA CALC-SCNC: -2.6 MMOL/L (ref -9–1.8)
BILIRUB SERPL-MCNC: <0.2 MG/DL
BILIRUB SERPL-MCNC: <0.2 MG/DL
BUN SERPL-MCNC: 19.7 MG/DL (ref 6–20)
BUN SERPL-MCNC: 19.7 MG/DL (ref 6–20)
CA-I BLD-MCNC: 4.6 MG/DL (ref 4.4–5.2)
CALCIUM SERPL-MCNC: 7.6 MG/DL (ref 8.6–10)
CALCIUM SERPL-MCNC: 7.7 MG/DL (ref 8.6–10)
CHLORIDE SERPL-SCNC: 106 MMOL/L (ref 98–107)
CHLORIDE SERPL-SCNC: 106 MMOL/L (ref 98–107)
CREAT SERPL-MCNC: 1.14 MG/DL (ref 0.51–0.95)
CREAT SERPL-MCNC: 1.16 MG/DL (ref 0.51–0.95)
CRP SERPL HS-MCNC: 178 MG/L
DEPRECATED HCO3 PLAS-SCNC: 18 MMOL/L (ref 22–29)
DEPRECATED HCO3 PLAS-SCNC: 18 MMOL/L (ref 22–29)
ERYTHROCYTE [DISTWIDTH] IN BLOOD BY AUTOMATED COUNT: 17 % (ref 10–15)
ERYTHROCYTE [DISTWIDTH] IN BLOOD BY AUTOMATED COUNT: 17 % (ref 10–15)
GFR SERPL CREATININE-BSD FRML MDRD: 67 ML/MIN/1.73M2
GFR SERPL CREATININE-BSD FRML MDRD: 69 ML/MIN/1.73M2
GLUCOSE BLDC GLUCOMTR-MCNC: 101 MG/DL (ref 70–99)
GLUCOSE BLDC GLUCOMTR-MCNC: 101 MG/DL (ref 70–99)
GLUCOSE BLDC GLUCOMTR-MCNC: 108 MG/DL (ref 70–99)
GLUCOSE BLDC GLUCOMTR-MCNC: 109 MG/DL (ref 70–99)
GLUCOSE BLDC GLUCOMTR-MCNC: 121 MG/DL (ref 70–99)
GLUCOSE BLDC GLUCOMTR-MCNC: 122 MG/DL (ref 70–99)
GLUCOSE BLDC GLUCOMTR-MCNC: 136 MG/DL (ref 70–99)
GLUCOSE BLDC GLUCOMTR-MCNC: 76 MG/DL (ref 70–99)
GLUCOSE SERPL-MCNC: 103 MG/DL (ref 70–99)
GLUCOSE SERPL-MCNC: 116 MG/DL (ref 70–99)
HCO3 BLD-SCNC: 22 MMOL/L (ref 21–28)
HCT VFR BLD AUTO: 26.1 % (ref 35–47)
HCT VFR BLD AUTO: 26.9 % (ref 35–47)
HGB BLD-MCNC: 8.2 G/DL (ref 11.7–15.7)
HGB BLD-MCNC: 8.5 G/DL (ref 11.7–15.7)
LACTATE SERPL-SCNC: 0.6 MMOL/L (ref 0.7–2)
MAGNESIUM SERPL-MCNC: 2.1 MG/DL (ref 1.7–2.3)
MCH RBC QN AUTO: 25.6 PG (ref 26.5–33)
MCH RBC QN AUTO: 25.9 PG (ref 26.5–33)
MCHC RBC AUTO-ENTMCNC: 31.4 G/DL (ref 31.5–36.5)
MCHC RBC AUTO-ENTMCNC: 31.6 G/DL (ref 31.5–36.5)
MCV RBC AUTO: 81 FL (ref 78–100)
MCV RBC AUTO: 82 FL (ref 78–100)
O2/TOTAL GAS SETTING VFR VENT: 2 %
OXYHGB MFR BLD: 95 % (ref 92–100)
PCO2 BLD: 37 MM HG (ref 35–45)
PF4 HEPARIN CMPLX AB SER QL: NEGATIVE
PH BLD: 7.39 [PH] (ref 7.35–7.45)
PHOSPHATE SERPL-MCNC: 4 MG/DL (ref 2.5–4.5)
PLATELET # BLD AUTO: 211 10E3/UL (ref 150–450)
PLATELET # BLD AUTO: 231 10E3/UL (ref 150–450)
PO2 BLD: 81 MM HG (ref 80–105)
POTASSIUM SERPL-SCNC: 4.6 MMOL/L (ref 3.4–5.3)
POTASSIUM SERPL-SCNC: 4.6 MMOL/L (ref 3.4–5.3)
PROT SERPL-MCNC: 4.7 G/DL (ref 6.4–8.3)
PROT SERPL-MCNC: 4.8 G/DL (ref 6.4–8.3)
RBC # BLD AUTO: 3.17 10E6/UL (ref 3.8–5.2)
RBC # BLD AUTO: 3.32 10E6/UL (ref 3.8–5.2)
SODIUM SERPL-SCNC: 135 MMOL/L (ref 136–145)
SODIUM SERPL-SCNC: 136 MMOL/L (ref 136–145)
UFH PPP CHRO-ACNC: 0.22 IU/ML
UFH PPP CHRO-ACNC: 0.38 IU/ML
WBC # BLD AUTO: 18 10E3/UL (ref 4–11)
WBC # BLD AUTO: 20.5 10E3/UL (ref 4–11)

## 2023-02-19 PROCEDURE — 93005 ELECTROCARDIOGRAM TRACING: CPT

## 2023-02-19 PROCEDURE — 93970 EXTREMITY STUDY: CPT | Mod: 26 | Performed by: RADIOLOGY

## 2023-02-19 PROCEDURE — 84155 ASSAY OF PROTEIN SERUM: CPT | Performed by: SURGERY

## 2023-02-19 PROCEDURE — 250N000013 HC RX MED GY IP 250 OP 250 PS 637: Performed by: THORACIC SURGERY (CARDIOTHORACIC VASCULAR SURGERY)

## 2023-02-19 PROCEDURE — 76801 OB US < 14 WKS SINGLE FETUS: CPT | Mod: 26 | Performed by: RADIOLOGY

## 2023-02-19 PROCEDURE — 97535 SELF CARE MNGMENT TRAINING: CPT | Mod: GO

## 2023-02-19 PROCEDURE — 85520 HEPARIN ASSAY: CPT | Performed by: STUDENT IN AN ORGANIZED HEALTH CARE EDUCATION/TRAINING PROGRAM

## 2023-02-19 PROCEDURE — 85390 FIBRINOLYSINS SCREEN I&R: CPT | Mod: 26 | Performed by: PATHOLOGY

## 2023-02-19 PROCEDURE — 93970 EXTREMITY STUDY: CPT

## 2023-02-19 PROCEDURE — 76830 TRANSVAGINAL US NON-OB: CPT

## 2023-02-19 PROCEDURE — 85048 AUTOMATED LEUKOCYTE COUNT: CPT | Performed by: STUDENT IN AN ORGANIZED HEALTH CARE EDUCATION/TRAINING PROGRAM

## 2023-02-19 PROCEDURE — 85730 THROMBOPLASTIN TIME PARTIAL: CPT | Performed by: STUDENT IN AN ORGANIZED HEALTH CARE EDUCATION/TRAINING PROGRAM

## 2023-02-19 PROCEDURE — 71045 X-RAY EXAM CHEST 1 VIEW: CPT

## 2023-02-19 PROCEDURE — 97165 OT EVAL LOW COMPLEX 30 MIN: CPT | Mod: GO

## 2023-02-19 PROCEDURE — 82805 BLOOD GASES W/O2 SATURATION: CPT | Performed by: STUDENT IN AN ORGANIZED HEALTH CARE EDUCATION/TRAINING PROGRAM

## 2023-02-19 PROCEDURE — 85520 HEPARIN ASSAY: CPT | Performed by: INTERNAL MEDICINE

## 2023-02-19 PROCEDURE — 83605 ASSAY OF LACTIC ACID: CPT | Performed by: SURGERY

## 2023-02-19 PROCEDURE — 93010 ELECTROCARDIOGRAM REPORT: CPT | Performed by: INTERNAL MEDICINE

## 2023-02-19 PROCEDURE — 76817 TRANSVAGINAL US OBSTETRIC: CPT | Mod: 26 | Performed by: RADIOLOGY

## 2023-02-19 PROCEDURE — 99254 IP/OBS CNSLTJ NEW/EST MOD 60: CPT | Mod: GC | Performed by: OBSTETRICS & GYNECOLOGY

## 2023-02-19 PROCEDURE — 85027 COMPLETE CBC AUTOMATED: CPT | Performed by: STUDENT IN AN ORGANIZED HEALTH CARE EDUCATION/TRAINING PROGRAM

## 2023-02-19 PROCEDURE — 76801 OB US < 14 WKS SINGLE FETUS: CPT

## 2023-02-19 PROCEDURE — 250N000011 HC RX IP 250 OP 636: Performed by: STUDENT IN AN ORGANIZED HEALTH CARE EDUCATION/TRAINING PROGRAM

## 2023-02-19 PROCEDURE — 86141 C-REACTIVE PROTEIN HS: CPT | Performed by: INTERNAL MEDICINE

## 2023-02-19 PROCEDURE — 200N000002 HC R&B ICU UMMC

## 2023-02-19 PROCEDURE — 258N000003 HC RX IP 258 OP 636: Performed by: SURGERY

## 2023-02-19 PROCEDURE — 97530 THERAPEUTIC ACTIVITIES: CPT | Mod: GO

## 2023-02-19 PROCEDURE — 71045 X-RAY EXAM CHEST 1 VIEW: CPT | Mod: 26 | Performed by: RADIOLOGY

## 2023-02-19 PROCEDURE — 99233 SBSQ HOSP IP/OBS HIGH 50: CPT | Mod: GC | Performed by: INTERNAL MEDICINE

## 2023-02-19 PROCEDURE — 84100 ASSAY OF PHOSPHORUS: CPT | Performed by: STUDENT IN AN ORGANIZED HEALTH CARE EDUCATION/TRAINING PROGRAM

## 2023-02-19 PROCEDURE — 86022 PLATELET ANTIBODIES: CPT | Performed by: STUDENT IN AN ORGANIZED HEALTH CARE EDUCATION/TRAINING PROGRAM

## 2023-02-19 PROCEDURE — 84450 TRANSFERASE (AST) (SGOT): CPT | Performed by: SURGERY

## 2023-02-19 PROCEDURE — 83735 ASSAY OF MAGNESIUM: CPT | Performed by: STUDENT IN AN ORGANIZED HEALTH CARE EDUCATION/TRAINING PROGRAM

## 2023-02-19 PROCEDURE — 80053 COMPREHEN METABOLIC PANEL: CPT | Performed by: STUDENT IN AN ORGANIZED HEALTH CARE EDUCATION/TRAINING PROGRAM

## 2023-02-19 PROCEDURE — 250N000011 HC RX IP 250 OP 636: Performed by: SURGERY

## 2023-02-19 PROCEDURE — 82330 ASSAY OF CALCIUM: CPT | Performed by: STUDENT IN AN ORGANIZED HEALTH CARE EDUCATION/TRAINING PROGRAM

## 2023-02-19 PROCEDURE — 250N000013 HC RX MED GY IP 250 OP 250 PS 637: Performed by: STUDENT IN AN ORGANIZED HEALTH CARE EDUCATION/TRAINING PROGRAM

## 2023-02-19 RX ORDER — CEFTRIAXONE 2 G/1
2 INJECTION, POWDER, FOR SOLUTION INTRAMUSCULAR; INTRAVENOUS EVERY 24 HOURS
Status: DISCONTINUED | OUTPATIENT
Start: 2023-02-19 | End: 2023-02-22

## 2023-02-19 RX ORDER — NICOTINE POLACRILEX 4 MG
15-30 LOZENGE BUCCAL
Status: DISCONTINUED | OUTPATIENT
Start: 2023-02-19 | End: 2023-02-19

## 2023-02-19 RX ORDER — DEXAMETHASONE SODIUM PHOSPHATE 4 MG/ML
1.5 INJECTION, SOLUTION INTRA-ARTICULAR; INTRALESIONAL; INTRAMUSCULAR; INTRAVENOUS; SOFT TISSUE DAILY
Status: DISCONTINUED | OUTPATIENT
Start: 2023-02-19 | End: 2023-02-19

## 2023-02-19 RX ORDER — DEXTROSE MONOHYDRATE 25 G/50ML
25-50 INJECTION, SOLUTION INTRAVENOUS
Status: DISCONTINUED | OUTPATIENT
Start: 2023-02-19 | End: 2023-02-19

## 2023-02-19 RX ORDER — HEPARIN SODIUM 10000 [USP'U]/100ML
0-5000 INJECTION, SOLUTION INTRAVENOUS CONTINUOUS
Status: DISCONTINUED | OUTPATIENT
Start: 2023-02-19 | End: 2023-02-20

## 2023-02-19 RX ADMIN — OXYCODONE HYDROCHLORIDE 10 MG: 10 TABLET ORAL at 17:49

## 2023-02-19 RX ADMIN — POLYETHYLENE GLYCOL 3350 17 G: 17 POWDER, FOR SOLUTION ORAL at 07:33

## 2023-02-19 RX ADMIN — HYDROMORPHONE HYDROCHLORIDE 0.4 MG: 0.2 INJECTION, SOLUTION INTRAMUSCULAR; INTRAVENOUS; SUBCUTANEOUS at 07:33

## 2023-02-19 RX ADMIN — ACETAMINOPHEN 975 MG: 325 TABLET, FILM COATED ORAL at 11:29

## 2023-02-19 RX ADMIN — MUPIROCIN 0.5 G: 20 OINTMENT TOPICAL at 20:18

## 2023-02-19 RX ADMIN — OXYCODONE HYDROCHLORIDE 10 MG: 10 TABLET ORAL at 09:06

## 2023-02-19 RX ADMIN — ACETAMINOPHEN 975 MG: 325 TABLET, FILM COATED ORAL at 04:14

## 2023-02-19 RX ADMIN — FAMOTIDINE 20 MG: 20 TABLET ORAL at 07:33

## 2023-02-19 RX ADMIN — FAMOTIDINE 20 MG: 20 TABLET ORAL at 20:18

## 2023-02-19 RX ADMIN — HYDROMORPHONE HYDROCHLORIDE 0.4 MG: 0.2 INJECTION, SOLUTION INTRAMUSCULAR; INTRAVENOUS; SUBCUTANEOUS at 16:49

## 2023-02-19 RX ADMIN — HEPARIN SODIUM 1200 UNITS/HR: 10000 INJECTION, SOLUTION INTRAVENOUS at 10:53

## 2023-02-19 RX ADMIN — OXYCODONE HYDROCHLORIDE 10 MG: 10 TABLET ORAL at 21:38

## 2023-02-19 RX ADMIN — HYDROMORPHONE HYDROCHLORIDE 0.4 MG: 0.2 INJECTION, SOLUTION INTRAMUSCULAR; INTRAVENOUS; SUBCUTANEOUS at 00:13

## 2023-02-19 RX ADMIN — REMDESIVIR 100 MG: 100 INJECTION, POWDER, LYOPHILIZED, FOR SOLUTION INTRAVENOUS at 21:20

## 2023-02-19 RX ADMIN — HYDROMORPHONE HYDROCHLORIDE 0.4 MG: 0.2 INJECTION, SOLUTION INTRAMUSCULAR; INTRAVENOUS; SUBCUTANEOUS at 05:25

## 2023-02-19 RX ADMIN — HYDROMORPHONE HYDROCHLORIDE 0.4 MG: 0.2 INJECTION, SOLUTION INTRAMUSCULAR; INTRAVENOUS; SUBCUTANEOUS at 09:32

## 2023-02-19 RX ADMIN — SENNOSIDES AND DOCUSATE SODIUM 1 TABLET: 50; 8.6 TABLET ORAL at 07:32

## 2023-02-19 RX ADMIN — SODIUM CHLORIDE 50 ML: 900 INJECTION INTRAVENOUS at 22:06

## 2023-02-19 RX ADMIN — OXYCODONE HYDROCHLORIDE 10 MG: 10 TABLET ORAL at 00:13

## 2023-02-19 RX ADMIN — HYDROMORPHONE HYDROCHLORIDE 0.4 MG: 0.2 INJECTION, SOLUTION INTRAMUSCULAR; INTRAVENOUS; SUBCUTANEOUS at 22:29

## 2023-02-19 RX ADMIN — HYDROMORPHONE HYDROCHLORIDE 0.4 MG: 0.2 INJECTION, SOLUTION INTRAMUSCULAR; INTRAVENOUS; SUBCUTANEOUS at 20:30

## 2023-02-19 RX ADMIN — HYDROMORPHONE HYDROCHLORIDE 0.4 MG: 0.2 INJECTION, SOLUTION INTRAMUSCULAR; INTRAVENOUS; SUBCUTANEOUS at 11:30

## 2023-02-19 RX ADMIN — CEFTRIAXONE SODIUM 2 G: 2 INJECTION, POWDER, FOR SOLUTION INTRAMUSCULAR; INTRAVENOUS at 20:09

## 2023-02-19 RX ADMIN — HYDROMORPHONE HYDROCHLORIDE 0.4 MG: 0.2 INJECTION, SOLUTION INTRAMUSCULAR; INTRAVENOUS; SUBCUTANEOUS at 14:42

## 2023-02-19 RX ADMIN — OXYCODONE HYDROCHLORIDE 10 MG: 10 TABLET ORAL at 04:14

## 2023-02-19 RX ADMIN — ASPIRIN 81 MG 81 MG: 81 TABLET ORAL at 07:32

## 2023-02-19 RX ADMIN — OXYCODONE HYDROCHLORIDE 10 MG: 10 TABLET ORAL at 13:55

## 2023-02-19 RX ADMIN — MUPIROCIN 0.5 G: 20 OINTMENT TOPICAL at 07:33

## 2023-02-19 RX ADMIN — HYDROMORPHONE HYDROCHLORIDE 0.4 MG: 0.2 INJECTION, SOLUTION INTRAMUSCULAR; INTRAVENOUS; SUBCUTANEOUS at 03:09

## 2023-02-19 RX ADMIN — ACETAMINOPHEN 975 MG: 325 TABLET, FILM COATED ORAL at 20:18

## 2023-02-19 RX ADMIN — HYDROMORPHONE HYDROCHLORIDE 0.4 MG: 0.2 INJECTION, SOLUTION INTRAMUSCULAR; INTRAVENOUS; SUBCUTANEOUS at 18:44

## 2023-02-19 ASSESSMENT — ACTIVITIES OF DAILY LIVING (ADL)
ADLS_ACUITY_SCORE: 46
ADLS_ACUITY_SCORE: 45
ADLS_ACUITY_SCORE: 45
ADLS_ACUITY_SCORE: 46
ADLS_ACUITY_SCORE: 45
DEPENDENT_IADLS:: INDEPENDENT
ADLS_ACUITY_SCORE: 45
PREVIOUS_RESPONSIBILITIES: MEAL PREP;HOUSEKEEPING;LAUNDRY;MEDICATION MANAGEMENT;FINANCES;DRIVING;WORK

## 2023-02-19 NOTE — PROGRESS NOTES
ID brief note     I was paged with regards to a pregnant patient with history of COVID19 in Dec 2022 and tested positive again this admission. Unable to get hold of the doctor who paged for this consult. I spoke with ICU staff on call Dr Gretchen Joy .     Chart reviewed.  22 year old female admitted on 2/17/2023. She has hx of Granulomatous polyangiitis, recent PE, prior DVT complicated by PE now presents with cough, ~5 weeks pregnant, and left sided chest pain with acute right lobar, segmental and left segmental and subsegmental PE and large, mobile right atrial thrombus s/p thrombectomy.  Previously diagnosed with mild COVID19 and was treated with paxlovid . Tested positive again on 2/17/23    Due to current medical issues, would treat COVID19 with Remdesevir 200 mg IV today and  100 mg IV daily x 4 days ( start the next day)     Jamil Cisneros MD,M.Med.Sc.  Infectious Diseases  Pager: [phone]

## 2023-02-19 NOTE — PROGRESS NOTES
Major Shift Events:  No acute events overnight. Pt arrived from OR extubated on 8L oximask, weaned to 2L NC overnight. Lungs clear, strong, productive cough. Afebrile, neuro intact, c/o pain 7/10 in the chest; receiving IV dilaudid and PO oxy as ordered. Levo weaned off to maintain MAP > 65. CT output minimal, though pt did dump ~0400 with big turn. UOP adequate, taking in good PO.     Plan: Fetal US, OB Gyn consult, and possibly de-line.    For vital signs and complete assessments, please see documentation flowsheets.

## 2023-02-19 NOTE — ANESTHESIA POSTPROCEDURE EVALUATION
Patient: Cande Shields    Procedure: Procedure(s):  Angiovac Mediated for Right Atrial Clot; Intracatheter Thrombectomy via bilateral percutaneous femoral venous access with 26 FR Right Femoral vein and 17 FR Left Femoral Vein cannulas. Transesophageal echchocardiogram performed by anesthesia staff  Emergency Sternotomy, Right Atrial Thrombectomy, Central Cannulation, Bilateral Femoral Cannulation, Closure of PFO ; Transesophageal Echocardiogram performed by anesthesia staff       Anesthesia Type:  General    Note:  Disposition: ICU            ICU Sign Out: Anesthesiologist/ICU physician sign out WAS performed   Postop Pain Control: Uneventful            Sign Out: Well controlled pain   PONV: No   Neuro/Psych: Uneventful            Sign Out: Acceptable/Baseline neuro status   Airway/Respiratory: Uneventful            Sign Out: Acceptable/Baseline resp. status; O2 supplementation               Oxygen: Face mask   CV/Hemodynamics: Uneventful            Sign Out: Acceptable CV status; No obvious hypovolemia; No obvious fluid overload   Other NRE: NONE   DID A NON-ROUTINE EVENT OCCUR? No    Event details/Postop Comments:  Patient transported directly from the OR to the ICU, extubated in the OR prior to transport.  Breathing well with face mask.  Vitals stable on 0.05 NE.  Report given to ICU.           Last vitals:  Vitals:    02/18/23 0337 02/18/23 0801 02/18/23 1930   BP: 115/85 122/84    Pulse: 93 78 96   Resp: 18 18 25   Temp: 37.1  C (98.7  F) 37.1  C (98.8  F)    SpO2: 96%  100%       Electronically Signed By: Leticia Holcomb MD  February 18, 2023  7:37 PM

## 2023-02-19 NOTE — ANESTHESIA CARE TRANSFER NOTE
Patient: Cande Shields    Procedure: Procedure(s):  Angiovac Mediated for Right Atrial Clot; Intracatheter Thrombectomy via bilateral percutaneous femoral venous access with 26 FR Right Femoral vein and 17 FR Left Femoral Vein cannulas. Transesophageal echchocardiogram performed by anesthesia staff  Emergency Sternotomy, Right Atrial Thrombectomy, Central Cannulation, Bilateral Femoral Cannulation, Closure of PFO ; Transesophageal Echocardiogram performed by anesthesia staff       Diagnosis: Thrombus [I82.90]  Diagnosis Additional Information: No value filed.    Anesthesia Type:   General     Note:    Oropharynx: spontaneously breathing  Level of Consciousness: awake  Oxygen Supplementation: face mask  Level of Supplemental Oxygen (L/min / FiO2): 8  Independent Airway: airway patency satisfactory and stable  Dentition: dentition unchanged  Vital Signs Stable: post-procedure vital signs reviewed and stable  Report to RN Given: handoff report given  Patient transferred to: ICU    ICU Handoff: Call for PAUSE to initiate/utilize ICU HANDOFF, Identified Patient, Identified Responsible Provider, Reviewed the Pertinent Medical History, Discussed Surgical Course, Reviewed Intra-OP Anesthesia Management and Issues during Anesthesia, Set Expectations for Post Procedure Period and Allowed Opportunity for Questions and Acknowledgement of Understanding      Vitals:  Vitals Value Taken Time   BP     Temp     Pulse 98 02/18/23 1944   Resp 31 02/18/23 1944   SpO2 100 % 02/18/23 1944   Vitals shown include unvalidated device data.    Electronically Signed By: LILIA Sprague CRNA  February 18, 2023  7:46 PM

## 2023-02-19 NOTE — BRIEF OP NOTE
[facility]    Brief Operative Note    Pre-operative diagnosis: Thrombus [I82.90]  Post-operative diagnosis Same as pre-operative diagnosis    Procedure: Procedure(s):  Angiovac for Right Atrial Clot  Thrombectomy with bilateral percutaneous femoral venous access Transesophageal echchocardiogram performed by anesthesia staff  Thrombectomy Percutaneous Angiovac STANDBY  Surgeon: Surgeon(s) and Role:  Panel 1:     * Adelfo Elmore MD - Primary     * Tommie Hdez MD  Panel 2:     * Po Monterroso MD - Primary  Anesthesia: * No anesthesia type entered *   Estimated Blood Loss: 500 ml  Drains: 2 med chest tubes, bilateral pleural chest tubes  Specimens:   ID Type Source Tests Collected by Time Destination   1 : Right Atrial Thrombus Tissue Heart SURGICAL PATHOLOGY EXAM Adelfo Emlore MD 2/18/2023  4:07 PM    A : Right Atrial Thrombus Tissue Heart ANAEROBIC BACTERIAL CULTURE ROUTINE, GRAM STAIN, FUNGAL OR YEAST CULTURE ROUTINE, AEROBIC BACTERIAL CULTURE ROUTINE Adelfo Elmore MD 2/18/2023  5:49 PM      Findings:   see op note.  Complications: None.  Implants: * No implants in log *

## 2023-02-19 NOTE — PROGRESS NOTES
CLINICAL NUTRITION SERVICES - BRIEF NOTE    Received provider consult for nutrition education with comments post op cardiovascular surgery (automatic consult on post-op order set). S/p Emergency Sternotomy, Right Atrial Thrombectomy, Central Cannulation, Bilateral Femoral Cannulation, Closure of PFO; Transesophageal Echocardiogram on 2/18. Nutrition education not indicated. RD will follow per LOS protocol or if re-consulted.     Mann Simon RDN, LD, CNSC  Weekend/Holiday RD pager [phone]

## 2023-02-19 NOTE — PROGRESS NOTES
Admitted/transferred from: OR  Reason for admission/transfer: s/p atrial thrombectomy  2 RN skin assessment: completed by JOSE Cabrera RN and ALESHA Middleton RN  Result of skin assessment and interventions/actions: No areas of injury, sacral mepilex placed preventatively.  Height, weight, drug calc weight: Done  Patient belongings (see Flowsheet)  MDRO education added to care planN/A    Pt admitted from OR, extubated on 8L oximask, on 0.04 of Norepi. Pt alert and oriented, moves all extremities, rates pain 7/10. CTs placed to suction.  ?

## 2023-02-19 NOTE — PROGRESS NOTES
CV ICU PROGRESS NOTE  February 19, 2023   Cande Shields  [id number]  Admitted: 2/17/2023  4:40 PM      ASSESSMENT: Cande Shields is a 22 year old female admitted on 2/17/2023 with PMH of obesity BMI 38, granulomatous polyangiitis, recent PE, prior DVT complicated by PE with possible pregnancy, COVID-19+ with RA thrombus noted on TTE, s/p attempted angiovac for thrombectomy with Dr. Monterroso and s/p Emergency Sternotomy, Right Atrial Thrombectomy, Central Cannulation, Bilateral Femoral Cannulation, Closure of PFO; Transesophageal Echocardiogram performed by anesthesia staff with Dr. Elmore on 2/15.     CO-MORBIDITIES:   Other pulmonary embolism without acute cor pulmonale, unspecified chronicity (H)  (primary encounter diagnosis)  Chest pain, unspecified type  Pneumonia due to infectious organism, unspecified laterality, unspecified part of lung    Today's Plan:  - Switch Insulin GTT to Medium ISS today  - Transvaginal ultrasound pending per M  - Send HIT screen panel, Lupus anticoag panel  - Start therapeutic heparin GTT this AM  - Remdesevir taper per ID recommendations  - PT/OT  - Stop insulin gtt, start Med sliding scale insulin  - Consult Hematology tomorrow 2/19  - Consider Pain Management consult tomorrow 2/19  - Avoid teratogenic medications  - No indication for IV steroids  - Continue ceftriaxone with positive Group A strep, or PO cephalosporin 10days  - Pull Guillen    PLAN:  Neuro/ pain/ sedation:  - Monitor neurological status. Notify the MD for any acute changes in exam.  #Prior ETOH use disorder  #Chronic pain  #Costochondritis, 2nd ICS; L>R  - pain mangement as above  #Acute postoperative pain  - Scheduled: Tylenol 975mg TID  - PRN: Tylenol 650mg q4H, Oxycodone, Dilaudid     Pulmonary care:   #Risk of hypoxic respiratory failure  Resp: 18  - Wean O2 as tolerated  - Goal SpO2 > 92%  - Encourage IS q15-30 minutes when awake.     Cardiovascular:    #Cardiogenic shock  #Prior saddle PE with BL DVT  with pulm infarct and mild RV dilation  #Hx of Pre-eclampsia with prior pregnancy  #Mobile thrombus in right atrium on TTE 2/18 AM, s/p thrombectomy     Pre-op echo 2/18 AM: large highly mobile thrombus in the right atrium, at least 2.2 cm in length.  Normal right ventricular size and function normal.  LVEF 60 to 65%.  Post-op echo 2/18 PM: completed by Anesthesia, Post CPB: normal biventricular function, valves unchanged.  PFO has been closed, no flow evident.  Thrombus previously noted in RAA is no longer present.  No aortic dissection noted.       - Epi, NE, Vaso gtts for inotropic and pressor support, wean as able  - Monitor hemodynamic status.   - Hydralazine PRN 10mg q30min, SBP>130, DBP>90  - Goal MAP >65, SBP <130 (per MFM)  - Hold Statin   - Hold BB   - ASA: start 81mg daily today  - HIT screen, start low intensity Heparin GTT  - Send HIT screen panel, Lupus anticoag panel     GI care / Nutrition:   - ADAT to Regular diet  - Nutrition consulted, appreciate recommendations  - PPI for bowel prophylaxis - Famotidine 20mg BID  - Bowel regimen: MiraLAX, Senna     Renal / Fluids / Electrolytes:  # CKD3  BL creat appears to be ~ 1.4 - 1.5, 1.2 today  - Strict I/O, daily weights  - Avoid/limit nephrotoxins as able  - Replete lytes PRN per protocol     Endocrine/:    #Stress hyperglycemia  Preop A1c NA  - Insulin gtt, weaned to 0 units/hr, switch to Med ISS  - Goal BG <180 for optimal healing; goal BG<120 per MFM     Rheumatology  #Granulomatous polyangiitis with renal, upper respiratory, musculoskeletal, and cutaneous involvement  Dx of granulomatous polyangiitis in 2020, and she has completed induction therapy with prednisone and rituximab  - Rheum consult in place  - No indication for IV steroids currently given no current flare, not currently taking steroids (also given crosses placenta, concern for active pregnancy)     ID / Antibiotics:  #COVID-19 positive   #Pneumonia, R middle lung  #Concern for  sepsis  #Strep anginosis - strep throat  #Stress induced leukocytosis  - Per ID, Remdesevir 200 mg IV today and 100 mg IV daily x 4 days   - Perioperative antibiotics, Rocephin 2g daily 48 hours  - Plan for total 10 day course, for strep throat  - Continue to monitor fever curve, WBC, and inflammatory markers as appropriate     Heme:  #Prior PE, occlusion of CFA     #Acute PE, bilateral lobar R, seg and subseg left/R   #Hx of DVT  #Concern for HIT, resolved  #Acute blood loss anemia  #Acute blood loss thrombocytopenia  No s/sx active bleeding  - Continue to monitor  - CBC daily   - Hgb goal > 7.0  - Hemoglobin this AM 8.5 from 9.4  - Therapeutic heparin GTT starting today      MSK / Skin:  #Sternotomy  #Surgical Incision  #Femoral cannulation incision sites  - Remove femoral cannulation sutures POD5, ensure incision management  - Sternal precautions  - Postoperative incision management per protocol  - PT/OT/CR     Other:  Obstetrics and Gynecology:  #Possible Pregnancy,   #Bicornuate uterus  - MFM Consult recommendations in place, appreciated, see detailed consult note with recommendations outlined by Dr. Fox 23  - Possible 5 weeks pregnant per ?LMP; with IUFD at 30 weeks and 6 days.  - Detailed recommendations, summarized as follows:   - Neurological: avoid NSAIDs   - CV: OK for Heparin and Lovenox, avoid DOAC/Warfarin, with recommended SBP<130/80, OK for Beta Blockers, avoid ACE/ARB/statins   - Endocrine: Goal BG < 120   - ID: OK for Penicillin and Cephalosporin   - Obstetrics: TV U/S to evaluate pregnancy   - Radiological consideration: Limit radiation, use shielding   - In General: Will avoid teratogenic medications     Prophylaxis:     - Mechanical ppx for DVT  - Chemical DVT ppx: starting therapeutic Hep GTT  - PPI  - Bowel regimen: PPI, MiraLAX, senna     Lines / Tubes / Drains:  - RIJ CVC, PA catheter  - Arterial Line  - CTs x 2 mediastinal, x2 pleural bilateral  - Guillen - remove  today     Disposition:  - CVICU     ICU Checklist  F - Feeding:   A - Analgesia:   S - Sedation:   T - Thromboembolic prophylaxis:      H - Head of bed elevated  U - Ulcer prophylaxis:  G - Glycemic control  S - SBT     B - Bowel regimen  I - Indwelling catheter  D - De-escalation of antibiotics    Patient seen, findings and plan discussed with CVICU staff and cardiothoracic surgeons.    Cooper Galdamez MD, PGY3  Surgery   2/19/2023 at 5:08 AM    ====================================    TODAY'S PROGRESS  SUBJECTIVE  Extubated post-operatively, diet advanced. O2 requirements weaned. Voiding.    OBJECTIVE  1. VITAL SIGNS  Temp:  [97.9  F (36.6  C)-99.3  F (37.4  C)] 99  F (37.2  C)  Pulse:  [] 91  Resp:  [13-32] 20  BP: (122)/(84) 122/84  MAP:  [66 mmHg-85 mmHg] 70 mmHg  Arterial Line BP: ()/(42-69) 105/55  SpO2:  [96 %-100 %] 97 %  Resp: 20      2. INTAKE/ OUTPUT  I/O last 3 completed shifts:  In: 3611.77 [P.O.:360; I.V.:2951.77; Other:300]  Out: 2015 [Urine:1405; Blood:500; Chest Tube:110]    3. PHYSICAL EXAMINATION    General: alert  Neuro: awake  Resp: non-labored on 2L NC  CV:  RRR, no m/r/g   Abdomen: Soft, non-distended, non-tender  Incisions: c/d/i  Extremities: warm and well perfused  CT: To suction, serosang output, no airleak, crepitus      4. INVESTIGATIONS  Arterial Blood Gases   Recent Labs   Lab 02/19/23  0359 02/18/23  1934 02/18/23 1738 02/18/23  1712   PH 7.39 7.32* 7.32* 7.43   PCO2 37 37 41 34*   PO2 81 195* 394* >600*   HCO3 22 19* 21 22     Complete Blood Count   Recent Labs   Lab 02/19/23  0357 02/18/23  1941 02/18/23  1740 02/18/23 1738 02/18/23  1712 02/18/23  1420 02/18/23  0814 02/17/23  1720   WBC 20.5* 29.7*  --   --   --   --  17.7* 21.8*   HGB 8.5* 9.4*  --  8.0* 6.0*   < > 10.3* 11.6*    241 144*  --   --   --  276 332    < > = values in this interval not displayed.     Basic Metabolic Panel  Recent Labs   Lab 02/19/23  0403 02/19/23  0400 02/19/23  0314  23  0118 23  0114 23  2205 23  1939 23  1738 23  1420 23  0711   NA  --  136  --   --  135*  --  137  --  137   < > 134*   POTASSIUM  --  4.6  --   --  4.6  --  5.0  --  5.1*   < > 5.6*   CHLORIDE  --  106  --   --  106  --  104  --   --   --  107   CO2  --  18*  --   --  18*  --  17*  --   --   --  13*   BUN  --  19.7  --   --  19.7  --  20.3*  --   --   --  23.0*   CR  --  1.16*  --   --  1.14*  --  1.10*  --   --   --  1.15*   * 103* 101* 122* 116*   < > 188*   < > 132*   < > 79    < > = values in this interval not displayed.     Liver Function Tests  Recent Labs   Lab 23  0400 23  01123  2237   AST 21 22 25  --  22   ALT 12 12 13  --  50*   ALKPHOS 75 77 93  --  91   BILITOTAL <0.2 <0.2 0.2  --  0.4   ALBUMIN 2.5* 2.5* 2.7*  --  3.6   INR  --   --  1.26* 1.26*  --      Pancreatic Enzymes  No lab results found in last 7 days.  Coagulation Profile  Recent Labs   Lab 23   INR 1.26* 1.26*   PTT 39* 33     Lactate  Invalid input(s): LACTATE    5. RADIOLOGY  Recent Results (from the past 24 hour(s))   Echo Complete   Result Value    LVEF  60-65%    Narrative    [id number]  [id number]  [id number]  [id number]^MYRNA BIRD^ILA     [facility],[place]  Echocardiography Laboratory  [address] [place] [id number]     Name: YUMIKO LYONS  MRN: [id number]  : 2000  Study Date: 2023 07:20 AM  Age: 22 yrs  Gender: Female  Patient Location: [facility]  Reason For Study: Pulmonary Embolism  Ordering Physician: ILA HENDERSON  Performed By: Geeta Oneal     BSA: 2.0 m2  Height: 64 in  Weight: 220 lb  BP: 115/85 mmHg  ______________________________________________________________________________  Procedure  Complete Portable Echo Adult. No contrast used per echo order. Poor acoustic  windows. The final echo results were communicated to  Dr. Lopez. The final echo  results were communicated to the ordering physician by phone .  ______________________________________________________________________________  Interpretation Summary  Large highly mobile thrombus in transit in the right atrium, at least 2.2 cm  in length.  Right ventricular function, chamber size, wall motion, and thickness are  normal.  Known patent foramen ovale, suboptimally imaged on this study. Intermittent  left to right atrial-level shunting is seen on some color Doppler images.  Pulmonary artery systolic pressure cannot be assessed.  Previous study not available for comparison.  ______________________________________________________________________________  Left Ventricle  Global and regional left ventricular function is normal with an EF of 60-65%.  Left ventricular size is normal. Left ventricular wall thickness is normal.  Left ventricular diastolic function is normal. Diastolic Doppler findings  (E/E' ratio and/or other parameters) suggest left ventricular filling  pressures are normal.     Right Ventricle  Right ventricular function, chamber size, wall motion, and thickness are  normal.     Atria  Both atria appear normal. Known patent foramen ovale, suboptimally imaged on  this study. Intermittent left to right atrial-level shunting is seen on some  color Doppler images. Large highly mobile thrombus in transit in the right  atrium, at least 2.2 cm in length.     Mitral Valve  The mitral valve is normal.     Aortic Valve  The valve leaflets are not well visualized. On Doppler interrogation, there is  no significant stenosis or regurgitation.     Tricuspid Valve  The tricuspid valve is normal. Mild tricuspid insufficiency is present. The  peak velocity of the tricuspid regurgitant jet is not obtainable. Pulmonary  artery systolic pressure cannot be assessed.     Pulmonic Valve  The pulmonic valve is normal. Trace pulmonic insufficiency is present.     Vessels  The aorta root is  normal. The inferior vena cava was normal in size with  preserved respiratory variability. IVC diameter <2.1 cm collapsing >50% with  sniff suggests a normal RA pressure of 3 mmHg.     Pericardium  No pericardial effusion is present.     Compared to Previous Study  Previous study not available for comparison.  ______________________________________________________________________________  MMode/2D Measurements & Calculations  IVSd: 0.93 cm  LVIDd: 4.6 cm  LVIDs: 2.9 cm  LVPWd: 0.92 cm  FS: 36.5 %  LV mass(C)d: 140.8 grams  LV mass(C)dI: 69.1 grams/m2  Ao root diam: 3.2 cm  asc Aorta Diam: 2.5 cm  LVOT diam: 2.2 cm  LVOT area: 3.7 cm2  RWT: 0.40     Doppler Measurements & Calculations  MV E max laisha: 58.1 cm/sec  MV A max laisha: 72.9 cm/sec  MV E/A: 0.80  MV dec time: 0.13 sec  E/E' av.8  Lateral E/e': 3.5  Medial E/e': 6.1     ______________________________________________________________________________  Report approved by: Natacha Rodriguez 2023 11:21 AM         XR Chest Port 1 View    Narrative    EXAM: XR CHEST PORT 1 VIEW  2023 8:03 PM     HISTORY:  Post Op CVTS Surgery       COMPARISON:  2023    FINDINGS:   Portable AP semiupright view of the chest. Postsurgical changes of  median sternotomy. Intact wires. Bibasilar chest tube. 2 mediastinal  drains. Right IJ central venous catheter terminates in right  innominate vein.    Trachea is midline. Heart size is normal. Dense airspace opacities in  bilateral peripheral mid lung fields are similar to prior. Increased  bibasilar streaky opacity. Small left pleural effusion. No appreciable  pneumothorax.    No acute osseous abnormality. Visualized upper abdomen is  unremarkable.        Impression    IMPRESSION:  1. Postsurgical changes of median sternotomy with intact wires. Right  IJ central venous catheter terminates in the right innominate vein.  2. Similar appearance of presumed peripheral infarcts.  3. Left greater than right basilar  atelectasis with small left pleural  effusion.    I have personally reviewed the examination and initial interpretation  and I agree with the findings.    CRISTHIAN TANG MD         SYSTEM ID:  [id number]

## 2023-02-19 NOTE — ANESTHESIA PREPROCEDURE EVALUATION
Anesthesia Pre-Procedure Evaluation    Patient: Cande Shields   MRN: [id number] : 2000        Procedure : Procedure(s):  Angiovac for Right Atrial Clot  Thrombectomy with bilateral percutaneous femoral venous access Transesophageal echchocardiogram performed by anesthesia staff  Thrombectomy Percutaneous Angiovac STANDBY          Past Medical History:   Diagnosis Date     ADHD (attention deficit hyperactivity disorder)      DVT, lower extremity, distal (H)      Dysmenorrhea      Femoral artery thrombosis, left (H) 2021     Multiple subsegmental pulmonary emboli without acute cor pulmonale (H) 2021     PFO (patent foramen ovale)      Preeclampsia, third trimester      Spontaneous pregnancy loss     31 weeks     Stage 3b chronic kidney disease (H)      Wegener's disease, pulmonary 2010    renal biopdy      Past Surgical History:   Procedure Laterality Date     ENT SURGERY      cyst     EXCISE GANGLION WRIST        Allergies   Allergen Reactions     Penicillins Rash     Unknown, but think rash.  Tolerated cephalexin (20), cefpodoxime (20)      Social History     Tobacco Use     Smoking status: Former     Packs/day: 0.25     Types: Cigarettes     Smokeless tobacco: Current     Tobacco comments:     vaping   Substance Use Topics     Alcohol use: Not Currently      Wt Readings from Last 1 Encounters:   23 99.8 kg (220 lb)        Anesthesia Evaluation   Pt has had prior anesthetic.     No history of anesthetic complications       ROS/MED HX  ENT/Pulmonary: Comment: Carolyn's granulomatosis    (+) recent URI,     Neurologic:       Cardiovascular:     (+) hypertension-----    METS/Exercise Tolerance:     Hematologic:     (+) History of blood clots,     Musculoskeletal:       GI/Hepatic:       Renal/Genitourinary:     (+) renal disease, type: CRI, Pt does not require dialysis,     Endo:     (+) Obesity,     Psychiatric/Substance Use:       Infectious Disease: Comment: COVID  positive 2/17/23      Malignancy:       Other:            Physical Exam    Airway        Mallampati: II   TM distance: > 3 FB   Neck ROM: full   Mouth opening: > 3 cm    Respiratory Devices and Support         Dental     Comment: Tongue ring      (+) Minor Abnormalities - some fillings, tiny chips      Cardiovascular          Rhythm and rate: regular and normal     Pulmonary           (+) rhonchi           OUTSIDE LABS:  CBC:   Lab Results   Component Value Date    WBC 17.7 (H) 02/18/2023    WBC 21.8 (H) 02/17/2023    HGB 8.0 (L) 02/18/2023    HGB 6.0 (LL) 02/18/2023    HCT 33.6 (L) 02/18/2023    HCT 37.5 02/17/2023     02/18/2023     02/17/2023     BMP:   Lab Results   Component Value Date     02/18/2023     02/18/2023    POTASSIUM 5.1 (H) 02/18/2023    POTASSIUM 5.2 (H) 02/18/2023    CHLORIDE 107 02/18/2023    CHLORIDE 107 02/17/2023    CO2 13 (L) 02/18/2023    CO2 19 (L) 02/17/2023    BUN 23.0 (H) 02/18/2023    BUN 26.4 (H) 02/17/2023    CR 1.15 (H) 02/18/2023    CR 1.37 (H) 02/17/2023     (H) 02/18/2023     (H) 02/18/2023     COAGS:   Lab Results   Component Value Date    PTT 33 02/18/2023    INR 1.26 (H) 02/18/2023    FIBR 827 (H) 02/18/2023     POC:   Lab Results   Component Value Date    BGM 84 02/01/2021    HCG Negative 03/04/2021    HCGS Negative 12/20/2022     HEPATIC:   Lab Results   Component Value Date    ALBUMIN 3.6 02/12/2023    PROTTOTAL 6.7 02/12/2023    ALT 50 (H) 02/12/2023    AST 22 02/12/2023    ALKPHOS 91 02/12/2023    BILITOTAL 0.4 02/12/2023     OTHER:   Lab Results   Component Value Date    PH 7.32 (L) 02/18/2023    LACT 0.7 02/18/2023    KRAIG 9.1 02/18/2023    PHOS 4.0 02/08/2023    MAG 2.6 (H) 07/09/2021    LIPASE 248 02/21/2021    TSH 2.57 06/10/2021    CRP 7.6 02/08/2023    SED 15 02/08/2023       Anesthesia Plan    ASA Status:  3, emergent    NPO Status:  ELEVATED Aspiration Risk/Unknown    Anesthesia Type: General.     - Airway: ETT   Induction:  RSI.   Maintenance: Balanced.   Techniques and Equipment:     - Lines/Monitors: 2nd IV, Arterial Line, Central Line, BIS, NIRS, WILY            WILY Absolute Contra-indication: NONE            WILY Relative Contra-indication: NONE     Consents    Anesthesia Plan(s) and associated risks, benefits, and realistic alternatives discussed. Questions answered and patient/representative(s) expressed understanding.    - Discussed:     - Discussed with:  Patient      - Extended Intubation/Ventilatory Support Discussed: Yes.         Postoperative Care    Pain management: IV analgesics.        Comments:    Other Comments: Patient is 5 weeks pregnant.  MFM following, will evaluate patient after procedure.             Leticia Holcomb MD

## 2023-02-19 NOTE — ANESTHESIA PROCEDURE NOTES
Perioperative WILY Procedure Note    Staff -        Anesthesiologist:  Leticia Holcomb MD       Performed By: anesthesiologist  Preanesthesia Checklist:  Patient identified, IV assessed, risks and benefits discussed, monitors and equipment assessed, procedure being performed at surgeon's request and anesthesia consent obtained.    WILY Probe Insertion  Probe Number: x8  Probe Status PRE Insertion: NO obvious damage  Probe type:  Adult 3D  Bite block used:   Oral Airway  Insertion Technique: Jaw Lift  Insertion complications: None obvious  Billing Report:WILY report by Anesthesiologist (See Separate Report note)  Probe Status POST Removal: NO obvious damage    WILY Report  General Procedure Information    Modalities: 2D, 3D and Color flow mapping  Diagnostic Indications comments: RA thrombus.  Echocardiographic and Doppler Measurements  Right Ventricle:  Cavity size normal.    Hypertrophy not present.   Thrombus not present.    Global function normal.     Left Ventricle:  Cavity size normal.    Hypertrophy not present.   Thrombus not present.   Global Function normal.   Ejection Fraction 65%.      Ventricular Regional Function:  1- Basal Anteroseptal:  normal  2- Basal Anterior:  normal  3- Basal Anterolateral:  normal  4- Basal Inferolateral:  normal  5- Basal Inferior:  normal  6- Basal Inferoseptal:  normal  7- Mid Anteroseptal:  normal  8- Mid Anterior:  normal  9- Mid Anterolateral:  normal  10- Mid Inferolateral:  normal  11- Mid Inferior:  normal  12- Mid Inferoseptal:  normal  13- Apical Anterior:  normal  14- Apical Lateral:  normal  15- Apical Inferior:  normal  16- Apical Septal:  normal  17- Hamlin:  normal    Valves  Aortic Valve: Annulus normal.  Stenosis not present.  Regurgitation absent.  Leaflets normal.  Leaflet motions normal.    Mitral Valve: Annulus normal.  Stenosis not present.  Regurgitation absent.  Leaflets normal.  Leaflet motions normal.    Tricuspid Valve: Annulus normal.  Stenosis not  present.  Regurgitation absent.  Leaflets normal.  Leaflet motions normal.    Pulmonic Valve: Annulus normal.  Stenosis not present.  Regurgitation absent.      Aorta: Ascending Aorta: Size normal.  Dissection not present.  Plaque thickness less than 3 mm.  Mobile plaque not present.    Aortic Arch: Size normal.    Dissection not present.   Plaque thickness less than 3 mm.   Mobile plaque not present.    Descending Aorta: Size normal.    Dissection not present.   Plaque thickness less than 3 mm.   Mobile plaque not present.      Right Atrium:  Size normal.   Spontaneous echo contrast not present.   Thrombus present.   Tumor not present.   Device not present.     Left Atrium: Size normal.  Spontaneous echo contrast not present.  Thrombus not present.  Tumor not present.  Device not present.    Left atrial appendage normal.     Atrial Septum: Intra-atrial septal morphology contains patent foramen ovale.   Patent foramen ovale shunts left to right.      Ventricular Septum: Intra-ventricular septum morphology normal.        Other Findings:   Pericardium:  normal. Pleural Effusion:  left. Pulmonary Arteries:  normal.  No coronary sinus catheter present.    Post Intervention Findings  Procedure(s) performed:  Other (see comments) (angiovac attempted, sternotomy for removal of RA clot). Global function:  Unchanged. Regional wall motion: Unchanged. Surgeon(s) notified of all postintervention findings: Yes.             Post Intervention comments: Post CPB: normal biventricular function, valves unchanged.  PFO has been closed, no flow evident.  Thrombus previously noted in RAA is no longer present.  No aortic dissection noted.  Surgeon notified of all findings..    Echocardiogram Comments  Echocardiogram comments: Pre CPB: normal biventricular function, trace MR and TR, no AI.  Large PFO noted with left to right flow.  Large mobile thrombus noted in RA (2.8cm) near IVC/eustacian valve with multiple smaller thrombi noted.  No  PE seen in main PA.  No aortic dissection..

## 2023-02-19 NOTE — PROGRESS NOTES
02/19/23 1300   Appointment Info   Signing Clinician's Name / Credentials (OT) Brooke Grace OTRL   Living Environment   People in Home parent(s)   Current Living Arrangements apartment   Home Accessibility no concerns   Transportation Anticipated car, drives self;family or friend will provide   Living Environment Comments Pt lives in an apartment with her mother, elevator access. Pt reports they have a tub shower. She is planning to move in w/her boyfriend.   Self-Care   Usual Activity Tolerance good   Current Activity Tolerance fair   Regular Exercise No   Equipment Currently Used at Home none   Fall history within last six months no   Activity/Exercise/Self-Care Comment pt reports IND at baseline with ADLs/IADLs. 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 and is active at work.   Instrumental Activities of Daily Living (IADL)   Previous Responsibilities meal prep;housekeeping;laundry;medication management;finances;driving;work   General Information   Onset of Illness/Injury or Date of Surgery 02/18/23   Referring Physician Tommie Hdez MD   Patient/Family Therapy Goal Statement (OT) to go home   Additional Occupational Profile Info/Pertinent History of Current Problem per chart Cande Shields is a 22 year old female admitted on 2/17/2023 with PMH of obesity BMI 38, granulomatous polyangiitis, recent PE, prior DVT complicated by PE with possible pregnancy, COVID-19+ with RA thrombus noted on TTE, s/p attempted angiovac for thrombectomy with Dr. Monterroso and s/p Emergency Sternotomy, Right Atrial Thrombectomy, Central Cannulation, Bilateral Femoral Cannulation, Closure of PFO; Transesophageal Echocardiogram performed by anesthesia staff with Dr. Elmore on 2/15.   Existing Precautions/Restrictions cardiac;sternal   Left Upper Extremity (Weight-bearing Status) partial weight-bearing (PWB)   Right Upper Extremity (Weight-bearing Status) partial weight-bearing (PWB)   Left Lower Extremity  (Weight-bearing Status) full weight-bearing (FWB)   Right Lower Extremity (Weight-bearing Status) full weight-bearing (FWB)   Cognitive Status Examination   Orientation Status orientation to person, place and time   Cognitive Status Comments Appears intact   Visual Perception   Visual Impairment/Limitations WNL   Sensory   Sensory Quick Adds sensation intact   Posture   Posture forward head position   Range of Motion Comprehensive   General Range of Motion no range of motion deficits identified   Strength Comprehensive (MMT)   General Manual Muscle Testing (MMT) Assessment no strength deficits identified   Coordination   Upper Extremity Coordination No deficits were identified   Bed Mobility   Comment (Bed Mobility) per RN Ax2 for line management   Transfers   Transfers sit-stand transfer;toilet transfer;shower transfer   Sit-Stand Transfer   Sit-Stand Johnson (Transfers) minimum assist (75% patient effort)   Shower Transfer   Type (Shower Transfer) lateral   Johnson Level (Shower Transfer) minimum assist (75% patient effort)   Shower Transfer Comments per clinical judgement   Toilet Transfer   Type (Toilet Transfer) sit-stand   Johnson Level (Toilet Transfer) minimum assist (75% patient effort)   Toilet Transfer Comments per clinical judgement   Balance   Balance Comments Good seated balance, standing balance with CGA   Activities of Daily Living   BADL Assessment/Intervention bathing;lower body dressing;toileting   Bathing Assessment/Intervention   Johnson Level (Bathing) moderate assist (50% patient effort)   Comment, (Bathing) per clinical judgement   Lower Body Dressing Assessment/Training   Comment, (Lower Body Dressing) per clinical judgement   Johnson Level (Lower Body Dressing) minimum assist (75% patient effort)   Toileting   Comment, (Toileting) per clinical judgement   Johnson Level (Toileting) minimum assist (75% patient effort)   Clinical Impression   Criteria for Skilled  Therapeutic Interventions Met (OT) Yes, treatment indicated   OT Diagnosis Pt is below baseline for ADLs   OT Problem List-Impairments impacting ADL activity tolerance impaired;pain;post-surgical precautions   Assessment of Occupational Performance 3-5 Performance Deficits   Identified Performance Deficits dressing, bathing, toileting, home management, work   Planned Therapy Interventions (OT) ADL retraining;IADL retraining;bed mobility training;transfer training;home program guidelines;progressive activity/exercise   Clinical Decision Making Complexity (OT) moderate complexity   Anticipated Equipment Needs Upon Discharge (OT) shower chair   Risk & Benefits of therapy have been explained evaluation/treatment results reviewed;care plan/treatment goals reviewed;risks/benefits reviewed;current/potential barriers reviewed;participants voiced agreement with care plan;participants included;patient   Clinical Impression Comments Pt presents below baseline, limited by activity tolerance, pain, and post-surgical precautions. Pt will benefit from skilled OT to progress toward PLOF   OT Total Evaluation Time   OT Eval, Low Complexity Minutes (42386) 6   OT Goals   Therapy Frequency (OT) 6 times/wk   OT Predicted Duration/Target Date for Goal Attainment 02/24/23   OT Goals Lower Body Dressing;Lower Body Bathing;Toilet Transfer/Toileting;Cardiac Phase 1   OT: Lower Body Dressing Independent;within precautions   OT: Lower Body Bathing Independent;with precautions   OT: Toilet Transfer/Toileting Independent;toilet transfer;cleaning and garment management;within precautions   OT: Understanding of cardiac education to maximize quality of life, condition management, and health outcomes Patient;Verbalize;Demonstrate   OT: Perform aerobic activity with stable cardiovascular response intermittent;15 minutes;ambulation;NuStep   OT: Functional/aerobic ambulation tolerance with stable cardiovascular response in order to return to home and  community environment Independent;Greater than 300 feet   OT: Navigation of stairs simulating home set up with stable cardiovascular response in order to return to home and community environment Modified independent;5 stairs   Interventions   Interventions Quick Adds Therapeutic Activity;Self-Care/Home Management   Self-Care/Home Management                              OT Discharge Planning   OT Plan Standing ADLs, screen PT - ambulation in room, review precautions   OT Discharge Recommendation (DC Rec) home with assist;home with outpatient cardiac rehab   OT Rationale for DC Rec Pt mobilizing well POD 1, anticipate will be safe to d/c home with family A and OP cardiac rehab.   OT Brief overview of current status min - CGA for STS   Total Session Time   Timed Code Treatment Minutes 18   Total Session Time (sum of timed and untimed services) 24

## 2023-02-19 NOTE — CONSULTS
"Maternal-Fetal Medicine Consultation    Cande Shields  : 2000  MRN: [id number]    HPI:  Cande Shields is a 22 year old  at 5w5d by LMP admitted to the CVICU for MFM consultation regarding early pregnancy in the setting of granulomatous polyangiitis, chronic kidney disease, pulmonary emboli and lower extremity DVTs, COVID infection, and right atrial embolism who is POD1 from an emergency sternotomy with right atrial thrombectomy and closure of PFO with central and bilateral femoral cannulation. Maternal Fetal Medicine is consulted given a positive serum HCG and early pregnancy on ultrasound a week ago.    Cadne presented to the ED on 2023 with chest pain, fever, and cough. She had a positive strep swab and was given a cephalosporin, which she tolerated well (allergy noted to Penicillin). She was diagnosed with a pulmonary embolism and was discharged home on therapeutic Lovenox given she had normal oxygenation. Her ultrasound at that time revealed an early likely intrauterine pregnancy with a yolk sac and no fetal pole, as well as a known bicornuate uterus with the pregnancy in the left horn. However, her symptoms continued to worsen with pain requiring oxycodone. She was tested for COVID on 2023, which was positive. ID recommended Remdesevir. She also had an echo with a large right atrial thrombus noted. She was therefore admitted to the ICU and angiovac for thrombectomy was attempted, but ultimately underwent emergency sternotomy with right atrial thrombectomy and closure of PFO with central and bilateral femoral cannulation.    She states that her pain is better controlled today and she is breathing more comfortably. She is doing \"as well as she can be.\" She states that she intends to continue this pregnancy. She lives about 25 minutes away and has no transportation barriers. Her mother, boyfriend, and best friend are her support system. She had a preconception visit in  when she was " initially planning for pregnancy. She is taking a prenatal vitamin. She confirms she was not taking anticoagulation when she presented with PE on 2/12 and she stopped Avacopan when planning pregnancy.     She has a complicated medical history, which is detailed below:    Cande was diagnosed with granulomatous polyangiitis (GPA) when she was about 9 years old. She had presented with ankle swelling, hemoptysis, and sinusitis symptoms. She had a renal biopsy after microhematuria was noted with normal kidney function at that time. She had been started on methotrexate and prednisone and was doing overall well, although she did struggle with depression with passive suicidal ideation requiring admission noted on chart review in 2011 at age 11, attributed to psychosocial stressors (divorce, new school, 50 lb. weight gain due to steroids with bullying at school) and chronic illness.     She had no flares or problems for years on prednisone and rituximab and became pregnant in 2020. She was followed for that pregnancy through [facility] (records available in Care Everywhere). On 10/28/2020, she had presented to the ED with chest pain and multiple providers were unable to find a fetal heart rate. She was diagnosed with an intra-uterine fetal demise of female Vicky at 30 weeks and 6 days and underwent an induction of labor with a vaginal delivery. She also had severe range blood pressures with thrombocytopenia and elevated liver enzymes, thus was diagnosed with pre-eclampsia with severe features and received magnesium for seizure prophylaxis. Fetal growth restriction (measuring about 26 weeks at 31 weeks) and oligohydramnios were also noted at the time of that admission, but had not been diagnosed prior. She had not felt fetal movement for 2 days prior to presentation and had normal fetal heart rate in clinic on 10/13/2020. Chromosomal microarray was normal and autopsy was without gross morphologic abnormalities or  abnormalities noted on in situ examination. Parvovirus, toxoplasma, and CMV serologies were negative, as well as urine drug screen.    She was not seen by the [facility] system for many years, until representing on 12/27/2020 to the ED with myalgias and admitted for a GPA flare in the setting of COVID infection at the beginning of the month (diagnosed 12/5/2020). She was on rituximab and prednisone for her GPA.  She was admitted again at the end of January 2021 with worsening creatinine, diagnosed again with a GPA flare, KATERINA due to underlying vasculitis, and hypertension. She then presented to [facility] and [place] [facility] [place] and diagnosed with bilateral lower extremity DVTs, saddle pulmonary embolism with cor pulmonale and respiratory failure, and right atrial thrombus with ICU admission. She had an angiogram with catheter directed thrombolysis of a left femoral artery thrombus after she developed a painful, pulseless, cool left leg while on a heparin drip. She had a [place] [facility] that did not reveal the right atrial thrombus initially seen at [facility], but PFO was noted with plan to follow up with cardiology outpatient. Records indicate that antiphospholipid antibody syndrome testing was negative during that hospitalization, as was factor V Leiden and prothrombin gene mutation testing. She was started on an argatroban drip while being evaluated for HIT, although heparin platelet antibody and serotonin release assays were negative.    Most recently, she was admitted in December 2022 with COVID infection and GPA flare with KATERINA. She was not on therapeutic anticoagulation at that time, as she completed 6 months of Xarelto and was having menorrhagia. 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 She discovered she was pregnant, in early February and saw her Rheumatologist with plan to stop rituximab and continue  "steroids.    OB History    Para Term  AB Living   2 1 0 1 0 0   SAB IAB Ectopic Multiple Live Births   0 0 0 0 0      # Outcome Date GA Lbr Tobin/2nd Weight Sex Delivery Anes PTL Lv   2 Current            1  10/28/20 30w6d 05:15 0.782 kg (1 lb 11.6 oz) F Vag-Spont IV N FD      Birth Comments: Vicky- 10/20 30w6d presented to [facility] with right sided chest pain, BP in severe range, No fetal heart tones auscultated. Induced, NSVB      Name: ELOYPENDING FD      Apgar1: 0  Apgar5: 0      Obstetric Comments   10/20 30w6d presented to [facility] with right sided chest pain, BP in severe range, No fetal heart tones auscultated. Induced, NSVB, \"Vicky\" 2lbs     Gynecologic History:  - Denies any history of abnormal pap smears  - Denies prior cervical surgery or procedures    Past Medical History:  Past Medical History:   Diagnosis Date     ADHD (attention deficit hyperactivity disorder)      DVT, lower extremity, distal (H)      Dysmenorrhea      Femoral artery thrombosis, left (H) 2021     Multiple subsegmental pulmonary emboli without acute cor pulmonale (H) 2021     PFO (patent foramen ovale)      Preeclampsia, third trimester      Spontaneous pregnancy loss     31 weeks     Stage 3b chronic kidney disease (H)      Wegener's disease, pulmonary 2010    renal biopdy     Past Surgical History:  Past Surgical History:   Procedure Laterality Date     ENT SURGERY  2000    cyst     EXCISE GANGLION WRIST  2009     STERNOTOMY  2023    right atrial thrombectomy     THROMBECTOMY ATRIUM Right 2023     Current Medications:  Prior to Admission medications    Medication Sig Last Dose Taking? Auth Provider Long Term End Date   benzoyl peroxide 5 % external liquid Use daily as directed   Zee Beauchamp MD     calcium carbonate-vitamin D (OSCAL) 500-5 MG-MCG tablet Take 1 tablet by mouth 2 times daily  Patient not taking: Reported on 1/10/2023   Abimbola Nicholas, " "DO     cephALEXin (KEFLEX) 500 MG capsule Take 1 capsule (500 mg) by mouth 3 times daily for 10 days   Hank Valle DO  2/23/23   clindamycin (CLEOCIN T) 1 % external lotion 1-2 times daily as spot treatment for pus bumps.   Zee Beauchamp MD     enoxaparin ANTICOAGULANT (LOVENOX) 100 MG/ML syringe Inject 1 mL (100 mg) Subcutaneous every 12 hours for 30 days   Hank Valle DO  3/15/23   methylPREDNISolone (MEDROL DOSEPAK) 4 MG tablet therapy pack Follow Package Directions   Jolie Dozier MD     omeprazole (PRILOSEC) 20 MG DR capsule Take 1 capsule (20 mg) by mouth daily  Patient not taking: Reported on 1/10/2023   Abimbola Nicholas DO     oxyCODONE (ROXICODONE) 5 MG tablet Take 1 tablet (5 mg) by mouth every 6 hours as needed for severe pain (7-10)   Hank Valle DO     predniSONE (DELTASONE) 10 MG tablet Take 10 mg by mouth daily   Reported, Patient     Prenatal Vit-Fe Fumarate-FA (PRENATAL VITAMINS PO)    Reported, Patient       Allergies:  Penicillins    ROS:  10-point ROS negative except as in HPI     Physical Exam:  /84 (BP Location: Left arm, Cuff Size: Adult Regular)   Pulse 83   Temp 99.3  F (37.4  C)   Resp 19   Ht 1.626 m (5' 4\")   Wt 99.8 kg (220 lb)   LMP 01/10/2023 (Approximate)   SpO2 98%   BMI 37.76 kg/m      Gen: NAD  CV: RRR  Resp: CTAB, diminished breath sounds  Abd: Gravid, non-tender  Ext: No edema, warm with palpable dorsalis pedis pulses bilaterally    Labs:  Renal Biopsy (3/2/2011) -   FINAL DIAGNOSIS:   -  Focal and segmental crescentic glomerulonephritis, pauciimmune type.   -  One of twelve (1/12) glomeruli has a segmental fibrocellular crescent.   -  Minimal interstitial inflammation and fibrosis is identified.   -  A rare cellular cast within one tubule is identified.      Latest Reference Range & Units 02/12/23 22:37   D-Dimer Quantitative 0.00 - 0.50 ug/mL FEU 3.78 (H)   (H): Data is abnormally high   Latest Reference Range " & Units 02/12/23 22:37   hCG Quantitative <5 mIU/mL 4,961 (H)   (H): Data is abnormally high   Latest Reference Range & Units 02/19/23 04:00   Sodium 136 - 145 mmol/L 136   Potassium 3.4 - 5.3 mmol/L 4.6   Chloride 98 - 107 mmol/L 106   Carbon Dioxide (CO2) 22 - 29 mmol/L 18 (L)   Urea Nitrogen 6.0 - 20.0 mg/dL 19.7   Creatinine 0.51 - 0.95 mg/dL 1.16 (H)   GFR Estimate >60 mL/min/1.73m2 67   Calcium 8.6 - 10.0 mg/dL 7.7 (L)   Anion Gap 7 - 15 mmol/L 12   Magnesium 1.7 - 2.3 mg/dL 2.1   Phosphorus 2.5 - 4.5 mg/dL 4.0   Albumin 3.5 - 5.2 g/dL 2.5 (L)   Protein Total 6.4 - 8.3 g/dL 4.7 (L)   Alkaline Phosphatase 35 - 104 U/L 75   ALT 10 - 35 U/L 12   AST 10 - 35 U/L 21   Bilirubin Total <=1.2 mg/dL <0.2   Glucose 70 - 99 mg/dL 103 (H)   (L): Data is abnormally low  (H): Data is abnormally high   Latest Reference Range & Units 02/19/23 03:57   WBC 4.0 - 11.0 10e3/uL 20.5 (H)   Hemoglobin 11.7 - 15.7 g/dL 8.5 (L)   Hematocrit 35.0 - 47.0 % 26.9 (L)   Platelet Count 150 - 450 10e3/uL 211   (H): Data is abnormally high  (L): Data is abnormally low    Imaging:   CT Angio Chest (2/12/2023) -   ANGIOGRAM CHEST: There are lobar and segmental pulmonary emboli in the right upper, middle, and lower lobes. There are segmental and subsegmental pulmonary emboli in the left lower lobe basilar segments. The main pulmonary artery is mildly dilated  measuring 3.6 cm. Borderline CT evidence of right heart strain with RV to LV ratio measuring 1.0. Thoracic aorta is negative for dissection.   LUNGS AND PLEURA: Scattered ill-defined nodular opacities in the bilateral upper lobes and right middle lobe. 3 adjacent subpleural nodules in the left lower lobe measuring 1.7 cm, 0.8 cm, and 0.7 cm. No cavitation. No pleural effusion or pneumothorax.  MEDIASTINUM/AXILLAE: No lymphadenopathy. No pericardial effusion.  CORONARY ARTERY CALCIFICATION: None.  UPPER ABDOMEN: Unremarkable.  MUSCULOSKELETAL: Multilevel Schmorl's nodes.                                                         IMPRESSION:  1.  Acute right lobar and segmental and left segmental and subsegmental pulmonary emboli. Borderline CT evidence of right heart strain.  2.  Scattered bilateral ill-defined nodular opacities with a few left lower lobe subpleural nodules. Findings may represent multifocal atypical infection or inflammatory related to granulomatosis with polyangiitis. No cavitation.    Transvaginal US (2/12/2023) -  UTERUS: Bicornuate uterus, with intrauterine gestational sac in the left horn. Mean sac diameter (MSD) measures 0.6 cm, corresponding to an estimated gestational age of 5 weeks 1 day. Yolk sac is present, with possible 1 mm fetal pole. No fetal heart tones are identified.  RIGHT OVARY: Normal.  LEFT OVARY: Normal; corpus luteal cyst is present.                                                            IMPRESSION:   1.  Intrauterine pregnancy of uncertain viability, likely due to early gestational age. Continued trending of beta-hCG levels and short-term follow-up sonography is recommended.  2.  Bicornuate uterus, with the gestational sac located in the left horn.    CXR (2/17/2023) -  FINDINGS: Two views of the chest. Trachea is midline. Normal cardiomediastinal silhouette. Airspace opacities projecting over the right midlung. Rounded appearing density along the left lateral pleural surface. Streaky retrocardiac opacities with slight elevation of the left hemidiaphragm. No definite pleural effusion or pneumothorax. No acute osseous abnormalities.                                                                 IMPRESSION:   1. Rounded appearing opacity along the left lateral pleural surface. Findings favor the appearance of a subpleural infarct in the setting of recently diagnosed pulmonary embolus, less likely a loculated effusion.  2. Airspace opacities projecting over the right midlung, also probably related to a pulmonary infarct.   3. Nodular opacities noted on the CT of  2/12/2023 are not appreciated on this chest x-ray.   4. Streaky retrocardiac opacities with elevation of the left hemidiaphragm, likely atelectasis.    Echo Interpretation Summary (2/17/2023) -  Large highly mobile thrombus in transit in the right atrium, at least 2.2 cm in length.  Right ventricular function, chamber size, wall motion, and thickness are normal.  Known patent foramen ovale, suboptimally imaged on this study. Intermittent left to right atrial-level shunting is seen on some color Doppler images.  Pulmonary artery systolic pressure cannot be assessed.  Previous study not available for comparison.  ______________________________________________________________________________  Left Ventricle  Global and regional left ventricular function is normal with an EF of 60-65%.  Left ventricular size is normal. Left ventricular wall thickness is normal.  Left ventricular diastolic function is normal. Diastolic Doppler findings (E/E' ratio and/or other parameters) suggest left ventricular filling pressures are normal.     Right Ventricle  Right ventricular function, chamber size, wall motion, and thickness are normal.     Atria  Both atria appear normal. Known patent foramen ovale, suboptimally imaged on this study. Intermittent left to right atrial-level shunting is seen on some color Doppler images. Large highly mobile thrombus in transit in the right atrium, at least 2.2 cm in length.     Mitral Valve  The mitral valve is normal.     Aortic Valve  The valve leaflets are not well visualized. On Doppler interrogation, there is no significant stenosis or regurgitation.     Tricuspid Valve  The tricuspid valve is normal. Mild tricuspid insufficiency is present. The peak velocity of the tricuspid regurgitant jet is not obtainable. Pulmonary artery systolic pressure cannot be assessed.     Pulmonic Valve  The pulmonic valve is normal. Trace pulmonic insufficiency is present.     Vessels  The aorta root is normal.  The inferior vena cava was normal in size with preserved respiratory variability. IVC diameter <2.1 cm collapsing >50% with sniff suggests a normal RA pressure of 3 mmHg.     Pericardium  No pericardial effusion is present.    Assessment/Plan:  Cande Shields is a 22 year old  at 5w5d by LMP admitted to the CVICU for MFM consultation regarding early pregnancy in the setting of granulomatous polyangiitis on chronic steroids with pulmonary and renal involvement (baseline creatinine 1.2-1.4), history of multiple pulmonary emboli and lower extremity DVT now on therapeutic Lovenox, COVID infection diagnosed on admission (also in 2022), and right atrial embolism noted on 23 who is POD1 from an emergency sternotomy with right atrial thrombectomy and closure of PFO with central and bilateral femoral cannulation. Of note, she also has a bicornuate uterus, depression, and history of IUFD at 31 weeks in the setting of pre-eclampsia with severe features. She has been normotensive as of late, but may have also previously had chronic hypertension on chart review.    We discussed with Cande the risks of pregnancy with her medical history. Upon reviewing the literature, granulomatosis with polyangiitis is rare, but has been reported in pregnancies. Its risks include miscarriage,  birth, stillbirth. Maternal risks include flare during pregnancy as well as preeclampsia. In one review, almost 40% of patients will flare during pregnancy.     We reviewed that she is at risk for recurrent or worsening VTE despite anticoagulation given that pregnancy is a hypercoaguable state. We encouraged her to alert her doctors if she has any new or worsening symptoms, both while admitted and outpatient. Anticoagulant therapy generally continues for at least six weeks postpartum. Patients with persistent risk factors for VTE or VTE diagnosed later in pregnancy may require a longer duration of therapy.     We also discussed  the risks of chronic kidney disease in pregnancy with the potential for worsening kidney function that could eventually require dialysis or need for transplantation, although this is rare. Additionally, chronic kidney disease can make differentiation of a flare or KATERINA from preeclampsia difficult. Increases in proteinuria occur in about 50% of pregnancies, hypertension develops or worsens in about 25% of pregnancies which may lead to maternal injury such as stroke, or poor fetal outcome.  Women with nephrotic syndrome may develop worsening edema as the pregnancy progresses, however this should resolve with delivery.  Permanent decline in renal function occurs in up to 10% of pregnancies affected by mild renal disease (creatinine <1.5 mg/dL); women with hypertension seem to be at increased risk.  In women with moderate renal insufficiency (creatinine 1.5-2.9 mg/dL), as many as one third experience irreversible damage, and as many as 10% may develop end-stage renal disease (ESRD).  Co-existing hypertension will increase this likelihood.     A history of preeclampsia in the past confers a recurrence risk of up to 25-30%. A history of severe preeclampsia remote from term may have a recurrence risk of almost 70%. Subsequent pregnancies in women with a history of severe preeclampsia or eclampsia are also at increased risk of other obstetric complications compared to women with no such history. These problems include:  placental abruption,  delivery, intrauterine growth restriction, and increased  mortality. Preeclampsia may require delivery if severe, which can result in a very  delivery, requiring NICU admission for the  with risks of the morbidity and mortality that are associated with  delivery. However, this risk can be reduced with introduction of low dose aspirin at 12 weeks, which is okay to take with concurrent anticoagulation.    We talked about how some patients opt to  terminate the pregnancy based on these risks, but that pregnancy is not necessarily contraindicated for Cande. She states she intends to continue the pregnancy and we would be happy to care for her during her high risk pregnancy. She would likely be seen at least monthly until the end of the second trimester, more frequently depending on how she is doing. She denies any barriers to transportation. As for  surveillance, we also briefly talked about a first trimester ultrasound around 11-13 weeks, followed by a Level II comprehensive scan at 18-20 weeks with monthly growth US thereafter and weekly BPP depending on how her pregnancy progresses.    We also acknowledged that pregnancy after a loss can be particularly anxiety-provoking and stressful. The patient identifies strong social supports in her life and was made aware of other resources through our office for patients that have had a prior loss or very complicated pregnancies. She also is involved in online support groups, which she finds helpful. She will need close monitoring of her mood and emotional wellness throughout pregnancy as well with plan for early intervention if concerns are identified given her many risk factors.    Obstetric considerations for this hospitalization are below:    Neurologic  - No anesthetic agents are known to pose risk from a teratogenic perspective to a developing embryo/fetus.  - Please avoid NSAIDs; tylenol is acceptable in pregnancy, as are opiates.    Cardiovascular  - Heparin and Lovenox may be used in the pregnant patient without concern for the pregnancy. Typically patients remain on Lovenox outpatient.   - For therapeutic anticoagulation with LMWH in pregnancy we target an Anti-Xa level of 0.6-1.0 units/mL  - Avoid DOACs and Warfarin in pregnancy.  - Levophed (norepinephrine bitartrate) is the medication most utilized in the pregnant patient.  - Recommend blood pressures be maintained below 130/80 given her  chronic kidney disease.  - Severe hypertension in pregnancy is defined as a blood pressure >=160/110mmHg sustained over 15 minutes. This should be treated with IV Labetalol (20mg) or IV Hydralazine (5mg) and OB should be contacted  - Medications used for the management of hypertension in pregnancy include: labetalol, hydralazine, and nifedipine.  - Beta-blockers may be used in pregnancy, but ACE and ARB are avoided, as are statins.  - If diuresis is required Lasix is appropriate for use as benefits outweigh unknown risk to the fetus.    Respiratory  - Maintenance of Oxygen saturation >94% and PaO2 > 70 mmHg (normal ABG 7.4-7.44/ 27-32/ / 18-22).  - Sedation while intubated can be achieved with commonly used agents similarly to in the non-pregnant population. Fentanyl, Propofol, and Precedex, are not contraindicated in the intubated pregnant patient.    Gastrointestinal/Nutrition  - Stool softeners if opiates are used for pain management.    Endocrine  - Recommend maintaining POC <120, insulin drip as needed.    Infectious Disease  - Penicillin, ampicillin, amoxicillin, erythromycin, mezlocillin, and cephalosporins can be given during all trimesters in pregnancy.  - Vancomycin, imipenem, and rifampicin are category C in pregnancy, thus their benefit must be weight against a risk that cannot be excluded in pregnancy.  - Aminoglycosides, quinolones, and tetracyclines should be avoided in pregnancy and only used for vital indications.  - Appreciate ID recommendations, Remdesevir is safe in pregnancy.    Rheumatology/Heme  - Prednisone may be continued as prescribed.  - Agree with Rheumatology consultation.    OB/Gyn  - Transvaginal US to evaluate viability of pregnancy given high risk for miscarriage in the setting of acute flare and COVID infection.  - Continue prenatal vitamin  - We will set up outpatient prenatal care for the patient with the Maternal Fetal Medicine office if her pregnancy remains viable, as  Cande will be an [facility] primary patient for her care given this high risk pregnancy.    Radiologic considerations  - MRI alone poses no risk to the developing fetus. Gadolinium contrast should be avoided unless the benefits clearly outweigh risks; if this appears to be indicated please discuss with OB team.  - Ionizing radiation (e.g. XR, CT): dose is dependent on area imaged and type of imaging. Ideally use would be minimized; lead shielding of the pelvis should be employed whenever possible.  - Intravenous CT contrast should ideally be avoided due to theoretical risk of fetal harm, however, if it is likely to make a difference in diagnosis or treatment of the patient it may be employed on a case-by-case basis - Oral CT/XR contrast is considered to be safe due to low systemic absorption.    The patient was seen and evaluated with Dr. Ivey. Care recommendations communicated to the primary team. Thank you for allowing us to participate in the care of your patient. We will continue to follow peripherally. Please do not hesitate to contact us if you have further questions regarding the management of this patient any time at the [facility] pager: [phone].    Esperanza Fox MD  Maternal Fetal Medicine Fellow    [facility] Attending Attestation  I saw this patient with the [facility] Fellow and agree with the findings and plan of care as documented in the note. I personally reviewed her vital signs, medications, labs, pertinent imaging, and fetal monitoring.    Time Spent on this Encounter   I spent a total of 60 minutes face-to-face or coordinating care of Ms. Shields. Over 50% of my time on the unit was spent counseling the patient and /or coordinating care regarding diagnosis, diagnostic results, prognosis and risks and benefits of treatment options.     Date of service (when I saw the patient): 2/19/2023     Ara Ivey MD  , OB/GYN  Maternal-Fetal Medicine  [phone] (Pager)

## 2023-02-19 NOTE — CONSULTS
Care Management Initial Consult    General Information  Assessment completed with: Cande Borjas  Type of CM/SW Visit: Initial Assessment    Primary Care Provider verified and updated as needed: Yes   Readmission within the last 30 days: no previous admission in last 30 days         Advance Care Planning:            Communication Assessment  Patient's communication style: spoken language (English or Bilingual)             Cognitive  Cognitive/Neuro/Behavioral: WDL                      Living Environment:   People in home: parent(s)  AmyHank kim  Current living Arrangements: apartment      Able to return to prior arrangements: yes       Family/Social Support:  Care provided by: self  Provides care for:    Marital Status: Single  Parent(s), Sibling(s), Partner          Description of Support System: Supportive, Involved    Support Assessment: Adequate family and caregiver support    Current Resources:   Patient receiving home care services: No     Community Resources: None  Equipment currently used at home: none  Supplies currently used at home: None    Employment/Financial:  Employment Status: employed full-time        Financial Concerns:     Referral to Financial Worker: No       Lifestyle & Psychosocial Needs:  Social Determinants of Health     Tobacco Use: High Risk     Smoking Tobacco Use: Former     Smokeless Tobacco Use: Current     Passive Exposure: Not on file   Alcohol Use: Not on file   Financial Resource Strain: Not on file   Food Insecurity: Not on file   Transportation Needs: Not on file   Physical Activity: Not on file   Stress: Not on file   Social Connections: Not on file   Intimate Partner Violence: Not on file   Depression: Not at risk     PHQ-2 Score: 0   Housing Stability: Not on file       Functional Status:  Prior to admission patient needed assistance:   Dependent ADLs:: Independent  Dependent IADLs:: Independent       Mental Health Status:  Mental Health Status: No Current Concerns        Chemical Dependency Status:  Chemical Dependency Status: No Current Concerns             Values/Beliefs:  Spiritual, Cultural Beliefs, Orthodoxy Practices, Values that affect care:    Unknown             Additional Information:  SW called Pt by phone. Pt stated brother is coming to visit Pt this afternoon. Pt stated plans to return home with her parents. Pt stated to have support from her parents, brother and a significant other. Pt stated was independent with all daily activities, including driving and money management. Pt stated considering applying for social security disability and would like assistance from . Pt did not have any other questions for SW at this time. SW continue to follow and provide assistance.       TANYA Pollack, RACQUEL  2/19/2023       Social Work and Care Management Department       SEARCHABLE in [employer] - search SOCIAL WORK       [facility] (0800 - 1630) Saturday and Sunday     Units: 4A, 4C, & 4E Pager: [phone]     Units: 5A & 5B Pager: [phone]     Units: 6A & 6B   Pager: [phone]     Units: 6C & 6D Pager: [phone]     Units: 7A &7B  Pager: [phone]     Units: 7C, 7D, & 5C Pager: [phone]     Unit: [facility] Pager: [phone]      [facility] (8446-3373) Saturday and Sunday      Units: 5 Ortho, 5 Med/Surg & WB ED  Pager:[phone]     Units: 6 Med/Surg, 8A, & 10A ICU  Pager: [phone]        After hours (1630 - 0000) Saturday & Sunday; (9275-1102) Mon-Fri; (1217-9998) [facility] Recognized Holidays     Units: ALL  Pager: [phone]

## 2023-02-19 NOTE — PROGRESS NOTES
[facility] labwork sent. Started on Heparin gtt at 1200 units/hr. Next heparin 10A check 2200.    Pain to incision, back- scheduled Tylenol and PRN 10mg Oxycodone and 0.4 mg Dilaudid. Pt has been using PRN's frequently. Pt got up to the chair w/ 2 assist d/t lines.     Ultrasound duplexes and transvaginal done this morning.    Guillen D/C'd at 1200. D/C'd Arterial line d/t not drawing back and dampened waveform    Plan: Hematology consulted. OBGYN following. Continue to monitor closely and notify CV[place] at [phone] with any questions.

## 2023-02-20 ENCOUNTER — APPOINTMENT (OUTPATIENT)
Dept: PHYSICAL THERAPY | Facility: CLINIC | Age: 23
End: 2023-02-20
Attending: STUDENT IN AN ORGANIZED HEALTH CARE EDUCATION/TRAINING PROGRAM
Payer: COMMERCIAL

## 2023-02-20 ENCOUNTER — APPOINTMENT (OUTPATIENT)
Dept: GENERAL RADIOLOGY | Facility: CLINIC | Age: 23
End: 2023-02-20
Attending: INTERNAL MEDICINE
Payer: COMMERCIAL

## 2023-02-20 DIAGNOSIS — I26.99 OTHER PULMONARY EMBOLISM WITHOUT ACUTE COR PULMONALE, UNSPECIFIED CHRONICITY (H): Primary | ICD-10-CM

## 2023-02-20 LAB
ALBUMIN SERPL BCG-MCNC: 2.5 G/DL (ref 3.5–5.2)
ALP SERPL-CCNC: 83 U/L (ref 35–104)
ALT SERPL W P-5'-P-CCNC: 11 U/L (ref 10–35)
ANION GAP SERPL CALCULATED.3IONS-SCNC: 10 MMOL/L (ref 7–15)
AST SERPL W P-5'-P-CCNC: 19 U/L (ref 10–35)
ATRIAL RATE - MUSE: 88 BPM
ATRIAL RATE - MUSE: 98 BPM
BACTERIA SPT CULT: NORMAL
BILIRUB SERPL-MCNC: <0.2 MG/DL
BUN SERPL-MCNC: 19.5 MG/DL (ref 6–20)
CA-I BLD-MCNC: 4.7 MG/DL (ref 4.4–5.2)
CALCIUM SERPL-MCNC: 8 MG/DL (ref 8.6–10)
CHLORIDE SERPL-SCNC: 104 MMOL/L (ref 98–107)
CLOT INIT KAOL IND TO POST HEP NEUT TRTO: 1 {RATIO}
CLOT INIT KAOL IND TO POST HEP NEUT TRTO: 1.1 {RATIO}
CLOT INIT KAOLIN IND BLD US: 140 SEC (ref 113–166)
CLOT INIT KAOLIN IND BLD US: 148 SEC (ref 113–166)
CLOT INIT KAOLIN IND P HEP NEUT BLD US: 129 SEC (ref 103–153)
CLOT INIT KAOLIN IND P HEP NEUT BLD US: 149 SEC (ref 103–153)
CLOT STIFF PLT CONT BLD CALC: 25.2 HPA (ref 11.9–29.8)
CLOT STIFF PLT CONT BLD CALC: 45.1 HPA (ref 11.9–29.8)
CLOT STIFF TF IND P HEP NEUT BLD US: 39.3 HPA (ref 13–33.2)
CLOT STIFF TF IND P HEP NEUT BLD US: >65 HPA (ref 13–33.2)
CLOT STIFF TF IND+IIB-IIIA INH P HEP NEU: 14.1 HPA (ref 1–3.7)
CLOT STIFF TF IND+IIB-IIIA INH P HEP NEU: 20 HPA (ref 1–3.7)
CREAT SERPL-MCNC: 1.17 MG/DL (ref 0.51–0.95)
CRP SERPL HS-MCNC: >300 MG/L
DEPRECATED HCO3 PLAS-SCNC: 21 MMOL/L (ref 22–29)
DIASTOLIC BLOOD PRESSURE - MUSE: NORMAL MMHG
DIASTOLIC BLOOD PRESSURE - MUSE: NORMAL MMHG
DRVVT SCREEN RATIO: 0.68
ERYTHROCYTE [DISTWIDTH] IN BLOOD BY AUTOMATED COUNT: 17.2 % (ref 10–15)
GFR SERPL CREATININE-BSD FRML MDRD: 66 ML/MIN/1.73M2
GLUCOSE BLDC GLUCOMTR-MCNC: 100 MG/DL (ref 70–99)
GLUCOSE BLDC GLUCOMTR-MCNC: 85 MG/DL (ref 70–99)
GLUCOSE BLDC GLUCOMTR-MCNC: 87 MG/DL (ref 70–99)
GLUCOSE SERPL-MCNC: 98 MG/DL (ref 70–99)
GRAM STAIN RESULT: NORMAL
HCT VFR BLD AUTO: 24.9 % (ref 35–47)
HGB BLD-MCNC: 7.7 G/DL (ref 11.7–15.7)
INR PPP: 1.32 (ref 0.85–1.15)
INTERPRETATION ECG - MUSE: NORMAL
INTERPRETATION ECG - MUSE: NORMAL
LA PPP-IMP: POSITIVE
LACTATE SERPL-SCNC: 0.7 MMOL/L (ref 0.7–2)
LUPUS INTERPRETATION: ABNORMAL
MAGNESIUM SERPL-MCNC: 2 MG/DL (ref 1.7–2.3)
MCH RBC QN AUTO: 25.9 PG (ref 26.5–33)
MCHC RBC AUTO-ENTMCNC: 30.9 G/DL (ref 31.5–36.5)
MCV RBC AUTO: 84 FL (ref 78–100)
P AXIS - MUSE: 57 DEGREES
P AXIS - MUSE: 65 DEGREES
PHOSPHATE SERPL-MCNC: 4.2 MG/DL (ref 2.5–4.5)
PLATELET # BLD AUTO: 251 10E3/UL (ref 150–450)
PLATELET NEUTRALIZATION: 2 SECONDS
POTASSIUM SERPL-SCNC: 4.5 MMOL/L (ref 3.4–5.3)
PR INTERVAL - MUSE: 124 MS
PR INTERVAL - MUSE: 142 MS
PROT SERPL-MCNC: 4.9 G/DL (ref 6.4–8.3)
PTT RATIO: 1.29
QRS DURATION - MUSE: 74 MS
QRS DURATION - MUSE: 76 MS
QT - MUSE: 350 MS
QT - MUSE: 354 MS
QTC - MUSE: 423 MS
QTC - MUSE: 451 MS
R AXIS - MUSE: 20 DEGREES
R AXIS - MUSE: 45 DEGREES
RBC # BLD AUTO: 2.97 10E6/UL (ref 3.8–5.2)
SODIUM SERPL-SCNC: 135 MMOL/L (ref 136–145)
SYSTOLIC BLOOD PRESSURE - MUSE: NORMAL MMHG
SYSTOLIC BLOOD PRESSURE - MUSE: NORMAL MMHG
T AXIS - MUSE: 36 DEGREES
T AXIS - MUSE: 50 DEGREES
THROMBIN TIME: 18.3 SECONDS (ref 13–19)
UFH PPP CHRO-ACNC: 0.2 IU/ML
UFH PPP CHRO-ACNC: >1.1 IU/ML
VENTRICULAR RATE- MUSE: 88 BPM
VENTRICULAR RATE- MUSE: 98 BPM
WBC # BLD AUTO: 20.5 10E3/UL (ref 4–11)

## 2023-02-20 PROCEDURE — 97161 PT EVAL LOW COMPLEX 20 MIN: CPT | Mod: GP

## 2023-02-20 PROCEDURE — 36415 COLL VENOUS BLD VENIPUNCTURE: CPT | Performed by: STUDENT IN AN ORGANIZED HEALTH CARE EDUCATION/TRAINING PROGRAM

## 2023-02-20 PROCEDURE — 258N000003 HC RX IP 258 OP 636: Performed by: SURGERY

## 2023-02-20 PROCEDURE — 250N000013 HC RX MED GY IP 250 OP 250 PS 637: Performed by: STUDENT IN AN ORGANIZED HEALTH CARE EDUCATION/TRAINING PROGRAM

## 2023-02-20 PROCEDURE — 99233 SBSQ HOSP IP/OBS HIGH 50: CPT | Mod: GC | Performed by: STUDENT IN AN ORGANIZED HEALTH CARE EDUCATION/TRAINING PROGRAM

## 2023-02-20 PROCEDURE — 85027 COMPLETE CBC AUTOMATED: CPT | Performed by: SURGERY

## 2023-02-20 PROCEDURE — 99207 PR SC NO CHARGE VISIT: CPT | Performed by: INTERNAL MEDICINE

## 2023-02-20 PROCEDURE — 99233 SBSQ HOSP IP/OBS HIGH 50: CPT | Mod: GC | Performed by: INTERNAL MEDICINE

## 2023-02-20 PROCEDURE — 84100 ASSAY OF PHOSPHORUS: CPT | Performed by: SURGERY

## 2023-02-20 PROCEDURE — 83605 ASSAY OF LACTIC ACID: CPT | Performed by: SURGERY

## 2023-02-20 PROCEDURE — 250N000011 HC RX IP 250 OP 636: Performed by: STUDENT IN AN ORGANIZED HEALTH CARE EDUCATION/TRAINING PROGRAM

## 2023-02-20 PROCEDURE — 999N000180 XR SURGERY CARM FLUORO LESS THAN 5 MIN

## 2023-02-20 PROCEDURE — 83735 ASSAY OF MAGNESIUM: CPT | Performed by: THORACIC SURGERY (CARDIOTHORACIC VASCULAR SURGERY)

## 2023-02-20 PROCEDURE — 250N000013 HC RX MED GY IP 250 OP 250 PS 637: Performed by: THORACIC SURGERY (CARDIOTHORACIC VASCULAR SURGERY)

## 2023-02-20 PROCEDURE — 999N000179 XR SURGERY CARM FLUORO LESS THAN 5 MIN W STILLS

## 2023-02-20 PROCEDURE — 999N000127 HC STATISTIC PERIPHERAL IV START W US GUIDANCE

## 2023-02-20 PROCEDURE — 97116 GAIT TRAINING THERAPY: CPT | Mod: GP

## 2023-02-20 PROCEDURE — 214N000001 HC R&B CCU UMMC

## 2023-02-20 PROCEDURE — 97530 THERAPEUTIC ACTIVITIES: CPT | Mod: GP

## 2023-02-20 PROCEDURE — 86037 ANCA TITER EACH ANTIBODY: CPT | Performed by: STUDENT IN AN ORGANIZED HEALTH CARE EDUCATION/TRAINING PROGRAM

## 2023-02-20 PROCEDURE — 85520 HEPARIN ASSAY: CPT | Performed by: STUDENT IN AN ORGANIZED HEALTH CARE EDUCATION/TRAINING PROGRAM

## 2023-02-20 PROCEDURE — 85520 HEPARIN ASSAY: CPT | Performed by: THORACIC SURGERY (CARDIOTHORACIC VASCULAR SURGERY)

## 2023-02-20 PROCEDURE — 82330 ASSAY OF CALCIUM: CPT | Performed by: STUDENT IN AN ORGANIZED HEALTH CARE EDUCATION/TRAINING PROGRAM

## 2023-02-20 PROCEDURE — 99223 1ST HOSP IP/OBS HIGH 75: CPT | Performed by: PHYSICIAN ASSISTANT

## 2023-02-20 PROCEDURE — 999N000128 HC STATISTIC PERIPHERAL IV START W/O US GUIDANCE

## 2023-02-20 PROCEDURE — 80053 COMPREHEN METABOLIC PANEL: CPT | Performed by: SURGERY

## 2023-02-20 PROCEDURE — 250N000011 HC RX IP 250 OP 636: Performed by: SURGERY

## 2023-02-20 PROCEDURE — 86141 C-REACTIVE PROTEIN HS: CPT | Performed by: INTERNAL MEDICINE

## 2023-02-20 PROCEDURE — 86355 B CELLS TOTAL COUNT: CPT | Performed by: STUDENT IN AN ORGANIZED HEALTH CARE EDUCATION/TRAINING PROGRAM

## 2023-02-20 RX ORDER — GUAIFENESIN 200 MG/10ML
10 LIQUID ORAL EVERY 4 HOURS PRN
Status: DISCONTINUED | OUTPATIENT
Start: 2023-02-20 | End: 2023-02-23

## 2023-02-20 RX ORDER — POTASSIUM CHLORIDE 1.5 G/1.58G
40 POWDER, FOR SOLUTION ORAL ONCE
Status: COMPLETED | OUTPATIENT
Start: 2023-02-20 | End: 2023-02-20

## 2023-02-20 RX ORDER — PRENATAL VIT/IRON FUM/FOLIC AC 27MG-0.8MG
1 TABLET ORAL DAILY
Status: DISCONTINUED | OUTPATIENT
Start: 2023-02-20 | End: 2023-02-27 | Stop reason: HOSPADM

## 2023-02-20 RX ORDER — HEPARIN SODIUM 10000 [USP'U]/100ML
0-5000 INJECTION, SOLUTION INTRAVENOUS CONTINUOUS
Status: DISCONTINUED | OUTPATIENT
Start: 2023-02-20 | End: 2023-02-22

## 2023-02-20 RX ADMIN — OXYCODONE HYDROCHLORIDE 10 MG: 10 TABLET ORAL at 06:07

## 2023-02-20 RX ADMIN — HYDROMORPHONE HYDROCHLORIDE 0.4 MG: 0.2 INJECTION, SOLUTION INTRAMUSCULAR; INTRAVENOUS; SUBCUTANEOUS at 15:41

## 2023-02-20 RX ADMIN — POTASSIUM CHLORIDE 40 MEQ: 1.5 POWDER, FOR SOLUTION ORAL at 18:06

## 2023-02-20 RX ADMIN — HYDROMORPHONE HYDROCHLORIDE 0.4 MG: 0.2 INJECTION, SOLUTION INTRAMUSCULAR; INTRAVENOUS; SUBCUTANEOUS at 22:13

## 2023-02-20 RX ADMIN — FAMOTIDINE 20 MG: 20 TABLET ORAL at 08:16

## 2023-02-20 RX ADMIN — REMDESIVIR 100 MG: 100 INJECTION, POWDER, LYOPHILIZED, FOR SOLUTION INTRAVENOUS at 21:14

## 2023-02-20 RX ADMIN — HEPARIN SODIUM 1800 UNITS/HR: 10000 INJECTION, SOLUTION INTRAVENOUS at 20:05

## 2023-02-20 RX ADMIN — OXYCODONE HYDROCHLORIDE 10 MG: 10 TABLET ORAL at 10:12

## 2023-02-20 RX ADMIN — ACETAMINOPHEN 975 MG: 325 TABLET, FILM COATED ORAL at 11:28

## 2023-02-20 RX ADMIN — PRENATAL VIT W/ FE FUMARATE-FA TAB 27-0.8 MG 1 TABLET: 27-0.8 TAB at 11:28

## 2023-02-20 RX ADMIN — HYDROMORPHONE HYDROCHLORIDE 0.4 MG: 0.2 INJECTION, SOLUTION INTRAMUSCULAR; INTRAVENOUS; SUBCUTANEOUS at 08:04

## 2023-02-20 RX ADMIN — FAMOTIDINE 20 MG: 20 TABLET ORAL at 20:11

## 2023-02-20 RX ADMIN — ASPIRIN 81 MG 81 MG: 81 TABLET ORAL at 08:16

## 2023-02-20 RX ADMIN — HYDROMORPHONE HYDROCHLORIDE 0.4 MG: 0.2 INJECTION, SOLUTION INTRAMUSCULAR; INTRAVENOUS; SUBCUTANEOUS at 20:06

## 2023-02-20 RX ADMIN — HYDROMORPHONE HYDROCHLORIDE 0.4 MG: 0.2 INJECTION, SOLUTION INTRAMUSCULAR; INTRAVENOUS; SUBCUTANEOUS at 18:06

## 2023-02-20 RX ADMIN — OXYCODONE HYDROCHLORIDE 10 MG: 10 TABLET ORAL at 18:06

## 2023-02-20 RX ADMIN — OXYCODONE HYDROCHLORIDE 10 MG: 10 TABLET ORAL at 01:37

## 2023-02-20 RX ADMIN — HYDROMORPHONE HYDROCHLORIDE 0.4 MG: 0.2 INJECTION, SOLUTION INTRAMUSCULAR; INTRAVENOUS; SUBCUTANEOUS at 11:28

## 2023-02-20 RX ADMIN — SENNOSIDES AND DOCUSATE SODIUM 1 TABLET: 50; 8.6 TABLET ORAL at 20:11

## 2023-02-20 RX ADMIN — OXYCODONE HYDROCHLORIDE 10 MG: 10 TABLET ORAL at 14:37

## 2023-02-20 RX ADMIN — OXYCODONE HYDROCHLORIDE 10 MG: 10 TABLET ORAL at 22:14

## 2023-02-20 RX ADMIN — CEFTRIAXONE SODIUM 2 G: 2 INJECTION, POWDER, FOR SOLUTION INTRAMUSCULAR; INTRAVENOUS at 20:00

## 2023-02-20 RX ADMIN — ACETAMINOPHEN 975 MG: 325 TABLET, FILM COATED ORAL at 20:11

## 2023-02-20 RX ADMIN — HEPARIN SODIUM 1350 UNITS/HR: 10000 INJECTION, SOLUTION INTRAVENOUS at 04:07

## 2023-02-20 RX ADMIN — HYDROMORPHONE HYDROCHLORIDE 0.4 MG: 0.2 INJECTION, SOLUTION INTRAMUSCULAR; INTRAVENOUS; SUBCUTANEOUS at 00:42

## 2023-02-20 RX ADMIN — SODIUM CHLORIDE 50 ML: 900 INJECTION INTRAVENOUS at 22:02

## 2023-02-20 RX ADMIN — HYDROMORPHONE HYDROCHLORIDE 0.4 MG: 0.2 INJECTION, SOLUTION INTRAMUSCULAR; INTRAVENOUS; SUBCUTANEOUS at 04:07

## 2023-02-20 RX ADMIN — HYDROMORPHONE HYDROCHLORIDE 0.4 MG: 0.2 INJECTION, SOLUTION INTRAMUSCULAR; INTRAVENOUS; SUBCUTANEOUS at 13:26

## 2023-02-20 ASSESSMENT — ACTIVITIES OF DAILY LIVING (ADL)
WEAR_GLASSES_OR_BLIND: NO
ADLS_ACUITY_SCORE: 24
ADLS_ACUITY_SCORE: 24
ADLS_ACUITY_SCORE: 25
DRESSING/BATHING_DIFFICULTY: NO
ADLS_ACUITY_SCORE: 25
ADLS_ACUITY_SCORE: 46
FALL_HISTORY_WITHIN_LAST_SIX_MONTHS: NO
DIFFICULTY_EATING/SWALLOWING: NO
TOILETING_ISSUES: NO
ADLS_ACUITY_SCORE: 25
WALKING_OR_CLIMBING_STAIRS_DIFFICULTY: NO
DIFFICULTY_COMMUNICATING: NO
CONCENTRATING,_REMEMBERING_OR_MAKING_DECISIONS_DIFFICULTY: NO
CHANGE_IN_FUNCTIONAL_STATUS_SINCE_ONSET_OF_CURRENT_ILLNESS/INJURY: NO
ADLS_ACUITY_SCORE: 42
DOING_ERRANDS_INDEPENDENTLY_DIFFICULTY: NO
ADLS_ACUITY_SCORE: 46
ADLS_ACUITY_SCORE: 25
ADLS_ACUITY_SCORE: 42
HEARING_DIFFICULTY_OR_DEAF: NO

## 2023-02-20 NOTE — PLAN OF CARE
Major Shift Events:  Neuro intact, post-op pain well controlled with PRNs. Attempted to titrate down NC, desats to high 80s, now on 1L, afebrile, other VSS. Up to commode x1 with assist of one, normal to urinate less frequently per pt. Minimal CT output, woundvac w/o output. Daily Cxray skipped for fetal protection. SO, Cm, at bedside at start of [facility].  Plan: de-line, transfer out of ICU, continue OB follow up.  For vital signs and complete assessments, please see documentation flowsheets.     Problem: Plan of Care - These are the overarching goals to be used throughout the patient stay.    Goal: Optimal Comfort and Wellbeing  Outcome: Progressing  Intervention: Monitor Pain and Promote Comfort  Recent Flowsheet Documentation  Taken 2/19/2023 2000 by Maru Bingham RN  Pain Management Interventions:   medication (see MAR)   distraction   diversional activity provided   emotional support  Intervention: Provide Person-Centered Care  Recent Flowsheet Documentation  Taken 2/20/2023 0400 by Maru Bingham RN  Trust Relationship/Rapport:   care explained   choices provided  Taken 2/20/2023 0000 by Maru Bingham RN  Trust Relationship/Rapport:   care explained   choices provided  Taken 2/19/2023 2000 by Maru Bingham RN  Trust Relationship/Rapport:   care explained   choices provided     Problem: Cardiovascular Surgery  Goal: Acceptable Pain Control  Outcome: Progressing  Intervention: Prevent or Manage Pain  Recent Flowsheet Documentation  Taken 2/19/2023 2000 by Maru Bingham RN  Pain Management Interventions:   medication (see MAR)   distraction   diversional activity provided   emotional support  Goal: Effective Oxygenation and Ventilation  Outcome: Progressing  Intervention: Promote Airway Secretion Clearance  Recent Flowsheet Documentation  Taken 2/20/2023 0400 by Maru Bingham RN  Administration (IS): self-administered  Cough And Deep Breathing: done with encouragement  Taken 2/20/2023 0000  by Maru Bingham RN  Administration (IS): self-administered  Cough And Deep Breathing: done with encouragement  Taken 2/19/2023 2000 by Maru Bingham RN  Administration (IS):   instruction provided, follow-up   proper technique demonstrated   self-administered  Cough And Deep Breathing: done with encouragement     Problem: Plan of Care - These are the overarching goals to be used throughout the patient stay.    Goal: Readiness for Transition of Care  Outcome: Adequate for Care Transition   Goal Outcome Evaluation:

## 2023-02-20 NOTE — PLAN OF CARE
ICU End of Shift Summary. See flowsheets for vital signs and detailed assessment.    Care provided 1042-0922    Changes this shift: Alert and oriented, following all commands. VSS on RA- 1L NC. Void at 1130, 325ml. No BM this shift.     Plan: Transferred to 6C.      Goal Outcome Evaluation:      Plan of Care Reviewed With: patient    Overall Patient Progress: improvingOverall Patient Progress: improving    Outcome Evaluation: Transfer orders to 6C

## 2023-02-20 NOTE — PROGRESS NOTES
"   02/20/23 1630   Appointment Info   Signing Clinician's Name / Credentials (PT) Beronica Rivas PT, DPT   Rehab Comments (PT) sternal prec   Living Environment   People in Home parent(s)   Current Living Arrangements apartment   Home Accessibility no concerns   Transportation Anticipated car, drives self;family or friend will provide   Living Environment Comments Pt lives in apartment with Mom. Has elevator access. Tub shower. apartment is accessible; was working in  and driving PTA.   Self-Care   Usual Activity Tolerance good   Current Activity Tolerance moderate   Regular Exercise No   Equipment Currently Used at Home none   Fall history within last six months no   Activity/Exercise/Self-Care Comment Pt reports IND at baseline with all mobiity and ADLs. Once recovered pt plans to move in with boyfriend.   General Information   Onset of Illness/Injury or Date of Surgery 02/18/23   Referring Physician Tommie Hdez MD   Patient/Family Therapy Goals Statement (PT) return home   Pertinent History of Current Problem (include personal factors and/or comorbidities that impact the POC) per EMR \"Tommie Hdez MD\"   Existing Precautions/Restrictions cardiac;sternal   General Observations activity: up in chair; progress ambulation as tolerated   Cognition   Affect/Mental Status (Cognition) WFL;flat/blunted affect   Cognitive Status Comments A&Ox4   Pain Assessment   Patient Currently in Pain Yes, see Vital Sign flowsheet  (incisional pain)   Integumentary/Edema   Integumentary/Edema Comments sternal incision, has wound vac   Posture    Posture Forward head position;Protracted shoulders   Range of Motion (ROM)   ROM Comment ROM WFL   Strength (Manual Muscle Testing)   Strength Comments generalized deconditioning post op. pt states feeling \"weak all over\"   Bed Mobility   Comment, (Bed Mobility) Min A for LE management, additional A for lines   Transfers   Comment, (Transfers) sit > stand min A x1 from recliner "   Gait/Stairs (Locomotion)   Comment, (Gait/Stairs) B UE support with therapist; min A 2/2 unsteadiness on feet.   Balance   Balance Comments impaired standing balance; sitting balance intact   Sensory Examination   Sensory Perception patient reports no sensory changes   Clinical Impression   Criteria for Skilled Therapeutic Intervention Yes, treatment indicated   PT Diagnosis (PT) impaired functional mobility   Influenced by the following impairments impaired strength, balance, act tolerance , and pain   Functional limitations due to impairments bed mobility, transfers, gait, functional activity tolerance   Clinical Presentation (PT Evaluation Complexity) Evolving/Changing   Clinical Presentation [facility]ationale [facility]   Clinical Decision Making (Complexity) moderate complexity   Planned Therapy Interventions (PT) balance training;bed mobility training;gait training;home exercise program;motor coordination training;neuromuscular re-education;patient/family education;ROM (range of motion);stair training;strengthening;transfer training;progressive activity/exercise;home program guidelines;risk factor education   Risk & Benefits of therapy have been explained evaluation/treatment results reviewed;care plan/treatment goals reviewed;risks/benefits reviewed;current/potential barriers reviewed;participants voiced agreement with care plan;participants included;patient   PT Total Evaluation Time   PT Eval, Low Complexity Minutes (92016) 10   Physical Therapy Goals   PT Frequency 5x/week   PT Predicted Duration/Target Date for Goal Attainment 02/28/23   PT Goals Bed Mobility;Transfers;Gait;PT Goal 1   PT: Bed Mobility Independent;Supine to/from sit;Rolling;Within precautions   PT: Transfers Independent;Sit to/from stand;Bed to/from chair;Within precautions   PT: Gait Independent;150 feet;Within precautions   PT: Goal 1 Demonstrate low falls risk with a balance assessment score of < 13.5 seconds on TUG,  > 19/24  on Dynamic Gait Index, > 9/12 on 4-Item Dynamic Gait Index, >22/30 on Functional Gait Assessment, or > 45/56 on Kearns Balance Assessment.   PT Discharge Planning   PT Plan progress gait in simon with w/c follow, prox strength, higher level balance   PT Discharge Recommendation (DC Rec) home with assist;home with outpatient cardiac rehab   PT Rationale for DC Rec Pt below baseline, limited by general deconditioning post op and ongoing sternal pain. Anticipate when patient is medicaly ready, will be safe to dc home with A from family prn. OP cardiac rehab to progress act tolerance   PT Brief overview of current status SBA, encourage OOB   Total Session Time   Total Session Time (sum of timed and untimed services) 10

## 2023-02-20 NOTE — PROGRESS NOTES
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 Rheumatology was consulted for the evaluation of possible flare and ongoing management of GPA.     Very complex history, recent vasculitis flareup in 12/22 which was treated with 3 doses of rituximab and she was started on avacopan with rapid steroid taper.  At that time she presented with new petechiae, KATERINA and hemoptysis. However, in the interim, she tested positive for pregnancy which led to discontinuation of avacopan and holding off on the fourth dose of rituximab. Her last dose of Rituximab was on 1/17/23.  Had a significant ED visit on 2/12 where she was diagnosed with saddle pulmonary embolus with mild right heart strain.  She was discharged on therapeutic enoxaparin. Presented to the ED again on 02/17/23 with complaints of worsening chest pain, cough and new onset hemoptysis for one day.  Work-up in the ED showed significantly elevated CRP at 322 which improved to 242.  Otherwise normal creatinine. Elevated WBC and procal.     Differential considered for chest pain, shortness of breath and new onset hemoptysis includes changes secondary to pulmonary infarction, DAH, new infection and vasculitis.  Patient's chest pain and tachycardia has been present since the diagnosis of PE and could very well be secondary to PE.  She has had Hemoptysis with last GPA flare but at  that time she also had KATERINA and petechiae, both of which are absent this time. Hemoptysis could also be secondary to PE . Her significant elevated CRP and procalcitonin could indicate an underlying infection or flareup of vasculitis.  She reported mild nosebleed which she blames on dryness and which is resolved since admission otherwise does not have any myalgias, joint pains, purpuric rash, ear, eye or sinus symptoms.  Her GFR and creatinine remained  stable compared to when she was having a flareup in 12/2022.       Based on the above discussion it appeared less likely that patient's symptoms are due to GPA flare. Will hold offsteroid.     Problems   1. PR3 positive ANCA vasculitis   2. DVT and recurrent PE  3. RA thrombus s/p sternotomy and thrombectomy   4. hypercoagulable state due to COVID/prednancy   5. Elevated CRP    Plan:  -continue to hold off steroids  -SD-3 pending   -check ANCA and CD19  -trend CRP q3d       Diet: Advance Diet as Tolerated: Regular Diet Adult    DVT Prophylaxis: on therapeutic AC  Guillen Catheter: Not present  Fluids: None   Lines: chest tubes, PIV  Cardiac Monitoring: ACTIVE order. Indication: Open heart surgery (72 hours)  Code Status: Full Code      The patient was seen and evaluated with attending rheumatologist, Dr Bola Clark   Internal Medicine  PGY3    Staff addendum  I performed the history and physical examination of the patient and discussed the management with the resident. I reviewed the available lab and imaging studies. I reviewed the resident s note and agree with the documented findings and plan of care.    Christopher Plaza MD  Rheumatology    ______________________________________________________________________    Interval History   Patient is s/p emergency sternotomy. No acute events overnight. Post operative pain is the chief complaint. Her breathing is okay, still requiring NC. She is happy about her pregnancy. No new joint pain, muscle pain or new rash  or nose bleed. Last received hydrocortisone on 02/18/2022. MFM following. Will be transferred to the floor today.     14 point review of systems collected and negative if not documented above.    Physical Exam   Vital Signs: Temp: 99  F (37.2  C) Temp src: Oral BP: 116/56 Pulse: 84   Resp: 24 SpO2: 97 % O2 Device: Nasal cannula Oxygen Delivery: 1 LPM  Weight: 220 lbs 0 oz    Constitutional: awake, alert, cooperative, no apparent distress, and appears stated age  Eyes:anicteric, PERRLA  ENT:NC/AT, EOMI, no nystagmus   Respiratory: Equal air entry bilaterally, sternotomy incision appears dry   Cardiovascular: normal rate, regular rhythm, no murmur   GI: soft, non tender   Skin:visible skin without any rashes  Neurologic: alert and oriented X3, no focal deficit         Data    2/20/2023    Cr 1.17  Hb 7.7  WBC 20.5  CRP >300    Absolute CD19 1 (1/17/23)  proteinase 3 ab IgG  6.6 (02/8/2023)  Neutrophilic cytoplasmic antibody 1:160 (12/20/20)

## 2023-02-20 NOTE — SUMMARY OF CARE
Maternal fetal medicine -    Follow up on consultation from yesterday with chart review.   Patient should have any and all cares as would be done in a non-pregnant patient given her early gestational age and very high risk clinical status outside of pregnancy.   Please page M team at [phone] for questions related to pregnancy .  We will continue to see at this time, but are available for questions at any time, and are happy to see for follow up consult if other issues arise.     When she is going to be discharged, please page our team during the day so we can coordinate outpatient OB care with our MFM clinic at [facility].     We did review her chart including progress note from today which notes that this patient has preeclampsia. This is not a possible diagnosis at 5 weeks gestation, and should be removed from her working diagnoses at this time.     Thank you,   Kayden Crump MD     Maternal Fetal Medicine

## 2023-02-20 NOTE — PLAN OF CARE
Major Shift Events:  Pt c/o incisional chest pain - PRN Oxycodone and PRN Dilaudid given. On 1L O2/NC. Heparin gtt infusing.     For vital signs and complete assessments, please see documentation flowsheets.      Goal Outcome Evaluation:    Plan of Care Reviewed With: patient  Overall Patient Progress: improving

## 2023-02-20 NOTE — PROGRESS NOTES
CV ICU PROGRESS NOTE  February 20, 2023   Cande Shields  [id number]  Admitted: 2/17/2023  4:40 PM      ASSESSMENT: Cande Shields is a 22 year old female admitted on 2/17/2023 with PMH of obesity BMI 38, granulomatous polyangiitis, recent PE, prior DVT complicated by PE with possible pregnancy, COVID-19+ with RA thrombus noted on TTE, s/p attempted angiovac for thrombectomy with Dr. Monterroso and s/p emergency sternotomy, Right Atrial Thrombectomy, Central Cannulation, Bilateral Femoral Cannulation, Closure of PFO; Transesophageal Echocardiogram performed by anesthesia staff with Dr. Elmore on 2/18.     CO-MORBIDITIES:   Other pulmonary embolism without acute cor pulmonale, unspecified chronicity (H)  (primary encounter diagnosis)  Chest pain, unspecified type  Pneumonia due to infectious organism, unspecified laterality, unspecified part of lung    Today's Plan:  - Remove RIJ line  - Consult Hematology placed, with recommendations appreciated, evaluation for need for IVC filter placement  - Increase to high intensity Hep GTT with goal for therapeutic anticoagulation for Anti-Xa level of 0.6-1.0 units/mL  - Transfer to floor orders placed     PLAN:  Neuro/ pain/ sedation:  - Monitor neurological status. Notify the MD for any acute changes in exam.  #Prior ETOH use disorder  #Chronic pain  #Costochondritis, 2nd ICS; L>R  - Pain mangement as above  #Acute postoperative pain  - Scheduled: Tylenol 975mg TID  - PRN: Tylenol 650mg q4H, Oxycodone, Dilaudid     Pulmonary care:   #Risk of hypoxic respiratory failure  Resp: 18  - Wean O2 as tolerated  - Goal SpO2 > 92%  - Encourage IS q15-30 minutes when awake.     Cardiovascular:    #Cardiogenic shock  #Prior saddle PE with BL DVT with pulm infarct and mild RV dilation  #Pre-eclampsia  #Mobile thrombus in right atrium on TTE 2/18 AM, s/p thrombectomy     Pre-op echo 2/18 AM: large highly mobile thrombus in the right atrium, at least 2.2 cm in length.  Normal right  ventricular size and function normal.  LVEF 60 to 65%.  Post-op echo 2/18 PM: completed by Anesthesia, Post CPB: normal biventricular function, valves unchanged.  PFO has been closed, no flow evident.  Thrombus previously noted in RAA is no longer present.  No aortic dissection noted.       - Epi, NE, Vaso gtts for inotropic and pressor support, wean as able  - Monitor hemodynamic status.   - Hydralazine PRN 10mg q30min, SBP>130, DBP>90  - Goal MAP >65, SBP <130 (per MFM)  - Hold Statin   - Hold BB   - ASA: start 81mg daily today  - HIT screen negative  - Heparin GTT for anti-coagulation:   - Increase to high intensity Hep GTT with goal for therapeutic anticoagulation for Anti-Xa level of 0.6-1.0 units/mL  - Lupus anticoag panel pending     GI care / Nutrition:   - Regular diet  - Nutrition consulted, appreciate recommendations  - PPI for bowel prophylaxis - Famotidine 20mg BID  - Bowel regimen: MiraLAX, Senna     Renal / Fluids / Electrolytes:  # CKD3  BL creat appears to be ~ 1.4 - 1.5, 1.2 from 1.2 today  - Strict I/O, daily weights  - Avoid/limit nephrotoxins as able  - Replete lytes PRN per protocol     Endocrine/:    #Stress hyperglycemia  Preop A1c NA  - Insulin Med ISS  - Goal BG <180 for optimal healing; goal BG<120 per MFM     Rheumatology  #Granulomatous polyangiitis with renal, upper respiratory, musculoskeletal, and cutaneous involvement  Dx of granulomatous polyangiitis in 2020, and she has completed induction therapy with prednisone and rituximab  - Rheum consult in place  - No indication for IV steroids currently given no current flare, not currently taking steroids (also given crosses placenta, concern for active pregnancy)     ID / Antibiotics:  #COVID-19 positive   #Pneumonia, R middle lung  #Concern for sepsis  #Strep anginosis - strep throat  #Stress induced leukocytosis  - Per ID, Remdesevir 200 mg IV today and 100 mg IV daily x 4 days   - Perioperative antibiotics, Rocephin 2g daily 48  hours  - Plan for total 10 day course, for strep throat  - Continue to monitor fever curve, WBC, and inflammatory markers as appropriate     Heme:  #Prior PE, occlusion of CFA     #Acute PE, bilateral lobar R, seg and subseg left/R   #Hx of DVT  #Concern for HIT, resolved  #Acute blood loss anemia  #Acute blood loss thrombocytopenia  No s/sx active bleeding  - Continue to monitor  - CBC daily   - Hgb goal > 7.0  - Hemoglobin this AM 7.7 from 8.2  - Heparin GTT for anti-coagulation:   - Increase to high intensity Hep GTT with goal for therapeutic anticoagulation for Anti-Xa level of 0.6-1.0 units/mL     MSK / Skin:  #Sternotomy  #Surgical Incision  #Femoral cannulation incision sites  - Remove wound vac POD5  - Remove femoral cannulation sutures POD5, ensure incision management  - Sternal precautions  - Postoperative incision management per protocol  - PT/OT/CR     Other:  Obstetrics and Gynecology:  #Possible Pregnancy,   #Bicornuate uterus  - MFM Consult recommendations in place, appreciated, see detailed consult note with recommendations outlined by Dr. Fox 23  - Possible 5 weeks pregnant per ?LMP; with IUFD at 30 weeks and 6 days.  - Detailed recommendations, summarized as follows:               - Neurological: avoid NSAIDs               - CV: OK for Heparin and Lovenox, avoid DOAC/Warfarin, with recommended SBP<130/80, OK for Beta Blockers, avoid ACE/ARB/statins               - Endocrine: Goal BG < 120               - ID: OK for Penicillin and Cephalosporin               - Obstetrics: TV U/S to evaluate pregnancy               - Radiological consideration: Limit radiation, use shielding               - In General: Will avoid teratogenic medications     Prophylaxis:     - Mechanical ppx for DVT  - Chemical DVT ppx: Therapeutic Hep GTT  - PPI  - Bowel regimen: PPI, MiraLAX, senna     Lines / Tubes / Drains:  - RIJ CVC, PA catheter - remove  - CTs x 2 mediastinal, x2 pleural  bilateral     Disposition:  - CVICU - transfer to floor    ICU Checklist  F - Feeding:   A - Analgesia:   S - Sedation:   T - Thromboembolic prophylaxis:      H - Head of bed elevated  U - Ulcer prophylaxis:  G - Glycemic control  S - SBT     B - Bowel regimen  I - Indwelling catheter  D - De-escalation of antibiotics    Patient seen, findings and plan discussed with CVICU staff and cardiothoracic surgeons.    Cooper Galdamez MD, PGY3  Surgery  2/20/2023 at 6:31 AM    ====================================    TODAY'S PROGRESS  SUBJECTIVE  No acute events overnight. Removed art line, weaned from 2L to 1L NC. Intermittent desaturation event to high 80's. Tolerating diet. Voiding.    OBJECTIVE  1. VITAL SIGNS  Temp:  [98.3  F (36.8  C)-99.3  F (37.4  C)] 98.6  F (37  C)  Pulse:  [80-99] 84  Resp:  [16-24] 20  BP: (110-135)/(55-62) 124/62  MAP:  [69 mmHg-90 mmHg] 86 mmHg  Arterial Line BP: ()/(54-71) 122/71  SpO2:  [89 %-99 %] 99 %  Resp: 20      2. INTAKE/ OUTPUT  I/O last 3 completed shifts:  In: 2780.69 [P.O.:1730; I.V.:1050.69]  Out: 856 [Urine:480; Chest Tube:376]    3. PHYSICAL EXAMINATION    General: alert  Neuro: awake  Resp: non-labored on 1L NC  CV:  RRR   Abdomen: Soft, non-distended, non-tender  Incisions: c/d/i  Extremities: warm and well perfused  CT: To suction, serosang output, no airleak, crepitus      4. INVESTIGATIONS  Arterial Blood Gases   Recent Labs   Lab 02/19/23  0359 02/18/23  1934 02/18/23  1738 02/18/23  1712   PH 7.39 7.32* 7.32* 7.43   PCO2 37 37 41 34*   PO2 81 195* 394* >600*   HCO3 22 19* 21 22     Complete Blood Count   Recent Labs   Lab 02/20/23  0411 02/19/23  1104 02/19/23  0357 02/18/23 1941   WBC 20.5* 18.0* 20.5* 29.7*   HGB 7.7* 8.2* 8.5* 9.4*    231 211 241     Basic Metabolic Panel  Recent Labs   Lab 02/20/23  0411 02/19/23  2230 02/19/23  1555 02/19/23  1141 02/19/23  0403 02/19/23  0400 02/19/23  0118 02/19/23  0114 02/18/23  2205 02/18/23  1941   *  --   --    --   --  136  --  135*  --  137   POTASSIUM 4.5  --   --   --   --  4.6  --  4.6  --  5.0   CHLORIDE 104  --   --   --   --  106  --  106  --  104   CO2 21*  --   --   --   --  18*  --  18*  --  17*   BUN 19.5  --   --   --   --  19.7  --  19.7  --  20.3*   CR 1.17*  --   --   --   --  1.16*  --  1.14*  --  1.10*   GLC 98 121* 101* 109*   < > 103*   < > 116*   < > 188*    < > = values in this interval not displayed.     Liver Function Tests  Recent Labs   Lab 02/20/23  0411 02/19/23  0400 02/19/23  0114 02/18/23 1941 02/18/23  1740   AST 19 21 22 25  --    ALT 11 12 12 13  --    ALKPHOS 83 75 77 93  --    BILITOTAL <0.2 <0.2 <0.2 0.2  --    ALBUMIN 2.5* 2.5* 2.5* 2.7*  --    INR  --   --   --  1.26* 1.26*     Pancreatic Enzymes  No lab results found in last 7 days.  Coagulation Profile  Recent Labs   Lab 02/18/23 1941 02/18/23  1740   INR 1.26* 1.26*   PTT 39* 33     Lactate  Invalid input(s): LACTATE    5. RADIOLOGY  Recent Results (from the past 24 hour(s))   US OB < 14 Weeks w Transvaginal    Narrative    EXAMINATION: US OB <14 WEEKS WITH TRANSVAGINAL SINGLE, 2/19/2023 10:29  AM     COMPARISON: Ultrasound 2/12/2023    HISTORY: Possible 5 weeks pregnant per LMP, large clot. Gestational  age 5 weeks 5 days from LMP 1/10/2023    TECHNIQUE: The pelvis was scanned in standard fashion with  transabdominal and transvaginal transducer(s).    FINDINGS: Bicornuate uterus. There is a gestational sac within the  uterine fundus, in the left horn. The amniotic fluid volume and sac  size are within expected limits for gestation age. There is an embryo  and yolk sac present within the gestational sac. The gestational sac  measures 1.85 cm corresponding to a gestational age of 6 weeks 6 days.  The embryo crown-rump length is not well visualized, possibly  measuring approximately 0.12 cm.. Embryonic heart motion is present at  96 beats per minute.  It is too early to accurately determine  placental site.    There is no free  fluid in the pelvis.  No uterine masses. The right  ovary is not visualized. The left ovary measures 2.7 x 3.0 x 3.2 cm.  There is normal blood flow to the ovaries. Somewhat complex cyst in  the left ovary measuring up to 1.9 cm, suspected corpus luteum.      Impression    IMPRESSION:    There is a single living intrauterine embryo within the  left horn of a bicornuate uterus. The gestational age is calculated  via gestational sac measurement, corresponding to 6 weeks 6 days. The  embryo is not well visualized, and crown-rump length measurement is  difficult. Embryonic heart motion is measured at 96 bpm. Short-term  follow-up ultrasound is recommended for confirmation.    I have personally reviewed the examination and initial interpretation  and I agree with the findings.    ROSANGELA MORAN MD         SYSTEM ID:  [id number]   US Lower Extremity Venous Duplex Bilateral    Narrative    EXAMINATION: DOPPLER VENOUS ULTRASOUND OF BILATERAL LOWER EXTREMITIES,  2/19/2023 10:34 AM     COMPARISON: Ultrasound 1/11/2011    HISTORY: Rule out lower extremity DVT    TECHNIQUE:  Gray-scale evaluation with compression, spectral flow and  color Doppler assessment of the deep venous system of both legs from  groin to knee, and then at the ankles.    FINDINGS:  In both lower extremities, the common femoral, femoral, popliteal and  posterior tibial veins demonstrate normal compressibility and blood  flow. The peroneal veins in the calf are not seen bilaterally.      Impression    IMPRESSION:  No evidence of deep venous thrombosis in either lower extremity.    I have personally reviewed the examination and initial interpretation  and I agree with the findings.    ROSANGELA MORAN MD         SYSTEM ID:  [id number]

## 2023-02-20 NOTE — DISCHARGE INSTRUCTIONS
AFTER YOU GO HOME FROM YOUR HEART SURGERY  (Sternotomy, right atrium clot removal, & PFO closure surgery- 2/24/2023)    You had a sternotomy, avoid lifting anything greater than ten pounds for 6 weeks after surgery and then less than 20 pounds for an additional 6 weeks.   Do not reach backwards or use arms to push out of chair.   Do not let people pull on your arms to assist with standing.   Avoid twisting or reaching too far across your body.    Avoid strenuous activities such as bowling, vacuuming, raking, shoveling, golf or tennis for 12 weeks after your surgery.   It is okay to resume sex if you feel comfortable in doing so. You may have to try different positions with your partner.    Splint your chest incision by hugging a pillow or bringing your arms across your chest when coughing or sneezing.     No driving for 4 weeks after surgery or while on pain medication.     Shower or wash your incisions daily with soap and water (or as instructed), pat dry.   Keep wound clean and dry, showers are okay after discharge, but don't let spray hit directly on incision.   No baths or swimming for 1 month.   Cover chest tube sites with dry gauze until they stop draining, then leave open to air. It is not abnormal for chest tube sites to drain yellowish/clear fluid for up to 2-3 weeks after surgery.   Watch for signs of infection: increased redness, tenderness, warmth or any drainage that appears infected (pus like) or is persistent.  Also a temperature > 100.5 F or chills. Call your surgeon or primary care provider's office immediately.   Remove any skin glue left on incisions after 10-14 days. This will not affect your incision and can speed up healing.    Exercise is very important in your recovery. Please follow the guidelines set up for you in your cardiac rehab classes at the hospital. If outpatient cardiac rehab was ordered for you, we highly recommend you participate. If you have problems arranging your cardiac  rehab, please call [phone] for all locations, with the exception of [place], please call [phone] and [place], please call [phone].    Avoid sitting for prolonged periods of time, try to walk every hour during the day. If you have a leg incision, elevate your leg often when you are not walking.    Check your weight when you get home from the hospital and continue to check it daily through your recovery for at least a month. If you notice a weight gain of 2-3 pounds in a week, notify your primary care physician, cardiologist or surgeon.    Bowel activity may be slow after surgery. If necessary, you may take an over the counter laxative such as milk of magnesia or Miralax. You may have stool softeners prescribed (docusate sodium, Senokot). We recommend using stool softeners while using narcotics for pain (oxycodone/percocet, hydrocodone/vicodin, hydromorphone/dilaudid).      Wean OFF of narcotics (oxycodone, dilaudid, hydrocodone) as soon as possible. You should continue taking acetaminophen as long as you have any surgical pain as the first choice for pain control and add narcotics as necessary for pain to be tolerable.      DO NOT SMOKE.  IF YOU NEED HELP QUITTING, PLEASE TALK WITH YOUR CARDIOLOGIST OR PRIMARY DOCTOR.    You are on a blood thinner (Lovenox injections), follow the instructions you were given in the hospital and DO NOT SKIP this medication. Try and take it the same time everyday. Your primary care physician or coumadin clinic will manage the dosing.     REGARDING PRESCRIPTION REFILLS.  If you need a refill on your pain medication contact us to discuss your pain and a possible one time refill.   All other medications will be adjusted, discontinued and re-filled by your primary care physician and/or your cardiologist as they were prior to your surgery. We have given you enough for one to three month with possibly one refill.    POST-OPERATIVE CLINIC VISITS  You have a follow up visit  with [facility] Advance Care Practitioners on 3/10/23 at 2 pm*** at the [facility].  You will then return to the care of your primary provider and your cardiologist. Future medication refills should come from your PCP or Cardiologist.   You should see your primary care provider about 1-2 weeks after discharge.   OB/GYN follow up on 3/2/23.  Hematology (Dr Dye) follow up on 3/8/23.  It is important to see your cardiologist (Roxie Oneal MD) as scheduled on 3/10/23.    If you do not hear from a  in 7 days, please call [phone] (choose option 1) and request to be seen with a general cardiologist or someone that you have seen in the past.   If there is a need to return to see CT Surgery please call our  at [phone].    SURGICAL QUESTIONS  Please call Mckinley Mcdaniel, Josiane Kelley, or Roxie Doran with surgical recovery and medication questions; phone numbers are listed below.  They will assist you with your needs and contact other surgery care team members as indicated.    On weekends or after hours, please call [phone] and ask the  to page the Cardiothoracic Surgery Fellow on call.      Thank you,    Your Cardiothoracic Surgery Team   Mckinley Mcdaniel, RN Care Coordinator-  [phone]   Josiane Kelley, RN Care Coordinator - [phone]  Roxie Doran RN Care Coordinator- [phone]

## 2023-02-21 ENCOUNTER — APPOINTMENT (OUTPATIENT)
Dept: OCCUPATIONAL THERAPY | Facility: CLINIC | Age: 23
End: 2023-02-21
Attending: STUDENT IN AN ORGANIZED HEALTH CARE EDUCATION/TRAINING PROGRAM
Payer: COMMERCIAL

## 2023-02-21 ENCOUNTER — APPOINTMENT (OUTPATIENT)
Dept: GENERAL RADIOLOGY | Facility: CLINIC | Age: 23
End: 2023-02-21
Attending: NURSE PRACTITIONER
Payer: COMMERCIAL

## 2023-02-21 ENCOUNTER — APPOINTMENT (OUTPATIENT)
Dept: GENERAL RADIOLOGY | Facility: CLINIC | Age: 23
End: 2023-02-21
Payer: COMMERCIAL

## 2023-02-21 LAB
ALBUMIN SERPL BCG-MCNC: 2.3 G/DL (ref 3.5–5.2)
ALP SERPL-CCNC: 114 U/L (ref 35–104)
ALT SERPL W P-5'-P-CCNC: 11 U/L (ref 10–35)
ANION GAP SERPL CALCULATED.3IONS-SCNC: 9 MMOL/L (ref 7–15)
AST SERPL W P-5'-P-CCNC: 13 U/L (ref 10–35)
BILIRUB SERPL-MCNC: <0.2 MG/DL
BUN SERPL-MCNC: 19.3 MG/DL (ref 6–20)
CA-I BLD-MCNC: 4.7 MG/DL (ref 4.4–5.2)
CA-I BLD-MCNC: 4.7 MG/DL (ref 4.4–5.2)
CALCIUM SERPL-MCNC: 8.3 MG/DL (ref 8.6–10)
CD19 CELLS # BLD: 1 CELLS/UL (ref 107–698)
CD19 CELLS NFR BLD: <1 % (ref 6–27)
CHLORIDE SERPL-SCNC: 104 MMOL/L (ref 98–107)
CREAT SERPL-MCNC: 1.16 MG/DL (ref 0.51–0.95)
CRP SERPL HS-MCNC: >300 MG/L
DEPRECATED HCO3 PLAS-SCNC: 21 MMOL/L (ref 22–29)
ERYTHROCYTE [DISTWIDTH] IN BLOOD BY AUTOMATED COUNT: 17.2 % (ref 10–15)
GFR SERPL CREATININE-BSD FRML MDRD: 67 ML/MIN/1.73M2
GLUCOSE BLDC GLUCOMTR-MCNC: 112 MG/DL (ref 70–99)
GLUCOSE BLDC GLUCOMTR-MCNC: 91 MG/DL (ref 70–99)
GLUCOSE BLDC GLUCOMTR-MCNC: 93 MG/DL (ref 70–99)
GLUCOSE SERPL-MCNC: 98 MG/DL (ref 70–99)
HCT VFR BLD AUTO: 23.5 % (ref 35–47)
HGB BLD-MCNC: 7.2 G/DL (ref 11.7–15.7)
LACTATE SERPL-SCNC: 0.6 MMOL/L (ref 0.7–2)
MCH RBC QN AUTO: 25.6 PG (ref 26.5–33)
MCHC RBC AUTO-ENTMCNC: 30.6 G/DL (ref 31.5–36.5)
MCV RBC AUTO: 84 FL (ref 78–100)
PHOSPHATE SERPL-MCNC: 4.1 MG/DL (ref 2.5–4.5)
PLATELET # BLD AUTO: 316 10E3/UL (ref 150–450)
POTASSIUM SERPL-SCNC: 4.6 MMOL/L (ref 3.4–5.3)
PROT SERPL-MCNC: 5.1 G/DL (ref 6.4–8.3)
PROTEINASE3 AB SER IA-ACNC: 3.5 U/ML
PROTEINASE3 AB SER IA-ACNC: POSITIVE
RBC # BLD AUTO: 2.81 10E6/UL (ref 3.8–5.2)
SODIUM SERPL-SCNC: 134 MMOL/L (ref 136–145)
UFH PPP CHRO-ACNC: 0.13 IU/ML
UFH PPP CHRO-ACNC: 0.15 IU/ML
WBC # BLD AUTO: 18.9 10E3/UL (ref 4–11)

## 2023-02-21 PROCEDURE — 80053 COMPREHEN METABOLIC PANEL: CPT | Performed by: SURGERY

## 2023-02-21 PROCEDURE — 85520 HEPARIN ASSAY: CPT | Performed by: THORACIC SURGERY (CARDIOTHORACIC VASCULAR SURGERY)

## 2023-02-21 PROCEDURE — 71045 X-RAY EXAM CHEST 1 VIEW: CPT | Mod: 26 | Performed by: RADIOLOGY

## 2023-02-21 PROCEDURE — 250N000011 HC RX IP 250 OP 636: Performed by: STUDENT IN AN ORGANIZED HEALTH CARE EDUCATION/TRAINING PROGRAM

## 2023-02-21 PROCEDURE — 36415 COLL VENOUS BLD VENIPUNCTURE: CPT | Performed by: STUDENT IN AN ORGANIZED HEALTH CARE EDUCATION/TRAINING PROGRAM

## 2023-02-21 PROCEDURE — 250N000011 HC RX IP 250 OP 636: Performed by: SURGERY

## 2023-02-21 PROCEDURE — 86141 C-REACTIVE PROTEIN HS: CPT | Performed by: INTERNAL MEDICINE

## 2023-02-21 PROCEDURE — 250N000013 HC RX MED GY IP 250 OP 250 PS 637: Performed by: STUDENT IN AN ORGANIZED HEALTH CARE EDUCATION/TRAINING PROGRAM

## 2023-02-21 PROCEDURE — 99232 SBSQ HOSP IP/OBS MODERATE 35: CPT | Performed by: PHYSICIAN ASSISTANT

## 2023-02-21 PROCEDURE — 97530 THERAPEUTIC ACTIVITIES: CPT | Mod: GO

## 2023-02-21 PROCEDURE — 250N000011 HC RX IP 250 OP 636: Performed by: NURSE PRACTITIONER

## 2023-02-21 PROCEDURE — 82330 ASSAY OF CALCIUM: CPT | Performed by: STUDENT IN AN ORGANIZED HEALTH CARE EDUCATION/TRAINING PROGRAM

## 2023-02-21 PROCEDURE — 85027 COMPLETE CBC AUTOMATED: CPT | Performed by: SURGERY

## 2023-02-21 PROCEDURE — 250N000013 HC RX MED GY IP 250 OP 250 PS 637: Performed by: THORACIC SURGERY (CARDIOTHORACIC VASCULAR SURGERY)

## 2023-02-21 PROCEDURE — 258N000003 HC RX IP 258 OP 636: Performed by: SURGERY

## 2023-02-21 PROCEDURE — 97535 SELF CARE MNGMENT TRAINING: CPT | Mod: GO

## 2023-02-21 PROCEDURE — 83605 ASSAY OF LACTIC ACID: CPT | Performed by: SURGERY

## 2023-02-21 PROCEDURE — 214N000001 HC R&B CCU UMMC

## 2023-02-21 PROCEDURE — 71045 X-RAY EXAM CHEST 1 VIEW: CPT

## 2023-02-21 PROCEDURE — 84100 ASSAY OF PHOSPHORUS: CPT | Performed by: SURGERY

## 2023-02-21 PROCEDURE — 71045 X-RAY EXAM CHEST 1 VIEW: CPT | Mod: 77

## 2023-02-21 PROCEDURE — 36415 COLL VENOUS BLD VENIPUNCTURE: CPT | Performed by: THORACIC SURGERY (CARDIOTHORACIC VASCULAR SURGERY)

## 2023-02-21 RX ORDER — FUROSEMIDE 10 MG/ML
20 INJECTION INTRAMUSCULAR; INTRAVENOUS DAILY
Status: DISCONTINUED | OUTPATIENT
Start: 2023-02-21 | End: 2023-02-23

## 2023-02-21 RX ADMIN — HEPARIN SODIUM 1800 UNITS/HR: 10000 INJECTION, SOLUTION INTRAVENOUS at 08:37

## 2023-02-21 RX ADMIN — OXYCODONE HYDROCHLORIDE 5 MG: 5 TABLET ORAL at 15:31

## 2023-02-21 RX ADMIN — HYDROMORPHONE HYDROCHLORIDE 0.4 MG: 0.2 INJECTION, SOLUTION INTRAMUSCULAR; INTRAVENOUS; SUBCUTANEOUS at 04:01

## 2023-02-21 RX ADMIN — HYDROMORPHONE HYDROCHLORIDE 0.4 MG: 0.2 INJECTION, SOLUTION INTRAMUSCULAR; INTRAVENOUS; SUBCUTANEOUS at 00:14

## 2023-02-21 RX ADMIN — FAMOTIDINE 20 MG: 20 TABLET ORAL at 08:36

## 2023-02-21 RX ADMIN — HYDROMORPHONE HYDROCHLORIDE 0.4 MG: 0.2 INJECTION, SOLUTION INTRAMUSCULAR; INTRAVENOUS; SUBCUTANEOUS at 14:19

## 2023-02-21 RX ADMIN — CEFTRIAXONE SODIUM 2 G: 2 INJECTION, POWDER, FOR SOLUTION INTRAMUSCULAR; INTRAVENOUS at 19:39

## 2023-02-21 RX ADMIN — PRENATAL VIT W/ FE FUMARATE-FA TAB 27-0.8 MG 1 TABLET: 27-0.8 TAB at 08:36

## 2023-02-21 RX ADMIN — HYDROMORPHONE HYDROCHLORIDE 0.4 MG: 0.2 INJECTION, SOLUTION INTRAMUSCULAR; INTRAVENOUS; SUBCUTANEOUS at 06:11

## 2023-02-21 RX ADMIN — HYDROMORPHONE HYDROCHLORIDE 0.4 MG: 0.2 INJECTION, SOLUTION INTRAMUSCULAR; INTRAVENOUS; SUBCUTANEOUS at 16:21

## 2023-02-21 RX ADMIN — HYDROMORPHONE HYDROCHLORIDE 0.4 MG: 0.2 INJECTION, SOLUTION INTRAMUSCULAR; INTRAVENOUS; SUBCUTANEOUS at 22:33

## 2023-02-21 RX ADMIN — FUROSEMIDE 20 MG: 10 INJECTION, SOLUTION INTRAVENOUS at 18:20

## 2023-02-21 RX ADMIN — ACETAMINOPHEN 975 MG: 325 TABLET, FILM COATED ORAL at 10:36

## 2023-02-21 RX ADMIN — HYDROMORPHONE HYDROCHLORIDE 0.4 MG: 0.2 INJECTION, SOLUTION INTRAMUSCULAR; INTRAVENOUS; SUBCUTANEOUS at 08:36

## 2023-02-21 RX ADMIN — ACETAMINOPHEN 650 MG: 325 TABLET ORAL at 18:37

## 2023-02-21 RX ADMIN — SODIUM CHLORIDE 50 ML: 900 INJECTION INTRAVENOUS at 21:01

## 2023-02-21 RX ADMIN — OXYCODONE HYDROCHLORIDE 10 MG: 10 TABLET ORAL at 10:04

## 2023-02-21 RX ADMIN — HYDROMORPHONE HYDROCHLORIDE 0.4 MG: 0.2 INJECTION, SOLUTION INTRAMUSCULAR; INTRAVENOUS; SUBCUTANEOUS at 18:19

## 2023-02-21 RX ADMIN — OXYCODONE HYDROCHLORIDE 10 MG: 10 TABLET ORAL at 19:39

## 2023-02-21 RX ADMIN — HYDROMORPHONE HYDROCHLORIDE 0.2 MG: 0.2 INJECTION, SOLUTION INTRAMUSCULAR; INTRAVENOUS; SUBCUTANEOUS at 10:36

## 2023-02-21 RX ADMIN — OXYCODONE HYDROCHLORIDE 10 MG: 10 TABLET ORAL at 04:13

## 2023-02-21 RX ADMIN — REMDESIVIR 100 MG: 100 INJECTION, POWDER, LYOPHILIZED, FOR SOLUTION INTRAVENOUS at 20:32

## 2023-02-21 RX ADMIN — GUAIFENESIN 10 ML: 200 SOLUTION ORAL at 23:43

## 2023-02-21 RX ADMIN — ACETAMINOPHEN 650 MG: 325 TABLET ORAL at 23:42

## 2023-02-21 RX ADMIN — HYDROMORPHONE HYDROCHLORIDE 0.4 MG: 0.2 INJECTION, SOLUTION INTRAMUSCULAR; INTRAVENOUS; SUBCUTANEOUS at 12:07

## 2023-02-21 RX ADMIN — ASPIRIN 81 MG 81 MG: 81 TABLET ORAL at 08:36

## 2023-02-21 RX ADMIN — HYDROMORPHONE HYDROCHLORIDE 0.4 MG: 0.2 INJECTION, SOLUTION INTRAMUSCULAR; INTRAVENOUS; SUBCUTANEOUS at 20:32

## 2023-02-21 RX ADMIN — OXYCODONE HYDROCHLORIDE 10 MG: 10 TABLET ORAL at 23:42

## 2023-02-21 RX ADMIN — FAMOTIDINE 20 MG: 20 TABLET ORAL at 19:39

## 2023-02-21 RX ADMIN — ACETAMINOPHEN 975 MG: 325 TABLET, FILM COATED ORAL at 04:01

## 2023-02-21 ASSESSMENT — ACTIVITIES OF DAILY LIVING (ADL)
ADLS_ACUITY_SCORE: 25

## 2023-02-21 NOTE — PROGRESS NOTES
MD Notification    Notified Person: MD    Notified Person Name: CV surgery    Notification Date/Time: 2/20/23 @ 2140    Notification Interaction: paged    Purpose of Notification: Critical Xa of 1.1    Orders Received: none    Comments:

## 2023-02-21 NOTE — PROGRESS NOTES
Cardiovascular Surgery Progress Note  02/21/2023         Assessment and Plan:     Cande Shielsd is a 22 year old female admitted on 2/17/2023 with PMH of obesity BMI 38, granulomatous polyangiitis, recent PE, prior DVT complicated by PE with possible pregnancy, COVID-19+ with RA thrombus noted on TTE, s/p attempted angiovac for thrombectomy with Dr. Monterroso and s/p emergency sternotomy, Right Atrial Thrombectomy, Central Cannulation, Bilateral Femoral Cannulation, Closure of PFO with Dr. Elmore on 2/18.    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  Pre-op echo 2/18 AM: large highly mobile thrombus in the right atrium, at least 2.2 cm in length.  Normal right ventricular size and function normal.  LVEF 60 to 65%.  Post-op echo 2/18 PM: completed by Anesthesia, Post CPB: normal biventricular function, valves unchanged.  PFO has been closed, no flow evident.  Thrombus previously noted in RAA is no longer present.  No aortic dissection noted.   - ASA 81 mg daily  - No statin indicated  - BB: deferred  - Diuresis, AC and ID as below    Chest tubes: removed 2/21  TPW: None    Pulmonary:  COVID 19  Extubated POD 0 to  NC. Now saturating well on 3 lpm.   - Supplemental O2 PRN to keep sats > 92%. Wean off as tolerated.  - Pulm hygiene, IS, activity and deep breathing  - ID as below  - Post CT pull CXR today and in AM    Neurology / MSK:  Acute post-operative pain  Chronic pain  History of EtOH use disorder  Costochondritis, 2nd ICS; L>R  Pain well controlled with current regimen:  - Acetaminophen, PO oxycodone PRN, IV dilaudid PRN, methocarbamol     / Renal / Fluid / Electrolytes:  CKD3  Hypervolemia  BL creat ~ 1.4-1.5, most recent creatinine 1.16; adequate UOP.   FLUID STATUS: Pre-op weight 220 lbs, weight today 232 lbs; I/O past 24 hours: +942 mL  - Diuresis: Lasix 20 mg IV x1  today and evaluate response  - Replete lytes per protocol  - Strict I/O, daily weights  - Avoid/limit nephrotoxins as able    Rheumatology  Granulomatous polyangiitis with renal, upper respiratory, musculoskeletal, and cutaneous involvement  PR3 positive ANCA vasculitis  Dx of granulomatous polyangiitis in 2020, and she has completed induction therapy with prednisone and rituximab  - Rheum consult in place  - No indication for IV steroids currently given no current flare, not currently taking steroids (also given crosses placenta, concern for active pregnancy)  - CRP every 3 days    GI / Nutrition:   - Tolerating regular diet  - Continue bowel regimen, no BM yet    Endocrine:  Stress induced hyperglycemia   - Initially managed on insulin drip postop, transitioned to sliding scale; goal BG <180 for optimal healing    Infectious Disease:  Stress induced leukocytosis  COVID-19  RML pneumonia  Strep anginosis - strep throat  WBC 18.9 and trending down, remains afebrile, no signs or symptoms of infection  - Completed perioperative antibiotics  - Continue Ceftriaxone for now- 10 day course for strep throat  - Per ID, Remdesevir 200 mg IV today and 100 mg IV daily x 4 days   - Continue to monitor fever curve, CBC    Hematology:   Acute blood loss anemia  Hgb 7.2; Plt 316, no signs or symptoms of active bleeding  - CBC in AM    Prior PE, occlusion of CFA     Acute PE, bilateral lobar R, seg and subseg left/R   Hx of DVT  Concern for HIT, resolved  - Continue heparin drip   - Positive lupus anticoagulant  - HIT negative  Recs per heme:   -Transition heparin gtt back to lovenox 100 mg BID when ok from surgery standpoint  -check lovenox anti-Xa 4-6 hours after 3rd lovenox dose (goal antiXa 0.8-1.2, slightly higher range due to pregnancy)   -Isolated positive lupus anticoagulant 2/19 was checked while on anticoagulation, does not change current management, no further testing needed now  -Follow-up scheduled with Dr. Dye  [facility]    Obstetrics and Gynecology  Possible Pregnancy,   Bicornuate uterus  - MFM Consult recommendations in place, appreciated, see detailed consult note with recommendations outlined by Dr. Fox 23  - Possible 5 weeks pregnant per ?LMP; with IUFD at 30 weeks and 6 days.  - Detailed recommendations, summarized as follows:               - Neurological: avoid NSAIDs               - CV: OK for Heparin and Lovenox, avoid DOAC/Warfarin, with recommended  SBP<130/80, OK for Beta Blockers, avoid ACE/ARB/statins               - Endocrine: Goal BG < 120               - ID: OK for Penicillin and Cephalosporin               - Obstetrics: TV U/S to evaluate pregnancy               - Radiological consideration: Limit radiation, use shielding               - In General: Will avoid teratogenic medications  When she is going to be discharged, please page MFM team during the day so they can coordinate outpatient OB care with MFM clinic at [facility].     MSK/Skin:  Sternotomy  Surgical incision  - Sternal precautions  - Incisional cares per protocol    Prophylaxis:   - Stress ulcer prophylaxis: Pantoprazole 40 mg daily  - DVT prophylaxis: IV heparin, SCD    Disposition:   - Transferred to  on   - Therapies recommending discharge to home when medically stable    Dr. Elmore has been informed of changes through both verbal and written communication.      LILIA Michaud, ACN-AG, CCRN  Nurse Practitioner  Cardiothoracic Surgery  Pager: [phone]        Interval History:     No overnight events.  States pain is well managed on current regimen. Slept well overnight.  Tolerating diet, is passing flatus, no  BM. No nausea or vomiting.  Breathing well without complaints, +O2.   Working with therapies and ambulating in room with assistance.   Denies chest pain, palpitations, dizziness, syncopal symptoms, fevers, chills, myalgias, or  sternal popping/clicking.         Physical Exam:     Gen: A&Ox4, NAD  Neuro: Intact with no focal deficits   CV: RRR, normal S1 S2, no murmurs, rubs or gallops. no JVD  Pulm: CTA, no wheezing or rhonchi, normal breathing on 3 lpm  Abd: nondistended, normal BS, soft, nontender  Ext: + peripheral edema, mild pitting  Incision: clean, dry, intact, no erythema, sternum stable  Tubes/drain sites: dressing clean and dry, serosanguinous output, no air leak         Data:    Imaging:  reviewed recent imaging, no acute concerns    No results found for this or any previous visit (from the past 24 hour(s)).     Labs:  Recent Labs   Lab 02/21/23  1042 02/21/23  0542 02/20/23  2217 02/20/23  0814 02/20/23  0411 02/19/23  1141 02/19/23  1104 02/19/23  0403 02/19/23  0400 02/18/23  2205 02/18/23  1941 02/18/23  1939 02/18/23  1740   WBC  --  18.9*  --   --  20.5*  --  18.0*  --   --    < > 29.7*  --   --    HGB  --  7.2*  --   --  7.7*  --  8.2*  --   --    < > 9.4*  --   --    MCV  --  84  --   --  84  --  82  --   --    < > 82  --   --    PLT  --  316  --   --  251  --  231  --   --    < > 241  --  144*   INR  --   --   --   --   --   --  1.32*  --   --   --  1.26*  --  1.26*   NA  --  134*  --   --  135*  --   --   --  136   < > 137  --   --    POTASSIUM  --  4.6  --   --  4.5  --   --   --  4.6   < > 5.0  --   --    CHLORIDE  --  104  --   --  104  --   --   --  106   < > 104  --   --    CO2  --  21*  --   --  21*  --   --   --  18*   < > 17*  --   --    BUN  --  19.3  --   --  19.5  --   --   --  19.7   < > 20.3*  --   --    CR  --  1.16*  --   --  1.17*  --   --   --  1.16*   < > 1.10*  --   --    ANIONGAP  --  9  --   --  10  --   --   --  12   < > 16*  --   --    KRAIG  --  8.3*  --   --  8.0*  --   --   --  7.7*   < > 8.4*  --   --    GLC 93 98 85   < > 98   < >  --    < > 103*   < > 188*   < >  --    ALBUMIN  --  2.3*  --   --  2.5*  --   --   --  2.5*   < > 2.7*  --   --    PROTTOTAL  --  5.1*  --   --  4.9*  --   --   --   4.7*   < > 5.2*  --   --    BILITOTAL  --  <0.2  --   --  <0.2  --   --   --  <0.2   < > 0.2  --   --    ALKPHOS  --  114*  --   --  83  --   --   --  75   < > 93  --   --    ALT  --  11  --   --  11  --   --   --  12   < > 13  --   --    AST  --  13  --   --  19  --   --   --  21   < > 25  --   --     < > = values in this interval not displayed.      GLUCOSE:   Recent Labs   Lab 02/21/23  1042 02/21/23  0542 02/20/23  2217 02/20/23  1114 02/20/23  0814 02/20/23  0411   GLC 93 98 85 87 100* 98

## 2023-02-21 NOTE — PROGRESS NOTES
[id number] 2/21/2023    Patient is a 22 year old female admitted for PE with a PMH of obesity BMI 38, granulomatous polyangiitis, recent PE, prior DVT complicated by PE with possible pregnancy, COVID-19+ with RA thrombus noted on TTE, s/p attempted angiovac for thrombectomy with Dr. Monterroso and had an emergency sternotomy with Dr. Elmore on 2/18.    Neuro: A&Ox4  Cardiac: Sinus rhythm; heparin drip at 1800, next 10a at 1930 this afternoon (paused for hours this AM); denies chest pain  Respiratory: URIAS; SOB; 2L/min via nasal cannula; diminished lungs  GI/: Constipation; produces little urine r/t CKD; last BM 2/18  Endocrine: BS ACHS  Diet/Appetite: Regular diet; thin liquids  Skin: Wound vac; repositions independently; 4 chest tubes pulled this afternoon (leave dressing on until tomorrow around 1300)  LDA: 2 right PIV; wound vac  Activity: Assist x1 for line management  Pain: Ongoing pains, PRNs available   Plan: Keep team updated

## 2023-02-21 NOTE — PLAN OF CARE
Neuro: A&Ox4.   Cardiac: Afebrile, VSS. SR      Respiratory: 2L NC, lung sounds clear/diminished with congested cough, denies SOB  GI/: Pt oliguric. No BM this shift.  Diet/appetite: Tolerating regular diet . Denies nausea.   Activity: Up with 1 assist    Pain: C/o incisional pain, controlled with PRN medications  Skin: No new deficits noted.  Lines: 2x R PIV. Heparin running @ 2100 units/hr  Drains: Chest tubes removed today. Sternal wound vac in place  Replacements: none given     Plan: Continue with current POC. Report changes to primary team.

## 2023-02-21 NOTE — PROGRESS NOTES
Transfer  Transferred from: 4E  Via:bed  Reason for transfer:Pt appropriate for 6C  Family: Aware of transfer  Belongings: Sent with pt  Chart: Sent with pt  Medications: Meds from bin sent with pt  Report called from: Kori Johansen nurse head-to-toe skin assessment performed by Fabrice Santiago and Manda Santiago.  Skin issues noted: Sternal incision and bi-lateral groin sites.  See PCS for assessment and treatment of wounds and surgical sites.

## 2023-02-21 NOTE — PLAN OF CARE
Neuro: A&Ox4.   Cardiac: Afebrile, VSS. SR   Respiratory: 2L NC, lung sounds clear/diminished with congested cough, denies SOB  GI/: Pt oliguric. No BM this shift.  Diet/appetite: Tolerating regular diet . Denies nausea.   Activity: Up with 2 assist    Pain: C/o incisional pain, controlled with PRN medications  Skin: No new deficits noted.  Lines: 2x R PIV. Heparin running @ 1500 units/hr  Drains: 2x mediastinal CT, 2x pleural CT to suction. Sternal wound vac  Replacements: none given    Plan: Continue with current POC. Report changes to primary team.

## 2023-02-21 NOTE — PLAN OF CARE
"Status [id number]    D: Admitted 2/17/23 for worsening cough and pleuritic chest pain found to have RA thrombus s/p attempted angiovac followed by emergency sternotomy, RA thrombectomy, central cannulation, bilateral femoral cannulation, and closure of PFO.     /74 (BP Location: Left arm)   Pulse 94   Temp 98.3  F (36.8  C) (Oral)   Resp 18   Ht 1.626 m (5' 4\")   Wt 105.5 kg (232 lb 8 oz)   LMP 01/10/2023 (Approximate)   SpO2 94%   BMI 39.91 kg/m      A/I:  Cardiac: SR on tele, HR 80's-90's.  LDA: Heparin gtt at 1500units/hr. 2 pleural CTs and 2 mediastinal CTs. Wound vac on sternal incision. All drains had little to no output this shift.  Pain: Severe sternal incision site pain somewhat managed by oxycodone, dilaudid and scheduled tylenol.     A&O x4. VSS, afebrile. Up to bedside commode Ax1. Baseline oliguria. Able to reposition self independently. Remains on 2L of O2. Call light appropriate.     P: Continue with plan of care, report changes to CVTS.    "

## 2023-02-21 NOTE — PHARMACY-ADMISSION MEDICATION HISTORY
Admission Medication History Completed by Pharmacy    See [facility] Admission Navigator for allergy information, preferred outpatient pharmacy, prior to admission medications and immunization status.     Medication History Sources:     Dispense history    Patient interview     Changes made to PTA medication list (reason):    Added: None    Deleted: Omeprazole, oscal    Changed: None    Additional Information:    Patient discontinued her avacopan, steroids and RTX several weeks ago. Has not been on any rheumatology medications since    Cephalexin started 2/12 for strep throat infection (day 10 = 2/22)    Prior to Admission medications    Medication Sig Last Dose Taking? Auth Provider Long Term End Date   benzoyl peroxide 5 % external liquid Use daily as directed Past Week Yes Zee Beauchamp MD     clindamycin (CLEOCIN T) 1 % external lotion 1-2 times daily as spot treatment for pus bumps. Past Week Yes Zee Beauchamp MD     oxyCODONE (ROXICODONE) 5 MG tablet Take 1 tablet (5 mg) by mouth every 6 hours as needed for severe pain (7-10) Past Week Yes Hank Valle DO     Prenatal Vit-Fe Fumarate-FA (PRENATAL VITAMINS PO)  Past Week Yes Reported, Patient     Avacopan 10 MG CAPS Take 30 mg by mouth 2 times daily  Patient not taking: Reported on 2/21/2023 Not Taking  Jolie Dozier MD     cephALEXin (KEFLEX) 500 MG capsule Take 1 capsule (500 mg) by mouth 3 times daily for 10 days   Hank Valle DO  2/23/23   enoxaparin ANTICOAGULANT (LOVENOX) 100 MG/ML syringe Inject 1 mL (100 mg) Subcutaneous every 12 hours for 30 days   Hank Valle DO  3/15/23   methylPREDNISolone (MEDROL DOSEPAK) 4 MG tablet therapy pack Follow Package Directions  Patient not taking: Reported on 2/21/2023 Not Taking  Jolie Dozier MD     predniSONE (DELTASONE) 10 MG tablet Take 10 mg by mouth daily  Patient not taking: Reported on 2/21/2023 Not Taking  Reported, Patient         Date completed:  02/21/23    Medication history completed by: Norma Krueger RP

## 2023-02-21 NOTE — PROGRESS NOTES
Hematology  Daily Progress Note   Date of Service: 2023    Patient: Cande Shields  MRN: [id number]  Admission Date: 2023  Hospital Day # Hospital Day: 5  Primary Outpatient Hematologist: Dr. Kodi Dye     Initial Reason for Consult: Granulomatous polyangitis, recent PE, prior DVT complicated by PE with new bilateral large PE, also wondering if IVC filter indicated     Assessment & Plan:   Cande Shields is a 22 year old female with PMHx of granulomatosis polyangitis, CKD, HELLP with spontaneous late , unprovoked VTE 3/2021, recently admitted 2022 with GPA flare and possible COVID vs other URI, discharged on 30 days ppx apixaban (renally dosed 2.5 mg PO BID, to end ~23).  Subsequently diagnosed with recurrent PE 23 in the setting of 4 week pregnancy, treated with Lovenox 100 mg BID (1 mg/kg).  She was admitted  after c/o ongoing left pleuritic chest pain.  Initially hemodynamically stable, with adequate o2 sats on RA, treated for pneumonia and costochondritis.  No repeat CT chest done, continued on PTA lovenox until routine echo done  to eval cardiac function showed large, highly mobile thrombus in transit in the right atrium, at least 2.2 cm in length.  Underwent attempted right atrial thrombectomy in IR but ultimately required emergency sternotomy with thrombectomy and PFO closure , escalated to high intensity heparin today with plan to transition back to Lovenox. B/L LE doppler negative for DVT.   She was also COVID+  and being treated for recurrent COVID.  Primary team checked lupus anticoagulant and it returned positive (no other APS labs checked).     #Hx of late spontaneous  10/2020  #unprovoked submassive saddle PE with bilateral LE DVT 3/20/21  -Status post thrombolysis + xarelto x 6 months (3/21-), discontinued 2/2 menorrhagia   -? Provoking factors: COVID 2020, miscarriage in 10/2020, GPA, + smoker   -Negative APS testing x 2  (3/21/21 + 10/19/21), negative for factor V Leiden and prothrombin gene mutation   #H/O ? RA thrombus 3/2021  -seen on TTE at [facility], but not on TTE at [facility] [place]  -Found to have PFO at that time  #thrombosis of left common femoral artery 3/21/21  -Possibly paradoxical embolus through PFO  #Concern for HIT   -Occurred while on heparin for DVT/PE in March 2021 (during 1st day of hospitalization for DVT/PE, HIT considered but HIT ab and RAUL negative so not felt to have HIT)   #Acute right lobar and segmental and left segmental and subsegmental pulmonary emboli -- 2/12/23  -In the setting of pregnancy (early first trimester)     Recommendations:   -Transition heparin gtt back to Lovenox 100 mg BID when ok from surgery standpoint  -Check anti-Xa 4-6 hours after 3rd Lovenox dose (goal anti-Xa 0.8-1.2, slightly higher range due to pregnancy)   -Isolated positive lupus anticoagulant 2/19 was checked while on anticoagulation (which increases chance of false positive), does not change current management, no further testing needed now  -Follow-up scheduled with Dr. Dye 3/8/23 in [facility] ([facility])     Patient was seen and plan of care was discussed with attending physician Dr. Ng     Hematology will sign off . Please don't hesitate to contact the Fellow On-Call with questions.    I spent 20 minutes face-to-face or coordinating care of Cande Shields.  Over 50% of our time on the unit was spent counseling the patient and/or coordinating care regarding anticoagulation     Eva Vivas PA-C  [facility] Hematology  [phone]      HEMATOLOGY STAFF  Patient was seen and evaluated as part of a shared APRN/PA visit.  Chart notes, vital signs, labs, and imaging studies reviewed.      Transferred out of ICU yesterday.  Chest tubes removed today.  Still on heparin drip.  Okay to transition to enoxaparin 100 mg every 12 hours when surgery team feels comfortable.  She has follow-up already arranged in our  "clinic as outlined above.    No further recommendations from our perspective.  We will sign off.  Please call with questions ([phone])      Total time: 35 minutes      Karthik Ng MD  Professor of Medicine  Division of Hematology, Oncology, and Transplantation  Director, [facility]      ___________________________________________________________________  Subjective & Interval History:    No acute events noted overnight. Hgb continues to trend down 7.2 today from 7.7 yesterday.  Transferred to step down from ICU yesterday   Lupus anticoagulant checked by primary team on 2/19 has returned positive.  Checked while on heparin gtt so possibly false positive    AntiXa level last night >1.1, this AM 0.13.  Chest tubes removed today.      Physical Exam:    /69 (BP Location: Left arm)   Pulse 96   Temp 98.8  F (37.1  C) (Oral)   Resp 16   Ht 1.626 m (5' 4\")   Wt 105.5 kg (232 lb 8 oz)   LMP 01/10/2023 (Approximate)   SpO2 95%   BMI 39.91 kg/m    Constitutional: Awake, alert, cooperative, in NAD. Sitting up in bed.   Eyes: EOMI, sclera clear, conjunctiva normal.   ENT: Normocephalic, without obvious abnormality, moist mucus membranes   Respiratory: Non-labored breathing.   Cardiovascular: no edema, warm well perfused   GI: soft, non-distended   Skin: No concerning lesions or rash on exposed areas.   Musculoskeletal: Normal bulk.   Neurologic: Awake, alert & oriented x 3, clear speech, moves all 4 extremities   Psych: appropriate affect       Labs & Studies: I personally reviewed the following studies:  ROUTINE LABS (Last four results):  CMPRecent Labs   Lab 02/21/23  1042 02/21/23  0542 02/20/23  2217 02/20/23  1114 02/20/23  0814 02/20/23  0411 02/19/23  0403 02/19/23  0400 02/19/23  0118 02/19/23  0114 02/18/23  2205 02/18/23  1941   NA  --  134*  --   --   --  135*  --  136  --  135*  --  137   POTASSIUM  --  4.6  --   --   --  4.5  --  4.6  --  4.6  --  5.0   CHLORIDE "  --  104  --   --   --  104  --  106  --  106  --  104   CO2  --  21*  --   --   --  21*  --  18*  --  18*  --  17*   ANIONGAP  --  9  --   --   --  10  --  12  --  11  --  16*   GLC 93 98 85 87   < > 98   < > 103*   < > 116*   < > 188*   BUN  --  19.3  --   --   --  19.5  --  19.7  --  19.7  --  20.3*   CR  --  1.16*  --   --   --  1.17*  --  1.16*  --  1.14*  --  1.10*   GFRESTIMATED  --  67  --   --   --  66  --  67  --  69  --  72   KRAIG  --  8.3*  --   --   --  8.0*  --  7.7*  --  7.6*  --  8.4*   MAG  --   --   --   --   --  2.0  --  2.1  --   --   --  2.5*   PHOS  --  4.1  --   --   --  4.2  --  4.0  --   --   --  4.0   PROTTOTAL  --  5.1*  --   --   --  4.9*  --  4.7*  --  4.8*  --  5.2*   ALBUMIN  --  2.3*  --   --   --  2.5*  --  2.5*  --  2.5*  --  2.7*   BILITOTAL  --  <0.2  --   --   --  <0.2  --  <0.2  --  <0.2  --  0.2   ALKPHOS  --  114*  --   --   --  83  --  75  --  77  --  93   AST  --  13  --   --   --  19  --  21  --  22  --  25   ALT  --  11  --   --   --  11  --  12  --  12  --  13    < > = values in this interval not displayed.     CBC  Recent Labs   Lab 02/21/23  0542 02/20/23  0411 02/19/23  1104 02/19/23  0354   WBC 18.9* 20.5* 18.0* 20.5*   RBC 2.81* 2.97* 3.17* 3.32*   HGB 7.2* 7.7* 8.2* 8.5*   HCT 23.5* 24.9* 26.1* 26.9*   MCV 84 84 82 81   MCH 25.6* 25.9* 25.9* 25.6*   MCHC 30.6* 30.9* 31.4* 31.6   RDW 17.2* 17.2* 17.0* 17.0*    251 231 211     INR  Recent Labs   Lab 02/19/23  1104 02/18/23  1941 02/18/23  1740   INR 1.32* 1.26* 1.26*       Medications list for reference:  Current Facility-Administered Medications   Medication     remdesivir 100 mg in sodium chloride 0.9 % 250 mL intermittent infusion    And     0.9% sodium chloride BOLUS     acetaminophen (TYLENOL) tablet 650 mg     aspirin (ASA) chewable tablet 81 mg     bisacodyl (DULCOLAX) suppository 10 mg     calcium gluconate 1 g in 50 mL sodium chloride intermittent infusion (premix)     calcium gluconate 2 g in  mL  intermittent infusion     calcium gluconate 3 g in sodium chloride 0.9 % 100 mL intermittent infusion     cefTRIAXone (ROCEPHIN) 2 g vial to attach to  ml bag for ADULTS or NS 50 ml bag for PEDS     glucose gel 15-30 g    Or     dextrose 50 % injection 25-50 mL    Or     glucagon injection 1 mg     famotidine (PEPCID) tablet 20 mg     guaiFENesin (ROBITUSSIN) 20 mg/mL solution 10 mL     heparin infusion 25,000 units in D5W 250 mL ANTICOAGULANT     hydrALAZINE (APRESOLINE) injection 10 mg     HYDROmorphone (DILAUDID) injection 0.2 mg    Or     HYDROmorphone (DILAUDID) injection 0.4 mg     insulin aspart (NovoLOG) injection (RAPID ACTING)     insulin aspart (NovoLOG) injection (RAPID ACTING)     lidocaine (LMX4) cream     lidocaine 1 % 0.1-1 mL     magnesium hydroxide (MILK OF MAGNESIA) suspension 30 mL     naloxone (NARCAN) injection 0.2 mg    Or     naloxone (NARCAN) injection 0.4 mg    Or     naloxone (NARCAN) injection 0.2 mg    Or     naloxone (NARCAN) injection 0.4 mg     oxyCODONE (ROXICODONE) tablet 5 mg    Or     oxyCODONE IR (ROXICODONE) tablet 10 mg     polyethylene glycol (MIRALAX) Packet 17 g     prenatal multivitamin w/iron per tablet 1 tablet     Reason beta blocker order not selected     senna-docusate (SENOKOT-S/PERICOLACE) 8.6-50 MG per tablet 1 tablet     sodium chloride (PF) 0.9% PF flush 3 mL     sodium chloride (PF) 0.9% PF flush 3 mL

## 2023-02-22 ENCOUNTER — APPOINTMENT (OUTPATIENT)
Dept: OCCUPATIONAL THERAPY | Facility: CLINIC | Age: 23
End: 2023-02-22
Payer: COMMERCIAL

## 2023-02-22 ENCOUNTER — APPOINTMENT (OUTPATIENT)
Dept: GENERAL RADIOLOGY | Facility: CLINIC | Age: 23
End: 2023-02-22
Attending: NURSE PRACTITIONER
Payer: COMMERCIAL

## 2023-02-22 ENCOUNTER — APPOINTMENT (OUTPATIENT)
Dept: GENERAL RADIOLOGY | Facility: CLINIC | Age: 23
End: 2023-02-22
Attending: THORACIC SURGERY (CARDIOTHORACIC VASCULAR SURGERY)
Payer: COMMERCIAL

## 2023-02-22 LAB
ALBUMIN SERPL BCG-MCNC: 2.7 G/DL (ref 3.5–5.2)
ALBUMIN UR-MCNC: 30 MG/DL
ALP SERPL-CCNC: 160 U/L (ref 35–104)
ALT SERPL W P-5'-P-CCNC: 12 U/L (ref 10–35)
ANCA AB PATTERN SER IF-IMP: ABNORMAL
ANION GAP SERPL CALCULATED.3IONS-SCNC: 11 MMOL/L (ref 7–15)
ANION GAP SERPL CALCULATED.3IONS-SCNC: 14 MMOL/L (ref 7–15)
APPEARANCE UR: ABNORMAL
AST SERPL W P-5'-P-CCNC: 13 U/L (ref 10–35)
BACTERIA BLD CULT: NO GROWTH
BILIRUB SERPL-MCNC: <0.2 MG/DL
BILIRUB UR QL STRIP: NEGATIVE
BUN SERPL-MCNC: 19 MG/DL (ref 6–20)
BUN SERPL-MCNC: 19.6 MG/DL (ref 6–20)
C-ANCA TITR SER IF: ABNORMAL {TITER}
CALCIUM SERPL-MCNC: 8.9 MG/DL (ref 8.6–10)
CALCIUM SERPL-MCNC: 9.4 MG/DL (ref 8.6–10)
CHLORIDE SERPL-SCNC: 100 MMOL/L (ref 98–107)
CHLORIDE SERPL-SCNC: 102 MMOL/L (ref 98–107)
COLOR UR AUTO: ABNORMAL
CREAT SERPL-MCNC: 1.18 MG/DL (ref 0.51–0.95)
CREAT SERPL-MCNC: 1.18 MG/DL (ref 0.51–0.95)
CRP SERPL-MCNC: 342 MG/L
DEPRECATED HCO3 PLAS-SCNC: 20 MMOL/L (ref 22–29)
DEPRECATED HCO3 PLAS-SCNC: 22 MMOL/L (ref 22–29)
ERYTHROCYTE [DISTWIDTH] IN BLOOD BY AUTOMATED COUNT: 17 % (ref 10–15)
GFR SERPL CREATININE-BSD FRML MDRD: 66 ML/MIN/1.73M2
GFR SERPL CREATININE-BSD FRML MDRD: 66 ML/MIN/1.73M2
GLUCOSE SERPL-MCNC: 96 MG/DL (ref 70–99)
GLUCOSE SERPL-MCNC: 99 MG/DL (ref 70–99)
GLUCOSE UR STRIP-MCNC: NEGATIVE MG/DL
HCT VFR BLD AUTO: 26.8 % (ref 35–47)
HGB BLD-MCNC: 8.3 G/DL (ref 11.7–15.7)
HGB UR QL STRIP: ABNORMAL
KETONES UR STRIP-MCNC: NEGATIVE MG/DL
LEUKOCYTE ESTERASE UR QL STRIP: NEGATIVE
MCH RBC QN AUTO: 25.9 PG (ref 26.5–33)
MCHC RBC AUTO-ENTMCNC: 31 G/DL (ref 31.5–36.5)
MCV RBC AUTO: 84 FL (ref 78–100)
NITRATE UR QL: NEGATIVE
PATH REPORT.COMMENTS IMP SPEC: NORMAL
PATH REPORT.COMMENTS IMP SPEC: NORMAL
PATH REPORT.FINAL DX SPEC: NORMAL
PATH REPORT.GROSS SPEC: NORMAL
PATH REPORT.MICROSCOPIC SPEC OTHER STN: NORMAL
PATH REPORT.RELEVANT HX SPEC: NORMAL
PH UR STRIP: 6 [PH] (ref 5–7)
PHOSPHATE SERPL-MCNC: 4.7 MG/DL (ref 2.5–4.5)
PHOTO IMAGE: NORMAL
PLAT MORPH BLD: ABNORMAL
PLATELET # BLD AUTO: 462 10E3/UL (ref 150–450)
POTASSIUM SERPL-SCNC: 4.8 MMOL/L (ref 3.4–5.3)
POTASSIUM SERPL-SCNC: 4.8 MMOL/L (ref 3.4–5.3)
PROCALCITONIN SERPL IA-MCNC: 1.38 NG/ML
PROT SERPL-MCNC: 6 G/DL (ref 6.4–8.3)
RADIOLOGIST FLAGS: ABNORMAL
RBC # BLD AUTO: 3.2 10E6/UL (ref 3.8–5.2)
RBC MORPH BLD: ABNORMAL
RBC URINE: 6 /HPF
SODIUM SERPL-SCNC: 134 MMOL/L (ref 136–145)
SODIUM SERPL-SCNC: 135 MMOL/L (ref 136–145)
SP GR UR STRIP: 1.02 (ref 1–1.03)
SQUAMOUS EPITHELIAL: 22 /HPF
TOXIC GRANULES BLD QL SMEAR: PRESENT
TRANSITIONAL EPI: 1 /HPF
UFH PPP CHRO-ACNC: 0.33 IU/ML
UFH PPP CHRO-ACNC: 0.44 IU/ML
UROBILINOGEN UR STRIP-MCNC: NORMAL MG/DL
WBC # BLD AUTO: 25.7 10E3/UL (ref 4–11)
WBC URINE: 3 /HPF

## 2023-02-22 PROCEDURE — 250N000013 HC RX MED GY IP 250 OP 250 PS 637: Performed by: THORACIC SURGERY (CARDIOTHORACIC VASCULAR SURGERY)

## 2023-02-22 PROCEDURE — 250N000013 HC RX MED GY IP 250 OP 250 PS 637: Performed by: STUDENT IN AN ORGANIZED HEALTH CARE EDUCATION/TRAINING PROGRAM

## 2023-02-22 PROCEDURE — 86140 C-REACTIVE PROTEIN: CPT | Performed by: NURSE PRACTITIONER

## 2023-02-22 PROCEDURE — 85520 HEPARIN ASSAY: CPT | Performed by: THORACIC SURGERY (CARDIOTHORACIC VASCULAR SURGERY)

## 2023-02-22 PROCEDURE — 258N000003 HC RX IP 258 OP 636: Performed by: SURGERY

## 2023-02-22 PROCEDURE — 250N000011 HC RX IP 250 OP 636: Performed by: STUDENT IN AN ORGANIZED HEALTH CARE EDUCATION/TRAINING PROGRAM

## 2023-02-22 PROCEDURE — 85027 COMPLETE CBC AUTOMATED: CPT | Performed by: SURGERY

## 2023-02-22 PROCEDURE — 74018 RADEX ABDOMEN 1 VIEW: CPT | Mod: 26 | Performed by: STUDENT IN AN ORGANIZED HEALTH CARE EDUCATION/TRAINING PROGRAM

## 2023-02-22 PROCEDURE — 97535 SELF CARE MNGMENT TRAINING: CPT | Mod: GO | Performed by: OCCUPATIONAL THERAPIST

## 2023-02-22 PROCEDURE — 74018 RADEX ABDOMEN 1 VIEW: CPT

## 2023-02-22 PROCEDURE — 84145 PROCALCITONIN (PCT): CPT | Performed by: NURSE PRACTITIONER

## 2023-02-22 PROCEDURE — 214N000001 HC R&B CCU UMMC

## 2023-02-22 PROCEDURE — 87205 SMEAR GRAM STAIN: CPT | Performed by: NURSE PRACTITIONER

## 2023-02-22 PROCEDURE — 80053 COMPREHEN METABOLIC PANEL: CPT

## 2023-02-22 PROCEDURE — 87040 BLOOD CULTURE FOR BACTERIA: CPT | Performed by: NURSE PRACTITIONER

## 2023-02-22 PROCEDURE — 250N000011 HC RX IP 250 OP 636: Performed by: SURGERY

## 2023-02-22 PROCEDURE — 71046 X-RAY EXAM CHEST 2 VIEWS: CPT | Mod: 26 | Performed by: RADIOLOGY

## 2023-02-22 PROCEDURE — 36415 COLL VENOUS BLD VENIPUNCTURE: CPT

## 2023-02-22 PROCEDURE — 84100 ASSAY OF PHOSPHORUS: CPT | Performed by: SURGERY

## 2023-02-22 PROCEDURE — 36415 COLL VENOUS BLD VENIPUNCTURE: CPT | Performed by: NURSE PRACTITIONER

## 2023-02-22 PROCEDURE — 250N000013 HC RX MED GY IP 250 OP 250 PS 637: Performed by: NURSE PRACTITIONER

## 2023-02-22 PROCEDURE — 250N000011 HC RX IP 250 OP 636: Performed by: NURSE PRACTITIONER

## 2023-02-22 PROCEDURE — 71046 X-RAY EXAM CHEST 2 VIEWS: CPT

## 2023-02-22 PROCEDURE — 97530 THERAPEUTIC ACTIVITIES: CPT | Mod: GO | Performed by: OCCUPATIONAL THERAPIST

## 2023-02-22 PROCEDURE — 82040 ASSAY OF SERUM ALBUMIN: CPT | Performed by: SURGERY

## 2023-02-22 PROCEDURE — 81001 URINALYSIS AUTO W/SCOPE: CPT | Performed by: NURSE PRACTITIONER

## 2023-02-22 RX ORDER — ENOXAPARIN SODIUM 100 MG/ML
100 INJECTION SUBCUTANEOUS EVERY 12 HOURS
Status: DISCONTINUED | OUTPATIENT
Start: 2023-02-22 | End: 2023-02-23

## 2023-02-22 RX ORDER — BISACODYL 10 MG
10 SUPPOSITORY, RECTAL RECTAL DAILY
Status: DISCONTINUED | OUTPATIENT
Start: 2023-02-22 | End: 2023-02-23

## 2023-02-22 RX ORDER — PIPERACILLIN SODIUM, TAZOBACTAM SODIUM 4; .5 G/20ML; G/20ML
4.5 INJECTION, POWDER, LYOPHILIZED, FOR SOLUTION INTRAVENOUS EVERY 6 HOURS
Status: COMPLETED | OUTPATIENT
Start: 2023-02-22 | End: 2023-02-26

## 2023-02-22 RX ADMIN — OXYCODONE HYDROCHLORIDE 10 MG: 10 TABLET ORAL at 20:45

## 2023-02-22 RX ADMIN — MAGNESIUM HYDROXIDE 30 ML: 400 SUSPENSION ORAL at 11:26

## 2023-02-22 RX ADMIN — BISACODYL 10 MG RECTAL SUPPOSITORY 10 MG: at 12:36

## 2023-02-22 RX ADMIN — OXYCODONE HYDROCHLORIDE 10 MG: 10 TABLET ORAL at 11:25

## 2023-02-22 RX ADMIN — HYDROMORPHONE HYDROCHLORIDE 0.4 MG: 0.2 INJECTION, SOLUTION INTRAMUSCULAR; INTRAVENOUS; SUBCUTANEOUS at 10:00

## 2023-02-22 RX ADMIN — FUROSEMIDE 20 MG: 10 INJECTION, SOLUTION INTRAVENOUS at 08:06

## 2023-02-22 RX ADMIN — PIPERACILLIN AND TAZOBACTAM 4.5 G: 4; .5 INJECTION, POWDER, FOR SOLUTION INTRAVENOUS at 12:50

## 2023-02-22 RX ADMIN — ACETAMINOPHEN 650 MG: 325 TABLET ORAL at 21:50

## 2023-02-22 RX ADMIN — ENOXAPARIN SODIUM 100 MG: 100 INJECTION SUBCUTANEOUS at 12:36

## 2023-02-22 RX ADMIN — SENNOSIDES AND DOCUSATE SODIUM 1 TABLET: 50; 8.6 TABLET ORAL at 08:07

## 2023-02-22 RX ADMIN — PIPERACILLIN AND TAZOBACTAM 4.5 G: 4; .5 INJECTION, POWDER, FOR SOLUTION INTRAVENOUS at 19:00

## 2023-02-22 RX ADMIN — OXYCODONE HYDROCHLORIDE 10 MG: 10 TABLET ORAL at 03:38

## 2023-02-22 RX ADMIN — OXYCODONE HYDROCHLORIDE 10 MG: 10 TABLET ORAL at 16:08

## 2023-02-22 RX ADMIN — SENNOSIDES AND DOCUSATE SODIUM 1 TABLET: 50; 8.6 TABLET ORAL at 20:45

## 2023-02-22 RX ADMIN — POLYETHYLENE GLYCOL 3350 17 G: 17 POWDER, FOR SOLUTION ORAL at 08:06

## 2023-02-22 RX ADMIN — Medication 12.5 MG: at 11:25

## 2023-02-22 RX ADMIN — FAMOTIDINE 20 MG: 20 TABLET ORAL at 20:45

## 2023-02-22 RX ADMIN — HYDROMORPHONE HYDROCHLORIDE 0.4 MG: 0.2 INJECTION, SOLUTION INTRAMUSCULAR; INTRAVENOUS; SUBCUTANEOUS at 05:06

## 2023-02-22 RX ADMIN — SODIUM CHLORIDE 50 ML: 900 INJECTION INTRAVENOUS at 21:54

## 2023-02-22 RX ADMIN — Medication 12.5 MG: at 20:45

## 2023-02-22 RX ADMIN — REMDESIVIR 100 MG: 100 INJECTION, POWDER, LYOPHILIZED, FOR SOLUTION INTRAVENOUS at 20:45

## 2023-02-22 RX ADMIN — HYDROMORPHONE HYDROCHLORIDE 0.4 MG: 0.2 INJECTION, SOLUTION INTRAMUSCULAR; INTRAVENOUS; SUBCUTANEOUS at 00:35

## 2023-02-22 RX ADMIN — PRENATAL VIT W/ FE FUMARATE-FA TAB 27-0.8 MG 1 TABLET: 27-0.8 TAB at 08:07

## 2023-02-22 RX ADMIN — FAMOTIDINE 20 MG: 20 TABLET ORAL at 08:07

## 2023-02-22 RX ADMIN — GUAIFENESIN 10 ML: 200 SOLUTION ORAL at 22:01

## 2023-02-22 RX ADMIN — ASPIRIN 81 MG 81 MG: 81 TABLET ORAL at 08:07

## 2023-02-22 RX ADMIN — HEPARIN SODIUM 2100 UNITS/HR: 10000 INJECTION, SOLUTION INTRAVENOUS at 00:35

## 2023-02-22 RX ADMIN — HYDROMORPHONE HYDROCHLORIDE 0.4 MG: 0.2 INJECTION, SOLUTION INTRAMUSCULAR; INTRAVENOUS; SUBCUTANEOUS at 08:06

## 2023-02-22 ASSESSMENT — ACTIVITIES OF DAILY LIVING (ADL)
ADLS_ACUITY_SCORE: 25

## 2023-02-22 NOTE — PLAN OF CARE
"/68 (BP Location: Left arm)   Pulse 105   Temp 98.4  F (36.9  C) (Oral)   Resp 28   Ht 1.626 m (5' 4\")   Wt 105.5 kg (232 lb 8 oz)   LMP 01/10/2023 (Approximate)   SpO2 98%   BMI 39.91 kg/m      Shift: 3155-1554  Reason for Admission: s/p emergency sternotomy, Right Atrial Thrombectomy, Central Cannulation, Bilateral Femoral Cannulation, Closure of PFO with Dr. Elmore on 2/18  VS/Tele: VSS on 2L NC. Afebrile. ST with rates 100-110.  Neuros: A/Ox4, able to make needs known. Calls appropriately  Respiratory: Sats >95% on 2L. URIAS. Tachypnic with activity. Easily desats w/ ambulation  GI/: 1 loose watery stool this shift- suppository and Milk of Mg given. Voiding adequately   Nutrition: Tolerating regular diet. Poor appetite  Drains/Lines: 2x R. PIV- TKO  Activity: Pt up ad drake in room. SBA when needed.  Pain/Nausea: C/o of incisional pain- PRN Oxycodone and IV Dilaudid given.   Skin: Sternal incision- wound VAC @ -125 mmHg  Labs: Elevated WBC and procalcitonin. UA and sputum needs to be collected still. Blood cultures collected?  New this shift: CXR done. Concern for ileus- US of abdomen done. Started on IV Zosyn.    Plan of care: Will continue to monitor and follow POC.     "

## 2023-02-22 NOTE — PLAN OF CARE
End of Shift Summary. See flowsheets for vital signs and detailed assessment.    Changes this shift: Alert and oriented, following all commands. VSS on 2L NC. Adequate UOP after lasix. Pain 7-9/10 this evening managed with prn oxycodone, tylenol, and dilaudid.     Plan: Continue to support pain control.       Goal Outcome Evaluation:      Plan of Care Reviewed With: patient    Overall Patient Progress: no changeOverall Patient Progress: no change

## 2023-02-22 NOTE — PROGRESS NOTES
Cardiovascular Surgery Progress Note  02/22/2023         Assessment and Plan:     Cande Shields is a 22 year old female admitted on 2/17/2023 with PMH of obesity BMI 38, granulomatous polyangiitis, recent PE, prior DVT complicated by PE with possible pregnancy, COVID-19+ with RA thrombus noted on TTE, s/p attempted angiovac for thrombectomy with Dr. Monterroso and s/p emergency sternotomy, Right Atrial Thrombectomy, Central Cannulation, Bilateral Femoral Cannulation, Closure of PFO with Dr. Elmore on 2/18.    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  Normal right ventricular size and function normal.  LVEF 60 to 65%.  Post-op echo 2/18 PM: completed by Anesthesia, Post CPB: normal biventricular function, valves unchanged.  PFO has been closed, no flow evident.  Thrombus previously noted in RAA is no longer present.  No aortic dissection noted.   - ASA 81 mg daily  - No statin indicated  - BB: start metoprolol tartrate 12.5 mg BID with hold parameters for tachycardia  - Diuresis, AC and ID as below    Chest tubes: removed 2/21  TPW: None    Pulmonary:  COVID 19  Concern for CAP vs HAP  Extubated POD 0 to  NC. Now saturating well on room air, has loose cough  - Supplemental O2 PRN to keep sats > 92%. Wean off as tolerated.  - Pulm hygiene, IS, activity and deep breathing  - ID as below  - Post CT pull CXR today and in AM    Neurology / MSK:  Acute post-operative pain  Chronic pain  History of EtOH use disorder  Costochondritis, 2nd ICS; L>R  Pain well controlled with current regimen:  - Acetaminophen, PO oxycodone PRN, IV dilaudid PRN, methocarbamol     / Renal / Fluid / Electrolytes:  CKD3  Hypervolemia  BL creat ~ 1.4-1.5, most recent creatinine stable; adequate UOP.   FLUID  STATUS: Pre-op weight 220 lbs, weight today 232 lbs; I/O past 24 hours: -1.3L  - Diuresis: Continue Lasix 20 mg IV daily  - Replete lytes per protocol  - Strict I/O, daily weights  - Avoid/limit nephrotoxins as able    Rheumatology  Granulomatous polyangiitis with renal, upper respiratory, musculoskeletal, and cutaneous involvement  PR3 positive ANCA vasculitis  Dx of granulomatous polyangiitis in 2020, and she has completed induction therapy with prednisone and rituximab  - Rheum consult in place  - No indication for steroids currently given no current flare, not currently taking steroids (however, per M, steroids ok in pregnancy if necessary/indicated)  - CRP every 3 days    GI / Nutrition:   Concern for ileus  CXR demonstrates dilated loops of bowel c/f ileus. Abd soft and non tender but has not had BM yet, not ambulating much d/t iso. Has not been passing gas.  - Abd xray  - Daily dulcolax  - Encourage ambulation, hydration and monitor clinically for now  - Tolerating regular diet  - Continue bowel regimen, no BM yet    Endocrine:  Stress induced hyperglycemia   - Initially managed on insulin drip postop, transitioned to sliding scale; goal BG <180 for optimal healing    Infectious Disease:  Stress induced leukocytosis  COVID-19  RML pneumonia  Strep anginosis - strep throat  WBC 25.7 and trending up, remains afebrile, procal positive and continues to have loose cough  All cultures sent thus far with NGTD  - Completed perioperative antibiotics  - Had planned Ceftriaxone for 10 day course for strep throat, broaden to Zosyn on 2/22 given increasing leukocytosis and procal  - Per ID, Remdesevir 200 mg IV today and 100 mg IV daily x 4 days   - Continue to monitor fever curve, CBC  - Repeat pan cultures on 2/22 given worsening leukocytosis  - Procal in AM to trend    Hematology:   Acute blood loss anemia  Hgb 8.3; Plt 26.8, no signs or symptoms of active bleeding  - CBC in AM    Prior PE, occlusion of  CFA     Acute PE, bilateral lobar R, seg and subseg left/R   Hx of DVT  Concern for HIT, resolved  - Continue heparin drip   - Positive lupus anticoagulant  - HIT negative  Recs per heme:   -Transition heparin gtt back to lovenox 100 mg BID today,   -check lovenox anti-Xa 4-6 hours after 3rd lovenox dose (goal antiXa 0.8-1.2, slightly higher range due to pregnancy)   -Isolated positive lupus anticoagulant  was checked while on anticoagulation, does not change current management, no further testing needed now  -Follow-up scheduled with Dr. Dye 3/8/23 in [facility]    Obstetrics and Gynecology  Possible Pregnancy,   Bicornuate uterus  - MFM Consult recommendations in place, appreciated, see detailed consult note with recommendations outlined by Dr. Fox 23  - Possible 5 weeks pregnant per ?LMP; with IUFD at 30 weeks and 6 days.  - Detailed recommendations, summarized as follows:               - Neurological: avoid NSAIDs               - CV: OK for Heparin and Lovenox, avoid DOAC/Warfarin, with recommended  SBP<130/80, OK for Beta Blockers, avoid ACE/ARB/statins               - Endocrine: Goal BG < 120               - ID: OK for Penicillin and Cephalosporin               - Obstetrics: TV U/S to evaluate pregnancy               - Radiological consideration: Limit radiation, use shielding               - In General: Will avoid teratogenic medications  When she is going to be discharged, please page MFM team during the day so they can coordinate outpatient OB care with MFM clinic at [facility].     MSK/Skin:  Sternotomy  Surgical incision  - Sternal precautions  - Incisional cares per protocol    Prophylaxis:   - Stress ulcer prophylaxis: Pantoprazole 40 mg daily  - DVT prophylaxis: IV heparin, SCD    Disposition:   - Transferred to  on   - Therapies recommending discharge to home when medically stable    Dr. Elmore has been  informed of changes through both verbal and written communication.      LILIA Michaud, ACN-AG, CCRN  Nurse Practitioner  Cardiothoracic Surgery  Pager: [phone]        Interval History:     No acute events overnight. States pain is well managed on current regimen, slept well. Breathing is stable on room air, loose cough persists, working with IS. Tolerating diet, is not passing flatus, no BM, no n/v, abdominal pain or distention. Denies chest pain, palpitations, dizziness, syncopal symptoms, chills, myalgias, sternal popping/clicking.         Physical Exam:     Gen: A&Ox4, NAD  Neuro: Intact with no focal deficits   CV: RRR, normal S1 S2, no murmurs, rubs or gallops. no JVD  Pulm: CTA, no wheezing or rhonchi, normal breathing on 3 lpm  Abd: nondistended, rare BS, soft, nontender  Ext: + peripheral edema, mild pitting  Incision: clean, dry, intact, no erythema, sternum stable  Tubes/drain sites: dressing clean and dry         Data:    Imaging:  reviewed recent imaging, no acute concerns    Recent Results (from the past 24 hour(s))   XR Chest Port 1 View    Narrative    Exam: XR CHEST PORT 1 VIEW, 2/21/2023 1:47 PM    Comparison: Chest x-ray 2/19/2023    History: s/p CT removal    Findings:  Single portable AP view of the chest. Interval removal of chest tubes.  Stable post surgical changes of the chest with intact median  sternotomy wires. Trachea is midline. Cardiac silhouette is stable.  Dense retrocardiac opacification. Increased right basilar rounded  opacity. Otherwise stable mixed bilateral interstitial and airspace  opacities. Stable small left pleural effusion. Trace right apical  pneumothorax. The visualized upper abdomen is unremarkable.      Impression    Impression:   1. Removal of bilateral chest tubes. Trace right apical pneumothorax.  2. Increased right basilar rounded opacification, may represent  infection.  3. Stable mixed bilateral interstitial and airspace opacities,  likely  atelectasis/edema however cannot rule out infection.  4. Stable small left pleural effusion.    I have personally reviewed the examination and initial interpretation  and I agree with the findings.    CRISTHIAN TANG MD         SYSTEM ID:  [id number]   XR Chest Port 1 View    Narrative    Exam: XR CHEST PORT 1 VIEW, 2/21/2023 11:07 PM    Comparison: 2/21/2023 at 1339    History: SOB    Findings:  Single AP portable semiupright view of the chest. Intact median  sternotomy wires.    Trachea is midline. Unchanged cardiac silhouette. Dense retrocardiac  opacities. Stable mixed interstitial and airspace opacities. Unchanged  right basilar rounded opacity. Trace right apical pneumothorax. No  left pneumothorax. Small left pleural effusion. No significant right  pleural effusion. Silhouetting of the left hemidiaphragm. The upper  abdomen is unremarkable.      Impression    Impression:   1. Unchanged trace right apical pneumothorax.  2. Unchanged right basilar rounded opacity concerning for infection.  3. Unchanged bilateral mixed interstitial and airspace opacities  likely represents atelectasis/edema, however cannot rule out  infection.  4. Lower lobe atelectasis with small effusion. Portion of this appears  to be loculated.    I have personally reviewed the examination and initial interpretation  and I agree with the findings.    DELILAH VILLARREAL MD         SYSTEM ID:  [id number]   XR Chest 2 Views    Narrative    Exam: XR CHEST 2 VIEWS, 2/22/2023 9:44 AM    Indication: chest tube removal s/p right atrial thrombectomy and  closure of PFO    Comparison: Chest radiograph 2/21/2023    Findings:   PA and lateral chest radiograph. Intact median sternotomy wires.  Trachea is midline. Unchanged cardiac silhouette. Dense retrocardiac  opacities similar to slightly increased. Similar to slightly improved  mixed interstitial and airspace opacities. Improved right basilar  rounded opacity. Decreased right apical pneumothorax. No  left  pneumothorax. Small left pleural effusion. No significant right  pleural effusion. Silhouetting of the left hemidiaphragm. There are  some gas-filled loops of bowel in the left upper quadrant, not  significantly distended. No acute osseous abnormalities.      Impression    Impression:   1.  Stable postsurgical changes.  2.  Decreased right apical pneumothorax.  3.  Improved right basilar opacity and similar to slightly improved  mixed interstitial and airspace opacities, likely atelectasis/edema.  Dense retrocardiac opacity similar to slightly increased.  4.  Stable small left pleural effusion.    KINJAL REDDY MD         SYSTEM ID:  [id number]        Labs:  Recent Labs   Lab 02/22/23  0108 02/21/23  2211 02/21/23  1725 02/21/23  1042 02/21/23  0542 02/20/23  0814 02/20/23  0411 02/19/23  1141 02/19/23  1104 02/18/23  2205 02/18/23  1941 02/18/23  1939 02/18/23  1740   WBC  --   --   --   --  18.9*  --  20.5*  --  18.0*   < > 29.7*  --   --    HGB  --   --   --   --  7.2*  --  7.7*  --  8.2*   < > 9.4*  --   --    MCV  --   --   --   --  84  --  84  --  82   < > 82  --   --    PLT  --   --   --   --  316  --  251  --  231   < > 241  --  144*   INR  --   --   --   --   --   --   --   --  1.32*  --  1.26*  --  1.26*   *  --   --   --  134*  --  135*  --   --    < > 137  --   --    POTASSIUM 4.8  --   --   --  4.6  --  4.5  --   --    < > 5.0  --   --    CHLORIDE 102  --   --   --  104  --  104  --   --    < > 104  --   --    CO2 22  --   --   --  21*  --  21*  --   --    < > 17*  --   --    BUN 19.6  --   --   --  19.3  --  19.5  --   --    < > 20.3*  --   --    CR 1.18*  --   --   --  1.16*  --  1.17*  --   --    < > 1.10*  --   --    ANIONGAP 11  --   --   --  9  --  10  --   --    < > 16*  --   --    KRAIG 8.9  --   --   --  8.3*  --  8.0*  --   --    < > 8.4*  --   --    GLC 99 91 112*   < > 98   < > 98   < >  --    < > 188*   < >  --    ALBUMIN  --   --   --   --  2.3*  --  2.5*  --   --    < > 2.7*   --   --    PROTTOTAL  --   --   --   --  5.1*  --  4.9*  --   --    < > 5.2*  --   --    BILITOTAL  --   --   --   --  <0.2  --  <0.2  --   --    < > 0.2  --   --    ALKPHOS  --   --   --   --  114*  --  83  --   --    < > 93  --   --    ALT  --   --   --   --  11  --  11  --   --    < > 13  --   --    AST  --   --   --   --  13  --  19  --   --    < > 25  --   --     < > = values in this interval not displayed.      GLUCOSE:   Recent Labs   Lab 02/22/23  0108 02/21/23  2211 02/21/23  1725 02/21/23  1042 02/21/23  0542 02/20/23 2217   GLC 99 91 112* 93 98 85

## 2023-02-23 ENCOUNTER — APPOINTMENT (OUTPATIENT)
Dept: OCCUPATIONAL THERAPY | Facility: CLINIC | Age: 23
End: 2023-02-23
Payer: COMMERCIAL

## 2023-02-23 LAB
ALBUMIN SERPL BCG-MCNC: 2.5 G/DL (ref 3.5–5.2)
ALP SERPL-CCNC: 204 U/L (ref 35–104)
ALT SERPL W P-5'-P-CCNC: 13 U/L (ref 10–35)
ANION GAP SERPL CALCULATED.3IONS-SCNC: 13 MMOL/L (ref 7–15)
AST SERPL W P-5'-P-CCNC: 14 U/L (ref 10–35)
BACTERIA BLD CULT: NO GROWTH
BACTERIA TISS BX CULT: NO GROWTH
BILIRUB SERPL-MCNC: 0.2 MG/DL
BUN SERPL-MCNC: 21.7 MG/DL (ref 6–20)
CALCIUM SERPL-MCNC: 8.5 MG/DL (ref 8.6–10)
CHLORIDE SERPL-SCNC: 101 MMOL/L (ref 98–107)
CREAT SERPL-MCNC: 1.46 MG/DL (ref 0.51–0.95)
CRP SERPL-MCNC: 229 MG/L
DEPRECATED HCO3 PLAS-SCNC: 22 MMOL/L (ref 22–29)
ERYTHROCYTE [DISTWIDTH] IN BLOOD BY AUTOMATED COUNT: 16.9 % (ref 10–15)
GFR SERPL CREATININE-BSD FRML MDRD: 51 ML/MIN/1.73M2
GLUCOSE SERPL-MCNC: 100 MG/DL (ref 70–99)
HCT VFR BLD AUTO: 22.5 % (ref 35–47)
HGB BLD-MCNC: 6.8 G/DL (ref 11.7–15.7)
HGB BLD-MCNC: 7.3 G/DL (ref 11.7–15.7)
LMWH PPP CHRO-ACNC: 1.22 IU/ML
MCH RBC QN AUTO: 25.6 PG (ref 26.5–33)
MCHC RBC AUTO-ENTMCNC: 30.2 G/DL (ref 31.5–36.5)
MCV RBC AUTO: 85 FL (ref 78–100)
PHOSPHATE SERPL-MCNC: 4.6 MG/DL (ref 2.5–4.5)
PLATELET # BLD AUTO: 472 10E3/UL (ref 150–450)
POTASSIUM SERPL-SCNC: 4.7 MMOL/L (ref 3.4–5.3)
PROCALCITONIN SERPL IA-MCNC: 2.04 NG/ML
PROT SERPL-MCNC: 5.5 G/DL (ref 6.4–8.3)
RBC # BLD AUTO: 2.66 10E6/UL (ref 3.8–5.2)
SODIUM SERPL-SCNC: 136 MMOL/L (ref 136–145)
WBC # BLD AUTO: 14.7 10E3/UL (ref 4–11)

## 2023-02-23 PROCEDURE — 80053 COMPREHEN METABOLIC PANEL: CPT | Performed by: SURGERY

## 2023-02-23 PROCEDURE — 36415 COLL VENOUS BLD VENIPUNCTURE: CPT | Performed by: SURGERY

## 2023-02-23 PROCEDURE — 250N000013 HC RX MED GY IP 250 OP 250 PS 637: Performed by: STUDENT IN AN ORGANIZED HEALTH CARE EDUCATION/TRAINING PROGRAM

## 2023-02-23 PROCEDURE — 97535 SELF CARE MNGMENT TRAINING: CPT | Mod: GO

## 2023-02-23 PROCEDURE — 250N000013 HC RX MED GY IP 250 OP 250 PS 637: Performed by: THORACIC SURGERY (CARDIOTHORACIC VASCULAR SURGERY)

## 2023-02-23 PROCEDURE — 84100 ASSAY OF PHOSPHORUS: CPT | Performed by: SURGERY

## 2023-02-23 PROCEDURE — 85018 HEMOGLOBIN: CPT | Performed by: PHYSICIAN ASSISTANT

## 2023-02-23 PROCEDURE — 250N000013 HC RX MED GY IP 250 OP 250 PS 637: Performed by: NURSE PRACTITIONER

## 2023-02-23 PROCEDURE — 85027 COMPLETE CBC AUTOMATED: CPT | Performed by: SURGERY

## 2023-02-23 PROCEDURE — 86140 C-REACTIVE PROTEIN: CPT | Performed by: PHYSICIAN ASSISTANT

## 2023-02-23 PROCEDURE — 250N000011 HC RX IP 250 OP 636

## 2023-02-23 PROCEDURE — 36415 COLL VENOUS BLD VENIPUNCTURE: CPT | Performed by: PHYSICIAN ASSISTANT

## 2023-02-23 PROCEDURE — 84145 PROCALCITONIN (PCT): CPT | Performed by: NURSE PRACTITIONER

## 2023-02-23 PROCEDURE — 250N000011 HC RX IP 250 OP 636: Performed by: NURSE PRACTITIONER

## 2023-02-23 PROCEDURE — 85520 HEPARIN ASSAY: CPT | Performed by: THORACIC SURGERY (CARDIOTHORACIC VASCULAR SURGERY)

## 2023-02-23 PROCEDURE — 36415 COLL VENOUS BLD VENIPUNCTURE: CPT | Performed by: THORACIC SURGERY (CARDIOTHORACIC VASCULAR SURGERY)

## 2023-02-23 PROCEDURE — 250N000013 HC RX MED GY IP 250 OP 250 PS 637: Performed by: PHYSICIAN ASSISTANT

## 2023-02-23 PROCEDURE — 250N000011 HC RX IP 250 OP 636: Performed by: SURGERY

## 2023-02-23 PROCEDURE — 214N000001 HC R&B CCU UMMC

## 2023-02-23 RX ORDER — HYDROMORPHONE HCL IN WATER/PF 6 MG/30 ML
0.2 PATIENT CONTROLLED ANALGESIA SYRINGE INTRAVENOUS
Status: DISCONTINUED | OUTPATIENT
Start: 2023-02-23 | End: 2023-02-24

## 2023-02-23 RX ORDER — AMOXICILLIN 250 MG
1 CAPSULE ORAL 2 TIMES DAILY
Status: DISCONTINUED | OUTPATIENT
Start: 2023-02-23 | End: 2023-02-27 | Stop reason: HOSPADM

## 2023-02-23 RX ORDER — ASPIRIN 81 MG/1
81 TABLET, CHEWABLE ORAL DAILY
Status: DISCONTINUED | OUTPATIENT
Start: 2023-02-24 | End: 2023-02-27 | Stop reason: HOSPADM

## 2023-02-23 RX ORDER — ENOXAPARIN SODIUM 100 MG/ML
80 INJECTION SUBCUTANEOUS EVERY 12 HOURS
Status: DISCONTINUED | OUTPATIENT
Start: 2023-02-23 | End: 2023-02-26

## 2023-02-23 RX ORDER — BISACODYL 10 MG
10 SUPPOSITORY, RECTAL RECTAL DAILY
Status: COMPLETED | OUTPATIENT
Start: 2023-02-24 | End: 2023-02-24

## 2023-02-23 RX ORDER — FUROSEMIDE 40 MG
40 TABLET ORAL
Status: DISCONTINUED | OUTPATIENT
Start: 2023-02-23 | End: 2023-02-24

## 2023-02-23 RX ORDER — GUAIFENESIN 600 MG/1
600 TABLET, EXTENDED RELEASE ORAL 2 TIMES DAILY
Status: DISCONTINUED | OUTPATIENT
Start: 2023-02-23 | End: 2023-02-27 | Stop reason: HOSPADM

## 2023-02-23 RX ORDER — FAMOTIDINE 20 MG/1
20 TABLET, FILM COATED ORAL 2 TIMES DAILY
Status: DISCONTINUED | OUTPATIENT
Start: 2023-02-23 | End: 2023-02-27 | Stop reason: HOSPADM

## 2023-02-23 RX ORDER — POLYETHYLENE GLYCOL 3350 17 G/17G
17 POWDER, FOR SOLUTION ORAL DAILY
Status: DISCONTINUED | OUTPATIENT
Start: 2023-02-24 | End: 2023-02-23

## 2023-02-23 RX ORDER — POLYETHYLENE GLYCOL 3350 17 G/17G
17 POWDER, FOR SOLUTION ORAL 2 TIMES DAILY
Status: DISCONTINUED | OUTPATIENT
Start: 2023-02-23 | End: 2023-02-27 | Stop reason: HOSPADM

## 2023-02-23 RX ADMIN — OXYCODONE HYDROCHLORIDE 10 MG: 10 TABLET ORAL at 04:14

## 2023-02-23 RX ADMIN — SENNOSIDES AND DOCUSATE SODIUM 1 TABLET: 50; 8.6 TABLET ORAL at 20:52

## 2023-02-23 RX ADMIN — HYDROMORPHONE HYDROCHLORIDE 0.2 MG: 0.2 INJECTION, SOLUTION INTRAMUSCULAR; INTRAVENOUS; SUBCUTANEOUS at 20:52

## 2023-02-23 RX ADMIN — HYDROMORPHONE HYDROCHLORIDE 0.2 MG: 0.2 INJECTION, SOLUTION INTRAMUSCULAR; INTRAVENOUS; SUBCUTANEOUS at 23:01

## 2023-02-23 RX ADMIN — HYDROMORPHONE HYDROCHLORIDE 0.2 MG: 0.2 INJECTION, SOLUTION INTRAMUSCULAR; INTRAVENOUS; SUBCUTANEOUS at 05:57

## 2023-02-23 RX ADMIN — ENOXAPARIN SODIUM 100 MG: 100 INJECTION SUBCUTANEOUS at 00:12

## 2023-02-23 RX ADMIN — PIPERACILLIN AND TAZOBACTAM 4.5 G: 4; .5 INJECTION, POWDER, FOR SOLUTION INTRAVENOUS at 12:14

## 2023-02-23 RX ADMIN — OXYCODONE HYDROCHLORIDE 10 MG: 10 TABLET ORAL at 09:48

## 2023-02-23 RX ADMIN — PIPERACILLIN AND TAZOBACTAM 4.5 G: 4; .5 INJECTION, POWDER, FOR SOLUTION INTRAVENOUS at 00:13

## 2023-02-23 RX ADMIN — GUAIFENESIN 10 ML: 200 SOLUTION ORAL at 09:49

## 2023-02-23 RX ADMIN — ENOXAPARIN SODIUM 80 MG: 80 INJECTION SUBCUTANEOUS at 23:08

## 2023-02-23 RX ADMIN — HYDROMORPHONE HYDROCHLORIDE 0.2 MG: 0.2 INJECTION, SOLUTION INTRAMUSCULAR; INTRAVENOUS; SUBCUTANEOUS at 10:38

## 2023-02-23 RX ADMIN — ENOXAPARIN SODIUM 100 MG: 100 INJECTION SUBCUTANEOUS at 09:49

## 2023-02-23 RX ADMIN — FAMOTIDINE 20 MG: 20 TABLET ORAL at 20:52

## 2023-02-23 RX ADMIN — GUAIFENESIN 600 MG: 600 TABLET ORAL at 12:13

## 2023-02-23 RX ADMIN — OXYCODONE HYDROCHLORIDE 10 MG: 10 TABLET ORAL at 20:52

## 2023-02-23 RX ADMIN — OXYCODONE HYDROCHLORIDE 10 MG: 10 TABLET ORAL at 00:12

## 2023-02-23 RX ADMIN — PIPERACILLIN AND TAZOBACTAM 4.5 G: 4; .5 INJECTION, POWDER, FOR SOLUTION INTRAVENOUS at 08:05

## 2023-02-23 RX ADMIN — FUROSEMIDE 20 MG: 10 INJECTION, SOLUTION INTRAVENOUS at 08:17

## 2023-02-23 RX ADMIN — ASPIRIN 81 MG 81 MG: 81 TABLET ORAL at 08:08

## 2023-02-23 RX ADMIN — HYDROMORPHONE HYDROCHLORIDE 0.2 MG: 0.2 INJECTION, SOLUTION INTRAMUSCULAR; INTRAVENOUS; SUBCUTANEOUS at 12:26

## 2023-02-23 RX ADMIN — HYDROMORPHONE HYDROCHLORIDE 0.2 MG: 0.2 INJECTION, SOLUTION INTRAMUSCULAR; INTRAVENOUS; SUBCUTANEOUS at 08:05

## 2023-02-23 RX ADMIN — PRENATAL VIT W/ FE FUMARATE-FA TAB 27-0.8 MG 1 TABLET: 27-0.8 TAB at 08:08

## 2023-02-23 RX ADMIN — ACETAMINOPHEN 650 MG: 325 TABLET ORAL at 05:27

## 2023-02-23 RX ADMIN — GUAIFENESIN 600 MG: 600 TABLET ORAL at 20:52

## 2023-02-23 RX ADMIN — FUROSEMIDE 40 MG: 40 TABLET ORAL at 15:12

## 2023-02-23 RX ADMIN — POLYETHYLENE GLYCOL 3350 17 G: 17 POWDER, FOR SOLUTION ORAL at 08:28

## 2023-02-23 RX ADMIN — FAMOTIDINE 20 MG: 20 TABLET ORAL at 08:08

## 2023-02-23 RX ADMIN — PIPERACILLIN AND TAZOBACTAM 4.5 G: 4; .5 INJECTION, POWDER, FOR SOLUTION INTRAVENOUS at 18:04

## 2023-02-23 RX ADMIN — SENNOSIDES AND DOCUSATE SODIUM 1 TABLET: 50; 8.6 TABLET ORAL at 08:08

## 2023-02-23 RX ADMIN — HYDROMORPHONE HYDROCHLORIDE 0.2 MG: 0.2 INJECTION, SOLUTION INTRAMUSCULAR; INTRAVENOUS; SUBCUTANEOUS at 18:13

## 2023-02-23 RX ADMIN — Medication 12.5 MG: at 08:08

## 2023-02-23 RX ADMIN — Medication 12.5 MG: at 20:53

## 2023-02-23 RX ADMIN — HYDROMORPHONE HYDROCHLORIDE 0.2 MG: 0.2 INJECTION, SOLUTION INTRAMUSCULAR; INTRAVENOUS; SUBCUTANEOUS at 15:18

## 2023-02-23 ASSESSMENT — ACTIVITIES OF DAILY LIVING (ADL)
ADLS_ACUITY_SCORE: 25

## 2023-02-23 NOTE — TELEPHONE ENCOUNTER
RN called and notified pt, pt currently admitted for PE. RN sent Chelaile message with scheduling number for US.    Anu Agarwal RN on 2/23/2023 at 9:05 AM

## 2023-02-23 NOTE — TELEPHONE ENCOUNTER
That date is ok  I will place the ultrasound order and this may be scheduled, hopefully same day as her appointment.   Antonio Sorensen MD

## 2023-02-23 NOTE — PLAN OF CARE
Cardiac: VSS  Resp: Nasal Cannula 2 LPM - 1LPM at end of shift  Neuro: A&Ox4  Pain: managed with PRN oxy and tylenol  GI/: continent to bowel and bladder, oliguric   Activity: Assist x1 and With standard walker  LDA: RPIV x2      Pt complained of incisional pain with little relief from oxy and tylenol (9/10). Team paged and IV dilaudid ordered. Weaned down to 1LPM O2

## 2023-02-23 NOTE — PLAN OF CARE
[id number]:  Reason for Admission: s/p emergency sternotomy, Right Atrial Thrombectomy, Central Cannulation, Bilateral Femoral Cannulation, Closure of PFO with Dr. Elmore on 2/18  VS/Tele: Afebrile. -110. BPs a bit low earlier this afternoon, 117/65 at 1600.  Neuros: A/Ox4.  Respiratory: Sats >95% on 2L. URIAS. Tachypneic with activity. Easily desats w/ ambulation  GI/: No loose stools since end of day shift. Voiding adequately   Nutrition: Switched to clear liquids this afternoon, d/t possible ileus. Poor appetite  Drains/Lines: 2x R PIV.  Activity: Pt up ad drake in room. SBA when needed.  Pain/Nausea: C/o of incisional pain- PRN Oxycodone 10mg x1.  Skin: Sternal incision- wound VAC @ -125 mmHg  Labs: Elevated WBC and procalcitonin. UA still needed. Sputum specimen sent to lab..    Plan of care: Continue to monitor and follow POC.

## 2023-02-23 NOTE — PROGRESS NOTES
Care Management Follow Up    Length of Stay (days): 6    Expected Discharge Date: 02/24/2023     Concerns to be Addressed: f/u with pt questions on applying for SSDI      Patient plan of care discussed at interdisciplinary rounds: Yes    Anticipated Discharge Disposition: Home     Anticipated Discharge Services:  TBD  Anticipated Discharge DME:  TBD      Additional Information:  JOSSE spoke with pt via phone call d/t pt being on COVID precautions to follow up with pt about questions on applying for SSDI. Pt confirmed that she was interested in applying for SSDI and looking for assistance with the process. SW provided pt with information on organization called [employer] who can answer pt questions about eligibility, the process, etc and can assist individuals with applying for SSDI. SW asked if pt would be interested in brochure and pt confirmed. JOSSE explained that JOSSE would see if Bedside RN or aide could bring it into pt's room. JOSSE requested pt follow up with SW if there were other questions after reviewing brochure. Pt had no other questions for JOSSE at this time.     JOSSE spoke with Bedside RN (Esperanza) about conversation with pt and requested she give pt the brochure when in the room next. Esperanza requested SW bring brochure to  nursing station and she would pick it up and give to pt. JOSSE dropped off brochure at the nursing station.     Ann Lacy, RACQUEL  [facility]   [facility]

## 2023-02-23 NOTE — PLAN OF CARE
D: Admitted s/p sternotomy, closure of PFO on 2/18  PMH of obesity, granulomatous polyangiitis, recent PT, prior DVT c/b possible pregnancy, COVID 19 w/RA thrombus on TTE     I: Monitored vitals and assessed pt status.     PRN: Dilaudid     A: A0x4. Afebrile. VSS. Soft BPs. HRs 90s. Sinus rhythm. Sats >92% on 1L NC. Pt denies any shortness of breath, chest pain/palpitations, nausea, dizziness, or chills. Dyspnea on exertion. Frequent cough, no sputum collected. Wound vac in place on sternal incision. Dilaudid given for incisional pain. Groin site WNL. Hgb 6.8, recheck of 7.3. Pt on regular diet. Poor appetite. Pt up SBA.      P: Discharge pending medically stability. Continue to monitor pt status and notify CVTS treatment team with any changes or concerns.       Goal Outcome Evaluation:     Plan of Care Reviewed With: patient    Overall Patient Progress: no change

## 2023-02-23 NOTE — PROGRESS NOTES
Cardiovascular Surgery Progress Note  02/23/2023         Assessment and Plan:     Cande Shields is a 22 year old female admitted on 2/17/2023 with PMH of obesity BMI 38, granulomatous polyangiitis, recent PE, prior DVT complicated by PE with possible pregnancy, COVID-19+ with RA thrombus noted on TTE, s/p attempted angiovac for thrombectomy with Dr. Monterroso and s/p emergency sternotomy, Right Atrial Thrombectomy, Central Cannulation, Bilateral Femoral Cannulation, Closure of PFO with Dr. Elmore on 2/18.      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  Normal right ventricular size and function normal.  LVEF 60 to 65%.  Post-op echo 2/18 PM: completed by Anesthesia, Post CPB: normal biventricular function, valves unchanged.  PFO has been closed, no flow evident.  Thrombus previously noted in RAA is no longer present.  No aortic dissection noted.   - ASA 81 mg daily  - No statin indicated  - BB: metoprolol tartrate 12.5 mg BID with hold parameters for bradycardia  - Diuresis, AC and ID as below.     Chest tubes: removed 2/21  TPW: None     Pulmonary:  COVID 19 +  Concern for CAP vs HAP  Extubated POD 0 to  NC. Now saturating well on 2 lpm, has loose cough  - Supplemental O2 PRN to keep sats > 92%. Wean off as tolerated.  - Pulm hygiene, IS, activity and deep breathing. Mucinex BID x 5 days.   - ID as below  - CXR with stable LLL opacity vs small effusion      Neurology / MSK:  Acute post-operative pain  Chronic pain  History of EtOH use disorder  Costochondritis, 2nd ICS; L>R  Pain well controlled with current regimen:  - Acetaminophen, PO oxycodone PRN, IV dilaudid PRN, methocarbamol      / Renal / Fluid / Electrolytes:  CKD 3  Hypervolemia  BL creat ~ 1.4-1.5, most recent  creatinine 1.46; adequate UOP.   FLUID STATUS: Pre-op weight 220 lbs  - Diuresis: Stop IV due to creatinine bump, started Lasix 40 mg PO BID 2/23 afternoon.  - Replete lytes per protocol  - Strict I/O, daily weights  - Avoid/limit nephrotoxins as able     Rheumatology  Granulomatous polyangiitis with renal, upper respiratory, musculoskeletal, and cutaneous involvement  PR3 positive ANCA vasculitis  Dx of granulomatous polyangiitis in 2020, and she has completed induction therapy with prednisone and rituximab.  - Rheum consult 2/18;   - No indication for steroids currently given no current flare, not currently taking steroids (however, per MFM, steroids ok in pregnancy if necessary/indicated)  - CRP every 3 days     GI / Nutrition:   Concern for ileus  CXR demonstrates dilated loops of bowel c/f ileus. Abd soft and non tender but has not had BM yet, not ambulating much d/t iso. Had not been passing gas.  - Abd xray 2/22 with air-filled bowel, non dilated.   - Continue bowel regimen. BID senokot and BID miralax  - Encourage ambulation, hydration and monitor clinically.  - Resume regular diet 2/23 now that + flatus and + BM 2/22 evening after suppository.     Endocrine:  Stress induced hyperglycemia   - Initially managed on insulin drip postop, transitioned to sliding scale; goal BG <180 for optimal healing     Infectious Disease:  Stress induced leukocytosis  COVID-19  RML pneumonia  Strep anginosis - strep throat  WBC 14.7 and trending down, remains afebrile, procal positive and continues to have loose cough.   Surgical cultures thus far with NGTD.   - Completed perioperative antibiotics. Continue to monitor fever curve, CBC  - Had planned Ceftriaxone for 10 day course for strep throat, broaden to Zosyn on 2/22 given increasing leukocytosis and procal.  - Per ID, Remdesevir 200 mg IV and 100 mg IV daily x 4 days   - Repeat pan cultures on 2/22 given worsening leukocytosis, NGTD.   - Procal trending up 2.04 (1.38).  CRP trending down 229 (342).      Hematology:   Acute blood loss anemia  Hgb 6.8; Plt 472, no signs or symptoms of active bleeding. Recheck Hgb 7.3 on .  - CBC in AM     Prior PE, occlusion of CFA, 2021     Acute PE, bilateral lobar R, seg and subseg left/R, 2023  Hx of DVT  Concern for HIT, resolved  - Heparin drip initially used post-operatively.   - Positive lupus anticoagulant  - HIT negative  Recs per heme:   -Transition heparin gtt back to lovenox 100 mg BID on   -check lovenox anti-Xa 4-6 hours after 3rd lovenox dose (goal antiXa 0.8-1.2, slightly higher range due to pregnancy)   -Isolated positive lupus anticoagulant  was checked while on anticoagulation, does not change current management, no further testing needed now  -Follow-up scheduled with Dr. Dye 3/8/23 in [facility]     Obstetrics and Gynecology  Hx HELLP with spontaneous late   Possible Pregnancy,   Bicornuate uterus  - MFM Consult recommendations in place, appreciated, see detailed consult note with recommendations outlined by Dr. Fox 23  - Possible 5 weeks pregnant per ?LMP; with IUFD at 30 weeks and 6 days.  - Detailed recommendations, summarized as follows:            - Neurological: avoid NSAIDs                - CV: OK for Heparin and Lovenox, avoid DOAC/Warfarin, with recommended         SBP<130/80, OK for Beta Blockers, avoid ACE/ARB/statins            - Endocrine: Goal BG < 120            - ID: OK for Penicillin and Cephalosporin            - Obstetrics: TV U/S to evaluate pregnancy            - Radiological consideration: Limit radiation, use shielding            - In General: Will avoid teratogenic medications  When she is going to be discharged, please page MFM team during the day so they can coordinate outpatient OB care with MFM clinic at [facility].      MSK/Skin:  Granulomatosis polyangitis  Sternotomy  Surgical incision  -  "Sternal precautions  - Incisional cares per protocol     Prophylaxis:   - Stress ulcer prophylaxis: Pantoprazole 40 mg daily  - DVT prophylaxis: IV heparin, SCD     Disposition:   - Transferred to  on 2/20  - Therapies recommending discharge to home when medically stable    Discussed with Dr Elmore through verbal communication during AM rounds.    Kevin Dinero PA-C  Cardiothoracic Surgery  Pager [phone]    8:49 AM   February 23, 2023        Interval History:     No overnight events.  Still with rattling cough but not always productive cough. Hemoptysis resolved. No fevers or new pain.    States pain is well managed on current regimen. Slept well overnight.  Tolerating diet but not eating much, is passing flatus, + BM last night with suppostory. No nausea or vomiting, no bloating.   Breathing well without complaints on 2 lpm.    Working with therapies and ambulating in room (COVID +) with assistance.   Denies chest pain, palpitations, dizziness, syncopal symptoms, fevers, chills, myalgias, or sternal popping/clicking.         Physical Exam:   Blood pressure 116/70, pulse 95, temperature 98.3  F (36.8  C), temperature source Oral, resp. rate 18, height 1.626 m (5' 4\"), weight 102.8 kg (226 lb 11.2 oz), last menstrual period 01/10/2023, SpO2 98 %, not currently breastfeeding.  Vitals:    02/17/23 1633 02/21/23 0451 02/23/23 0411   Weight: 99.8 kg (220 lb) 105.5 kg (232 lb 8 oz) 102.8 kg (226 lb 11.2 oz)      Weight; + 6 lbs since admit and trending down.   24 hr Fluid status; net loss 1.8 mL. UOP 2.2 L  MAPs: 75 - 85     Gen: A&Ox4, NAD  Neuro: no focal deficits   CV: RRR, normal S1 S2, no murmurs, rubs or gallops.   Pulm: CTA, no wheezing or rhonchi but productive cough, normal breathing on 2 lpm  Abd: nondistended, normal BS, soft, nontender  Ext: no peripheral edema, bilateral groins with venous sutures.  Incision: vac in place with suction intact, no erythema, sternum stable  Tubes/drain sites: dressing " clean and dry         Data:    Imaging:  reviewed recent imaging, no acute concerns    Labs:  BMP  Recent Labs   Lab 02/23/23  1030 02/22/23  0950 02/22/23  0108 02/21/23  2211 02/21/23  1042 02/21/23  0542    134* 135*  --   --  134*   POTASSIUM 4.7 4.8 4.8  --   --  4.6   CHLORIDE 101 100 102  --   --  104   KRAIG 8.5* 9.4 8.9  --   --  8.3*   CO2 22 20* 22  --   --  21*   BUN 21.7* 19.0 19.6  --   --  19.3   CR 1.46* 1.18* 1.18*  --   --  1.16*   * 96 99 91   < > 98    < > = values in this interval not displayed.     CBC  Recent Labs   Lab 02/23/23  1030 02/22/23  0950 02/21/23  0542 02/20/23  0411   WBC 14.7* 25.7* 18.9* 20.5*   RBC 2.66* 3.20* 2.81* 2.97*   HGB 6.8* 8.3* 7.2* 7.7*   HCT 22.5* 26.8* 23.5* 24.9*   MCV 85 84 84 84   MCH 25.6* 25.9* 25.6* 25.9*   MCHC 30.2* 31.0* 30.6* 30.9*   RDW 16.9* 17.0* 17.2* 17.2*   * 462* 316 251     INR  Recent Labs   Lab 02/19/23  1104 02/18/23  1941 02/18/23  1740   INR 1.32* 1.26* 1.26*      Hepatic Panel  Recent Labs   Lab 02/23/23  1030 02/22/23  0950 02/21/23  0542 02/20/23  0411   AST 14 13 13 19   ALT 13 12 11 11   ALKPHOS 204* 160* 114* 83   BILITOTAL 0.2 <0.2 <0.2 <0.2   ALBUMIN 2.5* 2.7* 2.3* 2.5*     GLUCOSE:   Recent Labs   Lab 02/23/23  1030 02/22/23  0950 02/22/23  0108 02/21/23  2211 02/21/23  1725 02/21/23  1042   * 96 99 91 112* 93

## 2023-02-23 NOTE — PHARMACY-ANTICOAGULATION SERVICE
"Clinical Pharmacy Note- Enoxaparin Anti-Xa Evaluation for ADULT Patients    Date of Service 2023  Patient's  2000   Patient's age:  22 year old  Patient's Weight used for enoxaparin dosin.8 kg (226 lb 11.2 oz)  Patient's BMI: Body mass index is 38.91 kg/m .   Enoxaparin Indication: DVT/PE treatment  Goal LMWH anti-Xa level for ADULT patients: Other 0.8-1.2 international unit(s)/mL  Current Enoxaparin Regimen: 1 mg/kg subcutaneous Q 12 hours     Creatinine for last 3 days:   Creatinine   Date Value Ref Range Status   2023 1.46 (H) 0.51 - 0.95 mg/dL Final   2023 1.18 (H) 0.51 - 0.95 mg/dL Final   2023 1.18 (H) 0.51 - 0.95 mg/dL Final   2021 1.38 (H) 0.52 - 1.04 mg/dL Final   2021 1.42 (H) 0.52 - 1.04 mg/dL Final   2021 1.39 (H) 0.52 - 1.04 mg/dL Final      Current estimated CrCL:  Serum creatinine: 1.46 mg/dL (H) 23 1030  Estimated creatinine clearance: 69.3 mL/min (A)     Platelet count for last 3 days:   Platelet Count   Date Value Ref Range Status   2023 472 (H) 150 - 450 10e3/uL Final   2023 462 (H) 150 - 450 10e3/uL Final   2023 316 150 - 450 10e3/uL Final   2021 353 150 - 450 10e9/L Final   2021 343 150 - 450 10e9/L Final   2021 351 150 - 450 10e9/L Final      Recent Enoxaparin anti-Xa Levels (past 3 days):   Recent Labs     23  1420   ALMWH 1.22   ]    ASSESSMENT OF LEVELS AND CURRENT REGIMEN:   1) Enoxaparin anti-XA level was drawn 4.5 hours post 3 dose pre steady state.    2) Current LMWH anti-Xa peak level is: Supra-therapeutic (HIGH)    3) Renal Function:  Scr changed > 50% in last 72 hours? No (but rising)   Urine Output: Good    4) Platelet Counts have been assessed and are: Stable    RECOMMENDATIONS:    1) Enoxaparin Regimen/Dose Plan: Decrease dose to 80 mg SQ q12 hours   2) Recheck Enoxaparin anti-Xa levels: 4 hours after the 4th dose of the \"new\" regimen:  2023 after AM dose      The " pharmacist will continue to monitor and follow enoxaparin LMWH anti-Xa levels as needed.      Please contact pharmacy if enoxaparin needs to be held for a procedure or if goal levels change.    Norma Krueger RPH, Pharm.D

## 2023-02-24 ENCOUNTER — APPOINTMENT (OUTPATIENT)
Dept: PHYSICAL THERAPY | Facility: CLINIC | Age: 23
End: 2023-02-24
Payer: COMMERCIAL

## 2023-02-24 LAB
ALBUMIN SERPL BCG-MCNC: 2.6 G/DL (ref 3.5–5.2)
ALP SERPL-CCNC: 253 U/L (ref 35–104)
ALT SERPL W P-5'-P-CCNC: 14 U/L (ref 10–35)
ANION GAP SERPL CALCULATED.3IONS-SCNC: 13 MMOL/L (ref 7–15)
AST SERPL W P-5'-P-CCNC: 16 U/L (ref 10–35)
BACTERIA SPT CULT: NORMAL
BILIRUB SERPL-MCNC: 0.2 MG/DL
BUN SERPL-MCNC: 20.8 MG/DL (ref 6–20)
CALCIUM SERPL-MCNC: 9 MG/DL (ref 8.6–10)
CHLORIDE SERPL-SCNC: 98 MMOL/L (ref 98–107)
CREAT SERPL-MCNC: 1.54 MG/DL (ref 0.51–0.95)
DEPRECATED HCO3 PLAS-SCNC: 24 MMOL/L (ref 22–29)
ERYTHROCYTE [DISTWIDTH] IN BLOOD BY AUTOMATED COUNT: 16.6 % (ref 10–15)
GFR SERPL CREATININE-BSD FRML MDRD: 48 ML/MIN/1.73M2
GLUCOSE SERPL-MCNC: 78 MG/DL (ref 70–99)
GRAM STAIN RESULT: NORMAL
HCT VFR BLD AUTO: 24.1 % (ref 35–47)
HGB BLD-MCNC: 7.3 G/DL (ref 11.7–15.7)
LMWH PPP CHRO-ACNC: 1.05 IU/ML
MCH RBC QN AUTO: 25.7 PG (ref 26.5–33)
MCHC RBC AUTO-ENTMCNC: 30.3 G/DL (ref 31.5–36.5)
MCV RBC AUTO: 85 FL (ref 78–100)
PHOSPHATE SERPL-MCNC: 4.3 MG/DL (ref 2.5–4.5)
PLATELET # BLD AUTO: 518 10E3/UL (ref 150–450)
POTASSIUM SERPL-SCNC: 4.7 MMOL/L (ref 3.4–5.3)
PROT SERPL-MCNC: 5.7 G/DL (ref 6.4–8.3)
RBC # BLD AUTO: 2.84 10E6/UL (ref 3.8–5.2)
SODIUM SERPL-SCNC: 135 MMOL/L (ref 136–145)
WBC # BLD AUTO: 10.7 10E3/UL (ref 4–11)

## 2023-02-24 PROCEDURE — 84100 ASSAY OF PHOSPHORUS: CPT | Performed by: SURGERY

## 2023-02-24 PROCEDURE — 250N000011 HC RX IP 250 OP 636: Performed by: PHYSICIAN ASSISTANT

## 2023-02-24 PROCEDURE — 36415 COLL VENOUS BLD VENIPUNCTURE: CPT | Performed by: THORACIC SURGERY (CARDIOTHORACIC VASCULAR SURGERY)

## 2023-02-24 PROCEDURE — 250N000011 HC RX IP 250 OP 636: Performed by: NURSE PRACTITIONER

## 2023-02-24 PROCEDURE — 214N000001 HC R&B CCU UMMC

## 2023-02-24 PROCEDURE — 85027 COMPLETE CBC AUTOMATED: CPT | Performed by: SURGERY

## 2023-02-24 PROCEDURE — 250N000011 HC RX IP 250 OP 636

## 2023-02-24 PROCEDURE — 36415 COLL VENOUS BLD VENIPUNCTURE: CPT | Performed by: SURGERY

## 2023-02-24 PROCEDURE — 97530 THERAPEUTIC ACTIVITIES: CPT | Mod: GP

## 2023-02-24 PROCEDURE — 250N000013 HC RX MED GY IP 250 OP 250 PS 637: Performed by: STUDENT IN AN ORGANIZED HEALTH CARE EDUCATION/TRAINING PROGRAM

## 2023-02-24 PROCEDURE — 85520 HEPARIN ASSAY: CPT | Performed by: THORACIC SURGERY (CARDIOTHORACIC VASCULAR SURGERY)

## 2023-02-24 PROCEDURE — 80053 COMPREHEN METABOLIC PANEL: CPT | Performed by: SURGERY

## 2023-02-24 PROCEDURE — 250N000013 HC RX MED GY IP 250 OP 250 PS 637: Performed by: NURSE PRACTITIONER

## 2023-02-24 PROCEDURE — 250N000011 HC RX IP 250 OP 636: Performed by: SURGERY

## 2023-02-24 PROCEDURE — 97116 GAIT TRAINING THERAPY: CPT | Mod: GP

## 2023-02-24 PROCEDURE — 250N000013 HC RX MED GY IP 250 OP 250 PS 637: Performed by: PHYSICIAN ASSISTANT

## 2023-02-24 RX ORDER — FUROSEMIDE 20 MG
20 TABLET ORAL
Status: DISCONTINUED | OUTPATIENT
Start: 2023-02-24 | End: 2023-02-27 | Stop reason: HOSPADM

## 2023-02-24 RX ORDER — HYDROMORPHONE HCL IN WATER/PF 6 MG/30 ML
0.2 PATIENT CONTROLLED ANALGESIA SYRINGE INTRAVENOUS EVERY 4 HOURS PRN
Status: DISCONTINUED | OUTPATIENT
Start: 2023-02-24 | End: 2023-02-27

## 2023-02-24 RX ADMIN — POLYETHYLENE GLYCOL 3350 17 G: 17 POWDER, FOR SOLUTION ORAL at 08:07

## 2023-02-24 RX ADMIN — FAMOTIDINE 20 MG: 20 TABLET ORAL at 19:48

## 2023-02-24 RX ADMIN — OXYCODONE HYDROCHLORIDE 10 MG: 10 TABLET ORAL at 04:54

## 2023-02-24 RX ADMIN — PIPERACILLIN AND TAZOBACTAM 4.5 G: 4; .5 INJECTION, POWDER, FOR SOLUTION INTRAVENOUS at 01:03

## 2023-02-24 RX ADMIN — HYDROMORPHONE HYDROCHLORIDE 0.2 MG: 0.2 INJECTION, SOLUTION INTRAMUSCULAR; INTRAVENOUS; SUBCUTANEOUS at 10:45

## 2023-02-24 RX ADMIN — GUAIFENESIN 600 MG: 600 TABLET ORAL at 19:48

## 2023-02-24 RX ADMIN — OXYCODONE HYDROCHLORIDE 10 MG: 10 TABLET ORAL at 14:46

## 2023-02-24 RX ADMIN — ENOXAPARIN SODIUM 80 MG: 80 INJECTION SUBCUTANEOUS at 10:45

## 2023-02-24 RX ADMIN — HYDROMORPHONE HYDROCHLORIDE 0.2 MG: 0.2 INJECTION, SOLUTION INTRAMUSCULAR; INTRAVENOUS; SUBCUTANEOUS at 01:02

## 2023-02-24 RX ADMIN — HYDROMORPHONE HYDROCHLORIDE 0.2 MG: 0.2 INJECTION, SOLUTION INTRAMUSCULAR; INTRAVENOUS; SUBCUTANEOUS at 08:08

## 2023-02-24 RX ADMIN — PIPERACILLIN AND TAZOBACTAM 4.5 G: 4; .5 INJECTION, POWDER, FOR SOLUTION INTRAVENOUS at 06:55

## 2023-02-24 RX ADMIN — HYDROMORPHONE HYDROCHLORIDE 0.2 MG: 0.2 INJECTION, SOLUTION INTRAMUSCULAR; INTRAVENOUS; SUBCUTANEOUS at 04:55

## 2023-02-24 RX ADMIN — Medication 12.5 MG: at 19:47

## 2023-02-24 RX ADMIN — SENNOSIDES AND DOCUSATE SODIUM 1 TABLET: 50; 8.6 TABLET ORAL at 08:07

## 2023-02-24 RX ADMIN — HYDROMORPHONE HYDROCHLORIDE 0.2 MG: 0.2 INJECTION, SOLUTION INTRAMUSCULAR; INTRAVENOUS; SUBCUTANEOUS at 18:21

## 2023-02-24 RX ADMIN — OXYCODONE HYDROCHLORIDE 10 MG: 10 TABLET ORAL at 01:02

## 2023-02-24 RX ADMIN — ENOXAPARIN SODIUM 80 MG: 80 INJECTION SUBCUTANEOUS at 21:24

## 2023-02-24 RX ADMIN — ASPIRIN 81 MG 81 MG: 81 TABLET ORAL at 08:07

## 2023-02-24 RX ADMIN — Medication 12.5 MG: at 08:07

## 2023-02-24 RX ADMIN — FAMOTIDINE 20 MG: 20 TABLET ORAL at 08:07

## 2023-02-24 RX ADMIN — OXYCODONE HYDROCHLORIDE 10 MG: 10 TABLET ORAL at 19:48

## 2023-02-24 RX ADMIN — PRENATAL VIT W/ FE FUMARATE-FA TAB 27-0.8 MG 1 TABLET: 27-0.8 TAB at 08:07

## 2023-02-24 RX ADMIN — PIPERACILLIN AND TAZOBACTAM 4.5 G: 4; .5 INJECTION, POWDER, FOR SOLUTION INTRAVENOUS at 18:05

## 2023-02-24 RX ADMIN — FUROSEMIDE 40 MG: 40 TABLET ORAL at 08:07

## 2023-02-24 RX ADMIN — GUAIFENESIN 600 MG: 600 TABLET ORAL at 08:07

## 2023-02-24 RX ADMIN — PIPERACILLIN AND TAZOBACTAM 4.5 G: 4; .5 INJECTION, POWDER, FOR SOLUTION INTRAVENOUS at 12:58

## 2023-02-24 RX ADMIN — FUROSEMIDE 20 MG: 20 TABLET ORAL at 16:04

## 2023-02-24 ASSESSMENT — ACTIVITIES OF DAILY LIVING (ADL)
ADLS_ACUITY_SCORE: 25

## 2023-02-24 NOTE — TELEPHONE ENCOUNTER
RECORDS RECEIVED FROM:   DATE RECEIVED:   NOTES STATUS DETAILS   OFFICE NOTE from referring provider    Internal Hospital f/u   OFFICE NOTE from other cardiologist    Internal  March 8-11-21   SUMMARY from hospital/ED   Internal 2-17-23   MEDICATION LIST   Internal    DIAGNOSTIC PROCEDURES     EKG   Internal 2-19-23   Monitor Reports   N/A    IMAGING (DISC & REPORT)      Echo   Internal 2-18-23   Stress Tests   N/A    Cath   N/A    MRI/MRA   N/A    CT/CTA   Internal 2-12-23     Action 2/24/23 [facility]  Fax #[phone]   Action Taken Requested 2-14-23 EKG    Sent to scanning, placed in Dr. John rightfax folder 3/2     Action 2/24/23 [facility]  Fax #[phone]   Action Taken Requested:    EKG Strips     CTA Chest   4-7-22 4-7-22     Sent to scanning, placed in Dr. John rightfax folder. Sent second request for CTA chest 3/2    Resolved CT in PACS 3/9

## 2023-02-24 NOTE — PLAN OF CARE
Physical Therapy Discharge Summary    Reason for therapy discharge:    All goals and outcomes met, no further needs identified.    Progress towards therapy goal(s). See goals on Care Plan in [facility] electronic health record for goal details.  Goals met    Therapy recommendation(s):    Continued therapy is recommended.  Rationale/Recommendations:  OP Cardiac rehab recommended for progression of cardiac capacity.

## 2023-02-24 NOTE — PLAN OF CARE
Goal Outcome Evaluation:      Plan of Care Reviewed With: patient    Overall Patient Progress: improvingOverall Patient Progress: improving    Outcome Evaluation: Pt reports appetite is improving. Pt asked to discontinue Ensure Enlive, wants to focus on adequate PO with meals as appetite improves. See RD note 2/24 for details.    Clarice Terrazas RDN, LD  6C RD pager: [phone]  Weekend/Holiday RD pager: [phone]

## 2023-02-24 NOTE — PLAN OF CARE
D: Admitted s/p sternotomy, closure of PFO on 2/18  PMH of obesity, granulomatous polyangiitis, recent PT, prior DVT c/b possible pregnancy, COVID 19 w/RA thrombus on TTE     I: Monitored vitals and assessed pt status.     PRN: Dilaudid, Oxycodone     A: A0x4. Afebrile. VSS. Soft BPs. HRs 90s. Sinus rhythm. Sats >92% on 1L NC. Pt denies any shortness of breath, chest pain/palpitations, nausea, dizziness, or chills. Frequent congestive cough. Wound vac in place on sternal incision. Oxycodone and Dilaudid given for incisional pain w/relief.  Groin site WNL. Pt on regular diet. Poor appetite. Pt up SBA.      P: Discharge pending medical stability. Continue to monitor pt status and notify CVTS treatment team with any changes or concerns.         Goal Outcome Evaluation:     Plan of Care Reviewed With: patient    Overall Patient Progress: no change

## 2023-02-24 NOTE — PROGRESS NOTES
"CLINICAL NUTRITION SERVICES - ASSESSMENT NOTE     Nutrition Prescription    RECOMMENDATIONS FOR MDs/PROVIDERS TO ORDER:  None currently     Malnutrition Status:    Patient does not meet two of the established criteria necessary for diagnosing malnutrition    Recommendations already ordered by Registered Dietitian (RD):    Discontinue Ensure Enlive, pt prefers to focus more on meals w/ appetite improving    Continue daily prenatal multivitamin with iron     Future/Additional Recommendations:    monitor for increased PO     REASON FOR ASSESSMENT  Cande Shields is a/an 22 year old female assessed by the dietitian for LOS    Patient admitted for cough and L sided chest pain. s/p thrombectomy    MEDICAL HISTORY  granulomatous polyangiitis, CKD 3, prior etoh use disorder, chronic pain, recent PE, prior DVT complicated by PE, COVID-19+ with RA thrombus; current pregnancy (6w3d; ).    NUTRITION HISTORY  Pt reports decreased appetite PTA d/t pain, feeling ill.   Pt reports appetite has improved throughout admission.     CURRENT NUTRITION ORDERS  Diet: Regular    Intake/Tolerance: Patient consuming 25-75 % of 0-2 meal(s) daily    LABS   23 10:30 23 06:13   Sodium 136 135 (L)   Potassium 4.7 4.7   Calcium 8.5 (L) 9.0   Phosphorus 4.6 (H) 4.3   CRP Inflammation 229.00 (H)        MEDICATIONS    Lasix    miralax BID    Prenatal multivitamin w/ iron    Senna BID    ANTHROPOMETRICS  Height: 162.6 cm (5' 4\")  Most Recent Weight: 102.8 kg (226 lb 11.2 oz)    IBW: 54.5 kg   BMI: Obesity Grade II BMI 35-39.9  Weight History: No significant weight loss noted.  Wt Readings from Last 15 Encounters:   23 102.8 kg (226 lb 11.2 oz)   23 101.8 kg (224 lb 6.4 oz)   02/10/23 100.7 kg (222 lb)   23 102.5 kg (226 lb)   23 101.5 kg (223 lb 11.2 oz)   23 102.7 kg (226 lb 8 oz)   23 101.2 kg (223 lb 3.2 oz)   01/10/23 100.7 kg (221 lb 14.4 oz)   22 98.4 kg (217 lb)   22 119.7 kg " (264 lb)   10/07/21 115.7 kg (255 lb)   09/08/21 120.2 kg (265 lb)   08/25/21 120.6 kg (265 lb 12.8 oz)   08/11/21 121.1 kg (267 lb)   07/09/21 116.6 kg (257 lb)     104.3 kg (230 lb) 10/31/2022      104.3 kg (230 lb) 08/03/2022      99.8 kg (220 lb) 04/07/2022         ASSESSED NUTRITION NEEDS  Dosing Weight: 54.5 kg (IBW)   Estimated Energy Needs: 7737-9773 kcals/day (30 - 35 kcals/kg )  Justification: Obese  Estimated Protein Needs: 65-82 grams protein/day (1.2 - 1.5 grams of pro/kg)  Justification: Obese  Estimated Fluid Needs: 0508-4076 mL/day (1 mL/kcal)   Justification: Maintenance    PHYSICAL FINDINGS  See malnutrition section below.    MALNUTRITION  % Intake: Decreased intake does not meet criteria  % Weight Loss: Weight loss does not meet criteria for malnutrition   Subcutaneous Fat Loss: None observed   Muscle Loss: None observed  Fluid Accumulation/Edema: None noted  Malnutrition Diagnosis: Patient does not meet two of the established criteria necessary for diagnosing malnutrition    NUTRITION DIAGNOSIS  Inadequate energy intake related to pain, poor appetite as evidenced by patient self report, ordering 0-2 meals per day with intake of 75% of less of meals.       INTERVENTIONS  Implementation  Introduced role of RD   Encouragement to meet estimated needs     Goals  Patient to consume % of nutritionally adequate meal trays TID, or the equivalent with supplements/snacks.        Monitoring/Evaluation  Progress toward goals will be monitored and evaluated per protocol.    Clarice Terrazas RDN, LD  6C RD pager: [phone]  Weekend/Holiday RD pager: [phone]

## 2023-02-24 NOTE — PROGRESS NOTES
Cardiovascular Surgery Progress Note  02/24/2023         Assessment and Plan:     Cande Shields is a 22 year old female admitted on 2/17/2023 with PMH of obesity BMI 38, granulomatous polyangiitis, recent PE, prior DVT complicated by PE with possible pregnancy, COVID-19+ with RA thrombus noted on TTE, s/p attempted angiovac for thrombectomy with Dr. Monterroso and s/p emergency sternotomy, Right Atrial Thrombectomy, Central Cannulation, Bilateral Femoral Cannulation, Closure of PFO with Dr. Elmore on 2/18.      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  Normal right ventricular size and function normal.  LVEF 60 to 65%.  Post-op echo 2/18 PM: completed by Anesthesia, Post CPB: normal biventricular function, valves unchanged.  PFO has been closed, no flow evident.  Thrombus previously noted in RAA is no longer present.  No aortic dissection noted.   - ASA 81 mg daily  - No statin indicated  - BB: metoprolol tartrate 12.5 mg BID with hold parameters for bradycardia  - Diuresis, AC and ID as below.     Chest tubes: removed 2/21  TPW: None     Pulmonary:  COVID 19 +  Concern for CAP vs HAP  Extubated POD 0 to  NC. Now saturating well on 2 lpm, has loose cough  - Supplemental O2 PRN to keep sats > 92%. Wean off as tolerated.  - Pulm hygiene, IS, activity and deep breathing. Mucinex BID x 5 days.   - ID as below  - CXR with stable LLL opacity vs small effusion      Neurology / MSK:  Acute post-operative pain  Chronic pain  History of EtOH use disorder  Costochondritis, 2nd ICS; L>R  Pain well controlled with current regimen:  - Acetaminophen, PO oxycodone PRN, IV dilaudid PRN, methocarbamol      / Renal / Fluid / Electrolytes:  CKD 3  Hypervolemia  BL creat ~ 1.4-1.5, most recent  creatinine 1.54; adequate UOP.   FLUID STATUS: Pre-op weight 220 lbs  - Diuresis: Stopped IV due to creatinine bump, started Lasix 40 mg PO BID 2/23 afternoon and reduced to 20 mg PO BID 2/24 afternoon.  - Replete lytes per protocol  - Strict I/O, daily weights  - Avoid/limit nephrotoxins as able     Rheumatology  Granulomatous polyangiitis with renal, upper respiratory, musculoskeletal, and cutaneous involvement  PR3 positive ANCA vasculitis  Dx of granulomatous polyangiitis in 2020, and she has completed induction therapy with prednisone and rituximab.  - Rheum consult 2/18;   - No indication for steroids currently given no current flare, not currently taking steroids (however, per [facility], steroids ok in pregnancy if necessary/indicated)  - CRP every 3 days     GI / Nutrition:   Concern for ileus, resolving  CXR demonstrates dilated loops of bowel c/f ileus. Abd soft and non tender but has not had BM yet, not ambulating much d/t iso. Had not been passing gas.  - Abd x-ray 2/22 with air-filled bowel, non dilated.   - Continue bowel regimen. BID senokot and BID miralax  - Encourage ambulation, hydration and monitor clinically.  - Resume regular diet 2/23 now that + flatus and + BM 2/22 evening after suppository.     Endocrine:  Stress induced hyperglycemia   - Initially managed on insulin drip postop, transitioned to sliding scale; goal BG <180 for optimal healing     Infectious Disease:  Stress induced leukocytosis  COVID-19  RML pneumonia  Strep anginosis - strep throat  WBC 10.7 and trending down, remains afebrile, procal positive and continues to have loose cough.   Surgical cultures thus far with NGTD.   - Completed perioperative antibiotics. Continue to monitor fever curve, CBC  - Had planned Ceftriaxone for 10 day course for strep throat, broaden to Zosyn on 2/22 - 2/26 given increasing leukocytosis and procal.  - Per ID, Remdesevir 200 mg IV and 100 mg IV daily x 4 days   - Repeat pan cultures on 2/22 given  worsening leukocytosis, NGTD.   - Procal trending up 2.04 (1.38). CRP trending down 229 (342).      Hematology:   Acute blood loss anemia  Hgb 7.3; Plt 518, no signs or symptoms of active bleeding.  - CBC in AM     Prior PE, occlusion of CFA, 2021     Acute PE, bilateral lobar R, seg and subseg left/R, Fe2023  Hx of DVT  Concern for HIT, resolved  - Heparin drip initially used post-operatively.   - Positive lupus anticoagulant  - HIT negative  Recs per heme:   -Transition heparin gtt back to lovenox 80 mg BID on   -check lovenox anti-Xa 4-6 hours after 3rd lovenox dose (goal antiXa 0.8-1.2, slightly higher range due to pregnancy)   -Isolated positive lupus anticoagulant  was checked while on anticoagulation, does not change current management, no further testing needed now  -Follow-up scheduled with Dr. Dye 3/8/23 in [facility])     Obstetrics and Gynecology  Hx HELLP with spontaneous late   Possible Pregnancy,   Bicornuate uterus  - MFM Consult recommendations in place, appreciated, see detailed consult note with recommendations outlined by Dr. Fox 23. Please page MFM team at [phone] if Q's.  - Possible 5 weeks pregnant per ?LMP; with IUFD at 30 weeks and 6 days.  - Detailed recommendations, summarized as follows:            - Neurological: avoid NSAIDs                - CV: OK for Heparin and Lovenox, avoid DOAC/Warfarin, with recommended         SBP<130/80, OK for Beta Blockers, avoid ACE/ARB/statins            - Endocrine: Goal BG < 120            - ID: OK for Penicillin and Cephalosporin            - Obstetrics: TV U/S to evaluate pregnancy            - Radiological consideration: Limit radiation, use shielding            - In General: Will avoid teratogenic medications  When she is going to be discharged, please page MFM team during the day so they can coordinate outpatient OB care with MFM clinic at [facility]  "Building.      MSK/Skin:  Granulomatosis polyangitis  Sternotomy  Surgical incision  - Sternal precautions  - Incisional cares per protocol     Prophylaxis:   - Stress ulcer prophylaxis: Pantoprazole 40 mg daily  - DVT prophylaxis: IV heparin, SCD     Disposition:   - Transferred to  on 2/20  - Therapies recommending discharge to home when medically stable  - Hematology: Follow-up scheduled with Dr. Dye 3/8/23 [facility]    Discussed with Dr Hutton through verbal communication during AM rounds.    Kevin Dinero PA-C  Cardiothoracic Surgery  Pager [phone]    12:48 PM   February 24, 2023        Interval History:     No overnight events. RN reporting IV dilaudid q 2 hours per Pt request.     States pain is well managed on current regimen. Slept well overnight.  Tolerating diet, is passing flatus, last BM 2 days ago. No nausea or vomiting.  Breathing well without complaints but still annoying cough.   Working with therapies and ambulating in room with assistance.   Denies chest pain, palpitations, dizziness, syncopal symptoms, fevers, chills, myalgias, or sternal popping/clicking.         Physical Exam:   Blood pressure 105/56, pulse 85, temperature 98.2  F (36.8  C), resp. rate 18, height 1.626 m (5' 4\"), weight 102.8 kg (226 lb 11.2 oz), last menstrual period 01/10/2023, SpO2 92 %, not currently breastfeeding.  Vitals:    02/17/23 1633 02/21/23 0451 02/23/23 0411   Weight: 99.8 kg (220 lb) 105.5 kg (232 lb 8 oz) 102.8 kg (226 lb 11.2 oz)      Weight; + 6 lbs since admit and trending down.   24 hr Fluid status; net loss 870 mL. UOP 1.5 L (+ 3 unrecorded)  MAPs: 74 - 92     Gen: A&Ox4, NAD  Neuro: no focal deficits, drowsy but arousable   CV: RRR, normal S1 S2, no murmurs, rubs or gallops.   Pulm: CTA, no wheezing or rhonchi, normal breathing on RA, non-productive rattling cough  Abd: nondistended, normal BS, soft, nontender  Ext: no peripheral edema  Incision: " wound vac dressing intact, no erythema, sternum stable  Tubes/drain sites: dressing clean and dry         Data:    Imaging:  reviewed recent imaging, no acute concerns    Labs:  BMP  Recent Labs   Lab 02/24/23  0613 02/23/23  1030 02/22/23  0950 02/22/23  0108   * 136 134* 135*   POTASSIUM 4.7 4.7 4.8 4.8   CHLORIDE 98 101 100 102   KRAIG 9.0 8.5* 9.4 8.9   CO2 24 22 20* 22   BUN 20.8* 21.7* 19.0 19.6   CR 1.54* 1.46* 1.18* 1.18*   GLC 78 100* 96 99     CBC  Recent Labs   Lab 02/24/23  0613 02/23/23  1202 02/23/23  1030 02/22/23  0950 02/21/23  0542   WBC 10.7  --  14.7* 25.7* 18.9*   RBC 2.84*  --  2.66* 3.20* 2.81*   HGB 7.3* 7.3* 6.8* 8.3* 7.2*   HCT 24.1*  --  22.5* 26.8* 23.5*   MCV 85  --  85 84 84   MCH 25.7*  --  25.6* 25.9* 25.6*   MCHC 30.3*  --  30.2* 31.0* 30.6*   RDW 16.6*  --  16.9* 17.0* 17.2*   *  --  472* 462* 316     INR  Recent Labs   Lab 02/19/23  1104 02/18/23  1941 02/18/23  1740   INR 1.32* 1.26* 1.26*      Hepatic Panel  Recent Labs   Lab 02/24/23  0613 02/23/23  1030 02/22/23  0950 02/21/23  0542   AST 16 14 13 13   ALT 14 13 12 11   ALKPHOS 253* 204* 160* 114*   BILITOTAL 0.2 0.2 <0.2 <0.2   ALBUMIN 2.6* 2.5* 2.7* 2.3*     GLUCOSE:   Recent Labs   Lab 02/24/23  0613 02/23/23  1030 02/22/23  0950 02/22/23  0108 02/21/23  2211 02/21/23  1725   GLC 78 100* 96 99 91 112*

## 2023-02-24 NOTE — PHARMACY-ANTICOAGULATION SERVICE
Clinical Pharmacy Note- Enoxaparin Anti-Xa Evaluation for ADULT Patients    Date of Service 2023  Patient's  2000   Patient's age:  22 year old  Patient's Weight used for enoxaparin dosin.8 kg (226 lb 11.2 oz)  Patient's BMI: Body mass index is 38.91 kg/m .   Enoxaparin Indication: DVT/PE treatment  Goal LMWH anti-Xa level for ADULT patients: Other 0.8-1.2 IU/mL (slightly higher due to pregnancy, per hematology recommendation  Current Enoxaparin Regimen: Other 80 mg Q12hh    Creatinine for last 3 days:   Creatinine   Date Value Ref Range Status   2023 1.54 (H) 0.51 - 0.95 mg/dL Final   2023 1.46 (H) 0.51 - 0.95 mg/dL Final   2023 1.18 (H) 0.51 - 0.95 mg/dL Final   2021 1.38 (H) 0.52 - 1.04 mg/dL Final   2021 1.42 (H) 0.52 - 1.04 mg/dL Final   2021 1.39 (H) 0.52 - 1.04 mg/dL Final      Current estimated CrCL:  Serum creatinine: 1.54 mg/dL (H) 23 0613  Estimated creatinine clearance: 65.7 mL/min (A)     Platelet count for last 3 days:   Platelet Count   Date Value Ref Range Status   2023 518 (H) 150 - 450 10e3/uL Final   2023 472 (H) 150 - 450 10e3/uL Final   2023 462 (H) 150 - 450 10e3/uL Final   2021 353 150 - 450 10e9/L Final   2021 343 150 - 450 10e9/L Final   2021 351 150 - 450 10e9/L Final      Recent Enoxaparin anti-Xa Levels (past 3 days):   Recent Labs     23  1506   ALMWH 1.05   ]    ASSESSMENT OF LEVELS AND CURRENT REGIMEN:   1) Enoxaparin anti-XA level was drawn 4.3 hours post-dose pre-steady state.    2) Current LMWH anti-Xa peak level is: AT GOAL    3) Renal Function:  Scr changed > 50% in last 72 hours? No  Urine Output: Good    4) Platelet Counts have been assessed and are: decreasing    RECOMMENDATIONS:      1) Enoxaparin Regimen/Dose Plan: NO change  2) Recheck Enoxaparin anti-Xa levels: No recheck necessary at this time.      The pharmacist will continue to monitor and follow enoxaparin  LMWH anti-Xa levels as needed.      Please contact pharmacy if enoxaparin needs to be held for a procedure or if goal levels change.    Latanya Irving, PharmD

## 2023-02-24 NOTE — PLAN OF CARE
"Status [id number]    D: Admitted 2/17/23 for worsening cough and pleuritic chest pain found to have RA thrombus s/p attempted angiovac followed by emergency sternotomy, RA thrombectomy, central cannulation, bilateral femoral cannulation, and closure of PFO.     /67 (BP Location: Right arm)   Pulse 94   Temp 98.4  F (36.9  C) (Oral)   Resp 18   Ht 1.626 m (5' 4\")   Wt 102.8 kg (226 lb 11.2 oz)   LMP 01/10/2023 (Approximate)   SpO2 91%   BMI 38.91 kg/m      A/I:  Cardiac: SR on tele, HR in 90's. Tachy at times up to 110bpm.  LDA: Wound vac on sternal incision to 125mmHg. Old CT sites covered with dressing.   Pain: Continues to report severe pain at incision site, managed with PRN oxycodone and dilaudid.    A&O x4. Up to bathroom SBA. Currently satting >90% on room air. Dyspnea with activity, 1L NC applied intermittently. Congested cough. Remains on IV abx.     P: Continue with plan of care, report changes to CVTS.    "

## 2023-02-24 NOTE — PROGRESS NOTES
Antimicrobial Stewardship Team Note    Antimicrobial Stewardship Program - A joint venture between [facility] and  Physicians to optimize antibiotic management.  NOT a formal consult - Restricted Antimicrobial Review     Patient: Cande Shields  MRN: [id number]  Allergies: Penicillins    Brief Summary: Cande Shields is a 22 year old female who is currently 6 weeks gestation with complicated PMHx of granulomatous polyangiitis (most recent flare 12/22 s/p rituximab*3, steroid taper, avacopan stopped since pregnancy), CKD 3a, VTE (PE and DVT in 2021). ADHD, chronic anemia, recurrent sinusitis, and obesity. She presented to ED on 2/17/2023 with worsening chest pain associated with known PE.    History of Present Illness: The patient presented to OSH on 2/12/23 with a cough, sharp chest pain, and fever. CT showed acute right and left segmental pulmonary embolism with evidence of right heart strain. It also showed scattered bilateral ill-defined nodular opacities. Additionally, a throat swab was positive Group A Strep (GAS) and she was given one dose of ceftriaxone. She was discharged with 10-day course of cephalexin for GAS and enoxaparin for her PE.     On 2/17/23, she presented to [facility] with worsening of chest pain and cough. She was transitioned from cephalexin to ceftriaxone upon admission with a plan to finish the 10-day course for Streptococcal pharyngitis. Her labs on admission were remarkable for leukocytosis (WBC 21.8), ANC of 17.7, CRP of 229, and procalcitonin of 14. CXR on admission showed rounded appearing opacity along the left lateral pleural surface. TTE showed large, mobile thrombus in the right atrium. AngioVAC was initially attempted but was unsuccessful and followed by emergent sternotomy and thrombectomy on 2/18. Right apical heart tissue was cultured (fungal and bacterial) during procedure and remains no growth to date.     She was initially managed in CVICU and transferred to   on 2/21. A CXR on 2/22 showed decreased apical pneumothorax, similar to slightly improved interstitial and airspace opacities, and stable small left pleural effusion. Her WBC count acutely increased to 25.7 on 2/22 from 18.9 the day prior and she had a procalcitonin of 1.38. She was transitioned to Zosyn for broader coverage. The patient has had a persistent loose, cough throughout hospitalization. She is on no respiratory support this morning and has an O2 saturation of 93% while on room air. She has been afebrile throughout admission and was on scheduled acetaminophen. Blood cultures from admission (2/17) and 2/22 remain no growth to date and MRSA nares PCR negative. Sputum cultures from 2/18 and 2/22 have grown normal violetta. She initially tested positive for COVID-19 in December of 2022 and was treated with Paxlovid. Negative respiratory viral panel and positive COVID-19 on admission. She is now post 5-day Remdesivir course. She was given 1 dose of azithromycin given due to pneumonia concerns on 2/17.           Active Anti-infective Medications   (From admission, onward)                 Start     Stop    02/22/23 1230  piperacillin-tazobactam  4.5 g,   Intravenous,   EVERY 6 HOURS        Hospital-Acquired Pneumonia        --                  Assessment: Possible Hospital Acquired Pneumonia (HAP)  The patient's Streptococcal pharyngitis has been adequately been treat with 10+ days of antibiotics. It is difficult to assess the patient for infection/pneumonia given her recent PE and rheumatological condition and overlap of signs and symptoms. Her lung imaging may be suggestive of pneumonia but is difficult to interpret due to recent PE. Her respiratory cultures have not grown pathogens, however she was on antibiotics prior to samples and has had a persistent, loose cough. Her fever curve cannot be trended because of scheduled acetaminophen. Use of inflammatory markers, CRP and procal, are likely not reliable in  patient's infectious workup since they may be impacted by granulomatous and renal dysfunction. Cause of WBC count increase on 2/22 is unclear. Granulomatous disease flare can also cause leukocytosis. However, the patient's leukocytosis has decreased since initiating Zosyn and is now resolved, so it is reasonable to continue treatment for potential HAP. Would favor shorter duration given clinical response and prior ceftriaxone use.     Recommendations:  Continue Zosyn 4.5 g Q6H though 2/26 for five-day course     Pharmacy took the following actions: Called/paged provider.    Discussed with ID Staff Dr. Ruby Ruano MD, MPH, and Tereza Burnett, PharmD, BCIDP      Latasha Gonzalez, 2023 PharmD Candidate   Pager: [phone]        Vital Signs/Clinical Features:  Vitals         02/22 0700  02/23 0659 02/23 0700  02/24 0659 02/24 0700  02/24 1416   Most Recent      Temp ( F) 98.1 -  99.4    98.2 -  98.4    97.8 -  98.2     98.2 (36.8) 02/24 1050    Pulse 91 -  106    89 -  107    85 -  104     85 02/24 1050    Resp 18 -  28    17 -  18      18     18 02/24 1050    BP 85/45 -  118/70    96/58 -  117/56    105/56 -  127/69     105/56 02/24 1050    SpO2 (%) 90 -  99    91 -  99    92 -  93     93 02/24 1300            Labs  Estimated Creatinine Clearance: 65.7 mL/min (A) (based on SCr of 1.54 mg/dL (H)).  Recent Labs   Lab Test 02/20/23  0411 02/21/23  0542 02/22/23  0108 02/22/23  0950 02/23/23  1030 02/24/23  0613   CR 1.17* 1.16* 1.18* 1.18* 1.46* 1.54*       Recent Labs   Lab Test 04/07/21  1239 04/08/21  0439 06/10/21  1517 07/09/21  2228 09/08/21  1103 02/12/23  2237 02/17/23  1720 02/18/23  0814 02/18/23  1420 02/19/23  1104 02/20/23  0411 02/21/23  0542 02/22/23  0950 02/23/23  1030 02/23/23  1202 02/24/23  0613   WBC 9.5 12.3*  --  8.4   < > 3.2* 21.8* 17.7*   < > 18.0* 20.5* 18.9* 25.7* 14.7*  --  10.7   ANEU 6.5 8.4*  --  5.1  --  0.7* 17.7* 14.9*  --   --   --   --   --   --   --   --    ALYM 1.9 2.3  --  1.9  --   1.0 1.7 1.2  --   --   --   --   --   --   --   --    SINTIA 0.8 1.2  --  0.9  --  1.4* 1.5* 0.9  --   --   --   --   --   --   --   --    AEOS 0.2 0.3  --  0.4  --  0.0 0.9* 0.7  --   --   --   --   --   --   --   --    HGB 9.0* 9.3*   < > 11.7   < > 11.4* 11.6* 10.3*   < > 8.2* 7.7* 7.2* 8.3* 6.8* 7.3* 7.3*   HCT 30.1* 30.0*  --  37.7   < > 36.0 37.5 33.6*   < > 26.1* 24.9* 23.5* 26.8* 22.5*  --  24.1*   MCV 94 94  --  86   < > 82 84 83   < > 82 84 84 84 85  --  85    343  --  353   < > 195 332 276   < > 231 251 316 462* 472*  --  518*    < > = values in this interval not displayed.       Recent Labs   Lab Test 02/19/23  0400 02/20/23  0411 02/21/23  0542 02/22/23  0950 02/23/23  1030 02/24/23  0613   BILITOTAL <0.2 <0.2 <0.2 <0.2 0.2 0.2   ALKPHOS 75 83 114* 160* 204* 253*   ALBUMIN 2.5* 2.5* 2.3* 2.7* 2.5* 2.6*   AST 21 19 13 13 14 16   ALT 12 11 11 12 13 14       Recent Labs   Lab Test 12/27/20  0242 12/29/20  0636 01/27/21  1756 01/29/21  0519 01/30/21  0502 04/08/21  0439 12/20/22  1251 01/17/23  1219 02/08/23  0758 02/17/23  1720 02/17/23  2022 02/18/23  0711 02/18/23  1420 02/18/23  1712 02/18/23  1738 02/18/23  1740 02/18/23  1934 02/19/23  0359 02/20/23  0411 02/21/23  0542 02/22/23  0950 02/23/23  1030   PCAL  --   --   --   --   --   --   --   --   --  14.00*  --   --   --   --   --   --   --   --   --   --  1.38* 2.04*   LACT 1.1  --   --    < >  --    < >  --   --   --   --    < >  --    < > 0.6 0.7  --  1.1 0.6* 0.7 0.6*  --   --    CRP 39.0* 27.0* 29.7*  --  7.9  --  31.6*  --  7.6  --   --   --   --   --   --   --   --   --   --   --   --   --    CRPI  --   --   --   --   --   --   --  16.40*  --  322.00*  --  242.00*  --   --   --   --   --   --   --   --  342.00* 229.00*   SED 38*  --   --   --  16  --  25* 19 15  --   --   --   --   --   --  85*  --   --   --   --   --   --     < > = values in this interval not displayed.             Culture Results:  7-Day Micro Results       Procedure  Component Value Units Date/Time    Respiratory Aerobic Bacterial Culture with Gram Stain     Order Status: Sent Lab Status: No result     Specimen: Sputum from Expectorate     Respiratory Aerobic Bacterial Culture with Gram Stain [[id number]] Collected: 02/22/23 1621    Order Status: Completed Lab Status: Final result Updated: 02/24/23 0806    Specimen: Sputum from Expectorate      Culture 1+ Normal violetta     Gram Stain Result <10 Squamous epithelial cells/low power field      >25 PMNs/low power field      4+ Mixed violetta    Blood Culture Hand, Left [[id number]]  (Normal) Collected: 02/22/23 1415    Order Status: Completed Lab Status: Preliminary result Updated: 02/23/23 1501    Specimen: Blood from Hand, Left      Culture No growth after 1 day    Blood Culture Hand, Right [[id number]]  (Normal) Collected: 02/22/23 1415    Order Status: Completed Lab Status: Preliminary result Updated: 02/23/23 1501    Specimen: Blood from Hand, Right      Culture No growth after 1 day    Anaerobic Bacterial Culture Routine [[id number]] Collected: 02/18/23 1749    Order Status: Completed Lab Status: Preliminary result Updated: 02/23/23 2003    Specimen: Tissue from Heart      Culture No anaerobic organisms isolated after 5 days    Gram Stain [[id number]] Collected: 02/18/23 1749    Order Status: Completed Lab Status: Final result Updated: 02/18/23 2117    Specimen: Tissue from Heart      Gram Stain Result No organisms seen      4+ WBC seen     Comment: Predominantly PMNs       Fungal or Yeast Culture Routine [[id number]] Collected: 02/18/23 1749    Order Status: Completed Lab Status: Preliminary result Updated: 02/23/23 2003    Specimen: Tissue from Heart      Culture No growth after 5 days    Tissue Aerobic Bacterial Culture Routine [[id number]] Collected: 02/18/23 1749    Order Status: Completed Lab Status: Final result Updated: 02/23/23 0730    Specimen: Tissue from Heart      Culture No Growth    Streptococcus  pneumoniae antigen     Order Status: Canceled Lab Status: No result     Specimen: Urine     Legionella pneumophila antigen urine     Order Status: Canceled Lab Status: No result     Specimen: Urine     Respiratory Aerobic Bacterial Culture with Gram Stain [[id number]] Collected: 02/18/23 0845    Order Status: Completed Lab Status: Final result Updated: 02/20/23 0853    Specimen: Sputum from Expectorate      Culture 4+ Normal violetta     Gram Stain Result <10 Squamous epithelial cells/low power field      >25 PMNs/low power field      4+ Mixed violetta    MRSA MSSA PCR, Nasal Swab [[id number]] Collected: 02/18/23 0340    Order Status: Completed Lab Status: Final result Updated: 02/18/23 0700    Specimen: Swab from Nares, Bilateral      MRSA Target DNA Negative     SA Target DNA Negative    Narrative:      The [employer]  Xpert SA Nasal Complete assay performed in the [employer]  Dx System is a qualitative in vitro diagnostic test designed for rapid detection of Staphylococcus aureus (SA) and methicillin-resistant Staphylococcus aureus (MRSA) from nasal swabs in patients at risk for nasal colonization. The test utilizes automated real-time polymerase chain reaction (PCR) to detect MRSA/SA DNA. The Xpert SA Nasal Complete assay is intended to aid in the prevention and control of MRSA/SA infections in healthcare settings. The assay is not intended to diagnose, guide or monitor treatment for MRSA/SA infections, or provide results of susceptibility to methicillin. A negative result does not preclude MRSA/SA nasal colonization.     Blood Culture Peripheral Blood [[id number]]  (Normal) Collected: 02/17/23 2229    Order Status: Completed Lab Status: Final result Updated: 02/23/23 0016    Specimen: Peripheral Blood      Culture No Growth    Blood Culture Line, venous [[id number]]  (Normal) Collected: 02/17/23 2207    Order Status: Completed Lab Status: Final result Updated: 02/22/23 2246    Specimen: Blood from Line, venous       Culture No Growth    Respiratory Panel PCR [[id number]]  (Normal) Collected: 02/17/23 2151    Order Status: Completed Lab Status: Final result Updated: 02/18/23 0019    Specimen: Swab from Nasopharyngeal      Adenovirus Not Detected     Coronavirus Not Detected     Comment: This test detects Coronavirus 229E, HKU1, NL63 and OC43 but does not distinguish between them. It does not detect MERS ( Respiratory Syndrome), SARS (Severe Acute Respiratory Syndrome) or 2019-nCoV (Novel 2019) Coronavirus.        Human Metapneumovirus Not Detected     Human Rhin/Enterovirus Not Detected     Influenza A Not Detected     Influenza A, H1 Not Detected     Influenza A 2009 H1N1 Not Detected     Influenza A, H3 Not Detected     Influenza B Not Detected     Parainfluenza Virus 1 Not Detected     Parainfluenza Virus 2 Not Detected     Parainfluenza Virus 3 Not Detected     Parainfluenza Virus 4 Not Detected     Respiratory Syncytial Virus A Not Detected     Respiratory Syncytial Virus B Not Detected     Chlamydia Pneumoniae Not Detected     Mycoplasma Pneumoniae Not Detected    Narrative:      The [employer] Respiratory Panel is a qualitative nucleic acid, multiplex, in vitro diagnostic test for the simultaneous detection and identification of multiple respiratory viral and bacterial nucleic acids in nasopharyngeal swabs collected in viral transport media from individual exhibiting signs and symptoms of respiratory infection. The assay has received FDA approval for the testing of nasopharyngeal (NP) swabs only. The [facility] at [facility] has validated the performance characteristics for bronchial alveolar lavage specimens. This test is used for clinical purposes and should not be regarded as investigational or for research. This laboratory is certified under the Clinical Laboratory Improvement Amendments of 1988 (CLIA-88) as qualified to perform high complexity clinical laboratory  testing.     Respiratory Aerobic Bacterial Culture with Gram Stain     Order Status: Canceled Lab Status: No result     Specimen: Sputum from Expectorate             Recent Labs   Lab Test 12/20/22  1251 01/17/23  1441 01/19/23  1221 02/18/23  1208 02/22/23  2054   URINEPH 6.0 5.5 5.5 5.5 6.0   NITRITE Negative Negative Negative Negative Negative   LEUKEST Negative Negative Negative Negative Negative   WBCU 0-5 1 2 2 3             Recent Labs   Lab Test 02/17/23  2151   IFLUA Not Detected   FLUAH1 Not Detected   FLUAH3 Not Detected   LE7656 Not Detected   IFLUB Not Detected   RSVA Not Detected   RSVB Not Detected   PIV1 Not Detected   PIV2 Not Detected   PIV3 Not Detected   HMPV Not Detected             Imaging: XR Chest 2 Views    Result Date: 2/22/2023  Exam: XR CHEST 2 VIEWS, 2/22/2023 9:44 AM Indication: chest tube removal s/p right atrial thrombectomy and closure of PFO Comparison: Chest radiograph 2/21/2023 Findings: PA and lateral chest radiograph. Intact median sternotomy wires. Trachea is midline. Unchanged cardiac silhouette. Dense retrocardiac opacities similar to slightly increased. Similar to slightly improved mixed interstitial and airspace opacities. Improved right basilar rounded opacity. Decreased right apical pneumothorax. No left pneumothorax. Small left pleural effusion. No significant right pleural effusion. Silhouetting of the left hemidiaphragm. There are some gas-filled loops of bowel in the left upper quadrant, not significantly distended. No acute osseous abnormalities.     Impression: 1.  Stable postsurgical changes. 2.  Decreased right apical pneumothorax. 3.  Improved right basilar opacity and similar to slightly improved mixed interstitial and airspace opacities, likely atelectasis/edema. Dense retrocardiac opacity similar to slightly increased. 4.  Stable small left pleural effusion. KINJAL REDDY MD   SYSTEM ID:  [id number]    XR Chest 2 Views    Result Date: 2/17/2023  EXAM: XR  CHEST 2 VIEWS  2023 5:31 PM  HISTORY: L chest pain COMPARISON: Chest CT 2023. FINDINGS: Two views of the chest. Trachea is midline. Normal cardiomediastinal silhouette. Airspace opacities projecting over the right midlung. Rounded appearing density along the left lateral pleural surface. Streaky retrocardiac opacities with slight elevation of the left hemidiaphragm. No definite pleural effusion or pneumothorax. No acute osseous abnormalities.     IMPRESSION: 1. Rounded appearing opacity along the left lateral pleural surface. Findings favor the appearance of a subpleural infarct in the setting of recently diagnosed pulmonary embolus, less likely a loculated effusion. 2. Airspace opacities projecting over the right midlung, also probably related to a pulmonary infarct. 3. Nodular opacities noted on the CT of 2023 are not appreciated on this chest x-ray. 4. Streaky retrocardiac opacities with elevation of the left hemidiaphragm, likely atelectasis. I have personally reviewed the examination and initial interpretation and I agree with the findings. DELILAH VILLARREAL MD   SYSTEM ID:  [id number]    Echo Complete    Result Date: 2023  [id number]^MYRNA BIRD^ILA  [facility] [address] [place] [id number]  Name: YMUIKO LYONS MRN: [id number] : 2000 Study Date: 2023 07:20 AM Age: 22 yrs Gender: Female Patient Location: [facility] Reason For Study: Pulmonary Embolism Ordering Physician: ILA HENDERSON Performed By: Geeta Oneal  BSA: 2.0 m2 Height: 64 in Weight: 220 lb BP: 115/85 mmHg ______________________________________________________________________________ Procedure Complete Portable Echo Adult. No contrast used per echo order. Poor acoustic windows. The final echo results were communicated to Dr. Lopez. The final echo results were communicated to the ordering physician by phone .  ______________________________________________________________________________ Interpretation Summary Large highly mobile thrombus in transit in the right atrium, at least 2.2 cm in length. Right ventricular function, chamber size, wall motion, and thickness are normal. Known patent foramen ovale, suboptimally imaged on this study. Intermittent left to right atrial-level shunting is seen on some color Doppler images. Pulmonary artery systolic pressure cannot be assessed. Previous study not available for comparison. ______________________________________________________________________________ Left Ventricle Global and regional left ventricular function is normal with an EF of 60-65%. Left ventricular size is normal. Left ventricular wall thickness is normal. Left ventricular diastolic function is normal. Diastolic Doppler findings (E/E' ratio and/or other parameters) suggest left ventricular filling pressures are normal.  Right Ventricle Right ventricular function, chamber size, wall motion, and thickness are normal.  Atria Both atria appear normal. Known patent foramen ovale, suboptimally imaged on this study. Intermittent left to right atrial-level shunting is seen on some color Doppler images. Large highly mobile thrombus in transit in the right atrium, at least 2.2 cm in length.  Mitral Valve The mitral valve is normal.  Aortic Valve The valve leaflets are not well visualized. On Doppler interrogation, there is no significant stenosis or regurgitation.  Tricuspid Valve The tricuspid valve is normal. Mild tricuspid insufficiency is present. The peak velocity of the tricuspid regurgitant jet is not obtainable. Pulmonary artery systolic pressure cannot be assessed.  Pulmonic Valve The pulmonic valve is normal. Trace pulmonic insufficiency is present.  Vessels The aorta root is normal. The inferior vena cava was normal in size with preserved respiratory variability. IVC diameter <2.1 cm collapsing >50% with sniff  suggests a normal RA pressure of 3 mmHg.  Pericardium No pericardial effusion is present.  Compared to Previous Study Previous study not available for comparison. ______________________________________________________________________________ MMode/2D Measurements & Calculations IVSd: 0.93 cm LVIDd: 4.6 cm LVIDs: 2.9 cm LVPWd: 0.92 cm FS: 36.5 % LV mass(C)d: 140.8 grams LV mass(C)dI: 69.1 grams/m2 Ao root diam: 3.2 cm asc Aorta Diam: 2.5 cm LVOT diam: 2.2 cm LVOT area: 3.7 cm2 RWT: 0.40  Doppler Measurements & Calculations MV E max laisha: 58.1 cm/sec MV A max laisha: 72.9 cm/sec MV E/A: 0.80 MV dec time: 0.13 sec E/E' av.8 Lateral E/e': 3.5 Medial E/e': 6.1  ______________________________________________________________________________ Report approved by: Natacha Rodriguez 2023 11:21 AM       US Lower Extremity Venous Duplex Bilateral    Result Date: 2023  EXAMINATION: DOPPLER VENOUS ULTRASOUND OF BILATERAL LOWER EXTREMITIES, 2023 10:34 AM COMPARISON: Ultrasound 2011 HISTORY: Rule out lower extremity DVT TECHNIQUE:  Gray-scale evaluation with compression, spectral flow and color Doppler assessment of the deep venous system of both legs from groin to knee, and then at the ankles. FINDINGS: In both lower extremities, the common femoral, femoral, popliteal and posterior tibial veins demonstrate normal compressibility and blood flow. The peroneal veins in the calf are not seen bilaterally.     IMPRESSION: No evidence of deep venous thrombosis in either lower extremity. I have personally reviewed the examination and initial interpretation and I agree with the findings. ROSANGELA MORAN MD   SYSTEM ID:  [id number]    US OB < 14 Weeks w Transvaginal    Result Date: 2023  EXAMINATION: US OB <14 WEEKS WITH TRANSVAGINAL SINGLE, 2023 10:29 AM COMPARISON: Ultrasound 2023 HISTORY: Possible 5 weeks pregnant per LMP, large clot. Gestational age 5 weeks 5 days from LMP 1/10/2023 TECHNIQUE:  The pelvis was scanned in standard fashion with transabdominal and transvaginal transducer(s). FINDINGS: Bicornuate uterus. There is a gestational sac within the uterine fundus, in the left horn. The amniotic fluid volume and sac size are within expected limits for gestation age. There is an embryo and yolk sac present within the gestational sac. The gestational sac measures 1.85 cm corresponding to a gestational age of 6 weeks 6 days. The embryo crown-rump length is not well visualized, possibly measuring approximately 0.12 cm.. Embryonic heart motion is present at 96 beats per minute.  It is too early to accurately determine placental site. There is no free fluid in the pelvis.  No uterine masses. The right ovary is not visualized. The left ovary measures 2.7 x 3.0 x 3.2 cm. There is normal blood flow to the ovaries. Somewhat complex cyst in the left ovary measuring up to 1.9 cm, suspected corpus luteum.     IMPRESSION:    There is a single living intrauterine embryo within the left horn of a bicornuate uterus. The gestational age is calculated via gestational sac measurement, corresponding to 6 weeks 6 days. The embryo is not well visualized, and crown-rump length measurement is difficult. Embryonic heart motion is measured at 96 bpm. Short-term follow-up ultrasound is recommended for confirmation. I have personally reviewed the examination and initial interpretation and I agree with the findings. ROSANGELA MORAN MD   SYSTEM ID:  [id number]    XR Chest Port 1 View    Result Date: 2/22/2023  Exam: XR CHEST PORT 1 VIEW, 2/21/2023 11:07 PM Comparison: 2/21/2023 at 1339 History: SOB Findings: Single AP portable semiupright view of the chest. Intact median sternotomy wires. Trachea is midline. Unchanged cardiac silhouette. Dense retrocardiac opacities. Stable mixed interstitial and airspace opacities. Unchanged right basilar rounded opacity. Trace right apical pneumothorax. No left pneumothorax. Small left pleural  effusion. No significant right pleural effusion. Silhouetting of the left hemidiaphragm. The upper abdomen is unremarkable.     Impression: 1. Unchanged trace right apical pneumothorax. 2. Unchanged right basilar rounded opacity concerning for infection. 3. Unchanged bilateral mixed interstitial and airspace opacities likely represents atelectasis/edema, however cannot rule out infection. 4. Lower lobe atelectasis with small effusion. Portion of this appears to be loculated. I have personally reviewed the examination and initial interpretation and I agree with the findings. DELILAH VILLARREAL MD   SYSTEM ID:  [id number]    XR Chest Port 1 View    Result Date: 2/21/2023  Exam: XR CHEST PORT 1 VIEW, 2/21/2023 1:47 PM Comparison: Chest x-ray 2/19/2023 History: s/p CT removal Findings: Single portable AP view of the chest. Interval removal of chest tubes. Stable post surgical changes of the chest with intact median sternotomy wires. Trachea is midline. Cardiac silhouette is stable. Dense retrocardiac opacification. Increased right basilar rounded opacity. Otherwise stable mixed bilateral interstitial and airspace opacities. Stable small left pleural effusion. Trace right apical pneumothorax. The visualized upper abdomen is unremarkable.     Impression: 1. Removal of bilateral chest tubes. Trace right apical pneumothorax. 2. Increased right basilar rounded opacification, may represent infection. 3. Stable mixed bilateral interstitial and airspace opacities, likely atelectasis/edema however cannot rule out infection. 4. Stable small left pleural effusion. I have personally reviewed the examination and initial interpretation and I agree with the findings. CRISTHIAN TANG MD   SYSTEM ID:  [id number]    XR Chest Port 1 View    Result Date: 2/19/2023  EXAM: XR CHEST PORT 1 VIEW  2/19/2023 4:04 AM HISTORY:  Post Op CVTS Surgery   COMPARISON:  2/18/2023 FINDINGS: AP portable semiupright view of the chest. Right IJ sheath in similar  position. Unchanged position of bibasilar chest tubes. Postsurgical changes of the chest with intact median sternotomy wires. Trachea is midline. No substantial change in appearance of dense airspace opacities in the bilateral peripheral mid lung fields. No substantial change in appearance of mixed interstitial and patchy airspace opacities within the lung bases, left greater than right. Continued dense retrocardiac opacifications. Stable enlarged cardiac silhouette. Small bilateral pleural effusions, left greater than right. No appreciable pneumothorax. No acute osseous abnormality. Visualized upper abdomen is unremarkable.      IMPRESSION: 1. Stable position of support devices, as above. 2. No substantial change in appearance of dense airspace opacities within the bilateral peripheral mid lung fields. 3. Stable mixed interstitial and patchy airspace opacities in lung bases, left greater than right, likely atelectasis/edema. 4. Small bilateral pleural effusions, left greater than right. I have personally reviewed the examination and initial interpretation and I agree with the findings. ROSANGELA MORAN MD   SYSTEM ID:  [id number]    XR Chest Port 1 View    Result Date: 2/18/2023  EXAM: XR CHEST PORT 1 VIEW  2/18/2023 8:03 PM HISTORY:  Post Op CVTS Surgery   COMPARISON:  2/17/2023 FINDINGS: Portable AP semiupright view of the chest. Postsurgical changes of median sternotomy. Intact wires. Bibasilar chest tube. 2 mediastinal drains. Right IJ central venous catheter terminates in right innominate vein. Trachea is midline. Heart size is normal. Dense airspace opacities in bilateral peripheral mid lung fields are similar to prior. Increased bibasilar streaky opacity. Small left pleural effusion. No appreciable pneumothorax. No acute osseous abnormality. Visualized upper abdomen is unremarkable.      IMPRESSION: 1. Postsurgical changes of median sternotomy with intact wires. Right IJ central venous catheter terminates  in the right innominate vein. 2. Similar appearance of presumed peripheral infarcts. 3. Left greater than right basilar atelectasis with small left pleural effusion. I have personally reviewed the examination and initial interpretation and I agree with the findings. CRISTHIAN TANG MD   SYSTEM ID:  [id number]    XR Abdomen Port 1 View    Result Date: 2/22/2023  EXAMINATION:  XR ABDOMEN PORT 1 VIEW 2/22/2023 1:17 PM. COMPARISON: None.. HISTORY:  c/f ileus FINDINGS: Portable supine view of the abdomen. Air-filled distended but not dilated loops of bowel. No pneumatosis or portal venous gas. Intact median sternotomy wires. Mild streaky perihilar and bibasilar opacities. . Small left pleural effusion. No acute osseous abnormality.     IMPRESSION: 1. Air-filled prominent but nondilated loops of bowel, early/ developing adynamic ileus cannot be ruled out. 2. Mild streaky perihilar and bibasilar opacities, likely atelectasis/pulmonary edema. 3. Small left pleural effusion. I have personally reviewed the examination and initial interpretation and I agree with the findings. NAVIN RAMIREZ MD   SYSTEM ID:  [id number]    XR Surgery MIRANDA L/T 5 Min Fluoro w Stills    Result Date: 2/22/2023  Exam: 1 VIEW CHEST LIMITED, 2/18/2023 6:40 PM Indication: Emergency surgery, possible retained foreign objects. Comparison: None Findings: No suspicious foreign objects seen on this limited portable AP view of the neck and upper chest.     Impression: No suspicious foreign objects [Urgent Result: No suspicious foreign objects] Finding was identified on 2/18/2023 6:40 PM. OR 24 RN Ms. Viviane was contacted by Dr. Lewis at 2/18/2023 6:41 PM and verbalized understanding of the urgent finding. I have personally reviewed the examination and initial interpretation and I agree with the findings. CRISTHIAN TANG MD   SYSTEM ID:  [id number]

## 2023-02-25 LAB
ALBUMIN SERPL BCG-MCNC: 2.7 G/DL (ref 3.5–5.2)
ALP SERPL-CCNC: 230 U/L (ref 35–104)
ALT SERPL W P-5'-P-CCNC: 12 U/L (ref 10–35)
ANION GAP SERPL CALCULATED.3IONS-SCNC: 12 MMOL/L (ref 7–15)
AST SERPL W P-5'-P-CCNC: 12 U/L (ref 10–35)
BACTERIA TISS BX CULT: NORMAL
BILIRUB SERPL-MCNC: 0.2 MG/DL
BUN SERPL-MCNC: 24 MG/DL (ref 6–20)
CALCIUM SERPL-MCNC: 8.7 MG/DL (ref 8.6–10)
CHLORIDE SERPL-SCNC: 101 MMOL/L (ref 98–107)
CREAT SERPL-MCNC: 1.52 MG/DL (ref 0.51–0.95)
CRP SERPL-MCNC: 102 MG/L
DEPRECATED HCO3 PLAS-SCNC: 23 MMOL/L (ref 22–29)
ERYTHROCYTE [DISTWIDTH] IN BLOOD BY AUTOMATED COUNT: 16.2 % (ref 10–15)
GFR SERPL CREATININE-BSD FRML MDRD: 49 ML/MIN/1.73M2
GLUCOSE SERPL-MCNC: 81 MG/DL (ref 70–99)
HCT VFR BLD AUTO: 23.3 % (ref 35–47)
HGB BLD-MCNC: 7 G/DL (ref 11.7–15.7)
HOLD SPECIMEN: NORMAL
LMWH PPP CHRO-ACNC: 1.05 IU/ML
MCH RBC QN AUTO: 25.5 PG (ref 26.5–33)
MCHC RBC AUTO-ENTMCNC: 30 G/DL (ref 31.5–36.5)
MCV RBC AUTO: 85 FL (ref 78–100)
PHOSPHATE SERPL-MCNC: 4.5 MG/DL (ref 2.5–4.5)
PLATELET # BLD AUTO: 541 10E3/UL (ref 150–450)
POTASSIUM SERPL-SCNC: 4.5 MMOL/L (ref 3.4–5.3)
PROCALCITONIN SERPL IA-MCNC: 2.34 NG/ML
PROT SERPL-MCNC: 5.7 G/DL (ref 6.4–8.3)
RBC # BLD AUTO: 2.74 10E6/UL (ref 3.8–5.2)
SODIUM SERPL-SCNC: 136 MMOL/L (ref 136–145)
WBC # BLD AUTO: 7.7 10E3/UL (ref 4–11)

## 2023-02-25 PROCEDURE — 85027 COMPLETE CBC AUTOMATED: CPT | Performed by: SURGERY

## 2023-02-25 PROCEDURE — 84145 PROCALCITONIN (PCT): CPT | Performed by: PHYSICIAN ASSISTANT

## 2023-02-25 PROCEDURE — 250N000013 HC RX MED GY IP 250 OP 250 PS 637: Performed by: STUDENT IN AN ORGANIZED HEALTH CARE EDUCATION/TRAINING PROGRAM

## 2023-02-25 PROCEDURE — 250N000013 HC RX MED GY IP 250 OP 250 PS 637: Performed by: NURSE PRACTITIONER

## 2023-02-25 PROCEDURE — 250N000011 HC RX IP 250 OP 636: Performed by: SURGERY

## 2023-02-25 PROCEDURE — 36415 COLL VENOUS BLD VENIPUNCTURE: CPT | Performed by: THORACIC SURGERY (CARDIOTHORACIC VASCULAR SURGERY)

## 2023-02-25 PROCEDURE — 250N000013 HC RX MED GY IP 250 OP 250 PS 637: Performed by: PHYSICIAN ASSISTANT

## 2023-02-25 PROCEDURE — 86140 C-REACTIVE PROTEIN: CPT | Performed by: NURSE PRACTITIONER

## 2023-02-25 PROCEDURE — 36415 COLL VENOUS BLD VENIPUNCTURE: CPT | Performed by: NURSE PRACTITIONER

## 2023-02-25 PROCEDURE — 85520 HEPARIN ASSAY: CPT | Performed by: THORACIC SURGERY (CARDIOTHORACIC VASCULAR SURGERY)

## 2023-02-25 PROCEDURE — 84100 ASSAY OF PHOSPHORUS: CPT | Performed by: SURGERY

## 2023-02-25 PROCEDURE — 214N000001 HC R&B CCU UMMC

## 2023-02-25 PROCEDURE — 80053 COMPREHEN METABOLIC PANEL: CPT | Performed by: SURGERY

## 2023-02-25 PROCEDURE — 250N000011 HC RX IP 250 OP 636: Performed by: PHYSICIAN ASSISTANT

## 2023-02-25 RX ADMIN — FUROSEMIDE 20 MG: 20 TABLET ORAL at 16:07

## 2023-02-25 RX ADMIN — OXYCODONE HYDROCHLORIDE 5 MG: 5 TABLET ORAL at 10:16

## 2023-02-25 RX ADMIN — PIPERACILLIN AND TAZOBACTAM 4.5 G: 4; .5 INJECTION, POWDER, FOR SOLUTION INTRAVENOUS at 00:17

## 2023-02-25 RX ADMIN — OXYCODONE HYDROCHLORIDE 10 MG: 10 TABLET ORAL at 14:12

## 2023-02-25 RX ADMIN — OXYCODONE HYDROCHLORIDE 10 MG: 10 TABLET ORAL at 05:32

## 2023-02-25 RX ADMIN — GUAIFENESIN 600 MG: 600 TABLET ORAL at 08:47

## 2023-02-25 RX ADMIN — PIPERACILLIN AND TAZOBACTAM 4.5 G: 4; .5 INJECTION, POWDER, FOR SOLUTION INTRAVENOUS at 18:26

## 2023-02-25 RX ADMIN — Medication 12.5 MG: at 20:03

## 2023-02-25 RX ADMIN — FAMOTIDINE 20 MG: 20 TABLET ORAL at 08:47

## 2023-02-25 RX ADMIN — ASPIRIN 81 MG 81 MG: 81 TABLET ORAL at 08:48

## 2023-02-25 RX ADMIN — PIPERACILLIN AND TAZOBACTAM 4.5 G: 4; .5 INJECTION, POWDER, FOR SOLUTION INTRAVENOUS at 12:23

## 2023-02-25 RX ADMIN — PIPERACILLIN AND TAZOBACTAM 4.5 G: 4; .5 INJECTION, POWDER, FOR SOLUTION INTRAVENOUS at 05:44

## 2023-02-25 RX ADMIN — GUAIFENESIN 600 MG: 600 TABLET ORAL at 20:03

## 2023-02-25 RX ADMIN — OXYCODONE HYDROCHLORIDE 10 MG: 10 TABLET ORAL at 00:17

## 2023-02-25 RX ADMIN — ENOXAPARIN SODIUM 80 MG: 80 INJECTION SUBCUTANEOUS at 10:13

## 2023-02-25 RX ADMIN — FUROSEMIDE 20 MG: 20 TABLET ORAL at 08:47

## 2023-02-25 RX ADMIN — ENOXAPARIN SODIUM 80 MG: 80 INJECTION SUBCUTANEOUS at 22:32

## 2023-02-25 RX ADMIN — Medication 12.5 MG: at 08:47

## 2023-02-25 RX ADMIN — PRENATAL VIT W/ FE FUMARATE-FA TAB 27-0.8 MG 1 TABLET: 27-0.8 TAB at 08:47

## 2023-02-25 ASSESSMENT — ACTIVITIES OF DAILY LIVING (ADL)
ADLS_ACUITY_SCORE: 25

## 2023-02-25 NOTE — PLAN OF CARE
Neuro: A&Ox4.   Cardiac: Afebrile, VSS.SR 80s.   Respiratory: RA.Frequent but nonproductive cough.  GI/: Voiding spontaneous, afternoon void unsaved. One loose stool this shift. AM bowel meds held.   Diet/appetite: Reports she 'doesn't eat until noon'. Fair appetite. Denies nausea.  Activity: Up with ad drake.    Pain: Reported chest pressure with wound vac. Agreed to take 5mg this AM.  Now that wound vac removed, patient is still rating pain 7/10 in chest area and requested 10mg oxycodone this afternoon.  Skin: Wound vac removed and incision left MALLIKA. Old CT with new dressings today by CVTS. Groin sutures removed and dressing placed by CVTS.  Lines: PIVx2. SL'd. Intermittent IV abx.  Drains: Wound vac removed today.  Replacements: None needed.  Plan: Tentative discharge Monday.      Goal Outcome Evaluation:

## 2023-02-25 NOTE — PLAN OF CARE
"/59 (BP Location: Right arm, Cuff Size: Adult Regular)   Pulse 84   Temp 98.4  F (36.9  C) (Oral)   Resp 16   Ht 1.626 m (5' 4\")   Wt 99.5 kg (219 lb 5.7 oz)   LMP 01/10/2023 (Approximate)   SpO2 95%   BMI 37.65 kg/m      Shift: 0520-7575    Status: Adm 2/17, found to be COVID-19+ with RA thrombus noted on TTE, s/p attempted angiovac for thrombectomy with Dr. Monterroso and s/p emergency sternotomy, Right Atrial Thrombectomy, Central Cannulation, Bilateral Femoral Cannulation, Closure of PFO with Dr. Elmore on 2/18. PMH granulomatous polyangiitis, recent PE, prior DVT complicated by PE with possible pregnancy    Neuro: A&O x4. Able to make needs known  Respiratory: RA, sating mid 90s. Denies SOB, frequent congestive cough   Cardiac: SR, HR 80s  GI/: Voids w/out difficulty, LBM today   Diet: Regular  Pain: Incisional pain managed w/ PRN oxy q4 hr   Incision/Drains: Sternal incision w/ wound vac, bilateral groin sites  IV Access: R PIV x2  Labs: Reviewed  Activity: Up ad drake, but calls for SBA d/t lines + wound vac    New changes this shift: No acute changes overnight. Cares clustered to promote rest/sleep.     Plan: Continue w/ POC and notify CVTS of any changes or concerns     "

## 2023-02-25 NOTE — PLAN OF CARE
D: S/p Emergency Sternotomy, Right Atrial Thrombectomy, Central Cannulation, Bilateral Femoral Cannulation, Closure of PFO ; Transesophageal Echocardiogram performed by anesthesia staff  Lab test positive for detection of COVID-19 virus. Currently 6 weeks pregnant. PMH: granulomatous polyangiitis, recent PE, prior DVT and saddle PE, HELLPs in pregnancy, Wegeners disease.       I: Monitored vitals and assessed pt status.   Running: TKo for abx    Neuro: A&Ox4.   Cardiac: Afebrile, VSS, SR 80s.           Respiratory: RA.Frequent but nonproductive cough. 95% RA  GI/: Voiding spontaneous, UO adequate with lasix. One loose stool tonight, bowel meds held, discussed this could be a side effect of abx.   Diet/appetite: Fair appetite. Denies nausea.  Activity: Up ad drake.    Pain: incisional Pain, refused pain meds this shift.   Skin: Wound vac removed and incision left MALLIKA. Old CT with new dressings today by CVTS. Groin sutures removed and dressing placed by CVTS.  Lines: PIVx2. SL'd. Intermittent IV abx.  Drains: Wound vac removed today.  Replacements: None needed.  Plan: Tentative discharge Monday.     I/O this shift:  In: 120 [P.O.:120]  Out: -     Temp:  [98.2  F (36.8  C)-98.6  F (37  C)] 98.3  F (36.8  C)  Pulse:  [80-89] 80  Resp:  [16-18] 18  BP: (107-119)/(59-66) 114/63  SpO2:  [90 %-96 %] 95 %      P: Continue to monitor Pt status and report changes to treatment team.

## 2023-02-25 NOTE — PROGRESS NOTES
Cardiovascular Surgery Progress Note  02/25/2023         Assessment and Plan:     Cande Shields is a 22 year old female admitted on 2/17/2023 with PMH of obesity BMI 38, granulomatous polyangiitis, recent PE, prior DVT complicated by PE with possible pregnancy, COVID-19+ with RA thrombus noted on TTE, s/p attempted angiovac for thrombectomy with Dr. Monterroso and s/p emergency sternotomy, Right Atrial Thrombectomy, Central Cannulation, Bilateral Femoral Cannulation, Closure of PFO with Dr. Elmore on 2/18.      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  Normal right ventricular size and function normal.  LVEF 60 to 65%.  Post-op echo 2/18 PM: completed by Anesthesia, Post CPB: normal biventricular function, valves unchanged.  PFO has been closed, no flow evident.  Thrombus previously noted in RAA is no longer present.  No aortic dissection noted.   - ASA 81 mg daily  - No statin indicated  - BB: metoprolol tartrate 12.5 mg BID with hold parameters for bradycardia  - Diuresis, AC and ID as below.     Chest tubes: removed 2/21  TPW: None     Pulmonary:  COVID 19 +  Concern for CAP vs HAP  Extubated POD 0 to  NC. Now saturating well on 2 lpm, has loose cough  - Supplemental O2 PRN to keep sats > 92%. Wean off as tolerated.  - Pulm hygiene, IS, activity and deep breathing. Mucinex BID x 5 days.   - ID as below  - CXR with stable LLL opacity vs small effusion      Neurology / MSK:  Acute post-operative pain  Chronic pain  History of EtOH use disorder  Costochondritis, 2nd ICS; L>R  Pain well controlled with current regimen:  - Acetaminophen, PO oxycodone PRN, IV dilaudid PRN, methocarbamol      / Renal / Fluid / Electrolytes:  CKD 3  Hypervolemia  BL creat ~ 1.4-1.5, most recent  creatinine 1.54; adequate UOP.   FLUID STATUS: Pre-op weight 220 lbs  - Diuresis: Stopped IV due to creatinine bump, started Lasix 40 mg PO BID 2/23 afternoon and reduced to 20 mg PO BID 2/24 afternoon.  - Replete lytes per protocol  - Strict I/O, daily weights  - Avoid/limit nephrotoxins as able     Rheumatology  Granulomatous polyangiitis with renal, upper respiratory, musculoskeletal, and cutaneous involvement  PR3 positive ANCA vasculitis  Dx of granulomatous polyangiitis in 2020, and she has completed induction therapy with prednisone and rituximab.  - Rheum consult 2/18;   - No indication for steroids currently given no current flare, not currently taking steroids (however, per [facility], steroids ok in pregnancy if necessary/indicated)  - CRP every 3 days     GI / Nutrition:   Concern for ileus, resolving  CXR demonstrates dilated loops of bowel c/f ileus. Abd soft and non tender but has not had BM yet, not ambulating much d/t iso. Had not been passing gas.  - Abd x-ray 2/22 with air-filled bowel, non dilated.   - Continue bowel regimen. BID senokot and BID miralax  - Encourage ambulation, hydration and monitor clinically.  - Resume regular diet 2/23 now that + flatus and + BM 2/22 evening after suppository.     Endocrine:  Stress induced hyperglycemia   - Initially managed on insulin drip postop, transitioned to sliding scale; goal BG <180 for optimal healing     Infectious Disease:  Stress induced leukocytosis  COVID-19  RML pneumonia  Strep anginosis - strep throat  WBC 7.7 and trending down, remains afebrile, procal positive and continues to have loose cough.   Surgical cultures thus far with NGTD.   - Completed perioperative antibiotics. Continue to monitor fever curve, CBC  - Had planned Ceftriaxone for 10 day course for strep throat, broadened to Zosyn on 2/22 - 2/26 given increasing leukocytosis and procal.  - Per ID, Remdesevir 200 mg IV and 100 mg IV daily x 4 days   - Repeat pan cultures on 2/22 given  worsening leukocytosis, NGTD.   - Procal trending up 2.34 (low 1.38). CRP trending down 102 (high 342).      Hematology:   Acute blood loss anemia  Hgb 7.3; Plt 518, no signs or symptoms of active bleeding.  - CBC in AM     Prior PE, occlusion of CFA, 2021     Acute PE, bilateral lobar R, seg and subseg left/R, 2023  Hx of DVT  Concern for HIT, resolved  - Heparin drip initially used post-operatively.   - Positive lupus anticoagulant  - HIT negative  Recs per heme:   -Transition heparin gtt back to lovenox 80 mg BID on   -check lovenox anti-Xa 4-6 hours after 3rd lovenox dose (goal antiXa 0.8-1.2, slightly higher range due to pregnancy)   -Isolated positive lupus anticoagulant  was checked while on anticoagulation, does not change current management, no further testing needed now  -Follow-up scheduled with Dr. Dye 3/8/23 in [facility]     Obstetrics and Gynecology  Hx HELLP with spontaneous late   Possible Pregnancy,   Bicornuate uterus  - MFM Consult recommendations in place, appreciated, see detailed consult note with recommendations outlined by Dr. Fox 23. Please page MFM team at [phone] if Q's.  - Possible 5 weeks pregnant per ?LMP; with IUFD at 30 weeks and 6 days.  - Detailed recommendations, summarized as follows:            - Neurological: avoid NSAIDs                - CV: OK for Heparin and Lovenox, avoid DOAC/Warfarin, with recommended         SBP<130/80, OK for Beta Blockers, avoid ACE/ARB/statins            - Endocrine: Goal BG < 120            - ID: OK for Penicillin and Cephalosporin            - Obstetrics: TV U/S to evaluate pregnancy            - Radiological consideration: Limit radiation, use shielding            - In General: Will avoid teratogenic medications  When she is going to be discharged, please page MFM team during the day so they can coordinate outpatient OB care with MFM clinic at [place]  [facility]      MSK/Skin:  Granulomatosis polyangitis  Sternotomy  Surgical incision  - Sternal precautions  - Incisional cares per protocol     Prophylaxis:   - Stress ulcer prophylaxis: Pantoprazole 40 mg daily.  - DVT prophylaxis: IV heparin, SCD.     Disposition:   - Transferred to  on 2/20  - Therapies recommending discharge to home when medically stable (? Monday)   - Hematology: Follow-up scheduled with Dr. Dye 3/8/23 in [facility]    Discussed with Dr Barker through verbal communication.      Kevin Dinero PA-C  Cardiothoracic Surgery  Pager [phone]    11:41 AM   February 25, 2023        Interval History:     No overnight events.  States pain is well managed on current regimen. Slept well overnight.  Tolerating diet, is passing flatus, + BM. No nausea or vomiting.  Breathing well without complaints.   Working with therapies and ambulating in room with assistance.   Denies chest pain, palpitations, dizziness, syncopal symptoms, fevers, chills, myalgias, or sternal popping/clicking.         Physical Exam:   Blood pressure 107/65, pulse 89, temperature 98.3  F (36.8  C), temperature source Oral, resp. rate 18, height 1.626 m (5' 4\"), weight 99.5 kg (219 lb 5.7 oz), last menstrual period 01/10/2023, SpO2 94 %, not currently breastfeeding.  Vitals:    02/21/23 0451 02/23/23 0411 02/25/23 0515   Weight: 105.5 kg (232 lb 8 oz) 102.8 kg (226 lb 11.2 oz) 99.5 kg (219 lb 5.7 oz)      Weight; - 1 lbs since admit and trending down.   24 hr Fluid status; net loss 220 mL. UOP 1.5 L  MAPs: 74 - 81     Gen: A&Ox4, NAD  Neuro: no focal deficits   CV: RRR, normal S1 S2, no murmurs, rubs or gallops.   Pulm: CTA, no wheezing or rhonchi, normal breathing on RA.   Abd: nondistended, normal BS, soft, nontender  Ext: no peripheral edema  Incision: clean, dry, intact, no erythema, sternum stable. Vac removed and skin glue applied.  Tubes/drain sites: dressing clean and " dry         Data:    Imaging:  reviewed recent imaging, no acute concerns    Labs:  BMP  Recent Labs   Lab 02/25/23  0739 02/24/23  0613 02/23/23  1030 02/22/23  0950    135* 136 134*   POTASSIUM 4.5 4.7 4.7 4.8   CHLORIDE 101 98 101 100   KRAIG 8.7 9.0 8.5* 9.4   CO2 23 24 22 20*   BUN 24.0* 20.8* 21.7* 19.0   CR 1.52* 1.54* 1.46* 1.18*   GLC 81 78 100* 96     CBC  Recent Labs   Lab 02/25/23  0739 02/24/23  0613 02/23/23  1202 02/23/23  1030 02/22/23  0950   WBC 7.7 10.7  --  14.7* 25.7*   RBC 2.74* 2.84*  --  2.66* 3.20*   HGB 7.0* 7.3* 7.3* 6.8* 8.3*   HCT 23.3* 24.1*  --  22.5* 26.8*   MCV 85 85  --  85 84   MCH 25.5* 25.7*  --  25.6* 25.9*   MCHC 30.0* 30.3*  --  30.2* 31.0*   RDW 16.2* 16.6*  --  16.9* 17.0*   * 518*  --  472* 462*     INR  Recent Labs   Lab 02/19/23  1104 02/18/23  1941 02/18/23  1740   INR 1.32* 1.26* 1.26*      Hepatic Panel  Recent Labs   Lab 02/25/23  0739 02/24/23  0613 02/23/23  1030 02/22/23  0950   AST 12 16 14 13   ALT 12 14 13 12   ALKPHOS 230* 253* 204* 160*   BILITOTAL 0.2 0.2 0.2 <0.2   ALBUMIN 2.7* 2.6* 2.5* 2.7*     GLUCOSE:   Recent Labs   Lab 02/25/23  0739 02/24/23  0613 02/23/23  1030 02/22/23  0950 02/22/23  0108 02/21/23  2211   GLC 81 78 100* 96 99 91

## 2023-02-26 LAB
ALBUMIN SERPL BCG-MCNC: 2.8 G/DL (ref 3.5–5.2)
ALP SERPL-CCNC: 270 U/L (ref 35–104)
ALT SERPL W P-5'-P-CCNC: 11 U/L (ref 10–35)
ANION GAP SERPL CALCULATED.3IONS-SCNC: 12 MMOL/L (ref 7–15)
AST SERPL W P-5'-P-CCNC: 17 U/L (ref 10–35)
BASOPHILS # BLD AUTO: 0.1 10E3/UL (ref 0–0.2)
BASOPHILS NFR BLD AUTO: 1 %
BILIRUB SERPL-MCNC: 0.2 MG/DL
BUN SERPL-MCNC: 21.9 MG/DL (ref 6–20)
CALCIUM SERPL-MCNC: 9 MG/DL (ref 8.6–10)
CHLORIDE SERPL-SCNC: 102 MMOL/L (ref 98–107)
CREAT SERPL-MCNC: 1.62 MG/DL (ref 0.51–0.95)
DEPRECATED HCO3 PLAS-SCNC: 22 MMOL/L (ref 22–29)
EOSINOPHIL # BLD AUTO: 0.3 10E3/UL (ref 0–0.7)
EOSINOPHIL NFR BLD AUTO: 3 %
ERYTHROCYTE [DISTWIDTH] IN BLOOD BY AUTOMATED COUNT: 16.2 % (ref 10–15)
GFR SERPL CREATININE-BSD FRML MDRD: 45 ML/MIN/1.73M2
GLUCOSE SERPL-MCNC: 85 MG/DL (ref 70–99)
HCT VFR BLD AUTO: 23.3 % (ref 35–47)
HGB BLD-MCNC: 7.1 G/DL (ref 11.7–15.7)
IMM GRANULOCYTES # BLD: 0.1 10E3/UL
IMM GRANULOCYTES NFR BLD: 1 %
LYMPHOCYTES # BLD AUTO: 1.6 10E3/UL (ref 0.8–5.3)
LYMPHOCYTES NFR BLD AUTO: 15 %
MCH RBC QN AUTO: 25.6 PG (ref 26.5–33)
MCHC RBC AUTO-ENTMCNC: 30.5 G/DL (ref 31.5–36.5)
MCV RBC AUTO: 84 FL (ref 78–100)
MONOCYTES # BLD AUTO: 0.6 10E3/UL (ref 0–1.3)
MONOCYTES NFR BLD AUTO: 6 %
NEUTROPHILS # BLD AUTO: 7.7 10E3/UL (ref 1.6–8.3)
NEUTROPHILS NFR BLD AUTO: 74 %
PHOSPHATE SERPL-MCNC: 4.3 MG/DL (ref 2.5–4.5)
PLATELET # BLD AUTO: 579 10E3/UL (ref 150–450)
POTASSIUM SERPL-SCNC: 4.7 MMOL/L (ref 3.4–5.3)
PROT SERPL-MCNC: 5.9 G/DL (ref 6.4–8.3)
RBC # BLD AUTO: 2.77 10E6/UL (ref 3.8–5.2)
SODIUM SERPL-SCNC: 136 MMOL/L (ref 136–145)
WBC # BLD AUTO: 10.1 10E3/UL (ref 4–11)
WBC # BLD AUTO: NORMAL 10*3/UL

## 2023-02-26 PROCEDURE — 84100 ASSAY OF PHOSPHORUS: CPT | Performed by: SURGERY

## 2023-02-26 PROCEDURE — 250N000013 HC RX MED GY IP 250 OP 250 PS 637: Performed by: PHYSICIAN ASSISTANT

## 2023-02-26 PROCEDURE — 250N000013 HC RX MED GY IP 250 OP 250 PS 637: Performed by: STUDENT IN AN ORGANIZED HEALTH CARE EDUCATION/TRAINING PROGRAM

## 2023-02-26 PROCEDURE — 250N000011 HC RX IP 250 OP 636: Performed by: THORACIC SURGERY (CARDIOTHORACIC VASCULAR SURGERY)

## 2023-02-26 PROCEDURE — 250N000011 HC RX IP 250 OP 636: Performed by: SURGERY

## 2023-02-26 PROCEDURE — 36415 COLL VENOUS BLD VENIPUNCTURE: CPT | Performed by: SURGERY

## 2023-02-26 PROCEDURE — 80053 COMPREHEN METABOLIC PANEL: CPT | Performed by: SURGERY

## 2023-02-26 PROCEDURE — 214N000001 HC R&B CCU UMMC

## 2023-02-26 PROCEDURE — 250N000011 HC RX IP 250 OP 636: Performed by: PHYSICIAN ASSISTANT

## 2023-02-26 PROCEDURE — 250N000013 HC RX MED GY IP 250 OP 250 PS 637: Performed by: NURSE PRACTITIONER

## 2023-02-26 PROCEDURE — 85025 COMPLETE CBC W/AUTO DIFF WBC: CPT | Performed by: SURGERY

## 2023-02-26 RX ORDER — ENOXAPARIN SODIUM 100 MG/ML
80 INJECTION SUBCUTANEOUS EVERY 12 HOURS
Status: DISCONTINUED | OUTPATIENT
Start: 2023-02-26 | End: 2023-02-27 | Stop reason: HOSPADM

## 2023-02-26 RX ORDER — ENOXAPARIN SODIUM 100 MG/ML
0.75 INJECTION SUBCUTANEOUS EVERY 12 HOURS
Status: DISCONTINUED | OUTPATIENT
Start: 2023-02-26 | End: 2023-02-26

## 2023-02-26 RX ADMIN — Medication 12.5 MG: at 20:00

## 2023-02-26 RX ADMIN — FAMOTIDINE 20 MG: 20 TABLET ORAL at 20:00

## 2023-02-26 RX ADMIN — FAMOTIDINE 20 MG: 20 TABLET ORAL at 10:24

## 2023-02-26 RX ADMIN — FUROSEMIDE 20 MG: 20 TABLET ORAL at 16:18

## 2023-02-26 RX ADMIN — PIPERACILLIN AND TAZOBACTAM 4.5 G: 4; .5 INJECTION, POWDER, FOR SOLUTION INTRAVENOUS at 12:46

## 2023-02-26 RX ADMIN — ENOXAPARIN SODIUM 80 MG: 80 INJECTION SUBCUTANEOUS at 20:00

## 2023-02-26 RX ADMIN — OXYCODONE HYDROCHLORIDE 10 MG: 10 TABLET ORAL at 01:28

## 2023-02-26 RX ADMIN — ENOXAPARIN SODIUM 80 MG: 80 INJECTION SUBCUTANEOUS at 10:25

## 2023-02-26 RX ADMIN — PIPERACILLIN AND TAZOBACTAM 4.5 G: 4; .5 INJECTION, POWDER, FOR SOLUTION INTRAVENOUS at 00:50

## 2023-02-26 RX ADMIN — ASPIRIN 81 MG 81 MG: 81 TABLET ORAL at 10:23

## 2023-02-26 RX ADMIN — ACETAMINOPHEN 650 MG: 325 TABLET ORAL at 12:54

## 2023-02-26 RX ADMIN — PRENATAL VIT W/ FE FUMARATE-FA TAB 27-0.8 MG 1 TABLET: 27-0.8 TAB at 10:23

## 2023-02-26 RX ADMIN — PIPERACILLIN AND TAZOBACTAM 4.5 G: 4; .5 INJECTION, POWDER, FOR SOLUTION INTRAVENOUS at 06:21

## 2023-02-26 RX ADMIN — GUAIFENESIN 600 MG: 600 TABLET ORAL at 10:24

## 2023-02-26 RX ADMIN — PIPERACILLIN AND TAZOBACTAM 4.5 G: 4; .5 INJECTION, POWDER, FOR SOLUTION INTRAVENOUS at 16:17

## 2023-02-26 RX ADMIN — FUROSEMIDE 20 MG: 20 TABLET ORAL at 10:24

## 2023-02-26 RX ADMIN — GUAIFENESIN 600 MG: 600 TABLET ORAL at 20:00

## 2023-02-26 RX ADMIN — Medication 12.5 MG: at 10:23

## 2023-02-26 ASSESSMENT — ACTIVITIES OF DAILY LIVING (ADL)
ADLS_ACUITY_SCORE: 22
ADLS_ACUITY_SCORE: 23
ADLS_ACUITY_SCORE: 25
ADLS_ACUITY_SCORE: 22
ADLS_ACUITY_SCORE: 22
ADLS_ACUITY_SCORE: 23
ADLS_ACUITY_SCORE: 22

## 2023-02-26 NOTE — PLAN OF CARE
Neuro: A&Ox4.   Cardiac: Afebrile, VSS.    Respiratory: RA  GI/: Voiding spontaneously. Held laxatives for loose BMs.  Diet/appetite: Fair appetite this afternoon. Denies nausea.  Activity: Up ad drake.   Pain: Rates sternal incision pain about 4. Took PRN tylenol but patient is trying to avoid narcotics.   Skin: Sternum, old CT sites and bilateral groin sites MALLIKA, no drainage.  Lines: PIV x2 SL'd.  Replacements: labs WNL.  Plan: Potential discharge to home tomorrow.        Goal Outcome Evaluation:      Plan of Care Reviewed With: patient

## 2023-02-26 NOTE — PROGRESS NOTES
D/continues in iso, and on ATB, intermittent mild cough   A/progressing  P/monitor for changes, possibly home tomorrow

## 2023-02-26 NOTE — PLAN OF CARE
"/60 (BP Location: Right arm, Cuff Size: Adult Regular)   Pulse 99   Temp 98.1  F (36.7  C) (Oral)   Resp 18   Ht 1.626 m (5' 4\")   Wt 98.5 kg (217 lb 1.6 oz)   LMP 01/10/2023 (Approximate)   SpO2 93%   BMI 37.27 kg/m      Shift: 3606-9432     Status: Adm w/ RA thrombus, s/p emergency sternotomy, Right Atrial Thrombectomy, Central Cannulation, Bilateral Femoral Cannulation, Closure of PFO with Dr. Elmore on 2/18. PMH granulomatous polyangiitis, recent PE, prior DVT complicated by PE, currently COVID+ & 6 weeks pregnant      Neuro: A&O x4. Able to make needs known  Respiratory: RA, sating mid 90s. Denies SOB, frequent congestive cough   Cardiac: SR, HR 80s-90s  GI/: Voids w/out difficulty, LBM yesterday   Diet: Regular  Pain: Incisional pain managed w/ PRN oxy 10mg x 1  Incision/Drains: Sternal incision MALLIKA, old CT sites and groin site dressings CDI   IV Access: R PIV x2  Labs: Reviewed  Activity: Up ad drake     New changes this shift: No acute changes overnight. Cares clustered to promote rest/sleep.     Plan: Possible discharge Monday. Continue w/ POC and notify CVTS of any changes or concerns     "

## 2023-02-26 NOTE — PROGRESS NOTES
Cardiovascular Surgery Progress Note  02/26/2023         Assessment and Plan:     Cande Shields is a 22 year old female admitted on 2/17/2023 with PMH of obesity BMI 38, granulomatous polyangiitis, recent PE, prior DVT complicated by PE with possible pregnancy, COVID-19+ with RA thrombus noted on TTE, s/p attempted angiovac for thrombectomy with Dr. Monterroso and s/p emergency sternotomy, Right Atrial Thrombectomy, Central Cannulation, Bilateral Femoral Cannulation, Closure of PFO with Dr. Elmore on 2/18.      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  Normal right ventricular size and function normal.  LVEF 60 to 65%.  Post-op echo 2/18 PM: completed by Anesthesia, Post CPB: normal biventricular function, valves unchanged.  PFO has been closed, no flow evident.  Thrombus previously noted in RAA is no longer present.  No aortic dissection noted.   - ASA 81 mg daily  - No statin indicated  - BB: metoprolol tartrate 12.5 mg BID with hold parameters for bradycardia  - Diuresis, AC and ID as below.     Chest tubes: removed 2/21  TPW: None     Pulmonary:  COVID 19 +  Concern for CAP vs HAP  Extubated POD 0 to  NC. Now saturating well on 2 lpm, has loose cough  - Supplemental O2 PRN to keep sats > 92%. Wean off as tolerated.  - Pulm hygiene, IS, activity and deep breathing. Mucinex BID x 5 days.   - ID as below  - CXR with stable LLL opacity vs small effusion      Neurology / MSK:  Acute post-operative pain  Chronic pain  History of EtOH use disorder  Costochondritis, 2nd ICS; L>R  Pain well controlled with current regimen:  - Acetaminophen, PO oxycodone PRN, IV dilaudid PRN, methocarbamol      / Renal / Fluid / Electrolytes:  CKD 3  Hypervolemia  BL creat ~ 1.4-1.5, most recent  creatinine 1.62; adequate UOP.   FLUID STATUS: Pre-op weight 220 lbs  - Diuresis: Stopped IV due to creatinine bump, started Lasix 40 mg PO BID 2/23 afternoon and reduced to 20 mg PO BID 2/24 afternoon.   - Replete lytes per protocol.  Strict I/O, daily weights.  - Avoid/limit nephrotoxins as able.  - 2/26; reports no dysuria or flank pain, has hematuria but she says this is normal for her.     Rheumatology  Granulomatous polyangiitis with renal, upper respiratory, musculoskeletal, and cutaneous involvement  PR3 positive ANCA vasculitis  Dx of granulomatous polyangiitis in 2020, and she has completed induction therapy with prednisone and rituximab.  - Rheum consult 2/18;     -- No indication for steroids currently given no current flare, not currently taking steroids (however, per [facility], steroids ok in pregnancy if necessary/indicated)    -- CRP every 3 days     GI / Nutrition:   Concern for ileus, resolving  CXR demonstrates dilated loops of bowel c/f ileus. Abd soft and non tender but has not had BM yet, not ambulating much d/t iso. Had not been passing gas.  - Abd x-ray 2/22 with air-filled bowel, non dilated.   - Continue bowel regimen. BID senokot and BID miralax.  - Encourage ambulation, hydration and monitor clinically.  - Resume regular diet 2/23 now that + flatus and + BM 2/22 evening after suppository.     Endocrine:  Stress induced hyperglycemia   - Initially managed on insulin drip postop, transitioned to sliding scale; goal BG <180 for optimal healing     Infectious Disease:  Stress induced leukocytosis  COVID-19  RML pneumonia  Strep anginosis - strep throat  WBC 10.1 and trended down on zosyn, remains afebrile, procal positive and continues to have loose cough.   Surgical cultures thus far with NGTD.   - Completed perioperative antibiotics. Continue to monitor fever curve, CBC  - Had planned Ceftriaxone for 10 day course for strep throat, broadened to Zosyn on 2/22 - 2/26 given increasing leukocytosis  and procal.  - Per ID, Remdesevir 200 mg IV and 100 mg IV daily x 4 days   - Repeated pan cultures on  given worsening leukocytosis, NGTD.   - Procal trending up 2.34 (low 1.38). CRP trending down 102 (high 342).      Hematology:   Acute blood loss anemia  Hgb 7.1 and stable; Plt 579, no signs or symptoms of active bleeding.  - CBC in AM     Prior PE, occlusion of CFA, 2021     Acute PE, bilateral lobar R, seg and subseg left/R, 2023  Hx of DVT  Concern for HIT, resolved  - Heparin drip initially used post-operatively.   - Positive lupus anticoagulant  - HIT negative  Recs per heme:   -Transitioned heparin gtt back to lovenox 80 mg BID on   -check lovenox anti-Xa 4-6 hours after 3rd lovenox dose (goal antiXa 0.8-1.2, slightly higher range due to pregnancy)   -Isolated positive lupus anticoagulant  was checked while on anticoagulation, does not change current management, no further testing needed now  -Follow-up scheduled with Dr. Dye 3/8/23 in [facility]     Obstetrics and Gynecology  Hx HELLP with spontaneous late   Possible Pregnancy,   Bicornuate uterus  - M Consult recommendations in place, appreciated, see detailed consult note with recommendations outlined by Dr. Fox 23. Please page MFM team at [phone] if Q's.  - Possible 5 weeks pregnant per ?LMP; with IUFD at 30 weeks and 6 days.  - Detailed recommendations, summarized as follows:            - Neurological: avoid NSAIDs                - CV: OK for Heparin and Lovenox, avoid DOAC/Warfarin, with recommended SBP<130/80, OK for Beta Blockers, avoid ACE/ARB/statins            - Endocrine: Goal BG < 120            - ID: OK for Penicillin and Cephalosporin            - Obstetrics: TV U/S to evaluate pregnancy            - Radiological consideration: Limit radiation, use shielding            - In General: Will avoid teratogenic medications  When she is going to be  "discharged, please page MFM team during the day so they can coordinate outpatient OB care with MFM clinic at [facility].      MSK/Skin:  Granulomatosis polyangitis  Sternotomy  Surgical incision  - Sternal precautions  - Incisional cares per protocol     Prophylaxis:   - Stress ulcer prophylaxis: Pantoprazole 40 mg daily.  - DVT prophylaxis: IV heparin, SCD.     Disposition:   - Transferred to  on 2/20  - Therapies recommending discharge to home when medically stable (? Monday). Last antibiotic dose 2/26.  - Hematology: Follow-up scheduled with Dr. Dye 3/8/23 in [facility]    Discussed with Dr Barker through written and verbal communication.      Kevin Dinero PA-C  Cardiothoracic Surgery  Pager [phone]    7:31 AM   February 26, 2023        Interval History:     No overnight events.  Hoping to get home soon. IV Zosyn finishes tonight.     States pain is well managed on current regimen. Slept well overnight.  Tolerating diet, is passing flatus, + BM. No nausea or vomiting.  Breathing well without complaints.   Working with therapies and ambulating in room without assistance.   Denies chest pain, palpitations, dizziness, syncopal symptoms, fevers, chills, myalgias, or sternal popping/clicking.         Physical Exam:   Blood pressure 105/71, pulse 80, temperature 98.8  F (37.1  C), temperature source Oral, resp. rate 18, height 1.626 m (5' 4\"), weight 98.5 kg (217 lb 1.6 oz), last menstrual period 01/10/2023, SpO2 95 %, not currently breastfeeding.  Vitals:    02/23/23 0411 02/25/23 0515 02/26/23 0442   Weight: 102.8 kg (226 lb 11.2 oz) 99.5 kg (219 lb 5.7 oz) 98.5 kg (217 lb 1.6 oz)      Weight; - 3 lbs since admit and trending down.   24 hr Fluid status; net loss 565 mL. UOP 1.2 L  MAPs: 72 - 76     Gen: A&Ox4, NAD  Neuro: no focal deficits   CV: RRR, normal S1 S2, no murmurs, rubs or gallops.   Pulm: CTA, no wheezing or rhonchi, normal " breathing on RA  Abd: nondistended, normal BS, soft, nontender  Ext: no peripheral edema  Incision: clean, dry, intact, no erythema, sternum stable  Tubes/drain sites: dressing clean and dry         Data:    Imaging:  reviewed recent imaging, no acute concerns    Labs:  BMP  Recent Labs   Lab 02/26/23  0625 02/25/23  0739 02/24/23  0613 02/23/23  1030    136 135* 136   POTASSIUM 4.7 4.5 4.7 4.7   CHLORIDE 102 101 98 101   KRAIG 9.0 8.7 9.0 8.5*   CO2 22 23 24 22   BUN 21.9* 24.0* 20.8* 21.7*   CR 1.62* 1.52* 1.54* 1.46*   GLC 85 81 78 100*     CBC  Recent Labs   Lab 02/26/23  0625 02/25/23  0739 02/24/23  0613 02/23/23  1202 02/23/23  1030   WBC 10.1 7.7 10.7  --  14.7*   RBC 2.77* 2.74* 2.84*  --  2.66*   HGB 7.1* 7.0* 7.3* 7.3* 6.8*   HCT 23.3* 23.3* 24.1*  --  22.5*   MCV 84 85 85  --  85   MCH 25.6* 25.5* 25.7*  --  25.6*   MCHC 30.5* 30.0* 30.3*  --  30.2*   RDW 16.2* 16.2* 16.6*  --  16.9*   * 541* 518*  --  472*     INR  No lab results found in last 7 days.   Hepatic Panel  Recent Labs   Lab 02/26/23  0625 02/25/23  0739 02/24/23  0613 02/23/23  1030   AST 17 12 16 14   ALT 11 12 14 13   ALKPHOS 270* 230* 253* 204*   BILITOTAL 0.2 0.2 0.2 0.2   ALBUMIN 2.8* 2.7* 2.6* 2.5*     GLUCOSE:   Recent Labs   Lab 02/26/23  0625 02/25/23  0739 02/24/23  0613 02/23/23  1030 02/22/23  0950 02/22/23  0108   GLC 85 81 78 100* 96 99

## 2023-02-26 NOTE — DISCHARGE SUMMARY
[facility], [place]   Cardiothoracic Surgery Hospital Discharge Summary     Cande Shields MRN# [id number]   Age: 22 year old YOB: 2000     Admitting Physician:  German Lopez MD  Discharge Physician:  Wendy Matthews NP  Primary Care Physician:        Cira Amanda     DATE OF ADMISSION: 2023      DATE OF DISCHARGE: 2023     Admit Wt: 220 lbs   Discharge Wt: 213 lbs          Primary Diagnoses:   1. Acute saddle PE, bilateral lobar right segmental and subsegmental left/right  2. S/P right atrial thrombectomy via sternotomy   3. COVID +    4. Right middle lobe PNA, hospital acquired pneumonia, culture negative  5. Therapeutic anticoagulation with Lovenox 80 mg injection BID.  6. Early in Pregnancy  7. Costochondritis, 2nd ICS, left > right          Secondary Diagnoses:   1. Prior PE, occlusion of CFA, 2021    2. Granulomatous polyangiitis with renal, upper respiratory, musculoskeletal, and cutaneous involvement  3. Hx HELLP and spontaneous late-term   4. KATERINA (resolved) on CKD 3  5. ADHD  6. Dysmenorrhea  7. EtOH use disorder  8. Bicornate Uterus   9. Hypervolemia, resolved    PROCEDURES PERFORMED:   Date: 2023.  Surgeon: Dr. Adelfo Elmore  1. Angiovac for Right atrial clot  2. Thrombectomy with bilateral percutaneous femoral venous access   3. Closure of PFO  4. Sternotomy     INTRAOPERATIVE COMPLICATIONS:  none    PATHOLOGY RESULTS:    Surgical Pathology 2023-  HEART, RIGHT ATRIAL THROMBUS; THROMBECTOMY:  [place] (acellular) thrombus; negative for acute inflammatory/vegetation exudates    CULTURE RESULTS:    No positive cultures- Sputum , blood , surgical specimen .    CONSULTS:    1. PT/OT  2. Hematology   3. OB - GYN  4. 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  Very complex history, recent vasculitis flareup in 12/22 which was treated with 3 doses of rituximab and she was started on avacopan with rapid steroid taper.  At that time she presented with new petechiae, KATERINA and hemoptysis. However, in the interim, she tested positive for pregnancy which led to discontinuation of avacopan and holding off on the fourth dose of rituximab. Her last dose of Rituximab was on 1/17/23.  Had a significant ED visit on 2/12 where she was diagnosed with saddle pulmonary embolus with mild right heart strain.  She was discharged on therapeutic enoxaparin. Presented to the ED again on 02/17/23 with complaints of worsening chest pain, cough and new onset hemoptysis for one day.  Work-up in the ED showed significantly elevated CRP at 322 which improved to 242.  Otherwise normal creatinine. Elevated WBC and procal. Planned angiovac via catheterization on 2/18/23 with surgical back-up.    Pre-op echo 2/18 AM: large highly mobile thrombus in the right atrium, at least 2.2 cm in length.  Normal right ventricular size and function normal.  LVEF 60 to 65%.  Post-op echo 2/18 PM: completed by Anesthesia, Post CPB: normal biventricular function, valves unchanged.  PFO has been closed, no flow evident.  Thrombus previously noted in RAA is no longer present.  No aortic dissection noted.     HOSPITAL COURSE:   Cande Shields is a 22 year old female who on 2/18/2023 underwent the above-named procedures.  She required emergency sternotomy for R atrial thrombectomy and PFO closure. Overall, she tolerated the operation well and postoperatively was admitted to the CVICU.  She was extubated on POD # 0 to 4 lpm via NC with neuro status intact.  Her ICU stay was complicated by PMHx and Pregnancy.  Hematology, Rheumatology, and OB-GYN were asked to follow during her recovery. She was started on a heparin gtt and transitioned to therapeutic Lovenox dosing. She was started on Remdesevir for COVID status and Ceftriaxone for strept throat.     She was transferred to the post-surgical telemetry unit on POD # 2.   Due to early pregnancy status, maternal fetal medicine followed along and will schedule follow up at the time of discharge. Additionally, rheumatology peripherally followed due to history of granulomatous polyangiitis and PR3 positive ANCA vasculitis. No indication for steroids during hospital course and she should follow with rheumatology as prior.    Due to leukocytosis and strep throat, she was transitioned from ceftriaxone to zosyn for broad coverage with resolution of elevated WBC.  Her CRP trended down and she remained afebrile. She did have early ileus that resolved with conservative measures and is having regular bowel movements at the time of discharge.    Given large clot burden, enoxaparin was resumed at the direction of hematology and she will discharge with this. Her hemoglobin is stable at the time of discharge. There was concern for HIT which returned negative. She is to follow up with Dr. Dye on 2/8/23 in the center for bleeding and clotting disorders (CBCD).    Prior to discharge, her pain was controlled well, she was able to perform most ADLs and ambulate without difficulty, and had full return of bowel and bladder function.  On February 27, 2023, she was discharged to home in stable condition.    Patient discharged on aspirin:  81 mg  Patient discharged on a statin: Not indicated  Patient discharged on beta blocker: Yes  Patient discharged on ACE Inhibitor/ARB: No         Discharge Disposition:     Discharged to home            Condition on Discharge:     Discharge condition: Stable   Discharge vitals: Blood pressure 117/69, pulse 94, temperature 98.7  F (37.1  C), temperature source  "Oral, resp. rate 16, height 1.626 m (5' 4\"), weight 96.8 kg (213 lb 6.4 oz), last menstrual period 01/10/2023, SpO2 94 %, not currently breastfeeding.     Code status on discharge: Full Code     Vitals:    02/25/23 0515 02/26/23 0442 02/27/23 0335   Weight: 99.5 kg (219 lb 5.7 oz) 98.5 kg (217 lb 1.6 oz) 96.8 kg (213 lb 6.4 oz)       DAY OF DISCHARGE PHYSICAL EXAM:    Blood pressure 117/69, pulse 94, temperature 98.7  F (37.1  C), temperature source Oral, resp. rate 16, height 1.626 m (5' 4\"), weight 96.8 kg (213 lb 6.4 oz), last menstrual period 01/10/2023, SpO2 94 %, not currently breastfeeding.  Vitals:    02/25/23 0515 02/26/23 0442 02/27/23 0335   Weight: 99.5 kg (219 lb 5.7 oz) 98.5 kg (217 lb 1.6 oz) 96.8 kg (213 lb 6.4 oz)        Gen: A&Ox4, NAD  Neuro: Intact with no focal deficits   CV: RRR, normal S1 S2, no murmurs, rubs or gallops. no JVD  Pulm: CTA, no wheezing or rhonchi, normal breathing on RA, loose cough, improving  Abd: nondistended, normal BS, soft, nontender  Ext: no peripheral edema, no pitting  Incision: clean, dry, intact, no erythema, sternum stable  Tubes/drain sites: dressing clean and dry    BMP  Recent Labs   Lab 02/27/23  0653 02/26/23  0625 02/25/23  0739 02/24/23  0613    136 136 135*   POTASSIUM 4.3 4.7 4.5 4.7   CHLORIDE 105 102 101 98   KRAIG 9.3 9.0 8.7 9.0   CO2 20* 22 23 24   BUN 22.0* 21.9* 24.0* 20.8*   CR 1.53* 1.62* 1.52* 1.54*   GLC 96 85 81 78     CBC  Recent Labs   Lab 02/27/23  0653 02/26/23  0625 02/25/23  0739 02/24/23  0613   WBC 11.2* 10.1 7.7 10.7   RBC 2.81* 2.77* 2.74* 2.84*   HGB 7.2* 7.1* 7.0* 7.3*   HCT 24.0* 23.3* 23.3* 24.1*   MCV 85 84 85 85   MCH 25.6* 25.6* 25.5* 25.7*   MCHC 30.0* 30.5* 30.0* 30.3*   RDW 16.4* 16.2* 16.2* 16.6*   * 579* 541* 518*     INR  No lab results found in last 7 days.   Hepatic Panel  Recent Labs   Lab 02/27/23  0653 02/26/23  0625 02/25/23  0739 02/24/23  0613   AST 13 17 12 16   ALT 16 11 12 14   ALKPHOS 248* 270* 230* " 253*   BILITOTAL <0.2 0.2 0.2 0.2   ALBUMIN 3.0* 2.8* 2.7* 2.6*     Recent Labs   Lab 02/27/23  0653 02/26/23  0625 02/25/23  0739 02/24/23  0613 02/23/23  1030 02/22/23  0950   GLC 96 85 81 78 100* 96       ECHOCARDIOGRAM, 2/18/2023-   Interpretation Summary  Large highly mobile thrombus in transit in the right atrium, at least 2.2 cm in length.  Right ventricular function, chamber size, wall motion, and thickness are normal.  Known patent foramen ovale, suboptimally imaged on this study. Intermittent  left to right atrial-level shunting is seen on some color Doppler images.  Pulmonary artery systolic pressure cannot be assessed.  Previous study not available for comparison.  ______________________________________________________________________________  Left Ventricle  Global and regional left ventricular function is normal with an EF of 60-65%.  Left ventricular size is normal. Left ventricular wall thickness is normal.  Left ventricular diastolic function is normal. Diastolic Doppler findings  (E/E' ratio and/or other parameters) suggest left ventricular filling pressures are normal.     Right Ventricle  Right ventricular function, chamber size, wall motion, and thickness are normal.     Atria  Both atria appear normal. Known patent foramen ovale, suboptimally imaged on this study. Intermittent left to right  atrial-level shunting is seen on some color Doppler images. Large highly mobile thrombus in transit in the right  atrium, at least 2.2 cm in length.     Mitral Valve  The mitral valve is normal.     Aortic Valve  The valve leaflets are not well visualized. On Doppler interrogation, there is no significant stenosis or regurgitation.     Tricuspid Valve  The tricuspid valve is normal. Mild tricuspid insufficiency is present. The peak velocity of the tricuspid regurgitant jet is  not obtainable. Pulmonary artery systolic pressure cannot be assessed.     Pulmonic Valve  The pulmonic valve is normal. Trace  pulmonic insufficiency is present.     Vessels  The aorta root is normal. The inferior vena cava was normal in size with preserved respiratory variability. IVC diameter  <2.1 cm collapsing >50% with sniff suggests a normal RA pressure of 3 mmHg.     Pericardium  No pericardial effusion is present.     Compared to Previous Study  Previous study not available for comparison.  ______________________________________________________________________________  MMode/2D Measurements & Calculations  IVSd: 0.93 cm  LVIDd: 4.6 cm  LVIDs: 2.9 cm  LVPWd: 0.92 cm  FS: 36.5 %  LV mass(C)d: 140.8 grams  LV mass(C)dI: 69.1 grams/m2  Ao root diam: 3.2 cm  asc Aorta Diam: 2.5 cm  LVOT diam: 2.2 cm  LVOT area: 3.7 cm2  RWT: 0.40     Doppler Measurements & Calculations  MV E max laisha: 58.1 cm/sec  MV A max laisha: 72.9 cm/sec  MV E/A: 0.80  MV dec time: 0.13 sec  E/E' av.8  Lateral E/e': 3.5  Medial E/e': 6.1    CXR 2023-   PA and lateral chest radiograph. Intact median sternotomy wires. Trachea is midline. Unchanged cardiac silhouette.  Dense retrocardiac opacities similar to slightly increased. Similar to slightly improved mixed interstitial and airspace  opacities. Improved right basilar rounded opacity. Decreased right apical pneumothorax. No left pneumothorax. Small left  pleural effusion. No significant right pleural effusion. Silhouetting of the left hemidiaphragm. There are some gas-filled  loops of bowel in the left upper quadrant, not significantly distended. No acute osseous abnormalities.  Impression:   1.  Stable postsurgical changes.  2.  Decreased right apical pneumothorax.  3.  Improved right basilar opacity and similar to slightly improved mixed interstitial and airspace opacities, likely  atelectasis/edema.  Dense retrocardiac opacity similar to slightly increased.  4.  Stable small left pleural effusion.    DISCHARGE INSTRUCTIONS:  You had a sternotomy, avoid lifting anything greater than ten pounds for 6 weeks  after surgery and then less than 20 pounds for an additional 6 weeks.   Do not reach backwards or use arms to push out of chair.   Do not let people pull on your arms to assist with standing.   Avoid twisting or reaching too far across your body.    Avoid strenuous activities such as bowling, vacuuming, raking, shoveling, golf or tennis for 12 weeks after your surgery.   It is okay to resume sex if you feel comfortable in doing so. You may have to try different positions with your partner.    Splint your chest incision by hugging a pillow or bringing your arms across your chest when coughing or sneezing.     No driving for 4 weeks after surgery or while on pain medication.     Shower or wash your incisions daily with soap and water (or as instructed), pat dry.   Keep wound clean and dry, showers are okay after discharge, but don't let spray hit directly on incision.   No baths or swimming for 1 month.   Cover chest tube sites with dry gauze until they stop draining, then leave open to air. It is not abnormal for chest tube sites to drain yellowish/clear fluid for up to 2-3 weeks after surgery.   Watch for signs of infection: increased redness, tenderness, warmth or any drainage that appears infected (pus like) or is persistent.  Also a temperature > 100.5 F or chills. Call your surgeon or primary care provider's office immediately.   Remove any skin glue left on incisions after 10-14 days. This will not affect your incision and can speed up healing.    Exercise is very important in your recovery. Please follow the guidelines set up for you in your cardiac rehab classes at the hospital. If outpatient cardiac rehab was ordered for you, we highly recommend you participate. If you have problems arranging your cardiac rehab, please call [phone] for all locations, with the exception of [place], please call [phone] and [place], please call [phone].    Avoid sitting for prolonged periods of time, try to  walk every hour during the day. If you have a leg incision, elevate your leg often when you are not walking.    Check your weight when you get home from the hospital and continue to check it daily through your recovery for at least a month. If you notice a weight gain of 2-3 pounds in a week, notify your primary care physician, cardiologist or surgeon.    Bowel activity may be slow after surgery. If necessary, you may take an over the counter laxative such as milk of magnesia or Miralax. You may have stool softeners prescribed (docusate sodium, Senokot). We recommend using stool softeners while using narcotics for pain (oxycodone/percocet, hydrocodone/vicodin, hydromorphone/dilaudid).      Wean OFF of narcotics (oxycodone, dilaudid, hydrocodone) as soon as possible. You should continue taking acetaminophen as long as you have any surgical pain as the first choice for pain control and add narcotics as necessary for pain to be tolerable.      DO NOT SMOKE.  IF YOU NEED HELP QUITTING, PLEASE TALK WITH YOUR CARDIOLOGIST OR PRIMARY DOCTOR.    You are on a blood thinner (Lovenox injections), follow the instructions you were given in the hospital and DO NOT SKIP this medication. Try and take it the same time everyday. Your primary care physician or coumadin clinic will manage the dosing.     REGARDING PRESCRIPTION REFILLS.  If you need a refill on your pain medication contact us to discuss your pain and a possible one time refill.   All other medications will be adjusted, discontinued and re-filled by your primary care physician and/or your cardiologist as they were prior to your surgery. We have given you enough for one to three month with possibly one refill.    POST-OPERATIVE CLINIC VISITS  You have a follow up visit with [facility] Advance Care Practitioners on 3/10/23 at 2 pm at the [facility].  You will then return to the care of your primary provider and your cardiologist. Future medication  refills should come from your PCP or Cardiologist.   You should see your primary care provider about 1-2 weeks after discharge.   OB/GYN follow up on 3/2/23.  Hematology (Dr Dye) follow up on 3/8/23.  It is important to see your cardiologist (Roxie Oneal MD) as scheduled on 3/10/23.      PRE-ADMISSION MEDICATIONS:  No current facility-administered medications on file prior to encounter.  clindamycin (CLEOCIN T) 1 % external lotion, 1-2 times daily as spot treatment for pus bumps.  oxyCODONE (ROXICODONE) 5 MG tablet, Take 1 tablet (5 mg) by mouth every 6 hours as needed for severe pain (7-10)  Prenatal Vit-Fe Fumarate-FA (PRENATAL VITAMINS PO),   predniSONE (DELTASONE) 10 MG tablet, Take 10 mg by mouth daily (Patient not taking: Reported on 2/21/2023)         DISCHARGE MEDICATIONS:   Current Discharge Medication List      START taking these medications    Details   acetaminophen (TYLENOL) 325 MG tablet Take 2 tablets (650 mg) by mouth every 4 hours as needed for other (For optimal non-opioid multimodal pain management to improve pain control.)    Associated Diagnoses: S/P patent foramen ovale closure      aspirin (ASA) 81 MG chewable tablet Take 1 tablet (81 mg) by mouth daily  Qty: 30 tablet, Refills: 2    Associated Diagnoses: S/P patent foramen ovale closure      famotidine (PEPCID) 20 MG tablet Take 1 tablet (20 mg) by mouth 2 times daily for 14 days  Qty: 30 tablet, Refills: 0    Associated Diagnoses: S/P patent foramen ovale closure      guaiFENesin (MUCINEX) 600 MG 12 hr tablet Take 1 tablet (600 mg) by mouth 2 times daily for 5 days  Qty: 10 tablet, Refills: 0    Associated Diagnoses: S/P patent foramen ovale closure      metoprolol tartrate (LOPRESSOR) 25 MG tablet Take 0.5 tablets (12.5 mg) by mouth 2 times daily  Qty: 60 tablet, Refills: 2    Associated Diagnoses: S/P patent foramen ovale closure      polyethylene glycol (MIRALAX) 17 GM/Dose powder Take 17 g by mouth daily as needed for  constipation  Qty: 510 g, Refills: 0    Associated Diagnoses: S/P patent foramen ovale closure      senna-docusate (SENOKOT-S/PERICOLACE) 8.6-50 MG tablet Take 1 tablet by mouth 2 times daily as needed for constipation  Qty: 60 tablet, Refills: 0    Associated Diagnoses: S/P patent foramen ovale closure         CONTINUE these medications which have CHANGED    Details   enoxaparin ANTICOAGULANT (LOVENOX) 80 MG/0.8ML syringe Inject 0.8 mLs (80 mg) Subcutaneous every 12 hours for 90 days  Qty: 48 mL, Refills: 2    Associated Diagnoses: S/P patent foramen ovale closure         CONTINUE these medications which have NOT CHANGED    Details   clindamycin (CLEOCIN T) 1 % external lotion 1-2 times daily as spot treatment for pus bumps.  Qty: 60 mL, Refills: 11    Associated Diagnoses: Folliculitis      oxyCODONE (ROXICODONE) 5 MG tablet Take 1 tablet (5 mg) by mouth every 6 hours as needed for severe pain (7-10)  Qty: 12 tablet, Refills: 0    Associated Diagnoses: Acute chest pain      Prenatal Vit-Fe Fumarate-FA (PRENATAL VITAMINS PO)       predniSONE (DELTASONE) 10 MG tablet Take 10 mg by mouth daily         STOP taking these medications       Avacopan 10 MG CAPS Comments:   Reason for Stopping:         benzoyl peroxide 5 % external liquid Comments:   Reason for Stopping:         cephALEXin (KEFLEX) 500 MG capsule Comments:   Reason for Stopping:         methylPREDNISolone (MEDROL DOSEPAK) 4 MG tablet therapy pack Comments:   Reason for Stopping:                   CC:s  Cira Amanda [facility]   Cardiothoracic Surgery  Office phone: [phone]  Office fax: [phone]     * a total of 45 minutes was spent on this discharge, including coordination of care, review of lab and imaging results,  patient communication and education, and discussion of care plan with consulting teams and surgeon.

## 2023-02-27 VITALS
WEIGHT: 213.4 LBS | SYSTOLIC BLOOD PRESSURE: 117 MMHG | DIASTOLIC BLOOD PRESSURE: 69 MMHG | HEART RATE: 94 BPM | RESPIRATION RATE: 16 BRPM | TEMPERATURE: 98.7 F | HEIGHT: 64 IN | BODY MASS INDEX: 36.43 KG/M2 | OXYGEN SATURATION: 94 %

## 2023-02-27 LAB
ALBUMIN SERPL BCG-MCNC: 3 G/DL (ref 3.5–5.2)
ALP SERPL-CCNC: 248 U/L (ref 35–104)
ALT SERPL W P-5'-P-CCNC: 16 U/L (ref 10–35)
ANION GAP SERPL CALCULATED.3IONS-SCNC: 12 MMOL/L (ref 7–15)
AST SERPL W P-5'-P-CCNC: 13 U/L (ref 10–35)
BACTERIA BLD CULT: NO GROWTH
BACTERIA BLD CULT: NO GROWTH
BILIRUB SERPL-MCNC: <0.2 MG/DL
BUN SERPL-MCNC: 22 MG/DL (ref 6–20)
CALCIUM SERPL-MCNC: 9.3 MG/DL (ref 8.6–10)
CHLORIDE SERPL-SCNC: 105 MMOL/L (ref 98–107)
CREAT SERPL-MCNC: 1.53 MG/DL (ref 0.51–0.95)
CRP SERPL-MCNC: 45.7 MG/L
DEPRECATED HCO3 PLAS-SCNC: 20 MMOL/L (ref 22–29)
ERYTHROCYTE [DISTWIDTH] IN BLOOD BY AUTOMATED COUNT: 16.4 % (ref 10–15)
GFR SERPL CREATININE-BSD FRML MDRD: 48 ML/MIN/1.73M2
GLUCOSE SERPL-MCNC: 96 MG/DL (ref 70–99)
HCT VFR BLD AUTO: 24 % (ref 35–47)
HGB BLD-MCNC: 7.2 G/DL (ref 11.7–15.7)
MCH RBC QN AUTO: 25.6 PG (ref 26.5–33)
MCHC RBC AUTO-ENTMCNC: 30 G/DL (ref 31.5–36.5)
MCV RBC AUTO: 85 FL (ref 78–100)
PHOSPHATE SERPL-MCNC: 3.5 MG/DL (ref 2.5–4.5)
PLATELET # BLD AUTO: 617 10E3/UL (ref 150–450)
POTASSIUM SERPL-SCNC: 4.3 MMOL/L (ref 3.4–5.3)
PROT SERPL-MCNC: 6.1 G/DL (ref 6.4–8.3)
RBC # BLD AUTO: 2.81 10E6/UL (ref 3.8–5.2)
SODIUM SERPL-SCNC: 137 MMOL/L (ref 136–145)
WBC # BLD AUTO: 11.2 10E3/UL (ref 4–11)

## 2023-02-27 PROCEDURE — 250N000013 HC RX MED GY IP 250 OP 250 PS 637: Performed by: NURSE PRACTITIONER

## 2023-02-27 PROCEDURE — 84100 ASSAY OF PHOSPHORUS: CPT | Performed by: SURGERY

## 2023-02-27 PROCEDURE — 80053 COMPREHEN METABOLIC PANEL: CPT | Performed by: SURGERY

## 2023-02-27 PROCEDURE — 85027 COMPLETE CBC AUTOMATED: CPT | Performed by: SURGERY

## 2023-02-27 PROCEDURE — 250N000011 HC RX IP 250 OP 636: Performed by: THORACIC SURGERY (CARDIOTHORACIC VASCULAR SURGERY)

## 2023-02-27 PROCEDURE — 250N000013 HC RX MED GY IP 250 OP 250 PS 637: Performed by: PHYSICIAN ASSISTANT

## 2023-02-27 PROCEDURE — 250N000013 HC RX MED GY IP 250 OP 250 PS 637: Performed by: STUDENT IN AN ORGANIZED HEALTH CARE EDUCATION/TRAINING PROGRAM

## 2023-02-27 PROCEDURE — 86140 C-REACTIVE PROTEIN: CPT | Performed by: PHYSICIAN ASSISTANT

## 2023-02-27 PROCEDURE — 36415 COLL VENOUS BLD VENIPUNCTURE: CPT | Performed by: SURGERY

## 2023-02-27 RX ORDER — AMOXICILLIN 250 MG
1 CAPSULE ORAL 2 TIMES DAILY PRN
Qty: 60 TABLET | Refills: 0 | Status: SHIPPED | OUTPATIENT
Start: 2023-02-27 | End: 2023-03-10 | Stop reason: SINTOL

## 2023-02-27 RX ORDER — POLYETHYLENE GLYCOL 3350 17 G/17G
17 POWDER, FOR SOLUTION ORAL DAILY PRN
Qty: 510 G | Refills: 0 | Status: SHIPPED | OUTPATIENT
Start: 2023-02-27 | End: 2023-03-10 | Stop reason: SINTOL

## 2023-02-27 RX ORDER — FAMOTIDINE 20 MG/1
20 TABLET, FILM COATED ORAL 2 TIMES DAILY
Qty: 30 TABLET | Refills: 0 | Status: SHIPPED | OUTPATIENT
Start: 2023-02-27 | End: 2023-03-13

## 2023-02-27 RX ORDER — METOPROLOL TARTRATE 25 MG/1
12.5 TABLET, FILM COATED ORAL 2 TIMES DAILY
Qty: 60 TABLET | Refills: 2 | Status: SHIPPED | OUTPATIENT
Start: 2023-02-27 | End: 2023-04-26

## 2023-02-27 RX ORDER — ASPIRIN 81 MG/1
81 TABLET, CHEWABLE ORAL DAILY
Qty: 30 TABLET | Refills: 2 | Status: SHIPPED | OUTPATIENT
Start: 2023-02-28 | End: 2023-03-30

## 2023-02-27 RX ORDER — ENOXAPARIN SODIUM 100 MG/ML
80 INJECTION SUBCUTANEOUS EVERY 12 HOURS
Qty: 48 ML | Refills: 2 | Status: SHIPPED | OUTPATIENT
Start: 2023-02-27 | End: 2023-04-26

## 2023-02-27 RX ORDER — GUAIFENESIN 600 MG/1
600 TABLET, EXTENDED RELEASE ORAL 2 TIMES DAILY
Qty: 10 TABLET | Refills: 0 | Status: SHIPPED | OUTPATIENT
Start: 2023-02-27 | End: 2023-03-04

## 2023-02-27 RX ORDER — ACETAMINOPHEN 325 MG/1
650 TABLET ORAL EVERY 4 HOURS PRN
Status: ON HOLD | COMMUNITY
Start: 2023-02-27 | End: 2023-08-31

## 2023-02-27 RX ADMIN — Medication 12.5 MG: at 08:30

## 2023-02-27 RX ADMIN — FAMOTIDINE 20 MG: 20 TABLET ORAL at 08:30

## 2023-02-27 RX ADMIN — ASPIRIN 81 MG 81 MG: 81 TABLET ORAL at 08:29

## 2023-02-27 RX ADMIN — ACETAMINOPHEN 650 MG: 325 TABLET ORAL at 03:37

## 2023-02-27 RX ADMIN — FUROSEMIDE 20 MG: 20 TABLET ORAL at 08:29

## 2023-02-27 RX ADMIN — GUAIFENESIN 600 MG: 600 TABLET ORAL at 08:29

## 2023-02-27 RX ADMIN — ENOXAPARIN SODIUM 80 MG: 80 INJECTION SUBCUTANEOUS at 08:29

## 2023-02-27 RX ADMIN — PRENATAL VIT W/ FE FUMARATE-FA TAB 27-0.8 MG 1 TABLET: 27-0.8 TAB at 08:30

## 2023-02-27 ASSESSMENT — ACTIVITIES OF DAILY LIVING (ADL)
ADLS_ACUITY_SCORE: 22

## 2023-02-27 NOTE — PROGRESS NOTES
DISCHARGE   Discharged to: Home  Via: Automobile  Accompanied by: Mom  Discharge Instructions: diet, activity, medications, follow up appointments, when to call the MD, and what to watchout for (i.e. s/s of infection, increasing SOB, palpitations, chest pain,)  Prescriptions: filled by [facility] discharge pharmacy per pt's request; medication list reviewed & sent with pt  Follow Up Appointments: arranged; information given  Belongings: All sent with pt  IV: out  Telemetry: off  Pt exhibits understanding of above discharge instructions; all questions answered.    RN took patient to front lobby via wheelchair in stable condition.

## 2023-02-27 NOTE — PLAN OF CARE
Occupational Therapy Discharge Summary    Reason for therapy discharge:    Discharged to home with outpatient therapy.    Progress towards therapy goal(s). See goals on Care Plan in [facility] electronic health record for goal details.  Goals partially met.  Barriers to achieving goals:   discharge from facility.    Therapy recommendation(s):    Pt would benefit from continued therapy in OP CR setting to progress cardiopulmonary health.

## 2023-02-27 NOTE — TELEPHONE ENCOUNTER
Closing encounter as it appears pt has read the [employer] message sent to her.  She is also scheduled for an US the same day as she sees Dr. Sorensen for a New prenatal.    Latasha Benjamin RN

## 2023-02-27 NOTE — PLAN OF CARE
"/68 (BP Location: Right arm, Cuff Size: Adult Regular)   Pulse 86   Temp 98.5  F (36.9  C) (Oral)   Resp 18   Ht 1.626 m (5' 4\")   Wt 96.8 kg (213 lb 6.4 oz)   LMP 01/10/2023 (Approximate)   SpO2 95%   BMI 36.63 kg/m       Shift: 4793-0338     Status: Adm w/ RA thrombus, s/p emergency sternotomy, Right Atrial Thrombectomy, Central Cannulation, Bilateral Femoral Cannulation, Closure of PFO with Dr. Elmore on 2/18. PMH granulomatous polyangiitis, recent PE, prior DVT complicated by PE, currently COVID+ & 6 weeks pregnant      Neuro: A&O x4. Able to make needs known  Respiratory: RA, sating mid 90s. Denies SOB, frequent non-productive cough   Cardiac: SR, HR 80's  GI/: Voids w/out difficulty, LBM yesterday   Diet: Regular  Pain: Back discomfort from bed, PRN tylenol given x1   Incision/Drains: Sternal incision, old CT sites, and bilateral groin sites MALLIKA and healing well, no drainage    IV Access: R PIV x2  Labs: Reviewed  Activity: Up ad drake     New changes this shift: No acute changes overnight. Cares clustered to promote rest/sleep.     Plan: Possible discharge home today. Per pt, mom will be providing transportation. Continue w/ POC and notify [facility] of any changes or concerns     "

## 2023-03-01 ENCOUNTER — PATIENT OUTREACH (OUTPATIENT)
Dept: CARE COORDINATION | Facility: CLINIC | Age: 23
End: 2023-03-01
Payer: COMMERCIAL

## 2023-03-01 LAB
ABO/RH(D): NORMAL
ANTIBODY SCREEN: NEGATIVE
SPECIMEN EXPIRATION DATE: NORMAL

## 2023-03-01 NOTE — PROGRESS NOTES
[facility] Resource Center Contact  RUST/Voicemail     Clinical Data: Transitional Care Management Outreach     Outreach attempted x 2.  Left message on patient's voicemail, providing [facility]'s 24/7 scheduling and nurse triage phone number [phone] ([phone]) for questions/concerns and/or to schedule an appt with an [facility] provider, if they do not have a PCP.      Plan:  [facility] will do no further outreaches at this time.       Meghan Morris  Community Health Worker  [facility], [facility]  [phone]      *Connected Care Resource Team does NOT follow patient ongoing. Referrals are identified based on internal discharge reports and the outreach is to ensure patient has an understanding of their discharge instructions.

## 2023-03-02 ENCOUNTER — PRENATAL OFFICE VISIT (OUTPATIENT)
Dept: OBGYN | Facility: CLINIC | Age: 23
End: 2023-03-02
Payer: COMMERCIAL

## 2023-03-02 ENCOUNTER — TELEPHONE (OUTPATIENT)
Dept: CARDIOLOGY | Facility: CLINIC | Age: 23
End: 2023-03-02

## 2023-03-02 ENCOUNTER — ANCILLARY PROCEDURE (OUTPATIENT)
Dept: ULTRASOUND IMAGING | Facility: CLINIC | Age: 23
End: 2023-03-02
Attending: OBSTETRICS & GYNECOLOGY
Payer: COMMERCIAL

## 2023-03-02 VITALS
OXYGEN SATURATION: 100 % | SYSTOLIC BLOOD PRESSURE: 96 MMHG | WEIGHT: 216.7 LBS | DIASTOLIC BLOOD PRESSURE: 63 MMHG | HEIGHT: 65 IN | BODY MASS INDEX: 36.1 KG/M2 | HEART RATE: 79 BPM

## 2023-03-02 DIAGNOSIS — M31.31 GRANULOMATOSIS WITH POLYANGIITIS WITH RENAL INVOLVEMENT (H): Primary | ICD-10-CM

## 2023-03-02 DIAGNOSIS — Q51.3 BICORNUATE UTERUS: ICD-10-CM

## 2023-03-02 DIAGNOSIS — O09.291: ICD-10-CM

## 2023-03-02 DIAGNOSIS — N18.30 STAGE 3 CHRONIC KIDNEY DISEASE, UNSPECIFIED WHETHER STAGE 3A OR 3B CKD (H): Chronic | ICD-10-CM

## 2023-03-02 DIAGNOSIS — Z86.718 PERSONAL HISTORY OF DVT (DEEP VEIN THROMBOSIS): ICD-10-CM

## 2023-03-02 DIAGNOSIS — O09.90 HIGH-RISK PREGNANCY, UNSPECIFIED TRIMESTER: ICD-10-CM

## 2023-03-02 DIAGNOSIS — O09.299 HX OF PRE-ECLAMPSIA IN PRIOR PREGNANCY, CURRENTLY PREGNANT: ICD-10-CM

## 2023-03-02 DIAGNOSIS — Z86.711 HISTORY OF PULMONARY EMBOLISM: ICD-10-CM

## 2023-03-02 DIAGNOSIS — Z34.80 PRENATAL CARE, SUBSEQUENT PREGNANCY, UNSPECIFIED TRIMESTER: ICD-10-CM

## 2023-03-02 DIAGNOSIS — O09.291 PREGNANCY WITH PRIOR ADVERSE OUTCOME IN FIRST TRIMESTER: Primary | ICD-10-CM

## 2023-03-02 DIAGNOSIS — M31.31 GRANULOMATOSIS WITH POLYANGIITIS WITH RENAL INVOLVEMENT (H): ICD-10-CM

## 2023-03-02 LAB
ALBUMIN MFR UR ELPH: 136 MG/DL (ref 1–14)
ALBUMIN UR-MCNC: 100 MG/DL
APPEARANCE UR: CLEAR
BACTERIA #/AREA URNS HPF: ABNORMAL /HPF
BILIRUB UR QL STRIP: NEGATIVE
COLOR UR AUTO: ABNORMAL
CREAT UR-MCNC: 157 MG/DL
GLUCOSE UR STRIP-MCNC: NEGATIVE MG/DL
HGB UR QL STRIP: ABNORMAL
KETONES UR STRIP-MCNC: NEGATIVE MG/DL
LEUKOCYTE ESTERASE UR QL STRIP: NEGATIVE
NITRATE UR QL: NEGATIVE
PH UR STRIP: 6 [PH] (ref 5–7)
PROT/CREAT 24H UR: 0.87 MG/MG CR (ref 0–0.2)
RBC #/AREA URNS AUTO: ABNORMAL /HPF
SP GR UR STRIP: 1.03 (ref 1–1.03)
UROBILINOGEN UR STRIP-MCNC: NORMAL MG/DL
WBC #/AREA URNS AUTO: ABNORMAL /HPF

## 2023-03-02 PROCEDURE — 86780 TREPONEMA PALLIDUM: CPT | Performed by: OBSTETRICS & GYNECOLOGY

## 2023-03-02 PROCEDURE — 86803 HEPATITIS C AB TEST: CPT | Performed by: OBSTETRICS & GYNECOLOGY

## 2023-03-02 PROCEDURE — 87340 HEPATITIS B SURFACE AG IA: CPT | Performed by: OBSTETRICS & GYNECOLOGY

## 2023-03-02 PROCEDURE — 86901 BLOOD TYPING SEROLOGIC RH(D): CPT | Performed by: OBSTETRICS & GYNECOLOGY

## 2023-03-02 PROCEDURE — 81001 URINALYSIS AUTO W/SCOPE: CPT | Performed by: OBSTETRICS & GYNECOLOGY

## 2023-03-02 PROCEDURE — 86850 RBC ANTIBODY SCREEN: CPT | Performed by: OBSTETRICS & GYNECOLOGY

## 2023-03-02 PROCEDURE — 76817 TRANSVAGINAL US OBSTETRIC: CPT

## 2023-03-02 PROCEDURE — 36415 COLL VENOUS BLD VENIPUNCTURE: CPT | Performed by: OBSTETRICS & GYNECOLOGY

## 2023-03-02 PROCEDURE — 87086 URINE CULTURE/COLONY COUNT: CPT | Performed by: OBSTETRICS & GYNECOLOGY

## 2023-03-02 PROCEDURE — 76801 OB US < 14 WKS SINGLE FETUS: CPT

## 2023-03-02 PROCEDURE — 84156 ASSAY OF PROTEIN URINE: CPT | Performed by: OBSTETRICS & GYNECOLOGY

## 2023-03-02 PROCEDURE — 86900 BLOOD TYPING SEROLOGIC ABO: CPT | Performed by: OBSTETRICS & GYNECOLOGY

## 2023-03-02 PROCEDURE — 86762 RUBELLA ANTIBODY: CPT | Performed by: OBSTETRICS & GYNECOLOGY

## 2023-03-02 PROCEDURE — 87389 HIV-1 AG W/HIV-1&-2 AB AG IA: CPT | Performed by: OBSTETRICS & GYNECOLOGY

## 2023-03-02 NOTE — PROGRESS NOTES
INITIAL OB ASSESSMENT............................................Date: 3/2/2023                            ---------------------    Name: Cande Shields.......................................................................Plan Number: [id number]    Age: 22 year old   : 2000  Phone: [phone] (home)   Address: [address] [place] [address]    Marital Status:  single  Race/Ethnicity:   Occupation:   at [employer]  Partner's Name:  Cm Sigala, Partner's Occupation:  [employer]     OB Physician: Antonio Sorensen MD       LMP:  Patient's LMP from OB Dating Form:  Patient's last menstrual period was 01/10/2023 (approximate).  Regular menses? yes  Menses every month.   Length of menses: 5 days    Early OB ultrasound     Comparisons: 2023     HISTORY:    Bicornuate uterus. Follow-up embryonic cardiac activity.     FINDINGS:    There is a gestational sac within the uterus with a yolk  sac and fetal pole. The crown-rump length measures 11 mm corresponding  to a gestational age of 7 weeks 2 days. Corresponding EDC is  10/17/2023. There is cardiac activity with a heart rate of 142 beats  per minute. The fetal pole has grown appropriately since 2023  ultrasound.     The right and left ovaries measure 2.5 x 1.3 x 1.3 cm and 3.9 x 3.2 x  4.4 cm, respectively. Both ovaries are normal in appearance. There is  a corpus luteum on the left.                                                                      IMPRESSION: There has been appropriate interval growth of single  living intrauterine embryo in the left horn of a bicornuate uterus,  now measuring 7 weeks 2 days corresponding to an estimated date of  delivery of 10/17/2023. . Cardiac activity is normal      Obstetrical History  ===================  OB History    Para Term  AB Living   2 1 0 1 0 0   SAB IAB Ectopic Multiple Live Births   0 0 0 0 0      # Outcome Date GA Lbr  "Tobin/2nd Weight Sex Delivery Anes PTL Lv   2 Current            1  10/28/20 30w6d 05:15 0.782 kg (1 lb 11.6 oz) F Vag-Spont IV N FD      Birth Comments: Vicky- 10/20 30w6d presented to [facility] with right sided chest pain, BP in severe range, No fetal heart tones auscultated. Induced, NSVB      Name: ELOY,PENDING FD      Apgar1: 0  Apgar5: 0      Obstetric Comments   10/20 30w6d presented to [facility] with right sided chest pain, BP in severe range, No fetal heart tones auscultated. Induced, NSVB, \"Vicky\" 2lbs     Aleena Yo on 2021 12:47 PM         Gynecological Ultrasound Report  Pelvic U/S - Transvaginal  [facility]  Referring Provider: Lidia Seth NP  Sonographer:  Aleena Yo RDMS  Indication: Bleeding/Menses- Dysfunctional uterine bleeding (DUB)  LMP: No LMP recorded. Patient has had an injection.  History:   Gynecological Ultrasonography:   Uterus: anteverted. Contour is bicornuate.  Size: 6.36 x 3.55 x 6.86 cm  Endometrium: Thickness Total  Right 8.95 mm, Left 3.86mm  Findings: Bicornuate  Right Ovary: 2.54 x 1.99 x 1.62 cm. Wnl  Left Ovary: 2.89 x 1.37 x 1.24 cm. Wnl  Cul de Sac Free Fluid: No free fluid     Impression: Bicornuate uterus, normal ovaries             This is note from Dr. Whitmore's evaluation of Cande:   21-year-old patient with history of pregnancy loss at 31 weeks from severe hypertensive crisis as part of preeclampsia.  She does have a bicornuate uterus but I feel that there are generous cavities on both sides.  She is not a candidate for a septum resection.  The patient has irregular cycles right now and is probably not ovulatory.  She will monitor her cycles and track them carefully.  I believe that she can move ahead with another pregnancy but she does need to see maternal-fetal medicine carefully as her risk of recurrent hypertension is very high.  They can follow the progression of the pregnancy within a bicornuate uterus with " ultrasounds very easily.      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     Multiple subsegmental pulmonary emboli without acute cor pulmonale (H) 03/2021     PFO (patent foramen ovale)      Preeclampsia, third trimester      Spontaneous pregnancy loss 2020    31 weeks     Stage 3b chronic kidney disease (H)      Wegener's disease, pulmonary 01/01/2010    renal biopsy       Past Surgical History:  Past Surgical History:   Procedure Laterality Date     ENT SURGERY  01/01/2000    cyst     EXCISE GANGLION WRIST  01/01/2009     STERNOTOMY  02/18/2023    right atrial thrombectomy     THROMBECTOMY ATRIUM Right 02/18/2023     THROMBECTOMY PERCUTANEOUS N/A 2/18/2023    Procedure: Emergency Sternotomy, Right Atrial Thrombectomy, Central Cannulation, Bilateral Femoral Cannulation, Closure of PFO ; Transesophageal Echocardiogram performed by anesthesia staff;  Surgeon: Adelfo Elmore MD;  Location: [facility] OR     THROMBECTOMY PERCUTANEOUS N/A 2/18/2023    Procedure: Angiovac Mediated for Right Atrial Clot; Intracatheter Thrombectomy via bilateral percutaneous femoral venous access with 26 FR Right Femoral vein and 17 FR Left Femoral Vein cannulas. Transesophageal echchocardiogram performed by anesthesia staff;  Surgeon: Po Monterroso MD;  Location: [facility] OR       Current Outpatient Medications   Medication Sig Dispense Refill     acetaminophen (TYLENOL) 325 MG tablet Take 2 tablets (650 mg) by mouth every 4 hours as needed for other (For optimal non-opioid multimodal pain management to improve pain control.)       aspirin (ASA) 81 MG chewable tablet Take 1 tablet (81 mg) by mouth daily 30 tablet 2     clindamycin (CLEOCIN T) 1 % external lotion 1-2 times daily as spot treatment for pus bumps. 60 mL 11     enoxaparin ANTICOAGULANT (LOVENOX) 80  MG/0.8ML syringe Inject 0.8 mLs (80 mg) Subcutaneous every 12 hours for 90 days 48 mL 2     famotidine (PEPCID) 20 MG tablet Take 1 tablet (20 mg) by mouth 2 times daily for 14 days 30 tablet 0     guaiFENesin (MUCINEX) 600 MG 12 hr tablet Take 1 tablet (600 mg) by mouth 2 times daily for 5 days 10 tablet 0     metoprolol tartrate (LOPRESSOR) 25 MG tablet Take 0.5 tablets (12.5 mg) by mouth 2 times daily 60 tablet 2     predniSONE (DELTASONE) 10 MG tablet Take 10 mg by mouth daily       Prenatal Vit-Fe Fumarate-FA (PRENATAL VITAMINS PO)        oxyCODONE (ROXICODONE) 5 MG tablet Take 1 tablet (5 mg) by mouth every 6 hours as needed for severe pain (7-10) (Patient not taking: Reported on 3/2/2023) 12 tablet 0     polyethylene glycol (MIRALAX) 17 GM/Dose powder Take 17 g by mouth daily as needed for constipation (Patient not taking: Reported on 3/2/2023) 510 g 0     senna-docusate (SENOKOT-S/PERICOLACE) 8.6-50 MG tablet Take 1 tablet by mouth 2 times daily as needed for constipation (Patient not taking: Reported on 3/2/2023) 60 tablet 0       Allergies   Allergen Reactions     Penicillins Rash     Unknown, but think rash.  Tolerated cephalexin (12/27/20), cefpodoxime (12/27/20), Ceftriaxone (Feb 2023), Zosyn (Feb 2023)       Social History     Socioeconomic History     Marital status: Legally      Spouse name: Not on file     Number of children: Not on file     Years of education: Not on file     Highest education level: Not on file   Occupational History     Not on file   Tobacco Use     Smoking status: Former     Packs/day: 0.25     Types: Cigarettes     Smokeless tobacco: Current     Tobacco comments:     Vaping stopped 2/26   Vaping Use     Vaping Use: Some days     Substances: Nicotine, Flavoring     Devices: Disposable   Substance and Sexual Activity     Alcohol use: Not Currently     Drug use: No     Sexual activity: Yes     Partners: Male     Birth control/protection: None   Other Topics Concern      "Parent/sibling w/ CABG, MI or angioplasty before 65F 55M? Not Asked   Social History Narrative    Lives with brother 16yo and mother. Pets: Dog Nicholas.    Lives with  in [place], has cat.    Mom lives next door.     Social Determinants of Health     Financial Resource Strain: Not on file   Food Insecurity: Not on file   Transportation Needs: Not on file   Physical Activity: Not on file   Stress: Not on file   Social Connections: Not on file   Intimate Partner Violence: Not on file   Housing Stability: Not on file       Single, Significant Other  No substance abuse, environmental exposures, mental health risks and No financial concerns.   She has a cat and a dog.  It is an indoor cat.  Her mother changes the litter box. Good Partner support.       Family History   Problem Relation Age of Onset     Thyroid Disease Mother      Cancer Maternal Grandfather      Asthma No family hx of      Diabetes No family hx of        Past Medical History of Father of Baby:   No significant medical history     No known genetic disease in patient's 1st or 2nd degree relatives  No known genetic diseases in the FOB's 1st or 2nd degree relatives      REVIEW OF SYMPTOMS:   History Since Last Menstrual Period:    fatigue and breast tenderness       PHYSICAL EXAM:  BP 96/63 (BP Location: Right arm, Cuff Size: Adult Large)   Pulse 79   Ht 1.651 m (5' 5\")   Wt 98.3 kg (216 lb 11.2 oz)   LMP 01/10/2023 (Approximate)   SpO2 100%   BMI 36.06 kg/m    General:  WNWD female, NAD  Oriented:  X 3  Alert  PSYCH:  mentation appears normal., affect and mood normal  HEENT:  NC/AT, EOMI  NECK:  Neck supple. No adenopathy. Thyroid symmetric, normal size,, Carotids without bruits.  HEART:  RRR  LUNGS:  Clear to auscultation.  Good respiratory effort  BREASTS: Not performed   ABDOMEN: Benign, Soft, flat, non-tender, No masses, organomegaly and Bowel sounds normoactive   PELVIC EXAM:  Declined   EXTREMITIES:No cyanosis, clubbing, warm and well " perfused and No edema   GAIT: normal including tandem walk, heel and toe walk.        Assessment:   IUP at 7.2 weeks  History of DVT   History of PE  Recent clot removal (Right Atrial Thrombectomy)  History of severe pre-eclampsia  History of  delivery (with fetal demise)  BMI >36  Bicornuate uterus   Chronic anemia      Plan:  Options for  testing for chromosomal and birth defects were discussed with the patient.  Diagnostic tests include CVS and Amniocentesis.  We discussed that these tests are definitive but invasive and do carry a risk of fetal loss.    Screening tests include nuchal translucency/blood marker testing in the first trimester and quad screening in the second trimester.  We discussed that these are screening tests and not diagnostic tests and that false positives and negatives are a distinct possibility.  The patient will think and decide.  We discussed physician coverage, tertiary support, diet, exercise, weight gain, schedule of visits, routine and indicated ultrasounds, and childbirth education.   We discussed aspirin use in pregnancy.  Low-dose aspirin prophylaxis can be beneficial in women at high risk of developing preeclampsia.  I generally recommend we begin aspirin at about 12-13 weeks gestation and continue until at least 36 weeks. I recommend to start this now.    Women with at least one of the following conditions are considered high risk for developing preeclampsia: Previous pregnancy with preeclampsia,  multifetal-gestation, chronic hypertension, diabetes mellitus, chronic kidney disease, autoimmune disease.  Women with more than 1 of the following conditions may also consider low-dose aspirin prophylaxis in pregnancy: Nulliparity, BMI greater than 30, family history of preeclampsia (mother or sister), AMA, socio-demographic characteristics, personal risk factors.    Patient does meet the above criteria.  Discussed risks and benefits of low dose Asprin therapy and she  elects to proceed.   She is on Lovenox and will need to continue.   I advise to see MFM for evaluation and management plan due to the complicated nature of her history.   Labs done today  RTC 4 weeks

## 2023-03-03 LAB
RUBV IGG SERPL QL IA: 0.42 INDEX
RUBV IGG SERPL QL IA: NORMAL
T PALLIDUM AB SER QL: NONREACTIVE

## 2023-03-03 NOTE — OP NOTE
OPERATIVE DATE: 2/18/23    PRE-OPERATIVE DIAGNOSIS:  1. Right atrial clot in transit  Patient Active Problem List   Diagnosis     Wegener's granulomatosis     Myalgia     Granulomatosis with polyangiitis, unspecified whether renal involvement (H)     ANCA-associated vasculitis     Pulmonary infiltrates     Acute kidney injury (H)     Need for pneumocystis prophylaxis     Acute deep vein thrombosis (DVT) of proximal vein of both lower extremities (H)     ADHD (attention deficit hyperactivity disorder), inattentive type     Alcohol use disorder, mild, in sustained remission     Bicornuate uterus     Binge-eating disorder, moderate     BMI 40.0-44.9, adult (H)     Cannabis use disorder, mild, in early remission     Femoral artery thrombosis, left (H)     Fetal demise, greater than 22 weeks, antepartum     Generalized anxiety disorder     History of granulomatosis with polyangiitis     Depression     Occlusion of common femoral artery (H)     Performance anxiety     Personal history of COVID-19     Pyoderma gangrenosum     Self-injurious behavior     Stage 3 chronic kidney disease, unspecified whether stage 3a or 3b CKD (H)     Acute saddle pulmonary embolism with acute cor pulmonale (H)     Thrombosis     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)     Infection due to 2019 novel coronavirus     ATN (acute tubular necrosis) (H)     Other pulmonary embolism without acute cor pulmonale, unspecified chronicity (H)     Chest pain, unspecified type     Pneumonia due to infectious organism, unspecified laterality, unspecified part of lung     POST-OPERATIVE DIAGNOSIS:  1. Right atrial clot in transit  Patient Active Problem List   Diagnosis     Wegener's granulomatosis     Myalgia     Granulomatosis with polyangiitis, unspecified whether renal involvement (H)     ANCA-associated vasculitis     Pulmonary infiltrates     Acute kidney injury (H)     Need for pneumocystis prophylaxis     Acute deep vein  thrombosis (DVT) of proximal vein of both lower extremities (H)     ADHD (attention deficit hyperactivity disorder), inattentive type     Alcohol use disorder, mild, in sustained remission     Bicornuate uterus     Binge-eating disorder, moderate     BMI 40.0-44.9, adult (H)     Cannabis use disorder, mild, in early remission     Femoral artery thrombosis, left (H)     Fetal demise, greater than 22 weeks, antepartum     Generalized anxiety disorder     History of granulomatosis with polyangiitis     Depression     Occlusion of common femoral artery (H)     Performance anxiety     Personal history of COVID-19     Pyoderma gangrenosum     Self-injurious behavior     Stage 3 chronic kidney disease, unspecified whether stage 3a or 3b CKD (H)     Acute saddle pulmonary embolism with acute cor pulmonale (H)     Thrombosis     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 Emergency right atrial thrombectomy  2. Median sternotomy and cardiopulmonary bypass  3. Transesophageal echocardiography    SURGEON: Adelfo Elmore MD    ASSISTANT: Tommie Hdez MD    ANESTHESIA: GETA    ESTIMATED BLOOD LOSS: 1000cc    OPERATIVE FINDINGS:  1. Massive right atrial thrombus    INDICATIONS:  YUMIKO LYONS is a 22 year old female admitted with clot in transit and chest pain.  We were asked to evaluate for Angiovac with possible emergency thrombectomy.  Risks and benefits of the operation were explained to the patient and their family including, but not limited to, bleeding, infection, stroke and even death.  They understood these risks and agreed to proceed electively.    OPERATIVE REPORT:  The patient was transferred  to the operating room and positioned supine on the OR table.  General anesthesia was initiated by the anesthesia team.  Endotracheal intubation and central venous access was performed by anesthesia.  The patients neck, chest, abdomen and bilateral lower extremities were clipped, prepped and draped in sterile fashion.  A pre-procedure time-out was performed confirming the correct patient, correct site and correct procedure.    The patient had attempted Angiovac for right atrial clot in transit.  The clot was too large for successful removal.  I was asked intra-operatively to perform median sternotomy and emergency thrombectomy.    Median sternotomy was made.  The patient was heparinized with 400mg/kg IV heparin.  Next the ascending aorta was cannulated.  The femoral venous cannula was used for bypass.  Cardiopulmonary bypass was initiated with good flows.  An SVC cannula was placed and Y'd into the circuit.  Next caval snares were placed and secured.  A right atriotomy was made.  The clot was removed.  The atriotomy was closed using 4-0 prolene.  The patient was weaned from bypass.  Pump blood volume was returned.  The heparin was reversed with protamine.  The cannulas were removed and sites made hemostatic.  Mediastinal chest tubes were placed. Next the sternum was approximated with sternal wires.  The wound was closed in layers with absorbable sutures.      The patient was then transferred from the operating bed to an ICU bed and transferred to the ICU in critical, but stable, condition.    All needle, sponge and instrument counts were correct at the end of the case.    Adelfo Elmore  Cardiothoracic Surgery  Pager: [phone]

## 2023-03-04 LAB — BACTERIA UR CULT: NORMAL

## 2023-03-06 LAB
HBV SURFACE AG SERPL QL IA: NONREACTIVE
HCV AB SERPL QL IA: NONREACTIVE
HIV 1+2 AB+HIV1 P24 AG SERPL QL IA: NONREACTIVE

## 2023-03-09 NOTE — PROGRESS NOTES
CARDIOTHORACIC SURGERY FOLLOW-UP VISIT     Cande Shields   2000   [id number]      Reason for visit:  Post-op RA clot angiovac, thrombectomy, PFO closure with Dr. Elmore on 2/18/2023.    HPI: Cande Shields is a 22 year old year old female who is currently pregnant; her PMH of ADHD, PE/ DVT, PFO, pre-eclampsia, CKD, and Wegener's disease who presents to clinic for a routine follow-up appointment after surgery. Hospital course was remarkable for covid infection as well a ileus which resolved prior to discharge. Patient was discharged home on 2/27/23.    Since discharge, has been recovering well though she has been bothered by a persistent cough and associated left posterolateral chest pain - worse with inhalation.  She otherwise denies dyspnea.  She does endorse sternal discomfort when lying down/sleeping and thus has had poor sleep.  She is having nausea +/- vomiting which she attributes to her pregnancy; she reports eating OK but does notice that the nausea seems to be worse around the time of PO intake; she also is having diarrhea.  No F/C or incision/sternal issues.  She continues to take APAP prn (~ EOD) for post-surgical pain.  No palpitations, dizziness, lightheadedness.    Patient is on therapeutic Lovenox.    Discharge weight 213 lbs; weight today 223 lbs in moderately heavy winter clothing; Appears grossly euvolemic upon examinination with no appreciable JVD, BLE edema, abdominal bloating.  Discussed the importance of nutrition in healing and staying adequately hydrated.   Midline sternal incision healing well with no noted erythema or drainage. Denies  sternal popping or clicking.  No psychiatric concerns.    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     Multiple subsegmental pulmonary emboli without acute cor pulmonale (H)  03/2021     PFO (patent foramen ovale)      Preeclampsia, third trimester      Spontaneous pregnancy loss 2020    31 weeks     Stage 3b chronic kidney disease (H)      Wegener's disease, pulmonary 01/01/2010    renal biopsy       PAST SURGICAL HISTORY:  Past Surgical History:   Procedure Laterality Date     ENT SURGERY  01/01/2000    cyst     EXCISE GANGLION WRIST  01/01/2009     STERNOTOMY  02/18/2023    right atrial thrombectomy     THROMBECTOMY ATRIUM Right 02/18/2023     THROMBECTOMY PERCUTANEOUS N/A 2/18/2023    Procedure: Emergency Sternotomy, Right Atrial Thrombectomy, Central Cannulation, Bilateral Femoral Cannulation, Closure of PFO ; Transesophageal Echocardiogram performed by anesthesia staff;  Surgeon: Adelfo Elmore MD;  Location: [facility] OR     THROMBECTOMY PERCUTANEOUS N/A 2/18/2023    Procedure: Angiovac Mediated for Right Atrial Clot; Intracatheter Thrombectomy via bilateral percutaneous femoral venous access with 26 FR Right Femoral vein and 17 FR Left Femoral Vein cannulas. Transesophageal echchocardiogram performed by anesthesia staff;  Surgeon: Po Monterroso MD;  Location: [facility] OR       CURRENT MEDICATIONS:   Current Outpatient Medications   Medication     acetaminophen (TYLENOL) 325 MG tablet     aspirin (ASA) 81 MG chewable tablet     clindamycin (CLEOCIN T) 1 % external lotion     enoxaparin ANTICOAGULANT (LOVENOX) 80 MG/0.8ML syringe     famotidine (PEPCID) 20 MG tablet     metoprolol tartrate (LOPRESSOR) 25 MG tablet     oxyCODONE (ROXICODONE) 5 MG tablet     polyethylene glycol (MIRALAX) 17 GM/Dose powder     predniSONE (DELTASONE) 10 MG tablet     Prenatal Vit-Fe Fumarate-FA (PRENATAL VITAMINS PO)     senna-docusate (SENOKOT-S/PERICOLACE) 8.6-50 MG tablet     No current facility-administered medications for this visit.       ALLERGIES:      Allergies   Allergen Reactions     Penicillins Rash     Unknown, but think rash.  Tolerated cephalexin (12/27/20), cefpodoxime (12/27/20),  Ceftriaxone (Feb 2023), Zosyn (Feb 2023)       ROS:  Review of symptoms otherwise negative unless commented on in HPI.     LABS:  Last Basic Metabolic Panel:  Lab Results   Component Value Date     02/27/2023     07/09/2021      Lab Results   Component Value Date    POTASSIUM 4.3 02/27/2023    POTASSIUM 5.1 02/18/2023    POTASSIUM 4.5 02/08/2023    POTASSIUM 4.6 07/09/2021     Lab Results   Component Value Date    CHLORIDE 105 02/27/2023    CHLORIDE 114 02/08/2023    CHLORIDE 110 07/09/2021     Lab Results   Component Value Date    KRAIG 9.3 02/27/2023    KRAIG 9.2 07/09/2021     Lab Results   Component Value Date    CO2 20 02/27/2023    CO2 19 02/08/2023    CO2 23 07/09/2021     Lab Results   Component Value Date    BUN 22.0 02/27/2023    BUN 31 02/08/2023    BUN 29 07/09/2021     Lab Results   Component Value Date    CR 1.53 02/27/2023    CR 1.38 07/09/2021     Lab Results   Component Value Date    GLC 96 02/27/2023    GLC 91 02/21/2023    GLC 84 02/08/2023    GLC 78 07/09/2021       Last CBC:   Lab Results   Component Value Date    WBC 11.2 02/27/2023    WBC 8.4 07/09/2021     Lab Results   Component Value Date    RBC 2.81 02/27/2023    RBC 4.39 07/09/2021     Lab Results   Component Value Date    HGB 7.2 02/27/2023    HGB 11.7 07/09/2021     Lab Results   Component Value Date    HCT 24.0 02/27/2023    HCT 37.7 07/09/2021     No components found for: MCT  Lab Results   Component Value Date    MCV 85 02/27/2023    MCV 86 07/09/2021     Lab Results   Component Value Date    MCH 25.6 02/27/2023    MCH 26.7 07/09/2021     Lab Results   Component Value Date    MCHC 30.0 02/27/2023    MCHC 31.0 07/09/2021     Lab Results   Component Value Date    RDW 16.4 02/27/2023    RDW 14.1 07/09/2021     Lab Results   Component Value Date     02/27/2023     07/09/2021       INR:  Lab Results   Component Value Date    INR 1.32 02/19/2023    INR 1.26 02/18/2023    INR 1.26 02/18/2023    INR 1.04 10/19/2021     "INR 1.02 2021    INR 0.91 2011    INR 0.96 2011     IMAGIN-view CXR pending    PHYSICAL EXAM:   /69 (BP Location: Right arm, Patient Position: Chair, Cuff Size: Adult Large)   Pulse 101   Ht 1.676 m (5' 5.98\")   Wt 101.4 kg (223 lb 9.6 oz)   LMP 01/10/2023 (Approximate)   SpO2 98%   BMI 36.11 kg/m       General: alert and oriented x 3, pleasant, calm, conversant, no acute distress, normal mood and affect  Neuro: no focal deficits, CN II-VII intact  CV: WWP, no peripheral edema  Pulm: unlabored breathing on RA, occasional dry cough  Incision: incision clean dry and intact without erythema, swelling or drainage    PROCEDURES: None       ASSESSMENT/PLAN:  Cande Shields is a 22 year old year old female status post emergent sternotomy, right atrial thrombectomy, and PFO closure who returns to clinic for postop visit.     S/p emergent sternotomy, right atrial thrombectomy, and PFO closure  Pregnancy  Cough with ?pleuritic pain  Recent covid infectio    1. Surgically doing well overall.  Incisions are healing well with no signs of infection. Increasing activity and strength overall.   2. Hemodynamics are stable. No medication changes were needed today.  3. Follow up with your cardiologist, Dr. Oneal, as scheduled.  4. Continue Outpatient Cardiac Rehab until completed.   5. Continue sternal precautions for 12 weeks from surgery date.   6. No driving for 4 weeks from surgery date.   7.  Follow up with M as recommended.  8.  Will get CXR today to r/o pleural effusion in the setting of therapeutic AC.  CXR personally reviewed & interpreted:  No significant pleural effusion to explain cough/chest discomfort.  Expect this likely represents post-covid cough with associated pleuritic pain.    Postoperative restrictions have been reviewed and all of the patient's questions were answered. Our contact information was given to the patient if he should have any further questions/concerns. No " further follow up is needed with us.  The total time spent with the patient was 25 minutes, > 50% of which was spent in counseling and coordination of care.    Jackie Junior CNP, [facility]-  Cardiothoracic Surgery  [employer] Pager [phone]        CC  Cira Amanda

## 2023-03-10 ENCOUNTER — ANCILLARY PROCEDURE (OUTPATIENT)
Dept: GENERAL RADIOLOGY | Facility: CLINIC | Age: 23
End: 2023-03-10
Attending: SURGERY
Payer: COMMERCIAL

## 2023-03-10 ENCOUNTER — PRE VISIT (OUTPATIENT)
Dept: CARDIOLOGY | Facility: CLINIC | Age: 23
End: 2023-03-10
Payer: COMMERCIAL

## 2023-03-10 ENCOUNTER — OFFICE VISIT (OUTPATIENT)
Dept: CARDIOLOGY | Facility: CLINIC | Age: 23
End: 2023-03-10
Attending: STUDENT IN AN ORGANIZED HEALTH CARE EDUCATION/TRAINING PROGRAM
Payer: COMMERCIAL

## 2023-03-10 ENCOUNTER — OFFICE VISIT (OUTPATIENT)
Dept: CARDIOLOGY | Facility: CLINIC | Age: 23
End: 2023-03-10
Attending: THORACIC SURGERY (CARDIOTHORACIC VASCULAR SURGERY)
Payer: COMMERCIAL

## 2023-03-10 VITALS
OXYGEN SATURATION: 98 % | SYSTOLIC BLOOD PRESSURE: 106 MMHG | HEART RATE: 101 BPM | BODY MASS INDEX: 35.93 KG/M2 | WEIGHT: 223.6 LBS | DIASTOLIC BLOOD PRESSURE: 69 MMHG | HEIGHT: 66 IN

## 2023-03-10 VITALS
DIASTOLIC BLOOD PRESSURE: 69 MMHG | HEART RATE: 101 BPM | SYSTOLIC BLOOD PRESSURE: 106 MMHG | HEIGHT: 66 IN | OXYGEN SATURATION: 98 % | WEIGHT: 223.55 LBS | BODY MASS INDEX: 35.93 KG/M2

## 2023-03-10 DIAGNOSIS — Z87.59 HISTORY OF SEVERE PRE-ECLAMPSIA: Primary | ICD-10-CM

## 2023-03-10 DIAGNOSIS — Q21.12 PFO (PATENT FORAMEN OVALE): ICD-10-CM

## 2023-03-10 DIAGNOSIS — R05.1 ACUTE COUGH: Primary | ICD-10-CM

## 2023-03-10 DIAGNOSIS — I26.02 ACUTE SADDLE PULMONARY EMBOLISM WITH ACUTE COR PULMONALE (H): ICD-10-CM

## 2023-03-10 DIAGNOSIS — I51.3 RIGHT ATRIAL THROMBUS: ICD-10-CM

## 2023-03-10 PROCEDURE — G0463 HOSPITAL OUTPT CLINIC VISIT: HCPCS | Mod: 25,27 | Performed by: STUDENT IN AN ORGANIZED HEALTH CARE EDUCATION/TRAINING PROGRAM

## 2023-03-10 PROCEDURE — G0463 HOSPITAL OUTPT CLINIC VISIT: HCPCS | Mod: 25

## 2023-03-10 PROCEDURE — 99024 POSTOP FOLLOW-UP VISIT: CPT

## 2023-03-10 PROCEDURE — 99215 OFFICE O/P EST HI 40 MIN: CPT | Performed by: STUDENT IN AN ORGANIZED HEALTH CARE EDUCATION/TRAINING PROGRAM

## 2023-03-10 PROCEDURE — 71046 X-RAY EXAM CHEST 2 VIEWS: CPT | Mod: GC | Performed by: RADIOLOGY

## 2023-03-10 ASSESSMENT — PAIN SCALES - GENERAL
PAINLEVEL: NO PAIN (0)
PAINLEVEL: NO PAIN (0)

## 2023-03-10 NOTE — LETTER
3/10/2023      RE: Cande Shields  [address]  [place] [address]       Dear Colleague,    Thank you for the opportunity to participate in the care of your patient, Cande Shields, at the [facility] [place] at [facility]. Please see a copy of my visit note below.    CARDIOTHORACIC SURGERY FOLLOW-UP VISIT     Cande Shields   2000   [id number]      Reason for visit:  Post-op RA clot angiovac, thrombectomy, PFO closure with Dr. Elmore on 2/18/2023.    HPI: Cande Shields is a 22 year old year old female who is currently pregnant; her PMH of ADHD, PE/ DVT, PFO, pre-eclampsia, CKD, and Wegener's disease who presents to clinic for a routine follow-up appointment after surgery. Hospital course was remarkable for covid infection as well a ileus which resolved prior to discharge. Patient was discharged home on 2/27/23.    Since discharge, has been recovering well though she has been bothered by a persistent cough and associated left posterolateral chest pain - worse with inhalation.  She otherwise denies dyspnea.  She does endorse sternal discomfort when lying down/sleeping and thus has had poor sleep.  She is having nausea +/- vomiting which she attributes to her pregnancy; she reports eating OK but does notice that the nausea seems to be worse around the time of PO intake; she also is having diarrhea.  No F/C or incision/sternal issues.  She continues to take APAP prn (~ EOD) for post-surgical pain.  No palpitations, dizziness, lightheadedness.    Patient is on therapeutic Lovenox.    Discharge weight 213 lbs; weight today 223 lbs in moderately heavy winter clothing; Appears grossly euvolemic upon examinination with no appreciable JVD, BLE edema, abdominal bloating.  Discussed the importance of nutrition in healing and staying adequately hydrated.   Midline sternal incision healing well with no noted erythema or drainage. Denies   sternal popping or clicking.  No psychiatric concerns.    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     Multiple subsegmental pulmonary emboli without acute cor pulmonale (H) 03/2021     PFO (patent foramen ovale)      Preeclampsia, third trimester      Spontaneous pregnancy loss 2020    31 weeks     Stage 3b chronic kidney disease (H)      Wegener's disease, pulmonary 01/01/2010    renal biopsy       PAST SURGICAL HISTORY:  Past Surgical History:   Procedure Laterality Date     ENT SURGERY  01/01/2000    cyst     EXCISE GANGLION WRIST  01/01/2009     STERNOTOMY  02/18/2023    right atrial thrombectomy     THROMBECTOMY ATRIUM Right 02/18/2023     THROMBECTOMY PERCUTANEOUS N/A 2/18/2023    Procedure: Emergency Sternotomy, Right Atrial Thrombectomy, Central Cannulation, Bilateral Femoral Cannulation, Closure of PFO ; Transesophageal Echocardiogram performed by anesthesia staff;  Surgeon: Adelfo Elmore MD;  Location: [facility] OR     THROMBECTOMY PERCUTANEOUS N/A 2/18/2023    Procedure: Angiovac Mediated for Right Atrial Clot; Intracatheter Thrombectomy via bilateral percutaneous femoral venous access with 26 FR Right Femoral vein and 17 FR Left Femoral Vein cannulas. Transesophageal echchocardiogram performed by anesthesia staff;  Surgeon: Po Monterroso MD;  Location: [facility] OR       CURRENT MEDICATIONS:   Current Outpatient Medications   Medication     acetaminophen (TYLENOL) 325 MG tablet     aspirin (ASA) 81 MG chewable tablet     clindamycin (CLEOCIN T) 1 % external lotion     enoxaparin ANTICOAGULANT (LOVENOX) 80 MG/0.8ML syringe     famotidine (PEPCID) 20 MG tablet     metoprolol tartrate (LOPRESSOR) 25 MG tablet     oxyCODONE (ROXICODONE) 5 MG tablet     polyethylene glycol (MIRALAX) 17 GM/Dose powder     predniSONE (DELTASONE) 10 MG  tablet     Prenatal Vit-Fe Fumarate-FA (PRENATAL VITAMINS PO)     senna-docusate (SENOKOT-S/PERICOLACE) 8.6-50 MG tablet     No current facility-administered medications for this visit.       ALLERGIES:      Allergies   Allergen Reactions     Penicillins Rash     Unknown, but think rash.  Tolerated cephalexin (12/27/20), cefpodoxime (12/27/20), Ceftriaxone (Feb 2023), Zosyn (Feb 2023)       ROS:  Review of symptoms otherwise negative unless commented on in HPI.     LABS:  Last Basic Metabolic Panel:  Lab Results   Component Value Date     02/27/2023     07/09/2021      Lab Results   Component Value Date    POTASSIUM 4.3 02/27/2023    POTASSIUM 5.1 02/18/2023    POTASSIUM 4.5 02/08/2023    POTASSIUM 4.6 07/09/2021     Lab Results   Component Value Date    CHLORIDE 105 02/27/2023    CHLORIDE 114 02/08/2023    CHLORIDE 110 07/09/2021     Lab Results   Component Value Date    KRAIG 9.3 02/27/2023    KRAIG 9.2 07/09/2021     Lab Results   Component Value Date    CO2 20 02/27/2023    CO2 19 02/08/2023    CO2 23 07/09/2021     Lab Results   Component Value Date    BUN 22.0 02/27/2023    BUN 31 02/08/2023    BUN 29 07/09/2021     Lab Results   Component Value Date    CR 1.53 02/27/2023    CR 1.38 07/09/2021     Lab Results   Component Value Date    GLC 96 02/27/2023    GLC 91 02/21/2023    GLC 84 02/08/2023    GLC 78 07/09/2021       Last CBC:   Lab Results   Component Value Date    WBC 11.2 02/27/2023    WBC 8.4 07/09/2021     Lab Results   Component Value Date    RBC 2.81 02/27/2023    RBC 4.39 07/09/2021     Lab Results   Component Value Date    HGB 7.2 02/27/2023    HGB 11.7 07/09/2021     Lab Results   Component Value Date    HCT 24.0 02/27/2023    HCT 37.7 07/09/2021     No components found for: MCT  Lab Results   Component Value Date    MCV 85 02/27/2023    MCV 86 07/09/2021     Lab Results   Component Value Date    MCH 25.6 02/27/2023    MCH 26.7 07/09/2021     Lab Results   Component Value Date    [facility] 30.0  "2023    MCHC 31.0 2021     Lab Results   Component Value Date    RDW 16.4 2023    RDW 14.1 2021     Lab Results   Component Value Date     2023     2021       INR:  Lab Results   Component Value Date    INR 1.32 2023    INR 1.26 2023    INR 1.26 2023    INR 1.04 10/19/2021    INR 1.02 2021    INR 0.91 2011    INR 0.96 2011     IMAGIN-view CXR pending    PHYSICAL EXAM:   /69 (BP Location: Right arm, Patient Position: Chair, Cuff Size: Adult Large)   Pulse 101   Ht 1.676 m (5' 5.98\")   Wt 101.4 kg (223 lb 9.6 oz)   LMP 01/10/2023 (Approximate)   SpO2 98%   BMI 36.11 kg/m       General: alert and oriented x 3, pleasant, calm, conversant, no acute distress, normal mood and affect  Neuro: no focal deficits, CN II-VII intact  CV: WWP, no peripheral edema  Pulm: unlabored breathing on RA, occasional dry cough  Incision: incision clean dry and intact without erythema, swelling or drainage    PROCEDURES: None       ASSESSMENT/PLAN:  Cande Shields is a 22 year old year old female status post emergent sternotomy, right atrial thrombectomy, and PFO closure who returns to clinic for postop visit.     S/p emergent sternotomy, right atrial thrombectomy, and PFO closure  Pregnancy  Cough with ?pleuritic pain  Recent covid infectio    1. Surgically doing well overall.  Incisions are healing well with no signs of infection. Increasing activity and strength overall.   2. Hemodynamics are stable. No medication changes were needed today.  3. Follow up with your cardiologist, Dr. Oneal, as scheduled.  4. Continue Outpatient Cardiac Rehab until completed.   5. Continue sternal precautions for 12 weeks from surgery date.   6. No driving for 4 weeks from surgery date.   7.  Follow up with MFM as recommended.  8.  Will get CXR today to r/o pleural effusion in the setting of therapeutic AC.  CXR personally reviewed & interpreted:  No " significant pleural effusion to explain cough/chest discomfort.  Expect this likely represents post-covid cough with associated pleuritic pain.    Postoperative restrictions have been reviewed and all of the patient's questions were answered. Our contact information was given to the patient if he should have any further questions/concerns. No further follow up is needed with us.  The total time spent with the patient was 25 minutes, > 50% of which was spent in counseling and coordination of care.    Jackie Junior CNP, ACN-AG  Cardiothoracic Surgery  [employer] Pager [phone]        CC  Cira Amanda

## 2023-03-10 NOTE — PATIENT INSTRUCTIONS
Cardiology Providers you saw during your visit:  Dr. Oneal     Medication changes:  1- no changes     Follow up:  1- Follow up in 6 weeks with echocardiogram and labs prior  2 - Follow up with maternal fetal medicine  3 - Chest x ray today       Follow the American Heart Association Diet and Lifestyle recommendations:  Limit saturated fat, trans fat, sodium, red meat, sweets and sugar-sweetened beverages. If you choose to eat red meat, compare labels and select the leanest cuts available.  Aim for at least 150 minutes of moderate physical activity or 75 minutes of vigorous physical activity - or an equal combination of both - each week.     During business hours: [phone], press option # 1 to schedule an appointment or send a message to your care team     After hours, weekends or holidays: On Call Cardiologist- [phone]   option #4 and ask to speak to the on-call Cardiologist. Inform them you are a CORE/heart failure patient at the [facility].     Rosaura Lea RN BSN CHFN  Cardiology Nurse Care Coordinator (Heart Failure / C.O.R.E.)

## 2023-03-10 NOTE — NURSING NOTE
Labs: Patient was given results of the laboratory testing obtained today and patient was instructed about when to return for the next laboratory testing. CMP, ntproBNP and troponin in 6 weeks.    Med Reconcile: Reviewed and verified all current medications with the patient. The updated medication list was printed and given to the patient. NO changes.     Return Appointment: Patient given instructions regarding scheduling next clinic visit. RTC in 6 weeks with labs and echo prior. Advised to set up MFM appointment.     Patient stated she understood all health information given and agreed to call with further questions or concerns.     Rosaura Lea RN

## 2023-03-10 NOTE — PATIENT INSTRUCTIONS
- No bathing, soaking, swimming, or hot tubs; no immersion in standing water.  OK to shower/let water run over wound but avoid aiming stream of water directly at the wound.  OK to gently wash with soap and water but do not scrub.  Wound may be left open to air, no dressing/bandage is necessary.   - Continue sternal precautions for 12 weeks post-operatively   - No need to return to see CV Surgery unless you develop problems with your incision or sternum   - Follow up with Cardiology and MFM as planned

## 2023-03-10 NOTE — NURSING NOTE
Chief Complaint   Patient presents with     New Patient      22 year old female presents with history of severe preeclampsia, recent acute saddle PE s.p thrombectomy and PFO closure, currently pregnant to establish care     Vitals were taken and medications reconciled.    Jian Ortega, EMT  1:54 PM

## 2023-03-10 NOTE — LETTER
3/10/2023      RE: Cande Shields  [address]  [place] [address]       Dear Colleague,    Thank you for the opportunity to participate in the care of your patient, Cande Shields, at the [facility]. Please see a copy of my visit note below.    Cardiology Clinic Note    HPI  Dear colleagues,     I had the pleasure of seeing Ms. Cande Shields in the Cardiology clinic.  As you know, Ms. Cande Shields is a pleasant 22 year old female with a past medical history of Granulomatosis with polyangiitis, CKD stage III, AL-3 positive ANCA vasculitis (kidney bx proven in March 2011), prior saddle PE w/ B/L DVT w/ pulmonary infarct & mild RV dilation on 03/20/2021- treated with thrombolysis, Hx of pre-eclampsia, HELLP with IUFD at 31 weeks in October 2020, who presented to the ED on 2/17/2023 with progressively worsening cough and sharp, pleuritic chest pain found to have large highly mobile thrombi in right atrium at 5 weeks pregnant. She underwent emergent sternotomy with right atrial thrombectomy and PFO closure. She represents to establish care in Cardiology clinic given her recent issues and high-risk pregnancy.    Very complex history, recent vasculitis flareup in 12/22 which was treated with 3 doses of rituximab and she was started on avacopan with rapid steroid taper.  At that time she presented with new petechiae, KATERINA and hemoptysis. However, in the interim, she tested positive for pregnancy which led to discontinuation of avacopan and holding off on the fourth dose of rituximab. Her last dose of Rituximab was on 1/17/23.  Had a significant ED visit on 2/12/23 where she was diagnosed with saddle pulmonary embolus with mild right heart strain.  She was discharged on therapeutic enoxaparin. Presented to the ED again on 02/17/23 with complaints of worsening chest pain, cough and new onset hemoptysis for one day.   Work-up in the ED showed significantly elevated CRP at 322 which improved to 242. Otherwise normal creatinine. Elevated WBC and procal. Planned angiovac via catheterization on 2/18/23 with surgical back-up. She required emergency sternotomy for R atrial thrombectomy and PFO closure. Overall, she tolerated the operation well and postoperatively was admitted to the CVICU.  She was extubated on POD # 0 to 4 lpm via NC with neuro status intact.  Her ICU stay was complicated by PMHx and Pregnancy. Hematology, Rheumatology, and OB-GYN were asked to follow during her recovery. She was started on a heparin gtt and transitioned to therapeutic Lovenox dosing. She was started on Remdesevir for COVID status and Ceftriaxone for strept throat. She was transferred to the post-surgical telemetry unit on POD # 2. Due to early pregnancy status, maternal fetal medicine followed along and will schedule follow up at the time of discharge. Additionally, rheumatology peripherally followed due to history of granulomatous polyangiitis and PR3 positive ANCA vasculitis. No indication for steroids during hospital course and she should follow with rheumatology as prior. Due to leukocytosis and strep throat, she was transitioned from ceftriaxone to zosyn for broad coverage with resolution of elevated WBC.  Her CRP trended down and she remained afebrile. She did have early ileus that resolved with conservative measures and is having regular bowel movements at the time of discharge.    She reports still having some incisional pain and significant bruising from her Lovenos shots. She denies any URIAS, orthopnea, PND, presyncope, syncope, abdominal pain, nausea, emesis, LE edema, or weakness. She reports at her recent FP visit, she does appear to still have a viable pregnancy. She reports good BP control recently. Patient reports compliance with her medications.    PAST MEDICAL HISTORY:  Patient Active Problem List   Diagnosis     Wegener's  granulomatosis     Myalgia     Granulomatosis with polyangiitis, unspecified whether renal involvement (H)     ANCA-associated vasculitis     Pulmonary infiltrates     Acute kidney injury (H)     Need for pneumocystis prophylaxis     Acute deep vein thrombosis (DVT) of proximal vein of both lower extremities (H)     ADHD (attention deficit hyperactivity disorder), inattentive type     Alcohol use disorder, mild, in sustained remission     Bicornuate uterus     Binge-eating disorder, moderate     BMI 40.0-44.9, adult (H)     Cannabis use disorder, mild, in early remission     Femoral artery thrombosis, left (H)     Fetal demise, greater than 22 weeks, antepartum     Generalized anxiety disorder     History of granulomatosis with polyangiitis     Depression     Occlusion of common femoral artery (H)     Performance anxiety     Personal history of COVID-19     Pyoderma gangrenosum     Self-injurious behavior     Stage 3 chronic kidney disease, unspecified whether stage 3a or 3b CKD (H)     Acute saddle pulmonary embolism with acute cor pulmonale (H)     Thrombosis     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)     Infection due to 2019 novel coronavirus     ATN (acute tubular necrosis) (H)     Other pulmonary embolism without acute cor pulmonale, unspecified chronicity (H)     Chest pain, unspecified type     Pneumonia due to infectious organism, unspecified laterality, unspecified part of lung        FAMILY HISTORY:  Family History   Problem Relation Age of Onset     Thyroid Disease Mother      Cancer Maternal Grandfather      Asthma No family hx of      Diabetes No family hx of    No known family history of heart disease    SOCIAL HISTORY:  Social History     Tobacco Use     Smoking status: Former     Packs/day: 0.25     Types: Cigarettes     Smokeless tobacco: Former     Tobacco comments:     Vaping stopped 2/26   Vaping Use     Vaping Use: Some days     Substances: Nicotine, Flavoring     Devices:  "Disposable   Substance Use Topics     Alcohol use: Not Currently     Drug use: No        ALLERGIES:  Allergies   Allergen Reactions     Penicillins Rash     Unknown, but think rash.  Tolerated cephalexin (12/27/20), cefpodoxime (12/27/20), Ceftriaxone (Feb 2023), Zosyn (Feb 2023)       CURRENT MEDICATIONS:  Current Outpatient Medications   Medication Sig Dispense Refill     acetaminophen (TYLENOL) 325 MG tablet Take 2 tablets (650 mg) by mouth every 4 hours as needed for other (For optimal non-opioid multimodal pain management to improve pain control.)       aspirin (ASA) 81 MG chewable tablet Take 1 tablet (81 mg) by mouth daily 30 tablet 2     enoxaparin ANTICOAGULANT (LOVENOX) 80 MG/0.8ML syringe Inject 0.8 mLs (80 mg) Subcutaneous every 12 hours for 90 days 48 mL 2     famotidine (PEPCID) 20 MG tablet Take 1 tablet (20 mg) by mouth 2 times daily for 14 days 30 tablet 0     metoprolol tartrate (LOPRESSOR) 25 MG tablet Take 0.5 tablets (12.5 mg) by mouth 2 times daily 60 tablet 2     Prenatal Vit-Fe Fumarate-FA (PRENATAL VITAMINS PO)          ROS:   A complete 12-point ROS was negative except as above.    EXAM:  /69 (BP Location: Right arm, Patient Position: Sitting, Cuff Size: Adult Large)   Pulse 101   Ht 1.676 m (5' 5.98\")   Wt 101.4 kg (223 lb 8.7 oz)   LMP 01/10/2023 (Approximate)   SpO2 98%   BMI 36.10 kg/m      General: appears comfortable, alert and interactive, in no acute distress  Head: normocephalic, atraumatic  Eyes: anicteric sclera, EOMI  Mouth: wearing mask per COVID-19 protocol  Neck: supple, no cervical adenopathy  CV: regular, S1/S2, no murmur, gallop, rub, estimated JVP ~6 cm  Resp: clear, no rales or wheezing  GI: soft, nontender, nondistended, +BS  Extremities: warm, no peripheral edema, 2+ bilateral radial pulses  Neurological: normal speech and affect, no gross motor deficits  Psych: normal mood and affect  Derm: no rashes or lesions on exposed surfaces; small nodule that is not " tender or warm on palpation over prior CVC line in RIJ; significant ecchymosis on abdomen from AC shots, sternal incision is well healing    Weight  Wt Readings from Last 10 Encounters:   03/10/23 101.4 kg (223 lb 8.7 oz)   03/10/23 101.4 kg (223 lb 9.6 oz)   03/02/23 98.3 kg (216 lb 11.2 oz)   02/27/23 96.8 kg (213 lb 6.4 oz)   02/12/23 101.8 kg (224 lb 6.4 oz)   02/10/23 100.7 kg (222 lb)   02/09/23 102.5 kg (226 lb)   01/24/23 101.5 kg (223 lb 11.2 oz)   01/19/23 102.7 kg (226 lb 8 oz)   01/17/23 101.2 kg (223 lb 3.2 oz)       I personally reviewed recent labs and data as below and discussed the results with the patient in clinic today.  Labs:  CBC RESULTS:  Lab Results   Component Value Date    WBC 11.2 (H) 02/27/2023    WBC 8.4 07/09/2021    RBC 2.81 (L) 02/27/2023    RBC 4.39 07/09/2021    HGB 7.2 (L) 02/27/2023    HGB 11.7 07/09/2021    HCT 24.0 (L) 02/27/2023    HCT 37.7 07/09/2021    MCV 85 02/27/2023    MCV 86 07/09/2021    MCH 25.6 (L) 02/27/2023    MCH 26.7 07/09/2021    MCHC 30.0 (L) 02/27/2023    MCHC 31.0 (L) 07/09/2021    RDW 16.4 (H) 02/27/2023    RDW 14.1 07/09/2021     (H) 02/27/2023     07/09/2021       CMP RESULTS:  Lab Results   Component Value Date     02/27/2023     07/09/2021    POTASSIUM 4.3 02/27/2023    POTASSIUM 5.1 (H) 02/18/2023    POTASSIUM 4.5 02/08/2023    POTASSIUM 4.6 07/09/2021    CHLORIDE 105 02/27/2023    CHLORIDE 114 (H) 02/08/2023    CHLORIDE 110 (H) 07/09/2021    CO2 20 (L) 02/27/2023    CO2 19 (L) 02/08/2023    CO2 23 07/09/2021    ANIONGAP 12 02/27/2023    ANIONGAP 8 02/08/2023    ANIONGAP 6 07/09/2021    GLC 96 02/27/2023    GLC 91 02/21/2023    GLC 84 02/08/2023    GLC 78 07/09/2021    BUN 22.0 (H) 02/27/2023    BUN 31 (H) 02/08/2023    BUN 29 07/09/2021    CR 1.53 (H) 02/27/2023    CR 1.38 (H) 07/09/2021    GFRESTIMATED 48 (L) 02/27/2023    GFRESTIMATED 54 (L) 07/09/2021    GFRESTBLACK 62 07/09/2021    KRAIG 9.3 02/27/2023    KRAIG 9.2 07/09/2021     BILITOTAL <0.2 02/27/2023    BILITOTAL <0.1 (L) 07/09/2021    ALBUMIN 3.0 (L) 02/27/2023    ALBUMIN 3.4 02/08/2023    ALBUMIN 3.2 (L) 07/09/2021    ALKPHOS 248 (H) 02/27/2023    ALKPHOS 91 07/09/2021    ALT 16 02/27/2023    ALT 42 07/09/2021    AST 13 02/27/2023    AST 19 07/09/2021        Testing/Procedures:  I personally visualized and interpreted:  Echocardiogram 2/18/23  Interpretation Summary  Large highly mobile thrombus in transit in the right atrium, at least 2.2 cm in length.  Right ventricular function, chamber size, wall motion, and thickness are normal.  Known patent foramen ovale, suboptimally imaged on this study. Intermittent left to right atrial-level shunting is seen on some color Doppler images.  Pulmonary artery systolic pressure cannot be assessed.  Previous study not available for comparison.    EKG 2/18/23 shows NSR with no acute ST changes    TVUS 3/2/23  IMPRESSION: There has been appropriate interval growth of single  living intrauterine embryo in the left horn of a bicornuate uterus,  now measuring 7 weeks 2 days corresponding to an estimated date of delivery of 10/17/2023. Cardiac activity is normal.    Outside results of note:  Outside records from recent [facility] admission were obtained, and relevant results/notes have been incorporated into HPI.    Assessment and Plan:     In summary, 22 year old female with a past medical history of Granulomatosis with polyangiitis, CKD stage III, [place]-3 positive ANCA vasculitis (kidney bx proven in March 2011), prior saddle PE w/ B/L DVT w/ pulmonary infarct & mild RV dilation on 03/20/2021- treated with thrombolysis, Hx of pre-eclampsia, HELLP with IUFD at 31 weeks in October 2020, who presented to the ED on 2/17/2023 with progressively worsening cough and sharp, pleuritic chest pain found to have large highly mobile thrombi in right atrium at 5 weeks pregnant. She underwent emergent sternotomy with right atrial thrombectomy and PFO closure. She  represents to establish care in Cardiology clinic given her recent issues and high-risk pregnancy.    Hx of saddle PE with B/L DVT and pulmonary infarct, mild EV dilation  Mobile thrombus in right atrium s/p thrombectomy  Pre-op echo  AM: large highly mobile thrombus in the right atrium, at least 2.2 cm in length.  Normal right ventricular size and function normal.  LVEF 60 to 65%. Post-op echo  PM: completed by Anesthesia, Post CPB: normal biventricular function, valves unchanged.  PFO has been closed, no flow evident. Thrombus previously noted in RAA is no longer present.  No aortic dissection noted.   - ASA 81 mg daily  - No statin indicated  - BB: metoprolol tartrate 12.5 mg BID with hold parameters for bradycardia  - Lovenox 80 mg BID, follows with Hematology    CKD Stage III  Cr mostly ~1.4-1.5 recently, did have KATERINA while inpatient, but improving prior to discharge.  - Will continue to monitor closely    Currently Pregnant (Est DD 10/17/23)  Hx HELLP with spontaneous late   Bicornuate uterus  Hx of IUFD at 30 weeks and 6 days. MFM consulted during recent complex admission.            - Neurological: avoid NSAIDs                - CV: OK for Heparin and Lovenox, avoid DOAC/Warfarin, with recommended SBP<130/80, OK for Beta Blockers, avoid ACE/ARB/statins            - Endocrine: Goal BG < 120            - ID: OK for Penicillin and Cephalosporin            - Obstetrics: Needs to establish with MFM outpatient            - Radiological consideration: Limit radiation, use shielding            - General: Will avoid teratogenic medications    To Do:  - No change to medications today  - Needs to establish with MFM  - Follow up with me in 6 weeks with repeat TTE prior    The patient states understanding and is agreeable with plan.   Feel free to contact myself regarding questions or concerns. It was a pleasure to see this patient today.    I spent 45 minutes in care of the patient today including  obtaining recent medical history, personally reviewing recent cardiac testing and/or lab results, today's examination, discussion of testing results and care recommendations with patient.    Roxie Oneal MD   of Medicine, [facility]  Advanced Heart Failure and Transplant Cardiology     CC  WILFREDO RIVERA MD

## 2023-03-10 NOTE — PROGRESS NOTES
Cardiology Clinic Note    HPI  Dear colleagues,     I had the pleasure of seeing Ms. Cande Shields in the Cardiology clinic.  As you know, Ms. Cande Shields is a pleasant 22 year old female with a past medical history of Granulomatosis with polyangiitis, CKD stage III, ME-3 positive ANCA vasculitis (kidney bx proven in March 2011), prior saddle PE w/ B/L DVT w/ pulmonary infarct & mild RV dilation on 03/20/2021- treated with thrombolysis, Hx of pre-eclampsia, HELLP with IUFD at 31 weeks in October 2020, who presented to the ED on 2/17/2023 with progressively worsening cough and sharp, pleuritic chest pain found to have large highly mobile thrombi in right atrium at 5 weeks pregnant. She underwent emergent sternotomy with right atrial thrombectomy and PFO closure. She represents to establish care in Cardiology clinic given her recent issues and high-risk pregnancy.    Very complex history, recent vasculitis flareup in 12/22 which was treated with 3 doses of rituximab and she was started on avacopan with rapid steroid taper.  At that time she presented with new petechiae, KATERINA and hemoptysis. However, in the interim, she tested positive for pregnancy which led to discontinuation of avacopan and holding off on the fourth dose of rituximab. Her last dose of Rituximab was on 1/17/23.  Had a significant ED visit on 2/12/23 where she was diagnosed with saddle pulmonary embolus with mild right heart strain.  She was discharged on therapeutic enoxaparin. Presented to the ED again on 02/17/23 with complaints of worsening chest pain, cough and new onset hemoptysis for one day.  Work-up in the ED showed significantly elevated CRP at 322 which improved to 242. Otherwise normal creatinine. Elevated WBC and procal. Planned angiovac via catheterization on 2/18/23 with surgical back-up. She required emergency sternotomy for R atrial thrombectomy and PFO closure. Overall, she tolerated the operation well and postoperatively was  admitted to the CVICU.  She was extubated on POD # 0 to 4 lpm via NC with neuro status intact.  Her ICU stay was complicated by PMHx and Pregnancy. Hematology, Rheumatology, and OB-GYN were asked to follow during her recovery. She was started on a heparin gtt and transitioned to therapeutic Lovenox dosing. She was started on Remdesevir for COVID status and Ceftriaxone for strept throat. She was transferred to the post-surgical telemetry unit on POD # 2. Due to early pregnancy status, maternal fetal medicine followed along and will schedule follow up at the time of discharge. Additionally, rheumatology peripherally followed due to history of granulomatous polyangiitis and PR3 positive ANCA vasculitis. No indication for steroids during hospital course and she should follow with rheumatology as prior. Due to leukocytosis and strep throat, she was transitioned from ceftriaxone to zosyn for broad coverage with resolution of elevated WBC.  Her CRP trended down and she remained afebrile. She did have early ileus that resolved with conservative measures and is having regular bowel movements at the time of discharge.    She reports still having some incisional pain and significant bruising from her Lovenos shots. She denies any URIAS, orthopnea, PND, presyncope, syncope, abdominal pain, nausea, emesis, LE edema, or weakness. She reports at her recent FP visit, she does appear to still have a viable pregnancy. She reports good BP control recently. Patient reports compliance with her medications.    PAST MEDICAL HISTORY:  Patient Active Problem List   Diagnosis     Wegener's granulomatosis     Myalgia     Granulomatosis with polyangiitis, unspecified whether renal involvement (H)     ANCA-associated vasculitis     Pulmonary infiltrates     Acute kidney injury (H)     Need for pneumocystis prophylaxis     Acute deep vein thrombosis (DVT) of proximal vein of both lower extremities (H)     ADHD (attention deficit hyperactivity  disorder), inattentive type     Alcohol use disorder, mild, in sustained remission     Bicornuate uterus     Binge-eating disorder, moderate     BMI 40.0-44.9, adult (H)     Cannabis use disorder, mild, in early remission     Femoral artery thrombosis, left (H)     Fetal demise, greater than 22 weeks, antepartum     Generalized anxiety disorder     History of granulomatosis with polyangiitis     Depression     Occlusion of common femoral artery (H)     Performance anxiety     Personal history of COVID-19     Pyoderma gangrenosum     Self-injurious behavior     Stage 3 chronic kidney disease, unspecified whether stage 3a or 3b CKD (H)     Acute saddle pulmonary embolism with acute cor pulmonale (H)     Thrombosis     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)     Infection due to 2019 novel coronavirus     ATN (acute tubular necrosis) (H)     Other pulmonary embolism without acute cor pulmonale, unspecified chronicity (H)     Chest pain, unspecified type     Pneumonia due to infectious organism, unspecified laterality, unspecified part of lung        FAMILY HISTORY:  Family History   Problem Relation Age of Onset     Thyroid Disease Mother      Cancer Maternal Grandfather      Asthma No family hx of      Diabetes No family hx of    No known family history of heart disease    SOCIAL HISTORY:  Social History     Tobacco Use     Smoking status: Former     Packs/day: 0.25     Types: Cigarettes     Smokeless tobacco: Former     Tobacco comments:     Vaping stopped 2/26   Vaping Use     Vaping Use: Some days     Substances: Nicotine, Flavoring     Devices: Disposable   Substance Use Topics     Alcohol use: Not Currently     Drug use: No        ALLERGIES:  Allergies   Allergen Reactions     Penicillins Rash     Unknown, but think rash.  Tolerated cephalexin (12/27/20), cefpodoxime (12/27/20), Ceftriaxone (Feb 2023), Zosyn (Feb 2023)       CURRENT MEDICATIONS:  Current Outpatient Medications   Medication Sig  "Dispense Refill     acetaminophen (TYLENOL) 325 MG tablet Take 2 tablets (650 mg) by mouth every 4 hours as needed for other (For optimal non-opioid multimodal pain management to improve pain control.)       aspirin (ASA) 81 MG chewable tablet Take 1 tablet (81 mg) by mouth daily 30 tablet 2     enoxaparin ANTICOAGULANT (LOVENOX) 80 MG/0.8ML syringe Inject 0.8 mLs (80 mg) Subcutaneous every 12 hours for 90 days 48 mL 2     famotidine (PEPCID) 20 MG tablet Take 1 tablet (20 mg) by mouth 2 times daily for 14 days 30 tablet 0     metoprolol tartrate (LOPRESSOR) 25 MG tablet Take 0.5 tablets (12.5 mg) by mouth 2 times daily 60 tablet 2     Prenatal Vit-Fe Fumarate-FA (PRENATAL VITAMINS PO)          ROS:   A complete 12-point ROS was negative except as above.    EXAM:  /69 (BP Location: Right arm, Patient Position: Sitting, Cuff Size: Adult Large)   Pulse 101   Ht 1.676 m (5' 5.98\")   Wt 101.4 kg (223 lb 8.7 oz)   LMP 01/10/2023 (Approximate)   SpO2 98%   BMI 36.10 kg/m      General: appears comfortable, alert and interactive, in no acute distress  Head: normocephalic, atraumatic  Eyes: anicteric sclera, EOMI  Mouth: wearing mask per COVID-19 protocol  Neck: supple, no cervical adenopathy  CV: regular, S1/S2, no murmur, gallop, rub, estimated JVP ~6 cm  Resp: clear, no rales or wheezing  GI: soft, nontender, nondistended, +BS  Extremities: warm, no peripheral edema, 2+ bilateral radial pulses  Neurological: normal speech and affect, no gross motor deficits  Psych: normal mood and affect  Derm: no rashes or lesions on exposed surfaces; small nodule that is not tender or warm on palpation over prior CVC line in RIJ; significant ecchymosis on abdomen from AC shots, sternal incision is well healing    Weight  Wt Readings from Last 10 Encounters:   03/10/23 101.4 kg (223 lb 8.7 oz)   03/10/23 101.4 kg (223 lb 9.6 oz)   03/02/23 98.3 kg (216 lb 11.2 oz)   02/27/23 96.8 kg (213 lb 6.4 oz)   02/12/23 101.8 kg (224 lb " 6.4 oz)   02/10/23 100.7 kg (222 lb)   02/09/23 102.5 kg (226 lb)   01/24/23 101.5 kg (223 lb 11.2 oz)   01/19/23 102.7 kg (226 lb 8 oz)   01/17/23 101.2 kg (223 lb 3.2 oz)       I personally reviewed recent labs and data as below and discussed the results with the patient in clinic today.  Labs:  CBC RESULTS:  Lab Results   Component Value Date    WBC 11.2 (H) 02/27/2023    WBC 8.4 07/09/2021    RBC 2.81 (L) 02/27/2023    RBC 4.39 07/09/2021    HGB 7.2 (L) 02/27/2023    HGB 11.7 07/09/2021    HCT 24.0 (L) 02/27/2023    HCT 37.7 07/09/2021    MCV 85 02/27/2023    MCV 86 07/09/2021    MCH 25.6 (L) 02/27/2023    MCH 26.7 07/09/2021    MCHC 30.0 (L) 02/27/2023    MCHC 31.0 (L) 07/09/2021    RDW 16.4 (H) 02/27/2023    RDW 14.1 07/09/2021     (H) 02/27/2023     07/09/2021       CMP RESULTS:  Lab Results   Component Value Date     02/27/2023     07/09/2021    POTASSIUM 4.3 02/27/2023    POTASSIUM 5.1 (H) 02/18/2023    POTASSIUM 4.5 02/08/2023    POTASSIUM 4.6 07/09/2021    CHLORIDE 105 02/27/2023    CHLORIDE 114 (H) 02/08/2023    CHLORIDE 110 (H) 07/09/2021    CO2 20 (L) 02/27/2023    CO2 19 (L) 02/08/2023    CO2 23 07/09/2021    ANIONGAP 12 02/27/2023    ANIONGAP 8 02/08/2023    ANIONGAP 6 07/09/2021    GLC 96 02/27/2023    GLC 91 02/21/2023    GLC 84 02/08/2023    GLC 78 07/09/2021    BUN 22.0 (H) 02/27/2023    BUN 31 (H) 02/08/2023    BUN 29 07/09/2021    CR 1.53 (H) 02/27/2023    CR 1.38 (H) 07/09/2021    GFRESTIMATED 48 (L) 02/27/2023    GFRESTIMATED 54 (L) 07/09/2021    GFRESTBLACK 62 07/09/2021    KRAIG 9.3 02/27/2023    KRAIG 9.2 07/09/2021    BILITOTAL <0.2 02/27/2023    BILITOTAL <0.1 (L) 07/09/2021    ALBUMIN 3.0 (L) 02/27/2023    ALBUMIN 3.4 02/08/2023    ALBUMIN 3.2 (L) 07/09/2021    ALKPHOS 248 (H) 02/27/2023    ALKPHOS 91 07/09/2021    ALT 16 02/27/2023    ALT 42 07/09/2021    AST 13 02/27/2023    AST 19 07/09/2021        Testing/Procedures:  I personally visualized and  interpreted:  Echocardiogram 2/18/23  Interpretation Summary  Large highly mobile thrombus in transit in the right atrium, at least 2.2 cm in length.  Right ventricular function, chamber size, wall motion, and thickness are normal.  Known patent foramen ovale, suboptimally imaged on this study. Intermittent left to right atrial-level shunting is seen on some color Doppler images.  Pulmonary artery systolic pressure cannot be assessed.  Previous study not available for comparison.    EKG 2/18/23 shows NSR with no acute ST changes    TVUS 3/2/23  IMPRESSION: There has been appropriate interval growth of single  living intrauterine embryo in the left horn of a bicornuate uterus,  now measuring 7 weeks 2 days corresponding to an estimated date of delivery of 10/17/2023. Cardiac activity is normal.    Outside results of note:  Outside records from recent [facility] admission were obtained, and relevant results/notes have been incorporated into HPI.    Assessment and Plan:     In summary, 22 year old female with a past medical history of Granulomatosis with polyangiitis, CKD stage III, OK-3 positive ANCA vasculitis (kidney bx proven in March 2011), prior saddle PE w/ B/L DVT w/ pulmonary infarct & mild RV dilation on 03/20/2021- treated with thrombolysis, Hx of pre-eclampsia, HELLP with IUFD at 31 weeks in October 2020, who presented to the ED on 2/17/2023 with progressively worsening cough and sharp, pleuritic chest pain found to have large highly mobile thrombi in right atrium at 5 weeks pregnant. She underwent emergent sternotomy with right atrial thrombectomy and PFO closure. She represents to establish care in Cardiology clinic given her recent issues and high-risk pregnancy.    Hx of saddle PE with B/L DVT and pulmonary infarct, mild EV dilation  Mobile thrombus in right atrium s/p thrombectomy  Pre-op echo 2/18 AM: large highly mobile thrombus in the right atrium, at least 2.2 cm in length.  Normal right ventricular size  and function normal.  LVEF 60 to 65%. Post-op echo  PM: completed by Anesthesia, Post CPB: normal biventricular function, valves unchanged.  PFO has been closed, no flow evident. Thrombus previously noted in RAA is no longer present.  No aortic dissection noted.   - ASA 81 mg daily  - No statin indicated  - BB: metoprolol tartrate 12.5 mg BID with hold parameters for bradycardia  - Lovenox 80 mg BID, follows with Hematology    CKD Stage III  Cr mostly ~1.4-1.5 recently, did have KATERINA while inpatient, but improving prior to discharge.  - Will continue to monitor closely    Currently Pregnant (Est DD 10/17/23)  Hx HELLP with spontaneous late   Bicornuate uterus  Hx of IUFD at 30 weeks and 6 days. MFM consulted during recent complex admission.            - Neurological: avoid NSAIDs                - CV: OK for Heparin and Lovenox, avoid DOAC/Warfarin, with recommended SBP<130/80, OK for Beta Blockers, avoid ACE/ARB/statins            - Endocrine: Goal BG < 120            - ID: OK for Penicillin and Cephalosporin            - Obstetrics: Needs to establish with MFM outpatient            - Radiological consideration: Limit radiation, use shielding            - General: Will avoid teratogenic medications    To Do:  - No change to medications today  - Needs to establish with MFM  - Follow up with me in 6 weeks with repeat TTE prior    The patient states understanding and is agreeable with plan.   Feel free to contact myself regarding questions or concerns. It was a pleasure to see this patient today.    I spent 45 minutes in care of the patient today including obtaining recent medical history, personally reviewing recent cardiac testing and/or lab results, today's examination, discussion of testing results and care recommendations with patient.    Roxie Oneal MD   of Medicine, [facility]  Advanced Heart Failure and Transplant Cardiology     WILFREDO COOK MD

## 2023-03-11 NOTE — PROCEDURES
Op Note    Date: February 18, 2023    Procedure: Angiovac suction embolectomy of the right atrial mass    Pre-op Diagnosis: Clot in transit in the right atrium    Post-op Diagnosis: Clot in transit in the right atrium with core of chronic thrombus    Proceduralist: Dr. Po Monterroso  Fellow Proceduralist: Dr. Adrien Brumfield    Anesthesia: General anesthesia    Estimated blood loss: 100cc    Indications:  Cande Shields is a 22 year old woman, currently 5w pregnant, w/ h/o PFO, granulomatosis polyangiitis, CKD, prior PE and DVT 3/2021, pre-eclampsia and HELLP w/ IUFD at 31 wks 10/2020, recent PE 2/12/2023 w/ PE showing mult-segmental PE and large RA thrombus.  She is referred for angiovac-mediated right atrial thrombectomy with blood return.    Procedure Details:  The patient was transferred to the operating table and positioned supine.  Lead covering was placed underneath the abdomen of the patient to limit fetal radiation exposure.  General anesthesia was initiated and maintained throughout the procedure by the anesthesia team.  Endotracheal intubation and IV access was achieved.  The patients chest, abdomen, and b/l groins were shaved, prepped, and draped in a sterile fashion.  A pre-procedure time out was performed confirming the correct patient, correct procedure, and correct site.      A WILY was performed by anesthesia.  This was used to guide the procedure in addition to fluoroscopy.  B/l femoral venous access was achieved using modified seldinger technique with placement of b/l 6Fr sheaths.  Amplatz super stiff wires were advanced, sheaths removed, and dilators used to achieve placement of a 26Fr dry seal sheath in the RFV and a 17Fr arterial ECMO cannula in the LFV.  The angiovac cannula was inserted through the dry-seal sheath.  The cannula was flushed and connected to the circuit angiovac circuit.  The circuit was attached to the LFV cannula via wet-to-wet technique.  Heparin was used for anticoagulation  with ACT > 250sec.  Thrombus debulking was achieved under WILY and fluoroscopy guidance.  Multiple passes were made to remove portions of the mass.  However, a large portion of thrombus remained tethered to the eustachian valve and could not be removed despite suction and pulling at multiple angles.  When the size could not be reduced further with more passes of the angiovac, the decision was made to halt further angiovac and proceed to median sternotomy and surgical right atrial thrombectomy with Dr. Adelfo Elmore, [facility].  Refer to Dr. Elmore's op note for more details of the surgical thrombectomy.  The angiovac was removed from the 26Fr sheath and the sheath was sutured in place.  The LFV cannula was used for cardiopulmonary bypass and was sutured in place.  The RFV sheath and LFV cannula were removed upon completion of the surgical procedure using figure of 8 stitches after reversal of heparin with protamine.    All needle, sponge, and instrument counts were correct at the end of the procedure.    Po Monterroso MD, PhD  Interventional/Critical Care Cardiology  [phone]    February 18, 2023

## 2023-03-13 ENCOUNTER — APPOINTMENT (OUTPATIENT)
Dept: ULTRASOUND IMAGING | Facility: CLINIC | Age: 23
End: 2023-03-13
Attending: EMERGENCY MEDICINE
Payer: COMMERCIAL

## 2023-03-13 ENCOUNTER — OFFICE VISIT (OUTPATIENT)
Dept: INTERNAL MEDICINE | Facility: CLINIC | Age: 23
End: 2023-03-13
Payer: COMMERCIAL

## 2023-03-13 ENCOUNTER — HOSPITAL ENCOUNTER (EMERGENCY)
Facility: CLINIC | Age: 23
Discharge: HOME | End: 2023-03-13
Attending: EMERGENCY MEDICINE | Admitting: EMERGENCY MEDICINE
Payer: COMMERCIAL

## 2023-03-13 VITALS
OXYGEN SATURATION: 98 % | HEART RATE: 79 BPM | TEMPERATURE: 97.8 F | SYSTOLIC BLOOD PRESSURE: 98 MMHG | DIASTOLIC BLOOD PRESSURE: 64 MMHG | RESPIRATION RATE: 18 BRPM

## 2023-03-13 VITALS
WEIGHT: 221.2 LBS | DIASTOLIC BLOOD PRESSURE: 69 MMHG | BODY MASS INDEX: 35.55 KG/M2 | HEIGHT: 66 IN | TEMPERATURE: 98.3 F | HEART RATE: 95 BPM | OXYGEN SATURATION: 98 % | SYSTOLIC BLOOD PRESSURE: 93 MMHG

## 2023-03-13 DIAGNOSIS — I26.02 ACUTE SADDLE PULMONARY EMBOLISM WITH ACUTE COR PULMONALE (H): ICD-10-CM

## 2023-03-13 DIAGNOSIS — Z3A.08 8 WEEKS GESTATION OF PREGNANCY: ICD-10-CM

## 2023-03-13 DIAGNOSIS — Z09 HOSPITAL DISCHARGE FOLLOW-UP: Primary | ICD-10-CM

## 2023-03-13 DIAGNOSIS — O21.9 NAUSEA/VOMITING IN PREGNANCY: ICD-10-CM

## 2023-03-13 DIAGNOSIS — Z87.74 S/P PATENT FORAMEN OVALE CLOSURE: ICD-10-CM

## 2023-03-13 DIAGNOSIS — O09.529 SUPERVISION OF HIGH-RISK PREGNANCY OF ELDERLY MULTIGRAVIDA: ICD-10-CM

## 2023-03-13 DIAGNOSIS — K21.9 GASTROESOPHAGEAL REFLUX DISEASE WITHOUT ESOPHAGITIS: ICD-10-CM

## 2023-03-13 DIAGNOSIS — R05.3 CHRONIC COUGH: ICD-10-CM

## 2023-03-13 DIAGNOSIS — I77.82 ANCA-ASSOCIATED VASCULITIS (H): ICD-10-CM

## 2023-03-13 DIAGNOSIS — O20.9 BLEEDING IN EARLY PREGNANCY: ICD-10-CM

## 2023-03-13 LAB
ABO/RH(D): NORMAL
ALBUMIN SERPL BCG-MCNC: 3.6 G/DL (ref 3.5–5.2)
ALP SERPL-CCNC: 115 U/L (ref 35–104)
ALT SERPL W P-5'-P-CCNC: 14 U/L (ref 10–35)
ANION GAP SERPL CALCULATED.3IONS-SCNC: 12 MMOL/L (ref 7–15)
ANTIBODY SCREEN: NEGATIVE
AST SERPL W P-5'-P-CCNC: 8 U/L (ref 10–35)
BASOPHILS # BLD AUTO: 0.1 10E3/UL (ref 0–0.2)
BASOPHILS NFR BLD AUTO: 1 %
BILIRUB SERPL-MCNC: <0.2 MG/DL
BUN SERPL-MCNC: 22.8 MG/DL (ref 6–20)
CALCIUM SERPL-MCNC: 9.1 MG/DL (ref 8.6–10)
CHLORIDE SERPL-SCNC: 107 MMOL/L (ref 98–107)
CREAT SERPL-MCNC: 1.28 MG/DL (ref 0.51–0.95)
DEPRECATED HCO3 PLAS-SCNC: 18 MMOL/L (ref 22–29)
EOSINOPHIL # BLD AUTO: 0.7 10E3/UL (ref 0–0.7)
EOSINOPHIL NFR BLD AUTO: 8 %
ERYTHROCYTE [DISTWIDTH] IN BLOOD BY AUTOMATED COUNT: 16.4 % (ref 10–15)
GFR SERPL CREATININE-BSD FRML MDRD: 60 ML/MIN/1.73M2
GLUCOSE SERPL-MCNC: 95 MG/DL (ref 70–99)
HCG INTACT+B SERPL-ACNC: ABNORMAL MIU/ML
HCT VFR BLD AUTO: 27 % (ref 35–47)
HGB BLD-MCNC: 8.1 G/DL (ref 11.7–15.7)
IMM GRANULOCYTES # BLD: 0.1 10E3/UL
IMM GRANULOCYTES NFR BLD: 1 %
LYMPHOCYTES # BLD AUTO: 2.6 10E3/UL (ref 0.8–5.3)
LYMPHOCYTES NFR BLD AUTO: 30 %
MCH RBC QN AUTO: 25.6 PG (ref 26.5–33)
MCHC RBC AUTO-ENTMCNC: 30 G/DL (ref 31.5–36.5)
MCV RBC AUTO: 85 FL (ref 78–100)
MONOCYTES # BLD AUTO: 0.9 10E3/UL (ref 0–1.3)
MONOCYTES NFR BLD AUTO: 11 %
NEUTROPHILS # BLD AUTO: 4.4 10E3/UL (ref 1.6–8.3)
NEUTROPHILS NFR BLD AUTO: 49 %
NRBC # BLD AUTO: 0 10E3/UL
NRBC BLD AUTO-RTO: 0 /100
PLATELET # BLD AUTO: 509 10E3/UL (ref 150–450)
POTASSIUM SERPL-SCNC: 4.5 MMOL/L (ref 3.4–5.3)
PROT SERPL-MCNC: 6 G/DL (ref 6.4–8.3)
RBC # BLD AUTO: 3.17 10E6/UL (ref 3.8–5.2)
SODIUM SERPL-SCNC: 137 MMOL/L (ref 136–145)
SPECIMEN EXPIRATION DATE: NORMAL
WBC # BLD AUTO: 8.7 10E3/UL (ref 4–11)

## 2023-03-13 PROCEDURE — 76801 OB US < 14 WKS SINGLE FETUS: CPT | Mod: 26 | Performed by: RADIOLOGY

## 2023-03-13 PROCEDURE — 84702 CHORIONIC GONADOTROPIN TEST: CPT | Performed by: EMERGENCY MEDICINE

## 2023-03-13 PROCEDURE — 76801 OB US < 14 WKS SINGLE FETUS: CPT

## 2023-03-13 PROCEDURE — 86850 RBC ANTIBODY SCREEN: CPT | Performed by: EMERGENCY MEDICINE

## 2023-03-13 PROCEDURE — 99284 EMERGENCY DEPT VISIT MOD MDM: CPT | Performed by: EMERGENCY MEDICINE

## 2023-03-13 PROCEDURE — 80053 COMPREHEN METABOLIC PANEL: CPT | Performed by: EMERGENCY MEDICINE

## 2023-03-13 PROCEDURE — 99284 EMERGENCY DEPT VISIT MOD MDM: CPT | Mod: 25 | Performed by: EMERGENCY MEDICINE

## 2023-03-13 PROCEDURE — 86901 BLOOD TYPING SEROLOGIC RH(D): CPT | Performed by: EMERGENCY MEDICINE

## 2023-03-13 PROCEDURE — 85014 HEMATOCRIT: CPT | Performed by: EMERGENCY MEDICINE

## 2023-03-13 PROCEDURE — 36415 COLL VENOUS BLD VENIPUNCTURE: CPT | Performed by: EMERGENCY MEDICINE

## 2023-03-13 PROCEDURE — 99495 TRANSJ CARE MGMT MOD F2F 14D: CPT | Performed by: INTERNAL MEDICINE

## 2023-03-13 RX ORDER — ALBUTEROL SULFATE 90 UG/1
2 AEROSOL, METERED RESPIRATORY (INHALATION) EVERY 6 HOURS PRN
Qty: 18 G | Refills: 1 | Status: SHIPPED | OUTPATIENT
Start: 2023-03-13 | End: 2023-05-24

## 2023-03-13 RX ORDER — FAMOTIDINE 20 MG/1
20 TABLET, FILM COATED ORAL 2 TIMES DAILY
Qty: 30 TABLET | Refills: 0 | Status: SHIPPED | OUTPATIENT
Start: 2023-03-13 | End: 2023-03-13

## 2023-03-13 RX ORDER — FAMOTIDINE 20 MG/1
20 TABLET, FILM COATED ORAL 2 TIMES DAILY
Qty: 60 TABLET | Refills: 1 | Status: SHIPPED | OUTPATIENT
Start: 2023-03-13 | End: 2023-04-13

## 2023-03-13 RX ORDER — PYRIDOXINE HCL (VITAMIN B6) 25 MG
25 TABLET ORAL EVERY 8 HOURS PRN
Qty: 30 TABLET | Refills: 1 | Status: SHIPPED | OUTPATIENT
Start: 2023-03-13 | End: 2023-04-13

## 2023-03-13 ASSESSMENT — ACTIVITIES OF DAILY LIVING (ADL): ADLS_ACUITY_SCORE: 37

## 2023-03-13 NOTE — NURSING NOTE
"Cande Shields is a 22 year old female patient that presents today in clinic for the following:    Chief Complaint   Patient presents with     Hospital F/U     HOSPITAL FOLLOW UP.     The patient's allergies and medications were reviewed as noted. A set of vitals were recorded as noted without incident: BP 93/69 (BP Location: Right arm, Patient Position: Sitting, Cuff Size: Adult Large)   Pulse 95   Temp 98.3  F (36.8  C) (Oral)   Ht 1.676 m (5' 5.98\")   Wt 100.3 kg (221 lb 3.2 oz)   LMP 01/10/2023 (Approximate)   SpO2 98%   BMI 35.72 kg/m  . The patient does not have any other questions for the provider.    Valente Parra, EMT at 12:37 PM on 3/13/2023.  Primary care clinic: [phone]  "

## 2023-03-13 NOTE — ED PROVIDER NOTES
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  The patient reports she went to bed in her normal state of health however awoke this morning with a large amount of blood in her bed.  She reports otherwise pregnancy has been going well thus far.  She reports no new trauma.  She reports no significant abdominal pain at this time.  She denies fever, chills, dysuria, urine frequency.  She reports she is not symptomatic from vaginal bleeding at this time.  No lightheadedness, chest pain, shortness of breath.  Patient does report she had a recent ultrasonography several weeks ago demonstrating intrauterine gestation with normal heart rate at that time.      EARLY OB ULTRASOUND 3/2/23   Comparisons: 2023     HISTORY:    Bicornuate uterus. Follow-up embryonic cardiac activity.     FINDINGS:    There is a gestational sac within the uterus with a yolk  sac and fetal pole. The crown-rump length measures 11 mm corresponding  to a gestational age of 7 weeks 2 days. Corresponding EDC is  10/17/2023. There is cardiac activity with a heart rate of 142 beats  per minute. The fetal pole has grown appropriately since 2023  ultrasound.     The right and left  "ovaries measure 2.5 x 1.3 x 1.3 cm and 3.9 x 3.2 x  4.4 cm, respectively. Both ovaries are normal in appearance. There is  a corpus luteum on the left.                                                                      IMPRESSION: There has been appropriate interval growth of single  living intrauterine embryo in the left horn of a bicornuate uterus,  now measuring 7 weeks 2 days corresponding to an estimated date of  delivery of 10/17/2023. . Cardiac activity is normal     AMI GALLEGOS MD     OB History    Para Term  AB Living   2 1 0 1 0 0   SAB IAB Ectopic Multiple Live Births   0 0 0 0 0      # Outcome Date GA Lbr Tobin/2nd Weight Sex Delivery Anes PTL Lv   2 Current            1  10/28/20 30w6d 05:15 0.782 kg (1 lb 11.6 oz) F Vag-Spont IV N FD      Birth Comments: Vicky- 10/20 30w6d presented to [facility] with right sided chest pain, BP in severe range, No fetal heart tones auscultated. Induced, NSVB      Name: ELOYPENDING FD      Apgar1: 0  Apgar5: 0      Obstetric Comments   10/20 30w6d presented to [facility] with right sided chest pain, BP in severe range, No fetal heart tones auscultated. Induced, NSVB, \"Vicky\" 2lbs     Past Medical History  Past Medical History:   Diagnosis Date     ADHD (attention deficit hyperactivity disorder)      DVT, lower extremity, distal (H)      Dysmenorrhea      Femoral artery thrombosis, left (H) 2021     Multiple subsegmental pulmonary emboli without acute cor pulmonale (H) 2021     PFO (patent foramen ovale)      Preeclampsia, third trimester      Spontaneous pregnancy loss     31 weeks     Stage 3b chronic kidney disease (H)      Wegener's disease, pulmonary 2010    renal biopsy     Past Surgical History:   Procedure Laterality Date     ENT SURGERY  2000    cyst     EXCISE GANGLION WRIST  2009     STERNOTOMY  2023    right atrial thrombectomy     THROMBECTOMY ATRIUM Right 2023     THROMBECTOMY " PERCUTANEOUS N/A 2/18/2023    Procedure: Emergency Sternotomy, Right Atrial Thrombectomy, Central Cannulation, Bilateral Femoral Cannulation, Closure of PFO ; Transesophageal Echocardiogram performed by anesthesia staff;  Surgeon: Adelfo Elmore MD;  Location: [facility] OR     THROMBECTOMY PERCUTANEOUS N/A 2/18/2023    Procedure: Angiovac Mediated for Right Atrial Clot; Intracatheter Thrombectomy via bilateral percutaneous femoral venous access with 26 FR Right Femoral vein and 17 FR Left Femoral Vein cannulas. Transesophageal echchocardiogram performed by anesthesia staff;  Surgeon: Po Monterroso MD;  Location: [facility] OR     aspirin (ASA) 81 MG chewable tablet  enoxaparin ANTICOAGULANT (LOVENOX) 80 MG/0.8ML syringe  metoprolol tartrate (LOPRESSOR) 25 MG tablet  acetaminophen (TYLENOL) 325 MG tablet  famotidine (PEPCID) 20 MG tablet  Prenatal Vit-Fe Fumarate-FA (PRENATAL VITAMINS PO)      Allergies   Allergen Reactions     Penicillins Rash     Unknown, but think rash.  Tolerated cephalexin (12/27/20), cefpodoxime (12/27/20), Ceftriaxone (Feb 2023), Zosyn (Feb 2023)     Family History  Family History   Problem Relation Age of Onset     Thyroid Disease Mother      Cancer Maternal Grandfather      Asthma No family hx of      Diabetes No family hx of      Social History   Social History     Tobacco Use     Smoking status: Former     Packs/day: 0.25     Types: Cigarettes     Smokeless tobacco: Former     Tobacco comments:     Vaping stopped 2/26   Vaping Use     Vaping Use: Some days     Substances: Nicotine, Flavoring     Devices: Disposable   Substance Use Topics     Alcohol use: Not Currently     Drug use: No      Past medical history, past surgical history, medications, allergies, family history, and social history were reviewed with the patient. No additional pertinent items.      A medically appropriate review of systems was performed with pertinent positives and negatives noted in the HPI, and all other systems  negative.    Physical Exam   BP: 114/65  Pulse: 96  Temp: 97.8  F (36.6  C)  Resp: 20  SpO2: 99 %  Physical Exam  Vitals and nursing note reviewed.   Constitutional:       General: She is not in acute distress.     Appearance: Normal appearance. She is not ill-appearing, toxic-appearing or diaphoretic.   HENT:      Head: Normocephalic and atraumatic.      Right Ear: External ear normal.      Left Ear: External ear normal.      Nose: Nose normal. No congestion.      Mouth/Throat:      Mouth: Mucous membranes are moist.      Pharynx: Oropharynx is clear. No oropharyngeal exudate.   Eyes:      Extraocular Movements: Extraocular movements intact.      Conjunctiva/sclera: Conjunctivae normal.      Pupils: Pupils are equal, round, and reactive to light.   Cardiovascular:      Rate and Rhythm: Normal rate.      Pulses: Normal pulses.      Heart sounds: Normal heart sounds. No murmur heard.    No friction rub.   Pulmonary:      Effort: Pulmonary effort is normal. No respiratory distress.      Breath sounds: No stridor. No wheezing or rales.   Abdominal:      General: Abdomen is flat. There is no distension.      Tenderness: There is no abdominal tenderness. There is no guarding or rebound.   Musculoskeletal:         General: Normal range of motion.      Cervical back: Normal range of motion.   Skin:     General: Skin is warm.      Capillary Refill: Capillary refill takes less than 2 seconds.      Coloration: Skin is not pale.   Neurological:      General: No focal deficit present.      Mental Status: She is alert.      Cranial Nerves: No cranial nerve deficit.   Psychiatric:         Mood and Affect: Mood normal.         Behavior: Behavior normal.         ED Course, Procedures, & Data     ED Course as of 03/13/23 1139   Mon Mar 13, 2023   1115 Hemoglobin(!): 8.1     Procedures                 Results for orders placed or performed during the hospital encounter of 03/13/23   US OB < 14 Weeks Single     Status: None     Narrative    EXAMINATION: TEMPORARY, 3/13/2023 10:33 AM     COMPARISON: Ultrasound, 3/2/2023, 2/19/2023, and 2/12/2023    HISTORY: Bleeding; pain 8-9 weeks.. Gestational age 8 weeks 6 days  from LMP 1/10/2023    TECHNIQUE: The pelvis was scanned in standard fashion with  transabdominal and transvaginal transducer(s).    FINDINGS: There is a gestational sac within the left cornu of a  bicornuate uterus. The amniotic fluid volume and sac size are within  expected limits for gestation age. There is an embryo and yolk sac  present within the gestational sac. The embryo has a crown-rump length  of 1.9 cm corresponding to 8 week 4 day gestation. Embryonic heart  motion is present at 165 beats per minute.  It is too early to  accurately determine placental site.    Thickened endometrium of the right cornu without gestation sac . There  is no free fluid in the pelvis.  No uterine masses. The right ovary  measures 3.2 x 2.9 x 1.9. The left ovary measures 3.1 x 4.5 x 2.6.  There is normal blood flow to the ovaries.      Impression    IMPRESSION:      There is a single living intrauterine embryo with ultrasound  gestational age of 8 weeks 4 days corresponding to ultrasound  estimated date of delivery of 10/19/2023.    I have personally reviewed the examination and initial interpretation  and I agree with the findings.    ARLETTE POOLE MD         SYSTEM ID:  [id number]   Comprehensive metabolic panel     Status: Abnormal   Result Value Ref Range    Sodium 137 136 - 145 mmol/L    Potassium 4.5 3.4 - 5.3 mmol/L    Chloride 107 98 - 107 mmol/L    Carbon Dioxide (CO2) 18 (L) 22 - 29 mmol/L    Anion Gap 12 7 - 15 mmol/L    Urea Nitrogen 22.8 (H) 6.0 - 20.0 mg/dL    Creatinine 1.28 (H) 0.51 - 0.95 mg/dL    Calcium 9.1 8.6 - 10.0 mg/dL    Glucose 95 70 - 99 mg/dL    Alkaline Phosphatase 115 (H) 35 - 104 U/L    AST 8 (L) 10 - 35 U/L    ALT 14 10 - 35 U/L    Protein Total 6.0 (L) 6.4 - 8.3 g/dL    Albumin 3.6 3.5 - 5.2 g/dL    Bilirubin  Total <0.2 <=1.2 mg/dL    GFR Estimate 60 (L) >60 mL/min/1.73m2   CBC with platelets and differential     Status: Abnormal   Result Value Ref Range    WBC Count 8.7 4.0 - 11.0 10e3/uL    RBC Count 3.17 (L) 3.80 - 5.20 10e6/uL    Hemoglobin 8.1 (L) 11.7 - 15.7 g/dL    Hematocrit 27.0 (L) 35.0 - 47.0 %    MCV 85 78 - 100 fL    MCH 25.6 (L) 26.5 - 33.0 pg    MCHC 30.0 (L) 31.5 - 36.5 g/dL    RDW 16.4 (H) 10.0 - 15.0 %    Platelet Count 509 (H) 150 - 450 10e3/uL    % Neutrophils 49 %    % Lymphocytes 30 %    % Monocytes 11 %    % Eosinophils 8 %    % Basophils 1 %    % Immature Granulocytes 1 %    NRBCs per 100 WBC 0 <1 /100    Absolute Neutrophils 4.4 1.6 - 8.3 10e3/uL    Absolute Lymphocytes 2.6 0.8 - 5.3 10e3/uL    Absolute Monocytes 0.9 0.0 - 1.3 10e3/uL    Absolute Eosinophils 0.7 0.0 - 0.7 10e3/uL    Absolute Basophils 0.1 0.0 - 0.2 10e3/uL    Absolute Immature Granulocytes 0.1 <=0.4 10e3/uL    Absolute NRBCs 0.0 10e3/uL   HCG quantitative pregnancy     Status: Abnormal   Result Value Ref Range    hCG Quantitative 123,241 (H) <5 mIU/mL   Extra Tube ([place] Draw)     Status: None ()    Narrative    The following orders were created for panel order Extra Tube ([place] Draw).  Procedure                               Abnormality         Status                     ---------                               -----------         ------                     Extra Red Top Tube[[id number]]                                                            Please view results for these tests on the individual orders.   Adult Type and Screen     Status: None   Result Value Ref Range    ABO/RH(D) A POS     Antibody Screen Negative Negative    SPECIMEN EXPIRATION DATE [id number]    CBC with platelets differential     Status: Abnormal    Narrative    The following orders were created for panel order CBC with platelets differential.  Procedure                               Abnormality         Status                     ---------                                -----------         ------                     CBC with platelets and d...[[id number]]  Abnormal            Final result                 Please view results for these tests on the individual orders.   ABO/Rh type and screen     Status: None    Narrative    The following orders were created for panel order ABO/Rh type and screen.  Procedure                               Abnormality         Status                     ---------                               -----------         ------                     Adult Type and Screen[[id number]]                            Final result                 Please view results for these tests on the individual orders.     Medications - No data to display  Labs Ordered and Resulted from Time of ED Arrival to Time of ED Departure   COMPREHENSIVE METABOLIC PANEL - Abnormal       Result Value    Sodium 137      Potassium 4.5      Chloride 107      Carbon Dioxide (CO2) 18 (*)     Anion Gap 12      Urea Nitrogen 22.8 (*)     Creatinine 1.28 (*)     Calcium 9.1      Glucose 95      Alkaline Phosphatase 115 (*)     AST 8 (*)     ALT 14      Protein Total 6.0 (*)     Albumin 3.6      Bilirubin Total <0.2      GFR Estimate 60 (*)    CBC WITH PLATELETS AND DIFFERENTIAL - Abnormal    WBC Count 8.7      RBC Count 3.17 (*)     Hemoglobin 8.1 (*)     Hematocrit 27.0 (*)     MCV 85      MCH 25.6 (*)     MCHC 30.0 (*)     RDW 16.4 (*)     Platelet Count 509 (*)     % Neutrophils 49      % Lymphocytes 30      % Monocytes 11      % Eosinophils 8      % Basophils 1      % Immature Granulocytes 1      NRBCs per 100 WBC 0      Absolute Neutrophils 4.4      Absolute Lymphocytes 2.6      Absolute Monocytes 0.9      Absolute Eosinophils 0.7      Absolute Basophils 0.1      Absolute Immature Granulocytes 0.1      Absolute NRBCs 0.0     HCG QUANTITATIVE PREGNANCY - Abnormal    hCG Quantitative 123,241 (*)    TYPE AND SCREEN, ADULT    ABO/RH(D) A POS      Antibody Screen Negative      SPECIMEN  EXPIRATION DATE [id number]     ABO/RH TYPE AND SCREEN     US OB < 14 Weeks Single   Final Result   IMPRESSION:       There is a single living intrauterine embryo with ultrasound   gestational age of 8 weeks 4 days corresponding to ultrasound   estimated date of delivery of 10/19/2023.      I have personally reviewed the examination and initial interpretation   and I agree with the findings.      ARLETTE POOLE MD            SYSTEM ID:  [id number]                 Assessment & Plan      Cande Shields is a 22 year old pregnant female G2 T0  L0 blood type A+ (LUIS 10/17/2023) with history of granulomatosis with polyangiitis, CKD stage III, OK-3 positive ANCA vasculitis (kidney bx proven in 2011), prior saddle PE w/ B/L DVT w/ pulmonary infarct & mild RV dilation on 2021(treated with thrombolysis), prior HELLP with IUFD at 31 weeks in 2020, recent hospitalization from -23 for acute saddle PE s/p right atrial thrombectomy via sternotomy in setting of pregnancy and COVID-19 complicated by hospital-acquired pneumonia on enoxaparin, bicornuate uterus who presents with vaginal bleeding.  On arrival patient noted alert.  Presently afebrile and hemodynamically stable.  She is lying supine in bed and appears nontoxic.  Abdominal examination overall benign no significant tenderness with palpation.  On chart review patient is still on Lovenox for above PE concerning for ongoing vaginal bleeding.  Presently she appears stable without signs of poor perfusion.  No significant anemia.  She is not tachycardic.  Blood pressure 114 at this time.  I would plan for laboratory studies to include hCG, CMP and CBC along with ultrasonography.    I did review patient prior records with note of hemoglobin typically in the 7-8 range, today just above 8.  She states she is asymptomatic at this time.  Since arrival to the emergency department bleeding has decreased.  Ultrasonography reviewed demonstrating  intrauterine gestation with normal fetal heart rate activity.  Secondary to concurrent use of Lovenox I did reach out to the on-call OB team.  They have recommended no further interventions at this time.  They were kind enough to take down patient information and will help to establish care with M.  I did offer continued observation the emergency department to trend hemoglobin and monitor clinically.  Patient feels comfortable returning to home at this time.  She does have an outpatient follow-up with her primary care physician this afternoon.  I would have her obtain a repeat hemoglobin in the next 1 to 2 days should bleeding be mild but persistent.  Any other symptoms such as increased lightheadedness, shortness of breath, weakness, escalation of vaginal bleeding or other concern we will have her return emergently.    I have reviewed the nursing notes. I have reviewed the findings, diagnosis, plan and need for follow up with the patient.    New Prescriptions    No medications on file       Final diagnoses:   Bleeding in early pregnancy       Phi Saldana M.D.  [facility] [place] EMERGENCY DEPARTMENT  3/13/2023     Phi Saldana MD  03/13/23 1140

## 2023-03-13 NOTE — PROGRESS NOTES
History of Present Illness:  Ms. Shields is a 22 year old female who presents for  Chief Complaint   Patient presents with     Hospital F/U     HOSPITAL FOLLOW UP.       Cande Shields is a 22 year old pregnant female G2 T0  L0 blood type A+ (LUIS 10/17/2023) with history of granulomatosis with polyangiitis, CKD stage III, RI-3 positive ANCA vasculitis (kidney bx proven in 2011), prior saddle PE w/ B/L DVT w/ pulmonary infarct & mild RV dilation on 2021(treated with thrombolysis), prior HELLP with IUFD at 31 weeks in 2020, recent hospitalization from -23 for acute saddle PE s/p right atrial thrombectomy via sternotomy in setting of pregnancy and COVID-19 complicated by hospital-acquired pneumonia on enoxaparin, bicornuate uterus who presents for follow up.    Last seen by me .  Will see MFM on 23. Also sees Rheum, Lung, Nephrology, Heme, Cards.  Was in ER today for vaginal bleeding--OB US showed viable fetus.  She is interested in completing her COVID vaccination-primary.  Doing well from pulmonary standpoint, mildly sore, but no dyspnea, chest pain. Tolerating lovenox. Heme follow-up this week.  She reports nausea with pregnancy, hasn't tried anything.  Also reports chronic cough--stopped smoking. No phlegm, fevers, feels its a smokers cough.  She is not currently on anything for her autoimmune disease.      Review of external notes as documented above --ER and Hospital, Rheum, Ob                  A detailed Review of Systems was performed, verified and is negative except as documented in the HPI.  All health questionnaires were reviewed, verified and relevant information documented above.    Past Medical History:  Past Medical History:   Diagnosis Date     ADHD (attention deficit hyperactivity disorder)      DVT, lower extremity, distal (H)      Dysmenorrhea      Femoral artery thrombosis, left (H) 2021     Multiple subsegmental pulmonary emboli without acute cor  pulmonale (H) 03/2021     PFO (patent foramen ovale)      Preeclampsia, third trimester      Spontaneous pregnancy loss 2020    31 weeks     Stage 3b chronic kidney disease (H)      Wegener's disease, pulmonary 01/01/2010    renal biopsy       Past Surgical History:  Past Surgical History:   Procedure Laterality Date     ENT SURGERY  01/01/2000    cyst     EXCISE GANGLION WRIST  01/01/2009     STERNOTOMY  02/18/2023    right atrial thrombectomy     THROMBECTOMY ATRIUM Right 02/18/2023     THROMBECTOMY PERCUTANEOUS N/A 2/18/2023    Procedure: Emergency Sternotomy, Right Atrial Thrombectomy, Central Cannulation, Bilateral Femoral Cannulation, Closure of PFO ; Transesophageal Echocardiogram performed by anesthesia staff;  Surgeon: Adelfo Elmore MD;  Location: [facility] OR     THROMBECTOMY PERCUTANEOUS N/A 2/18/2023    Procedure: Angiovac Mediated for Right Atrial Clot; Intracatheter Thrombectomy via bilateral percutaneous femoral venous access with 26 FR Right Femoral vein and 17 FR Left Femoral Vein cannulas. Transesophageal echchocardiogram performed by anesthesia staff;  Surgeon: Po Monterroso MD;  Location: [facility] OR       Active Meds:  Current Outpatient Medications   Medication     acetaminophen (TYLENOL) 325 MG tablet     aspirin (ASA) 81 MG chewable tablet     enoxaparin ANTICOAGULANT (LOVENOX) 80 MG/0.8ML syringe     famotidine (PEPCID) 20 MG tablet     metoprolol tartrate (LOPRESSOR) 25 MG tablet     Prenatal Vit-Fe Fumarate-FA (PRENATAL VITAMINS PO)     No current facility-administered medications for this visit.        Allergies:  Penicillins    Family History:  family history includes Cancer in her maternal grandfather; Thyroid Disease in her mother.    Social History:  Social History     Tobacco Use     Smoking status: Former     Packs/day: 0.25     Types: Cigarettes     Smokeless tobacco: Former     Tobacco comments:     Vaping stopped 2/26   Vaping Use     Vaping Use: Some days     Substances:  "Nicotine, Flavoring     Devices: Disposable   Substance Use Topics     Alcohol use: Not Currently     Drug use: No       Physical Exam:  Vitals: BP 93/69 (BP Location: Right arm, Patient Position: Sitting, Cuff Size: Adult Large)   Pulse 95   Temp 98.3  F (36.8  C) (Oral)   Ht 1.676 m (5' 5.98\")   Wt 100.3 kg (221 lb 3.2 oz)   LMP 01/10/2023 (Approximate)   SpO2 98%   BMI 35.72 kg/m    Constitutional: Alert, oriented, pleasant, no acute distress  Head: Normocephalic, atraumatic  Eyes: Extra-ocular movements intact, pupils equally round and reactive bilaterally, no scleral icterus  ENT: Oropharynx clear, moist mucus membranes, good dentition  Neck: Supple, no lymphadenopathy  Cardiovascular: Regular rate and rhythm, no murmurs, rubs or gallops, peripheral pulses full/symmetric  Respiratory: Good air movement bilaterally, lungs clear, no wheezes/rales/rhonchi  GI: Abdomen soft, bowel sounds present, nondistended, nontender, no organomegaly or masses, no rebound/guarding  Musculoskeletal: No edema, normal muscle tone, normal gait  Neurologic: Alert and oriented, cranial nerves 2-12 intact, grossly non-focal  Skin: No rashes/lesions, well healed sternotomy scar  Psychiatric: normal mentation, affect and mood      Diagnostics:  Labs reviewed in Epic          Assessment and Plan:  Cande was seen today for hospital f/u.    Diagnoses and all orders for this visit:    Hospital follow up  Cande was seen today for hospital f/u.    Diagnoses and all orders for this visit:    Hospital discharge follow-up  Supervision of high-risk pregnancy of elderly multigravida  ANCA-associated vasculitis  Acute saddle pulmonary embolism with acute cor pulmonale (H)  Appears stable, advised follow-up with Heme and MFM re: ongoing supervision of high risk pregnancy    Chronic cough  Does not appear infectious  -     albuterol (PROAIR HFA/PROVENTIL HFA/VENTOLIN HFA) 108 (90 Base) MCG/ACT inhaler; Inhale 2 puffs into the lungs every 6 " hours as needed for shortness of breath or cough    Gastroesophageal reflux disease without esophagitis  -     famotidine (PEPCID) 20 MG tablet; Take 1 tablet (20 mg) by mouth 2 times daily    Nausea/vomiting in pregnancy  Advised to follow-up with MFM given high risk pregnancy and medical conditions  -     pyridOXINE (VITAMIN B6) 25 MG tablet; Take 1 tablet (25 mg) by mouth every 8 hours as needed (nausea)  -     doxylamine (UNISOM) 25 MG TABS tablet; Take 0.5 tablets (12.5 mg) by mouth 2 times daily as needed for other (nausea)        Cira Amanda MD  Internal Medicine    >30 minutes spent today performing chart review, history and exam, counseling, care coordination, documentation and further activities as noted above exclusive of any procedures or EKG interpretation

## 2023-03-13 NOTE — ED TRIAGE NOTES
From home.  C/f miscarriage  8-9 weeks  Woke up in a pool of blood this morning.  VSS.   Hx of PE, on lovenox.       Triage Assessment     Row Name 03/13/23 0995       Triage Assessment (Adult)    Airway WDL WDL       Respiratory WDL    Respiratory WDL WDL       Skin Circulation/Temperature WDL    Skin Circulation/Temperature WDL WDL       Cardiac WDL    Cardiac WDL WDL       Peripheral/Neurovascular WDL    Peripheral Neurovascular WDL WDL       Cognitive/Neuro/Behavioral WDL    Cognitive/Neuro/Behavioral WDL WDL

## 2023-03-13 NOTE — DISCHARGE INSTRUCTIONS
I was happy to see that your ultrasonography confirms your baby to be in the right spot, in the uterus.  Also at this time reassuringly there is a normal fetal heart rate.  As we discussed bleeding at this time in pregnancy can be normal otherwise at times indicate strain or stress on the pregnancy or early miscarriage.  We did check a hemoglobin which is slightly higher than your baseline.  Yours typically runs in the 7-8 range, currently at 8 today.  Secondary to your use of Lovenox for your blood clot I suspect your higher risk for persistent bleeding.  At this time I did reach out to the OB team who were helpful.  They will consult MFM and reach out to the clinic to establish care for you.  We discussed options for ongoing monitoring in the emergency department with serial hemoglobins versus r going home and return to the emergency department if should you become symptomatic.  Should you develop any increase or persistent vaginal bleeding, abdominal pain, lightheadedness, dizziness, shortness of breath would want you to return for reevaluation.  Otherwise I believe you need your hemoglobin ECG checked within 48 hours.

## 2023-03-14 ENCOUNTER — TELEPHONE (OUTPATIENT)
Dept: OBGYN | Facility: CLINIC | Age: 23
End: 2023-03-14

## 2023-03-14 DIAGNOSIS — N93.9 VAGINAL BLEEDING: ICD-10-CM

## 2023-03-14 DIAGNOSIS — I82.4Y3 ACUTE DEEP VEIN THROMBOSIS (DVT) OF PROXIMAL VEIN OF BOTH LOWER EXTREMITIES (H): Primary | ICD-10-CM

## 2023-03-14 NOTE — TELEPHONE ENCOUNTER
----- Message from LILIA Orellana CNP sent at 3/14/2023 10:11 AM CDT -----  Regarding: Appt today  Please contact patient-this is not appropriate for mid level to see, needs to see physician. Thank you. Daisy RODRIGUES CNP    Pt currently 9w0d, -fetal demise at 22 weeks.    Bleeding yesterday was seen in ED- ED US showing viable pregnancy, pt has bicornuate uterus, passing blood clots size of golf balls after being seen in ED.    Soaking a pad every few hours, denies pain, cramping or additional vaginal symptoms.    RN relayed ED precautions again for heavy bleeding and pain.    RN routing to MD/DOs to advise on work in today per pt request.    Anu Agarwal RN on 3/14/2023 at 10:48 AM    Pt wanting to be seen by provider today for f/u.

## 2023-03-14 NOTE — TELEPHONE ENCOUNTER
RN called pt and reviewed ED precautions again.    RN scheduled pt with Dr. Sorensen tomorrow at 3:00PM, pt asking if there are any sooner openings Dr. Sorensen can work her in that day as she shares a car with her mom and afternoons are difficult due to this.    Anu Agarwal RN on 3/14/2023 at 2:28 PM

## 2023-03-15 ENCOUNTER — PRE VISIT (OUTPATIENT)
Dept: MATERNAL FETAL MEDICINE | Facility: CLINIC | Age: 23
End: 2023-03-15

## 2023-03-15 ENCOUNTER — OFFICE VISIT (OUTPATIENT)
Dept: OBGYN | Facility: CLINIC | Age: 23
End: 2023-03-15
Payer: COMMERCIAL

## 2023-03-15 VITALS
DIASTOLIC BLOOD PRESSURE: 58 MMHG | BODY MASS INDEX: 35.75 KG/M2 | WEIGHT: 221.4 LBS | SYSTOLIC BLOOD PRESSURE: 110 MMHG | HEART RATE: 105 BPM | OXYGEN SATURATION: 99 %

## 2023-03-15 DIAGNOSIS — O20.0 THREATENED MISCARRIAGE: Primary | ICD-10-CM

## 2023-03-15 DIAGNOSIS — I77.82 ANCA-ASSOCIATED VASCULITIS (H): ICD-10-CM

## 2023-03-15 DIAGNOSIS — D63.8 ANEMIA IN OTHER CHRONIC DISEASES CLASSIFIED ELSEWHERE: ICD-10-CM

## 2023-03-15 LAB
HGB BLD-MCNC: 8 G/DL (ref 11.7–15.7)
HOLD SPECIMEN: NORMAL

## 2023-03-15 PROCEDURE — 99214 OFFICE O/P EST MOD 30 MIN: CPT | Performed by: OBSTETRICS & GYNECOLOGY

## 2023-03-15 PROCEDURE — 36415 COLL VENOUS BLD VENIPUNCTURE: CPT | Performed by: OBSTETRICS & GYNECOLOGY

## 2023-03-15 PROCEDURE — 85018 HEMOGLOBIN: CPT | Performed by: OBSTETRICS & GYNECOLOGY

## 2023-03-15 RX ORDER — MEPERIDINE HYDROCHLORIDE 25 MG/ML
25 INJECTION INTRAMUSCULAR; INTRAVENOUS; SUBCUTANEOUS EVERY 30 MIN PRN
Status: CANCELLED | OUTPATIENT
Start: 2023-03-15

## 2023-03-15 RX ORDER — HEPARIN SODIUM,PORCINE 10 UNIT/ML
5 VIAL (ML) INTRAVENOUS
Status: CANCELLED | OUTPATIENT
Start: 2023-03-15

## 2023-03-15 RX ORDER — DIPHENHYDRAMINE HYDROCHLORIDE 50 MG/ML
50 INJECTION INTRAMUSCULAR; INTRAVENOUS
Status: CANCELLED
Start: 2023-03-15

## 2023-03-15 RX ORDER — HEPARIN SODIUM (PORCINE) LOCK FLUSH IV SOLN 100 UNIT/ML 100 UNIT/ML
5 SOLUTION INTRAVENOUS
Status: CANCELLED | OUTPATIENT
Start: 2023-03-15

## 2023-03-15 RX ORDER — METHYLPREDNISOLONE SODIUM SUCCINATE 125 MG/2ML
125 INJECTION, POWDER, LYOPHILIZED, FOR SOLUTION INTRAMUSCULAR; INTRAVENOUS
Status: CANCELLED
Start: 2023-03-15

## 2023-03-15 RX ORDER — ALBUTEROL SULFATE 0.83 MG/ML
2.5 SOLUTION RESPIRATORY (INHALATION)
Status: CANCELLED | OUTPATIENT
Start: 2023-03-15

## 2023-03-15 RX ORDER — EPINEPHRINE 1 MG/ML
0.3 INJECTION, SOLUTION, CONCENTRATE INTRAVENOUS EVERY 5 MIN PRN
Status: CANCELLED | OUTPATIENT
Start: 2023-03-15

## 2023-03-15 RX ORDER — ALBUTEROL SULFATE 90 UG/1
1-2 AEROSOL, METERED RESPIRATORY (INHALATION)
Status: CANCELLED
Start: 2023-03-15

## 2023-03-15 NOTE — PROGRESS NOTES
Cande is a 22 year old  (Patient's last menstrual period was 01/10/2023 (approximate).) at about 9 weeks based on LMP.    She presents today in follow up from the ER from this week for threatened .    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  The patient reports she went to bed in her normal state of health however awoke with a large amount of blood in her bed.  Otherwise,the pregnancy had been going well thus far.  She reports no trauma.  She reports no abdominal pain at this time.  She denies fever, chills, dysuria, urine frequency.   She woke up this am about 3 am and she had 2 large clots.  She had not been clotting when she was in the ER.   She reports she is not symptomatic from vaginal bleeding at this time.  No lightheadedness, chest pain, shortness of breath.  She had a recent ultrasonography several weeks ago demonstrating intrauterine gestation with normal heart rate at that time.      Her ultrasound from 2023 shows the following:    EXAMINATION: TEMPORARY, 3/13/2023 10:33 AM   COMPARISON: Ultrasound, 3/2/2023, 2023, and 2023  HISTORY: Bleeding; pain 8-9 weeks.. Gestational age 8 weeks 6 days  from LMP 1/10/2023  TECHNIQUE: The pelvis was scanned in standard fashion with  transabdominal and transvaginal transducer(s).  FINDINGS: There is a gestational sac within the left cornu of a  bicornuate uterus. The amniotic fluid volume and sac size are within  expected limits for  gestation age. There is an embryo and yolk sac  present within the gestational sac. The embryo has a crown-rump length  of 1.9 cm corresponding to 8 week 4 day gestation. Embryonic heart  motion is present at 165 beats per minute.  It is too early to  accurately determine placental site.  Thickened endometrium of the right cornu without gestation sac . There  is no free fluid in the pelvis.  No uterine masses. The right ovary  measures 3.2 x 2.9 x 1.9. The left ovary measures 3.1 x 4.5 x 2.6.  There is normal blood flow to the ovaries.                                                     IMPRESSION:      There is a single living intrauterine embryo with ultrasound  gestational age of 8 weeks 4 days corresponding to ultrasound  estimated date of delivery of 10/19/2023.         EARLY OB ULTRASOUND 3/2/23   Comparisons: 2/19/2023  HISTORY:    Bicornuate uterus. Follow-up embryonic cardiac activity.  FINDINGS:    There is a gestational sac within the uterus with a yolk  sac and fetal pole. The crown-rump length measures 11 mm corresponding  to a gestational age of 7 weeks 2 days. Corresponding EDC is  10/17/2023. There is cardiac activity with a heart rate of 142 beats  per minute. The fetal pole has grown appropriately since 2/19/2023  ultrasound.  The right and left ovaries measure 2.5 x 1.3 x 1.3 cm and 3.9 x 3.2 x  4.4 cm, respectively. Both ovaries are normal in appearance. There is  a corpus luteum on the left.                                                            IMPRESSION: There has been appropriate interval growth of single  living intrauterine embryo in the left horn of a bicornuate uterus,  now measuring 7 weeks 2 days corresponding to an estimated date of  delivery of 10/17/2023. . 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     Multiple subsegmental pulmonary emboli without acute cor pulmonale (H) 03/2021     PFO (patent foramen ovale)      Preeclampsia, third trimester      Spontaneous pregnancy loss 2020    31 weeks     Stage 3b chronic kidney disease (H)      Wegener's disease, pulmonary 01/01/2010    renal biopsy       Past Surgical History:   Procedure Laterality Date     ENT SURGERY  01/01/2000    cyst     EXCISE GANGLION WRIST  01/01/2009     STERNOTOMY  02/18/2023    right atrial thrombectomy     THROMBECTOMY ATRIUM Right 02/18/2023     THROMBECTOMY PERCUTANEOUS N/A 2/18/2023    Procedure: Emergency Sternotomy, Right Atrial Thrombectomy, Central Cannulation, Bilateral Femoral Cannulation, Closure of PFO ; Transesophageal Echocardiogram performed by anesthesia staff;  Surgeon: Adelfo Elmore MD;  Location: [facility] OR     THROMBECTOMY PERCUTANEOUS N/A 2/18/2023    Procedure: Angiovac Mediated for Right Atrial Clot; Intracatheter Thrombectomy via bilateral percutaneous femoral venous access with 26 FR Right Femoral vein and 17 FR Left Femoral Vein cannulas. Transesophageal echchocardiogram performed by anesthesia staff;  Surgeon: Po Monterroso MD;  Location: [facility] OR          Allergies   Allergen Reactions     Penicillins Rash     Unknown, but think rash.  Tolerated cephalexin (12/27/20), cefpodoxime (12/27/20), Ceftriaxone (Feb 2023), Zosyn (Feb 2023)       Current Outpatient Medications   Medication Sig Dispense Refill     acetaminophen (TYLENOL) 325 MG tablet Take 2 tablets (650 mg) by mouth every 4 hours as needed for other (For optimal non-opioid multimodal pain management to improve pain control.)       albuterol (PROAIR HFA/PROVENTIL HFA/VENTOLIN HFA) 108 (90 Base) MCG/ACT inhaler Inhale 2 puffs into the lungs every 6 hours as needed for shortness of breath or cough 18 g 1     aspirin (ASA) 81 MG chewable tablet Take 1 tablet (81 mg) by mouth daily 30 tablet 2     doxylamine (UNISOM) 25 MG TABS tablet Take  0.5 tablets (12.5 mg) by mouth 2 times daily as needed for other (nausea) 30 tablet 1     enoxaparin ANTICOAGULANT (LOVENOX) 80 MG/0.8ML syringe Inject 0.8 mLs (80 mg) Subcutaneous every 12 hours for 90 days 48 mL 2     famotidine (PEPCID) 20 MG tablet Take 1 tablet (20 mg) by mouth 2 times daily 60 tablet 1     metoprolol tartrate (LOPRESSOR) 25 MG tablet Take 0.5 tablets (12.5 mg) by mouth 2 times daily 60 tablet 2     Prenatal Vit-Fe Fumarate-FA (PRENATAL VITAMINS PO)        pyridOXINE (VITAMIN B6) 25 MG tablet Take 1 tablet (25 mg) by mouth every 8 hours as needed (nausea) 30 tablet 1       Social History     Socioeconomic History     Marital status: Legally      Spouse name: Not on file     Number of children: Not on file     Years of education: Not on file     Highest education level: Not on file   Occupational History     Not on file   Tobacco Use     Smoking status: Former     Packs/day: 0.25     Types: Cigarettes     Smokeless tobacco: Former     Tobacco comments:     Vaping stopped 2/26   Vaping Use     Vaping Use: Some days     Substances: Nicotine, Flavoring     Devices: Disposable   Substance and Sexual Activity     Alcohol use: Not Currently     Drug use: No     Sexual activity: Yes     Partners: Male     Birth control/protection: None   Other Topics Concern     Parent/sibling w/ CABG, MI or angioplasty before 65F 55M? Not Asked   Social History Narrative    Lives with brother 16yo and mother. Pets: Dog Nicholas.    Lives with  in [place], has cat.    Mom lives next door.     Social Determinants of Health     Financial Resource Strain: Not on file   Food Insecurity: Not on file   Transportation Needs: Not on file   Physical Activity: Not on file   Stress: Not on file   Social Connections: Not on file   Intimate Partner Violence: Not on file   Housing Stability: Not on file       Family History   Problem Relation Age of Onset     Thyroid Disease Mother      Cancer Maternal Grandfather       Asthma No family hx of      Diabetes No family hx of          Review of Systems:  10 point ROS of systems including Constitutional, Eyes, Respiratory, Cardiovascular, Gastroenterology, Genitourinary, Integumentary, Muscularskeletal, Psychiatric were all negative except for pertinent positives noted in my HPI and in the PMH.      Exam  /58 (BP Location: Right arm, Cuff Size: Adult Large)   Pulse 105   Wt 100.4 kg (221 lb 6.4 oz)   LMP 01/10/2023 (Approximate)   SpO2 99%   BMI 35.75 kg/m    General:  WNWD female, NAD  Alert  Oriented x 3  Gait:  Normal  Skin:  Normal skin turgor  HEENT:  NC/AT, EOMI  Abdomen:  Non-tender, non-distended. Bedside ultrasound performed and it shows FHM,   Vulva: No external lesions, normal hair distribution, no adenopathy  BUS:  Normal, no masses noted  Urethra:  No hypermobility seen  Urethral meatus:  No masses noted  Vagina: Moist, some brown and red blood, well rugated, no lesions  Cervix: Smooth, pink, no visible lesions, some blood at the cervix   Perianal:  No masses noted  Extremities:  No clubbing, no cyanosis and no edema.      Component      Latest Ref Rng & Units 3/15/2023   Hemoglobin      11.7 - 15.7 g/dL 8.0 (L)       Assessment:  Threatened ab  Rh positive   Chronic anemia       Plan  We reviewed that miscarriage occurs ~ 1 in 5 pregnancy events and that there was no direct event or prevention  that the patient could have avoided or performed.  Discussed that there are many etiologies for miscarriage, the most common being a genetic anomaly. The risk for a miscarriage increases with advancing maternal age due to a higher incidence of conceptuses with a chromosomal aneuploidy. The risk may approach 75% in women who are 45 years of age and older.   Commonly reported causes of recurrent pregnancy loss include the following:     Endocrine            Uncontrolled diabetes mellitus            Luteal phase deficiency (remains inconclusive, limited to 1st  trimester)            Polycystic ovary syndrome  Environmental agents            Prolonged exposure to alcohol, smoking            Immunologic            Antiphospholipid syndrome  Maternal factors (acquired, inherited, structural)            Uterine anatomic malformations            Myomas            Cervical abnormalities  Chromosomal and single gene disorders            Fetal chromosomal abnormalities            Parental balanced translocation            Alpha thalassemia major            Thrombophilia            X-linked male lethal conditions       She has MFM appointment next week and her OBV with me in 2 weeks.     Iron infusions will be ordered.  Infusion plan reviewed.         Total time preparing to see patient with reviewing prior encounter and labs, face to face time,  and coordinating care on the same calendar date:   35 minutes    Antonio Sorensen MD

## 2023-03-16 ENCOUNTER — OFFICE VISIT (OUTPATIENT)
Dept: HEMATOLOGY | Facility: CLINIC | Age: 23
End: 2023-03-16
Attending: INTERNAL MEDICINE
Payer: COMMERCIAL

## 2023-03-16 VITALS
TEMPERATURE: 98.1 F | WEIGHT: 221.2 LBS | BODY MASS INDEX: 35.72 KG/M2 | SYSTOLIC BLOOD PRESSURE: 109 MMHG | DIASTOLIC BLOOD PRESSURE: 72 MMHG | OXYGEN SATURATION: 98 % | HEART RATE: 84 BPM

## 2023-03-16 DIAGNOSIS — I26.94 MULTIPLE SUBSEGMENTAL PULMONARY EMBOLI WITHOUT ACUTE COR PULMONALE (H): Primary | ICD-10-CM

## 2023-03-16 PROCEDURE — G0463 HOSPITAL OUTPT CLINIC VISIT: HCPCS | Performed by: INTERNAL MEDICINE

## 2023-03-16 PROCEDURE — 99214 OFFICE O/P EST MOD 30 MIN: CPT | Performed by: INTERNAL MEDICINE

## 2023-03-16 NOTE — PATIENT INSTRUCTIONS
Our plan for today:  Continue the Lovenox twice a day  Follow up with your OB for IV iron (ok to us if you need this and we can help)  I would recommend checking a Lovenox level about once a month.  Schedule these with your OB doctor and then call us when you have those results and we will help with any adjustment that is needed.  Call us with any questions and I will plan to see you around 30-32 weeks to discuss a plan for managing the blood thinner around your delivery!

## 2023-03-16 NOTE — PROGRESS NOTES
[facility]  [address] [place] [id number]  Phone: [phone], Fax: [phone]      Outpatient Visit Note:    Patient: Cande Shields  MRN: [id number]  : 2000  GRACIE: 2023    Cande Shields is a 22 year old woman with a history of recurrent unprovoked pulmonary embolism, currently pregnant on enoxaparin who returns for routine follow up.      Assessment:  In summary, Cande Shields is a 22 year old woman with complex medical history including granulomatous polyangiitis, severe preeclampsia/HELLP and IUFD with first pregnancy, recent open thrombectomy, currently 9 weeks pregnant and doing well on enoxaparin 1mg/kg Q12 and ASA for secondary prevention of preeclampsia.  She is at very high risk for recurrence and would benefit from indefinite anticoagulation for secondary prophylaxis once she completes treatment for this VTE in Aug 2023.    Recommendations:  1. I counseled the patient about my assessment of risks/benefits of anticoagulation and that she is at very high risk for recurrence  2. While pregnant, continue enoxaparin 1mg/kg Q12  3. Check lovenox levels about once per month (we will coordinate with OB or MFM)  4. Continue ASA for preeclampsia prophylaxis  5. RTC 32 weeks or so to discuss anticoagulation delivery plan  6. Call with any questions or concerns, confirmed she has our phone and [facility]     35 minutes spent on the date of the encounter doing chart review, review of test results, interpretation of tests, patient visit and documentation       Kodi Dye MD   of Medicine, Division of Hematology, Oncology and Transplantation  [employer]     --------------------------------------------------------  Forward History:  2020 Presented G1 26W with severe preeclampsia, IUFD at 26 weeks.  3/20/2021: large PE (unprovoked)  3/21/21: Developed acute pain and coolness in her left leg and was  without pulses   --IR arteriogram and thrombolysis              -Occlusion of common femoral, profunda and superficial femoral arteries, distal popliteal artery              -TPA infusion started due to unable to suction thrombus or mechanical thrombectomy   --Due to development while on heparin gtt HIT ab sent and negative, RAUL sent and also returned negative but she was on argatroban while this was pending   --APS panel sent and was negative   --Echo: Mobile echodensity (~2 x 1.5cm) in right atrium, likely thrombus.  Cannot confirm attachment location, but most likely originating from or near IVC (also based on CT images).  -Seen by hematology: while primary arterial thrombosis is of concern, a follow up echo with bubble study is recommended to rule out right to left intracardiac shunt given her RA thrombus  3/23/21: Transferred to [facility] due to insurance, was changed to fondaparinux and ultimately xarelto  3/25/21: FVL mutation + prothrombin gene mutation testing negative, protein C >150 (elevated, on ac)   10/7/2021: Seen in clinic completed 6 months of xarelto 3-8/2021  -APS testing repeated and was negative, Antithrombin level   -She was requested to follow-up in clinic in 1 month but has not been seen since initial appointment.  Lost to followup   Latest Reference Range & Units 10/19/21 11:17   Cardiolipin IgG Ilene Negative  Negative   Cardiolipin Ilene IgG Instrument Value <10.0 GPL-U/mL <2.0   Cardiolipin Ilene IgM Instrument Value <10.0 MPL-U/mL <2.0   Lupus Result Negative  Negative   Beta 2 Glycoprotein 1 Antibody IgG <7.0 U/mL 0.9   Beta 2 Glycoprotein 1 Antibody IgM <7.0 U/mL <2.4     12/21/2022: GPA flare off anticoagulation. Consult with Dr. Yemi Mcneill/LISETH Kulkarni for ?of ability to use heparin for DVT ppx as ?of HIT in the past (heparin listed as allergey)  --During admission for recurrent GPA flare in the setting of new petechiae, blood streaked sputum and an KATERINA, also with possible COVID-19  infection vs other viral URI     --Regarding listed heparin allergy noted 3/23/21, her HIT ab screen and RAUL were both negative 3/21/21, not consistent with HIT.  It is ok to use heparin for prophylaxis, although patient prefers not to given prior concern for HIT.   --Regarding significant clot history without clear provoking factors (possibly due to recent pregnancy and COVID) and negative APS and genetic hypercoagulable work-up and now admitted with GPA flare and questionable re-infection with COVID would recommend anticoagulation for at least the next 30 days (can change to DOAC to avoid injection) and follow-up in clinic with Dr. Dye in 1 month to discuss if anticoagulation should be continued long-term.               -She was discharge on 12/21/22 with apixaban 2.5 mg po BID (renal dosing) and instructed to follow-up with PCP in 7 days to consider increasing to 5 mg PO BID pending renal function (to end around 1/18)    2/12/23: Presented to [facility] ED with chest pain SOB and CTA showed acute large PE.  Newly pregnant,treated with LWMH 1mg/kg Q12  2/17/23: Presented to [facility] ED with ongoing chest left sided pleuritic chest pain which she related to known PE (no DVT), -Echo planned to eval for cardiac function showed large, highly mobile thrombus in transit in the right atrium (at least 2.2 cm in length)   --In IR underwent angiovac for right atrial clot thrombectomy that failed, ultimately went to OR for emergency sternotomy, thrombectomy and PFO closure      History: Cande Shields is a 22 year old woman with a history of recurrent unprovoked PE requiring emergency sternotomy.  She is currently 9 weeks pregnant (G2, previous HELLP with IUFD) and taking enoxaparin 80mg Q12 hours for treatment of PE.  She reports she is tolerating the enoxaparin injections okay, but is having bruising and pain with the injections.  She wonders if she can inject the locations of her body.  She is also having vaginal bleeding and  is noted to have a threatened miscarriage.  She is planning to see maternal-fetal medicine next week.  She is also taking aspirin.    Medications are reviewed in the EMR and notable for aspirin and enoxaparin 80 mg every 12 hours    Objective:  Vitals: B/P: 109/72, T: 98.1, P: 84, R: Data Unavailable, Wt: 221 lbs 3.2 oz  Exam:   Gen: Appears well, no distress  HEENT: no scleral icterus or hemorrhage, no wet purpura, no lymphadenopathy  Ext: no edema    Labs:  Most recent LAC panel pos (feb 2023)    Imaging:  I reviewed her imaging from her February pulmonary embolism and thrombectomy.

## 2023-03-17 ENCOUNTER — MYC MEDICAL ADVICE (OUTPATIENT)
Dept: NEPHROLOGY | Facility: CLINIC | Age: 23
End: 2023-03-17
Payer: COMMERCIAL

## 2023-03-18 LAB — BACTERIA TISS BX CULT: NO GROWTH

## 2023-03-18 NOTE — TELEPHONE ENCOUNTER
Reached out to patient via [facility] to schedule follow up with Dr. Dozier.    Marielena Jaquez RN, BSN, PHN  [facility] Nurse Navigator  [phone]

## 2023-03-20 NOTE — PROGRESS NOTES
Maternal-Fetal Medicine Consultation    Cande Shields  : 2000  MRN: [id number]    REFERRAL:  Cande Shields is a 22 year old sent by Dr. Sorensen from [facility] for MFM consultation.    HPI:  Cande Shields is a 22 year old  at 10w0d by LMP consistent with 7w2d US here for MFM consultation regarding granulomatous polyangitis, chronic kidney disease, pulmonary emboli and lower extremity DVTs, COVID infection early this pregnancy, and right atrial embolism s/p emergency sternotomy with right atrial thrombectomy and closure of PFO, history of HELLP syndrome and IUFD in a prior pregnancy.    She is here with her mother.     Was seen for MFM consultations 10/19/2021 and during her recent hospitalization on 2023. As outlined during previous consultation in February, Cande presented to the ED on 2023 with chest pain, fever, and cough.     She was admitted in 2022 with COVID infection and GPA flare with KATERINA. She was not on therapeutic anticoagulation at that time, as she completed 6 months of Xarelto and was having menorrhagia. 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 She discovered she was pregnant, in early February and saw her Rheumatologist with plan to stop rituximab and continue steroids.    She had a positive strep swab and was given a cephalosporin, which she tolerated well (allergy noted to Penicillin). She was diagnosed with a pulmonary embolism and was discharged home on therapeutic Lovenox given she had normal oxygenation. Her ultrasound at that time revealed an early likely intrauterine pregnancy with a yolk sac and no fetal pole, as well as a known bicornuate uterus with the pregnancy in the left horn. However, her symptoms continued to worsen with pain requiring oxycodone. She was tested for COVID on 2023, which was positive. ID recommended Remdesevir. She  "also had an echo with a large right atrial thrombus noted. She was therefore admitted to the ICU and angiovac for thrombectomy was attempted, but ultimately underwent emergency sternotomy with right atrial thrombectomy and closure of PFO with central and bilateral femoral cannulation.     She was discharged on anticoagulation and has consultation with hematology, Dr. Dye on 3/13. She is currently on Lovenox 80mg BID. She is taking this medication, though takes it at different times of day. She needs a refill for the Lovenox as she is almost out of her prescription. Has an appointment with cardiology and repeat TTE . She does not have a follow-up appointment scheduled with nephrology.    She had been seen for an episode of vaginal bleeding last week and is continuing to have some vaginal bleeding. She notes bleeding mainly at night and has more bright red bleeding and during the day notes darker brown spotting. She is Rh positive and Rhogam is not indicated.    Prenatal Care:  Primary OB care this pregnancy has been with Dr. Sorensen [facility]    Obstetrics History:  OB History    Para Term  AB Living   2 1 0 1 0 0   SAB IAB Ectopic Multiple Live Births   0 0 0 0 0      # Outcome Date GA Lbr Tobin/2nd Weight Sex Delivery Anes PTL Lv   2 Current            1  10/28/20 30w6d 05:15 0.782 kg (1 lb 11.6 oz) F Vag-Spont IV N FD      Birth Comments: Vicky- 10/20 30w6d presented to [facility] with right sided chest pain, BP in severe range, No fetal heart tones auscultated. Induced, NSVB      Name: LUCIANTOBINFABIANAPENDING FD      Apgar1: 0  Apgar5: 0      Obstetric Comments   10/20 30w6d presented to [facility] with right sided chest pain, BP in severe range, No fetal heart tones auscultated. 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     Multiple subsegmental pulmonary emboli without acute cor pulmonale (H) 03/2021     PFO (patent foramen ovale)      Preeclampsia, third trimester      Spontaneous pregnancy loss 2020    31 weeks     Stage 3b chronic kidney disease (H)      Wegener's disease, pulmonary 01/01/2010    renal biopsy       Past Surgical History:  Past Surgical History:   Procedure Laterality Date     ENT SURGERY  01/01/2000    cyst     EXCISE GANGLION WRIST  01/01/2009     STERNOTOMY  02/18/2023    right atrial thrombectomy     THROMBECTOMY ATRIUM Right 02/18/2023     THROMBECTOMY PERCUTANEOUS N/A 2/18/2023    Procedure: Emergency Sternotomy, Right Atrial Thrombectomy, Central Cannulation, Bilateral Femoral Cannulation, Closure of PFO ; Transesophageal Echocardiogram performed by anesthesia staff;  Surgeon: Adelfo Elmore MD;  Location: [facility] OR     THROMBECTOMY PERCUTANEOUS N/A 2/18/2023    Procedure: Angiovac Mediated for Right Atrial Clot; Intracatheter Thrombectomy via bilateral percutaneous femoral venous access with 26 FR Right Femoral vein and 17 FR Left Femoral Vein cannulas. Transesophageal echchocardiogram performed by anesthesia staff;  Surgeon: Po Monterroso MD;  Location: [facility] OR       Current Medications:  Prior to Admission medications    Medication Sig Last Dose Taking? Auth Provider Long Term End Date   acetaminophen (TYLENOL) 325 MG tablet Take 2 tablets (650 mg) by mouth every 4 hours as needed for other (For optimal non-opioid multimodal pain management to improve pain control.)   Wendy Matthews NP     albuterol (PROAIR HFA/PROVENTIL HFA/VENTOLIN HFA) 108 (90 Base) MCG/ACT inhaler Inhale 2 puffs into the lungs every 6 hours as needed for shortness of breath or cough   Cira Amanda MD Yes    aspirin (ASA) 81 MG chewable tablet Take 1 tablet (81 mg) by mouth daily   Wendy Matthews NP     doxylamine (UNISOM) 25 MG TABS tablet Take 0.5 tablets  "(12.5 mg) by mouth 2 times daily as needed for other (nausea)   Cira Amanda MD     enoxaparin ANTICOAGULANT (LOVENOX) 80 MG/0.8ML syringe Inject 0.8 mLs (80 mg) Subcutaneous every 12 hours for 90 days   Wendy Matthews NP No 23   famotidine (PEPCID) 20 MG tablet Take 1 tablet (20 mg) by mouth 2 times daily   Cira Amanda MD     metoprolol tartrate (LOPRESSOR) 25 MG tablet Take 0.5 tablets (12.5 mg) by mouth 2 times daily   Wendy Matthews NP Yes    Prenatal Vit-Fe Fumarate-FA (PRENATAL VITAMINS PO)    Reported, Patient     pyridOXINE (VITAMIN B6) 25 MG tablet Take 1 tablet (25 mg) by mouth every 8 hours as needed (nausea)   Cira Amanda MD         Allergies:  Penicillins    PHYSICAL EXAM:  BP 95/66 (BP Location: Right arm, Patient Position: Sitting, Cuff Size: Adult Large)   Pulse 98   Resp 18   LMP 01/10/2023 (Approximate)   SpO2 97%      Gen Appear: NAD    Blood type A Pos, Antibody negative  Hgb 8.0  MCV 85  Plts 509  HBSAg nonreactive  Hepatitis C antibody  Treponemal Ab nonreactive  HIV negative  Rubella non-immune  Urine Cx negative    Imaging:  Please see \"Imaging\" tab under \"Chart Review\" for details of today's US at the [facility].    ASSESSMENT/PLAN:  Cande Shields is a 22 year old  at 10w0d by LMP consistent with 7w2d US here for M consultation regarding her medical co-morbidities. She already had an M consult on . We reviewed risks during the pregnancy as follows:     Granulomatous Polyangitis  Chronic Kidney Disease  - With respect to granulomatous polyangitis, there are risks of preeclampsia as well as risk of flare. In one review, almost 40% of patients will flare during pregnancy. Additionally, we discussed the risks of chronic kidney disease in pregnancy with the potential for worsening kidney function that could eventually require dialysis or need for transplantation, although this is rare. Additionally, chronic kidney disease can make differentiation of " a flare or KATERINA from preeclampsia difficult. Permanent decline in renal function occurs in up to 10% of pregnancies affected by mild renal disease (creatinine <1.5 mg/dL); women with hypertension seem to be at increased risk. In women with moderate renal insufficiency (creatinine 1.5-2.9 mg/dL), as many as one third experience irreversible damage, and as many as 10% may develop end-stage renal disease (ESRD).  Co-existing hypertension will increase this likelihood. CKD also increases the risks of fetal pregnancy complications, including but not limited to miscarriage, FGR, and need for  delivery.     Venous thromboembolism  - We reviewed that she is at risk for recurrent or worsening VTE despite anticoagulation given that pregnancy is a hypercoaguable state. Anticoagulant therapy generally continues for at least six weeks postpartum, though she will likely be on anticoagulation for a longer time frame as recommended by cardiology. We reviewed the importance of taking her Lovenox every 12 hours to ensure that she remains therapeutic throughout the day. We also discussed the recommendation to check montly anti-Xa levels 4-6 hours after her morning dose to ensure that she remains at a therapeutic level during pregnancy.     Subchorionic Hemorrhage  - We reviewed the findings of a large subchorionic hemorrhage on US. We discussed that this is a risk factor for miscarriages as well as other pregnancy complications including pPROM. Unfortunately there are no known interventions to reduce progression or treat the BOUBACAR. We will continue to closely monitor the pregnancy via ultrasound to assess the size of the BOUBACAR. We anticipate that she will likely continue to have vaginal bleeding given the BOUBACAR. Strict vaginal bleeding precautions were reviewed with the patient, including soaking a pad per hour over two consecutive hours or dyspnea, lightheadedness. Despite the presence of the subchorionic hemorrhage, given the risks  of venous thromboembolism continued anticoagulation is recommended at this time as the risk of cessation of anticoagulation could be life-threatening. She demonstrates understanding.   - Also recommend scheduling IV iron infusions given her hemoglobin of 8.0 in the setting of vaginal bleeding.      Sternotomy and RA thrombectomy  - She has follow-up echo and appointment with cardiology. We will discuss the patient at the upcoming cardio-obstetrics meeting if this pregnancy is ongoing.     Recommendations:  - Care with MFM for remainder of pregnancy.  - Strict bleeding precautions reviewed with the patient.   - Continue low dose aspirin for preeclampsia prophylaxis.  - Advised patient to schedule IV iron infusions.  - Return in 2 weeks for nuchal translucency, new OB visit. Will need to assess whether she needs a breast exam at that time.   - Check anti-Xa levels monthly. Her last anti-Xa level was at the end of February and therefore she is due. Order placed and instructed to perform 4-6 hours after morning dose. She should complete within the next week.    - Advised to return to hematology with Dr. Dye at 32 weeks gestation to discuss management of her anticoagulation surrounding her delivery.  - Repeat maternal echo/appointment with Dr. Oneal on . Frequency of surveillance to be determined by cardiology, though would anticipate at least one additional echo in the third trimester.  - Continue metoprolol 12.5mg BID.   - She does not have follow-up with nephrology scheduled and we advised that she schedule an appointment and follow closely with them during the pregnancy.   - She is scheduled to see rheumatology . Will discuss moving this appointment up if new symptoms arise.  - Comprehensive anatomy US at 18-20 weeks  - Serial growth US every 4 weeks beginning at 28 weeks.  - Weekly  surveillance with BPPs beginning at 28 weeks (prior to her IUFD at 30 weeks in prior pregnancy).  - Mode of  delivery to be determined in conjunction with cardiology and cardiovascular surgery, though would likely anticipate no contraindications to vaginal delivery.  - Will discuss with hematology regarding management of anticoagulation around the time of delivery.  - Delivery at 37-39 weeks, or sooner as clinically indicated.     The patient was seen and evaluated with Dr. Gonzalez.    Thank you for allowing us to participate in the care of your patient. Please do not hesitate to contact us if you have further questions regarding the management of your patient.       Nirmala Lozano MD  Maternal Fetal Medicine Fellow  3/20/2023 11:19 AM    -------    I personally spent a total of 60 minutes, including both face-to-face and non-face-to-face on the date of the encounter, addressing the above diagnosis. Activities performed in this time include chart review, obtaining / reviewing history, performing a medically necessary evaluation, documentation and counseling/care coordination/ordering tests/ordering meds.      Ina Gonzalez MD  Maternal Fetal Medicine   Date of Service (when I saw the patient): 3/21/2023

## 2023-03-21 ENCOUNTER — HOSPITAL ENCOUNTER (OUTPATIENT)
Dept: ULTRASOUND IMAGING | Facility: CLINIC | Age: 23
Discharge: HOME | End: 2023-03-21
Attending: OBSTETRICS & GYNECOLOGY
Payer: COMMERCIAL

## 2023-03-21 ENCOUNTER — OFFICE VISIT (OUTPATIENT)
Dept: MATERNAL FETAL MEDICINE | Facility: CLINIC | Age: 23
End: 2023-03-21
Attending: OBSTETRICS & GYNECOLOGY
Payer: COMMERCIAL

## 2023-03-21 VITALS
HEART RATE: 98 BPM | DIASTOLIC BLOOD PRESSURE: 66 MMHG | OXYGEN SATURATION: 97 % | RESPIRATION RATE: 18 BRPM | SYSTOLIC BLOOD PRESSURE: 95 MMHG

## 2023-03-21 DIAGNOSIS — N93.9 VAGINAL BLEEDING: ICD-10-CM

## 2023-03-21 DIAGNOSIS — I82.4Y3 ACUTE DEEP VEIN THROMBOSIS (DVT) OF PROXIMAL VEIN OF BOTH LOWER EXTREMITIES (H): ICD-10-CM

## 2023-03-21 DIAGNOSIS — M31.31 GRANULOMATOSIS WITH POLYANGIITIS WITH RENAL INVOLVEMENT (H): ICD-10-CM

## 2023-03-21 DIAGNOSIS — N18.30 STAGE 3 CHRONIC KIDNEY DISEASE, UNSPECIFIED WHETHER STAGE 3A OR 3B CKD (H): Primary | ICD-10-CM

## 2023-03-21 PROCEDURE — 76801 OB US < 14 WKS SINGLE FETUS: CPT

## 2023-03-21 PROCEDURE — 99215 OFFICE O/P EST HI 40 MIN: CPT | Mod: 25 | Performed by: STUDENT IN AN ORGANIZED HEALTH CARE EDUCATION/TRAINING PROGRAM

## 2023-03-21 PROCEDURE — G0463 HOSPITAL OUTPT CLINIC VISIT: HCPCS | Mod: 25 | Performed by: STUDENT IN AN ORGANIZED HEALTH CARE EDUCATION/TRAINING PROGRAM

## 2023-03-21 PROCEDURE — 76801 OB US < 14 WKS SINGLE FETUS: CPT | Mod: 26 | Performed by: STUDENT IN AN ORGANIZED HEALTH CARE EDUCATION/TRAINING PROGRAM

## 2023-03-21 RX ORDER — ENOXAPARIN SODIUM 100 MG/ML
80 INJECTION SUBCUTANEOUS 2 TIMES DAILY
Qty: 48 ML | Refills: 0 | Status: SHIPPED | OUTPATIENT
Start: 2023-03-21 | End: 2023-04-13

## 2023-03-21 ASSESSMENT — PATIENT HEALTH QUESTIONNAIRE - PHQ9: SUM OF ALL RESPONSES TO PHQ QUESTIONS 1-9: 0

## 2023-03-21 NOTE — NURSING NOTE
Cande, accompanied by her Mother Amy, here in clinic for 1st tri US and MFM Consult. Pt reports she continues to have active vaginal bleeding requiring her to wear a pad, no recent clots. Pt reports that she occasinaly feels lightheaded if she is standing too long. Meds and allergies reviewed. Pawan Gonzalez and Marta reviewed US and met with Cande, see consult note. Dr. Gonzalez recommended that patient continues to follow with MFM during her pregnancy, pt agreeable. New OB Folder information and teaching sheets given and reviewed. PCC/Birthplace contact card given. Instructed pt to call PCC line with any questions or concerns. Plan for pt to return on 4/4/23 for GC/NT and 1st OBV. Pt also aware that she has iron infusion orders and needs to schedule appt. PHQ 9 done today. Anti XA level standing order placed, pt to have drawn monthly. PT VU of plan and follow up appointments, no further questions.  Kortney Washington RN

## 2023-03-22 ENCOUNTER — TELEPHONE (OUTPATIENT)
Dept: NEPHROLOGY | Facility: CLINIC | Age: 23
End: 2023-03-22
Payer: COMMERCIAL

## 2023-03-22 NOTE — TELEPHONE ENCOUNTER
Whit reached out to patient for appt.    Marielena Jaquez RN, BSN, PHN  Vasculitis & Lupus Program Nurse Navigator  [phone]

## 2023-03-22 NOTE — TELEPHONE ENCOUNTER
[facility] via [facility] is wanting to inquire if patient will continue on medication or not.  Will reach out to patient on follow up with Dr. Dozier/Dr. Call and get back to pharmacy if we stop med or not.    Marielena Jaquez RN, BSN, PHN  Vasculitis & Lupus Program Nurse Navigator  [phone]

## 2023-03-23 ENCOUNTER — TELEPHONE (OUTPATIENT)
Dept: HEMATOLOGY | Facility: CLINIC | Age: 23
End: 2023-03-23
Payer: COMMERCIAL

## 2023-03-23 ENCOUNTER — TELEPHONE (OUTPATIENT)
Dept: RHEUMATOLOGY | Facility: CLINIC | Age: 23
End: 2023-03-23
Payer: COMMERCIAL

## 2023-03-23 NOTE — TELEPHONE ENCOUNTER
----- Message from Ramses Call MD sent at 3/22/2023  8:57 PM CDT -----  Regarding: RE: Follow up  Thanks Marielena,   I hope to see Cande in April; I know that a few slots have opened up in [place] in late April. Lolis, can patient come in April on one of your holds? Even if we give her an April slot, I want to keep the 6-23 appointment now on the books. Thanks.  ----- Message -----  From: Marielena Jaquez, RN  Sent: 3/22/2023   3:41 PM CDT  To: Lolis Vera RN, Ramses Call MD  Subject: Follow up                                        Helekta Rivero  I have finally heard from Cande Shields who you saw at your [facility] for GPA and Dr. Dozier sees at the [facility].  Your last appt note in Feb states patient needs to follow up in 6wks.    At this time she is being followed with OB, MFM, as well as Dr. Ayoub.  MFM yesterday says she has ongoing spotting at night.  Labs for this month are:   RBC 3.17  Hgb 8.1  HCT27  Plt 509  Cr 1.28  BUN 22.8  Total Protein 136  Total Protein UR 0.87  Cr Urine 157  Blood in Urine small  Protein 100    Did you want to get her scheduled, when and where?  [facility] not until August is POA return and [place] you have a few returns in April.    Let me know    Marielena Jaquez RN, BSN, PHN  Vasculitis & Lupus Program Nurse Navigator  [phone]

## 2023-03-23 NOTE — TELEPHONE ENCOUNTER
[id number]  Cande Shields  22 year old female  [facility] Diagnosis: Hx of unprovoked PE   [facility] Provider: Hardik    Call placed to Cande to coordinate lab draws to check her Lovenox levels. Left message that we'd like to set up 1 lab appointment per month, we can coordinate with other appointments but the level must be checked 4-6hrs after her morning dose.    Call back number provided for patient to call back and coordinate.    Farida RUANON, RN, PHN   [facility]   Office: [phone]  Fax: [phone]

## 2023-03-27 NOTE — TELEPHONE ENCOUNTER
Joanne from [facility] calling back stating that someone was suppose to let them know if a prior authorization was sent for [facility] [employer] medication. Writer see's that patient is scheduled for May but pharmacy stating they haven't got an update and would like an update from provider. Please contact Joanne at [facility] [phone]. Thank you

## 2023-03-28 NOTE — TELEPHONE ENCOUNTER
Update from Dr. ROSALVA Call to schedule for April and June follow ups.  Patient is scheduled at [facility] on April 13th at 230pm and June 1st at 0930.  Update sent to patient.    Marielena Jaquez RN, BSN, PHN  Vasculitis & Lupus Program Nurse Navigator  [phone]

## 2023-03-30 ENCOUNTER — TELEPHONE (OUTPATIENT)
Dept: HEMATOLOGY | Facility: CLINIC | Age: 23
End: 2023-03-30

## 2023-03-30 ENCOUNTER — MYC REFILL (OUTPATIENT)
Dept: NEPHROLOGY | Facility: CLINIC | Age: 23
End: 2023-03-30

## 2023-03-30 DIAGNOSIS — Z87.74 S/P PATENT FORAMEN OVALE CLOSURE: ICD-10-CM

## 2023-03-30 RX ORDER — ASPIRIN 81 MG/1
81 TABLET, CHEWABLE ORAL DAILY
Qty: 30 TABLET | Refills: 4 | Status: ON HOLD | OUTPATIENT
Start: 2023-03-30 | End: 2023-09-03

## 2023-03-30 NOTE — TELEPHONE ENCOUNTER
Patient left message on Vasculitis line needing Baby ASA refilled and sent to [facility] in [place].  Called [facility] Nurse line for direction as this med needs refill from previous hospital stay.

## 2023-03-31 NOTE — PROGRESS NOTES
Maternal-Fetal Medicine   First OB Visit    Cande Shields  : 2000  MRN: [id number]    HPI:  Cande Shields is a 22 year old  at 12w0d by LMP consistent with 7w2d US here for new OB visit.    She is here with her mom, Amy.    Was seen for MFM consultations 10/19/2021 and during her recent hospitalization on 2023. As outlined during previous consultation in February, Cande presented to the ED on 2023 with chest pain, fever, and cough.      She was admitted in 2022 with COVID infection and GPA flare with KATERINA. She was not on therapeutic anticoagulation at that time, as she completed 6 months of Xarelto and was having menorrhagia. 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 She discovered she was pregnant, in early February and saw her Rheumatologist with plan to stop rituximab and continue steroids.     She had a positive strep swab and was given a cephalosporin, which she tolerated well (allergy noted to Penicillin). She was diagnosed with a pulmonary embolism and was discharged home on therapeutic Lovenox given she had normal oxygenation. Her ultrasound at that time revealed an early likely intrauterine pregnancy with a yolk sac and no fetal pole, as well as a known bicornuate uterus with the pregnancy in the left horn. However, her symptoms continued to worsen with pain requiring oxycodone. She was tested for COVID on 2023, which was positive. ID recommended Remdesevir. She also had an echo with a large right atrial thrombus noted. She was therefore admitted to the ICU and angiovac for thrombectomy was attempted, but ultimately underwent emergency sternotomy with right atrial thrombectomy and closure of PFO with central and bilateral femoral cannulation.      She was discharged on anticoagulation and has consultation with hematology, Dr. Dye on 3/13. She is currently on Lovenox  "80mg BID. Has an appointment with cardiology and repeat TTE . She does not have a follow-up appointment scheduled with nephrology.     She had been seen for an episode of vaginal bleeding and had a subchorionic hemorrhage diagnosed. She is still having bleeding off an on, but it has lessened. She is at times having dizziness or palpitations with exertion. She is scheduled for iron infusions. She denies cramping.    She reports she has an appointment upcoming with nephrology, rheumatology, and cardiology.    Pregnancy complicated by:  - Granulomatosis polyangiitis  - History of multiple VTE, including DVT and PE  - Right atrial thrombus s/p sternotomy and thrombectomy on 2023 earlier in pregnancy  - PFO s/p closure on 2023  - History of IUFD at 31 weeks  - History of preeclampsia  - Chronic kidney disease (baseline creatinine ~1.3)  - Bicornuate uterus    Obstetrics History:  OB History    Para Term  AB Living   2 1 0 1 0 0   SAB IAB Ectopic Multiple Live Births   0 0 0 0 0      # Outcome Date GA Lbr Tobin/2nd Weight Sex Delivery Anes PTL Lv   2 Current            1  10/28/20 30w6d 05:15 0.782 kg (1 lb 11.6 oz) F Vag-Spont IV N FD      Birth Comments: Vicky- 10/20 30w6d presented to [facility] with right sided chest pain, BP in severe range, No fetal heart tones auscultated. Induced, NSVB      Name: ELOYPENDING FD      Apgar1: 0  Apgar5: 0      Obstetric Comments   10/20 30w6d presented to [facility] with right sided chest pain, BP in severe range, No fetal heart tones auscultated. 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     Multiple subsegmental pulmonary emboli without acute cor pulmonale (H) 03/2021     PFO (patent foramen ovale)      Preeclampsia, third trimester      Spontaneous pregnancy loss 2020    31 weeks     Stage 3b chronic kidney disease (H)      Wegener's disease, pulmonary 01/01/2010    renal biopsy       Past Surgical History:  Past Surgical History:   Procedure Laterality Date     ENT SURGERY  01/01/2000    cyst     EXCISE GANGLION WRIST  01/01/2009     STERNOTOMY  02/18/2023    right atrial thrombectomy     THROMBECTOMY ATRIUM Right 02/18/2023     THROMBECTOMY PERCUTANEOUS N/A 2/18/2023    Procedure: Emergency Sternotomy, Right Atrial Thrombectomy, Central Cannulation, Bilateral Femoral Cannulation, Closure of PFO ; Transesophageal Echocardiogram performed by anesthesia staff;  Surgeon: Adelfo Elmore MD;  Location: [facility] OR     THROMBECTOMY PERCUTANEOUS N/A 2/18/2023    Procedure: Angiovac Mediated for Right Atrial Clot; Intracatheter Thrombectomy via bilateral percutaneous femoral venous access with 26 FR Right Femoral vein and 17 FR Left Femoral Vein cannulas. Transesophageal echchocardiogram performed by anesthesia staff;  Surgeon: Po Monterroso MD;  Location: [facility] OR       Current Medications:  Prior to Admission medications    Medication Sig Last Dose Taking? Auth Provider Long Term End Date   acetaminophen (TYLENOL) 325 MG tablet Take 2 tablets (650 mg) by mouth every 4 hours as needed for other (For optimal non-opioid multimodal pain management to improve pain control.)  Patient not taking: Reported on 3/21/2023   Wendy Matthews NP     albuterol (PROAIR HFA/PROVENTIL HFA/VENTOLIN HFA) 108 (90 Base) MCG/ACT inhaler Inhale 2 puffs into the lungs every 6 hours as needed for shortness of breath or cough  Patient not taking: Reported on 3/21/2023   Cira Amanda MD Yes    aspirin (ASA) 81 MG chewable tablet Take 1 tablet (81 mg) by mouth daily   Kodi Dye MD      doxylamine (UNISOM) 25 MG TABS tablet Take 0.5 tablets (12.5 mg) by mouth 2 times daily as needed for other (nausea)  Patient not taking: Reported on 3/21/2023   Cira Amanda MD     enoxaparin ANTICOAGULANT (LOVENOX) 80 MG/0.8ML syringe Inject 0.8 mLs (80 mg) Subcutaneous 2 times daily for 30 days   Ina Gonzalez MD No 4/20/23   enoxaparin ANTICOAGULANT (LOVENOX) 80 MG/0.8ML syringe Inject 0.8 mLs (80 mg) Subcutaneous every 12 hours for 90 days   Wendy Matthews NP No 5/28/23   famotidine (PEPCID) 20 MG tablet Take 1 tablet (20 mg) by mouth 2 times daily  Patient not taking: Reported on 3/21/2023   Cira Amanda MD     metoprolol tartrate (LOPRESSOR) 25 MG tablet Take 0.5 tablets (12.5 mg) by mouth 2 times daily   Wendy Matthews NP Yes    Prenatal Vit-Fe Fumarate-FA (PRENATAL VITAMINS PO)    Reported, Patient     pyridOXINE (VITAMIN B6) 25 MG tablet Take 1 tablet (25 mg) by mouth every 8 hours as needed (nausea)  Patient not taking: Reported on 3/21/2023   Cira Amanda MD       Allergies:  Penicillins    Social History:   Denies use of alcohol, drugs or smoking.    Family History:  Denies history of genetic disorders, preeeclampsia, thromboembolic disease, bleeding disorders, developmental delay.    ROS:  10-point ROS negative except as in HPI     PHYSICAL EXAM:  BP (!) 82/61 (BP Location: Left arm, Patient Position: Sitting, Cuff Size: Adult Regular)   Pulse 85   Resp 20   Wt 99.8 kg (220 lb 1.6 oz)   LMP 01/10/2023 (Approximate)   SpO2 98%   BMI 35.54 kg/m    Gen: NAD, well appearing  Chest: Non-labored breathing  Abdomen: gravid, non-tender, non-distended  Extremities: WWP, no edema    Labs:   Latest Reference Range & Units 04/04/23 15:29   Sodium 136 - 145 mmol/L 138   Potassium 3.4 - 5.3 mmol/L 4.7   Chloride 98 - 107 mmol/L 108 (H)   Carbon Dioxide (CO2) 22 - 29 mmol/L 19 (L)   Urea Nitrogen 6.0 - 20.0 mg/dL 21.8 (H)   Creatinine 0.51 - 0.95 mg/dL 1.33 (H)   GFR Estimate >60 mL/min/1.73m2 57  "(L)   Calcium 8.6 - 10.0 mg/dL 9.6   Anion Gap 7 - 15 mmol/L 11   Albumin 3.5 - 5.2 g/dL 3.8   Protein Total 6.4 - 8.3 g/dL 6.8   Alkaline Phosphatase 35 - 104 U/L 101   ALT 10 - 35 U/L 35   AST 10 - 35 U/L 18   Bilirubin Total <=1.2 mg/dL <0.2   Glucose 70 - 99 mg/dL 82   N-Terminal Pro Bnp 0 - 450 pg/mL 328   Troponin T, High Sensitivity <=14 ng/L 14   WBC 4.0 - 11.0 10e3/uL 9.8   Hemoglobin 11.7 - 15.7 g/dL 8.4 (L)   Hematocrit 35.0 - 47.0 % 28.5 (L)   Platelet Count 150 - 450 10e3/uL 468 (H)   RBC Count 3.80 - 5.20 10e6/uL 3.52 (L)   MCV 78 - 100 fL 81   MCH 26.5 - 33.0 pg 23.9 (L)   MCHC 31.5 - 36.5 g/dL 29.5 (L)   RDW 10.0 - 15.0 % 14.9   (H): Data is abnormally high  (L): Data is abnormally low    Ultrasounds:   Please see \"imaging\" tab for results of today's ultrasound.    Other Imagin/18 Echo: Large highly mobile thrombus in transit in the right atrium, at least 2.2 cm in length. Right ventricular function, chamber size, wall motion, and thickness are normal. Known patent foramen ovale, suboptimally imaged on this study. Intermittent left to right atrial-level shunting is seen on some color Doppler images. Pulmonary artery systolic pressure cannot be assessed. Previous study not available for comparison.   LE Doppler: No evidence of deep venous thrombosis in either lower extremity.    ASSESSMENT/PLAN:  Cande Shields is a 22 year old  at 12w0d by LMP consistent with 7w2d US here for new OB visit.    Pregnancy complicated by:   - Granulomatosis polyangiitis  - History of multiple VTE, including DVT and PE  - Right atrial thrombus s/p sternotomy and thrombectomy on 2023 earlier in pregnancy  - History of IUFD at 31 weeks  - History of preeclampsia  - Chronic kidney disease (baseline creatinine ~1.3)  - Bicornuate uterus    Granulomatosis polyaniigitis  History of VTE (PE and DVT)  Right atrial thrombus s/p sternotomy and thrombectomy in early pregnancy  Anemia  - Continue low dose " aspirin for preeclampsia prophylaxis.  - Scheduled IV iron infusions, but strict return precautions given as she may ultimately require blood transfusion  - Check anti-Xa levels monthly. Her last anti-Xa level was at the end of February and therefore she is due. Order placed and instructed to perform 4-6 hours after morning dose.   - Today resulted at 1.8, message sent to Dr. Dye to coordinate any dosing adjustments   - Advised to return to hematology with Dr. Dye at 32 weeks gestation to discuss management of her anticoagulation surrounding her delivery.  - Repeat maternal echo/appointment with Dr. Oneal on . Frequency of surveillance to be determined by cardiology, though would anticipate at least one additional echo in the third trimester.  - Continue metoprolol 12.5mg BID.   - She is scheduled to see rheumatology . Will discuss moving this appointment up if new symptoms arise.    Chronic Kidney Disease  - Creatinine currently at baseline, has follow up scheduled with nephrology in the next month  - Goal blood pressure at 130/80    History of preeclampsia  History of IUFD at 31 weeks   - Close monitoring for signs and/or symptoms of evolving preeclampsia  - Close monitoring of blood pressure both prenatally and in the postpartum period     Surveillance  - Comprehensive anatomy ultrasound at 16 weeks with 2/3 then level II at 18-20 weeks  - Serial growth US every 4 weeks beginning at 28 weeks.  - Weekly  surveillance with BPPs beginning at 28 weeks (prior to her IUFD at 30 weeks in prior pregnancy)    Delivery planning:  - Anesthesia consult in third trimester  - Mode of delivery to be determined in conjunction with cardiology and cardiovascular surgery, though would likely anticipate no contraindications to vaginal delivery.  - Will discuss with hematology regarding management of anticoagulation around the time of delivery.  - Delivery at 37-39 weeks, or sooner as clinically  indicated.   - Feeding: discuss at future visit  - Contraception plans: discuss at future visit    Routine PNC  - Prenatal labs:  Rh pos, antibody negative   HepB/HIV/RPR/HepC: negative   GC/CT: negative   Rubella: immune     UC: no growth  - Pap smear: consider at next visit if no longer bleeding  - Tdap at 28 weeks  - Aneuploidy screening: NIPT drawn today  - 1 hour GCT at 28 weeks    Return to clinic in 4 weeks for 2/3 US and OBV.  Patient seen and staffed with Dr. Gonzalez.    Esperanza Fox MD  Maternal Fetal Medicine Fellow

## 2023-04-04 ENCOUNTER — LAB (OUTPATIENT)
Dept: LAB | Facility: CLINIC | Age: 23
End: 2023-04-04
Attending: OBSTETRICS & GYNECOLOGY
Payer: COMMERCIAL

## 2023-04-04 ENCOUNTER — HOSPITAL ENCOUNTER (OUTPATIENT)
Dept: ULTRASOUND IMAGING | Facility: CLINIC | Age: 23
Discharge: HOME | End: 2023-04-04
Attending: OBSTETRICS & GYNECOLOGY
Payer: COMMERCIAL

## 2023-04-04 ENCOUNTER — OFFICE VISIT (OUTPATIENT)
Dept: MATERNAL FETAL MEDICINE | Facility: CLINIC | Age: 23
End: 2023-04-04
Attending: OBSTETRICS & GYNECOLOGY
Payer: COMMERCIAL

## 2023-04-04 VITALS
SYSTOLIC BLOOD PRESSURE: 82 MMHG | OXYGEN SATURATION: 98 % | BODY MASS INDEX: 35.54 KG/M2 | HEART RATE: 85 BPM | WEIGHT: 220.1 LBS | DIASTOLIC BLOOD PRESSURE: 61 MMHG | RESPIRATION RATE: 20 BRPM

## 2023-04-04 DIAGNOSIS — N18.30 STAGE 3 CHRONIC KIDNEY DISEASE, UNSPECIFIED WHETHER STAGE 3A OR 3B CKD (H): Chronic | ICD-10-CM

## 2023-04-04 DIAGNOSIS — Q51.3 BICORNUATE UTERUS: ICD-10-CM

## 2023-04-04 DIAGNOSIS — O09.291: ICD-10-CM

## 2023-04-04 DIAGNOSIS — Z87.59 HISTORY OF SEVERE PRE-ECLAMPSIA: ICD-10-CM

## 2023-04-04 DIAGNOSIS — O09.90 HIGH-RISK PREGNANCY, UNSPECIFIED TRIMESTER: ICD-10-CM

## 2023-04-04 DIAGNOSIS — Z36.9 ANTENATAL SCREENING ENCOUNTER: ICD-10-CM

## 2023-04-04 DIAGNOSIS — M31.31 GRANULOMATOSIS WITH POLYANGIITIS WITH RENAL INVOLVEMENT (H): ICD-10-CM

## 2023-04-04 DIAGNOSIS — N93.9 VAGINAL BLEEDING: ICD-10-CM

## 2023-04-04 DIAGNOSIS — I82.4Y3 ACUTE DEEP VEIN THROMBOSIS (DVT) OF PROXIMAL VEIN OF BOTH LOWER EXTREMITIES (H): ICD-10-CM

## 2023-04-04 DIAGNOSIS — Z36.9 ANTENATAL SCREENING ENCOUNTER: Primary | ICD-10-CM

## 2023-04-04 DIAGNOSIS — N18.30 STAGE 3 CHRONIC KIDNEY DISEASE, UNSPECIFIED WHETHER STAGE 3A OR 3B CKD (H): ICD-10-CM

## 2023-04-04 DIAGNOSIS — O20.9 BLEEDING IN EARLY PREGNANCY: ICD-10-CM

## 2023-04-04 PROBLEM — O99.419 CARDIAC DISEASE DURING PREGNANCY: Status: ACTIVE | Noted: 2023-04-04

## 2023-04-04 LAB
ALBUMIN SERPL BCG-MCNC: 3.8 G/DL (ref 3.5–5.2)
ALP SERPL-CCNC: 101 U/L (ref 35–104)
ALT SERPL W P-5'-P-CCNC: 35 U/L (ref 10–35)
ANION GAP SERPL CALCULATED.3IONS-SCNC: 11 MMOL/L (ref 7–15)
AST SERPL W P-5'-P-CCNC: 18 U/L (ref 10–35)
BILIRUB SERPL-MCNC: <0.2 MG/DL
BUN SERPL-MCNC: 21.8 MG/DL (ref 6–20)
CALCIUM SERPL-MCNC: 9.6 MG/DL (ref 8.6–10)
CHLORIDE SERPL-SCNC: 108 MMOL/L (ref 98–107)
CREAT SERPL-MCNC: 1.33 MG/DL (ref 0.51–0.95)
DEPRECATED HCO3 PLAS-SCNC: 19 MMOL/L (ref 22–29)
ERYTHROCYTE [DISTWIDTH] IN BLOOD BY AUTOMATED COUNT: 14.9 % (ref 10–15)
GFR SERPL CREATININE-BSD FRML MDRD: 57 ML/MIN/1.73M2
GLUCOSE SERPL-MCNC: 82 MG/DL (ref 70–99)
HCT VFR BLD AUTO: 28.5 % (ref 35–47)
HGB BLD-MCNC: 8.4 G/DL (ref 11.7–15.7)
LMWH PPP CHRO-ACNC: 1.8 IU/ML
MCH RBC QN AUTO: 23.9 PG (ref 26.5–33)
MCHC RBC AUTO-ENTMCNC: 29.5 G/DL (ref 31.5–36.5)
MCV RBC AUTO: 81 FL (ref 78–100)
NT-PROBNP SERPL-MCNC: 328 PG/ML (ref 0–450)
PLATELET # BLD AUTO: 468 10E3/UL (ref 150–450)
POTASSIUM SERPL-SCNC: 4.7 MMOL/L (ref 3.4–5.3)
PROT SERPL-MCNC: 6.8 G/DL (ref 6.4–8.3)
RBC # BLD AUTO: 3.52 10E6/UL (ref 3.8–5.2)
SODIUM SERPL-SCNC: 138 MMOL/L (ref 136–145)
TROPONIN T SERPL HS-MCNC: 14 NG/L
WBC # BLD AUTO: 9.8 10E3/UL (ref 4–11)

## 2023-04-04 PROCEDURE — 85520 HEPARIN ASSAY: CPT

## 2023-04-04 PROCEDURE — 80053 COMPREHEN METABOLIC PANEL: CPT

## 2023-04-04 PROCEDURE — 85014 HEMATOCRIT: CPT | Performed by: OBSTETRICS & GYNECOLOGY

## 2023-04-04 PROCEDURE — G0463 HOSPITAL OUTPT CLINIC VISIT: HCPCS | Mod: 25 | Performed by: OBSTETRICS & GYNECOLOGY

## 2023-04-04 PROCEDURE — 96040 HC GENETIC COUNSELING, EACH 30 MINUTES: CPT

## 2023-04-04 PROCEDURE — 83880 ASSAY OF NATRIURETIC PEPTIDE: CPT

## 2023-04-04 PROCEDURE — 76813 OB US NUCHAL MEAS 1 GEST: CPT | Mod: 26 | Performed by: OBSTETRICS & GYNECOLOGY

## 2023-04-04 PROCEDURE — 99215 OFFICE O/P EST HI 40 MIN: CPT | Mod: 25 | Performed by: OBSTETRICS & GYNECOLOGY

## 2023-04-04 PROCEDURE — 84484 ASSAY OF TROPONIN QUANT: CPT

## 2023-04-04 PROCEDURE — 76813 OB US NUCHAL MEAS 1 GEST: CPT

## 2023-04-04 PROCEDURE — 36415 COLL VENOUS BLD VENIPUNCTURE: CPT

## 2023-04-04 ASSESSMENT — PATIENT HEALTH QUESTIONNAIRE - PHQ9: SUM OF ALL RESPONSES TO PHQ QUESTIONS 1-9: 0

## 2023-04-04 ASSESSMENT — PAIN SCALES - GENERAL: PAINLEVEL: NO PAIN (0)

## 2023-04-04 NOTE — PROGRESS NOTES
"[facility]  Genetic Counseling Consult    Patient:  Cande Shields YOB: 2000   Date of Service:  23   MRN: [id number]    Cande was seen at the [facility] for genetic consultation. The indication for genetic counseling is desire to discuss options for genetic screening and diagnostics. The patient was accompanied to this visit by their mother, Amy. The patient and their accompanied individual is wearing a mask due to current [facility] policies.      The session was conducted in English.        IMPRESSION/ PLAN   1. Cande has not had genetic screening in this pregnancy but elected to have screening today.  Following today's MFM visit, Cande had a blood draw for Core NIPS through [id number]. The Core NIPS includes screening for trisomy 21, 18, and 13 and the patient opted to screen for sex chromosome aneuploidies, including reported fetal sex. Results are expected in 7-10 days. The patient will be called with results and if they do not answer they requested a detailed message with results on their voicemail, NOT including the predicted fetal sex information. Instead, they would like the sex information emailed to her at [email or web address]. Patient was informed that results, including fetal sex, will be available in [employer].     2. Cande had a nuchal translucency ultrasound today. Please see the ultrasound report for further details. Cande also had a first OB visit today. Please see the consult note for more details.     3. Further recommendations include a level II ultrasound with MFM and maternal serum AFP only screening for neural tube defects, if desired (quad screen should NOT be performed).     PREGNANCY HISTORY   /Parity:       Cande's pregnancy history is significant for:     10/2020: 30w6d IUFD of female fetus, \"Vicky\"    CURRENT PREGNANCY   Current Age: 22 year old   Age at " "Delivery: 23 year old  LUIS: 10/17/2023, by Last Menstrual Period                                   Gestational Age: 12w0d  This pregnancy is a single gestation.   This pregnancy was conceived spontaneously.    MEDICAL HISTORY   Cande s reported medical history is not expected to impact pregnancy management or risks to fetal development.       FAMILY HISTORY   A three-generation pedigree was obtained today and is scanned under the \"Media\" tab in Epic. The family history was reported by Cande and their mother.    The following significant findings were reported today:   Cande was recently discovered to have a PFO, which was repaired.  Patent foramen ovale (PFO) is a normal hole in heart structure that infants are born with that typically closes shortly after birth on it's own. However, in a portion of infants it may not close properly. We discussed that prenatal screening by ultrasound or fetal echocardiogram are not necessarily useful for these types of heart defects as we would expect this hole to be open in a fetus. Cande was encouraged to share this family history information with their pediatrician.   Cande's father was reportedly born with only one kidney. He is asymptomatic and only discovered this incidentally during evaluation for kidney stones. Unilateral renal agenesis is likely a multifactorial condition. Recurrence risk may be increased for family members.   Cande has a paternal first cousin with spina bifida. Spina bifida is a condition that occurs when the neural tube does not close completely during the first few weeks of development which can result in abnormalities in spine formation. The cause of this condition is likely multifactorial, including multiple genetic and environmental factors. Given that this is a fourth degree relative to the pregnancy, this history is not likely to significantly impact the chance for a neural tube defect in Cande's pregnancy.   Cande's mother reports that " Cande's maternal great uncle, maternal great grandfather, and maternal great great grandfather all had cleft lip and palate. Her paternal great uncle also had a congenital heart defect. Cande's maternal grandfather, mother, and other maternal relatives are all unaffected. We discussed that cleft lip and palate can be part of an underlying genetic condition or can be isolated and likely multifactorial in cause. The reported family history is suspicious for an autosomal dominant condition. If this were the case, risk in the current pregnancy would be low. However, recurrence risk cannot be eliminated without more information.   Cande's maternal grandparents had stillborn twins. No additional details are known about this loss.   The father of the pregnancy, Cm, has a nephew with autism. It was reported that he has no other health concerns or physical differences.  He has not undergone genetic testing to evaluate for genetic causes of autism spectrum disorder to Cande's knowledge. Autism is a heterogeneous disorder, including genetic and non-genetic causes. In a small percentage of individuals with ASD, it is a feature of an underlying genetic condition such as Down syndrome, fragile X syndrome, or Rett syndrome. However, in most isolated cases the cause is thought to be multifactorial, meaning a combination of multiple genetic and environmental factors. Since there is a genetic component to ASD, the recurrence risk for other family members of an individual with ASD is increased. This risk is highest among first-degree relatives of the individual with an autism spectrum disorder (such as siblings). Risk for second and third degree relatives may be slightly elevated from the general population risk. Given that this is a third degree relative to the pregnancy, the risk for autism spectrum disorders may be slightly increased in the current pregnancy. Without a known genetic cause, testing in the current pregnancy is  not available. They were encouraged to share this information with their pediatrician and have awareness for early signs and symptoms.     Otherwise, the reported family history is unremarkable for multiple miscarriages, stillbirths, birth defects, intellectual disabilities, known genetic conditions, and consanguinity.       CARRIER SCREENING   Expanded carrier screening is available to screen for autosomal recessive conditions and X-linked conditions in a large list of genes. Autosomal recessive conditions happen when a mutation has been inherited from the egg and sperm and include conditions like cystic fibrosis, thalassemia, hearing loss, spinal muscular atrophy, and more. X-linked conditions happen when a mutation has been inherited from the egg and include conditions like fragile X syndrome. [place] screening was also reviewed. About [place]  Screening    The patient has declined the carrier screening options today. They are aware the option will remain, and they can contact us if they would like to pursue screening.       RISK ASSESSMENT FOR CHROMOSOME CONDITIONS   We explained that the risk for fetal chromosome abnormalities increases with maternal age. We discussed specific features of common chromosome abnormalities, including Down syndrome, trisomy 13, trisomy 18, and sex chromosome trisomies.      At age 23 at midtrimester, the risk to have a baby with Down syndrome is 1 in 1040.    At age 23 at midtrimester, the risk to have a baby with any chromosome abnormality is 1 in 520.     We discussed that current ACMG guidelines also recommend that screening for 22q11.2 deletion syndrome be offered to all pregnant patients. 22q11.2 deletion syndrome has an estimated prevalence of 1 in 990 to 1 in 2148 (0.05-0.1%). Risk is not thought to increase with maternal age. Clinical features are variable but include congenital heart defects, cleft palate, developmental delays, immune system deficiencies, and hearing  loss. Approximately 90% of cases are de guillaume (a sporadic new change in a pregnancy) and 10% are inherited. Cell-free DNA screening for 22q11.2 deletion syndrome is available (Expanded NIPS through [employer]). We discussed the limitations of cell-free DNA screening in detecting microdeletions and the possiblity of false positives and false negatives. Expanded NIPS also allows for reflex to include other microdeletion conditions and rare autosomal trisomies if an indication would arise later in the pregnancy.     Cande has not had genetic screening in this pregnancy but elected to have screening today.  Screening for 22q11.2 deletion syndrome was declined today.     GENETIC TESTING OPTIONS   Genetic testing during a pregnancy includes screening and diagnostic procedures.      Screening tests are non-invasive which means no risk to the pregnancy and includes ultrasounds and blood work. The benefits and limitations of screening were reviewed. Screening tests provide a risk assessment (chance) specific to the pregnancy for certain fetal chromosome abnormalities but cannot definitively diagnose or exclude a fetal chromosome abnormality. Follow-up genetic counseling and consideration of diagnostic testing is recommended with any abnormal screening result. Diagnostic testing during a pregnancy is more certain and can test for more conditions. However, the tests do have a risk of miscarriage that requires careful consideration. These tests can detect fetal chromosome abnormalities with greater than 99% certainty. Results can be compromised by maternal cell contamination or mosaicism and are limited by the resolution of current genetic testing technology.     There is no screening or diagnostic test that detects all forms of birth defects or intellectual disability.     We discussed the following screening options:   Non-invasive prenatal testing (NIPT)    Also called cell-free DNA screening because it detects  chromosomes from the placenta in the pregnant person's blood    Can be done any time after 10 weeks gestation    Screens for trisomy 21, trisomy 18, trisomy 13, and sex chromosome aneuploidies    Cannot screen for open neural tube defects, maternal serum AFP after 15 weeks is recommended    We discussed that [employer] will perform a benefits investigation to determine coverage, but that it is the patient's responsibility to select billing through insurance or self-pay ($99 for Core NIPS, $349 for Expanded NIPS). The patient was instructed to expect an email or text from [employer] to set up a patient portal in order to make the selection. If no contact from [employer] is received, of if she is very concerned about cost, the patient should contact [employer]'s billing office at the number provided today. We reviewed that if no selection is made before results return, no cost can be guaranteed. The patient expressed her understanding.     We discussed the following ultrasound options:  Nuchal translucency (NT) ultrasound    Ultrasound between [id number] that includes nuchal translucency measurement and nasal bone assessments    Nuchal translucency refers to the space at the back of the neck where fluid builds up. All babies at this stage have fluid and there is only concern if there is too much fluid    Nasal bone refers to the small bone in the nose. There is concern for conditions like Down syndrome if the bone cannot be seen at all    This ultrasound can be done as part of first trimester screening, at the same time as another screen (NIPT), at the same time as a CVS, or if the patients does not want genetic screening.    Markers on ultrasound detects about 70% of pregnancies with aneuploidy    Abnormalities on NT ultrasound can also increase the risk for a birth defect, like a heart defect    Comprehensive level II ultrasound (Fetal Anatomy Ultrasound)    Ultrasound done between 18-20 weeks gestation    Screens for major  birth defects and markers for aneuploidy (like trisomy 21 and trisomy 18)    Includes looking at the fetus/baby's growth, heart, organs (stomach, kidneys), placenta, and amniotic fluid    We discussed the following diagnostic options:   Chorionic villus sampling (CVS)    Invasive diagnostic procedure done between 10w0d and 13w6d    The procedure collects a small sample from the placenta for the purpose of chromosomal testing and/or other genetic testing    Diagnostic result; more than 99% sensitivity for fetal chromosome abnormalities    Cannot screen for open neural tube defects, maternal serum AFP after 15 weeks is recommended    Amniocentesis    Invasive diagnostic procedure done after 15 weeks gestation    The procedure collects a small sample of amniotic fluid for the purpose of chromosomal testing and/or other genetic testing    Diagnostic result; more than 99% sensitivity for fetal chromosome abnormalities    Testing for AFP in the amniotic fluid can test for open neural tube defects    It was a pleasure to be involved with [facility]. Face-to-face time of the meeting was 30 minutes.    PRAVIN Rodriguez, [facility]  Certified and [place] Licensed Genetic Counselor  [facility]  Maternal Fetal Medicine  Office: [phone]  [facility]: [phone]   Fax: [phone]  [facility]

## 2023-04-04 NOTE — NURSING NOTE
Cande here for GC, NT, and 1st OBV due to preg c/b h/o fetal demise at 30 weeks, CKD, h/o DVT and PE. Pt reports +FM, denies ctx, denies SRoM and denies vag bleeding.  Pt on U/S today had hematoma still and pt reports bleeding saturating about 3 pads per day since 8 weeks.  Pt had Nt WNL today.  Pt had PHQ9 and was 0 today.  Dr. Fox and Dr. Gonzalez in to see pt.   Pt scheduled for 16 weeks 2/3 complete and obv and 20 weeks comp U/s and obv.   Pt left amb and stable and scheduled follow up apts. Yaz Esparza RN

## 2023-04-05 NOTE — TELEPHONE ENCOUNTER
Updated [place] that provider has been notified and patient has follow up appt later in May so will have more information after that.    Marielena Jaquez RN, BSN, PHN  [facility] Nurse Navigator  [phone]

## 2023-04-10 ENCOUNTER — TELEPHONE (OUTPATIENT)
Dept: MATERNAL FETAL MEDICINE | Facility: CLINIC | Age: 23
End: 2023-04-10
Payer: COMMERCIAL

## 2023-04-10 ENCOUNTER — CARE COORDINATION (OUTPATIENT)
Dept: CARDIOLOGY | Facility: CLINIC | Age: 23
End: 2023-04-10

## 2023-04-10 DIAGNOSIS — O09.90 HIGH-RISK PREGNANCY, UNSPECIFIED TRIMESTER: Primary | ICD-10-CM

## 2023-04-10 RX ORDER — PNV NO.95/FERROUS FUM/FOLIC AC 28MG-0.8MG
1 TABLET ORAL DAILY
Qty: 90 TABLET | Refills: 3 | Status: SHIPPED | OUTPATIENT
Start: 2023-04-10 | End: 2023-04-13

## 2023-04-10 NOTE — TELEPHONE ENCOUNTER
Pt called back. Informed she needs to have her Anti Xa redrawn 4-6 hours after her morning dose. Pt states she usually takes her lovenox at 0300. Informed pt she should go to [facility] between 7-9 am for lab draw. Pt also stated she is out of PNV and isn't able to buy any- asked if insurance will cover them. Informed that some insurances do cover PNV and prescription sent for PNV to pt pharmacy of choice. No further questions or concerns.     Rebecca Castillo, RN

## 2023-04-10 NOTE — TELEPHONE ENCOUNTER
Called pt and LM to have her call [facility] office back. Dr. Fox would like her to get her anti-Xa redrawn this week, preferably today or tomorrow, 4-6 hours after her lovenox dose.     Rebecca Castillo RN

## 2023-04-11 NOTE — PROGRESS NOTES
"Patient called in today. She reported that someone told her if she heard a popping in her chest, she could call us. I spoke with her and she described it as a discomfort deep in her chest that she noticed over the last week or so.  It's not painful - it's just that she's \"aware it's there\" especially when she coughs. She said she can sweep, etc and doesn't feel it. Patient had a thrombectomy via sternotomy. She said the incision looks ok, near the bottom it looks red but no real pain over the incision.       Reviewed with Dr. Oneal.  Patient has appt with Rheumatology at [place] this Thursday. Will obtain CXR to see if sternal wires are intact.     "

## 2023-04-12 DIAGNOSIS — R07.89 CHEST DISCOMFORT: Primary | ICD-10-CM

## 2023-04-12 NOTE — PROGRESS NOTES
Rheumatology Clinic Visit     Cande Shields MRN# [id number]   YOB: 2000 Age: 22 year old     Date of Visit: 04/13/2023  Primary care provider: Trudy Faye          Assessment and Plan:     # Granulomatous polyangiitis with glomerulonephritis, upper respiratory, musculoskeletal, and cutaneous involvement;   Constitutional symptoms, skin rash on the legs and elbows, shortness of breath, and frequency/ severity of bloody nasal discharge all remain improved/absent since last visit in 2-2023, despite occurrence of DVT and life-threating PE since last visit. Physical exam today is remarkable for 10 lb wt gain since 12-22, otherwise normal vitals; normal nose and throat exam; no edema and pulmonary, cutaneous, and musculoskeletal exams are all unremarkable.    Data: Laboratory evaluation on April 4, 2023 showed hemoglobin of 8.4, stable compared to March 13.  White count and platelets were normal.  Urinalysis in March 2023 showed 100+ protein small blood but no RBCs.  Treponema, hepatitis B, hepatitis C, and HIV were all negative.  Comprehensive metabolic panel showed creatinine at 1.33, stable compared to March 13.  N-terminal proBNP was 328; normal.    Discussion: GPA flare with rising creatinine and rash, associated with COVID-19 infection in 12- 2022 is now resolved with minimal symptoms; renal function is at baseline.  Patient had completed 3 out of planned 4 weekly doses of rituximab, started in January 2023, 375 mg/m  last dose on 1-. She last had Cytoxan 500 mg/m2 on 1-.     I agree with withholding further Rituxan given ongoing pregnancy (now 12 weeks gestation).  Prednisone has appropriately tapered off.    # Hypercoagulable state: Potentially causally associated with GPA, but also may have been provoked with COVID-19 infection.  I agree with Dr. Dye's  assessment that lifelong coagulation is indicated.     # High-risk pregnancy: During pregnancy, I recommend continued  collaboration with high risk maternal-fetal medicine service, and monitoring for worsening renal function and recurrence of GPA with creatinine, CBC with platelets, UA to be monitored by Dr. Dozier.  I recommend continued use of prenatal vitamins and avoidance of nonsteroidal anti-inflammatory medications, smoking and alcohol.  Immunomodulatory medications that might be considered during pregnancy include corticosteroids and azathioprine; rituximab and Cytoxan should not be used (until after 2nd trimester at least)    Recommendation:  1.  Continue Vit B6, lopressor, enoxaparin; agree with planned iron infusions  2. Follow up with Nephrology, Maternal fetal medicine, Hematology (Dr. Dye)     RTC 6-1-23    Ramses Call MD  [facility]              Active Problem List:     Patient Active Problem List    Diagnosis Date Noted     Cardiac disease during pregnancy 04/04/2023     Priority: Medium     Cardiac diagnosis: Polyangiitis granulomatosis, history of right atrial thrombectomy with sternotomy, PFO closure, history of DVT/PE, CKD (Cr 1.3)  Modified WHO Class: III // NYHA Class: II  Last Echo - 2/18/23: RA thrombus 2.2cm, patent foramen ovale  Medications: Lovenox, aspirin, metoprolol  Comorbidities: bicornuate uterus, subchorionic hematoma, history of preeclampsia and IUFD    Antepartum plan:  [] Fetal echocardiogram at 22 weeks  [] Anti Xa levels  [] Follow up with cardiology 4/26  [] Follow up with nephrology and rheumatology  [] Anesthesia consult  [] Discussion at Cardio-OB conference: 4/5    Delivery Plan:  - Timing and location of delivery:  - Mode of delivery:  - Anesthesia plan:  - Monitoring:   - IV access/lines:  - SBE prophylaxis:     Postpartum Recommendations:  -ppBCM:  -MFM Follow-up: [facility] 5/4    Providers:  Primary OB: [facility] 5/6  Cardiologist: Dr. Oneal 4/26       Other pulmonary embolism without acute cor pulmonale, unspecified chronicity (H) 02/17/2023     Priority: Medium  "    Chest pain, unspecified type 02/17/2023     Priority: Medium     Pneumonia due to infectious organism, unspecified laterality, unspecified part of lung 02/17/2023     Priority: Medium     ATN (acute tubular necrosis) (H) 12/21/2022     Priority: Medium     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) 12/20/2022     Priority: Medium     Infection due to 2019 novel coronavirus 12/20/2022     Priority: Medium     Acute deep vein thrombosis (DVT) of proximal vein of both lower extremities (H) 10/30/2021     Priority: Medium     BMI 40.0-44.9, adult (H) 10/30/2021     Priority: Medium     Femoral artery thrombosis, left (H) 10/30/2021     Priority: Medium     Thrombosis 03/23/2021     Priority: Medium     Occlusion of common femoral artery (H) 03/21/2021     Priority: Medium     Stage 3 chronic kidney disease, unspecified whether stage 3a or 3b CKD (H) 03/20/2021     Priority: Medium     Acute saddle pulmonary embolism with acute cor pulmonale (H) 03/20/2021     Priority: Medium     Need for pneumocystis prophylaxis 03/04/2021     Priority: Medium     Pulmonary infiltrates 01/28/2021     Priority: Medium     Acute kidney injury (H) 01/28/2021     Priority: Medium     Personal history of COVID-19 01/10/2021     Priority: Medium     Pyoderma gangrenosum 01/10/2021     Priority: Medium     ANCA-associated vasculitis (H) 12/31/2020     Priority: Medium     Myalgia 12/27/2020     Priority: Medium     Granulomatosis with polyangiitis, unspecified whether renal involvement (H) 12/27/2020     Priority: Medium     Bicornuate uterus 10/28/2020     Priority: Medium     Fetal demise, greater than 22 weeks, antepartum 10/28/2020     Priority: Medium     10/20 30w6d presented to [facility] with right sided chest pain, BP in severe range, No fetal heart tones auscultated. Induced, NSVB, \"Yasmin\" 2lbs       Alcohol use disorder, mild, in sustained remission 02/11/2015     Priority: Medium     Formatting of this note " might be different from the original.  sober since 01/2014.       Binge-eating disorder, moderate 02/11/2015     Priority: Medium     Cannabis use disorder, mild, in early remission 02/11/2015     Priority: Medium     Formatting of this note might be different from the original.  sober since 06/2014.       Depression 02/11/2015     Priority: Medium     ADHD (attention deficit hyperactivity disorder), inattentive type 07/09/2014     Priority: Medium     Generalized anxiety disorder 07/09/2014     Priority: Medium     Performance anxiety 07/09/2014     Priority: Medium     Self-injurious behavior 07/09/2014     Priority: Medium     History of granulomatosis with polyangiitis 02/15/2012     Priority: Medium     Wegener's granulomatosis 10/04/2011     Priority: Medium     Replacing diagnoses that were inactivated after the 10/1/2021 regulatory import.              History of Present Illness:   Cande Shields presents for followup vasculitis. Last seen 2-9-2023.  On that date, granulomatous polyangiitis with chronic kidney disease and glomerulonephritis was judged stable.  Patient had recently learned she was in the early stages of pregnancy.  Recommendation was to continue prednisone at low-dose, monitor for renal disease, discontinue planned Rituxan infusions, and follow-up with maternal-fetal medicine.     Background: Granulomatosis with polyangitis dx at age 10, with PR3 positivitiy, initially treated with Methotrexate, Ssteroids, Rituximab infusions Q6-8 months until about 2012, since then had been in remission. On December 26 2020, she was admitted to [facility] for for myalgias, arthralgias, pedal edema and a petechial non blanching palpable purpura rash involving her thighs, legs, elbows, chin and neck (vasculitis). She had proteinuria on UA, lung nodule on CXR, elevated inflammatory markers.  Although COVID-19 infection was also present, a flare of granulomatous polyangiitis was diagnosed.  The patient  was started on Solu-Medrol 1 mg/kg/day.  She was also begun on induction protocol with rituximab 375 mg/m  IV 4 doses, given on a weekly schedule.  She improved, and was discharged on December 30, 2020 on high-dose prednisone. Last seen in 2-2021, when she was improving after hospitalization.  Another flare of GPA with nephritis was diagnosed in December 2022.  Patient was hospitalized, treated with high-dose steroids and new induction therapy with rituximab.  She received 3 out of planned 4 infusions of rituximab; therapy was stopped due to pregnancy diagnosed in early 2-2023.    Interval history April 13, 2023    Shortly after last visit, unfortunately, patient suffered venous thromboembolism and pulmonary embolus with significant hemodynamic decline.  Echo showed large right atrial thrombus noted. She was therefore admitted to the ICU and angiovac for thrombectomy was attempted, but ultimately she underwent emergency sternotomy with right atrial thrombectomy and closure of PFO with central and bilateral femoral cannulation.      Patient was seen by maternal-fetal medicine on April 4, 2023.  Intrauterine pregnancy at 12 weeks was noted.  Recent history of multiple venous thromboemboli with DVT and PE, and PFO closure on February 18, 2023 was also noted.  Recommendation were to continue low-dose aspirin for preeclampsia prophylaxis, received IV iron for anemia, continue 10 a factor monitoring for anticoagulation, continue blood pressure control with metoprolol.  Expected delivery would be by vaginal route at 37 to 39 weeks of pregnancy.    Patient was seen by Dr. Gonzalez on March 16, 2023 in the Center for clotting and bleeding disorders.  History of GPA, severe preeclampsia and intrauterine feeder and death with prior pregnancy was noted.  During ongoing pregnancy, recommendation was to continue enoxaparin 1 mg/kg every 12 hours and check Lovenox levels once monthly, to continue aspirin for preeclampsia  prophylaxis, and to return at 32 weeks of gestation for discussion of peridelivery anticoagulation plan.    Today, feeling well. No n/v. No f/c/s. No joint pain, skin rash.  No upper respiratory symptoms, nasal discharge, sinus pressure.  Chest pain is worsening, dating from February surgery.  Pain is not pleuritic, but is migratory, affecting back and front not necessarily related to site of previous sternotomy.  Energy level is overall good, as is appetite.  She plans to return to full-time work as a  next week.    Interval history February 9, 2023    Since last visit in 20221:  Patient saw Dr. Chang in nephrology in April 2021 in follow-up of RI-3+ ANCA vasculitis.  Creatinine was noted to be improving and patient was feeling well several weeks after a single course of intravenous cyclophosphamide.  Plan was to continue prednisone through the month of April, and then start tapering to off.  Plan was to consider maintenance Rituxan in June or July 2021. She was lost to Renal and Rheumatology followup.    She was next admitted between 12/20-12/21 of 2022. She came in with positive home COVID test and  petechial rashes that were similar to GPA previous flare and some phlegm with blood tinge her PR3 was elevated at 27, her Cr was 2.05 but after IV fluid and 1 dose of methlyprednisolone 125 mg Cr down to 1.48 the next day. She received tapering dose of methylprednisone IV and oral, as well as avacopan. She received Rituximab 375 mg/m2 1st dose on 1/10/23 and 2nd dose on 1/17/23, 1-.    Patient saw Dr. Dozier in nephrology on January 19, 2023.  Impression was of ANCA vasculitis flare, partially completed induction treatment with rituximab, and concerning decrease in GFR compared to 1 month earlier.  Nevertheless, GPA flare was considered to be under improving control.  A plan for tapering Medrol to 8 mg/day as of February 8 was given, and a plan for a total of 4 weekly doses of rituximab was  discussed.    Today, patient reports that she is 4 weeks pregnant, testing positive just 4 days ago. She will meet with OB/Gyn tomorrow.  She feels well overall; no joint pain, skin rash, shortness of breath, fever. She quit smoking 2 weeks ago; she still has a cough (dry, chronic). She has not had recurrence of blood-tinged phlegm. No nosebleeds or facial pain/sinus drainage.    She has continued avacopan, but stopped on learning that she is pregnant, per vasculitis service. She is taking prenatal vitamins.  prednisone 12 mg daily has continued. She missed a 4th rituxan dose scheduled for 2-3-2023.  She still has low back pain, every day. Pain is constant, but modest.  She works full time at a day care.    No recurrence of COVID symptoms that she had in .     Interval history 02-:    She is feeling well. Good energy level  Doing cardio, situps, jumping jacks at home.  She notes R foot pain for the past several weeks; made worse with high impact weight bearing. Pain is in the foot arch and in the pad.    After the last visit in January, creatinine returned unexpectedly elevated at 2.  Dr. Chang and the renal team recommended immediate hospitalization for possible ANCA vasculitis flare unresponsive to rituximab and prednisone.  Patient was hospitalized for steroid pulse and received Cytoxan 500 mg/m  on January 31.  She tolerated medications well and was discharged in early February.    Patient was seen in the emergency room on February 21, 2021 for right foot pain.  Impression was of probable plantar fasciitis; symptomatic Rx was recommended.  Pregnancy test was negative.  Left flank pain was judged mild; urinalysis showed mild hematuria, and patient was discharged with plans to monitor.    She tapered prednisone from 50 to 40 5 days ago. She will taper by 10 mg each week per Renal.      Interval history 01-:    She had more bumps on her feet,a nd her joints started to ache when she went down  "to 40 mg prednisone after a few days.    She notes tremors and shakinesss in her hands, and her legs are swollen on prednisone.    Skin on her legs has cleared up. \"bumps\" on her elbows persist, no larger.    She is breathless with minimal exertion, like climbing stairs. She notes hot flashes, she sometiems has sweats as well, sudden onset after being cold.    She continues to cough; there is frequently flecks of blood, usually dark.    Her nose is dry despite use of a humidifier. She also has had a bloody nose daily for the last 10 days.            Review of Systems:       Constitutional: negative  Skin: negative  Eyes: negative  Ears/Nose/Throat: negative  Respiratory: No shortness of breath, dyspnea on exertion, cough, or hemoptysis  Cardiovascular: negative  Gastrointestinal: negative  Genitourinary: negative  Musculoskeletal: negative  Neurologic: negative  Psychiatric: negative  Hematologic/Lymphatic/Immunologic: negative  Endocrine: negative          Past Medical History:     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     Multiple subsegmental pulmonary emboli without acute cor pulmonale (H) 03/2021     PFO (patent foramen ovale)      Preeclampsia, third trimester      Spontaneous pregnancy loss 2020    31 weeks     Stage 3b chronic kidney disease (H)      Wegener's disease, pulmonary 01/01/2010    renal biopsy     Past Surgical History:   Procedure Laterality Date     ENT SURGERY  01/01/2000    cyst     EXCISE GANGLION WRIST  01/01/2009     STERNOTOMY  02/18/2023    right atrial thrombectomy     THROMBECTOMY ATRIUM Right 02/18/2023     THROMBECTOMY PERCUTANEOUS N/A 2/18/2023    Procedure: Emergency Sternotomy, Right Atrial Thrombectomy, Central Cannulation, Bilateral Femoral Cannulation, Closure of PFO ; Transesophageal Echocardiogram performed by anesthesia " staff;  Surgeon: Adelfo Elmore MD;  Location: [facility] OR     THROMBECTOMY PERCUTANEOUS N/A 2/18/2023    Procedure: Angiovac Mediated for Right Atrial Clot; Intracatheter Thrombectomy via bilateral percutaneous femoral venous access with 26 FR Right Femoral vein and 17 FR Left Femoral Vein cannulas. Transesophageal echchocardiogram performed by anesthesia staff;  Surgeon: Po Monterroso MD;  Location: [facility] OR     # ANCA vasculitis:  GPA flare, treated with 1 mg/kg/day prednisone, and 4 doses of rituximab 375 mg/m  completed in late January 2021. Followup 3-day pulse methylprednisolone and Cytoxan 500 mg/m2 were given on 1- in response to rising creatinine and active urinary sediment. Creatinine stable at ~ 2 as of February 23, 2021.         Social History:     Social History     Occupational History     Not on file   Tobacco Use     Smoking status: Former     Packs/day: 0.25     Types: Cigarettes     Smokeless tobacco: Former     Tobacco comments:     Vaping stopped 2/26   Vaping Use     Vaping status: Some Days     Substances: Nicotine, Flavoring     Devices: Disposable   Substance and Sexual Activity     Alcohol use: Not Currently     Drug use: No     Sexual activity: Yes     Partners: Male     Birth control/protection: None            Family History:     Family History   Problem Relation Age of Onset     Thyroid Disease Mother      Cancer Maternal Grandfather      Asthma No family hx of      Diabetes No family hx of             Allergies:     Allergies   Allergen Reactions     Penicillins Rash     Unknown, but think rash.  Tolerated cephalexin (12/27/20), cefpodoxime (12/27/20), Ceftriaxone (Feb 2023), Zosyn (Feb 2023)            Medications:     Current Outpatient Medications   Medication Sig Dispense Refill     acetaminophen (TYLENOL) 325 MG tablet Take 2 tablets (650 mg) by mouth every 4 hours as needed for other (For optimal non-opioid multimodal pain management to improve pain  control.)       aspirin (ASA) 81 MG chewable tablet Take 1 tablet (81 mg) by mouth daily 30 tablet 4     enoxaparin ANTICOAGULANT (LOVENOX) 80 MG/0.8ML syringe Inject 0.8 mLs (80 mg) Subcutaneous every 12 hours for 90 days 48 mL 2     famotidine (PEPCID) 20 MG tablet Take 1 tablet (20 mg) by mouth 2 times daily 60 tablet 2     metoprolol tartrate (LOPRESSOR) 25 MG tablet Take 0.5 tablets (12.5 mg) by mouth 2 times daily 60 tablet 2     pyridOXINE (VITAMIN B6) 25 MG tablet Take 1 tablet (25 mg) by mouth every 8 hours as needed (nausea) 30 tablet 4     albuterol (PROAIR HFA/PROVENTIL HFA/VENTOLIN HFA) 108 (90 Base) MCG/ACT inhaler Inhale 2 puffs into the lungs every 6 hours as needed for shortness of breath or cough (Patient not taking: Reported on 4/13/2023) 18 g 1     doxylamine (UNISOM) 25 MG TABS tablet Take 0.5 tablets (12.5 mg) by mouth 2 times daily as needed for other (nausea) (Patient not taking: Reported on 4/13/2023) 30 tablet 1            Physical Exam:   Blood pressure (!) 75/51, pulse 80, weight 99.6 kg (219 lb 9.6 oz), last menstrual period 01/10/2023, SpO2 98 %, not currently breastfeeding.  Wt Readings from Last 4 Encounters:   04/13/23 99.6 kg (219 lb 9.6 oz)   04/04/23 99.8 kg (220 lb 1.6 oz)   03/16/23 100.3 kg (221 lb 3.2 oz)   03/15/23 100.4 kg (221 lb 6.4 oz)       Constitutional: well-developed, appearing stated age; cooperative  Eyes: nl EOM, PERRLA, vision, conjunctiva, sclera  ENT: nl external ears, nose, hearing, lips, teeth, gums, throat  No mucous membrane lesions, normal saliva pool  Neck: no mass or thyroid enlargement  Resp: lungs clear to auscultation, nl to palpation  CV: RRR, no murmurs, rubs or gallops, no edema  MS: The TMJ, neck, shoulder, elbow, wrist, MCP/PIP/DIP, spine, hip, knee were examined and found normal. No active synovitis or altered joint anatomy. Full joint ROM. Normal  strength. No dactylitis,  tenosynovitis, enthesopathy.  Skin: No evidence of former rash over  thighs legs elbows or hands.  Neuro: nl cranial nerves  Psych: nl judgement, orientation, memory, affect.         Data:     No results found for any visits on 04/13/23.      Latest Ref Rng & Units 3/13/2023     9:53 AM 3/15/2023    10:52 AM 4/4/2023     3:29 PM   RHEUM RESULTS   Albumin 3.5 - 5.2 g/dL 3.6    3.8     ALT 10 - 35 U/L 14    35     AST 10 - 35 U/L 8    18     Creatinine 0.51 - 0.95 mg/dL 1.28    1.33     GFR Estimate >60 mL/min/1.73m2 60    57     Hematocrit 35.0 - 47.0 % 27.0    28.5     Hemoglobin 11.7 - 15.7 g/dL 8.1   8.0   8.4     WBC 4.0 - 11.0 10e3/uL 8.7    9.8     RBC Count 3.80 - 5.20 10e6/uL 3.17    3.52     RDW 10.0 - 15.0 % 16.4    14.9     MCHC 31.5 - 36.5 g/dL 30.0    29.5     MCV 78 - 100 fL 85    81     Platelet Count 150 - 450 10e3/uL 509    468          ,  ,  ,  ,  ,  ,   CESARIO interpretation   Date Value Ref Range Status   12/28/2020 Negative NEG^Negative Final     Comment:                                        Reference range:  <1:40  NEGATIVE  1:40 - 1:80  BORDERLINE POSITIVE  >1:80 POSITIVE       ,  ,  ,  ,  ,  ,  ,  ,  ,   Hep B Surface Agn   Date Value Ref Range Status   12/28/2020 Nonreactive NR^Nonreactive Final   ,  ,  ,  ,  ,  ,  ,  ,  ,  ,  ,   Neutrophil Cytoplasmic IgG Antibody   Date Value Ref Range Status   02/22/2011 1:160  Final     Comment:     Reference range: <1:20  (Note)  Cytoplasmic ANCA (c-ANCA) staining pattern observed. No  perinuclear ANCA (p-ANCA) pattern seen. Presence of p-ANCA  ruled out on formalin-fixed neutrophils.  TEST INFORMATION: Anti-Neutrophil Cyto Ab, IgG    Neutrophil Cytoplasmic Antibodies (C-ANCA = granular  cytoplasmic staining, P-ANCA = perinuclear staining) are  found in the serum of over 90 percent of patients with  certain necrotizing systemic vasculitides, and usually in  less than 5 percent of patients with collagen vascular  disease or arthritis.  Performed by [facility],  [address] [place] [id number]  [phone]  [email or web address], Leonie Damon MD, Lab. Director                                                                           ,   Proteinase 3 Antibody IgG   Date Value Ref Range Status   12/28/2020 >8.0 (H) 0.0 - 0.9 AI Final     Comment:     Positive  Antibody index (AI) values reflect qualitative changes in antibody   concentration that cannot be directly associated with clinical condition or   disease state.       ,   Myeloperoxidase Antibody IgG   Date Value Ref Range Status   12/28/2020 <0.2 0.0 - 0.9 AI Final     Comment:     Negative  Antibody index (AI) values reflect qualitative changes in antibody   concentration that cannot be directly associated with clinical condition or   disease state.       ,   Neutrophil Cytoplasmic Antibody   Date Value Ref Range Status   12/28/2020 1:80 (A) <1:10 [titer] Final   ,   Neutrophil Cytoplasmic Antibody Pattern   Date Value Ref Range Status   12/28/2020   Final    Cytoplasmic ANCA (c-ANCA) staining pattern observed and confirmed on formalin-fixed   neutrophils.       ,   Serine Protease 3   Date Value Ref Range Status   02/22/2011 1684 (H)  Final     Comment:     Reference range: 0 to 19  Unit: AU/mL  (Note)  REFERENCE INTERVAL: Serine Protease 3, IgG     19 AU/mL or Less ........ Negative   20-25 AU/mL ............. Equivocal   26 AU/mL or Greater ..... Positive    Approximately 90% of patients with a P-ANCA pattern by IFA  have antibodies specific for MPO.    Approximately 85% of patients with a C-ANCA pattern by IFA  have antibodies specific for PR3.  Performed by [facility],  [address] [place] [id number] [phone]  [email or web address], Leonie Damon MD, Lab. Director                                                                                                                                                                                                                                    ,   Myeloperox Antibodyys   Date Value Ref  Range Status   02/22/2011 0  Final     Comment:     Reference range: 0 to 19  Unit: AU/mL  (Note)  REFERENCE INTERVAL: Myeloperoxidase Abs, IgG     19 AU/mL or Less ......... Negative   20-25 AU/mL .............. Equivocal   26 AU/mL or Greater ...... Positive    Approximately 90% of patients with a P-ANCA pattern by IFA  have antibodies specific for MPO.    Approximately 85% of patients with a C-ANCA pattern by IFA  have antibodies specific for PR3.                                                                                                        ,  ,  ,  ,  ,  ,  ,  ,  ,  ,  ,  ,      Rheumatology Clinic Visit     Cande Shields MRN# [id number]   YOB: 2000 Age: 22 year old     Date of Visit: 04/13/2023  Primary care provider: Trudy Faye          Assessment and Plan:     # Granulomatous polyangiitis with glomerulonephritis, upper respiratory, musculoskeletal, and cutaneous involvement;   Constitutional symptoms, skin rash on the legs and elbows, shortness of breath, and frequency and severity of bloody nasal discharge have greatly improved since 1 month ago. Physical exam today is remarkable for 10 lb wt gain since 12-22, otherwise normal vitals; normal nose and throat exam; no edema and pulmonary, cutaneous, and musculoskeletal exams are all unremarkable.    Patient has now completed induction therapy for GPA flare, started in January 2023 with steroid burst/taper and 3 doses of rituximab 375 mg/m  last on 1-. She last had Cytoxan 500 mg/m2 on 1- in response to rising creatinine and active urinary sediment.    Data: On January 19, 2023, basic metabolic panel revealed creatinine of 1.63 with a GFR of 45, decreased from 53 noted in January 17.  Phosphorus was elevated at 4.7.  Hemoglobin was 11.3 with normal MCV.  Urinalysis showed 100+ protein, 5 red cells per high-powered field.  Total urine protein was 1.11 g/g of creatinine.  PA-3 antibodies were positive at 12 units.   Absolute CD19 counts on January 17 were less than 1.  Repeat antinuclear antibody in December 2022 was negative.  Chest x-ray was negative.    Discussion: GPA flare with rising creatinine and rash, associated with COVID-19 infection in 2022 is now much better controlled with minimal symptoms.  Patient had completed 3 out of planned 4 weekly doses of rituximab treatment.  I agree with withholding further Rituxan given the discovery of first trimester pregnancy in the past week.  I agree with continuing prednisone (12.5 mg current dose) with taper under direction of Dr. Dozier.    Patient describes intention to carry pregnancy to term.  During pregnancy, I recommend collaboration with high risk maternal-fetal medicine service, and monitoring for worsening renal function and recurrence of GPA with creatinine, CBC with platelets, inflammatory markers monthly.  I recommend continued use of prenatal vitamins, avoidance of nonsteroidal anti-inflammatory medications, smoking.  Immunomodulatory medications that might be considered during pregnancy include corticosteroids and azathioprine; rituximab and Cytoxan should not be used.    Recommendation:  1.  Continue prednisone 12 mg daily, follow recommendations from nephrology/vasculitis team regarding prednisone taper.  2.  Urinalysis, spot urine protein in the next week  3.  Follow through with OB entry telephone call and follow-up for fetal monitoring for high risk pregnancy.  4.  Monitor blood pressure weekly at home; report change in upper or lower blood pressure number by 20 units to vasculitis service or rheumatology  5.  Continue off cigarettes, but use prenatal vitamin 1 daily    RTC 6 weeks    Ramses Call MD  Staff Rheumatologist, [facility]              Active Problem List:     Patient Active Problem List    Diagnosis Date Noted     Cardiac disease during pregnancy 04/04/2023     Priority: Medium     Cardiac diagnosis: Polyangiitis granulomatosis, history of  right atrial thrombectomy with sternotomy, PFO closure, history of DVT/PE, CKD (Cr 1.3)  Modified WHO Class: III // NYHA Class: II  Last Echo - 2/18/23: RA thrombus 2.2cm, patent foramen ovale  Medications: Lovenox, aspirin, metoprolol  Comorbidities: bicornuate uterus, subchorionic hematoma, history of preeclampsia and IUFD    Antepartum plan:  [] Fetal echocardiogram at 22 weeks  [] Anti Xa levels  [] Follow up with cardiology 4/26  [] Follow up with nephrology and rheumatology  [] Anesthesia consult  [] Discussion at Cardio-OB conference: 4/5    Delivery Plan:  - Timing and location of delivery:  - Mode of delivery:  - Anesthesia plan:  - Monitoring:   - IV access/lines:  - SBE prophylaxis:     Postpartum Recommendations:  -ppBCM:  -MFM Follow-up: [facility] 5/4    Providers:  Primary OB: [facility] 5/6  Cardiologist: Dr. Oneal 4/26       Other pulmonary embolism without acute cor pulmonale, unspecified chronicity (H) 02/17/2023     Priority: Medium     Chest pain, unspecified type 02/17/2023     Priority: Medium     Pneumonia due to infectious organism, unspecified laterality, unspecified part of lung 02/17/2023     Priority: Medium     ATN (acute tubular necrosis) (H) 12/21/2022     Priority: Medium     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) 12/20/2022     Priority: Medium     Infection due to 2019 novel coronavirus 12/20/2022     Priority: Medium     Acute deep vein thrombosis (DVT) of proximal vein of both lower extremities (H) 10/30/2021     Priority: Medium     BMI 40.0-44.9, adult (H) 10/30/2021     Priority: Medium     Femoral artery thrombosis, left (H) 10/30/2021     Priority: Medium     Thrombosis 03/23/2021     Priority: Medium     Occlusion of common femoral artery (H) 03/21/2021     Priority: Medium     Stage 3 chronic kidney disease, unspecified whether stage 3a or 3b CKD (H) 03/20/2021     Priority: Medium     Acute saddle pulmonary embolism with acute cor pulmonale (H) 03/20/2021      "Priority: Medium     Need for pneumocystis prophylaxis 03/04/2021     Priority: Medium     Pulmonary infiltrates 01/28/2021     Priority: Medium     Acute kidney injury (H) 01/28/2021     Priority: Medium     Personal history of COVID-19 01/10/2021     Priority: Medium     Pyoderma gangrenosum 01/10/2021     Priority: Medium     ANCA-associated vasculitis (H) 12/31/2020     Priority: Medium     Myalgia 12/27/2020     Priority: Medium     Granulomatosis with polyangiitis, unspecified whether renal involvement (H) 12/27/2020     Priority: Medium     Bicornuate uterus 10/28/2020     Priority: Medium     Fetal demise, greater than 22 weeks, antepartum 10/28/2020     Priority: Medium     10/20 30w6d presented to [facility] with right sided chest pain, BP in severe range, No fetal heart tones auscultated. Induced, NSVB, \"Yasmin\" 2lbs       Alcohol use disorder, mild, in sustained remission 02/11/2015     Priority: Medium     Formatting of this note might be different from the original.  sober since 01/2014.       Binge-eating disorder, moderate 02/11/2015     Priority: Medium     Cannabis use disorder, mild, in early remission 02/11/2015     Priority: Medium     Formatting of this note might be different from the original.  sober since 06/2014.       Depression 02/11/2015     Priority: Medium     ADHD (attention deficit hyperactivity disorder), inattentive type 07/09/2014     Priority: Medium     Generalized anxiety disorder 07/09/2014     Priority: Medium     Performance anxiety 07/09/2014     Priority: Medium     Self-injurious behavior 07/09/2014     Priority: Medium     History of granulomatosis with polyangiitis 02/15/2012     Priority: Medium     Wegener's granulomatosis 10/04/2011     Priority: Medium     Replacing diagnoses that were inactivated after the 10/1/2021 regulatory import.              History of Present Illness:   Cande Shields presents for followup vasculitis. Last seen 2-.     Background: " Granulomatosis with polyangitis dx at age 10, with PR3 positivitiy, initially treated with Methotrexate, Steroids, Rituximab infusions Q6-8 months until about 2012, since then had been in remission.    On December 26 2020, she was admitted to [facility] for for myalgias, arthralgias, pedal edema and a petechial non blanching palpable purpura rash involving her thighs, legs, elbows, chin and neck (vasculitis). She had proteinuria on UA, lung nodule on CXR, elevated inflammatory markers.  Although COVID-19 infection was also present, a flare of granulomatous polyangiitis was diagnosed.  The patient was started on Solu-Medrol 1 mg/kg/day.  She was also begun on induction protocol with rituximab 375 mg/m  IV 4 doses, given on a weekly schedule.  She improved, and was discharged on December 30, 2020 on high-dose prednisone. Last seen in 2-2021, when she was improving after hospitalization.    Interval history February 9, 2023    Since last visit in 20221:  Patient saw Dr. Chang in nephrology in April 2021 in follow-up of [place]-3+ ANCA vasculitis.  Creatinine was noted to be improving and patient was feeling well several weeks after a single course of intravenous cyclophosphamide.  Plan was to continue prednisone through the month of April, and then start tapering to off.  Plan was to consider maintenance Rituxan in June or July 2021. She was lost to Renal and Rheumatology followup.    She was next admitted between 12/20-12/21 of 2022. She came in with positive home COVID test and  petechial rashes that were similar to GPA previous flare and some phlegm with blood tinge her PR3 was elevated at 27, her Cr was 2.05 but after IV fluid and 1 dose of methlyprednisolone 125 mg Cr down to 1.48 the next day. She received tapering dose of methylprednisone IV and oral, as well as avacopan. She received Rituximab 375 mg/m2 1st dose on 1/10/23 and 2nd dose on 1/17/23, 1-.    Patient saw Dr. Dozier in nephrology on  January 19, 2023.  Impression was of ANCA vasculitis flare, partially completed induction treatment with rituximab, and concerning decrease in GFR compared to 1 month earlier.  Nevertheless, GPA flare was considered to be under improving control.  A plan for tapering Medrol to 8 mg/day as of February 8 was given, and a plan for a total of 4 weekly doses of rituximab was discussed.    Today, patient reports that she is 4 weeks pregnant, testing positive just 4 days ago. She will meet with OB/Gyn tomorrow.  She feels well overall; no joint pain, skin rash, shortness of breath, fever. She quit smoking 2 weeks ago; she still has a cough (dry, chronic). She has not had recurrence of blood-tinged phlegm. No nosebleeds or facial pain/sinus drainage.    She has continued avacopan, but stopped on learning that she is pregnant, per vasculitis service. She is taking prenatal vitamins.  prednisone 12 mg daily has continued. She missed a 4th rituxan dose scheduled for 2-3-2023.  She still has low back pain, every day. Pain is constant, but modest.  She works full time at a day care.    No recurrence of COVID symptoms that she had in .     Interval history 02-:    She is feeling well. Good energy level  Doing cardio, situps, jumping jacks at home.  She notes R foot pain for the past several weeks; made worse with high impact weight bearing. Pain is in the foot arch and in the pad.    After the last visit in January, creatinine returned unexpectedly elevated at 2.  Dr. Chang and the renal team recommended immediate hospitalization for possible ANCA vasculitis flare unresponsive to rituximab and prednisone.  Patient was hospitalized for steroid pulse and received Cytoxan 500 mg/m  on January 31.  She tolerated medications well and was discharged in early February.    Patient was seen in the emergency room on February 21, 2021 for right foot pain.  Impression was of probable plantar fasciitis; symptomatic Rx was  "recommended.  Pregnancy test was negative.  Left flank pain was judged mild; urinalysis showed mild hematuria, and patient was discharged with plans to monitor.    She tapered prednisone from 50 to 40 5 days ago. She will taper by 10 mg each week per Renal.      Interval history 01-:    She had more bumps on her feet,a nd her joints started to ache when she went down to 40 mg prednisone after a few days.    She notes tremors and shakinesss in her hands, and her legs are swollen on prednisone.    Skin on her legs has cleared up. \"bumps\" on her elbows persist, no larger.    She is breathless with minimal exertion, like climbing stairs. She notes hot flashes, she sometiems has sweats as well, sudden onset after being cold.    She continues to cough; there is frequently flecks of blood, usually dark.    Her nose is dry despite use of a humidifier. She also has had a bloody nose daily for the last 10 days.            Review of Systems:       Constitutional: negative  Skin: negative  Eyes: negative  Ears/Nose/Throat: negative  Respiratory: No shortness of breath, dyspnea on exertion, cough, or hemoptysis  Cardiovascular: negative  Gastrointestinal: negative  Genitourinary: negative  Musculoskeletal: negative  Neurologic: negative  Psychiatric: negative  Hematologic/Lymphatic/Immunologic: negative  Endocrine: negative          Past Medical History:     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     Multiple subsegmental pulmonary emboli without acute cor pulmonale (H) 03/2021     PFO (patent foramen ovale)      Preeclampsia, third trimester      Spontaneous pregnancy loss 2020    31 weeks     Stage 3b chronic kidney disease (H)      Wegener's disease, pulmonary 01/01/2010    renal biopsy     Past Surgical History:   Procedure Laterality Date     ENT SURGERY  " 01/01/2000    cyst     EXCISE GANGLION WRIST  01/01/2009     STERNOTOMY  02/18/2023    right atrial thrombectomy     THROMBECTOMY ATRIUM Right 02/18/2023     THROMBECTOMY PERCUTANEOUS N/A 2/18/2023    Procedure: Emergency Sternotomy, Right Atrial Thrombectomy, Central Cannulation, Bilateral Femoral Cannulation, Closure of PFO ; Transesophageal Echocardiogram performed by anesthesia staff;  Surgeon: Adelfo Elmore MD;  Location: [facility] OR     THROMBECTOMY PERCUTANEOUS N/A 2/18/2023    Procedure: Angiovac Mediated for Right Atrial Clot; Intracatheter Thrombectomy via bilateral percutaneous femoral venous access with 26 FR Right Femoral vein and 17 FR Left Femoral Vein cannulas. Transesophageal echchocardiogram performed by anesthesia staff;  Surgeon: Po Monterroso MD;  Location: [facility] OR     # ANCA vasculitis:  GPA flare, treated with 1 mg/kg/day prednisone, and 4 doses of rituximab 375 mg/m  completed in late January 2021. Followup 3-day pulse methylprednisolone and Cytoxan 500 mg/m2 were given on 1- in response to rising creatinine and active urinary sediment. Creatinine stable at ~ 2 as of February 23, 2021.         Social History:     Social History     Occupational History     Not on file   Tobacco Use     Smoking status: Former     Packs/day: 0.25     Types: Cigarettes     Smokeless tobacco: Former     Tobacco comments:     Vaping stopped 2/26   Vaping Use     Vaping status: Some Days     Substances: Nicotine, Flavoring     Devices: Disposable   Substance and Sexual Activity     Alcohol use: Not Currently     Drug use: No     Sexual activity: Yes     Partners: Male     Birth control/protection: None            Family History:     Family History   Problem Relation Age of Onset     Thyroid Disease Mother      Cancer Maternal Grandfather      Asthma No family hx of      Diabetes No family hx of             Allergies:     Allergies   Allergen Reactions     Penicillins Rash     Unknown, but  think rash.  Tolerated cephalexin (12/27/20), cefpodoxime (12/27/20), Ceftriaxone (Feb 2023), Zosyn (Feb 2023)            Medications:     Current Outpatient Medications   Medication Sig Dispense Refill     acetaminophen (TYLENOL) 325 MG tablet Take 2 tablets (650 mg) by mouth every 4 hours as needed for other (For optimal non-opioid multimodal pain management to improve pain control.)       aspirin (ASA) 81 MG chewable tablet Take 1 tablet (81 mg) by mouth daily 30 tablet 4     enoxaparin ANTICOAGULANT (LOVENOX) 80 MG/0.8ML syringe Inject 0.8 mLs (80 mg) Subcutaneous every 12 hours for 90 days 48 mL 2     famotidine (PEPCID) 20 MG tablet Take 1 tablet (20 mg) by mouth 2 times daily 60 tablet 2     metoprolol tartrate (LOPRESSOR) 25 MG tablet Take 0.5 tablets (12.5 mg) by mouth 2 times daily 60 tablet 2     pyridOXINE (VITAMIN B6) 25 MG tablet Take 1 tablet (25 mg) by mouth every 8 hours as needed (nausea) 30 tablet 4     albuterol (PROAIR HFA/PROVENTIL HFA/VENTOLIN HFA) 108 (90 Base) MCG/ACT inhaler Inhale 2 puffs into the lungs every 6 hours as needed for shortness of breath or cough (Patient not taking: Reported on 4/13/2023) 18 g 1     doxylamine (UNISOM) 25 MG TABS tablet Take 0.5 tablets (12.5 mg) by mouth 2 times daily as needed for other (nausea) (Patient not taking: Reported on 4/13/2023) 30 tablet 1            Physical Exam:   Blood pressure (!) 75/51, pulse 80, weight 99.6 kg (219 lb 9.6 oz), last menstrual period 01/10/2023, SpO2 98 %, not currently breastfeeding.  Wt Readings from Last 4 Encounters:   04/13/23 99.6 kg (219 lb 9.6 oz)   04/04/23 99.8 kg (220 lb 1.6 oz)   03/16/23 100.3 kg (221 lb 3.2 oz)   03/15/23 100.4 kg (221 lb 6.4 oz)     Repeat BP 94/65 r arm    Constitutional: well-developed, appearing stated age; cooperative  Eyes: nl EOM, PERRLA, vision, conjunctiva, sclera  ENT: nl external ears, nose, hearing, lips, teeth, gums, throat  No mucous membrane lesions, normal saliva pool  Neck: no  mass or thyroid enlargement  Resp: lungs clear to auscultation  CV: RRR, no murmurs, rubs or gallops, no edema  MS: The TMJ, neck, shoulder, elbow, wrist, MCP/PIP/DIP, spine, hip, knee were examined and found normal. No active synovitis or altered joint anatomy. Full joint ROM. Normal  strength. No dactylitis,  tenosynovitis, enthesopathy.  Skin: Hematoma over abdominal skin.  Neuro: nl cranial nerves  Psych: nl judgement, orientation, memory, affect.         Data:     No results found for any visits on 04/13/23.      Latest Ref Rng & Units 3/13/2023     9:53 AM 3/15/2023    10:52 AM 4/4/2023     3:29 PM   RHEUM RESULTS   Albumin 3.5 - 5.2 g/dL 3.6    3.8     ALT 10 - 35 U/L 14    35     AST 10 - 35 U/L 8    18     Creatinine 0.51 - 0.95 mg/dL 1.28    1.33     GFR Estimate >60 mL/min/1.73m2 60    57     Hematocrit 35.0 - 47.0 % 27.0    28.5     Hemoglobin 11.7 - 15.7 g/dL 8.1   8.0   8.4     WBC 4.0 - 11.0 10e3/uL 8.7    9.8     RBC Count 3.80 - 5.20 10e6/uL 3.17    3.52     RDW 10.0 - 15.0 % 16.4    14.9     MCHC 31.5 - 36.5 g/dL 30.0    29.5     MCV 78 - 100 fL 85    81     Platelet Count 150 - 450 10e3/uL 509    468          ,  ,  ,  ,  ,  ,   CESARIO interpretation   Date Value Ref Range Status   12/28/2020 Negative NEG^Negative Final     Comment:                                        Reference range:  <1:40  NEGATIVE  1:40 - 1:80  BORDERLINE POSITIVE  >1:80 POSITIVE       ,  ,  ,  ,  ,  ,  ,  ,  ,   Hep B Surface Agn   Date Value Ref Range Status   12/28/2020 Nonreactive NR^Nonreactive Final   ,  ,  ,  ,  ,  ,  ,  ,  ,  ,  ,   Neutrophil Cytoplasmic IgG Antibody   Date Value Ref Range Status   02/22/2011 1:160  Final     Comment:     Reference range: <1:20  (Note)  Cytoplasmic ANCA (c-ANCA) staining pattern observed. No  perinuclear ANCA (p-ANCA) pattern seen. Presence of p-ANCA  ruled out on formalin-fixed neutrophils.  TEST INFORMATION: Anti-Neutrophil Cyto Ab, IgG    Neutrophil Cytoplasmic Antibodies  (C-ANCA = granular  cytoplasmic staining, P-ANCA = perinuclear staining) are  found in the serum of over 90 percent of patients with  certain necrotizing systemic vasculitides, and usually in  less than 5 percent of patients with collagen vascular  disease or arthritis.  Performed by [facility],  [address] [place] [address] [phone]  [email or web address], Leonie Damon MD, Lab. Director                                                                           ,   Proteinase 3 Antibody IgG   Date Value Ref Range Status   12/28/2020 >8.0 (H) 0.0 - 0.9 AI Final     Comment:     Positive  Antibody index (AI) values reflect qualitative changes in antibody   concentration that cannot be directly associated with clinical condition or   disease state.       ,   Myeloperoxidase Antibody IgG   Date Value Ref Range Status   12/28/2020 <0.2 0.0 - 0.9 AI Final     Comment:     Negative  Antibody index (AI) values reflect qualitative changes in antibody   concentration that cannot be directly associated with clinical condition or   disease state.       ,   Neutrophil Cytoplasmic Antibody   Date Value Ref Range Status   12/28/2020 1:80 (A) <1:10 [titer] Final   ,   Neutrophil Cytoplasmic Antibody Pattern   Date Value Ref Range Status   12/28/2020   Final    Cytoplasmic ANCA (c-ANCA) staining pattern observed and confirmed on formalin-fixed   neutrophils.       ,   Serine Protease 3   Date Value Ref Range Status   02/22/2011 1684 (H)  Final     Comment:     Reference range: 0 to 19  Unit: AU/mL  (Note)  REFERENCE INTERVAL: Serine Protease 3, IgG     19 AU/mL or Less ........ Negative   20-25 AU/mL ............. Equivocal   26 AU/mL or Greater ..... Positive    Approximately 90% of patients with a P-ANCA pattern by IFA  have antibodies specific for MPO.    Approximately 85% of patients with a C-ANCA pattern by IFA  have antibodies specific for PR3.  Performed by [facility],  [address] [place] [place] [address]  [phone]  [email or web address], Leonie Damon MD, Lab. Director                                                                                                                                                                                                                                    ,   Myeloperox Antibodyys   Date Value Ref Range Status   02/22/2011 0  Final     Comment:     Reference range: 0 to 19  Unit: AU/mL  (Note)  REFERENCE INTERVAL: Myeloperoxidase Abs, IgG     19 AU/mL or Less ......... Negative   20-25 AU/mL .............. Equivocal   26 AU/mL or Greater ...... Positive    Approximately 90% of patients with a P-ANCA pattern by IFA  have antibodies specific for MPO.    Approximately 85% of patients with a C-ANCA pattern by IFA  have antibodies specific for PR3.                                                                                                        ,  ,  ,  ,  ,  ,  ,  ,  ,  ,  ,  ,

## 2023-04-13 ENCOUNTER — OFFICE VISIT (OUTPATIENT)
Dept: RHEUMATOLOGY | Facility: CLINIC | Age: 23
End: 2023-04-13
Payer: COMMERCIAL

## 2023-04-13 VITALS
DIASTOLIC BLOOD PRESSURE: 51 MMHG | WEIGHT: 219.6 LBS | OXYGEN SATURATION: 98 % | HEART RATE: 80 BPM | SYSTOLIC BLOOD PRESSURE: 75 MMHG | BODY MASS INDEX: 35.46 KG/M2

## 2023-04-13 DIAGNOSIS — O21.9 NAUSEA/VOMITING IN PREGNANCY: ICD-10-CM

## 2023-04-13 DIAGNOSIS — Z87.74 S/P PATENT FORAMEN OVALE CLOSURE: ICD-10-CM

## 2023-04-13 DIAGNOSIS — K21.9 GASTROESOPHAGEAL REFLUX DISEASE WITHOUT ESOPHAGITIS: ICD-10-CM

## 2023-04-13 PROCEDURE — 99214 OFFICE O/P EST MOD 30 MIN: CPT | Performed by: INTERNAL MEDICINE

## 2023-04-13 RX ORDER — PYRIDOXINE HCL (VITAMIN B6) 25 MG
25 TABLET ORAL EVERY 8 HOURS PRN
Qty: 30 TABLET | Refills: 4 | Status: ON HOLD | OUTPATIENT
Start: 2023-04-13 | End: 2023-09-03

## 2023-04-13 RX ORDER — FAMOTIDINE 20 MG/1
20 TABLET, FILM COATED ORAL 2 TIMES DAILY
Qty: 60 TABLET | Refills: 2 | Status: SHIPPED | OUTPATIENT
Start: 2023-04-13 | End: 2023-07-24

## 2023-04-13 NOTE — PATIENT INSTRUCTIONS
Diagnosis:  1.  ANCA associated vasculitis, improved after treatment with rituximab, prednisone, and avacopan from December 2022 to early February. Now doing well off all prednisone with stable kidney filtering function as of April 4, 2023. I recommend checking urinalysis and urine protein, and monitoring for recurrence of symptoms (bloody sputum production, fever, skin rash, rising blood pressure).  2.  Pregnancy, 12 weeks gestation: I agree with current multivitamin, anti-coagulation, and blood pressure control medications.  3. Blood clot in the lungs, February 2023; now on anticoagulation.  4. Post-surgical chest pain    Recommendation:  1.  Continue Vit B6, lopressor, enoxaparin; agree with planned iron infusions  2. Follow up with Nephrology, Maternal fetal medicine, Hematology (Dr. Dye)

## 2023-04-16 ENCOUNTER — HEALTH MAINTENANCE LETTER (OUTPATIENT)
Age: 23
End: 2023-04-16

## 2023-04-17 ENCOUNTER — INFUSION THERAPY VISIT (OUTPATIENT)
Dept: INFUSION THERAPY | Facility: CLINIC | Age: 23
End: 2023-04-17
Payer: COMMERCIAL

## 2023-04-17 ENCOUNTER — MYC MEDICAL ADVICE (OUTPATIENT)
Dept: INTERNAL MEDICINE | Facility: CLINIC | Age: 23
End: 2023-04-17

## 2023-04-17 VITALS
HEART RATE: 94 BPM | OXYGEN SATURATION: 98 % | WEIGHT: 223 LBS | DIASTOLIC BLOOD PRESSURE: 67 MMHG | RESPIRATION RATE: 18 BRPM | BODY MASS INDEX: 36.01 KG/M2 | SYSTOLIC BLOOD PRESSURE: 98 MMHG | TEMPERATURE: 98.2 F

## 2023-04-17 DIAGNOSIS — N18.30 STAGE 3 CHRONIC KIDNEY DISEASE, UNSPECIFIED WHETHER STAGE 3A OR 3B CKD (H): ICD-10-CM

## 2023-04-17 DIAGNOSIS — I77.82 ANCA-ASSOCIATED VASCULITIS (H): Primary | ICD-10-CM

## 2023-04-17 PROCEDURE — 99207 PR NO CHARGE LOS: CPT

## 2023-04-17 PROCEDURE — 96366 THER/PROPH/DIAG IV INF ADDON: CPT | Performed by: NURSE PRACTITIONER

## 2023-04-17 PROCEDURE — 96365 THER/PROPH/DIAG IV INF INIT: CPT | Performed by: NURSE PRACTITIONER

## 2023-04-17 RX ORDER — ALBUTEROL SULFATE 90 UG/1
1-2 AEROSOL, METERED RESPIRATORY (INHALATION)
Status: CANCELLED
Start: 2023-04-19

## 2023-04-17 RX ORDER — EPINEPHRINE 1 MG/ML
0.3 INJECTION, SOLUTION INTRAMUSCULAR; SUBCUTANEOUS EVERY 5 MIN PRN
Status: CANCELLED | OUTPATIENT
Start: 2023-04-19

## 2023-04-17 RX ORDER — HEPARIN SODIUM (PORCINE) LOCK FLUSH IV SOLN 100 UNIT/ML 100 UNIT/ML
5 SOLUTION INTRAVENOUS
Status: CANCELLED | OUTPATIENT
Start: 2023-04-19

## 2023-04-17 RX ORDER — DIPHENHYDRAMINE HYDROCHLORIDE 50 MG/ML
50 INJECTION INTRAMUSCULAR; INTRAVENOUS
Status: CANCELLED
Start: 2023-04-19

## 2023-04-17 RX ORDER — MEPERIDINE HYDROCHLORIDE 25 MG/ML
25 INJECTION INTRAMUSCULAR; INTRAVENOUS; SUBCUTANEOUS EVERY 30 MIN PRN
Status: CANCELLED | OUTPATIENT
Start: 2023-04-19

## 2023-04-17 RX ORDER — ALBUTEROL SULFATE 0.83 MG/ML
2.5 SOLUTION RESPIRATORY (INHALATION)
Status: CANCELLED | OUTPATIENT
Start: 2023-04-19

## 2023-04-17 RX ORDER — METHYLPREDNISOLONE SODIUM SUCCINATE 125 MG/2ML
125 INJECTION, POWDER, LYOPHILIZED, FOR SOLUTION INTRAMUSCULAR; INTRAVENOUS
Status: CANCELLED
Start: 2023-04-19

## 2023-04-17 RX ORDER — HEPARIN SODIUM,PORCINE 10 UNIT/ML
5 VIAL (ML) INTRAVENOUS
Status: CANCELLED | OUTPATIENT
Start: 2023-04-19

## 2023-04-17 RX ADMIN — Medication 250 ML: at 09:09

## 2023-04-17 NOTE — PROGRESS NOTES
Infusion Nursing Note:  Cande Shields presents today for Venofer 1/3.    Patient seen by provider today: No   present during visit today: Not Applicable.    Note: Patient oriented to [facility]. Print off of Venofer gone over and given to patient. Advised patient to call if she has any change in status and she verbalized understanding.       Intravenous Access:  Peripheral IV placed.    Treatment Conditions:  Not Applicable.      Post Infusion Assessment:  Patient tolerated infusion without incident.  Site patent and intact, free from redness, edema or discomfort.  No evidence of extravasations.  Access discontinued per protocol.       Discharge Plan:   [facility]  Patient will return 4/21 for next appointment.   Patient discharged in stable condition accompanied by: self.  Departure Mode: Ambulatory.      Johanna Griffin RN

## 2023-04-17 NOTE — TELEPHONE ENCOUNTER
Provider request visit to discuss.      Saturnino Lora CMA ([facility]) at 4:10 PM on 4/18/2023

## 2023-04-18 ENCOUNTER — TELEPHONE (OUTPATIENT)
Dept: MATERNAL FETAL MEDICINE | Facility: CLINIC | Age: 23
End: 2023-04-18
Payer: COMMERCIAL

## 2023-04-18 DIAGNOSIS — Z36.9 ANTENATAL SCREENING ENCOUNTER: Primary | ICD-10-CM

## 2023-04-18 NOTE — TELEPHONE ENCOUNTER
April 18, 2023    Cande returned my call and asked if she could have repeat blood draw at [place]. I confirmed that unfortunately I am only able to arrange blood draw for NIPT at our outpatient hospital labs. She expressed understanding and will plan to come to [facility] outpatient lab for redraw, likely tomorrow. Discussed aiming for blood draw as far away from her last Lovenox dose as possible (so ~11-11:30 given that she takes Lovenox at noon and midnight). At Cande's request, will email a map and directions to her at [email or web address].     All questions were answered and Cande was encouraged to stay in touch if she has any additional questions.     Tereza Goldberg, [facility], [facility]  Licensed Genetic Counselor  [facility]  Maternal Fetal Medicine  Phone: [phone]  [email or web address]

## 2023-04-18 NOTE — CONFIDENTIAL NOTE
I have not discussed this with patient previously and dont have enough information to do this.  I am not aware why she needs an WALLACE.  Would advise e-visit or virtual visit to discuss.  Cira Amanda MD  Internal Medicine

## 2023-04-18 NOTE — TELEPHONE ENCOUNTER
"April 18, 2023    Left a voicemail for Cande to discuss her NIPT testing.  Unfortunately, her test result came back as no results, SAMPLE FAILURE, due to \"specimen does not meet quality metrics\".    Per [facility], failed analysis due to \"specimen does not meet quality metrics\" can be due to a wide range of factors which may include poor specimen quality due to improper blood draw technique or handling, insufficient total cell free DNA, or underlying biological reasons (such as maternal autoimmune conditions or medications).     Of note, Cande is on Lovenox, which is associated with an increased rate of \"no call\" NIPT results due to low fetal fraction. However, no call results have also been associated with an increased risk of aneuploidy.      [facility] will accept another sample to attempt testing again. This could be drawn at any of our hospital outpatient labs or [facility] can arrange a mobile blood draw. Encouraged Cande to call me back to discuss further.     Tereza Goldberg, [facility], [facility]  Licensed Genetic Counselor  [facility]  Maternal Fetal Medicine  Phone: [phone]  [email or web address]   "

## 2023-04-19 ENCOUNTER — LAB (OUTPATIENT)
Dept: LAB | Facility: CLINIC | Age: 23
End: 2023-04-19
Payer: COMMERCIAL

## 2023-04-19 DIAGNOSIS — Z36.9 ANTENATAL SCREENING ENCOUNTER: ICD-10-CM

## 2023-04-19 PROCEDURE — 36415 COLL VENOUS BLD VENIPUNCTURE: CPT

## 2023-04-20 ENCOUNTER — PRE VISIT (OUTPATIENT)
Dept: CARDIOLOGY | Facility: CLINIC | Age: 23
End: 2023-04-20
Payer: COMMERCIAL

## 2023-04-20 DIAGNOSIS — R07.9 CHEST PAIN, UNSPECIFIED TYPE: ICD-10-CM

## 2023-04-20 DIAGNOSIS — M31.31 GRANULOMATOSIS WITH POLYANGIITIS WITH RENAL INVOLVEMENT (H): Primary | ICD-10-CM

## 2023-04-20 DIAGNOSIS — I77.82 ANCA-ASSOCIATED VASCULITIS (H): ICD-10-CM

## 2023-04-26 ENCOUNTER — ANCILLARY ORDERS (OUTPATIENT)
Dept: CARDIOLOGY | Facility: CLINIC | Age: 23
End: 2023-04-26

## 2023-04-26 ENCOUNTER — LAB (OUTPATIENT)
Dept: LAB | Facility: CLINIC | Age: 23
End: 2023-04-26
Payer: COMMERCIAL

## 2023-04-26 ENCOUNTER — OFFICE VISIT (OUTPATIENT)
Dept: CARDIOLOGY | Facility: CLINIC | Age: 23
End: 2023-04-26
Attending: STUDENT IN AN ORGANIZED HEALTH CARE EDUCATION/TRAINING PROGRAM
Payer: COMMERCIAL

## 2023-04-26 VITALS
SYSTOLIC BLOOD PRESSURE: 97 MMHG | OXYGEN SATURATION: 94 % | HEART RATE: 84 BPM | DIASTOLIC BLOOD PRESSURE: 60 MMHG | BODY MASS INDEX: 36.32 KG/M2 | WEIGHT: 218 LBS | HEIGHT: 65 IN

## 2023-04-26 DIAGNOSIS — I77.82 ANCA-ASSOCIATED VASCULITIS (H): ICD-10-CM

## 2023-04-26 DIAGNOSIS — M31.31 GRANULOMATOSIS WITH POLYANGIITIS WITH RENAL INVOLVEMENT (H): ICD-10-CM

## 2023-04-26 DIAGNOSIS — Z87.59 HISTORY OF SEVERE PRE-ECLAMPSIA: ICD-10-CM

## 2023-04-26 DIAGNOSIS — I51.3 MURAL THROMBUS OF ATRIUM: ICD-10-CM

## 2023-04-26 DIAGNOSIS — N18.30 STAGE 3 CHRONIC KIDNEY DISEASE, UNSPECIFIED WHETHER STAGE 3A OR 3B CKD (H): ICD-10-CM

## 2023-04-26 DIAGNOSIS — R07.9 CHEST PAIN, UNSPECIFIED TYPE: ICD-10-CM

## 2023-04-26 DIAGNOSIS — I26.92 SADDLE EMBOLUS OF PULMONARY ARTERY, UNSPECIFIED CHRONICITY, UNSPECIFIED WHETHER ACUTE COR PULMONALE PRESENT (H): ICD-10-CM

## 2023-04-26 DIAGNOSIS — I51.9 HEART DISEASE DURING PREGNANCY, ANTEPARTUM: Primary | ICD-10-CM

## 2023-04-26 DIAGNOSIS — Z87.74 S/P PATENT FORAMEN OVALE CLOSURE: ICD-10-CM

## 2023-04-26 DIAGNOSIS — O99.419 HEART DISEASE DURING PREGNANCY, ANTEPARTUM: Primary | ICD-10-CM

## 2023-04-26 LAB
ALBUMIN SERPL BCG-MCNC: 3.8 G/DL (ref 3.5–5.2)
ALP SERPL-CCNC: 80 U/L (ref 35–104)
ALT SERPL W P-5'-P-CCNC: 15 U/L (ref 10–35)
ANION GAP SERPL CALCULATED.3IONS-SCNC: 10 MMOL/L (ref 7–15)
AST SERPL W P-5'-P-CCNC: 13 U/L (ref 10–35)
BILIRUB SERPL-MCNC: <0.2 MG/DL
BUN SERPL-MCNC: 22.2 MG/DL (ref 6–20)
CALCIUM SERPL-MCNC: 9.8 MG/DL (ref 8.6–10)
CHLORIDE SERPL-SCNC: 108 MMOL/L (ref 98–107)
CREAT SERPL-MCNC: 1.46 MG/DL (ref 0.51–0.95)
DEPRECATED HCO3 PLAS-SCNC: 20 MMOL/L (ref 22–29)
ERYTHROCYTE [DISTWIDTH] IN BLOOD BY AUTOMATED COUNT: 18.8 % (ref 10–15)
GFR SERPL CREATININE-BSD FRML MDRD: 51 ML/MIN/1.73M2
GLUCOSE SERPL-MCNC: 92 MG/DL (ref 70–99)
HCT VFR BLD AUTO: 30.3 % (ref 35–47)
HGB BLD-MCNC: 9 G/DL (ref 11.7–15.7)
LVEF ECHO: NORMAL
MCH RBC QN AUTO: 24.1 PG (ref 26.5–33)
MCHC RBC AUTO-ENTMCNC: 29.7 G/DL (ref 31.5–36.5)
MCV RBC AUTO: 81 FL (ref 78–100)
PLATELET # BLD AUTO: 391 10E3/UL (ref 150–450)
POTASSIUM SERPL-SCNC: 4.7 MMOL/L (ref 3.4–5.3)
PROT SERPL-MCNC: 6.8 G/DL (ref 6.4–8.3)
RBC # BLD AUTO: 3.73 10E6/UL (ref 3.8–5.2)
SODIUM SERPL-SCNC: 138 MMOL/L (ref 136–145)
WBC # BLD AUTO: 8.1 10E3/UL (ref 4–11)

## 2023-04-26 PROCEDURE — G0463 HOSPITAL OUTPT CLINIC VISIT: HCPCS | Performed by: STUDENT IN AN ORGANIZED HEALTH CARE EDUCATION/TRAINING PROGRAM

## 2023-04-26 PROCEDURE — 99215 OFFICE O/P EST HI 40 MIN: CPT | Mod: 25 | Performed by: STUDENT IN AN ORGANIZED HEALTH CARE EDUCATION/TRAINING PROGRAM

## 2023-04-26 PROCEDURE — 93325 DOPPLER ECHO COLOR FLOW MAPG: CPT | Performed by: INTERNAL MEDICINE

## 2023-04-26 PROCEDURE — 93308 TTE F-UP OR LMTD: CPT | Performed by: INTERNAL MEDICINE

## 2023-04-26 PROCEDURE — 85027 COMPLETE CBC AUTOMATED: CPT | Performed by: PATHOLOGY

## 2023-04-26 PROCEDURE — 80053 COMPREHEN METABOLIC PANEL: CPT | Performed by: PATHOLOGY

## 2023-04-26 PROCEDURE — 93321 DOPPLER ECHO F-UP/LMTD STD: CPT | Performed by: INTERNAL MEDICINE

## 2023-04-26 PROCEDURE — 36415 COLL VENOUS BLD VENIPUNCTURE: CPT | Performed by: PATHOLOGY

## 2023-04-26 RX ORDER — METOPROLOL TARTRATE 25 MG/1
12.5 TABLET, FILM COATED ORAL 2 TIMES DAILY
Qty: 60 TABLET | Refills: 2 | Status: SHIPPED | OUTPATIENT
Start: 2023-04-26 | End: 2024-10-03

## 2023-04-26 RX ORDER — ENOXAPARIN SODIUM 100 MG/ML
80 INJECTION SUBCUTANEOUS EVERY 12 HOURS
Qty: 48 ML | Refills: 2 | Status: SHIPPED | OUTPATIENT
Start: 2023-04-26 | End: 2023-08-15

## 2023-04-26 ASSESSMENT — PAIN SCALES - GENERAL: PAINLEVEL: NO PAIN (0)

## 2023-04-26 NOTE — LETTER
4/26/2023      RE: Cande Shields  [address]  [place] [address]       Dear Colleague,    Thank you for the opportunity to participate in the care of your patient, Cande Shields, at the [facility] [place] [facility]. Please see a copy of my visit note below.    Cardiology Clinic Note    HPI  Dear colleagues,     I had the pleasure of seeing Ms. Cande Shields in the Cardiology clinic.  As you know, Ms. 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 She underwent emergent sternotomy with right atrial thrombectomy and PFO closure. She presents for follow up.    Very complex history, recent vasculitis flareup in 12/22 which was treated with 3 doses of rituximab and she was started on avacopan with rapid steroid taper.  At that time she presented with new petechiae, KATERINA and hemoptysis. However, in the interim, she tested positive for pregnancy which led to discontinuation of avacopan and holding off on the fourth dose of rituximab. Her last dose of Rituximab was on 1/17/23. Had a significant ED visit on 2/12/23 where she was diagnosed with saddle pulmonary embolus with mild right heart strain. She was discharged on therapeutic enoxaparin. Presented to the ED again on 02/17/23 with complaints of worsening chest pain, cough and new onset hemoptysis for one day.  Work-up in the ED showed significantly elevated CRP at 322 which improved to  242. Otherwise normal creatinine. Elevated WBC and procal. Planned angiovac via catheterization on 2/18/23 with surgical back-up. She required emergency sternotomy for R atrial thrombectomy and PFO closure. Overall, she tolerated the operation well and postoperatively was admitted to the CVICU.  She was extubated on POD # 0 to 4 lpm via NC with neuro status intact.  Her ICU stay was complicated by PMHx and Pregnancy. Hematology, Rheumatology, and OB-GYN were asked to follow during her recovery. She was started on a heparin gtt and transitioned to therapeutic Lovenox dosing. She was started on Remdesevir for COVID status and Ceftriaxone for strept throat. She was transferred to the post-surgical telemetry unit on POD # 2. Due to early pregnancy status, maternal fetal medicine followed along and will schedule follow up at the time of discharge. Additionally, rheumatology peripherally followed due to history of granulomatous polyangiitis and PR3 positive ANCA vasculitis. No indication for steroids during hospital course and she should follow with rheumatology as prior. Due to leukocytosis and strep throat, she was transitioned from ceftriaxone to zosyn for broad coverage with resolution of elevated WBC.  Her CRP trended down and she remained afebrile. She did have early ileus that resolved with conservative measures and is having regular bowel movements at the time of discharge.    Since our last visit, she has established care with [facility] and Rheumatology. She has also seen Hematology. She reports overall feeling very well in the last few weeks with improved energy. She denies any URIAS, orthopnea, PND, presyncope, syncope, abdominal pain, nausea, emesis, LE edema, or weakness. She continues to have some bleeding and bruising from her Lovenox injections. She overall reports minimal appetite, but forces herself to eat regularly throughout the day. She reports good BP control recently. Patient reports compliance with her  medications.    PAST MEDICAL HISTORY:  Patient Active Problem List   Diagnosis    Wegener's granulomatosis    Myalgia    Granulomatosis with polyangiitis, unspecified whether renal involvement (H)    ANCA-associated vasculitis (H)    Pulmonary infiltrates    Acute kidney injury (H)    Need for pneumocystis prophylaxis    Acute deep vein thrombosis (DVT) of proximal vein of both lower extremities (H)    ADHD (attention deficit hyperactivity disorder), inattentive type    Alcohol use disorder, mild, in sustained remission    Bicornuate uterus    Binge-eating disorder, moderate    BMI 40.0-44.9, adult (H)    Cannabis use disorder, mild, in early remission    Femoral artery thrombosis, left (H)    Fetal demise, greater than 22 weeks, antepartum    Generalized anxiety disorder    History of granulomatosis with polyangiitis    Depression    Occlusion of common femoral artery (H)    Performance anxiety    Personal history of COVID-19    Pyoderma gangrenosum    Self-injurious behavior    Stage 3 chronic kidney disease, unspecified whether stage 3a or 3b CKD (H)    Acute saddle pulmonary embolism with acute cor pulmonale (H)    Thrombosis    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)    Infection due to 2019 novel coronavirus    ATN (acute tubular necrosis) (H)    Other pulmonary embolism without acute cor pulmonale, unspecified chronicity (H)    Chest pain, unspecified type    Pneumonia due to infectious organism, unspecified laterality, unspecified part of lung    Cardiac disease during pregnancy        FAMILY HISTORY:  Family History   Problem Relation Age of Onset    Thyroid Disease Mother     Cancer Maternal Grandfather     Asthma No family hx of     Diabetes No family hx of    No known family history of heart disease    SOCIAL HISTORY:  Social History     Tobacco Use    Smoking status: Former     Packs/day: 0.25     Types: Cigarettes    Smokeless tobacco: Former    Tobacco comments:     Vaping stopped  "2/26   Vaping Use    Vaping status: Some Days     Substances: Nicotine, Flavoring     Devices: Disposable   Substance Use Topics    Alcohol use: Not Currently    Drug use: No        ALLERGIES:  Allergies   Allergen Reactions    Penicillins Rash     Unknown, but think rash.  Tolerated cephalexin (12/27/20), cefpodoxime (12/27/20), Ceftriaxone (Feb 2023), Zosyn (Feb 2023)       CURRENT MEDICATIONS:  Current Outpatient Medications   Medication Sig Dispense Refill    acetaminophen (TYLENOL) 325 MG tablet Take 2 tablets (650 mg) by mouth every 4 hours as needed for other (For optimal non-opioid multimodal pain management to improve pain control.)      aspirin (ASA) 81 MG chewable tablet Take 1 tablet (81 mg) by mouth daily 30 tablet 4    enoxaparin ANTICOAGULANT (LOVENOX) 80 MG/0.8ML syringe Inject 0.8 mLs (80 mg) Subcutaneous every 12 hours 48 mL 2    famotidine (PEPCID) 20 MG tablet Take 1 tablet (20 mg) by mouth 2 times daily 60 tablet 2    metoprolol tartrate (LOPRESSOR) 25 MG tablet Take 0.5 tablets (12.5 mg) by mouth 2 times daily 60 tablet 2    pyridOXINE (VITAMIN B6) 25 MG tablet Take 1 tablet (25 mg) by mouth every 8 hours as needed (nausea) 30 tablet 4    albuterol (PROAIR HFA/PROVENTIL HFA/VENTOLIN HFA) 108 (90 Base) MCG/ACT inhaler Inhale 2 puffs into the lungs every 6 hours as needed for shortness of breath or cough (Patient not taking: Reported on 4/13/2023) 18 g 1    doxylamine (UNISOM) 25 MG TABS tablet Take 0.5 tablets (12.5 mg) by mouth 2 times daily as needed for other (nausea) (Patient not taking: Reported on 4/13/2023) 30 tablet 1       ROS:   A complete 12-point ROS was negative except as above.    EXAM:  BP 97/60 (BP Location: Right arm, Patient Position: Sitting, Cuff Size: Adult Large)   Pulse 84   Ht 1.663 m (5' 5.47\")   Wt 98.9 kg (218 lb)   LMP 01/10/2023 (Approximate)   SpO2 94%   BMI 35.76 kg/m      General: appears comfortable, alert and interactive, in no acute distress  Head: " normocephalic, atraumatic  Eyes: anicteric sclera, EOMI  Mouth: wearing mask per COVID-19 protocol  Neck: supple, no cervical adenopathy  CV: regular rate and rhythm, S1/S2, no murmur, gallop, rub, estimated JVP ~6 cm  Resp: clear, no rales or wheezing  GI: soft, nontender, nondistended, +BS  Extremities: warm, no peripheral edema, 2+ bilateral radial pulses  Neurological: normal speech and affect, no gross motor deficits  Psych: normal mood and affect  Derm: no rashes on exposed surfaces; significant ecchymosis on abdomen from AC shots, sternal incision is well healing    Weight  Wt Readings from Last 10 Encounters:   04/26/23 98.9 kg (218 lb)   04/17/23 101.2 kg (223 lb)   04/13/23 99.6 kg (219 lb 9.6 oz)   04/04/23 99.8 kg (220 lb 1.6 oz)   03/16/23 100.3 kg (221 lb 3.2 oz)   03/15/23 100.4 kg (221 lb 6.4 oz)   03/13/23 100.3 kg (221 lb 3.2 oz)   03/10/23 101.4 kg (223 lb 8.7 oz)   03/10/23 101.4 kg (223 lb 9.6 oz)   03/02/23 98.3 kg (216 lb 11.2 oz)       I personally reviewed recent labs and data as below and discussed the results with the patient in clinic today.  Labs:  CBC RESULTS:  Lab Results   Component Value Date    WBC 8.1 04/26/2023    WBC 8.4 07/09/2021    RBC 3.73 (L) 04/26/2023    RBC 4.39 07/09/2021    HGB 9.0 (L) 04/26/2023    HGB 11.7 07/09/2021    HCT 30.3 (L) 04/26/2023    HCT 37.7 07/09/2021    MCV 81 04/26/2023    MCV 86 07/09/2021    MCH 24.1 (L) 04/26/2023    MCH 26.7 07/09/2021    MCHC 29.7 (L) 04/26/2023    MCHC 31.0 (L) 07/09/2021    RDW 18.8 (H) 04/26/2023    RDW 14.1 07/09/2021     04/26/2023     07/09/2021       CMP RESULTS:  Lab Results   Component Value Date     04/26/2023     07/09/2021    POTASSIUM 4.7 04/26/2023    POTASSIUM 5.1 (H) 02/18/2023    POTASSIUM 4.5 02/08/2023    POTASSIUM 4.6 07/09/2021    CHLORIDE 108 (H) 04/26/2023    CHLORIDE 114 (H) 02/08/2023    CHLORIDE 110 (H) 07/09/2021    CO2 20 (L) 04/26/2023    CO2 19 (L) 02/08/2023    CO2 23  07/09/2021    ANIONGAP 10 04/26/2023    ANIONGAP 8 02/08/2023    ANIONGAP 6 07/09/2021    GLC 92 04/26/2023    GLC 91 02/21/2023    GLC 84 02/08/2023    GLC 78 07/09/2021    BUN 22.2 (H) 04/26/2023    BUN 31 (H) 02/08/2023    BUN 29 07/09/2021    CR 1.46 (H) 04/26/2023    CR 1.38 (H) 07/09/2021    GFRESTIMATED 51 (L) 04/26/2023    GFRESTIMATED 54 (L) 07/09/2021    GFRESTBLACK 62 07/09/2021    KRAIG 9.8 04/26/2023    KRAIG 9.2 07/09/2021    BILITOTAL <0.2 04/26/2023    BILITOTAL <0.1 (L) 07/09/2021    ALBUMIN 3.8 04/26/2023    ALBUMIN 3.4 02/08/2023    ALBUMIN 3.2 (L) 07/09/2021    ALKPHOS 80 04/26/2023    ALKPHOS 91 07/09/2021    ALT 15 04/26/2023    ALT 42 07/09/2021    AST 13 04/26/2023    AST 19 07/09/2021        Testing/Procedures:  I personally visualized and interpreted:  Echocardiogram 2/18/23  Interpretation Summary  Large highly mobile thrombus in transit in the right atrium, at least 2.2 cm in length.  Right ventricular function, chamber size, wall motion, and thickness are normal.  Known patent foramen ovale, suboptimally imaged on this study. Intermittent left to right atrial-level shunting is seen on some color Doppler images.  Pulmonary artery systolic pressure cannot be assessed.  Previous study not available for comparison.    EKG 2/18/23 shows NSR with no acute ST changes    TVUS 3/2/23  IMPRESSION: There has been appropriate interval growth of single  living intrauterine embryo in the left horn of a bicornuate uterus,  now measuring 7 weeks 2 days corresponding to an estimated date of delivery of 10/17/2023. Cardiac activity is normal.    TTE 4/26/23  Interpretation Summary  Left ventricular size, wall motion and function are normal. The ejection fraction is 55-60%.  Right ventricular function, chamber size, wall motion, and thickness are normal.  The inferior vena cava is normal.  No pericardial effusion is present.    Assessment and Plan:     In summary, 22 year old female with a past medical history  of Granulomatosis with polyangiitis, CKD stage III, VA-3 positive ANCA vasculitis (kidney bx proven in 2011), prior saddle PE w/ B/L DVT w/ pulmonary infarct & mild RV dilation on 2021- treated with thrombolysis, Hx of pre-eclampsia, HELLP with IUFD at 31 weeks in 2020, who presented to the ED on 2023 with progressively worsening cough and sharp, pleuritic chest pain found to have large highly mobile thrombi in right atrium at 5 weeks pregnant. She underwent emergent sternotomy with right atrial thrombectomy and PFO closure. She represents for follow-up.    Hx of saddle PE with B/L DVT and pulmonary infarct  Mobile thrombus in right atrium s/p thrombectomy  Pre-op echo  AM: large highly mobile thrombus in the right atrium, at least 2.2 cm in length.  Normal right ventricular size and function normal.  LVEF 60 to 65%. Post-op echo  PM: completed by Anesthesia, Post CPB: normal biventricular function, valves unchanged. PFO has been closed, no flow evident. Thrombus previously noted in RAA is no longer present. No aortic dissection noted.   - ASA 81 mg daily  - No statin indicated  - BB: metoprolol tartrate 12.5 mg BID  - Lovenox 80 mg BID, follows with Hematology    CKD Stage III  Cr mostly ~1.4-1.5 recently, did have KATERINA while inpatient, but improving prior to discharge.  - Will continue to monitor closely    Currently Pregnant (Est DD 10/17/23)  Hx HELLP with spontaneous late   Bicornuate uterus  Hx of IUFD at 30 weeks and 6 days. MFM consulted during recent complex admission.            - Neurological: avoid NSAIDs                - CV: OK for Heparin and Lovenox, avoid DOAC/Warfarin, with recommended SBP<130/80, OK for Beta Blockers, avoid ACE/ARB/statins            - Endocrine: Goal BG < 120            - ID: OK for Penicillin and Cephalosporin            - Obstetrics: Follows with M            - Radiological consideration: Limit radiation, use shielding            -  General: Will avoid teratogenic medications    To Do:  - No change to medications today  - Continue eating small, frequent meals daily  - Follow up with MFM and Hematology as scheduled, if worsening bleeding notify team  - Follow up with me in 6-8 weeks with repeat TTE prior    The patient states understanding and is agreeable with plan.   Feel free to contact myself regarding questions or concerns. It was a pleasure to see this patient today.    I spent 40 minutes in care of the patient today including obtaining recent medical history, personally reviewing recent cardiac testing and/or lab results, today's examination, discussion of testing results and care recommendations with patient.    Roxie Oneal MD   of Medicine, [facility]  Advanced Heart Failure and Transplant Cardiology     CC  WILFREDO RIVERA MD

## 2023-04-26 NOTE — PROGRESS NOTES
Cardiology Clinic Note    HPI  Dear colleagues,     I had the pleasure of seeing Ms. Cande Shields in the Cardiology clinic.  As you know, Ms. Cande Shields is a pleasant 22 year old female with a past medical history of Granulomatosis with polyangiitis, CKD stage III, TX-3 positive ANCA vasculitis (kidney bx proven in March 2011), prior saddle PE w/ B/L DVT w/ pulmonary infarct & mild RV dilation on 03/20/2021- treated with thrombolysis, Hx of pre-eclampsia, HELLP with IUFD at 31 weeks in October 2020, who presented to the ED on 2/17/2023 with progressively worsening cough and sharp, pleuritic chest pain found to have large highly mobile thrombi in right atrium at 5 weeks pregnant. She underwent emergent sternotomy with right atrial thrombectomy and PFO closure. She presents for follow up.    Very complex history, recent vasculitis flareup in 12/22 which was treated with 3 doses of rituximab and she was started on avacopan with rapid steroid taper.  At that time she presented with new petechiae, KATERINA and hemoptysis. However, in the interim, she tested positive for pregnancy which led to discontinuation of avacopan and holding off on the fourth dose of rituximab. Her last dose of Rituximab was on 1/17/23. Had a significant ED visit on 2/12/23 where she was diagnosed with saddle pulmonary embolus with mild right heart strain. She was discharged on therapeutic enoxaparin. Presented to the ED again on 02/17/23 with complaints of worsening chest pain, cough and new onset hemoptysis for one day.  Work-up in the ED showed significantly elevated CRP at 322 which improved to 242. Otherwise normal creatinine. Elevated WBC and procal. Planned angiovac via catheterization on 2/18/23 with surgical back-up. She required emergency sternotomy for R atrial thrombectomy and PFO closure. Overall, she tolerated the operation well and postoperatively was admitted to the CVICU.  She was extubated on POD # 0 to 4 lpm via NC with neuro  status intact.  Her ICU stay was complicated by PMHx and Pregnancy. Hematology, Rheumatology, and OB-GYN were asked to follow during her recovery. She was started on a heparin gtt and transitioned to therapeutic Lovenox dosing. She was started on Remdesevir for COVID status and Ceftriaxone for strept throat. She was transferred to the post-surgical telemetry unit on POD # 2. Due to early pregnancy status, maternal fetal medicine followed along and will schedule follow up at the time of discharge. Additionally, rheumatology peripherally followed due to history of granulomatous polyangiitis and PR3 positive ANCA vasculitis. No indication for steroids during hospital course and she should follow with rheumatology as prior. Due to leukocytosis and strep throat, she was transitioned from ceftriaxone to zosyn for broad coverage with resolution of elevated WBC.  Her CRP trended down and she remained afebrile. She did have early ileus that resolved with conservative measures and is having regular bowel movements at the time of discharge.    Since our last visit, she has established care with [facility] and Rheumatology. She has also seen Hematology. She reports overall feeling very well in the last few weeks with improved energy. She denies any URIAS, orthopnea, PND, presyncope, syncope, abdominal pain, nausea, emesis, LE edema, or weakness. She continues to have some bleeding and bruising from her Lovenox injections. She overall reports minimal appetite, but forces herself to eat regularly throughout the day. She reports good BP control recently. Patient reports compliance with her medications.    PAST MEDICAL HISTORY:  Patient Active Problem List   Diagnosis     Wegener's granulomatosis     Myalgia     Granulomatosis with polyangiitis, unspecified whether renal involvement (H)     ANCA-associated vasculitis (H)     Pulmonary infiltrates     Acute kidney injury (H)     Need for pneumocystis prophylaxis     Acute deep vein  thrombosis (DVT) of proximal vein of both lower extremities (H)     ADHD (attention deficit hyperactivity disorder), inattentive type     Alcohol use disorder, mild, in sustained remission     Bicornuate uterus     Binge-eating disorder, moderate     BMI 40.0-44.9, adult (H)     Cannabis use disorder, mild, in early remission     Femoral artery thrombosis, left (H)     Fetal demise, greater than 22 weeks, antepartum     Generalized anxiety disorder     History of granulomatosis with polyangiitis     Depression     Occlusion of common femoral artery (H)     Performance anxiety     Personal history of COVID-19     Pyoderma gangrenosum     Self-injurious behavior     Stage 3 chronic kidney disease, unspecified whether stage 3a or 3b CKD (H)     Acute saddle pulmonary embolism with acute cor pulmonale (H)     Thrombosis     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)     Infection due to 2019 novel coronavirus     ATN (acute tubular necrosis) (H)     Other pulmonary embolism without acute cor pulmonale, unspecified chronicity (H)     Chest pain, unspecified type     Pneumonia due to infectious organism, unspecified laterality, unspecified part of lung     Cardiac disease during pregnancy        FAMILY HISTORY:  Family History   Problem Relation Age of Onset     Thyroid Disease Mother      Cancer Maternal Grandfather      Asthma No family hx of      Diabetes No family hx of    No known family history of heart disease    SOCIAL HISTORY:  Social History     Tobacco Use     Smoking status: Former     Packs/day: 0.25     Types: Cigarettes     Smokeless tobacco: Former     Tobacco comments:     Vaping stopped 2/26   Vaping Use     Vaping status: Some Days     Substances: Nicotine, Flavoring     Devices: Disposable   Substance Use Topics     Alcohol use: Not Currently     Drug use: No        ALLERGIES:  Allergies   Allergen Reactions     Penicillins Rash     Unknown, but think rash.  Tolerated cephalexin  "(12/27/20), cefpodoxime (12/27/20), Ceftriaxone (Feb 2023), Zosyn (Feb 2023)       CURRENT MEDICATIONS:  Current Outpatient Medications   Medication Sig Dispense Refill     acetaminophen (TYLENOL) 325 MG tablet Take 2 tablets (650 mg) by mouth every 4 hours as needed for other (For optimal non-opioid multimodal pain management to improve pain control.)       aspirin (ASA) 81 MG chewable tablet Take 1 tablet (81 mg) by mouth daily 30 tablet 4     enoxaparin ANTICOAGULANT (LOVENOX) 80 MG/0.8ML syringe Inject 0.8 mLs (80 mg) Subcutaneous every 12 hours 48 mL 2     famotidine (PEPCID) 20 MG tablet Take 1 tablet (20 mg) by mouth 2 times daily 60 tablet 2     metoprolol tartrate (LOPRESSOR) 25 MG tablet Take 0.5 tablets (12.5 mg) by mouth 2 times daily 60 tablet 2     pyridOXINE (VITAMIN B6) 25 MG tablet Take 1 tablet (25 mg) by mouth every 8 hours as needed (nausea) 30 tablet 4     albuterol (PROAIR HFA/PROVENTIL HFA/VENTOLIN HFA) 108 (90 Base) MCG/ACT inhaler Inhale 2 puffs into the lungs every 6 hours as needed for shortness of breath or cough (Patient not taking: Reported on 4/13/2023) 18 g 1     doxylamine (UNISOM) 25 MG TABS tablet Take 0.5 tablets (12.5 mg) by mouth 2 times daily as needed for other (nausea) (Patient not taking: Reported on 4/13/2023) 30 tablet 1       ROS:   A complete 12-point ROS was negative except as above.    EXAM:  BP 97/60 (BP Location: Right arm, Patient Position: Sitting, Cuff Size: Adult Large)   Pulse 84   Ht 1.663 m (5' 5.47\")   Wt 98.9 kg (218 lb)   LMP 01/10/2023 (Approximate)   SpO2 94%   BMI 35.76 kg/m      General: appears comfortable, alert and interactive, in no acute distress  Head: normocephalic, atraumatic  Eyes: anicteric sclera, EOMI  Mouth: wearing mask per COVID-19 protocol  Neck: supple, no cervical adenopathy  CV: regular rate and rhythm, S1/S2, no murmur, gallop, rub, estimated JVP ~6 cm  Resp: clear, no rales or wheezing  GI: soft, nontender, nondistended, " +BS  Extremities: warm, no peripheral edema, 2+ bilateral radial pulses  Neurological: normal speech and affect, no gross motor deficits  Psych: normal mood and affect  Derm: no rashes on exposed surfaces; significant ecchymosis on abdomen from AC shots, sternal incision is well healing    Weight  Wt Readings from Last 10 Encounters:   04/26/23 98.9 kg (218 lb)   04/17/23 101.2 kg (223 lb)   04/13/23 99.6 kg (219 lb 9.6 oz)   04/04/23 99.8 kg (220 lb 1.6 oz)   03/16/23 100.3 kg (221 lb 3.2 oz)   03/15/23 100.4 kg (221 lb 6.4 oz)   03/13/23 100.3 kg (221 lb 3.2 oz)   03/10/23 101.4 kg (223 lb 8.7 oz)   03/10/23 101.4 kg (223 lb 9.6 oz)   03/02/23 98.3 kg (216 lb 11.2 oz)       I personally reviewed recent labs and data as below and discussed the results with the patient in clinic today.  Labs:  CBC RESULTS:  Lab Results   Component Value Date    WBC 8.1 04/26/2023    WBC 8.4 07/09/2021    RBC 3.73 (L) 04/26/2023    RBC 4.39 07/09/2021    HGB 9.0 (L) 04/26/2023    HGB 11.7 07/09/2021    HCT 30.3 (L) 04/26/2023    HCT 37.7 07/09/2021    MCV 81 04/26/2023    MCV 86 07/09/2021    MCH 24.1 (L) 04/26/2023    MCH 26.7 07/09/2021    MCHC 29.7 (L) 04/26/2023    MCHC 31.0 (L) 07/09/2021    RDW 18.8 (H) 04/26/2023    RDW 14.1 07/09/2021     04/26/2023     07/09/2021       CMP RESULTS:  Lab Results   Component Value Date     04/26/2023     07/09/2021    POTASSIUM 4.7 04/26/2023    POTASSIUM 5.1 (H) 02/18/2023    POTASSIUM 4.5 02/08/2023    POTASSIUM 4.6 07/09/2021    CHLORIDE 108 (H) 04/26/2023    CHLORIDE 114 (H) 02/08/2023    CHLORIDE 110 (H) 07/09/2021    CO2 20 (L) 04/26/2023    CO2 19 (L) 02/08/2023    CO2 23 07/09/2021    ANIONGAP 10 04/26/2023    ANIONGAP 8 02/08/2023    ANIONGAP 6 07/09/2021    GLC 92 04/26/2023    GLC 91 02/21/2023    GLC 84 02/08/2023    GLC 78 07/09/2021    BUN 22.2 (H) 04/26/2023    BUN 31 (H) 02/08/2023    BUN 29 07/09/2021    CR 1.46 (H) 04/26/2023    CR 1.38 (H)  07/09/2021    GFRESTIMATED 51 (L) 04/26/2023    GFRESTIMATED 54 (L) 07/09/2021    GFRESTBLACK 62 07/09/2021    KRAIG 9.8 04/26/2023    KRAIG 9.2 07/09/2021    BILITOTAL <0.2 04/26/2023    BILITOTAL <0.1 (L) 07/09/2021    ALBUMIN 3.8 04/26/2023    ALBUMIN 3.4 02/08/2023    ALBUMIN 3.2 (L) 07/09/2021    ALKPHOS 80 04/26/2023    ALKPHOS 91 07/09/2021    ALT 15 04/26/2023    ALT 42 07/09/2021    AST 13 04/26/2023    AST 19 07/09/2021        Testing/Procedures:  I personally visualized and interpreted:  Echocardiogram 2/18/23  Interpretation Summary  Large highly mobile thrombus in transit in the right atrium, at least 2.2 cm in length.  Right ventricular function, chamber size, wall motion, and thickness are normal.  Known patent foramen ovale, suboptimally imaged on this study. Intermittent left to right atrial-level shunting is seen on some color Doppler images.  Pulmonary artery systolic pressure cannot be assessed.  Previous study not available for comparison.    EKG 2/18/23 shows NSR with no acute ST changes    TVUS 3/2/23  IMPRESSION: There has been appropriate interval growth of single  living intrauterine embryo in the left horn of a bicornuate uterus,  now measuring 7 weeks 2 days corresponding to an estimated date of delivery of 10/17/2023. Cardiac activity is normal.    TTE 4/26/23  Interpretation Summary  Left ventricular size, wall motion and function are normal. The ejection fraction is 55-60%.  Right ventricular function, chamber size, wall motion, and thickness are normal.  The inferior vena cava is normal.  No pericardial effusion is present.    Assessment and Plan:     In summary, 22 year old female with a past medical history of Granulomatosis with polyangiitis, CKD stage III, ND-3 positive ANCA vasculitis (kidney bx proven in March 2011), prior saddle PE w/ B/L DVT w/ pulmonary infarct & mild RV dilation on 03/20/2021- treated with thrombolysis, Hx of pre-eclampsia, HELLP with IUFD at 31 weeks in October  , who presented to the ED on 2023 with progressively worsening cough and sharp, pleuritic chest pain found to have large highly mobile thrombi in right atrium at 5 weeks pregnant. She underwent emergent sternotomy with right atrial thrombectomy and PFO closure. She represents for follow-up.    Hx of saddle PE with B/L DVT and pulmonary infarct  Mobile thrombus in right atrium s/p thrombectomy  Pre-op echo  AM: large highly mobile thrombus in the right atrium, at least 2.2 cm in length.  Normal right ventricular size and function normal.  LVEF 60 to 65%. Post-op echo  PM: completed by Anesthesia, Post CPB: normal biventricular function, valves unchanged. PFO has been closed, no flow evident. Thrombus previously noted in RAA is no longer present. No aortic dissection noted.   - ASA 81 mg daily  - No statin indicated  - BB: metoprolol tartrate 12.5 mg BID  - Lovenox 80 mg BID, follows with Hematology    CKD Stage III  Cr mostly ~1.4-1.5 recently, did have KATERINA while inpatient, but improving prior to discharge.  - Will continue to monitor closely    Currently Pregnant (Est DD 10/17/23)  Hx HELLP with spontaneous late   Bicornuate uterus  Hx of IUFD at 30 weeks and 6 days. MFM consulted during recent complex admission.            - Neurological: avoid NSAIDs                - CV: OK for Heparin and Lovenox, avoid DOAC/Warfarin, with recommended SBP<130/80, OK for Beta Blockers, avoid ACE/ARB/statins            - Endocrine: Goal BG < 120            - ID: OK for Penicillin and Cephalosporin            - Obstetrics: Follows with MFM            - Radiological consideration: Limit radiation, use shielding            - General: Will avoid teratogenic medications    To Do:  - No change to medications today  - Continue eating small, frequent meals daily  - Follow up with MFM and Hematology as scheduled, if worsening bleeding notify team  - Follow up with me in 6-8 weeks with repeat TTE prior    The patient  states understanding and is agreeable with plan.   Feel free to contact myself regarding questions or concerns. It was a pleasure to see this patient today.    I spent 40 minutes in care of the patient today including obtaining recent medical history, personally reviewing recent cardiac testing and/or lab results, today's examination, discussion of testing results and care recommendations with patient.    Roxie Oneal MD   of Medicine, [facility]  Advanced Heart Failure and Transplant Cardiology     WILFREDO COOK MD

## 2023-04-26 NOTE — NURSING NOTE
Chief Complaint   Patient presents with     New Patient     22 year old female presents with history of severe preeclampsia, recent acute saddle PE s/p thrombectomy and PFO closure, currently pregnant to establish care with labs prior.       Vitals were taken, medications reconciled.    Katerina Juárez, EMT   3:05 PM

## 2023-04-26 NOTE — PATIENT INSTRUCTIONS
Cardiology Providers you saw during your visit:  Dr. Oneal     Medication/Plan changes:  1-  No medication changes  2 - Please work on eating small frequent meals/snacks build some body weight.     Follow up:  1- Follow up with Dr. Oneal in 6-8 weeks with a limited echocardiogram and labs prior          Follow the American Heart Association Diet and Lifestyle recommendations:  Limit saturated fat, trans fat, sodium, red meat, sweets and sugar-sweetened beverages. If you choose to eat red meat, compare labels and select the leanest cuts available.  Aim for at least 150 minutes of moderate physical activity or 75 minutes of vigorous physical activity - or an equal combination of both - each week.     During business hours: [phone], press option # 1 to schedule an appointment or send a message to your care team     After hours, weekends or holidays: On Call Cardiologist- [phone]   option #4 and ask to speak to the on-call Cardiologist. Inform them you are a CORE/heart failure patient at the [facility].     Rosaura Lea RN BSN CHFN  Cardiology Nurse Care Coordinator (Heart Failure / C.O.R.E.)

## 2023-05-01 ENCOUNTER — TELEPHONE (OUTPATIENT)
Dept: MATERNAL FETAL MEDICINE | Facility: CLINIC | Age: 23
End: 2023-05-01
Payer: COMMERCIAL

## 2023-05-01 LAB — SCANNED LAB RESULT: NORMAL

## 2023-05-01 NOTE — TELEPHONE ENCOUNTER
Called Cande to disclose no call NIPT results. Patient did not answer and voicemail was full. Will attempt to call again tomorrow.     Simi Glynn MS, [facility]  Licensed Genetic Counselor   [facility]  Maternal Fetal Medicine  [email or web address]  Office: [phone]  Pager [phone]  MFM: [phone]   Fax: [phone]

## 2023-05-02 ENCOUNTER — TELEPHONE (OUTPATIENT)
Dept: MATERNAL FETAL MEDICINE | Facility: CLINIC | Age: 23
End: 2023-05-02
Payer: COMMERCIAL

## 2023-05-02 NOTE — TELEPHONE ENCOUNTER
"April 18, 2023     Cande returned my call and we discussed her NIPT result. Unfortunately, her second redraw also came back as no results, SAMPLE FAILURE, due to \"specimen does not meet quality metrics\".     Per [facility], failed analysis due to \"specimen does not meet quality metrics\" can be due to a wide range of factors which may include poor specimen quality due to improper blood draw technique or handling, insufficient total cell free DNA, or underlying biological reasons (such as maternal autoimmune conditions or medications).      Of note, Cande is on Lovenox, which is associated with an increased rate of \"no call\" NIPT results due to low fetal fraction. However, no call results have also been associated with an increased risk of aneuploidy.      As this was the second sample that resulted in a failed analysis, sending a third sample to [facility] is not recommended. Options moving forward include an amniocentesis, a quad screen, or ultrasound monitoring alone. We specifically discussed that while ultrasound detection for genetic conditions commonly associated with low fetal fraction (trisomy 13, trisomy 18, triploidy) is high, it is not 100%.     After discussing each of these options, Cande declined amniocentesis and quad screen at this time and elected to proceed with ultrasound monitoring alone. She is scheduled to return to clinic on Thursday 5/4 for a 2/3 early anatomy survey. She is aware that diagnostic genetic testing will remain available to her if she wishes to proceed at any point or if concerns arise on ultrasound.      Tereza Goldberg, [facility], [facility]  Licensed Genetic Counselor  [facility]  Maternal Fetal Medicine  Phone: [phone]  [email or web address]   "

## 2023-05-04 ENCOUNTER — OFFICE VISIT (OUTPATIENT)
Dept: MATERNAL FETAL MEDICINE | Facility: CLINIC | Age: 23
End: 2023-05-04
Attending: OBSTETRICS & GYNECOLOGY
Payer: COMMERCIAL

## 2023-05-04 ENCOUNTER — HOSPITAL ENCOUNTER (OUTPATIENT)
Dept: ULTRASOUND IMAGING | Facility: CLINIC | Age: 23
Discharge: HOME | End: 2023-05-04
Attending: OBSTETRICS & GYNECOLOGY
Payer: COMMERCIAL

## 2023-05-04 VITALS
BODY MASS INDEX: 36.13 KG/M2 | DIASTOLIC BLOOD PRESSURE: 67 MMHG | OXYGEN SATURATION: 97 % | SYSTOLIC BLOOD PRESSURE: 92 MMHG | HEART RATE: 74 BPM | RESPIRATION RATE: 16 BRPM | WEIGHT: 220.3 LBS

## 2023-05-04 DIAGNOSIS — Z86.718 HISTORY OF VENOUS THROMBOEMBOLISM: Primary | ICD-10-CM

## 2023-05-04 DIAGNOSIS — N18.30 STAGE 3 CHRONIC KIDNEY DISEASE, UNSPECIFIED WHETHER STAGE 3A OR 3B CKD (H): Chronic | ICD-10-CM

## 2023-05-04 DIAGNOSIS — O09.90 HIGH-RISK PREGNANCY, UNSPECIFIED TRIMESTER: ICD-10-CM

## 2023-05-04 DIAGNOSIS — M31.31 GRANULOMATOSIS WITH POLYANGIITIS WITH RENAL INVOLVEMENT (H): ICD-10-CM

## 2023-05-04 PROCEDURE — 76817 TRANSVAGINAL US OBSTETRIC: CPT | Mod: 26 | Performed by: OBSTETRICS & GYNECOLOGY

## 2023-05-04 PROCEDURE — G0463 HOSPITAL OUTPT CLINIC VISIT: HCPCS | Mod: 25 | Performed by: OBSTETRICS & GYNECOLOGY

## 2023-05-04 PROCEDURE — 76805 OB US >/= 14 WKS SNGL FETUS: CPT | Mod: 26 | Performed by: OBSTETRICS & GYNECOLOGY

## 2023-05-04 PROCEDURE — 76805 OB US >/= 14 WKS SNGL FETUS: CPT

## 2023-05-04 PROCEDURE — 99213 OFFICE O/P EST LOW 20 MIN: CPT | Mod: 25 | Performed by: OBSTETRICS & GYNECOLOGY

## 2023-05-04 NOTE — PROGRESS NOTES
"Maternal-Fetal Medicine   Return OB Visit    Cande Shields  : 2000  MRN: [id number]    HPI:  Cande Shields is a 22 year old  at 16w2d by LMP consistent with 7w2d US here for return OB visit.    She overall has been feeling well. She continues to have intermittent vaginal bleeding every few weeks. When she has bleeding, it is bright red. She denies any abdominal pain or cramping. Denies leakage of fluid.    She has been taking 80 mg Lovenox BID. She last had an anti-Xa level was on  and was supra therapeutic at 1.8, though this was taken about 3.5 hours after her Lovenox dose. She has continued to take the same dose, though has not had any other anti-Xa levels drawn. She was seen by Dr. Landeros on  and has a follow-up scheduled at the end of .    She has been having some poor appetite, but has been forcing herself to eat. She occasionally has some nausea, but is tolerating PO and denies emesis.     Pregnancy complicated by:  - Granulomatosis polyangiitis  - History of multiple VTE, including DVT and PE  - Subchorionic hemorrhage  - Right atrial thrombus s/p sternotomy and thrombectomy on 2023 earlier in pregnancy  - PFO s/p closure on 2023  - History of IUFD at 31 weeks  - History of preeclampsia  - Chronic kidney disease (baseline creatinine ~1.3)  - Bicornuate uterus    PHYSICAL EXAM:  BP 92/67 (BP Location: Left arm, Patient Position: Sitting, Cuff Size: Adult Large)   Pulse 74   Resp 16   Wt 99.9 kg (220 lb 4.8 oz)   LMP 01/10/2023 (Approximate)   SpO2 97%   BMI 36.13 kg/m       Gen: NAD, well appearing  Pulm: CTAB  CV: RRR  Abdomen: gravid, soft, non-tender, non-distended  Extremities: no edema    Labs:  NIPT no call x2    Ultrasounds:   Please see \"imaging\" tab for results of today's ultrasound.    Other Imagin/18 Echo: Large highly mobile thrombus in transit in the right atrium, at least 2.2 cm in length. Right ventricular function, chamber size, wall motion, " and thickness are normal. Known patent foramen ovale, suboptimally imaged on this study. Intermittent left to right atrial-level shunting is seen on some color Doppler images. Pulmonary artery systolic pressure cannot be assessed. Previous study not available for comparison.   LE Doppler: No evidence of deep venous thrombosis in either lower extremity.    ASSESSMENT/PLAN:  Cnade Shields is a 22 year old  at 16w2d by LMP consistent with 7w2d US here for new OB visit.    Granulomatosis polyangitis  History of VTE (PE and DVT)  Right atrial thrombus s/p sternotomy and thrombectomy in early pregnancy  Anemia  - Check anti-Xa levels monthly. Her last anti-Xa level was  and resulted at 1.8. She remained on the same dose as her level was checked 3.5 hours after her dose. Advised we check a level today, though she is unable to wait until she is at 4 hrs post Lovenox this morning. Recommend completing anti-Xa level in the next week.  - Scheduled IV iron infusions. Completed 1 and was unable to do the others. Will work to re-schedule. Strict return precautions previously given as she may ultimately require blood transfusion.  - Advised to return to hematology with Dr. Dye at 32 weeks gestation to discuss management of her anticoagulation surrounding her delivery.  - Seen by Dr. Oneal, cardiology on . Next appt on  (or sooner as clinically indicated).  - Continue metoprolol 12.5 mg BID. BP 92/67 today, though patient denies any lightheadedness or dizziness.   - Seen by rheumatology . She is scheduled to see rheumatology again . Will discuss moving this appointment up if new symptoms arise.    Chronic Kidney Disease  - Creatinine currently at baseline, has follow up scheduled with nephrology today, though will need to cancel. Advised patient to re-schedule as soon as possible.   - Goal blood pressure at 130/80. BP 92/67 today, though patient denies any lightheadedness or dizziness.   - On  metoprolol 12.5mg BID    History of preeclampsia  History of IUFD at 31 weeks   - Close monitoring for signs and/or symptoms of evolving preeclampsia  - Close monitoring of blood pressure both prenatally and in the postpartum period  - Continue low dose aspirin for preeclampsia prophylaxis.     Surveillance  Subchorionic hemorrhage  - Bleeding precautions reviewed  - Comprehensive anatomy ultrasound at 20 weeks  - Serial growth US every 4 weeks beginning at 28 weeks.  - Weekly  surveillance with BPPs beginning at 28 weeks (prior to her IUFD at 30 weeks in prior pregnancy)    Delivery planning:  - Anesthesia consult in third trimester  - Mode of delivery to be determined in conjunction with cardiology and cardiovascular surgery, though would likely anticipate no contraindications to vaginal delivery.  - Will discuss with hematology regarding management of anticoagulation around the time of delivery.  - Delivery at 37-39 weeks, or sooner as clinically indicated.   - Feeding: discuss at future visit  - Contraception plans: discuss at future visit    Routine PNC  - Prenatal labs:  Rh pos, antibody negative   HepB/HIV/RPR/HepC: negative   GC/CT: negative   Rubella: immune     UC: no growth  - Pap smear: perform postpartum  - Tdap at 28 weeks  - Aneuploidy screening: NIPT no call x 2. Declines invasive testing at this time.   - 1 hour GCT at 24-28 weeks  - Provided letter for support animal to live with her at her apartment.    Return to clinic in 4 weeks for OBV and anatomy US.    Patient seen and staffed with Dr. Gonzalez.    Nirmala Lozano MD  Maternal-Fetal Medicine Fellow

## 2023-05-04 NOTE — NURSING NOTE
Cande seen in clinic today for OB visit at 16w2d gestation for pregnancy complicated by chronic kidney disease, granulomatous polyangitis, hx PE, DVT, hx R atrail embolism s/sp emergency sternotomy and closure of PFO, h/o HELLP syndrome, hx 3rd tri IUFD (see report/notes). VSS. Pt reports not yet feeling fetal movement, see flowsheet. Pt denies bldg/lof/change in discharge/contractions/headache/vision changes/chest pain/SOB/edema. Pt assessed for  labor, preeclampsia, infection, fetal wellbeing without concerns. Cande is wondering about firmness and bruising to abdomen from Lovenox. She has also had loss of appetite for much of pregnancy and is worried about this. Pt notified to review new pt education in AVS, verbally reviewed highlights. Dr. Lozano and Dr. Gonzalez met with pt and discussed POC. Plan for return  for L2 and OBV as scheduled. Pt discharged stable and ambulatory.

## 2023-05-04 NOTE — PATIENT INSTRUCTIONS
Pregnancy: Your Second Trimester Changes  Each day, you and your baby are changing and growing together. Here s a quick look at what s happening to both of you.  How you are changing  Even when you don t notice it, your body is adapting to meet the needs of your growing baby. The changes in your body might also affect your moods.  Your body  Your uterus expands as your baby grows. As the weeks go by, you will feel more pressure on your bladder, stomach, and other organs. You may notice some skin color changes on your forehead, nose, or cheeks. Freckles may darken, and moles may grow. You may notice a darker line on your abdomen between your belly button and pubic bone in the midline.  Your moods  The second trimester is often easier than the first. Still, be prepared for mood swings. These are from the increase in hormones made by your body. Hormones are chemicals that affect the way organs work. These mood swings are a normal part of pregnancy.  How your baby is growing    Month 4  Your baby s heartbeat may be heard with a Doppler (handheld ultrasound device) by 9 to 10 weeks. Eyebrows, eyelashes, and fingernails begin to form.    Month 5  You may feel your baby move. After a growth spurt, your baby nears 10 inches.    Month 6  Your baby s fingerprints have formed. Your baby weighs about 1 to 2 pounds and is about 12 inches long.    [email or web address] last reviewed this educational content on 7/1/2021 2000-2022 [employer]. All rights reserved. This information is not intended as a substitute for professional medical care. Always follow your healthcare professional's instructions.          Adapting to Pregnancy: Second Trimester  Keep up the healthy habits you started in your first trimester. You might be a little more tired than normal. So plan your day wisely. Look at the tips below and choose the ones that suit your lifestyle.   Note  If you have any questions, talk with your healthcare provider.   If  you work  If you can, adjust your work with your employer to fit your needs. Try these tips:     If you stand for long periods, find ways to do some tasks while sitting. Also, try to stand with 1 foot resting on a low stool or ledge. Shift your weight from foot to foot often. Wear low-heeled shoes.    If you sit, keep your knees level with your hips. Rest your feet on a firm surface. Sit tall with support for your low back.    If you work long hours, ask about adjusting your schedule. Try taking shorter breaks more often.  When you travel  The second trimester may be the best time for any travel. Talk to your healthcare provider about any special plans you may need to make. Always:     Wear a seat belt. Fasten the lap part under your belly. Wear the shoulder part also.    Take breaks often during long trips by car or plane. Move around to stretch your legs.    Drink plenty of fluids on flights. The air in plane cabins is very dry.    Stay out of hot climates or high altitudes if you are not used to them.    Stay away from places where the food and water might make you sick.    Make sure you are up-to-date on all vaccines, including the flu vaccine. This is especially important when traveling overseas.  Taking time to relax  Find time to rest and relax at work or at home:     Take short time-outs daily. Do relaxation exercises.    Breathe deeply during stressful times.    Try not to take on too much. Plan tasks for times when you have the most energy.    Take naps when you can. Or just sit and relax.    After week 16, don't lie on your back for more than a few minutes. Instead, lie on your side. Switch sides often.    Having sex  Unless your healthcare provider tells you otherwise, there is no reason to stop having sex now. Blood supply increases to the pelvic area in the second trimester. Because of this, sex might be more enjoyable. Try different positions and see what s best. Also talk with your partner about any  changes in desire. Spotting may happen after sex. Let your healthcare provider know if there is heavy bleeding.   Keeping your environment safe  You can still clean your house and use scented products. Just take some simple precautions:     Wear gloves when using cleaning fluids.    Open windows to let in fresh air. Use a fan if you paint.    Stay away from secondhand smoke.    Don t breathe fumes from nail polish, hair spray, cleansers, or other chemicals.  How daily issues affect your health  Many things in your daily life impact your health. This can include transportation, money problems, housing, access to food, and . If you can t get to medical appointments, you may not receive the care you need. When money is tight, it may be difficult to pay for medicines. And living far from a grocery store can make it hard to buy healthy food.   If you have concerns in any of these or other areas, talk with your healthcare team. They may know of local resources to assist you. Or they may have a staff person who can help.   [employer] last reviewed this educational content on 6/1/2021 2000-2022 [employer]. All rights reserved. This information is not intended as a substitute for professional medical care. Always follow your healthcare professional's instructions.

## 2023-05-04 NOTE — LETTER
May 4, 2023    Regarding: Cande Shields  YOB: 2000  [id number]   [place] [address]    To Whom It May Concern:     This is to certify that Ms. Cande Shields is under my care and meets criteria for her cat to be an emotional support animal given her medical co morbidities.     f you have additional questions or concerns, please do not hesitate to contact me.    Sincerely,      Nirmala Lozano MD

## 2023-05-09 ENCOUNTER — LAB (OUTPATIENT)
Dept: LAB | Facility: CLINIC | Age: 23
End: 2023-05-09
Payer: COMMERCIAL

## 2023-05-09 DIAGNOSIS — I82.4Y3 ACUTE DEEP VEIN THROMBOSIS (DVT) OF PROXIMAL VEIN OF BOTH LOWER EXTREMITIES (H): ICD-10-CM

## 2023-05-09 DIAGNOSIS — N93.9 VAGINAL BLEEDING: ICD-10-CM

## 2023-05-09 DIAGNOSIS — M31.31 GRANULOMATOSIS WITH POLYANGIITIS WITH RENAL INVOLVEMENT (H): ICD-10-CM

## 2023-05-09 DIAGNOSIS — N18.30 STAGE 3 CHRONIC KIDNEY DISEASE, UNSPECIFIED WHETHER STAGE 3A OR 3B CKD (H): ICD-10-CM

## 2023-05-09 LAB — LMWH PPP CHRO-ACNC: 1.13 IU/ML

## 2023-05-09 PROCEDURE — 36415 COLL VENOUS BLD VENIPUNCTURE: CPT

## 2023-05-09 PROCEDURE — 85520 HEPARIN ASSAY: CPT

## 2023-05-12 ENCOUNTER — TELEPHONE (OUTPATIENT)
Dept: NEPHROLOGY | Facility: CLINIC | Age: 23
End: 2023-05-12

## 2023-05-12 NOTE — TELEPHONE ENCOUNTER
Update request on Cande and Romelia Vega from customer support updated that med is on hold and next follow up appt is June as patient no show for May appt.  Plan: will update them once patient POC updated.  Marielena Jaquez RN, BSN, PHN  [facility] Nurse Navigator  [phone]

## 2023-05-20 DIAGNOSIS — R05.3 CHRONIC COUGH: ICD-10-CM

## 2023-05-21 ENCOUNTER — HOSPITAL ENCOUNTER (EMERGENCY)
Facility: CLINIC | Age: 23
Discharge: HOME | End: 2023-05-21
Attending: EMERGENCY MEDICINE | Admitting: EMERGENCY MEDICINE
Payer: COMMERCIAL

## 2023-05-21 ENCOUNTER — APPOINTMENT (OUTPATIENT)
Dept: ULTRASOUND IMAGING | Facility: CLINIC | Age: 23
End: 2023-05-21
Attending: EMERGENCY MEDICINE
Payer: COMMERCIAL

## 2023-05-21 VITALS
HEART RATE: 84 BPM | TEMPERATURE: 97.8 F | WEIGHT: 226.4 LBS | OXYGEN SATURATION: 97 % | DIASTOLIC BLOOD PRESSURE: 54 MMHG | BODY MASS INDEX: 37.13 KG/M2 | SYSTOLIC BLOOD PRESSURE: 104 MMHG | RESPIRATION RATE: 16 BRPM

## 2023-05-21 DIAGNOSIS — R25.2 CALF CRAMP: ICD-10-CM

## 2023-05-21 DIAGNOSIS — Z71.1 CONCERN ABOUT PREGNANCY COMPLICATION WITHOUT DIAGNOSIS: ICD-10-CM

## 2023-05-21 PROCEDURE — 93971 EXTREMITY STUDY: CPT | Mod: RT

## 2023-05-21 PROCEDURE — 76857 US EXAM PELVIC LIMITED: CPT | Performed by: EMERGENCY MEDICINE

## 2023-05-21 PROCEDURE — 99284 EMERGENCY DEPT VISIT MOD MDM: CPT | Performed by: EMERGENCY MEDICINE

## 2023-05-21 PROCEDURE — 99284 EMERGENCY DEPT VISIT MOD MDM: CPT | Mod: 25 | Performed by: EMERGENCY MEDICINE

## 2023-05-21 RX ORDER — PRENATAL VIT/IRON FUM/FOLIC AC 27MG-0.8MG
1 TABLET ORAL DAILY
COMMUNITY
End: 2023-12-06

## 2023-05-21 ASSESSMENT — ACTIVITIES OF DAILY LIVING (ADL): ADLS_ACUITY_SCORE: 37

## 2023-05-21 NOTE — ED PROVIDER NOTES
ED Provider Note  [facility]      History     Chief Complaint   Patient presents with     Leg Pain     Patient presents to ED with c/o R calf pain. Patient is currently taking Lovenox after PE and open heart surgery in Feb of this year. Patient is 19 weeks pregnant.      ALDA Shields is a 22 year old female who 19 weeks pregnant with extensive history of DVT PE.  Please see recent discharge summary from February documenting the patient's complicated clinical course including saddle pulmonary embolism as well as a large atrial thrombus requiring open heart surgery for thrombectomy.  She is currently on Lovenox throughout the duration of her pregnancy for anticoagulation secondary to above.    Patient had been feeling well last night felt a severe charley horse type cramp in her right calf and with her history came to the emergency department to be sure everything is okay.  The calf is since resolved with a very mild dull ache remaining.  No associated fevers or chills chest pain or shortness of breath.  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     Multiple subsegmental pulmonary emboli without acute cor pulmonale (H) 03/2021     PFO (patent foramen ovale)      Preeclampsia, third trimester      Spontaneous pregnancy loss 2020    31 weeks     Stage 3b chronic kidney disease (H)      Wegener's disease, pulmonary 01/01/2010    renal biopsy     Past Surgical History:   Procedure  Laterality Date     ENT SURGERY  01/01/2000    cyst     EXCISE GANGLION WRIST  01/01/2009     IR LOWER EXTREMITY ANGIOGRAM LEFT  3/21/2021     IR THROMBOLYSIS ART/VENOUS INFUSION SUBSQ DAY  3/21/2021     IR THROMBOLYSIS ART/VENOUS INFUSION SUBSQ DAY  3/21/2021     STERNOTOMY  02/18/2023    right atrial thrombectomy     THROMBECTOMY ATRIUM Right 02/18/2023     THROMBECTOMY PERCUTANEOUS N/A 2/18/2023    Procedure: Emergency Sternotomy, Right Atrial Thrombectomy, Central Cannulation, Bilateral Femoral Cannulation, Closure of PFO ; Transesophageal Echocardiogram performed by anesthesia staff;  Surgeon: Adelfo Elmore MD;  Location: [facility] OR     THROMBECTOMY PERCUTANEOUS N/A 2/18/2023    Procedure: Angiovac Mediated for Right Atrial Clot; Intracatheter Thrombectomy via bilateral percutaneous femoral venous access with 26 FR Right Femoral vein and 17 FR Left Femoral Vein cannulas. Transesophageal echchocardiogram performed by anesthesia staff;  Surgeon: Po Monterroso MD;  Location: [facility] OR     acetaminophen (TYLENOL) 325 MG tablet  albuterol (PROAIR HFA/PROVENTIL HFA/VENTOLIN HFA) 108 (90 Base) MCG/ACT inhaler  aspirin (ASA) 81 MG chewable tablet  doxylamine (UNISOM) 25 MG TABS tablet  enoxaparin ANTICOAGULANT (LOVENOX) 80 MG/0.8ML syringe  famotidine (PEPCID) 20 MG tablet  metoprolol tartrate (LOPRESSOR) 25 MG tablet  Prenatal Vit-Fe Fumarate-FA (PRENATAL MULTIVITAMIN W/IRON) 27-0.8 MG tablet  pyridOXINE (VITAMIN B6) 25 MG tablet      Allergies   Allergen Reactions     Penicillins Rash     Unknown, but think rash.  Tolerated cephalexin (12/27/20), cefpodoxime (12/27/20), Ceftriaxone (Feb 2023), Zosyn (Feb 2023)     Family History  Family History   Problem Relation Age of Onset     Thyroid Disease Mother      Cancer Maternal Grandfather      Asthma No family hx of      Diabetes No family hx of      Social History   Social History     Tobacco Use     Smoking status: Former     Packs/day: 0.25     Types: Cigarettes      Smokeless tobacco: Former     Tobacco comments:     Vaping stopped 2/26   Vaping Use     Vaping status: Some Days     Substances: Nicotine, Flavoring     Devices: Disposable   Substance Use Topics     Alcohol use: Not Currently     Drug use: No         A medically appropriate review of systems was performed with pertinent positives and negatives noted in the HPI, and all other systems negative.    Physical Exam   BP: 104/54  Pulse: 81  Temp: 98.1  F (36.7  C)  Resp: 16  Weight: 102.7 kg (226 lb 6.4 oz)  SpO2: 100 %  Physical Exam  GEN: Well appearing, non toxic, cooperative and conversant.   HEENT: The head is normocephalic and atraumatic. Pupils are equal round and reactive to light. Extraocular motions are intact. There is no facial swelling. The neck is nontender and supple.   CV: Regular rate and rhythm.  PULM: Clear to auscultation bilaterally.  ABD: Soft, nontender, nondistended.   EXT: Full range of motion.  No edema.  No erythema or rashes.  No reyes calf tenderness or popliteal tenderness  NEURO: Cranial nerves II through XII are intact and symmetric. Bilateral upper and lower extremities grossly show full range of motion without any focal deficits.   PSYCH: Calm and cooperative, interactive.       ED Course, Procedures, & Data      Procedures  Results for orders placed during the hospital encounter of 05/21/23    POC US OB TRANSABDOMINAL LIMITED    Impression  Limited Bedside Transabdominal ultrasound for evaluation of IUP  Performed any interpreted by me.    Indication:  Concern for fetal motion  Findings:  The lower abdomen was interrogated with a curvilinear probe. The uterus was identified.  Within the uterus there is a moving fetus with visible heart rate with FHR of 130    Impression: Intrauterine pregnancy, + fetal motion                      Results for orders placed or performed during the hospital encounter of 05/21/23   US Lower Extremity Venous Duplex Right     Status: None    Narrative     EXAM: US LOWER EXTREMITY VENOUS DUPLEX RIGHT  LOCATION: [facility]  DATE/TIME: 5/21/2023 3:26 AM CDT    INDICATION: History of DVT.  COMPARISON: 02/19/2023  TECHNIQUE: Venous Duplex ultrasound of the right lower extremity with and without compression, augmentation and duplex. Color flow and spectral Doppler with waveform analysis performed.    FINDINGS: Exam includes the common femoral, femoral, popliteal, and contralateral common femoral veins as well as segmentally visualized deep calf veins and greater saphenous vein.     RIGHT: No deep vein thrombosis. No superficial thrombophlebitis. No popliteal cyst.      Impression    IMPRESSION:  1.  No deep venous thrombosis in the right lower extremity.   POC US OB TRANSABDOMINAL LIMITED     Status: None    Impression    Limited Bedside Transabdominal ultrasound for evaluation of IUP        Performed any interpreted by me.    Indication:  Concern for fetal motion   Findings:  The lower abdomen was interrogated with a curvilinear probe. The uterus was identified.   Within the uterus there is a moving fetus with visible heart rate with FHR of 130    Impression: Intrauterine pregnancy, + fetal motion        Medications - No data to display  Labs Ordered and Resulted from Time of ED Arrival to Time of ED Departure - No data to display  US Lower Extremity Venous Duplex Right   Final Result   IMPRESSION:   1.  No deep venous thrombosis in the right lower extremity.      POC US OB TRANSABDOMINAL LIMITED   Final Result   Limited Bedside Transabdominal ultrasound for evaluation of IUP         Performed any interpreted by me.      Indication:  Concern for fetal motion    Findings:   The lower abdomen was interrogated with a curvilinear probe. The uterus was identified.    Within the uterus there is a moving fetus with visible heart rate with FHR of 130      Impression: Intrauterine pregnancy, + fetal motion                 Critical care  was not performed.     Medical Decision Making  The patient's presentation was of high complexity (an acute health issue posing potential threat to life or bodily function).    The patient's evaluation involved:  review of 3+ test result(s) ordered prior to this encounter (see separate area of note for details)  independent interpretation of testing performed by another health professional (see separate area of note for details)    The patient's management necessitated only low risk treatment.      Assessment & Plan    22-year-old female with history as noted above currently well-appearing history of extensive DVT PE presenting with calf cramp.  DDx includes thrombosis-deep or superficial, rashes/cellulitis, muscle sprain, Baker's cyst, among other causes    At this time presentation symptoms largely resolved.  No exam findings of concern and both calves appear to be symmetric.  Concerned about pregnancy as above, bedside ultrasound reassuring  DVT ultrasound negative and patient is on Lovenox-DVT unlikely missed    A broad ddx was considered as above. Given the pts presentation, clinical course, and workup, my suspicion for an emergent process is low. Pt improved on serial exams. Appropriate for outpt follow up with SERP.  Will discuss with her doctor with a repeat ultrasound is founded given extensive prior history although currently on Lovenox      I have reviewed the nursing notes. I have reviewed the findings, diagnosis, plan and need for follow up with the patient.    New Prescriptions    No medications on file       Final diagnoses:   Calf cramp   Concern about pregnancy complication without diagnosis       Varun Bernal MD  [facility] EMERGENCY DEPARTMENT  5/21/2023     Varun Bernal MD  05/21/23 0345

## 2023-05-21 NOTE — DISCHARGE INSTRUCTIONS
Continue with all your regularly scheduled follow-ups and medications    Return to the emergency department for any further leg pain, swelling, fever, numbness or tingling, rash or any other concerns for worsening

## 2023-05-24 RX ORDER — ALBUTEROL SULFATE 90 UG/1
2 AEROSOL, METERED RESPIRATORY (INHALATION) EVERY 6 HOURS PRN
Qty: 18 G | Refills: 1 | Status: SHIPPED | OUTPATIENT
Start: 2023-05-24 | End: 2023-08-01

## 2023-05-24 NOTE — TELEPHONE ENCOUNTER
3/13/2023  [facility] Internal Medicine [place]  Cira Amanda MD  Internal Medicine        Chronic cough [R05.3]

## 2023-05-30 NOTE — PROGRESS NOTES
"Maternal-Fetal Medicine   Return OB Visit    Cande Shields  : 2000  MRN: [id number]    Subjective:  Cande Shields is a 22 year old  at 20w0d by 7w2d US here for return OB Visit.    Since last OB visit on , Cande was seen in ED on  for right leg pain. Pain was described as a charley horse. Lower extremity dopplers were negative. Cande was also seen on on  for suspected strep throat and prescribed Cefdinir 300mg BID. Cande states she feels much improved from her upper respiratory symptoms. Cough has improved. No current concerns.    Cande is overall feeling well today. States vaginal bleeding has resolved. Denies any change to discharge. Denies loss of fluid or contractions. Endorsing some fetal movement. Denies chest pain, shortness of breath or other systemic symptoms.    Takes Lovenox two times daily at 0730 and 1930.    Pregnancy Notable for:  - Granulomatosis polyangiitis  - History of multiple VTE, including DVT and PE  - Subchorionic hemorrhage  - Right atrial thrombus s/p sternotomy and thrombectomy on 2023 earlier in pregnancy  - PFO s/p closure on 2023  - History of IUFD at 31 weeks  - History of preeclampsia  - Chronic kidney disease (baseline creatinine ~1.3)  - Bicornuate uterus    Objective:  BP 99/68 (BP Location: Left arm, Patient Position: Sitting, Cuff Size: Adult Large)   Pulse 69   Resp 18   Wt 101.3 kg (223 lb 4.8 oz)   LMP 01/10/2023 (Approximate)   SpO2 97%   BMI 36.63 kg/m    Gen: Well appearing, no acute distress  CV: Regular rate and rhythm  Pulm: CTAB    Imaging:   Please see \"Imaging\" tab under \"Chart Review\" for details of today's US at the [facility].     ASSESSMENT/PLAN:  Cande Shields is a 22 year old  at 20w2d by 7w2d US here for [facility] OB follow up visit.    Granulomatosis polyangitis  History of VTE (PE and DVT)  Right atrial thrombus s/p sternotomy and thrombectomy in early pregnancy  Anemia  - Check anti-Xa levels " monthly. Her last anti-Xa level was  and resulted at 1.13. Recommend completing anti-Xa level 4-6 hours after administation in the next week as she does not have time today.  - S/p 1 IV iron infusion. Strict return precautions previously given as she may ultimately require blood transfusion.  In process of rescheduling.  - Scheduled visit on  with Dr. Dye of hematology to discuss management of her anticoagulation surrounding her delivery.  - Seen by Dr. Oneal, cardiology on . Next appt on  (or sooner as clinically indicated).  - Continue metoprolol 12.5 mg BID. BP 99/68 today, though patient denies any lightheadedness or dizziness.   - Scheduled to see rheumatology again . Will discuss moving this appointment up if new symptoms arise.    Chronic Kidney Disease  - Goal blood pressure at 130/80. BP 99/68 today, though patient denies any lightheadedness or dizziness.   - Follow up with Nephrology on   - Metoprolol 12.5mg BID    History of preeclampsia  History of IUFD at 31 weeks   - Close monitoring for signs and/or symptoms of evolving preeclampsia  - Close monitoring of blood pressure both prenatally and in the postpartum period  - Continue low dose aspirin for preeclampsia prophylaxis.     Surveillance  Subchorionic hemorrhage  - Comprehensive anatomy ultrasound today  - Serial growth US every 4 weeks beginning at 28 weeks.  - Weekly  surveillance with BPPs beginning at 28 weeks (prior to her IUFD at 30 weeks in prior pregnancy)     Delivery planning:  - Anesthesia consult in third trimester  - Mode of delivery to be determined in conjunction with cardiology and cardiovascular surgery, though would likely anticipate no contraindications to vaginal delivery.  - Will discuss with hematology regarding management of anticoagulation around the time of delivery.  - Delivery at 37-39 weeks, or sooner as clinically indicated.   - Feeding: discuss at future visit  - Contraception  plans: discuss at future visit     Routine PNC  - Prenatal labs:  Rh pos, antibody negative               HepB/HIV/RPR/HepC: negative               GC/CT: negative               Rubella: immune                 UC: no growth  - Pap smear: perform postpartum  - Tdap at 28 weeks  - Aneuploidy screening: NIPT no call x 2. Declines invasive testing at this time.   - 1 hour GCT at 24-28 weeks    Follow up in 4 weeks. Will plan to see q2 weeks after that time. Sooner with symptoms.    The patient was seen and evaluated with Dr. Gonzalez.    Thank you for allowing us to participate in the care of your patient. Please do not hesitate to contact us if you have further questions regarding the management of your patient.     Antony Mendoza MD, MPH  Ob/Gyn Resident, PGY-2  06/01/23 11:43 AM  ----                                                                                                          MFM Physician Attestation  I saw this patient with the resident and agree with the resident/fellow's findings and plan of care as documented in the note.  I personally reviewed her vital signs, medications, labs, pertinent imaging, and fetal monitoring.     I personally spent a total of 30 minutes, including both face-to-face and non-face-to-face on the date of the encounter, addressing the above diagnosis. Activities performed in this time include chart review, obtaining / reviewing history, performing a medically necessary evaluation, documentation and counseling/care coordination/ordering tests/ordering meds.      Ina Gonzalez MD  Maternal Fetal Medicine   Date of Service (when I saw the patient): 6/1/2023

## 2023-06-01 ENCOUNTER — OFFICE VISIT (OUTPATIENT)
Dept: MATERNAL FETAL MEDICINE | Facility: CLINIC | Age: 23
End: 2023-06-01
Attending: OBSTETRICS & GYNECOLOGY
Payer: COMMERCIAL

## 2023-06-01 ENCOUNTER — HOSPITAL ENCOUNTER (OUTPATIENT)
Dept: ULTRASOUND IMAGING | Facility: CLINIC | Age: 23
Discharge: HOME | End: 2023-06-01
Attending: OBSTETRICS & GYNECOLOGY
Payer: COMMERCIAL

## 2023-06-01 VITALS
HEART RATE: 69 BPM | RESPIRATION RATE: 18 BRPM | SYSTOLIC BLOOD PRESSURE: 99 MMHG | BODY MASS INDEX: 36.63 KG/M2 | DIASTOLIC BLOOD PRESSURE: 68 MMHG | OXYGEN SATURATION: 97 % | WEIGHT: 223.3 LBS

## 2023-06-01 DIAGNOSIS — Z87.59 HISTORY OF IUFD: Primary | ICD-10-CM

## 2023-06-01 DIAGNOSIS — M31.31 GRANULOMATOSIS WITH POLYANGIITIS WITH RENAL INVOLVEMENT (H): ICD-10-CM

## 2023-06-01 DIAGNOSIS — O09.90 HIGH-RISK PREGNANCY, UNSPECIFIED TRIMESTER: ICD-10-CM

## 2023-06-01 DIAGNOSIS — O09.299 HISTORY OF HELLP SYNDROME, CURRENTLY PREGNANT: ICD-10-CM

## 2023-06-01 DIAGNOSIS — N18.30 STAGE 3 CHRONIC KIDNEY DISEASE, UNSPECIFIED WHETHER STAGE 3A OR 3B CKD (H): Chronic | ICD-10-CM

## 2023-06-01 PROCEDURE — 76811 OB US DETAILED SNGL FETUS: CPT

## 2023-06-01 PROCEDURE — 99214 OFFICE O/P EST MOD 30 MIN: CPT | Mod: 25 | Performed by: STUDENT IN AN ORGANIZED HEALTH CARE EDUCATION/TRAINING PROGRAM

## 2023-06-01 PROCEDURE — G0463 HOSPITAL OUTPT CLINIC VISIT: HCPCS | Mod: 25 | Performed by: STUDENT IN AN ORGANIZED HEALTH CARE EDUCATION/TRAINING PROGRAM

## 2023-06-01 PROCEDURE — 76811 OB US DETAILED SNGL FETUS: CPT | Mod: 26 | Performed by: STUDENT IN AN ORGANIZED HEALTH CARE EDUCATION/TRAINING PROGRAM

## 2023-06-01 NOTE — PATIENT INSTRUCTIONS
Understanding Round Ligament Pain in Pregnancy   Round ligament pain is a common problem in pregnancy. Ligaments are strong tissues that connect bones, muscles, and organs. There are 2 round ligaments. There is 1 on each side of the uterus. The top part of each ligament attaches to the upper side of the uterus. The bottom of each ligament attaches down in the pubic area. These ligaments help keep the uterus in place as you move around.     What causes round ligament pain in pregnancy?   As your uterus grows during pregnancy, the round ligaments are stretched and work harder when you move around. They may stretch too quickly when you stand up or bend or laugh. Nearby nerves may be irritated, or the ligaments may have a painful spasm.   Symptoms of ligament pain in pregnancy  The symptoms are sharp pains that last a few seconds. The pain may happen most often on the right side of the belly. It may happen in the hip, the lower belly, or even deep down in your pubic area. The pain may happen when you:     Move suddenly    Stand up    Walk    Roll over in bed    Laugh    Cough    Sneeze  Diagnosing round ligament pain in pregnancy   Your healthcare provider will ask about your symptoms and give you a physical exam. He or she may give you tests to check for other problems that can cause pain, such as an ovarian cyst or enlarged vein (varicocele). He or she will also check for signs of  labor or other pregnancy problems.   Treatment for round ligament pain in pregnancy   To help prevent pain:    Move slowly when you stand up, roll over, turn, or bend.    Don t stand for long periods of time.    Don t lift heavy objects.    Do gentle daily stretches of your hip joints.  When to call your healthcare provider  Call your healthcare provider right away if you have any of these:     Fever of 100.4 F (38 C) or higher    Pains that last more than a few minutes    Pain that gets worse    Bleeding, nausea, vomiting, or  other new symptoms  [employer] last reviewed this educational content on 3/1/2020    5986-4768 [employer] All rights reserved. This information is not intended as a substitute for professional medical care. Always follow your healthcare professional's instructions.          Relieving Back Pain During Pregnancy: Wall Stretch, Body Bend   Before trying these exercises, talk to your healthcare provider to make sure they are safe for you. Ask your healthcare provider how many times to do each exercise.   Wall stretch  This strengthens and loosens the muscles in your upper back:   1. Lean against a wall with a firm pillow or rolled towel under your shoulder blades. Your feet should be about 12 inches from the wall and shoulder-width apart. Point your chin down.  2. Breathe in. Push your shoulders, neck, and head against the wall. You will feel a stretch in your shoulders.  3. Hold for 5 counts. Then breathe out, and relax your shoulders and neck.   Body bend  This strengthens your back and buttocks muscles:   1. Stand with your legs shoulder-width apart. Put your hands on your upper thighs and bend your knees slightly.  2. Slowly bend forward at the hips. Push your hips back and keep your shoulders up. Make sure your back is straight. You ll feel a stretch in your upper thighs. You ll also feel your back muscles holding you in position.  3. Hold for 5 counts, then straighten.   [employer] last reviewed this educational content on 7/1/2021 2000-2022 [employer] All rights reserved. This information is not intended as a substitute for professional medical care. Always follow your healthcare professional's instructions.          Pregnancy: Planning Your Exercise Routine  While you re pregnant, an exercise routine helps both your mind and your body feel good. It tones your muscles and makes them stronger. It also gives you and your baby more oxygen.   The right exercise for you    Overall  conditioning is best for you and your baby. Try walking, swimming, or riding a stationary bike. Always warm up, cool down, and drink enough fluids. Keep a snack close by in case your blood sugar gets low. Discuss exercise choices with your healthcare provider. Talk about the following:     If you already exercise, find out how to adapt your routine while you re pregnant. Keep the intensity of the exercise moderate. As your pregnancy progresses, your center of gravity will change. Be careful to keep your balance.    Ask if there are any local prenatal exercise classes, such as yoga or water aerobics. Find out which prenatal exercise videos are good choices.    If you were not exercising before your pregnancy, find out the best way to start. Now is not the time to begin a new workout on your own. Start slowly. Listen to your body.    Ask which forms of exercise you should avoid. These may include risky activities like hot yoga, horseback riding, scuba diving, skiing, skating, and contact sports.  Pelvic tilts  These help strengthen your stomach muscles and low back. You can do pelvic tilts instead of sit-ups.     Do this exercise on your hands and knees.    Relax the back of your neck. Pull your stomach in until your low back flattens.    Hold for 30 seconds. Release. Repeat 10 times. Do this twice a day.  Kegel exercises  Kegel exercises strengthen the pelvic muscles. Doing Kegels daily helps prepare these muscles for delivery. Kegels also help ease your recovery. You exercise these muscles by tightening, holding, then relaxing them. To do 1 type of Kegel exercise, contract as if you were stopping your urine stream (but do it when you re not urinating). Hold for 10 seconds, then repeat 10 times, a few times a day.   Tips to add activity  Here are some tips to follow:    Park the car farther from a store and walk.    If you can, do errands on foot instead of driving.    Walk across the office to talk to someone in  person instead of calling.    While waiting for appointments, go up and down stairs or around the block.  Tips to stay active  Here are some tips to follow:    Maintain your routine. But exercise less intensely if you feel tired.    Base your workout on how you feel, not your heart rate. Heart rates aren t a good way to measure effort during pregnancy.    Don't exercise on your back after week 16.  What are the warning signs that I should stop exercising?  Stop exercising and call your healthcare provider if you have any of these symptoms:    Vaginal bleeding     Dizziness or feeling faint     Increased shortness of breath     Chest pain     Headache     Muscle weakness     Calf (back of the leg) pain or swelling      Uterine contractions or  labor     Decreased fetal movement     Fluid leaking (or gushing) from your vagina  [employer] last reviewed this educational content on 2021-2022 [employer]. All rights reserved. This information is not intended as a substitute for professional medical care. Always follow your healthcare professional's instructions.

## 2023-06-13 DIAGNOSIS — O99.419 HEART DISEASE DURING PREGNANCY, ANTEPARTUM: Primary | ICD-10-CM

## 2023-06-13 DIAGNOSIS — I51.9 HEART DISEASE DURING PREGNANCY, ANTEPARTUM: Primary | ICD-10-CM

## 2023-06-26 ENCOUNTER — MYC MEDICAL ADVICE (OUTPATIENT)
Dept: NEPHROLOGY | Facility: CLINIC | Age: 23
End: 2023-06-26
Payer: COMMERCIAL

## 2023-06-26 DIAGNOSIS — I77.82 ANCA-ASSOCIATED VASCULITIS (H): Primary | ICD-10-CM

## 2023-06-29 ENCOUNTER — HOSPITAL ENCOUNTER (OUTPATIENT)
Dept: ULTRASOUND IMAGING | Facility: CLINIC | Age: 23
Discharge: HOME | End: 2023-06-29
Attending: STUDENT IN AN ORGANIZED HEALTH CARE EDUCATION/TRAINING PROGRAM
Payer: COMMERCIAL

## 2023-06-29 ENCOUNTER — OFFICE VISIT (OUTPATIENT)
Dept: MATERNAL FETAL MEDICINE | Facility: CLINIC | Age: 23
End: 2023-06-29
Attending: STUDENT IN AN ORGANIZED HEALTH CARE EDUCATION/TRAINING PROGRAM
Payer: COMMERCIAL

## 2023-06-29 VITALS
BODY MASS INDEX: 37.2 KG/M2 | WEIGHT: 226.8 LBS | OXYGEN SATURATION: 96 % | HEART RATE: 69 BPM | DIASTOLIC BLOOD PRESSURE: 76 MMHG | SYSTOLIC BLOOD PRESSURE: 109 MMHG | RESPIRATION RATE: 18 BRPM

## 2023-06-29 DIAGNOSIS — O99.419 HEART DISEASE DURING PREGNANCY, ANTEPARTUM: ICD-10-CM

## 2023-06-29 DIAGNOSIS — N18.30 STAGE 3 CHRONIC KIDNEY DISEASE, UNSPECIFIED WHETHER STAGE 3A OR 3B CKD (H): Chronic | ICD-10-CM

## 2023-06-29 DIAGNOSIS — Z87.59 HISTORY OF IUFD: ICD-10-CM

## 2023-06-29 DIAGNOSIS — O09.292 PRIOR PREGNANCY WITH FETAL DEMISE AND CURRENT PREGNANCY IN SECOND TRIMESTER: ICD-10-CM

## 2023-06-29 DIAGNOSIS — O09.299 HISTORY OF HELLP SYNDROME, CURRENTLY PREGNANT: ICD-10-CM

## 2023-06-29 DIAGNOSIS — I77.82 ANCA-ASSOCIATED VASCULITIS (H): ICD-10-CM

## 2023-06-29 DIAGNOSIS — I51.9 HEART DISEASE DURING PREGNANCY, ANTEPARTUM: ICD-10-CM

## 2023-06-29 DIAGNOSIS — M31.31 GRANULOMATOSIS WITH POLYANGIITIS WITH RENAL INVOLVEMENT (H): ICD-10-CM

## 2023-06-29 PROCEDURE — 76816 OB US FOLLOW-UP PER FETUS: CPT | Mod: 26 | Performed by: OBSTETRICS & GYNECOLOGY

## 2023-06-29 PROCEDURE — 76816 OB US FOLLOW-UP PER FETUS: CPT

## 2023-06-29 PROCEDURE — G0463 HOSPITAL OUTPT CLINIC VISIT: HCPCS | Mod: 25 | Performed by: OBSTETRICS & GYNECOLOGY

## 2023-06-29 PROCEDURE — 99214 OFFICE O/P EST MOD 30 MIN: CPT | Mod: 25 | Performed by: OBSTETRICS & GYNECOLOGY

## 2023-06-29 NOTE — PATIENT INSTRUCTIONS
Blood Glucose Screening During Pregnancy  Gestational diabetes is diabetes that only pregnant women get. Changes in your body during pregnancy can cause high blood sugar (glucose). This can cause problems for you and your baby. It is a serious condition. But it can be controlled.     Your healthcare provider will talk with you about blood glucose screening.     Who is at risk for gestational diabetes?  You are at risk of getting gestational diabetes if any of the risk factors below apply to you. The risk for this condition gets higher as your number of risk factors increases:    You are , , , , or .    You weigh more than your healthcare provider says is healthy for you.    You have a relative with diabetes.    You are older than 25.    You had gestational diabetes during a past pregnancy.    You had a stillbirth or a very large baby before.    You have a history of abnormal glucose tolerance.    You have sugar in your urine at the first prenatal visit.    You have metabolic syndrome, PCOS (polycystic ovary syndrome), are using glucocorticoids, or have high blood pressure.    You are pregnant with twins or more  What happens during a screening?  Here is what to expect during a blood glucose screening:    There is conflicting advice for screening. But the American College of Obstetricians and Gynecologists currently advises that all pregnant women be screened for gestational diabetes. When you are screened depends on your risk. Women are tested at 24 to 28 weeks of pregnancy. Women at high risk may be tested when they first learn they are pregnant.    To do the screening, a blood sample is taken. Your blood sugar level is measured.    If the results show a high blood sugar level, a glucose tolerance test may be ordered. You will drink a certain amount of sugar. This test measures how long it takes for sugar to leave your blood. The test will show if you  have gestational diabetes.  What to know if you test positive  Here are some things you need to know:    Gestational diabetes can be treated. The best way to control it is to find out you have it early and start treatment quickly.    This condition can cause problems for the mother during pregnancy. It can also cause problems with the baby during pregnancy, delivery, and after. Treatment greatly lowers the chance for problems.    The changes in your body that cause gestational diabetes normally happen only when you are pregnant. After the baby is born, your body goes back to normal. The condition goes away. But you may be more likely to have type 2 diabetes later. Talk with your healthcare provider about ways to help prevent type 2 diabetes.  Treating gestational diabetes  Here is how to treat gestational diabetes:    You ll need to check your blood sugar often. You can do this at home. Prick your finger and check a drop of blood on a glucose monitor. Your healthcare provider will show you how and when to check your blood sugar. They will talk about your target blood sugar level.    To manage your blood sugar, you will be given a special plan. It will likely include meal planning and getting regular exercise. Some women need to take a hormone called insulin. Others may take medicine to help control their blood sugar.  [email or web address] last reviewed this educational content on 8/1/2020 2000-2022 [employer]. All rights reserved. This information is not intended as a substitute for professional medical care. Always follow your healthcare professional's instructions.          Tdap (Tetanus, Diphtheria, Pertussis) Vaccine: What You Need to Know    This is a Vaccine Information Statement from the CDC.   Many vaccine information statements are available in Mongolian and other languages. See www.immunize.org/vis   Hojas de información sobre vacunas están disponibles en español y en muchos otros idiomas. Visite  www.immunize.org/vis   1. Why get vaccinated?   Tdap vaccine can prevent tetanus, diphtheria, and pertussis.   Diphtheria and pertussis spread from person to person. Tetanus enters the body through cuts or wounds.     TETANUS (T) causes painful stiffening of the muscles. Tetanus can lead to serious health problems, including being unable to open the mouth, having trouble swallowing and breathing, or death.    DIPHTHERIA (D) can lead to difficulty breathing, heart failure, paralysis, or death.    PERTUSSIS (aP), also known as  whooping cough,  can cause uncontrollable, violent coughing that makes it hard to breathe, eat, or drink. Pertussis can be extremely serious especially in babies and young children, causing pneumonia, convulsions, brain damage, or death. In teens and adults, it can cause weight loss, loss of bladder control, passing out, and rib fractures from severe coughing.  2. Tdap vaccine   Tdap is only for children 7 years and older, adolescents, and adults.   Adolescents should receive a single dose of Tdap, preferably at age 11 or 12 years.   Pregnant people should get a dose of Tdap during every pregnancy, preferably during the early part of the third trimester, to help protect the  from pertussis. Infants are most at risk for severe, life-threatening complications from pertussis.   Adults who have never received Tdap should get a dose of Tdap.   Also, adults should receive a booster dose of either Tdap or Td (a different vaccine that protects against tetanus and diphtheria but not pertussis) every 10 years , or after 5 years in the case of a severe or dirty wound or burn.   Tdap may be given at the same time as other vaccines.   3. Talk with your health care provider  Tell your vaccination provider if the person getting the vaccine:     Has had an allergic reaction after a previous dose of any vaccine that protects against tetanus, diphtheria, or pertussis , or has any severe, life-threatening  allergies    Has had a coma, decreased level of consciousness, or prolonged seizures within 7 days after a previous dose of any pertussis vaccine (DTP, DTaP, or Tdap)    Has seizures or another nervous system problem    Has ever had Guillain-Barré Syndrome (also called  GBS )    Has had severe pain or swelling after a previous dose of any vaccine that protects against tetanus or diphtheria  In some cases, your health care provider may decide to postpone Tdap vaccination until a future visit.   People with minor illnesses, such as a cold, may be vaccinated. People who are moderately or severely ill should usually wait until they recover before getting Tdap vaccine.   Your health care provider can give you more information.  4. Risks of a vaccine reaction     Pain, redness, or swelling where the shot was given, mild fever, headache, feeling tired, and nausea, vomiting, diarrhea, or stomachache sometimes happen after Tdap vaccination.  People sometimes faint after medical procedures, including vaccination. Tell your provider if you feel dizzy or have vision changes or ringing in the ears.   As with any medicine, there is a very remote chance of a vaccine causing a severe allergic reaction, other serious injury, or death.   5. What if there is a serious problem?  An allergic reaction could occur after the vaccinated person leaves the clinic. If you see signs of a severe allergic reaction (hives, swelling of the face and throat, difficulty breathing, a fast heartbeat, dizziness, or weakness), call 9-1-1 and get the person to the nearest hospital.   For other signs that concern you, call your health care provider.   Adverse reactions should be reported to the Vaccine Adverse Event Reporting System (VAERS). Your health care provider will usually file this report, or you can do it yourself. Visit the VAERS website at www.vaers.hhs.gov or call 1-665.236.7954. VAERS is only for reporting reactions, and VAERS staff members do  not give medical advice.   6. The National Vaccine Injury Compensation Program  The National Vaccine Injury Compensation Program (VICP) is a federal program that was created to compensate people who may have been injured by certain vaccines. Claims regarding alleged injury or death due to vaccination have a time limit for filing, which may be as short as two years. Visit the VICP website at www.[facility] [[email or web address]/vaccinecompensation or call 1-314.692.7485 to learn about the program and about filing a claim.   7. How can I learn more?     Ask your health care provider.    Call your local or state health department.    Visit the website of the Food and Drug Administration (FDA) for vaccine package inserts and additional information at www.fda.gov/vaccines-blood-biologics/vaccines.  ? Contact the Centers for Disease Control and Prevention (CDC): Call 1-296.723.7700 ( 5-267-DLF-INFO) or  ? Visit CDC s website at www.cdc.gov/vaccines.  Vaccine Information Statement   Tdap (Tetanus, Diphtheria, Pertussis) Vaccine  42 U.S.C.   [id number]  8/6/2021  [employer] last reviewed this educational content on     4694-5635 [employer]. All rights reserved. This information is not intended as a substitute for professional medical care. Always follow your healthcare professional's instructions.

## 2023-06-29 NOTE — NURSING NOTE
Cande seen in clinic today for OB visit at 24w2d gestation. VSS. Pt reports normal fetal movement, see flowsheet. Pt evaluated for  labor, preeclampsia, infection, fetal wellbeing without concerns. Pt denies bleeding/lof/change in vaginal discharge/contractions/headache/vision changes/chest pain/SOB/edema. 1 hour GCT discussed today, patient would prefer to do this at her next visit. Plans to get all labs that were released today at [place] tomorrow. Patient has had trouble getting to appointments as she works [employer], does not want to miss more work, and does not want to drive her car to [place]. Help with finding medical cab rides offered, patient declines. Phone number given to reschedule cardiology appointment following patient discussion with provider today. Nano message sent regarding infusions center open on  so patient can go on a non-work day. Pt notified to review new pt education in [facility], verbally reviewed highlights. Dr. To and Dr. Munoz met with pt and discussed POC. Plan for return in 2-3 weeks for RL2 . Future visits scheduled at . Pt discharged stable and ambulatory.

## 2023-06-29 NOTE — PROGRESS NOTES
"Maternal-Fetal Medicine   Return OB Visit    Cande Shields  : 2000  MRN: [id number]    Subjective:  Cande Shields is a 22 year old  at 24w2d by 7w2d US here for return OB Visit.    Since last OB visit on 23, Cande reports she has been doing well.  Denies vaginal bleeding, LOF, contractions or pain.  Active fetal movement.  Denies fever, chills, headache, chest pain, shortness of breath, leg swelling, nausea, vomiting, or other concerns.    She reports she is having difficulty making all of her consultations with her work schedule.  She has unfortunately had to cancel her rheumatology visit, cardiology visit, IV iron infusion.  Is unable to do Glucola today but is able to get labs tomorrow including Anti-Xa level at [facility] in anticipation of her 23 nephrology phone visit with Dr. Pena.    Takes Lovenox two times daily at 0730 and 1930.    Pregnancy Notable for:  - Granulomatosis polyangiitis  - History of multiple VTE, including DVT and PE  - Subchorionic hemorrhage  - Right atrial thrombus s/p sternotomy and thrombectomy on 2023 earlier in pregnancy  - PFO s/p closure on 2023  - History of IUFD at 31 weeks  - History of severe pre-eclampsia  - Chronic kidney disease (baseline creatinine ~1.3)  - Bicornuate uterus  - Downtrending EFW (26% --> 11%); NIPT no call x 2, declines diagnostic testing or quad screen    Objective:  /76 (BP Location: Left arm, Patient Position: Sitting, Cuff Size: Adult Regular)   Pulse 69   Resp 18   Wt 102.9 kg (226 lb 12.8 oz)   LMP 01/10/2023 (Approximate)   SpO2 96%   BMI 37.20 kg/m       Gen:  Alert, oriented, appears comfortable  CV:  Regular rate and rhythm  Pulm: Clear to auscultation bilateral fields  Extremities:  No edema    Imaging:   Please see \"Imaging\" tab under \"Chart Review\" for details of today's US at the [facility].     ASSESSMENT/PLAN:  Cande Shields is a 22 year old  at 24w2d by 7w2d US here for " MFM OB follow up visit.    Pregnancy Notable for:  - Granulomatosis polyangiitis  - History of multiple VTE, including DVT and PE  - Subchorionic hemorrhage  - Right atrial thrombus s/p sternotomy and thrombectomy on 2/18/2023 earlier in pregnancy  - PFO s/p closure on 2/18/2023  - History of IUFD at 31 weeks  - History of severe pre-eclampsia  - Chronic kidney disease (baseline creatinine ~1.3)  - Bicornuate uterus  - Downtrending EFW (26% --> 11%); NIPT no call x 2, declines diagnostic testing or quad screen    Granulomatosis polyangitis  History of VTE (PE and DVT)  Right atrial thrombus s/p sternotomy and thrombectomy in early pregnancy  Anemia  - Check anti-Xa levels monthly. Her last anti-Xa level was 5/9 and resulted at 1.13. Recommend completing anti-Xa level 4-6 hours after administration; she will do this tomorrow (6/30)  - Hgb 9.0 on 4/26/23  - S/P 1 IV iron infusion. Strict return precautions previously given as she may ultimately require blood transfusion.  In process of rescheduling on Saturdays at the [facility]; phone number provided  - Scheduled visit on 8/17 with Dr. Dye of hematology to discuss management of her anticoagulation surrounding her delivery.  - Seen by Dr. Oneal, cardiology on 4/26. Missed appointment on 6/22; will need to reschedule.  Number provided today.  Discussed the importance of maintaining this visit given her recent PFO closure.  - Continue metoprolol 12.5 mg BID. Normotensive today, patient asymptomatic.  - Scheduled to see rheumatology 6/1 but appointment cancelled.  She does not feel she can reschedule this at this time and will prioritize cardiology, MFM, nephrology.    Chronic Kidney Disease  - Goal blood pressure at 130/80. BP normotensive today 109/76.  - Follow up with Nephrology initially on 6/27 however rescheduled with Dr. Goodson for 7/5.  Labs to be drawn tomorrow at [facility] in anticipation of this.  - Continue Metoprolol 12.5 mg BID    History of  pre-eclampsia, severe  History of IUFD at 31 weeks   EFW downtrending to 11%  - Close monitoring for signs and/or symptoms of evolving preeclampsia using home BP cuff; precautions reviewed today  - Close monitoring of blood pressure both prenatally and in the postpartum period  - Continue low dose aspirin for preeclampsia prophylaxis.  - 3/2/23 baseline labs urine protein/creatinine 0.87; 3/13/23 Creatinine 1.28, AST 8, ALT 14, hgb 8.1, plt 509     Surveillance  Subchorionic hemorrhage  - Comprehensive anatomy ultrasound 23  - Serial growth US; will repeat in 2-3 weeks as patient is very axious regarding the downtrending growth especially in light of her history of severe pre-eclampsia resulting in IUFD  - BPPs beginning at 28 weeks (prior to her IUFD at 30 weeks in prior pregnancy)     Delivery planning:  - Anesthesia consult in third trimester  - Mode of delivery to be determined in conjunction with cardiology and cardiovascular surgery, though would likely anticipate no contraindications to vaginal delivery.  - Will discuss with hematology regarding management of anticoagulation around the time of delivery.  - Delivery at 37 weeks, or sooner as clinically indicated.   - Feeding: breast  - Contraception plans: undecided, initial counseling provided     Routine PNC  - Prenatal labs:  Rh pos, antibody negative               HepB/HIV/RPR/HepC: negative               GC/CT: negative               Rubella: immune                 UC: no growth  - Pap smear: perform postpartum  - Tdap at 28 weeks  - Aneuploidy screening: NIPT no call x 2. Declines invasive testing at this time.   - 1 hour GCT at next visit, declines today    Follow up in 2-3 weeks for OB visit and growth US.  1 hr glucola to be done that day.  At that visit will review visit with Dr. Goodson.  Labs to be done tomorrow at [place] in anticipation of Dr. Goodson visit, including anti-Xa and Treponema.  Patient given phone number to reschedule  cardiology visit and echo.  Will work to reschedule IV iron infusion.  Continue home BP monitoring.    The patient was seen and evaluated with Dr. Tammy To MD   Maternal-Fetal Medicine Fellow, PGY6  2023 10:17 AM      Physician Attestation   I saw this patient with the fellow and agree with the resident/fellow's findings and plan of care as documented in the note.      Key findings: 23 yo  at 24w2d with complex medical history. Discussed today the less than expected interval fetal growth with the EFW now at the 11th percentile. Cande is very anxious regarding the fetal growth given her history of  preeclampsia with stillbirth. We will plan to repeat the fetal growth assessment in 2-3 weeks, and if the EFW or AC declines to <10th percentile will plan to initiate  surveillance at that time, otherwise will begin at 28 weeks gestation as previously planned. Discussed signs and symptoms of preeclampsia, and recommended starting home blood pressure monitoring. We discussed the importance of the sub-specialty care for optimization of her health in pregnancy. Cande will prioritize Cardiology and Nephrology, as well as follow up with Hematology. We will work to try and schedule IV iron infusions on  as she does not work that day. Labs to be completed at [facility] tomorrow, and GCT when returns to clinic in 2-3 weeks.    32 MINUTES SPENT BY ME on the date of service doing chart review, history, exam, documentation & further activities per the note.    Roxie Munoz MD  Date of Service (when I saw the patient): 23

## 2023-06-30 ENCOUNTER — LAB (OUTPATIENT)
Dept: LAB | Facility: CLINIC | Age: 23
End: 2023-06-30
Payer: COMMERCIAL

## 2023-06-30 DIAGNOSIS — I77.82 ANCA-ASSOCIATED VASCULITIS (H): ICD-10-CM

## 2023-06-30 DIAGNOSIS — O99.012 ANEMIA AFFECTING PREGNANCY IN SECOND TRIMESTER: ICD-10-CM

## 2023-06-30 LAB
ALBUMIN MFR UR ELPH: 115 MG/DL
ALBUMIN SERPL BCG-MCNC: 3.7 G/DL (ref 3.5–5.2)
ALBUMIN UR-MCNC: 100 MG/DL
ALP SERPL-CCNC: 103 U/L (ref 35–104)
ALT SERPL W P-5'-P-CCNC: 44 U/L (ref 0–50)
AMORPH CRY #/AREA URNS HPF: ABNORMAL /HPF
ANION GAP SERPL CALCULATED.3IONS-SCNC: 12 MMOL/L (ref 7–15)
APPEARANCE UR: CLEAR
AST SERPL W P-5'-P-CCNC: 25 U/L (ref 0–45)
BACTERIA #/AREA URNS HPF: ABNORMAL /HPF
BASOPHILS # BLD AUTO: 0.1 10E3/UL (ref 0–0.2)
BASOPHILS NFR BLD AUTO: 1 %
BILIRUB DIRECT SERPL-MCNC: <0.2 MG/DL (ref 0–0.3)
BILIRUB SERPL-MCNC: <0.2 MG/DL
BILIRUB UR QL STRIP: NEGATIVE
BUN SERPL-MCNC: 23.1 MG/DL (ref 6–20)
CALCIUM SERPL-MCNC: 8.8 MG/DL (ref 8.6–10)
CHLORIDE SERPL-SCNC: 107 MMOL/L (ref 98–107)
COLOR UR AUTO: YELLOW
CREAT SERPL-MCNC: 1.61 MG/DL (ref 0.51–0.95)
CREAT UR-MCNC: 269 MG/DL
CRP SERPL-MCNC: 7.78 MG/L
DEPRECATED HCO3 PLAS-SCNC: 18 MMOL/L (ref 22–29)
EOSINOPHIL # BLD AUTO: 0.2 10E3/UL (ref 0–0.7)
EOSINOPHIL NFR BLD AUTO: 3 %
ERYTHROCYTE [DISTWIDTH] IN BLOOD BY AUTOMATED COUNT: 18.7 % (ref 10–15)
ERYTHROCYTE [SEDIMENTATION RATE] IN BLOOD BY WESTERGREN METHOD: 52 MM/HR (ref 0–20)
GFR SERPL CREATININE-BSD FRML MDRD: 45 ML/MIN/1.73M2
GLUCOSE SERPL-MCNC: 80 MG/DL (ref 70–99)
GLUCOSE UR STRIP-MCNC: NEGATIVE MG/DL
HCT VFR BLD AUTO: 28.9 % (ref 35–47)
HGB BLD-MCNC: 8.9 G/DL (ref 11.7–15.7)
HGB UR QL STRIP: ABNORMAL
HYALINE CASTS #/AREA URNS LPF: ABNORMAL /LPF
IMM GRANULOCYTES # BLD: 0.1 10E3/UL
IMM GRANULOCYTES NFR BLD: 1 %
KETONES UR STRIP-MCNC: NEGATIVE MG/DL
LEUKOCYTE ESTERASE UR QL STRIP: ABNORMAL
LYMPHOCYTES # BLD AUTO: 1.5 10E3/UL (ref 0.8–5.3)
LYMPHOCYTES NFR BLD AUTO: 17 %
MAGNESIUM SERPL-MCNC: 2.2 MG/DL (ref 1.7–2.3)
MCH RBC QN AUTO: 24.2 PG (ref 26.5–33)
MCHC RBC AUTO-ENTMCNC: 30.8 G/DL (ref 31.5–36.5)
MCV RBC AUTO: 79 FL (ref 78–100)
MONOCYTES # BLD AUTO: 0.6 10E3/UL (ref 0–1.3)
MONOCYTES NFR BLD AUTO: 7 %
NEUTROPHILS # BLD AUTO: 6.3 10E3/UL (ref 1.6–8.3)
NEUTROPHILS NFR BLD AUTO: 71 %
NITRATE UR QL: NEGATIVE
NRBC # BLD AUTO: 0 10E3/UL
NRBC BLD AUTO-RTO: 0 /100
PH UR STRIP: 5.5 [PH] (ref 5–7)
PHOSPHATE SERPL-MCNC: 3.6 MG/DL (ref 2.5–4.5)
PLATELET # BLD AUTO: 368 10E3/UL (ref 150–450)
POTASSIUM SERPL-SCNC: 4.8 MMOL/L (ref 3.4–5.3)
PROT SERPL-MCNC: 6.7 G/DL (ref 6.4–8.3)
PROT/CREAT 24H UR: 0.43 MG/MG CR (ref 0–0.2)
RBC # BLD AUTO: 3.68 10E6/UL (ref 3.8–5.2)
RBC #/AREA URNS AUTO: ABNORMAL /HPF
SKIP: ABNORMAL
SODIUM SERPL-SCNC: 137 MMOL/L (ref 136–145)
SP GR UR STRIP: 1.03 (ref 1–1.03)
SQUAMOUS #/AREA URNS AUTO: ABNORMAL /LPF
UROBILINOGEN UR STRIP-MCNC: NORMAL MG/DL
WBC # BLD AUTO: 8.7 10E3/UL (ref 4–11)
WBC #/AREA URNS AUTO: ABNORMAL /HPF

## 2023-06-30 PROCEDURE — 84156 ASSAY OF PROTEIN URINE: CPT | Performed by: INTERNAL MEDICINE

## 2023-06-30 PROCEDURE — 86140 C-REACTIVE PROTEIN: CPT | Performed by: INTERNAL MEDICINE

## 2023-06-30 PROCEDURE — 86355 B CELLS TOTAL COUNT: CPT | Performed by: INTERNAL MEDICINE

## 2023-06-30 PROCEDURE — 85025 COMPLETE CBC W/AUTO DIFF WBC: CPT | Performed by: INTERNAL MEDICINE

## 2023-06-30 PROCEDURE — 83516 IMMUNOASSAY NONANTIBODY: CPT | Performed by: STUDENT IN AN ORGANIZED HEALTH CARE EDUCATION/TRAINING PROGRAM

## 2023-06-30 PROCEDURE — 83516 IMMUNOASSAY NONANTIBODY: CPT | Performed by: INTERNAL MEDICINE

## 2023-06-30 PROCEDURE — 83540 ASSAY OF IRON: CPT

## 2023-06-30 PROCEDURE — 82248 BILIRUBIN DIRECT: CPT

## 2023-06-30 PROCEDURE — 86780 TREPONEMA PALLIDUM: CPT | Performed by: STUDENT IN AN ORGANIZED HEALTH CARE EDUCATION/TRAINING PROGRAM

## 2023-06-30 PROCEDURE — 81001 URINALYSIS AUTO W/SCOPE: CPT

## 2023-06-30 PROCEDURE — 82728 ASSAY OF FERRITIN: CPT

## 2023-06-30 PROCEDURE — 80053 COMPREHEN METABOLIC PANEL: CPT

## 2023-06-30 PROCEDURE — 85652 RBC SED RATE AUTOMATED: CPT

## 2023-06-30 PROCEDURE — 83550 IRON BINDING TEST: CPT

## 2023-06-30 PROCEDURE — 83735 ASSAY OF MAGNESIUM: CPT | Performed by: INTERNAL MEDICINE

## 2023-07-01 LAB
CD19 B CELL COMMENT: ABNORMAL
CD19 CELLS # BLD: <1 CELLS/UL (ref 107–698)
CD19 CELLS NFR BLD: <1 % (ref 6–27)
T PALLIDUM AB SER QL: NONREACTIVE

## 2023-07-05 ENCOUNTER — VIRTUAL VISIT (OUTPATIENT)
Dept: NEPHROLOGY | Facility: CLINIC | Age: 23
End: 2023-07-05
Attending: INTERNAL MEDICINE
Payer: COMMERCIAL

## 2023-07-05 ENCOUNTER — TELEPHONE (OUTPATIENT)
Dept: NEPHROLOGY | Facility: CLINIC | Age: 23
End: 2023-07-05

## 2023-07-05 VITALS — WEIGHT: 230 LBS | BODY MASS INDEX: 37.72 KG/M2

## 2023-07-05 DIAGNOSIS — I77.82 ANCA-ASSOCIATED VASCULITIS (H): Primary | ICD-10-CM

## 2023-07-05 DIAGNOSIS — O99.012 ANEMIA AFFECTING PREGNANCY IN SECOND TRIMESTER: Primary | ICD-10-CM

## 2023-07-05 DIAGNOSIS — I77.82 ANCA-ASSOCIATED VASCULITIS (H): ICD-10-CM

## 2023-07-05 DIAGNOSIS — N18.30 STAGE 3 CHRONIC KIDNEY DISEASE, UNSPECIFIED WHETHER STAGE 3A OR 3B CKD (H): Primary | Chronic | ICD-10-CM

## 2023-07-05 LAB
FERRITIN SERPL-MCNC: 8 NG/ML (ref 6–175)
IRON BINDING CAPACITY (ROCHE): 510 UG/DL (ref 240–430)
IRON SATN MFR SERPL: 5 % (ref 15–46)
IRON SERPL-MCNC: 24 UG/DL (ref 37–145)

## 2023-07-05 PROCEDURE — 99215 OFFICE O/P EST HI 40 MIN: CPT | Mod: VID | Performed by: INTERNAL MEDICINE

## 2023-07-05 PROCEDURE — 99417 PROLNG OP E/M EACH 15 MIN: CPT | Performed by: INTERNAL MEDICINE

## 2023-07-05 RX ORDER — AZATHIOPRINE 50 MG/1
50 TABLET ORAL DAILY
Qty: 90 TABLET | Refills: 3 | Status: SHIPPED | OUTPATIENT
Start: 2023-07-05 | End: 2023-07-28

## 2023-07-05 ASSESSMENT — PAIN SCALES - GENERAL: PAINLEVEL: NO PAIN (0)

## 2023-07-05 NOTE — NURSING NOTE
Is the patient currently in the state of [place]? YES    Visit mode:VIDEO    If the visit is dropped, the patient can be reconnected by: VIDEO VISIT: Text to cell phone: [phone]    Will anyone else be joining the visit? NO      How would you like to obtain your AVS? MyChart    Are changes needed to the allergy or medication list? NO    Reason for visit: No chief complaint on file.

## 2023-07-05 NOTE — PATIENT INSTRUCTIONS
It was a pleasure taking care of you today.  I've included a brief summary of our discussion and care plan from today's visit below.  Please review this information with your primary care provider.     My recommendations are summarized as follows:  -Except for the creatinine value, all the other vasculitis activity indicators are improving.  Since your last dose of rituximab was in January and it is almost 6 months, it is time to start maintenance treatment for vasculitis.  The medication Imuran or azathioprine is a safe medication during the pregnancy.  It is a usually given for patients with other autoimmune diseases and for patients with a kidney transplant.  We will check a test before starting this medication that can help with dosing it. While waiting for the lab test, we will start on a very small dose 50 mg twice daily.  I would like you to check blood work 1 week after starting the new medication.  -I would like you to continue measuring your blood pressure twice daily.  -I would like to see you in clinic at [place] in 2 weeks after starting the new medication  -Since you work at the  please exercise caution with sick children by wearing a mask all day and washing your hands with soap and water frequently    Who do I call with any questions after my visit?  Please be in touch if there are any further questions that arise following today's visit.  There are multiple ways to contact your nephrology care team.       During business hours, you may reach your Nephrology Care Team or schedule or reschedule an appointment or lab at [phone].       If you need to schedule imaging, please call [phone].   To schedule a COVID test, please call [phone].     You can always send a secure message through [employer]. [employer] messages are answered by your nurse or doctor typically within 24-48 hours. Please allow extra time on weekends and holidays.       For urgent/emergent questions after business  hours, you may reach the on-call Nephrology Fellow by contacting the [facility]  at [phone].     How will I get the results of any tests ordered?    You will receive all of your results.  If you have signed up for MyChart, any tests ordered at your visit will be available to you once resulted on MyChart. Typically the physician reviews them and may or may not make further recommendations. If there are urgent results that require a change in your care plan, your physician or nurse will call you to discuss the next steps. If you are not on MyChart, a letter may be generated and mailed to you with your results.

## 2023-07-05 NOTE — RESULT ENCOUNTER NOTE
I would like her to get IV iron at the infusion center at [facility]. She works at [facility] and is easy for her to have it done there.  Thanks

## 2023-07-05 NOTE — Clinical Note
Ok to overbook in 2-3 weeks at [facility]. Marielena; can you make sure she gets the imuran (I ordered 50 daily; not sure if insurance would approve that or want bid dosing like the other patient). Thanks

## 2023-07-05 NOTE — TELEPHONE ENCOUNTER
Attempted to contact pt.  No answer.  Message left on VM to return call.   Calling to discuss,  Message received from Dr. Goodson to schedule pt in 2-3weeks at the [place] site.  Lab appointment prior.    [employer] message also sent.    Per Dr. Goodson ok to add pt to her schedule in 2-3 weeks. (7/18/23 or 7/25/23).    Marlee Lawrence LPN

## 2023-07-05 NOTE — LETTER
2023       RE: Cande Shields  [address]  [place] [address]     Dear Colleague,    Thank you for referring your patient, Cande Shields, to the [facility] [place] at [facility]. Please see a copy of my visit note below.    Virtual Visit Details: 2023    Type of service:  Video Visit   Video Start Time: 12:35pm  Video End Time:1:00pm    Originating Location (pt. Location): Home    Distant Location (provider location):  On-site  Platform used for Video Visit: Dany    CC:   -PR3 ANCA vasculitis  -Pregnancy 25 weeks +1  -CKD stage 3    HPI:   Cande Shields is a 22 year old female L0  who presents for Follow-up of PR3 ANCA vasculitis. She is 25 weeks +1    Her past medical history is significant for:  1.  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  Chronic kidney disease  3.  History of intrauterine fetal death secondary to severe preeclampsia at 30 weeks  4  Severe preeclampsia and IUFD at 31 weeks  5  Saddle pulmonary embolus and history of DVT with PE, Cardiac thrombus  6. bicornuate uterus  7. Large subchoronic hemorrhage  8. ADHD    ANCA history:  The patient was initially diagnosed with ND-3 ANCA vascultis at the age of 10 years. 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 She was treated in the past by Dr. Velásquez. She was treated with methotrexate, high dose steroid. As per patient, she did not respond to rituximab so she was placed on oral cytoxan (unclear how long) then she had been followed by Dr. Enriqueta Alcantara, pediatric rheumatologist at [facility].    Her Cr was 0.6-0.8mg/dl in . She was pregnant and had severe pre-eclampsia and noted to have IUFD at 31 weeks in 2020. In 2020, she developed several days of  myalgia, joint pain, pedal edema and was admitted to [facility] between 12/26-12/30. She was thought to have a flare of GPA . Of note, she had COVID 1 mo preceding this event. She was given solumedrol 80 mg per day and received 4 doses of    mg/m2. She received the last dose of the series on 1/20/21 ([facility]). Cr during that flare was 0.77. However, she was readmitted again on 1/27/22 after rising creatinine=2.05mg/dl. Followup pulse methylprednisolone and Cytoxan 500 mg/m2 were given on 1- in response to rising creatinine and active urinary sediment. She received IV cytoxan 500 mg/m2 x 3 doses (total dose cumulation about 3.5 g). Gynecology input on ovarian preservation in setting of cyclophosphamide - did not give leuprolide. Eventually steroid was tapered down in 4/2021. She was then lost follow-up and did not receive maintenance therapy. Her creatinine was 1.11 mg/dl in January 2022.      She was presented in December 2022 with cold symptoms with positive home COVID test and was treated with Paxlovid. She also noted to have petechial rashes similar to previous flare. Upon presentation, she has Cr of 2.05 but after IV fluid and 1 dose of methlyprednisolone 125 mg Cr down to 1.48 the next day.  She has no leg swelling. She has some phlegm with blood tinge and denied any SOB. We thought that she has flare as her PR3 was elevated at 27. She received tapering dose of methylprednisone 125 mg IV x1-> 32 mg IV x1 and was started on oral Medrol tapering dose: Take 8 tablets (32 mg) by mouth daily for 6 days, THEN 6 tablets (24 mg) daily for 7 days, THEN 4 tablets (16 mg) daily for 7 days, THEN 3 tablets (12 mg) daily for 14 days. We also started her on Avacopan 30 mg BID through emergency program. She received Rituximab 375 mg/m2 1st dose on 1/10/23 and 2nd dose on 1/17/23 and 3rd dose on 1/24/23. She did not receive her last dose as she was found to be pregnant at 4 weeks in February. She also  received avacopan which was stopped once the team knew she was pregnant.     Of note, she was hospitalized February 2023 for acute saddle PE and underwent  right atrial thrombectomy with sternotomy in the setting of pregnancy and COVID-19. Her course was complicated by hospital-acquired pneumonia. She is currently on on enoxaparin.  She had a PFO which was closed as well as per patient.      She was seen last week by maternal-fetal medicine. She gained only 6 pounds so far. Her fetal ultrasound showed that her baby is at the 11th percentile.  The plan to continue the fetal growth assessment every 2- 3 weeks.    Overall, Cande is feeling well.  She continues to go to work.  She can feel the baby moving.  She measures her blood pressure daily and her blood pressure has been around 116 systolic she does not remember her diastolic readings.  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  She does not wear a mask.  She denies any joint pain hemoptysis, shortness of breath, skin rash.  She was supposed to get IV iron infusions however was not able to schedule.    Social history: She lives in [place].  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    Family history not significant for autoimmune or chronic kidney disease.    Allergies   Allergen Reactions    Penicillins Rash     Unknown, but think rash.  Tolerated cephalexin (12/27/20), cefpodoxime (12/27/20), Ceftriaxone (Feb 2023), Zosyn (Feb 2023)       acetaminophen (TYLENOL) 325 MG tablet, Take 2 tablets (650 mg) by mouth every 4 hours as needed for other (For optimal non-opioid multimodal pain management to improve pain control.)  albuterol (PROAIR HFA/PROVENTIL HFA/VENTOLIN HFA) 108 (90 Base) MCG/ACT inhaler, Inhale 2 puffs into the lungs every 6 hours as needed for shortness of breath or cough (Patient not taking: Reported on 6/1/2023)  aspirin (ASA) 81 MG chewable tablet, Take 1 tablet (81 mg) by mouth daily  doxylamine  (UNISOM) 25 MG TABS tablet, Take 0.5 tablets (12.5 mg) by mouth 2 times daily as needed for other (nausea) (Patient not taking: Reported on 6/29/2023)  enoxaparin ANTICOAGULANT (LOVENOX) 80 MG/0.8ML syringe, Inject 0.8 mLs (80 mg) Subcutaneous every 12 hours  famotidine (PEPCID) 20 MG tablet, Take 1 tablet (20 mg) by mouth 2 times daily (Patient not taking: Reported on 6/29/2023)  metoprolol tartrate (LOPRESSOR) 25 MG tablet, Take 0.5 tablets (12.5 mg) by mouth 2 times daily  Prenatal Vit-Fe Fumarate-FA (PRENATAL MULTIVITAMIN W/IRON) 27-0.8 MG tablet, Take 1 tablet by mouth daily  pyridOXINE (VITAMIN B6) 25 MG tablet, Take 1 tablet (25 mg) by mouth every 8 hours as needed (nausea) (Patient not taking: Reported on 6/29/2023)    No current facility-administered medications on file prior to visit.      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    Multiple subsegmental pulmonary emboli without acute cor pulmonale (H) 03/2021    PFO (patent foramen ovale)     Preeclampsia, third trimester     Spontaneous pregnancy loss 2020    31 weeks    Stage 3b chronic kidney disease (H)     Wegener's disease, pulmonary 01/01/2010    renal biopsy       Past Surgical History:   Procedure Laterality Date    ENT SURGERY  01/01/2000    cyst    EXCISE GANGLION WRIST  01/01/2009    IR LOWER EXTREMITY ANGIOGRAM LEFT  3/21/2021    IR THROMBOLYSIS ART/VENOUS INFUSION SUBSQ DAY  3/21/2021    IR THROMBOLYSIS ART/VENOUS INFUSION SUBSQ DAY  3/21/2021    STERNOTOMY  02/18/2023    right atrial thrombectomy    THROMBECTOMY ATRIUM Right 02/18/2023    THROMBECTOMY PERCUTANEOUS N/A 2/18/2023    Procedure: Emergency Sternotomy, Right Atrial Thrombectomy, Central Cannulation, Bilateral Femoral Cannulation, Closure of PFO ; Transesophageal Echocardiogram performed by anesthesia staff;  Surgeon: Catarina  Adelfo Ly MD;  Location: [facility] OR    THROMBECTOMY PERCUTANEOUS N/A 2/18/2023    Procedure: Angiovac Mediated for Right Atrial Clot; Intracatheter Thrombectomy via bilateral percutaneous femoral venous access with 26 FR Right Femoral vein and 17 FR Left Femoral Vein cannulas. Transesophageal echchocardiogram performed by anesthesia staff;  Surgeon: Po Monterroso MD;  Location: [facility] OR       Social History     Tobacco Use    Smoking status: Former     Packs/day: 0.25     Types: Cigarettes    Smokeless tobacco: Former    Tobacco comments:     Vaping stopped 2/26   Vaping Use    Vaping Use: Some days    Substances: Nicotine, Flavoring    Devices: Disposable   Substance Use Topics    Alcohol use: Not Currently    Drug use: No       Family History   Problem Relation Age of Onset    Thyroid Disease Mother     Cancer Maternal Grandfather     Asthma No family hx of     Diabetes No family hx of        ROS: A 12 system review of systems was negative other than noted here or above.     Exam:  LMP 01/10/2023 (Approximate)    She looked well on video visit.  No physical exam could be done.  Blood pressure noted at home is at 116 systolic.    Results:reviewed in details with the patient    Assessment/Plan:  #1 granulomatous polyangiitis/AZ-3 ANCA vasculitis with glomerulonephritis, upper respiratory, musculoskeletal, and cutaneous involvement  #2 CKD stage III  Diagnosed early at the age of 10 when she received hemodialysis at that point and Cytoxan.  It seemed that she had another flare in 2021 which was also treated with Cytoxan.  Her third flare was in January 2023 where she was treated with 3 doses of rituximab and was found to be pregnant so her first dose of rituximab was with her.  - She continues to be in clinical and biochemical remission at this point.  No symptoms of active disease.  Her serologies and her inflammatory markers continue to decrease.  However her creatinine has been slightly going up during her second  trimester; when in fact there should be a dilutional effect of the creatinine which peak by the second trimester; thus, creatinine might actually be higher.  Her hematuria have resolved and her proteinuria has been improving.  - Her last dose of rituximab was in .  Her CD19 count remains below 1%.  I discussed with Cande today the importance of keeping her immune system under control by starting maintenance therapy especially with her background of comorbidities and her high risk for complications.  - We will check TPMT, meanwhile we will start with small dose azathioprine at 50 mg daily to be increased slowly to 2 mg/kg/day.  Azathioprine is relatively safe during pregnancy.  We are trying to avoid rituximab as she is high risk for  delivery and in such a case the baby may suffer consequences of low immunity and increased risk of infections.  - I emphasized to Cande today that she needs to exercise utmost precaution especially given her job at a  facility where she is exposed to sick children.  I emphasized that she needs to be wearing mask all the time with frequent handwashing with soap and water.    #3 hypertension: Her blood pressure is currently well controlled.  She is on metoprolol 12.5 mg twice daily.  She was instructed to measure her blood pressure twice daily and bring a blood pressure log with her to clinic with her next appointment.    #4 anemia: Her iron studies checked today are low. She needs to receive IV iron infusion especially that we are starting azathioprine which can put her at further risk for worsening anemia.  We will try to schedule IV iron infusions at [place] which is a close location to her work.    #5 pregnancy 25 weeks +1, history of preeclampsia, CKD which puts her at increased risk for preeclampsia:  She was also instructed about the increased risk of pre-eclampsia when compared with women without renal disease[10x higher].  In addition, there is  a slightly increased risk of  delivery and SGA babies. She has been on aspirin and will continue that until 36 weeks of pregnancy.  She is followed frequently by maternal-fetal medicine. Her last fetal echocardiogram showed the baby growth at 11th percentile.  That is borderline and [facility] will perform these ultrasounds every 2 to 3 weeks.  Our knowledge of pregnancy outcomes among women with preexisting ACNA vasculitis is limited to case series. Active disease at the time of conception was associated with an increased risk of intrauterine fetal demise and maternal death, which occurred in 3 of 34 patients*.  deliveries occurred in 35%, 33%, and 16% of those with GPA, MPA, and eGPA, respectively.   Remission was achieved in pregnancy for 10/25 women, with the remaining achieving remission postpartum, and 2 with a relapse in a subsequent pregnancy. Overall, 19/26 pregnancies resulted in live births, although 11/15 reported delivery before 37 weeks.     #6 hypercoagulable state: She is being followed by Dr. Linton from hematology.  She is currently on Lovenox and she will need to be on lifelong anticoagulation.    *Denys NAVARRETE, Hermann L, Claudia M, et al. Pregnancy and vasculitis: a systematic review of the literature. Autoimmun Rev. 2012;11(6-7):D321-M154.    The total time of this encounter amounted to 204 minutes on the day of the encounter 2023. This time included face to face time spent with the patient, preparatory work and chart review, ordering tests, and performing post visit documentation.    *This dictation was prepared in part using Dragon recognition software.  As a result errors may occur. When identified these transcription errors have been corrected.  While every attempt is made to correct errors during dictation, errors may still exist.     Kang Goodson MD   [facility]   Department of Medicine   Division of Renal Disease and Hypertension

## 2023-07-05 NOTE — PROGRESS NOTES
Virtual Visit Details: 2023    Type of service:  Video Visit   Video Start Time: 12:35pm  Video End Time:1:00pm    Originating Location (pt. Location): Home    Distant Location (provider location):  On-site  Platform used for Video Visit: Dany    CC:   -PR3 ANCA vasculitis  -Pregnancy 25 weeks +1  -CKD stage 3    HPI:   Cande Shields is a 22 year old female L0  who presents for Follow-up of PR3 ANCA vasculitis. She is 25 weeks +1    Her past medical history is significant for:  1.  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  Chronic kidney disease  3.  History of intrauterine fetal death secondary to severe preeclampsia at 30 weeks  4  Severe preeclampsia and IUFD at 31 weeks  5  Saddle pulmonary embolus and history of DVT with PE, Cardiac thrombus  6. bicornuate uterus  7. Large subchoronic hemorrhage  8. ADHD    ANCA history:  The patient was initially diagnosed with MN-3 ANCA vascultis at the age of 10 years. 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 She was treated in the past by Dr. Velásquez. She was treated with methotrexate, high dose steroid. As per patient, she did not respond to rituximab so she was placed on oral cytoxan (unclear how long) then she had been followed by Dr. Enriqueta Alcantara[facility]    Her Cr was 0.6-0.8mg/dl in . She was pregnant and had severe pre-eclampsia and noted to have IUFD at 31 weeks in 2020. In 2020, she developed several days of myalgia, joint pain, pedal edema and was admitted to [facility] between -. She was thought to have a flare of GPA . Of note, she had COVID 1 mo preceding this event. She was given solumedrol 80 mg per day and received 4 doses of    mg/m2. She received the last dose of the series on 21 ([facility]). Cr  during that flare was 0.77. However, she was readmitted again on 1/27/22 after rising creatinine=2.05mg/dl. Followup pulse methylprednisolone and Cytoxan 500 mg/m2 were given on 1- in response to rising creatinine and active urinary sediment. She received IV cytoxan 500 mg/m2 x 3 doses (total dose cumulation about 3.5 g). Gynecology input on ovarian preservation in setting of cyclophosphamide - did not give leuprolide. Eventually steroid was tapered down in 4/2021. She was then lost follow-up and did not receive maintenance therapy. Her creatinine was 1.11 mg/dl in January 2022.      She was presented in December 2022 with cold symptoms with positive home COVID test and was treated with Paxlovid. She also noted to have petechial rashes similar to previous flare. Upon presentation, she has Cr of 2.05 but after IV fluid and 1 dose of methlyprednisolone 125 mg Cr down to 1.48 the next day.  She has no leg swelling. She has some phlegm with blood tinge and denied any SOB. We thought that she has flare as her PR3 was elevated at 27. She received tapering dose of methylprednisone 125 mg IV x1-> 32 mg IV x1 and was started on oral Medrol tapering dose: Take 8 tablets (32 mg) by mouth daily for 6 days, THEN 6 tablets (24 mg) daily for 7 days, THEN 4 tablets (16 mg) daily for 7 days, THEN 3 tablets (12 mg) daily for 14 days. We also started her on Avacopan 30 mg BID through emergency program. She received Rituximab 375 mg/m2 1st dose on 1/10/23 and 2nd dose on 1/17/23 and 3rd dose on 1/24/23. She did not receive her last dose as she was found to be pregnant at 4 weeks in February. She also received avacopan which was stopped once the team knew she was pregnant.     Of note, she was hospitalized February 2023 for acute saddle PE and underwent  right atrial thrombectomy with sternotomy in the setting of pregnancy and COVID-19. Her course was complicated by hospital-acquired pneumonia. She is currently on on enoxaparin.   She had a PFO which was closed as well as per patient.      She was seen last week by maternal-fetal medicine. She gained only 6 pounds so far. Her fetal ultrasound showed that her baby is at the 11th percentile.  The plan to continue the fetal growth assessment every 2- 3 weeks.    Overall, Cande is feeling well.  She continues to go to work.  She can feel the baby moving.  She measures her blood pressure daily and her blood pressure has been around 116 systolic she does not remember her diastolic readings.  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  She does not wear a mask.  She denies any joint pain hemoptysis, shortness of breath, skin rash.  She was supposed to get IV iron infusions however was not able to schedule.    Social history: She lives in [place].  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    Family history not significant for autoimmune or chronic kidney disease.    Allergies   Allergen Reactions     Penicillins Rash     Unknown, but think rash.  Tolerated cephalexin (12/27/20), cefpodoxime (12/27/20), Ceftriaxone (Feb 2023), Zosyn (Feb 2023)       acetaminophen (TYLENOL) 325 MG tablet, Take 2 tablets (650 mg) by mouth every 4 hours as needed for other (For optimal non-opioid multimodal pain management to improve pain control.)  albuterol (PROAIR HFA/PROVENTIL HFA/VENTOLIN HFA) 108 (90 Base) MCG/ACT inhaler, Inhale 2 puffs into the lungs every 6 hours as needed for shortness of breath or cough (Patient not taking: Reported on 6/1/2023)  aspirin (ASA) 81 MG chewable tablet, Take 1 tablet (81 mg) by mouth daily  doxylamine (UNISOM) 25 MG TABS tablet, Take 0.5 tablets (12.5 mg) by mouth 2 times daily as needed for other (nausea) (Patient not taking: Reported on 6/29/2023)  enoxaparin ANTICOAGULANT (LOVENOX) 80 MG/0.8ML syringe, Inject 0.8 mLs (80 mg) Subcutaneous every 12 hours  famotidine (PEPCID) 20 MG tablet, Take 1 tablet (20 mg) by mouth 2 times daily  (Patient not taking: Reported on 6/29/2023)  metoprolol tartrate (LOPRESSOR) 25 MG tablet, Take 0.5 tablets (12.5 mg) by mouth 2 times daily  Prenatal Vit-Fe Fumarate-FA (PRENATAL MULTIVITAMIN W/IRON) 27-0.8 MG tablet, Take 1 tablet by mouth daily  pyridOXINE (VITAMIN B6) 25 MG tablet, Take 1 tablet (25 mg) by mouth every 8 hours as needed (nausea) (Patient not taking: Reported on 6/29/2023)    No current facility-administered medications on file prior to visit.      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     Multiple subsegmental pulmonary emboli without acute cor pulmonale (H) 03/2021     PFO (patent foramen ovale)      Preeclampsia, third trimester      Spontaneous pregnancy loss 2020    31 weeks     Stage 3b chronic kidney disease (H)      Wegener's disease, pulmonary 01/01/2010    renal biopsy       Past Surgical History:   Procedure Laterality Date     ENT SURGERY  01/01/2000    cyst     EXCISE GANGLION WRIST  01/01/2009     IR LOWER EXTREMITY ANGIOGRAM LEFT  3/21/2021     IR THROMBOLYSIS ART/VENOUS INFUSION SUBSQ DAY  3/21/2021     IR THROMBOLYSIS ART/VENOUS INFUSION SUBSQ DAY  3/21/2021     STERNOTOMY  02/18/2023    right atrial thrombectomy     THROMBECTOMY ATRIUM Right 02/18/2023     THROMBECTOMY PERCUTANEOUS N/A 2/18/2023    Procedure: Emergency Sternotomy, Right Atrial Thrombectomy, Central Cannulation, Bilateral Femoral Cannulation, Closure of PFO ; Transesophageal Echocardiogram performed by anesthesia staff;  Surgeon: Adelfo Elmore MD;  Location: [facility] OR     THROMBECTOMY PERCUTANEOUS N/A 2/18/2023    Procedure: Angiovac Mediated for Right Atrial Clot; Intracatheter Thrombectomy via bilateral percutaneous femoral venous access with 26 FR Right Femoral vein and 17 FR Left Femoral Vein cannulas. Transesophageal echchocardiogram performed by  anesthesia staff;  Surgeon: Po Monterroso MD;  [facility]       Social History     Tobacco Use     Smoking status: Former     Packs/day: 0.25     Types: Cigarettes     Smokeless tobacco: Former     Tobacco comments:     Vaping stopped 2/26   Vaping Use     Vaping Use: Some days     Substances: Nicotine, Flavoring     Devices: Disposable   Substance Use Topics     Alcohol use: Not Currently     Drug use: No       Family History   Problem Relation Age of Onset     Thyroid Disease Mother      Cancer Maternal Grandfather      Asthma No family hx of      Diabetes No family hx of        ROS: A 12 system review of systems was negative other than noted here or above.     Exam:  LMP 01/10/2023 (Approximate)    She looked well on video visit.  No physical exam could be done.  Blood pressure noted at home is at 116 systolic.    Results:reviewed in details with the patient    Assessment/Plan:  #1 granulomatous polyangiitis/IA-3 ANCA vasculitis with glomerulonephritis, upper respiratory, musculoskeletal, and cutaneous involvement  #2 CKD stage III  Diagnosed early at the age of 10 when she received hemodialysis at that point and Cytoxan.  It seemed that she had another flare in 2021 which was also treated with Cytoxan.  Her third flare was in January 2023 where she was treated with 3 doses of rituximab and was found to be pregnant so her first dose of rituximab was with her.  - She continues to be in clinical and biochemical remission at this point.  No symptoms of active disease.  Her serologies and her inflammatory markers continue to decrease.  However her creatinine has been slightly going up during her second trimester; when in fact there should be a dilutional effect of the creatinine which peak by the second trimester; thus, creatinine might actually be higher.  Her hematuria have resolved and her proteinuria has been improving.  - Her last dose of rituximab was in January 24,2023.  Her CD19 count remains below  1%.  I discussed with Cande today the importance of keeping her immune system under control by starting maintenance therapy especially with her background of comorbidities and her high risk for complications.  - We will check TPMT, meanwhile we will start with small dose azathioprine at 50 mg daily to be increased slowly to 2 mg/kg/day.  Azathioprine is relatively safe during pregnancy.  We are trying to avoid rituximab as she is high risk for  delivery and in such a case the baby may suffer consequences of low immunity and increased risk of infections.  - I emphasized to Cande today that she needs to exercise utmost precaution especially given her job at a  facility where she is exposed to sick children.  I emphasized that she needs to be wearing mask all the time with frequent handwashing with soap and water.    #3 hypertension: Her blood pressure is currently well controlled.  She is on metoprolol 12.5 mg twice daily.  She was instructed to measure her blood pressure twice daily and bring a blood pressure log with her to clinic with her next appointment.    #4 anemia: Her iron studies checked today are low. She needs to receive IV iron infusion especially that we are starting azathioprine which can put her at further risk for worsening anemia.  We will try to schedule IV iron infusions at [place] which is a close location to her work.    #5 pregnancy 25 weeks +1, history of preeclampsia, CKD which puts her at increased risk for preeclampsia:  She was also instructed about the increased risk of pre-eclampsia when compared with women without renal disease[10x higher].  In addition, there is a slightly increased risk of  delivery and SGA babies. She has been on aspirin and will continue that until 36 weeks of pregnancy.  She is followed frequently by maternal-fetal medicine. Her last fetal echocardiogram showed the baby growth at 11th percentile.  That is borderline and MFM will perform  these ultrasounds every 2 to 3 weeks.  Our knowledge of pregnancy outcomes among women with preexisting ACNA vasculitis is limited to case series. Active disease at the time of conception was associated with an increased risk of intrauterine fetal demise and maternal death, which occurred in 3 of 34 patients*.  deliveries occurred in 35%, 33%, and 16% of those with GPA, MPA, and eGPA, respectively.   Remission was achieved in pregnancy for 10/25 women, with the remaining achieving remission postpartum, and 2 with a relapse in a subsequent pregnancy. Overall, 19/26 pregnancies resulted in live births, although 11/15 reported delivery before 37 weeks.     #6 hypercoagulable state: She is being followed by Dr. Linton from hematology.  She is currently on Lovenox and she will need to be on lifelong anticoagulation.    *Denys NAVARRETE, Hermann L, Claudia M, et al. Pregnancy and vasculitis: a systematic review of the literature. Autoimmun Rev. 2012;11(6-7):S568-I573.    The total time of this encounter amounted to 204 minutes on the day of the encounter 2023. This time included face to face time spent with the patient, preparatory work and chart review, ordering tests, and performing post visit documentation.    *This dictation was prepared in part using Dragon recognition software.  As a result errors may occur. When identified these transcription errors have been corrected.  While every attempt is made to correct errors during dictation, errors may still exist.     Kang Goodson MD   [facility]   Department of Medicine   Division of Renal Disease and Hypertension

## 2023-07-06 RX ORDER — DIPHENHYDRAMINE HYDROCHLORIDE 50 MG/ML
50 INJECTION INTRAMUSCULAR; INTRAVENOUS
Status: CANCELLED
Start: 2023-07-06

## 2023-07-06 RX ORDER — HEPARIN SODIUM,PORCINE 10 UNIT/ML
5-20 VIAL (ML) INTRAVENOUS DAILY PRN
Status: CANCELLED | OUTPATIENT
Start: 2023-07-06

## 2023-07-06 RX ORDER — ALBUTEROL SULFATE 0.83 MG/ML
2.5 SOLUTION RESPIRATORY (INHALATION)
Status: CANCELLED | OUTPATIENT
Start: 2023-07-06

## 2023-07-06 RX ORDER — EPINEPHRINE 1 MG/ML
0.3 INJECTION, SOLUTION, CONCENTRATE INTRAVENOUS EVERY 5 MIN PRN
Status: CANCELLED | OUTPATIENT
Start: 2023-07-06

## 2023-07-06 RX ORDER — METHYLPREDNISOLONE SODIUM SUCCINATE 125 MG/2ML
125 INJECTION, POWDER, LYOPHILIZED, FOR SOLUTION INTRAMUSCULAR; INTRAVENOUS
Status: CANCELLED
Start: 2023-07-06

## 2023-07-06 RX ORDER — ALBUTEROL SULFATE 90 UG/1
1-2 AEROSOL, METERED RESPIRATORY (INHALATION)
Status: CANCELLED
Start: 2023-07-06

## 2023-07-06 RX ORDER — HEPARIN SODIUM (PORCINE) LOCK FLUSH IV SOLN 100 UNIT/ML 100 UNIT/ML
5 SOLUTION INTRAVENOUS
Status: CANCELLED | OUTPATIENT
Start: 2023-07-06

## 2023-07-07 NOTE — TELEPHONE ENCOUNTER
Pt responded to [employer] message dated 7/0523.  Pt is scheduled with Dr. Goodson 7/25/23 10:30am Labs at 10:00am.  Marlee Lawrence LPN

## 2023-07-12 LAB
PROTEINASE3 AB SER IA-ACNC: 6.1 U/ML
PROTEINASE3 AB SER IA-ACNC: 6.2 U/ML
PROTEINASE3 AB SER IA-ACNC: POSITIVE
PROTEINASE3 AB SER IA-ACNC: POSITIVE

## 2023-07-18 NOTE — PROGRESS NOTES
"Maternal-Fetal Medicine Return OB Visit    Cande Shields  : 2000  MRN: [id number]    Subjective:  Cande Shields is a 22 year old  at 27w2d by 7w2d US here for return OB Visit.    Cande reports she has been doing well.  Denies vaginal bleeding, LOF, contractions or pain.  Active fetal movement.  Denies fever, chills, headache, chest pain, shortness of breath, leg swelling, nausea, vomiting, or other concerns. She is having some pelvic pressure and doesn't find the pelvic support belt helpful.    Objective:  /70 (BP Location: Right arm, Patient Position: Sitting, Cuff Size: Adult Regular)   Pulse 65   Resp 16   LMP 01/10/2023 (Approximate)   SpO2 96%      Gen:  Alert, oriented, appears comfortable  CV:  Warm, well-perfused  Pulm: Breathing comfortably, no tachypnea  Abd: Non-tender, gravid  Extremities:  No edema    Imaging:   Please see \"Imaging\" tab under \"Chart Review\" for details of today's US with new fetal growth restriction.    NST:  Baseline 125, moderate variability, 10x10 accelerations present, no decelerations, appropriate for gestational age. No contractions on tocometry.    ASSESSMENT/PLAN:  Cande Shields is a 22 year old  at 27w2d by 7w2d US here for MFM OB follow up visit.    Pregnancy complicated by:  - Granulomatosis polyangiitis  - History of multiple VTE, including DVT and PE  - Subchorionic hemorrhage  - Right atrial thrombus s/p sternotomy and thrombectomy on 2023 earlier in pregnancy  - PFO s/p closure on 2023  - History of IUFD at 31 weeks  - History of severe pre-eclampsia  - Chronic kidney disease (baseline creatinine ~1.3)  - Bicornuate uterus  - Fetal growth restriction with elevated umbilical artery dopplers ()    Granulomatosis polyangitis  History of VTE (PE and DVT)  Right atrial thrombus s/p sternotomy and thrombectomy in early pregnancy  Anemia  - Check anti-Xa levels monthly. Her last anti-Xa level was  and resulted at 1.13. " Recommend completing anti-Xa level 4-6 hours after administration. She is aware of our recommendation to complete this.  - S/p 1 IV iron infusion. Strict return precautions previously given as she may ultimately require blood transfusion.  In process of rescheduling on  at the [facility]; phone number provided  - Scheduled visit on  with Dr. Dye of hematology to discuss management of her anticoagulation surrounding her delivery.  - Seen by Dr. Oneal, cardiology on . Missed appointment on ; working on rescheduling this.  - Continue metoprolol 12.5 mg BID  - Scheduled to see rheumatology  but appointment cancelled.  She does not feel she can reschedule this at this time and will prioritize cardiology, MFM, nephrology.    Chronic Kidney Disease  - Goal blood pressure at 130/80  - Saw Dr. Goodson (nephrology) on  and started on azathioprine    History of pre-eclampsia, severe  History of IUFD at 31 weeks   Fetal growth restriction (EFW 9%ile)  Abnormal aortic arch view on 23  - Close monitoring for signs and/or symptoms of evolving preeclampsia using home BP cuff; precautions reviewed today  - Close monitoring of blood pressure both prenatally and in the postpartum period  - Continue low dose aspirin for preeclampsia prophylaxis  - Discussed etiologies and offered diagnostic testing (previously no call NIPT x2). Declines invasive testing.  - Weekly  testing with UAD.  - Fetal echocardiogram to rule out coarctation.     Surveillance  Subchorionic hemorrhage  - Serial growth US; will repeat in 2-3 weeks as patient is very axious regarding the downtrending growth especially in light of her history of severe pre-eclampsia resulting in IUFD  - Weekly  testing with UAD given IUFD and new diagnosis of FGR     Delivery planning:  - Anesthesia consult in third trimester  - Mode of delivery to be determined in conjunction with cardiology and cardiovascular surgery,  though would likely anticipate no contraindications to vaginal delivery.  - Will discuss with hematology regarding management of anticoagulation around the time of delivery.  - Delivery at 37 weeks, or sooner as clinically indicated given FGR with abnormal dopplers and medical history.   - Feeding: breast  - Contraception plans: undecided, initial counseling provided     Routine PNC  - Prenatal labs:  Rh pos, antibody negative               HepB/HIV/RPR/HepC: negative               GC/CT: negative               Rubella: immune                 UC: no growth  - Pap smear: perform postpartum  - Tdap on 7/20  - Aneuploidy screening: NIPT no call x 2. Declines invasive testing at this time.   - 1 hour GCT today    Follow up for OBV in 2 weeks and in 1 week for UAD and BPP. Plan for growth in 3 weeks.    The patient was seen and evaluated with Dr. Gonzalez.    Espearnza Fox MD  Maternal Fetal Medicine Fellow    Appointments in Next Year    Jul 20, 2023  8:00 AM  MFM US COMPRE SINGLE F/U with [id number]  [facility] [place] ([facility] ) [phone]   Jul 20, 2023  8:45 AM  Ob Follow Up with Ina Gonzalez MD  [facility] [place] ([facility] ) [phone]   Jul 25, 2023 10:00 AM  LAB with LAB FIRST FLOOR [facility] ([facility]) [phone]   Jul 25, 2023 10:30 AM  (Arrive by 10:15 AM)  Return Nephrology with Kang Goodson MD  [facility] ([facility]) [phone]   Aug 11, 2023  3:30 PM  (Arrive by 3:15 PM)  RETURN VASCULITIS with Ramses Call MD  [facility] [place] ([facility] ) [phone]   Aug 17, 2023  3:00 PM  RETURN CLOTTING  DISORDER with Kodi Dye MD  [facility] ([facility] ) [phone]                                                                                                                [facility] Physician Attestation  I saw this patient with the resident/fellow and agree with the their findings and plan of care as documented in the note.  I personally reviewed her vital signs, medications, labs, pertinent imaging, and fetal monitoring.    I personally spent a total of 25 minutes, including both face-to-face and non-face-to-face on the date of the encounter, addressing the above diagnosis. Activities performed in this time include chart review, obtaining / reviewing history, performing a medically necessary evaluation, documentation and counseling/care coordination/ordering tests/ordering meds.      Ina Gonzalez MD  Maternal Fetal Medicine   Date of Service (when I saw the patient): 7/20/2023

## 2023-07-19 DIAGNOSIS — K21.9 GASTROESOPHAGEAL REFLUX DISEASE WITHOUT ESOPHAGITIS: ICD-10-CM

## 2023-07-19 DIAGNOSIS — Z87.74 S/P PATENT FORAMEN OVALE CLOSURE: ICD-10-CM

## 2023-07-20 ENCOUNTER — APPOINTMENT (OUTPATIENT)
Dept: LAB | Facility: CLINIC | Age: 23
End: 2023-07-20
Attending: OBSTETRICS & GYNECOLOGY
Payer: COMMERCIAL

## 2023-07-20 ENCOUNTER — OFFICE VISIT (OUTPATIENT)
Dept: MATERNAL FETAL MEDICINE | Facility: CLINIC | Age: 23
End: 2023-07-20
Attending: OBSTETRICS & GYNECOLOGY
Payer: COMMERCIAL

## 2023-07-20 ENCOUNTER — HOSPITAL ENCOUNTER (OUTPATIENT)
Dept: ULTRASOUND IMAGING | Facility: CLINIC | Age: 23
Discharge: HOME | End: 2023-07-20
Attending: OBSTETRICS & GYNECOLOGY
Payer: COMMERCIAL

## 2023-07-20 VITALS
WEIGHT: 230 LBS | HEART RATE: 65 BPM | SYSTOLIC BLOOD PRESSURE: 107 MMHG | RESPIRATION RATE: 16 BRPM | OXYGEN SATURATION: 96 % | BODY MASS INDEX: 37.72 KG/M2 | DIASTOLIC BLOOD PRESSURE: 70 MMHG

## 2023-07-20 DIAGNOSIS — N18.30 STAGE 3 CHRONIC KIDNEY DISEASE, UNSPECIFIED WHETHER STAGE 3A OR 3B CKD (H): Chronic | ICD-10-CM

## 2023-07-20 DIAGNOSIS — I77.82 ANCA-ASSOCIATED VASCULITIS (H): ICD-10-CM

## 2023-07-20 DIAGNOSIS — O09.299 HISTORY OF HELLP SYNDROME, CURRENTLY PREGNANT: ICD-10-CM

## 2023-07-20 DIAGNOSIS — O09.292 PRIOR PREGNANCY WITH FETAL DEMISE AND CURRENT PREGNANCY IN SECOND TRIMESTER: ICD-10-CM

## 2023-07-20 DIAGNOSIS — M31.31 GRANULOMATOSIS WITH POLYANGIITIS WITH RENAL INVOLVEMENT (H): ICD-10-CM

## 2023-07-20 DIAGNOSIS — O36.5990 FETAL GROWTH RESTRICTION ANTEPARTUM: Primary | ICD-10-CM

## 2023-07-20 LAB — GLUCOSE 1H P 50 G GLC PO SERPL-MCNC: 97 MG/DL (ref 70–129)

## 2023-07-20 PROCEDURE — 59025 FETAL NON-STRESS TEST: CPT | Mod: 26 | Performed by: STUDENT IN AN ORGANIZED HEALTH CARE EDUCATION/TRAINING PROGRAM

## 2023-07-20 PROCEDURE — 76816 OB US FOLLOW-UP PER FETUS: CPT | Mod: 26 | Performed by: STUDENT IN AN ORGANIZED HEALTH CARE EDUCATION/TRAINING PROGRAM

## 2023-07-20 PROCEDURE — 250N000011 HC RX IP 250 OP 636

## 2023-07-20 PROCEDURE — 59025 FETAL NON-STRESS TEST: CPT | Performed by: STUDENT IN AN ORGANIZED HEALTH CARE EDUCATION/TRAINING PROGRAM

## 2023-07-20 PROCEDURE — G0463 HOSPITAL OUTPT CLINIC VISIT: HCPCS | Mod: 25 | Performed by: STUDENT IN AN ORGANIZED HEALTH CARE EDUCATION/TRAINING PROGRAM

## 2023-07-20 PROCEDURE — 36415 COLL VENOUS BLD VENIPUNCTURE: CPT | Performed by: STUDENT IN AN ORGANIZED HEALTH CARE EDUCATION/TRAINING PROGRAM

## 2023-07-20 PROCEDURE — 76820 UMBILICAL ARTERY ECHO: CPT

## 2023-07-20 PROCEDURE — 90471 IMMUNIZATION ADMIN: CPT

## 2023-07-20 PROCEDURE — 99213 OFFICE O/P EST LOW 20 MIN: CPT | Mod: 25 | Performed by: STUDENT IN AN ORGANIZED HEALTH CARE EDUCATION/TRAINING PROGRAM

## 2023-07-20 PROCEDURE — 59025 FETAL NON-STRESS TEST: CPT

## 2023-07-20 PROCEDURE — 80069 RENAL FUNCTION PANEL: CPT | Performed by: STUDENT IN AN ORGANIZED HEALTH CARE EDUCATION/TRAINING PROGRAM

## 2023-07-20 PROCEDURE — 76820 UMBILICAL ARTERY ECHO: CPT | Mod: 26 | Performed by: STUDENT IN AN ORGANIZED HEALTH CARE EDUCATION/TRAINING PROGRAM

## 2023-07-20 PROCEDURE — 82950 GLUCOSE TEST: CPT | Performed by: STUDENT IN AN ORGANIZED HEALTH CARE EDUCATION/TRAINING PROGRAM

## 2023-07-20 PROCEDURE — 90715 TDAP VACCINE 7 YRS/> IM: CPT

## 2023-07-20 ASSESSMENT — PATIENT HEALTH QUESTIONNAIRE - PHQ9: SUM OF ALL RESPONSES TO PHQ QUESTIONS 1-9: 0

## 2023-07-20 NOTE — NURSING NOTE
Cande seen in clinic today for OB visit at 27w2d gestation. VSS. Pt reports normal fetal movement, see flowsheet. Pt evaluated for  labor, preeclampsia, infection, fetal wellbeing and cardiac symptoms without concerns. Pt denies bleeding/lof/change in vaginal discharge/contractions/headache/vision changes/chest pain/SOB/edema. Discussed scheduling ehco/cardiology and IV iron infusion appointments, patient reports she forgot and will make a note to get those set up ASAP. Tdap given. Evaluated mood via PHQ-9, see flow sheets. Plans to get anti Xa drawn after next OBV. Pt notified to review new pt education in AVS, verbally reviewed highlights. Dr. Fox and Dr. Gonzalez met with pt and discussed POC. NST Performed due to FGR.  Dr. Fox reviewed efm tracing. See NST/BPP Doc Flowsheet tab.  Plan for return for weekly BPPs with UARs, RL2/BPP/UAR in 3 weeks, OBV in 2 weeks. Will also arrange for echo with pediatric cardiology. Future visits scheduled at . Pt discharged stable and ambulatory.

## 2023-07-20 NOTE — PATIENT INSTRUCTIONS
Weeks 26 to 30 of Your Pregnancy: Care Instructions  You're starting your last trimester. You'll probably feel your baby moving around more. Your back may ache as your body gets used to your baby's size and length. Take care of yourself, and pay attention to what your body needs.    Talk to your doctor about getting the Tdap shot. It will help protect your  against whooping cough (pertussis). Also ask your doctor about flu and COVID-19 shots if you haven't had them yet. If your blood type is Rh negative, you may be given a shot of Rh immune globulin (such as RhoGAM). It can help prevent problems for your baby.   You may have [place] contractions. They are single or several strong contractions without a pattern. These are practice contractions but not the start of labor.   Be kind to yourself.       Take breaks when you're tired.    Change positions often. Don't sit for too long or stand for too long.    At work, rest during breaks if you can. If you don't get breaks, talk to your doctor about writing a letter to your employer to request them.    Avoid fumes, chemicals, and tobacco smoke.  Be sexual if you want to.       You may be interested in sex, or you may not. Everyone is different.    Sex is okay unless your doctor tells you not to.    Your belly can make it hard to find good positions for sex. [place] and explore.  Watch for signs of  labor.    These signs include:     Menstrual-like cramps. Or you may have pain or pressure in your pelvis that happens in a pattern.    About 6 or more contractions in an hour (even after rest and a glass of water).    A low, dull backache that doesn't go away when you change positions.    An increase or change in vaginal discharge.    Light vaginal bleeding or spotting.    Your water breaking.  Know what to do if you think you are having contractions.       Drink 1 or 2 glasses of water.    Lie down on your left side for at least an hour.    While on  "your side, feel the top of your belly to see if it's tight.    Write down your contractions for an hour. Time how long it is from the start of one contraction to the start of the next.    Call your doctor if you have regular contractions.  Follow-up care is a key part of your treatment and safety. Be sure to make and go to all appointments, and call your doctor if you are having problems. It's also a good idea to know your test results and keep a list of the medicines you take.  Where can you learn more?  Go to https://www.emoteShare.net/patiented  Enter S999 in the search box to learn more about \"Weeks 26 to 30 of Your Pregnancy: Care Instructions.\"  Current as of: November 9, 2022               Content Version: 13.7    5972-2904 [employer].   Care instructions adapted under license by your healthcare professional. If you have questions about a medical condition or this instruction, always ask your healthcare professional. [employer] disclaims any warranty or liability for your use of this information.    Counting Your Baby's Kicks: Care Instructions  Overview     Counting your baby's kicks is one way your doctor can tell that your baby is healthy. You will probably feel your baby move for the first time between 16 and 22 weeks. The movement may feel like flutters rather than kicks. Your baby may move more at certain times of the day. When you are active, you may notice less kicking than when you are resting. At your prenatal visits, your doctor will ask whether the baby is active.  In your last trimester, your doctor may ask you to count the number of times you feel your baby move.  Follow-up care is a key part of your treatment and safety. Be sure to make and go to all appointments, and call your doctor if you are having problems. It's also a good idea to know your test results and keep a list of the medicines you take.  How do you count fetal kicks?    A common method of checking your " "baby's movement is to note the length of time it takes to count 10 movements (such as kicks, flutters, or rolls).    Pick your baby's most active time of day to count. This may be any time from morning to evening.    If you don't feel 10 movements in an hour, have something to eat or drink and count for another hour. If you don't feel at least 10 movements in the 2-hour period, call your doctor.  Do not use an at-home Doppler heart monitor in place of counting fetal movements.  When should you call for help?   Call your doctor now or seek immediate medical care if:      You feel fewer than 10 movements in a 2-hour period.       You noticed that your baby has stopped moving or is moving less than normal.   Watch closely for changes in your health, and be sure to contact your doctor if you have any problems.  Where can you learn more?  Go to https://www.Joyhound.Livra Panels/patiented  Enter U048 in the search box to learn more about \"Counting Your Baby's Kicks: Care Instructions.\"  Current as of: November 9, 2022               Content Version: 13.7    0221-8050 [employer].   Care instructions adapted under license by your healthcare professional. If you have questions about a medical condition or this instruction, always ask your healthcare professional. [employer] disclaims any warranty or liability for your use of this information.    Counting Your Baby's Movements   Counting your unborn baby's movements will assure you of your baby's health.   The best time to count is when your baby is most active. Do it at the same time every day.  To keep track of your baby's movements, use the attached chart. Bring the chart with you to each clinic visit.  1. Do not smoke for at least 2 hours before counting your baby's movements. (In fact, it's best to quit smoking if you're pregnant.)  2. Lie on your side. Put your hand on your baby.  3. Write the time you start counting movements.  4. Count the next 10 " kicks, rolls, and other movements.  5. Write the time when your baby has finished 10 movements.  6. If you reach 10 movements within 2 hours, you're done for the day.  Call your care provider right away if:    You feel no movement for the first hour.    It takes more than 2 hours to feel 10 movements.    There's a change in the normal pattern of movements.  Your care provider's phone number is:   ________________________________________  The number to your [facility] birth center is:   ________________________________________     For informational purposes only. Not to replace the advice of your health care provider. Copyright   1991, 2005 [facility]. All rights reserved. Clinically reviewed by Aletha Lima RN, [facility]. [employer] [id number] - REV 10/21.       Kick Counts  It s normal to worry about your baby s health. Generally, you will feel your baby start to move in your 2nd trimester at around 16 to 24 weeks. Getting to know the pattern of your baby's movements is one way to know what's normal for you and baby. This is called a kick count. Talk with your healthcare provider about kick counts and your specific situation. Always follow your provider's instructions.   How to count kicks    Here is just one way to do kick counts. Always follow your healthcare provider's instructions. Starting at 28 weeks, count your baby's movements daily. Time how long it takes you to feel 10 kicks, flutters, swishes, or rolls. Ideally, you want to feel at least 10 movements in 2 hours. You will likely feel 10 movements in less time than that.   Here are tips for counting kicks:     Choose a time when the baby is active, such as after a meal.     Sit comfortably or lie on your side.     The first time the baby moves, write down the time.     Count each movement until the baby has moved  10 times. This can take from 20 minutes to 2 hours.     If you haven't felt 10 kicks by the end of the second hour,  wait a few hours. Then try again.    Try to do it at the same time each day.  When to call your healthcare provider  Follow your provider's instructions about when to call about your baby's movements. Don't hesitate to call if you have concerns.   Call your healthcare provider  right away if:     You do a couple sets of kick counts during the day and your baby moves fewer than 10 times in 2 hours.    Your baby moves much less often than on the days before.    You haven't felt your baby move all day.  [employer] last reviewed this educational content on 2022-2023 [employer]. All rights reserved. This information is not intended as a substitute for professional medical care. Always follow your healthcare professional's instructions.          Understanding  Labor  Going into labor before week 37 of pregnancy is called  labor.  labor can cause your baby to be born too soon. This can lead to health problems for your baby.     Before labor, the cervix is thick and closed.      In  labor, the cervix begins to efface (thin) and dilate (open).     Symptoms of  labor  If you think you re having  labor, get medical help right away. Contractions alone don t mean you re in  labor. What matters more are changes in your cervix. The cervix is the opening at the lower end of the uterus. Symptoms of  labor include:    4 or more contractions per hour    Strong contractions    Constant menstrual-like cramping    Low-back pain    Mucous or bloody fluid from the vagina    Bleeding or spotting in the second or third trimester  Evaluating  labor  Your healthcare provider will try to find out if you re in  labor or just having contractions. They may watch you for a few hours. You may have these tests:    Pelvic exam. This is to see if your cervix has effaced (thinned) and dilated (opened).    Uterine activity monitoring. This is used to detect  contractions.    Fetal monitoring. This is done to check the health of your baby.    Ultrasound. This test looks at your baby s size and position.    Amniocentesis. This test checks how mature your baby s lungs are.  Caring for yourself at home  If you have  contractions, but your cervix is still thick and closed, your healthcare provider may tell you to:    Drink plenty of water.    Do fewer activities.    Rest in bed on your side.    Don't have intercourse or stimulate your nipples.  When to call your healthcare provider  Call your healthcare provider if you have any of these:    4 or more contractions per hour    Bag of water breaks    Bleeding or spotting  If you need hospital care   labor often means that you need hospital care. You may need complete bed rest. You may have an IV (intravenous) line in your arm or hand. This is to give you fluids. You may be given pills or injections. These are done to help prevent contractions. You may get a medicine called a corticosteroid. This is to help your baby s lungs mature more quickly.  Are you at risk?  Any pregnant woman can have  labor. It may start for no reason. But these risk factors can increase your chances:    Past  labor or early birth    Smoking, drug, or alcohol use in pregnancy    A multiple pregnancy (twins or more)    Problems with the shape of the uterus    Bleeding during the pregnancy  The dangers of  birth  A baby born too soon may have health problems. This is because the baby didn t have enough time to grow. Some of the risks for your baby include:    Not breastfeeding or feeding well    Having immature lungs    Bleeding in the brain    Death  Reaching term  Your goal is to get as close to term (week 37 or later) as you can before giving birth. The closer you get to term, the higher your chance of having a healthy baby. Work with your healthcare provider. Together, you can take steps that may keep you from giving  birth too early.  [employer] last reviewed this educational content on 10/1/2021    6059-6427 [employer]. All rights reserved. This information is not intended as a substitute for professional medical care. Always follow your healthcare professional's instructions.          Pregnancy: Your Third Trimester Changes  As the baby grows, your body changes, too. You may also see signs that your body is getting ready for labor. Be patient. Within a few more weeks, your baby will be born.   How you are changing  Your body is preparing for the birth of your baby. Some of the most common changes are listed below. If you have any questions or concerns, ask your healthcare provider:     You ll gain more weight from fluids, extra blood, and fat deposits.    Your breasts will grow as your body gets ready to feed the baby. They may be more tender. You may also notice a slight yellow or white discharge from the nipples.    Discharge from your vagina may increase. This is normal.    You might see some skin color changes on your forehead, cheeks, or nose. Most of these will go away after you deliver.  How your baby is growing  Month 7  Your baby can open and close their eyes and weighs around 4 pounds (1.8 kg). If born prematurely (too early), your baby would likely survive with special care.     Month 8  Your baby is building up body fat and weighs around 6 pounds (2.7 kg).    Month 9  Your baby weighs nearly 7 pounds (3.2 kg) and is about 19 to 21 inches long. In other words, any day now...     [employer] last reviewed this educational content on 9/1/2021 2000-2023 [employer]. All rights reserved. This information is not intended as a substitute for professional medical care. Always follow your healthcare professional's instructions.

## 2023-07-21 NOTE — TELEPHONE ENCOUNTER
FUTURE VISIT INFORMATION      SURGERY INFORMATION:    TBD  Burn labor and delivery Heart and Lung Issues     Consult: ov 23    RECORDS REQUESTED FROM:       Primary Care Provider: Cira Amanda MD- authorGENth    Pertinent Medical History: Stafford;s granulomatosis,  ANCA- Associated vasculitis, cardiac disease during pregnancy     Most recent EKG+ Tracin23    Most recent ECHO: 23

## 2023-07-24 ENCOUNTER — HOSPITAL ENCOUNTER (EMERGENCY)
Facility: CLINIC | Age: 23
Discharge: HOME | End: 2023-07-24
Attending: EMERGENCY MEDICINE | Admitting: EMERGENCY MEDICINE
Payer: COMMERCIAL

## 2023-07-24 VITALS
HEART RATE: 89 BPM | HEIGHT: 64 IN | BODY MASS INDEX: 39.27 KG/M2 | DIASTOLIC BLOOD PRESSURE: 73 MMHG | TEMPERATURE: 97.6 F | WEIGHT: 230 LBS | RESPIRATION RATE: 16 BRPM | OXYGEN SATURATION: 95 % | SYSTOLIC BLOOD PRESSURE: 110 MMHG

## 2023-07-24 DIAGNOSIS — Z3A.28 28 WEEKS GESTATION OF PREGNANCY: ICD-10-CM

## 2023-07-24 DIAGNOSIS — Z79.01 CHRONIC ANTICOAGULATION: ICD-10-CM

## 2023-07-24 DIAGNOSIS — R04.0 EPISTAXIS: ICD-10-CM

## 2023-07-24 LAB
BASOPHILS # BLD AUTO: 0 10E3/UL (ref 0–0.2)
BASOPHILS NFR BLD AUTO: 1 %
EOSINOPHIL # BLD AUTO: 0.3 10E3/UL (ref 0–0.7)
EOSINOPHIL NFR BLD AUTO: 3 %
ERYTHROCYTE [DISTWIDTH] IN BLOOD BY AUTOMATED COUNT: 18.6 % (ref 10–15)
HCT VFR BLD AUTO: 25.7 % (ref 35–47)
HGB BLD-MCNC: 7.9 G/DL (ref 11.7–15.7)
IMM GRANULOCYTES # BLD: 0.1 10E3/UL
IMM GRANULOCYTES NFR BLD: 1 %
LYMPHOCYTES # BLD AUTO: 1.4 10E3/UL (ref 0.8–5.3)
LYMPHOCYTES NFR BLD AUTO: 18 %
MCH RBC QN AUTO: 24.8 PG (ref 26.5–33)
MCHC RBC AUTO-ENTMCNC: 30.7 G/DL (ref 31.5–36.5)
MCV RBC AUTO: 81 FL (ref 78–100)
MONOCYTES # BLD AUTO: 0.6 10E3/UL (ref 0–1.3)
MONOCYTES NFR BLD AUTO: 8 %
NEUTROPHILS # BLD AUTO: 5.4 10E3/UL (ref 1.6–8.3)
NEUTROPHILS NFR BLD AUTO: 69 %
NRBC # BLD AUTO: 0 10E3/UL
NRBC BLD AUTO-RTO: 0 /100
PLATELET # BLD AUTO: 299 10E3/UL (ref 150–450)
RBC # BLD AUTO: 3.19 10E6/UL (ref 3.8–5.2)
WBC # BLD AUTO: 7.7 10E3/UL (ref 4–11)

## 2023-07-24 PROCEDURE — 250N000009 HC RX 250: Performed by: EMERGENCY MEDICINE

## 2023-07-24 PROCEDURE — 36415 COLL VENOUS BLD VENIPUNCTURE: CPT | Performed by: EMERGENCY MEDICINE

## 2023-07-24 PROCEDURE — 85025 COMPLETE CBC W/AUTO DIFF WBC: CPT | Performed by: EMERGENCY MEDICINE

## 2023-07-24 PROCEDURE — 30901 CONTROL OF NOSEBLEED: CPT | Mod: RT | Performed by: EMERGENCY MEDICINE

## 2023-07-24 PROCEDURE — 99284 EMERGENCY DEPT VISIT MOD MDM: CPT | Mod: 25 | Performed by: EMERGENCY MEDICINE

## 2023-07-24 PROCEDURE — 250N000013 HC RX MED GY IP 250 OP 250 PS 637: Performed by: EMERGENCY MEDICINE

## 2023-07-24 RX ORDER — FAMOTIDINE 20 MG/1
20 TABLET, FILM COATED ORAL 2 TIMES DAILY
Qty: 180 TABLET | Refills: 0 | Status: ON HOLD | OUTPATIENT
Start: 2023-07-24 | End: 2023-09-03

## 2023-07-24 RX ADMIN — PHENYLEPHRINE HYDROCHLORIDE 1 SPRAY: 0.25 SPRAY NASAL at 05:15

## 2023-07-24 RX ADMIN — SILVER NITRATE APPLICATORS: 25; 75 STICK TOPICAL at 05:09

## 2023-07-24 ASSESSMENT — ACTIVITIES OF DAILY LIVING (ADL): ADLS_ACUITY_SCORE: 37

## 2023-07-24 NOTE — TELEPHONE ENCOUNTER
famotidine (PEPCID) 20 MG tablet      Last Written Prescription Date:  4-13-23  Last Fill Quantity: 60,   # refills: 2  Last Office Visit : 3-13-23  Future Office visit:  none    Routing refill request to provider for review/approval because:  Last rx by other provider/clinic ( Kye/ Jakub)

## 2023-07-24 NOTE — DISCHARGE INSTRUCTIONS
Please follow-up with your primary care provider/OB/GYN provider at your previously scheduled appointment this week.  Please continue on medications.      If you have another nosebleed, blow out all the clots, spray phenylephrine in your nose, and then clamp it and hold direct pressure (with nasal clamp or fingers) for 20 to 30 minutes.  If it is still bleeding after that, repeat the entire procedure.  If it is still bleeding after the second clamping, repeat it one more time (remember, no peeking!).  If it continues after 3 attempts, come in to the Emergency Department.

## 2023-07-24 NOTE — ED PROVIDER NOTES
ED Provider Note  [facility]      History     Chief Complaint   Patient presents with     Epistaxis     HPI  Cande Shields is a 22 year old female who has a past medical history of DVT, PE, currently on Lovenox, currently 28 weeks pregnant who presents to the emergency department for evaluation of epistaxis.  Patient reports nosebleeds on and off over the past few months during her pregnancy and being on Lovenox.  Patient states that yesterday she was doing well however developed a nosebleed last night around 2000.  Patient reports it is always her right nostril.  Patient states that she had a large clots, however eventually get the bleeding to stop.  Patient states that she went to sleep and woke up this morning with recurrent bleeding so came in for further evaluation.  Patient denies any weakness, dizziness, chest pain, shortness of breath.  Patient states that she feels the baby moving, denies any pregnancy or complaints.  Denies any abdominal/pelvic pain, no vaginal bleeding, no vaginal discharge or leakage of fluid.    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     Multiple subsegmental pulmonary emboli without acute cor pulmonale (H) 03/2021     PFO (patent foramen ovale)      Preeclampsia, third trimester      Spontaneous pregnancy loss 2020    31 weeks     Stage 3b chronic kidney disease (H)      Wegener's disease, pulmonary 01/01/2010    renal biopsy     Past Surgical History:   Procedure Laterality Date     ENT SURGERY  01/01/2000    cyst     EXCISE GANGLION WRIST  01/01/2009     IR LOWER EXTREMITY ANGIOGRAM LEFT  3/21/2021     IR THROMBOLYSIS ART/VENOUS INFUSION SUBSQ DAY  3/21/2021     IR THROMBOLYSIS ART/VENOUS INFUSION SUBSQ DAY  3/21/2021     STERNOTOMY  02/18/2023    right atrial thrombectomy      THROMBECTOMY ATRIUM Right 02/18/2023     THROMBECTOMY PERCUTANEOUS N/A 2/18/2023    Procedure: Emergency Sternotomy, Right Atrial Thrombectomy, Central Cannulation, Bilateral Femoral Cannulation, Closure of PFO ; Transesophageal Echocardiogram performed by anesthesia staff;  Surgeon: Adelfo Elmore MD;  Location: [facility] OR     THROMBECTOMY PERCUTANEOUS N/A 2/18/2023    Procedure: Angiovac Mediated for Right Atrial Clot; Intracatheter Thrombectomy via bilateral percutaneous femoral venous access with 26 FR Right Femoral vein and 17 FR Left Femoral Vein cannulas. Transesophageal echchocardiogram performed by anesthesia staff;  Surgeon: Po Monterroso MD;  Location: [facility] OR     acetaminophen (TYLENOL) 325 MG tablet  aspirin (ASA) 81 MG chewable tablet  azaTHIOprine (IMURAN) 50 MG tablet  enoxaparin ANTICOAGULANT (LOVENOX) 80 MG/0.8ML syringe  metoprolol tartrate (LOPRESSOR) 25 MG tablet  Prenatal Vit-Fe Fumarate-FA (PRENATAL MULTIVITAMIN W/IRON) 27-0.8 MG tablet  albuterol (PROAIR HFA/PROVENTIL HFA/VENTOLIN HFA) 108 (90 Base) MCG/ACT inhaler  doxylamine (UNISOM) 25 MG TABS tablet  famotidine (PEPCID) 20 MG tablet  pyridOXINE (VITAMIN B6) 25 MG tablet      Allergies   Allergen Reactions     Penicillins Rash     Unknown, but think rash.  Tolerated cephalexin (12/27/20), cefpodoxime (12/27/20), Ceftriaxone (Feb 2023), Zosyn (Feb 2023)     Family History  Family History   Problem Relation Age of Onset     Thyroid Disease Mother      Cancer Maternal Grandfather      Asthma No family hx of      Diabetes No family hx of      Social History   Social History     Tobacco Use     Smoking status: Former     Packs/day: 0.25     Types: Cigarettes     Smokeless tobacco: Former     Tobacco comments:     Vaping stopped 2/26   Vaping Use     Vaping Use: Some days     Substances: Nicotine, Flavoring     Devices: Disposable   Substance Use Topics     Alcohol use: Not Currently     Drug use: No      Past medical history, past surgical  "history, medications, allergies, family history, and social history were reviewed with the patient. No additional pertinent items.      A medically appropriate review of systems was performed with pertinent positives and negatives noted in the HPI, and all other systems negative.    Physical Exam   BP: 110/73  Pulse: 89  Temp: 97.6  F (36.4  C)  Resp: 16  Height: 162.6 cm (5' 4\")  Weight: 104.3 kg (230 lb)  SpO2: 95 %  Physical Exam  General: Afebrile, no acute distress   HEENT: Normocephalic, atraumatic, conjunctivae normal. MMM on examination right nares with no active bleeding, there is an area of prior bleeding on the right anterior inner aspect.   Neck: non-tender, supple  Cardio: regular rate. regular rhythm   Resp: Normal work of breathing, no respiratory distress, lungs clear bilaterally, no wheezing, rhonchi, rales  Chest/Back: no visual signs of trauma, no CVA tenderness   Abdomen: soft, non distension, no tenderness, no peritoneal signs   Neuro: alert and fully oriented. CN II-XII grossly intact. Grossly normal strength and sensation in all extremities.   MSK: no deformities. Normal range of motion  Integumentary/Skin: no rash visualized, normal color  Psych: normal affect, normal behavior      ED Course, Procedures, & Data      Procedures  .Epistaxis tx  Date/Time: 7/24/23  Performed by: Natacha Toney MD  Authorized by: Natacha Toney MD   Consent: Verbal consent obtained. Written consent not obtained.  Risks and benefits: risks, benefits and alternatives were discussed  Consent given by: patient  Patient understanding: patient states understanding of the procedure being performed  Patient identity confirmed: verbally with patient  Time out: Immediately prior to procedure a \"time out\" was called to verify the correct patient, procedure, equipment, support staff and site/side marked as required.     Anesthesia:  Local Anesthetic: none  Sedation:  Patient sedated: no     Treatment site: right anterior " and right Kiesselbach's area  Repair method: silver nitrate, no packing  Post-procedure assessment: no bleeding  Treatment complexity: moderate  Recurrence: recurrence of recent bleed  Patient tolerance: Patient tolerated the procedure well with no immediate complications            Results for orders placed or performed during the hospital encounter of 07/24/23   CBC with platelets differential     Status: None ()    Narrative    The following orders were created for panel order CBC with platelets differential.  Procedure                               Abnormality         Status                     ---------                               -----------         ------                     CBC with platelets and d...[[id number]]                                                   Please view results for these tests on the individual orders.     Medications   phenylephrine (MANUEL-SYNEPHRINE) 0.25 % spray 1 spray (1 spray Nasal $Given 7/24/23 8588)   silver nitrate (ARZOL) Misc ( Topical $Given by Other 7/24/23 2703)     Labs Ordered and Resulted from Time of ED Arrival to Time of ED Departure - No data to display  No orders to display          Critical care was not performed.     Medical Decision Making  The patient's presentation was of moderate complexity (a chronic illness mild to moderate exacerbation, progression, or side effect of treatment).    The patient's evaluation involved:  review of external note(s) from 2 sources (Most recent OB/GYN notes)  ordering and/or review of 1 test(s) in this encounter (CBC)    The patient's management necessitated moderate risk (prescription drug management including medications given in the ED).      Assessment & Plan    Cande Shields is a 22 year old female who has a past medical history of DVT, PE, currently on Lovenox, currently 28 weeks pregnant who presents to the emergency department for evaluation of epistaxis.  Upon arrival patient is nontoxic-appearing, afebrile, moderate  distress secondary to epistaxis.  Upon arrival after removing packing placed prior to arrival there was no active bleeding to the right nares, area was visualized on the right anterior inner naris Kiesselbach area, silver nitrate was applied.  Patient tolerated procedure well with no complications.  Given patient's anemia, a CBC was drawn to repeat hemoglobin.  Patient feels comfortable with discharge [facility] if significantly low.  Patient has close follow-up with her OB/GYN this week on Thursday.  Patient was sent home with Afrin, nasal clamp, and instructions on what to do if recurrent bleeds.  Patient feels comfortable this plan and will plan for discharge home with close outpatient follow-up.  Strict return precautions discussed.    I have reviewed the nursing notes. I have reviewed the findings, diagnosis, plan and need for follow up with the patient.    New Prescriptions    No medications on file       Final diagnoses:   Epistaxis       Natacha Toney MD  [facility] EMERGENCY DEPARTMENT  7/24/2023     Natacha Toney MD  07/25/23 0714

## 2023-07-24 NOTE — TELEPHONE ENCOUNTER
Pepcid last refilled by Dr. Call, routed to Rheumatology for refill.     CHANDNI NYE RN on 7/24/2023 at 1:17 PM

## 2023-07-24 NOTE — ED TRIAGE NOTES
Pt. states has had a nose bleed on and off since 2000 last night.  Pt. on blood thinner,  Enoxaparin and is 28 wks pregnant.  No pregnancy issues   Triage Assessment     Row Name 07/24/23 0432       Triage Assessment (Adult)    Airway WDL WDL       Respiratory WDL    Respiratory WDL WDL       Skin Circulation/Temperature WDL    Skin Circulation/Temperature WDL WDL       Cardiac WDL    Cardiac WDL WDL       Peripheral/Neurovascular WDL    Peripheral Neurovascular WDL WDL       Cognitive/Neuro/Behavioral WDL    Cognitive/Neuro/Behavioral WDL WDL       Pierson Coma Scale    Best Eye Response 4-->(E4) spontaneous    Best Motor Response 6-->(M6) obeys commands    Best Verbal Response 5-->(V5) oriented    Pierson Coma Scale Score 15

## 2023-07-25 ENCOUNTER — LAB (OUTPATIENT)
Dept: LAB | Facility: CLINIC | Age: 23
End: 2023-07-25
Payer: COMMERCIAL

## 2023-07-25 ENCOUNTER — OFFICE VISIT (OUTPATIENT)
Dept: NEPHROLOGY | Facility: CLINIC | Age: 23
End: 2023-07-25
Payer: COMMERCIAL

## 2023-07-25 VITALS
OXYGEN SATURATION: 95 % | DIASTOLIC BLOOD PRESSURE: 80 MMHG | SYSTOLIC BLOOD PRESSURE: 113 MMHG | BODY MASS INDEX: 39.63 KG/M2 | WEIGHT: 230.9 LBS | HEART RATE: 94 BPM | RESPIRATION RATE: 16 BRPM

## 2023-07-25 DIAGNOSIS — I77.82 ANCA-ASSOCIATED VASCULITIS (H): Primary | ICD-10-CM

## 2023-07-25 DIAGNOSIS — I77.82 ANCA-ASSOCIATED VASCULITIS (H): ICD-10-CM

## 2023-07-25 LAB
ALBUMIN SERPL BCG-MCNC: 3.5 G/DL (ref 3.5–5.2)
ANION GAP SERPL CALCULATED.3IONS-SCNC: 12 MMOL/L (ref 7–15)
BUN SERPL-MCNC: 20.3 MG/DL (ref 6–20)
CALCIUM SERPL-MCNC: 9.3 MG/DL (ref 8.6–10)
CHLORIDE SERPL-SCNC: 106 MMOL/L (ref 98–107)
CREAT SERPL-MCNC: 1.43 MG/DL (ref 0.51–0.95)
DEPRECATED HCO3 PLAS-SCNC: 18 MMOL/L (ref 22–29)
GFR SERPL CREATININE-BSD FRML MDRD: 52 ML/MIN/1.73M2
GLUCOSE SERPL-MCNC: 96 MG/DL (ref 70–99)
PHOSPHATE SERPL-MCNC: 3.3 MG/DL (ref 2.5–4.5)
POTASSIUM SERPL-SCNC: 4.6 MMOL/L (ref 3.4–5.3)
SODIUM SERPL-SCNC: 136 MMOL/L (ref 136–145)

## 2023-07-25 PROCEDURE — 84433 ASY THIOPURIN S-MTHYLTRNSFRS: CPT | Mod: 90

## 2023-07-25 PROCEDURE — 99214 OFFICE O/P EST MOD 30 MIN: CPT | Performed by: INTERNAL MEDICINE

## 2023-07-25 PROCEDURE — 36415 COLL VENOUS BLD VENIPUNCTURE: CPT

## 2023-07-25 NOTE — PATIENT INSTRUCTIONS
It was a pleasure taking care of you today.  I've included a brief summary of our discussion and care plan from today's visit below.  Please review this information with your primary care provider.     My recommendations are summarized as follows:  -Urine studies today  -Will follow up on your blood work from today  -Labs in 1 months  -Continue to monitor your Blood pressure  -will try to move your iron injections to an earlier date    Who do I call with any questions after my visit?  Please be in touch if there are any further questions that arise following today's visit.  There are multiple ways to contact your nephrology care team.       During business hours, you may reach your Nephrology Care Team or schedule or reschedule an appointment or lab at [phone].       If you need to schedule imaging, please call [phone].   To schedule a COVID test, please call [phone].     You can always send a secure message through [facility]. [facility] [employer] messages are answered by your nurse or doctor typically within 24-48 hours. Please allow extra time on weekends and holidays.       For urgent/emergent questions after business hours, you may reach the on-call Nephrology Fellow by contacting the [facility]  at [phone].     How will I get the results of any tests ordered?    You will receive all of your results.  If you have signed up for [employer], any tests ordered at your visit will be available to you once resulted on [employer]. Typically the physician reviews them and may or may not make further recommendations. If there are urgent results that require a change in your care plan, your physician or nurse will call you to discuss the next steps. If you are not on [employer], a letter may be generated and mailed to you with your results.

## 2023-07-25 NOTE — NURSING NOTE
Cande Shields's goals for this visit include: NONE  She requests these members of her care team be copied on today's visit information: YES    PCP: Cira Amanda    Referring Provider:  No referring provider defined for this encounter.    /80 (BP Location: Left arm, Patient Position: Sitting, Cuff Size: Adult Large)   Pulse 94   Resp 16   Wt 104.7 kg (230 lb 14.4 oz)   LMP 01/10/2023 (Approximate)   SpO2 95%   BMI 39.63 kg/m      Do you need any medication refills at today's visit? NONE    Henrry King, EMT

## 2023-07-25 NOTE — PROGRESS NOTES
Anticoagulation Note - Preoperative Assessment Center (PAC) Pharmacist     Patient was interviewed on July 25, 2023 prior to scheduled PAC clinic appointment. The purpose of this note is to document the perioperative anticoagulation plan outlined by the providers caring for Cande Shields.     Current Regimen  Anticoagulation Regimen as of July 25, 2023: enoxaparin 80mg subcutaneous q12 hours. Note this is therapeutic dosing as she has been followed for anti-Xa levels and dose adjusted to goal range (of note, it appears she is due for this to be repeated).   Indication:  Granulomatous polyangitis, multiple prior DVT/PE, last PE 2/2023. Prescriber:  Dr. Oneal (also sees Dr. Dye)  Current medications that may interact with this include: aspirin    Creatinine   Date Value Ref Range Status   06/30/2023 1.61 (H) 0.51 - 0.95 mg/dL Final   07/09/2021 1.38 (H) 0.52 - 1.04 mg/dL Final       Perioperative plan  Cande Shields is being seen in PAC clinic for anesthesia eval prior to delivery (patient is 28 weeks pregnant).  Patient has appointment with Dr. Dye on 8/17 for delivery anticoagulation planning.  If patient to get epidural would need her last dose of lovenox to be at least 24 hr prior to time of epidural placement per RAPS guidelines.     Resumption of anticoagulation after procedure will be based on surgery team assessment of bleeding risks and complications.  This plan may require re-assessment and modification by her primary team in the perioperative setting depending on patients clinical situation.        Richardson Andres [facility]  July 25, 2023  9:51 AM

## 2023-07-25 NOTE — PROGRESS NOTES
Preoperative Assessment Center Medication History Note  Medication history completed on July 25, 2023 by this writer prior to patient's PAC appointment. See Epic admission navigator for prior to admission medications. Operating room staff will still need to confirm medications and last dose information on day of surgery.     Medication history interview sources  Patient and CareEverywhere/SureScripts via phone    Changes made to PTA medication list  Added: none  Deleted: none  Changed: albuterol/doxylamine - not taking, but has refills left and relatively recent Rxs so left on the list and marked as not taking.     Allergies reviewed with patient and updates made in EHR: yes    -- No recent (within 30 days) course of antibiotics  -- No recent (within 30 days) course of systemic steroids  -- Reports being on blood thinning medications aspirin 81 mg and lovenox - see other note.    -- Declines being on any other prescription or over-the-counter medications    Prior to Admission medications    Medication Sig Last Dose Taking? Auth Provider Long Term End Date   acetaminophen (TYLENOL) 325 MG tablet Take 2 tablets (650 mg) by mouth every 4 hours as needed for other (For optimal non-opioid multimodal pain management to improve pain control.) Taking Yes Wendy Matthews NP     aspirin (ASA) 81 MG chewable tablet Take 1 tablet (81 mg) by mouth daily Taking Yes Kodi Dye MD     azaTHIOprine (IMURAN) 50 MG tablet Take 1 tablet (50 mg) by mouth daily Taking Yes Kang Goodson MD Yes    enoxaparin ANTICOAGULANT (LOVENOX) 80 MG/0.8ML syringe Inject 0.8 mLs (80 mg) Subcutaneous every 12 hours Taking Yes Roxie Oneal MD No    famotidine (PEPCID) 20 MG tablet Take 1 tablet (20 mg) by mouth 2 times daily Taking Yes Ramses Call MD     metoprolol tartrate (LOPRESSOR) 25 MG tablet Take 0.5 tablets (12.5 mg) by mouth 2 times daily Taking Yes Roxie Oneal MD Yes    Prenatal Vit-Fe Fumarate-FA  (PRENATAL MULTIVITAMIN W/IRON) 27-0.8 MG tablet Take 1 tablet by mouth daily Patient uses [employer] brand chewable prenatal vitamins Taking Yes Reported, Patient     pyridOXINE (VITAMIN B6) 25 MG tablet Take 1 tablet (25 mg) by mouth every 8 hours as needed (nausea) Taking Yes Ramses Call MD     albuterol (PROAIR HFA/PROVENTIL HFA/VENTOLIN HFA) 108 (90 Base) MCG/ACT inhaler Inhale 2 puffs into the lungs every 6 hours as needed for shortness of breath or cough  Patient not taking: Reported on 6/1/2023 Not Taking  Cira Amanda MD Yes    doxylamine (UNISOM) 25 MG TABS tablet Take 0.5 tablets (12.5 mg) by mouth 2 times daily as needed for other (nausea)  Patient not taking: Reported on 6/29/2023 Not Taking  Cira Amanda MD          Medication History Completed By: Richardson Andres RPH 7/25/2023 9:51 AM

## 2023-07-25 NOTE — PROGRESS NOTES
CC:   -PR3 ANCA vasculitis  -Pregnancy 28 weeks +0  -CKD stage 3    HPI:   Cande Shields is a 22 year old female L0  who presents for Follow-up of PR3 ANCA vasculitis. She is 28 weeks +0    Her past medical history is significant for:  1.  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  Chronic kidney disease  3.  History of intrauterine fetal death secondary to severe preeclampsia at 30 weeks  4  Severe preeclampsia and IUFD at 31 weeks  5  Saddle pulmonary embolus and history of DVT with PE, Cardiac thrombus  6. bicornuate uterus  7. Large subchoronic hemorrhage  8. ADHD    ANCA history:  The patient was initially diagnosed with MS-3 ANCA vascultis at the age of 10 years. 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 She was treated in the past by Dr. Velásquez. She was treated with methotrexate, high dose steroid. As per patient, she did not respond to rituximab so she was placed on oral cytoxan (unclear how long) then she had been followed by Dr. Enriqueta Alcantara, pediatric rheumatologist at [facility].    Her Cr was 0.6-0.8mg/dl in . She was pregnant and had severe pre-eclampsia and noted to have IUFD at 31 weeks in 2020. In 2020, she developed several days of myalgia, joint pain, pedal edema and was admitted to [facility] between -. She was thought to have a flare of GPA . Of note, she had COVID 1 mo preceding this event. She was given solumedrol 80 mg per day and received 4 doses of  mg/m2. She received the last dose of the series on 21 ([facility]). Cr during that flare was 0.77. However, she was readmitted again on 22 after rising creatinine=2.05mg/dl. Followup pulse methylprednisolone and Cytoxan 500 mg/m2 were given on 2021 in response to rising creatinine and active urinary sediment. She  received IV cytoxan 500 mg/m2 x 3 doses (total dose cumulation about 3.5 g). Gynecology input on ovarian preservation in setting of cyclophosphamide - did not give leuprolide. Eventually steroid was tapered down in 4/2021. She was then lost follow-up and did not receive maintenance therapy. Her creatinine was 1.11 mg/dl in January 2022.      She presented in December 2022 with cold symptoms with positive home COVID test and was treated with Paxlovid. She also noted to have petechial rashes similar to previous flare. Upon presentation, she has Cr of 2.05 but after IV fluid and 1 dose of methlyprednisolone 125 mg Cr down to 1.48 the next day.  She has no leg swelling. She has some phlegm with blood tinge and denied any SOB. We thought that she has flare as her PR3 was elevated at 27. She received tapering dose of methylprednisone 125 mg IV x1-> 32 mg IV x1 and was started on oral Medrol tapering dose: Take 8 tablets (32 mg) by mouth daily for 6 days, THEN 6 tablets (24 mg) daily for 7 days, THEN 4 tablets (16 mg) daily for 7 days, THEN 3 tablets (12 mg) daily for 14 days. We also started her on Avacopan 30 mg BID through emergency program. She received Rituximab 375 mg/m2 1st dose on 1/10/23 and 2nd dose on 1/17/23 and 3rd dose on 1/24/23. She did not receive her last dose as she was found to be pregnant at 4 weeks in February. She also received avacopan which was stopped once the team knew she was pregnant.     Of note, she was hospitalized February 2023 for acute saddle PE and underwent  right atrial thrombectomy with sternotomy in the setting of pregnancy and COVID-19. Her course was complicated by hospital-acquired pneumonia. She is currently on on enoxaparin.  She had a PFO which was closed as well as per patient.      She was seen last week by maternal-fetal medicine. She is not gaining much weight (around 6 kg). Her fetal ultrasound showed that her baby is at the 9th percentile and she is now doing weekly US.       Overall, Cande is feeling well. She only complains of pelvic pressure. She continues to go to work.  She can feel the baby moving.  She measures her blood pressure daily and her blood pressure has been around 116 systolic. She does not remember her diastolic readings.  She is on imuran 50 mg daily(started mid July 2023) . She is scheduled to get her anemia injections mid August. She denies any joint pain hemoptysis, shortness of breath, skin rash or dysuria.    Social history: She lives in [place].  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 She is single but she has a boyfriend (not the current father).    Family history not significant for autoimmune or chronic kidney disease.    Allergies   Allergen Reactions     Penicillins Rash     Unknown, but think rash.  Tolerated cephalexin (12/27/20), cefpodoxime (12/27/20), Ceftriaxone (Feb 2023), Zosyn (Feb 2023)       acetaminophen (TYLENOL) 325 MG tablet, Take 2 tablets (650 mg) by mouth every 4 hours as needed for other (For optimal non-opioid multimodal pain management to improve pain control.)  aspirin (ASA) 81 MG chewable tablet, Take 1 tablet (81 mg) by mouth daily  azaTHIOprine (IMURAN) 50 MG tablet, Take 1 tablet (50 mg) by mouth daily  enoxaparin ANTICOAGULANT (LOVENOX) 80 MG/0.8ML syringe, Inject 0.8 mLs (80 mg) Subcutaneous every 12 hours  famotidine (PEPCID) 20 MG tablet, Take 1 tablet (20 mg) by mouth 2 times daily  metoprolol tartrate (LOPRESSOR) 25 MG tablet, Take 0.5 tablets (12.5 mg) by mouth 2 times daily  Prenatal Vit-Fe Fumarate-FA (PRENATAL MULTIVITAMIN W/IRON) 27-0.8 MG tablet, Take 1 tablet by mouth daily Patient uses [employer] brand chewable prenatal vitamins  pyridOXINE (VITAMIN B6) 25 MG tablet, Take 1 tablet (25 mg) by mouth every 8 hours as needed (nausea)  albuterol (PROAIR HFA/PROVENTIL HFA/VENTOLIN HFA) 108 (90 Base) MCG/ACT inhaler, Inhale 2 puffs into the lungs every 6 hours as needed for shortness of breath or cough  (Patient not taking: Reported on 6/1/2023)  doxylamine (UNISOM) 25 MG TABS tablet, Take 0.5 tablets (12.5 mg) by mouth 2 times daily as needed for other (nausea) (Patient not taking: Reported on 6/29/2023)    No current facility-administered medications on file prior to visit.      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     Multiple subsegmental pulmonary emboli without acute cor pulmonale (H) 03/2021     PFO (patent foramen ovale)      Preeclampsia, third trimester      Spontaneous pregnancy loss 2020    31 weeks     Stage 3b chronic kidney disease (H)      Wegener's disease, pulmonary 01/01/2010    renal biopsy       Past Surgical History:   Procedure Laterality Date     ENT SURGERY  01/01/2000    cyst     EXCISE GANGLION WRIST  01/01/2009     IR LOWER EXTREMITY ANGIOGRAM LEFT  3/21/2021     IR THROMBOLYSIS ART/VENOUS INFUSION SUBSQ DAY  3/21/2021     IR THROMBOLYSIS ART/VENOUS INFUSION SUBSQ DAY  3/21/2021     STERNOTOMY  02/18/2023    right atrial thrombectomy     THROMBECTOMY ATRIUM Right 02/18/2023     THROMBECTOMY PERCUTANEOUS N/A 2/18/2023    Procedure: Emergency Sternotomy, Right Atrial Thrombectomy, Central Cannulation, Bilateral Femoral Cannulation, Closure of PFO ; Transesophageal Echocardiogram performed by anesthesia staff;  Surgeon: Adelfo Elmore MD;  Location: [facility] OR     THROMBECTOMY PERCUTANEOUS N/A 2/18/2023    Procedure: Angiovac Mediated for Right Atrial Clot; Intracatheter Thrombectomy via bilateral percutaneous femoral venous access with 26 FR Right Femoral vein and 17 FR Left Femoral Vein cannulas. Transesophageal echchocardiogram performed by anesthesia staff;  Surgeon: Po Monterroso MD;  Location: [facility] OR       Social History     Tobacco Use     Smoking status: Former     Packs/day: 0.25     Types: Cigarettes     Smokeless  tobacco: Former     Tobacco comments:     Vaping stopped 2/26   Vaping Use     Vaping Use: Some days     Substances: Nicotine, Flavoring     Devices: Disposable   Substance Use Topics     Alcohol use: Not Currently     Drug use: No       Family History   Problem Relation Age of Onset     Thyroid Disease Mother      Cancer Maternal Grandfather      Asthma No family hx of      Diabetes No family hx of        ROS: A 12 system review of systems was negative other than noted here or above.     Exam:  /80 (BP Location: Left arm, Patient Position: Sitting, Cuff Size: Adult Large)   Pulse 94   Resp 16   Wt 104.7 kg (230 lb 14.4 oz)   LMP 01/10/2023 (Approximate)   SpO2 95%   BMI 39.63 kg/m       Physical Exam:  General : Alert, cooperative, comfortable  Skin: Skin color, texture and turgor normal, no rashes, no lesions   Lymph Nodes: No obvious adenopathy, no swelling   Eyes: No scleral icterus, equal pupils  HENT: no traumatic injury to the head or face, no gross abnormalities  Lungs: Normal respiratory effort, bilateral symmetrical breath sounds, No added sounds  Heart: Regular rate and rhythm, No murmurs, rub or gallop  Abdomen: Normal bowel sounds, no tenderness, no organomegaly  Musculoskeletal: No obvious abnormalities  Neurologic: Grossly intact    Results:reviewed in details with the patient    Assessment/Plan:  #1 granulomatous polyangiitis/LA-3 ANCA vasculitis with glomerulonephritis, upper respiratory, musculoskeletal, and cutaneous involvement  #2 CKD stage III  Diagnosed early at the age of 10 when she received hemodialysis at that point and Cytoxan.  It seemed that she had another flare in 2021 which was also treated with Cytoxan.  Her third flare was in January 2023 where she was treated with 3 doses of rituximab and was found to be pregnant so her first dose of rituximab was withheld.  - She continues to be in clinical and biochemical remission at this point.  No symptoms of active disease.  Her  serologies and her inflammatory markers continue to decrease.  However her creatinine has been slightly going up during her second trimester; when in fact there should be a dilutional effect of the creatinine which peak by the second trimester. Her hematuria have resolved and her proteinuria has been improving.  - Her last dose of rituximab was in .  Her CD19 count remains below 1%. We started her on imuran 50 mg her as maintenance to keep her immune system under control given her background of comorbidities and her high risk for complications.  -  TPMT is pending, if normal, imuran dose need to be increased slowly to 2 mg/kg/day.  Azathioprine is relatively safe during pregnancy.  We are trying to avoid rituximab as she is high risk for  delivery and in such a case the baby may suffer consequences of low immunity and increased risk of infections.  - I emphasized to Cande today that she needs to exercise utmost precaution especially given her job at a  facility where she is exposed to sick children.  I emphasized that she needs to be wearing mask all the time with frequent handwashing with soap and water.    #3 hypertension: Her blood pressure is currently well controlled.  She is on metoprolol 12.5 mg twice daily.  She was instructed to measure her blood pressure twice daily.    #4 anemia: Her iron studies are low. She has scheduled iron infusion at [place] mid-August. Will try to see if we can move these appointments earlier.    #5 pregnancy 28 weeks +0, history of preeclampsia, CKD which puts her at increased risk for preeclampsia:  She was also instructed about the increased risk of pre-eclampsia when compared with women without renal disease[10x higher].  In addition, there is a slightly increased risk of  delivery and SGA babies. She has been on aspirin and will continue that until 36 weeks of pregnancy.  She is followed frequently by maternal-fetal medicine. Her last  fetal echocardiogram showed the baby growth at 9th percentile.      Our knowledge of pregnancy outcomes among women with preexisting ACNA vasculitis is limited to case series. Active disease at the time of conception was associated with an increased risk of intrauterine fetal demise and maternal death, which occurred in 3 of 34 patients*.  deliveries occurred in 35%, 33%, and 16% of those with GPA, MPA, and eGPA, respectively.     Remission was achieved in pregnancy for 10/25 women, with the remaining achieving remission postpartum, and 2 with a relapse in a subsequent pregnancy. Overall, 19/26 pregnancies resulted in live births, although 11/15 reported delivery before 37 weeks.     #6 hypercoagulable state: She is being followed by Dr. Linton from hematology.  She is currently on Lovenox and she will need to be on lifelong anticoagulation.    *Denys NAVARRETE, Hermann L, Claudia M, et al. Pregnancy and vasculitis: a systematic review of the literature. Autoimmun Rev. 2012;11(6-7):L482-R388.    The total time of this encounter amounted to 30 minutes on the day of the encounter 2023. This time included face to face time spent with the patient, preparatory work and chart review, ordering tests, and performing post visit documentation.    *This dictation was prepared in part using Dragon recognition software.  As a result errors may occur. When identified these transcription errors have been corrected.  While every attempt is made to correct errors during dictation, errors may still exist.     Kang Goodson MD   [facility]   Department of Medicine   Division of Renal Disease and Hypertension

## 2023-07-26 ENCOUNTER — TELEPHONE (OUTPATIENT)
Dept: NEPHROLOGY | Facility: CLINIC | Age: 23
End: 2023-07-26

## 2023-07-26 ENCOUNTER — ANESTHESIA EVENT (OUTPATIENT)
Dept: SURGERY | Facility: CLINIC | Age: 23
End: 2023-07-26

## 2023-07-26 ENCOUNTER — PRE VISIT (OUTPATIENT)
Dept: SURGERY | Facility: CLINIC | Age: 23
End: 2023-07-26

## 2023-07-26 ENCOUNTER — VIRTUAL VISIT (OUTPATIENT)
Dept: SURGERY | Facility: CLINIC | Age: 23
End: 2023-07-26
Payer: COMMERCIAL

## 2023-07-26 DIAGNOSIS — O09.299 HIGH RISK PREGNANCY DUE TO HISTORY OF PREVIOUS OBSTETRICAL PROBLEM, ANTEPARTUM: Primary | ICD-10-CM

## 2023-07-26 PROCEDURE — 99205 OFFICE O/P NEW HI 60 MIN: CPT | Mod: 95 | Performed by: PHYSICIAN ASSISTANT

## 2023-07-26 ASSESSMENT — PAIN SCALES - GENERAL: PAINLEVEL: NO PAIN (0)

## 2023-07-26 ASSESSMENT — ENCOUNTER SYMPTOMS
ORTHOPNEA: 0
DYSRHYTHMIAS: 0

## 2023-07-26 ASSESSMENT — LIFESTYLE VARIABLES: TOBACCO_USE: 1

## 2023-07-26 NOTE — TELEPHONE ENCOUNTER
Left message for Cande after calling [facility] infusion to inquire about sooner iron infusion dates/times. [facility] infusion offered pt Friday 7/29 though pt unable due to work conflict. [facility] offered 8/8, 8/10 and 8/14. Tentatively scheduled these and left a message for Niki to see if these would work for her. Asked that she call back or send a [employer] message after reviewing the dates and times.

## 2023-07-26 NOTE — CONSULTS
Anesthesia Consult Note      Reason for consult:   High Risk OB consult  (O09.299) High risk pregnancy due to history of previous obstetrical problem, antepartum  (primary encounter diagnosis)    Date of Encounter: 2023  Referring Physician: Ina Gonzalez MD   Primary Care Physician:  Cira Amanda  Cande Shields is a 22 year old woman who is currently  who is currently 28 weeks +1 pregnant . 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    The patient has an OB history significant for: Hx of pre-eclampsia, HELLP with IUFD at 31 weeks in 2020.    OB Hx:       /parity:      History of complications of pregnancy  Pre-eclampsia, HELLP with IUFD at 31 weeks, subchorionic hemorrhage    History of obstetrical surgery  No obstetrical surgery/procedure. Patient reports that she delivered her first baby vaginally at 31 weeks without an epidural    History of bleeding/coagulopathy  Multiple DVT/PE dating back to , most recently PE 2023    History of anesthesia issues in patient or 1st degree relative  No previous issues with anesthesia for the patient or a first degree relative    History is obtained from the patient.     Past Medical History  Past Medical History:   Diagnosis Date    ADHD (attention deficit hyperactivity disorder)     DVT, lower extremity, distal (H)     Dysmenorrhea     Femoral artery thrombosis, left (H) 2021    Multiple subsegmental pulmonary emboli without acute cor pulmonale (H) 2021    PFO (patent foramen ovale)     Preeclampsia, third trimester     Spontaneous pregnancy loss     31 weeks     Stage 3b chronic kidney disease (H)     Wegener's disease, pulmonary 01/01/2010    renal biopsy       Past Surgical History  Past Surgical History:   Procedure Laterality Date    ENT SURGERY  01/01/2000    cyst    EXCISE GANGLION WRIST  01/01/2009    IR LOWER EXTREMITY ANGIOGRAM LEFT  3/21/2021    IR THROMBOLYSIS ART/VENOUS INFUSION SUBSQ DAY  3/21/2021    IR THROMBOLYSIS ART/VENOUS INFUSION SUBSQ DAY  3/21/2021    STERNOTOMY  02/18/2023    right atrial thrombectomy    THROMBECTOMY ATRIUM Right 02/18/2023    THROMBECTOMY PERCUTANEOUS N/A 2/18/2023    Procedure: Emergency Sternotomy, Right Atrial Thrombectomy, Central Cannulation, Bilateral Femoral Cannulation, Closure of PFO ; Transesophageal Echocardiogram performed by anesthesia staff;  Surgeon: Adelfo Elmore MD;  Location: [facility] OR    THROMBECTOMY PERCUTANEOUS N/A 2/18/2023    Procedure: Angiovac Mediated for Right Atrial Clot; Intracatheter Thrombectomy via bilateral percutaneous femoral venous access with 26 FR Right Femoral vein and 17 FR Left Femoral Vein cannulas. Transesophageal echchocardiogram performed by anesthesia staff;  Surgeon: Po Monterroso MD;  Location: [facility] OR       Prior to Admission Medications  Current Outpatient Medications   Medication Sig Dispense Refill    acetaminophen (TYLENOL) 325 MG tablet Take 2 tablets (650 mg) by mouth every 4 hours as needed for other (For optimal non-opioid multimodal pain management to improve pain control.)      aspirin (ASA) 81 MG chewable tablet Take 1 tablet (81 mg) by mouth daily 30 tablet 4    azaTHIOprine (IMURAN) 50 MG tablet Take 1 tablet (50 mg) by mouth daily 90 tablet 3    enoxaparin ANTICOAGULANT (LOVENOX) 80 MG/0.8ML syringe Inject 0.8 mLs (80 mg) Subcutaneous every 12 hours 48 mL 2    famotidine (PEPCID) 20 MG tablet Take 1 tablet (20 mg) by mouth 2 times daily 180 tablet 0    metoprolol tartrate (LOPRESSOR) 25 MG tablet Take 0.5 tablets (12.5 mg) by mouth 2 times daily 60 tablet 2     Prenatal Vit-Fe Fumarate-FA (PRENATAL MULTIVITAMIN W/IRON) 27-0.8 MG tablet Take 1 tablet by mouth daily Patient uses Viddyad brand chewable prenatal vitamins      pyridOXINE (VITAMIN B6) 25 MG tablet Take 1 tablet (25 mg) by mouth every 8 hours as needed (nausea) 30 tablet 4    albuterol (PROAIR HFA/PROVENTIL HFA/VENTOLIN HFA) 108 (90 Base) MCG/ACT inhaler Inhale 2 puffs into the lungs every 6 hours as needed for shortness of breath or cough (Patient not taking: Reported on 6/1/2023) 18 g 1    doxylamine (UNISOM) 25 MG TABS tablet Take 0.5 tablets (12.5 mg) by mouth 2 times daily as needed for other (nausea) (Patient not taking: Reported on 6/29/2023) 30 tablet 1       Menstrual history: Patient's last menstrual period was 01/10/2023 (approximate).     Allergies  Allergies   Allergen Reactions    Penicillins Rash     Unknown, but think rash.  Tolerated cephalexin (12/27/20), cefpodoxime (12/27/20), Ceftriaxone (Feb 2023), Zosyn (Feb 2023)       Social History  Social History     Socioeconomic History    Marital status: Legally      Spouse name: Not on file    Number of children: Not on file    Years of education: Not on file    Highest education level: Not on file   Occupational History    Not on file   Tobacco Use    Smoking status: Former     Packs/day: 0.25     Types: Cigarettes    Smokeless tobacco: Former    Tobacco comments:     Vaping stopped 2/26   Vaping Use    Vaping Use: Some days    Substances: Nicotine, Flavoring    Devices: Disposable   Substance and Sexual Activity    Alcohol use: Not Currently    Drug use: No    Sexual activity: Yes     Partners: Male     Birth control/protection: None   Other Topics Concern    Parent/sibling w/ CABG, MI or angioplasty before 65F 55M? Not Asked   Social History Narrative    Lives with brother 16yo and mother. Pets: Dog Nicholas.    Lives with  in [place], has cat.    Mom lives next door.     Social Determinants of Health     Financial Resource Strain:  Not on file   Food Insecurity: Not on file   Transportation Needs: Not on file   Physical Activity: Not on file   Stress: Not on file   Social Connections: Not on file   Intimate Partner Violence: Not on file   Housing Stability: Not on file       Family History  Family History   Problem Relation Age of Onset    Thyroid Disease Mother     Cancer Maternal Grandfather     Asthma No family hx of     Diabetes No family hx of     Anesthesia Reaction No family hx of     Venous thrombosis No family hx of        The complete review of systems is negative other than noted in the HPI or here. Anesthesia Evaluation   Pt has had prior anesthetic. Type: General.    No history of anesthetic complications       ROS/MED HX  ENT/Pulmonary: Comment: COVID-19 with hospital acquired pneumonia 2/2023 - resolved  History of recurrent bronchitis, improved with smoking cessation    (+)     ILA risk factors,  hypertension,         tobacco use, Past use,                   (-) asthma and recent URI   Neurologic:  - neg neurologic ROS     Cardiovascular: Comment: History of PFO  and atrial thrombus s/p sternotomy, thrombectomy, and closure of PFO 2/18/2023    (+)  hypertension- -   -  - -   Taking blood thinners (will receive instructions from hematology prior to delivery)                              Previous cardiac testing   Echo: Date: 4/26/2023 Results:  Interpretation Summary   Left ventricular size, wall motion and function are normal. The ejection fraction is 55-60%.   Right ventricular function, chamber size, wall motion, and thickness are normal.   The inferior vena cava is normal.   No pericardial effusion is present.  Stress Test:  Date: Results:    ECG Reviewed:  Date: 2/19/2023 Results:  Sinus rhythm   Normal ECG  Cath:  Date: Results:   (-) CAD, URIAS, orthopnea/PND, arrhythmias, irregular heartbeat/palpitations and dyslipidemia   METS/Exercise Tolerance: >4 METS    Hematologic: Comments: Follows closely with hematology    (+)  History of blood clots,    pt is anticoagulated, anemia,       (-) history of blood transfusion   Musculoskeletal: Comment: Back pain/sciatic pain  Pelvic pain and pressure      GI/Hepatic:     (+) GERD, Asymptomatic on medication,               (-) liver disease   Renal/Genitourinary: Comment: Granulomatosis polyangitis, in remission - primarily affecting kidneys, follows closely with neprhology and rheumatology    (+) renal disease, type: CRI, Pt does not require dialysis,        (-) History of transplant   Endo:  - neg endo ROS  (-) Type II DM and thyroid disease   Psychiatric/Substance Use:     (+) psychiatric history anxiety, depression and other (comment) (ADD)       Infectious Disease:  - neg infectious disease ROS     Malignancy:  - neg malignancy ROS     Other:  - neg other ROS            Virtual visit -  No vitals were obtained    Physical Exam  Constitutional: Pleasant female, no apparent distress, and appears stated age.  Eyes: Pupils equal  HENT: Normocephalic and atraumatic  Respiratory: Non labored breathing on room air  Neurologic: Awake, alert, oriented to name, place and time.   Neuropsychiatric: Calm, cooperative. Normal affect.      Labs / testing: (personally reviewed)    Outside records reviewed from:  Care Everywhere    Assessment  Cande Shields is a 22 year old female seen as a PAC referral for risk assessment and optimization for anesthesia.    Plan/Recommendations  Pt will be optimized for the proposed procedure.  See below for details on the assessment, risk, and preoperative recommendations    NEUROLOGY  - No history of TIA, CVA or seizure    -Post Op delirium risk factors:  No risk identified    ENT  - No current airway concerns.  Will need to be reassessed day of surgery.  Mallampati: Unable to assess  TM: Unable to assess    CARDIAC  - Patient presented on 2/17/2023 with progressively worsening cough and sharp, pleuritic chest pain found to have large highly mobile thrombi in right  atrium at 5 weeks pregnant. She underwent emergent sternotomy with right atrial thrombectomy and PFO closure on 2/18/2023 and has been following with cardiology since. Her last visit was on 4/26/2023 where she was noted to be doing well, echo completed (see ROS for results) and she is scheduled for additional follow-up on 8/9/2023 with TTE prior. She reports that she has been feeling well and denies cardiac symptoms such as chest pain/pressure, URIAS, orthopnea, lower extremity edema, dizziness, etc.   - History of pre-eclampsia/HTN: managed with metoprolol, monitors BP at home daily and reports good control.     - METS (Metabolic Equivalents)  Patient performs 4 or more METS exercise without symptoms            Total Score: 0      RCRI-Very low risk: Class 1 0.4% complication rate            Total Score: 0        PULMONARY    ILA Low Risk            Total Score: 2    ILA: Hypertension    ILA: BMI over 35 kg/m2      - Denies asthma or inhaler use  - History of recurrent bronchitis, has improved significantly since she quit smoking  - History of COVID-19 and hospital acquired pneumonia 2/2023, resolved    - Tobacco History    History   Smoking Status    Former    Packs/day: 0.25    Types: Cigarettes   Smokeless Tobacco    Former       GI  - GERD  Controlled on medications: Pepcid  PONV Medium Risk  Total Score: 2           1 AN PONV: Pt is Female    1 AN PONV: Patient is not a current smoker        /RENAL  - CKD Stage 3 Baseline Creatinine  1.3  - 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 Follows closely with nephology, last visit 7/25/23 and planned follow-up in 6 weeks as well as rheumatology, last visit 4/13 with next visit scheduled for 8/11/23.   - Diagnosed at age 10, received hemodialysis at that time and Cytoxan  - Now in clinical and biochemical remission. Last dose of Rituximab 1/24/2023. Now being treated with low  "dose azathioprine  - Continue recommendations per nephrology    ENDOCRINE  - BMI: Estimated body mass index is 39.63 kg/m  as calculated from the following:    Height as of 7/24/23: 1.626 m (5' 4\").    Weight as of 7/25/23: 104.7 kg (230 lb 14.4 oz).  Obesity (BMI >30)  - No history of Diabetes Mellitus    HEME  VTE Low Risk 0.5%            Total Score: 3    VTE: Pt history of VTE        - Prior saddle PE w/ B/L DVT w/ pulmonary infarct & mild RV dilation on 03/20/2021- treated with thrombolysis  - Recent right atrial thrombus s/p sternotomy and thrombectomy 2/2023  - Currently anticoagulated with Lovenox and on aspirin  81 mg antiplatelet therapy. Managed by hematology, last visit 3/16/23 and next visit scheduled for 8/17/23. An anticoagulation delivery plan will be developed at that visit, will defer to their recommendations.  - Anemia, most recently hgb 7.9. Patient has iron infusions scheduled for August, managed by [facility]. Consider obtaining T&S prior to delivery as patient may require blood transfusion.      MSK  - Currently experiencing some pelvic pain and low back pain/sciatica    PSYCH  - History of anxiety, depression, ADD; no currently on medications. Patient reports no concerns with mental health at this time.     Tentative Obstetrical Anesthesia Treatment plan developed in collaboration with the attending anesthesiologist Dr. Pérez. Will also send staff message to Dr. Naa Palomo to communicate hematology/anticoagulation plan.       Please refer to the physical examination documented by the anesthesiologist in the anesthesia record on the day of delivery.    Video-Visit Details    Type of service:  Video Visit    Provider received verbal consent for a Video Visit from the patient? Yes   Video Start Time:  8:04 AM  Video End Time: 8:24 AM    Originating Location (pt. Location): Parked in their car  Distant Location (provider location):  Off-site  Mode of Communication:  Video Conference via [employer]  On " the day of service:     Prep time: 15 minutes  Visit time: 20 minutes  Documentation time: 30 minutes  ------------------------------------------  Total time: 65 minutes    Bree Gilliam PA-C    Preoperative Assessment Center  [facility]  Office phone: [phone]  Fax: [phone]

## 2023-07-26 NOTE — PROGRESS NOTES
Cande is a 22 year old who is being evaluated via a billable video visit.      How would you like to obtain your AVS? MyChart  If the video visit is dropped, the invitation should be resent by: Text to cell phone: [phone]    HPI         Review of Systems           Physical Exam

## 2023-07-27 ENCOUNTER — PATIENT OUTREACH (OUTPATIENT)
Dept: CARE COORDINATION | Facility: CLINIC | Age: 23
End: 2023-07-27

## 2023-07-27 ENCOUNTER — OFFICE VISIT (OUTPATIENT)
Dept: MATERNAL FETAL MEDICINE | Facility: CLINIC | Age: 23
End: 2023-07-27
Attending: STUDENT IN AN ORGANIZED HEALTH CARE EDUCATION/TRAINING PROGRAM
Payer: COMMERCIAL

## 2023-07-27 ENCOUNTER — HOSPITAL ENCOUNTER (OUTPATIENT)
Dept: ULTRASOUND IMAGING | Facility: CLINIC | Age: 23
Discharge: HOME | End: 2023-07-27
Attending: STUDENT IN AN ORGANIZED HEALTH CARE EDUCATION/TRAINING PROGRAM
Payer: COMMERCIAL

## 2023-07-27 DIAGNOSIS — N18.30 STAGE 3 CHRONIC KIDNEY DISEASE, UNSPECIFIED WHETHER STAGE 3A OR 3B CKD (H): Chronic | ICD-10-CM

## 2023-07-27 DIAGNOSIS — O09.292 PRIOR PREGNANCY WITH FETAL DEMISE AND CURRENT PREGNANCY IN SECOND TRIMESTER: ICD-10-CM

## 2023-07-27 DIAGNOSIS — O09.299 HISTORY OF HELLP SYNDROME, CURRENTLY PREGNANT: ICD-10-CM

## 2023-07-27 DIAGNOSIS — M31.31 GRANULOMATOSIS WITH POLYANGIITIS WITH RENAL INVOLVEMENT (H): ICD-10-CM

## 2023-07-27 DIAGNOSIS — O09.293 PRIOR PREGNANCY WITH FETAL DEMISE AND CURRENT PREGNANCY IN THIRD TRIMESTER: Primary | ICD-10-CM

## 2023-07-27 DIAGNOSIS — N18.30 STAGE 3 CHRONIC KIDNEY DISEASE, UNSPECIFIED WHETHER STAGE 3A OR 3B CKD (H): ICD-10-CM

## 2023-07-27 LAB — TPMT BLD-CCNC: 20.8 U/ML

## 2023-07-27 PROCEDURE — 76819 FETAL BIOPHYS PROFIL W/O NST: CPT

## 2023-07-27 PROCEDURE — 76819 FETAL BIOPHYS PROFIL W/O NST: CPT | Mod: 26 | Performed by: OBSTETRICS & GYNECOLOGY

## 2023-07-27 NOTE — NURSING NOTE
Patient reports positive fetal movement, denies pain, denies contractions/pre-term labor, leaking of fluid, or bleeding. Patient denies headache, visual changes, nausea/vomiting, epigastric pain related to preeclampsia. Education provided to patient on BPP. SBAR given to CARMELITA CAMACHO, see their note in Epic.    Rebecca Castillo RN

## 2023-07-28 RX ORDER — AZATHIOPRINE 50 MG/1
50 TABLET ORAL 2 TIMES DAILY
Qty: 90 TABLET | Refills: 3 | Status: SHIPPED | OUTPATIENT
Start: 2023-07-28 | End: 2024-10-03

## 2023-07-28 NOTE — PROGRESS NOTES
"Please see \"Imaging\" tab under \"Chart Review\" for details of today's US at the [facility].    Cisco Hardy MD  Maternal-Fetal Medicine    "

## 2023-08-01 ENCOUNTER — OFFICE VISIT (OUTPATIENT)
Dept: MATERNAL FETAL MEDICINE | Facility: CLINIC | Age: 23
End: 2023-08-01
Attending: STUDENT IN AN ORGANIZED HEALTH CARE EDUCATION/TRAINING PROGRAM
Payer: MEDICAID

## 2023-08-01 ENCOUNTER — HOSPITAL ENCOUNTER (OUTPATIENT)
Dept: ULTRASOUND IMAGING | Facility: CLINIC | Age: 23
Discharge: HOME | End: 2023-08-01
Attending: STUDENT IN AN ORGANIZED HEALTH CARE EDUCATION/TRAINING PROGRAM
Payer: MEDICAID

## 2023-08-01 VITALS
RESPIRATION RATE: 20 BRPM | DIASTOLIC BLOOD PRESSURE: 69 MMHG | BODY MASS INDEX: 39.98 KG/M2 | HEART RATE: 85 BPM | SYSTOLIC BLOOD PRESSURE: 101 MMHG | WEIGHT: 232.9 LBS | OXYGEN SATURATION: 100 %

## 2023-08-01 DIAGNOSIS — N18.30 STAGE 3 CHRONIC KIDNEY DISEASE, UNSPECIFIED WHETHER STAGE 3A OR 3B CKD (H): Chronic | ICD-10-CM

## 2023-08-01 DIAGNOSIS — O36.5990 FETAL GROWTH RESTRICTION ANTEPARTUM: Primary | ICD-10-CM

## 2023-08-01 DIAGNOSIS — O09.299 HISTORY OF HELLP SYNDROME, CURRENTLY PREGNANT: ICD-10-CM

## 2023-08-01 DIAGNOSIS — M31.31 GRANULOMATOSIS WITH POLYANGIITIS WITH RENAL INVOLVEMENT (H): ICD-10-CM

## 2023-08-01 DIAGNOSIS — O36.5990 FETAL GROWTH RESTRICTION ANTEPARTUM: ICD-10-CM

## 2023-08-01 DIAGNOSIS — O09.292 PRIOR PREGNANCY WITH FETAL DEMISE AND CURRENT PREGNANCY IN SECOND TRIMESTER: ICD-10-CM

## 2023-08-01 DIAGNOSIS — O09.292 PRIOR PREGNANCY WITH FETAL DEMISE AND CURRENT PREGNANCY IN SECOND TRIMESTER: Primary | ICD-10-CM

## 2023-08-01 PROBLEM — O09.90 HIGH-RISK PREGNANCY, UNSPECIFIED TRIMESTER: Status: ACTIVE | Noted: 2023-08-01

## 2023-08-01 PROCEDURE — 99215 OFFICE O/P EST HI 40 MIN: CPT | Mod: 25 | Performed by: ADVANCED PRACTICE MIDWIFE

## 2023-08-01 PROCEDURE — 76819 FETAL BIOPHYS PROFIL W/O NST: CPT

## 2023-08-01 PROCEDURE — G0463 HOSPITAL OUTPT CLINIC VISIT: HCPCS | Mod: 25 | Performed by: ADVANCED PRACTICE MIDWIFE

## 2023-08-01 PROCEDURE — 59025 FETAL NON-STRESS TEST: CPT | Mod: XU

## 2023-08-01 PROCEDURE — 76818 FETAL BIOPHYS PROFILE W/NST: CPT | Mod: 26 | Performed by: OBSTETRICS & GYNECOLOGY

## 2023-08-01 PROCEDURE — 76820 UMBILICAL ARTERY ECHO: CPT | Mod: 26 | Performed by: OBSTETRICS & GYNECOLOGY

## 2023-08-01 ASSESSMENT — PAIN SCALES - GENERAL: PAINLEVEL: NO PAIN (0)

## 2023-08-01 NOTE — PROGRESS NOTES
"Maternal fetal Medicine OB Follow up visit.     Cande Shields  : 2000  MRN: [id number]    CC: OB Follow-up    Subjective:  Cande Shields is a 22 year old  at 29w0d presenting for routine OB follow-up.   Struggling with missing so much work with so many appointments  Unable to do lab work today  Lightheaded, will try to make iron infusion therapy appointments during week  Open to Saturday lab appointments    Having some pelvic pain    Patient denies regular, painful contractions, denies loss of fluid or vaginal bleeding.  Reports fetal movement.      Patient also denies any recent fevers/chills, headaches or changes in vision, RUQ pain, nausea or vomiting, constipation, diarrhea or other systemic symptoms.      OB Hx:  OB History    Para Term  AB Living   2 1 0 1 0 0   SAB IAB Ectopic Multiple Live Births   0 0 0 0 0      # Outcome Date GA Lbr Tobin/2nd Weight Sex Delivery Anes PTL Lv   2 Current            1  10/28/20 30w6d 05:15 0.782 kg (1 lb 11.6 oz) F Vag-Spont IV N FD      Birth Comments: Vicky- 10/20 30w6d presented to [facility] with right sided chest pain, BP in severe range, No fetal heart tones auscultated. Induced, NSVB      Name: ELOYPENDING FD      Apgar1: 0  Apgar5: 0      Obstetric Comments   10/20 30w6d presented to [facility] with right sided chest pain, BP in severe range, No fetal heart tones auscultated. Induced, NSVB, \"Vicky\" 2lbs         Objective:  /69 (BP Location: Left arm, Patient Position: Sitting, Cuff Size: Adult Large)   Pulse 85   Resp 20   Wt 105.6 kg (232 lb 14.4 oz)   LMP 01/10/2023 (Approximate)   SpO2 100%   BMI 39.98 kg/m      Gen: alert, oriented, NAD  Skin: warm, dry, intact  Respiratory: breathing nml, no SOB  Abdominal: gravid, non-tender,   SP: some pain w palpation  Pelvic: deferred  Extremities: no edema  Psych: mood stable, up beat      OB Ultrasound:  Please see \"imaging\" tab under chart review for today's " ultrasound results.      Assessment/Plan:  22 year old  at 29w0d here for follow OB visit.      Pregnancy complicated by:  - Granulomatosis polyangiitis  - History of multiple VTE, including DVT and PE  - Subchorionic hemorrhage  - Right atrial thrombus s/p sternotomy and thrombectomy on 2023 earlier in pregnancy  - PFO s/p closure on 2023  - History of IUFD at 31 weeks  - History of severe pre-eclampsia  - Chronic kidney disease (baseline creatinine ~1.3)  - Bicornuate uterus  - Fetal growth restriction with elevated umbilical artery dopplers ()     Granulomatosis polyangitis  History of VTE (PE and DVT)  Right atrial thrombus s/p sternotomy and thrombectomy in early pregnancy  Anemia  - Check anti-Xa levels monthly. Her last anti-Xa level was  and resulted at 1.13. Recommend completing anti-Xa level 4-6 hours after administration. She is aware of our recommendation to complete this.  -Gave patient the number to [facility] to make Saturday lab appointment  - S/p 1 IV iron infusion. Strict return precautions previously given as she may ultimately require blood transfusion.  In process of rescheduling on  at the [place]; phone number provided  - Scheduled visit on  with Dr. Dye of hematology to discuss management of her anticoagulation surrounding her delivery.  - Seen by Dr. Oneal, cardiology on . Missed appointment on ; working on rescheduling this. Sending another request for appointment today  - Continue metoprolol 12.5 mg BID  - Scheduled to see rheumatology  but appointment cancelled.  She does not feel she can reschedule this at this time and will prioritize cardiology, MFM, nephrology.     Chronic Kidney Disease  - Goal blood pressure at 130/80  - Saw Dr. Goodson (nephrology) on  and started on azathioprine     History of pre-eclampsia, severe  History of IUFD at 31 weeks   Fetal growth restriction (EFW 9%ile)  Abnormal aortic arch view on 23  - Close  monitoring for signs and/or symptoms of evolving preeclampsia using home BP cuff; precautions reviewed today  - Close monitoring of blood pressure both prenatally and in the postpartum period  - Continue low dose aspirin for preeclampsia prophylaxis  - Discussed etiologies and offered diagnostic testing (previously no call NIPT x2). Declines invasive testing.  - Weekly  testing with UAD.  She was originally scheduled for BPP only today, came back for UAD  - Fetal echocardiogram to rule out coarctation.      Surveillance  Subchorionic hemorrhage  - Serial growth US; will repeat in 2-3 weeks as patient is very axious regarding the downtrending growth especially in light of her history of severe pre-eclampsia resulting in IUFD  - Weekly  testing with UAD given IUFD and new diagnosis of FGR     Delivery planning:  - Anesthesia consult in third trimester  - Mode of delivery to be determined in conjunction with cardiology and cardiovascular surgery, though would likely anticipate no contraindications to vaginal delivery.  - Will discuss with hematology regarding management of anticoagulation around the time of delivery.  - Delivery at 37 weeks, or sooner as clinically indicated given FGR with abnormal dopplers and medical history.   - Feeding: bottle  - Contraception plans: undecided, initial counseling provided, does not want depo, considering OCP, discussed IUD     Routine PNC  - Prenatal labs:  Rh pos, antibody negative               HepB/HIV/RPR/HepC: negative               GC/CT: negative               Rubella: immune                 UC: no growth  - Pap smear: perform postpartum  - Tdap on   - Aneuploidy screening: NIPT no call x 2. Declines invasive testing at this time.      Follow up for OBV in 2 weeks and in 1 week for UAD and BPP. Plan for growth in 1 weeks  40 minutes spent on the date of the encounter doing chart review, history and exam, documentation and further activities per  the note.    Estefania Nicholson, DNP, APRN, CNM, FACNM

## 2023-08-01 NOTE — NURSING NOTE
Patient reports + fetal movement, denies pain, denies contractions, leaking of fluid, or bleeding.    Patient denies headache, visual changes, nausea/vomiting, epigastric pain related to preeclampsia.  Pt had BPP done and dopplers and UAR elevated. Pt has Stage 3 CKD. Pt has FGR and had Nst done today too. Education provided to patient on NST.  SBAR given to CARMELITA CAMACHO, see their note in Epic. Estefania Page in to talk with pt. Pt made apts for 2x weekly for NST/BPP with UAR and F/U OBV and left amb and stable .Yaz Esparza RN

## 2023-08-01 NOTE — PROGRESS NOTES
Patient reports  fetal movement,  pain,  contractions, leaking of fluid, or bleeding.  Reports blood sugar values fasting  and  hr post prandial .  Patient denies headache, visual changes, nausea/vomiting, epigastric pain related to preeclampsia.  Education provided to patient on .  SBAR given to CARMELITA CAMACHO, see their note in Epic.

## 2023-08-02 NOTE — PROGRESS NOTES
GERS message sent to patient regarding recent hospitalization status. RN offered CC services and assisted with help for scheduling follow up appointment. Please see Editorially for further details.     CHANDNI NYE RN on 8/2/2023 at 1:09 PM

## 2023-08-02 NOTE — PROGRESS NOTES
Please see the imaging tab for details of the ultrasound performed today.    Roxie Munoz MD  Specialist in Maternal-Fetal Medicine

## 2023-08-03 NOTE — PROGRESS NOTES
Clinic Care Coordination Contact  [facility]/Voicemail       Clinical Data: Care Coordinator Outreach  Outreach attempted x 1 (via [employer]- pt has been reading messages).  Left message on patient's MyChart with call back information and requested return call.  Plan: Care Coordinator will send care coordination introduction letter with care coordinator contact information and explanation of care coordination services via mail. Care Coordinator will do no further outreaches at this time.    CHANDNI NYE RN on 8/3/2023 at 12:40 PM

## 2023-08-04 ENCOUNTER — OFFICE VISIT (OUTPATIENT)
Dept: MATERNAL FETAL MEDICINE | Facility: CLINIC | Age: 23
End: 2023-08-04
Attending: OBSTETRICS & GYNECOLOGY
Payer: MEDICAID

## 2023-08-04 ENCOUNTER — TELEPHONE (OUTPATIENT)
Dept: RHEUMATOLOGY | Facility: CLINIC | Age: 23
End: 2023-08-04

## 2023-08-04 ENCOUNTER — HOSPITAL ENCOUNTER (OUTPATIENT)
Dept: ULTRASOUND IMAGING | Facility: CLINIC | Age: 23
Discharge: HOME | End: 2023-08-04
Attending: OBSTETRICS & GYNECOLOGY
Payer: MEDICAID

## 2023-08-04 DIAGNOSIS — N18.30 STAGE 3 CHRONIC KIDNEY DISEASE, UNSPECIFIED WHETHER STAGE 3A OR 3B CKD (H): Chronic | ICD-10-CM

## 2023-08-04 DIAGNOSIS — O36.5990 FETAL GROWTH RESTRICTION ANTEPARTUM: ICD-10-CM

## 2023-08-04 DIAGNOSIS — O09.292 PRIOR PREGNANCY WITH FETAL DEMISE AND CURRENT PREGNANCY IN SECOND TRIMESTER: ICD-10-CM

## 2023-08-04 DIAGNOSIS — M31.31 GRANULOMATOSIS WITH POLYANGIITIS WITH RENAL INVOLVEMENT (H): ICD-10-CM

## 2023-08-04 DIAGNOSIS — I82.4Y3 ACUTE DEEP VEIN THROMBOSIS (DVT) OF PROXIMAL VEIN OF BOTH LOWER EXTREMITIES (H): ICD-10-CM

## 2023-08-04 DIAGNOSIS — D64.9 ANEMIA: Primary | ICD-10-CM

## 2023-08-04 DIAGNOSIS — O09.299 HISTORY OF HELLP SYNDROME, CURRENTLY PREGNANT: ICD-10-CM

## 2023-08-04 DIAGNOSIS — N93.9 VAGINAL BLEEDING: ICD-10-CM

## 2023-08-04 PROCEDURE — 76820 UMBILICAL ARTERY ECHO: CPT | Mod: 26 | Performed by: STUDENT IN AN ORGANIZED HEALTH CARE EDUCATION/TRAINING PROGRAM

## 2023-08-04 PROCEDURE — 59025 FETAL NON-STRESS TEST: CPT | Performed by: STUDENT IN AN ORGANIZED HEALTH CARE EDUCATION/TRAINING PROGRAM

## 2023-08-04 PROCEDURE — 59025 FETAL NON-STRESS TEST: CPT

## 2023-08-04 PROCEDURE — 59025 FETAL NON-STRESS TEST: CPT | Mod: 26 | Performed by: STUDENT IN AN ORGANIZED HEALTH CARE EDUCATION/TRAINING PROGRAM

## 2023-08-04 PROCEDURE — 76815 OB US LIMITED FETUS(S): CPT

## 2023-08-04 PROCEDURE — 76815 OB US LIMITED FETUS(S): CPT | Mod: 26 | Performed by: STUDENT IN AN ORGANIZED HEALTH CARE EDUCATION/TRAINING PROGRAM

## 2023-08-04 NOTE — NURSING NOTE
Patient reports normal fetal movement, denies pain, contractions, leaking of fluid, or bleeding. Patient denies headache, visual changes, nausea/vomiting, epigastric pain related to preeclampsia. NST Performed due to FGR.  Dr. Gonzalez reviewed efm tracing. See NST/BPP Doc Flowsheet tab. SBAR given to CARMELITA CAMACHO, see their note in Epic.

## 2023-08-04 NOTE — TELEPHONE ENCOUNTER
[facility] Call Center    Phone Message    May a detailed message be left on voicemail: yes     Reason for Call: Other: Pt is calling to see if she can switch visit on 8/11 with Dr Call at the [facility] to a video visit.      Action Taken: Message routed to:  Other: P Adult Rheumatology     Travel Screening: Not Applicable

## 2023-08-04 NOTE — PROGRESS NOTES
Please see the full imaging report from the View[employer] program under the imaging tab.    Ina Gonzalez MD  Maternal Fetal Medicine

## 2023-08-07 ENCOUNTER — OFFICE VISIT (OUTPATIENT)
Dept: MATERNAL FETAL MEDICINE | Facility: CLINIC | Age: 23
End: 2023-08-07
Attending: OBSTETRICS & GYNECOLOGY
Payer: MEDICAID

## 2023-08-07 ENCOUNTER — HOSPITAL ENCOUNTER (OUTPATIENT)
Dept: ULTRASOUND IMAGING | Facility: CLINIC | Age: 23
Discharge: HOME | End: 2023-08-07
Attending: OBSTETRICS & GYNECOLOGY
Payer: MEDICAID

## 2023-08-07 ENCOUNTER — LAB (OUTPATIENT)
Dept: LAB | Facility: CLINIC | Age: 23
End: 2023-08-07
Payer: MEDICAID

## 2023-08-07 ENCOUNTER — DOCUMENTATION ONLY (OUTPATIENT)
Dept: MATERNAL FETAL MEDICINE | Facility: CLINIC | Age: 23
End: 2023-08-07

## 2023-08-07 ENCOUNTER — TELEPHONE (OUTPATIENT)
Dept: MATERNAL FETAL MEDICINE | Facility: CLINIC | Age: 23
End: 2023-08-07

## 2023-08-07 DIAGNOSIS — N18.30 STAGE 3 CHRONIC KIDNEY DISEASE, UNSPECIFIED WHETHER STAGE 3A OR 3B CKD (H): Chronic | ICD-10-CM

## 2023-08-07 DIAGNOSIS — N18.30 STAGE 3 CHRONIC KIDNEY DISEASE, UNSPECIFIED WHETHER STAGE 3A OR 3B CKD (H): ICD-10-CM

## 2023-08-07 DIAGNOSIS — O09.299 HISTORY OF HELLP SYNDROME, CURRENTLY PREGNANT: ICD-10-CM

## 2023-08-07 DIAGNOSIS — O36.5990 FETAL GROWTH RESTRICTION ANTEPARTUM: ICD-10-CM

## 2023-08-07 DIAGNOSIS — O09.292 PRIOR PREGNANCY WITH FETAL DEMISE AND CURRENT PREGNANCY IN SECOND TRIMESTER: ICD-10-CM

## 2023-08-07 DIAGNOSIS — M31.31 GRANULOMATOSIS WITH POLYANGIITIS WITH RENAL INVOLVEMENT (H): ICD-10-CM

## 2023-08-07 DIAGNOSIS — I82.4Y3 ACUTE DEEP VEIN THROMBOSIS (DVT) OF PROXIMAL VEIN OF BOTH LOWER EXTREMITIES (H): ICD-10-CM

## 2023-08-07 DIAGNOSIS — R04.0 BLEEDING FROM THE NOSE: Primary | ICD-10-CM

## 2023-08-07 DIAGNOSIS — I77.82 ANCA-ASSOCIATED VASCULITIS (H): ICD-10-CM

## 2023-08-07 DIAGNOSIS — N93.9 VAGINAL BLEEDING: ICD-10-CM

## 2023-08-07 LAB
ALBUMIN MFR UR ELPH: 30.3 MG/DL
ALBUMIN UR-MCNC: 20 MG/DL
ANION GAP SERPL CALCULATED.3IONS-SCNC: 10 MMOL/L (ref 7–15)
APPEARANCE UR: CLEAR
APTT PPP: 33 SECONDS (ref 22–38)
BACTERIA #/AREA URNS HPF: ABNORMAL /HPF
BASOPHILS # BLD AUTO: 0 10E3/UL (ref 0–0.2)
BASOPHILS NFR BLD AUTO: 1 %
BILIRUB UR QL STRIP: NEGATIVE
BUN SERPL-MCNC: 24.3 MG/DL (ref 6–20)
CALCIUM SERPL-MCNC: 8.3 MG/DL (ref 8.6–10)
CHLORIDE SERPL-SCNC: 104 MMOL/L (ref 98–107)
COLOR UR AUTO: ABNORMAL
CREAT SERPL-MCNC: 1.49 MG/DL (ref 0.51–0.95)
CREAT UR-MCNC: 70.9 MG/DL
DEPRECATED HCO3 PLAS-SCNC: 20 MMOL/L (ref 22–29)
EOSINOPHIL # BLD AUTO: 0.2 10E3/UL (ref 0–0.7)
EOSINOPHIL NFR BLD AUTO: 3 %
ERYTHROCYTE [DISTWIDTH] IN BLOOD BY AUTOMATED COUNT: 17.7 % (ref 10–15)
FIBRINOGEN PPP-MCNC: 631 MG/DL (ref 170–490)
GFR SERPL CREATININE-BSD FRML MDRD: 50 ML/MIN/1.73M2
GLUCOSE SERPL-MCNC: 114 MG/DL (ref 70–99)
GLUCOSE UR STRIP-MCNC: NEGATIVE MG/DL
HCT VFR BLD AUTO: 27.7 % (ref 35–47)
HGB BLD-MCNC: 8.5 G/DL (ref 11.7–15.7)
HGB UR QL STRIP: ABNORMAL
IMM GRANULOCYTES # BLD: 0.1 10E3/UL
IMM GRANULOCYTES NFR BLD: 1 %
INR PPP: 1.11 (ref 0.85–1.15)
KETONES UR STRIP-MCNC: NEGATIVE MG/DL
LEUKOCYTE ESTERASE UR QL STRIP: NEGATIVE
LMWH PPP CHRO-ACNC: >2 IU/ML
LYMPHOCYTES # BLD AUTO: 1.3 10E3/UL (ref 0.8–5.3)
LYMPHOCYTES NFR BLD AUTO: 15 %
MCH RBC QN AUTO: 24.4 PG (ref 26.5–33)
MCHC RBC AUTO-ENTMCNC: 30.7 G/DL (ref 31.5–36.5)
MCV RBC AUTO: 79 FL (ref 78–100)
MONOCYTES # BLD AUTO: 0.5 10E3/UL (ref 0–1.3)
MONOCYTES NFR BLD AUTO: 6 %
NEUTROPHILS # BLD AUTO: 6.6 10E3/UL (ref 1.6–8.3)
NEUTROPHILS NFR BLD AUTO: 74 %
NITRATE UR QL: NEGATIVE
NRBC # BLD AUTO: 0 10E3/UL
NRBC BLD AUTO-RTO: 0 /100
PH UR STRIP: 5.5 [PH] (ref 5–7)
PLATELET # BLD AUTO: 368 10E3/UL (ref 150–450)
POTASSIUM SERPL-SCNC: 4.8 MMOL/L (ref 3.4–5.3)
PROT/CREAT 24H UR: 0.43 MG/MG CR (ref 0–0.2)
RBC # BLD AUTO: 3.49 10E6/UL (ref 3.8–5.2)
RBC #/AREA URNS AUTO: ABNORMAL /HPF
SODIUM SERPL-SCNC: 134 MMOL/L (ref 136–145)
SP GR UR STRIP: 1.01 (ref 1–1.03)
SQUAMOUS #/AREA URNS AUTO: ABNORMAL /LPF
UROBILINOGEN UR STRIP-MCNC: NORMAL MG/DL
WBC # BLD AUTO: 8.7 10E3/UL (ref 4–11)
WBC #/AREA URNS AUTO: ABNORMAL /HPF

## 2023-08-07 PROCEDURE — 76820 UMBILICAL ARTERY ECHO: CPT | Mod: 26 | Performed by: OBSTETRICS & GYNECOLOGY

## 2023-08-07 PROCEDURE — 81001 URINALYSIS AUTO W/SCOPE: CPT | Performed by: OBSTETRICS & GYNECOLOGY

## 2023-08-07 PROCEDURE — 59025 FETAL NON-STRESS TEST: CPT

## 2023-08-07 PROCEDURE — 85730 THROMBOPLASTIN TIME PARTIAL: CPT | Performed by: OBSTETRICS & GYNECOLOGY

## 2023-08-07 PROCEDURE — 84156 ASSAY OF PROTEIN URINE: CPT | Performed by: OBSTETRICS & GYNECOLOGY

## 2023-08-07 PROCEDURE — 76815 OB US LIMITED FETUS(S): CPT | Mod: 26 | Performed by: OBSTETRICS & GYNECOLOGY

## 2023-08-07 PROCEDURE — 59025 FETAL NON-STRESS TEST: CPT | Performed by: OBSTETRICS & GYNECOLOGY

## 2023-08-07 PROCEDURE — 85610 PROTHROMBIN TIME: CPT | Performed by: OBSTETRICS & GYNECOLOGY

## 2023-08-07 PROCEDURE — 85025 COMPLETE CBC W/AUTO DIFF WBC: CPT | Performed by: OBSTETRICS & GYNECOLOGY

## 2023-08-07 PROCEDURE — 36415 COLL VENOUS BLD VENIPUNCTURE: CPT

## 2023-08-07 PROCEDURE — 80048 BASIC METABOLIC PNL TOTAL CA: CPT | Performed by: OBSTETRICS & GYNECOLOGY

## 2023-08-07 PROCEDURE — 76818 FETAL BIOPHYS PROFILE W/NST: CPT

## 2023-08-07 PROCEDURE — 76818 FETAL BIOPHYS PROFILE W/NST: CPT | Mod: 26 | Performed by: OBSTETRICS & GYNECOLOGY

## 2023-08-07 PROCEDURE — 85520 HEPARIN ASSAY: CPT

## 2023-08-07 PROCEDURE — 85384 FIBRINOGEN ACTIVITY: CPT | Performed by: OBSTETRICS & GYNECOLOGY

## 2023-08-07 PROCEDURE — 86780 TREPONEMA PALLIDUM: CPT

## 2023-08-07 PROCEDURE — 81003 URINALYSIS AUTO W/O SCOPE: CPT | Performed by: OBSTETRICS & GYNECOLOGY

## 2023-08-07 NOTE — NURSING NOTE
Patient reports normal fetal movement, denies pain, contractions, leaking of fluid, or bleeding. Patient denies headache, visual changes, nausea/vomiting, epigastric pain related to preeclampsia. Assisted with scheduling lab appointment at [facility] for 1110 today. NST Performed due to FGR.  Dr. Weathers reviewed efm tracing. See NST/BPP Doc Flowsheet tab. SBAR given to CARMELITA CAMACHO, see their note in Epic.

## 2023-08-07 NOTE — PROGRESS NOTES
Discussed with PCP report from lab regarding anti-Xa level of 2.0 4 hours after her dose. Patient has been using enoxaparin 100 mg 2 x a day instead of the prescribed dose of 80 mg x2 x a day.     We attempted to contact hematology who referred us to the primary hematologist. I discussed with cardiology who agrees and defers to hematology to make any adjustments.I have recommended holding dose of enoxaparin tonight, restarting at 50 mg every 12 hours tomorrow, repeat anti-Xa after 3-4 doses and adjust upwards as needed to achieve anti-Xa level of 0.8-1.0.     This was discussed with patient by PCP.

## 2023-08-07 NOTE — TELEPHONE ENCOUNTER
Writer received critical lab value of >2.0 for XA. Writer called and spoke with Cande. Cande took Lovenox at 7 am and XA was drawn at 11:00 am. Pt states she has been taking 100mg BID Lovenox. Pt also reports increased nose bleeds (difficult to stop) over the past week or so.    Writer spoke with Dr. Gonzalez. Pt to hold 7 pm dose of Lovenox tonight and then  tomorrow take 50 mg AM and 50mg PM. Pt on Wed 8/9 to take 50 mg BID and then repeat XA level on Thursday 8/10.  Dr. Gonzalez will reach out to Dr. Oneal regarding medication plan.  Yaz Esparza RN

## 2023-08-08 ENCOUNTER — TELEPHONE (OUTPATIENT)
Dept: HEMATOLOGY | Facility: CLINIC | Age: 23
End: 2023-08-08
Payer: MEDICAID

## 2023-08-08 ENCOUNTER — INFUSION THERAPY VISIT (OUTPATIENT)
Dept: INFUSION THERAPY | Facility: CLINIC | Age: 23
End: 2023-08-08
Attending: INTERNAL MEDICINE
Payer: MEDICAID

## 2023-08-08 VITALS — HEART RATE: 87 BPM | TEMPERATURE: 98.5 F | SYSTOLIC BLOOD PRESSURE: 102 MMHG | DIASTOLIC BLOOD PRESSURE: 64 MMHG

## 2023-08-08 DIAGNOSIS — D64.9 ANEMIA: ICD-10-CM

## 2023-08-08 DIAGNOSIS — N18.30 STAGE 3 CHRONIC KIDNEY DISEASE, UNSPECIFIED WHETHER STAGE 3A OR 3B CKD (H): Primary | ICD-10-CM

## 2023-08-08 DIAGNOSIS — I77.82 ANCA-ASSOCIATED VASCULITIS (H): ICD-10-CM

## 2023-08-08 LAB — T PALLIDUM AB SER QL: NONREACTIVE

## 2023-08-08 PROCEDURE — 96365 THER/PROPH/DIAG IV INF INIT: CPT

## 2023-08-08 PROCEDURE — 258N000003 HC RX IP 258 OP 636: Performed by: INTERNAL MEDICINE

## 2023-08-08 PROCEDURE — 250N000011 HC RX IP 250 OP 636: Mod: JZ | Performed by: INTERNAL MEDICINE

## 2023-08-08 RX ORDER — HEPARIN SODIUM,PORCINE 10 UNIT/ML
5-20 VIAL (ML) INTRAVENOUS DAILY PRN
Status: CANCELLED | OUTPATIENT
Start: 2023-08-10

## 2023-08-08 RX ORDER — ALBUTEROL SULFATE 90 UG/1
1-2 AEROSOL, METERED RESPIRATORY (INHALATION)
Status: CANCELLED
Start: 2023-08-10

## 2023-08-08 RX ORDER — METHYLPREDNISOLONE SODIUM SUCCINATE 125 MG/2ML
125 INJECTION, POWDER, LYOPHILIZED, FOR SOLUTION INTRAMUSCULAR; INTRAVENOUS
Status: CANCELLED
Start: 2023-08-10

## 2023-08-08 RX ORDER — DIPHENHYDRAMINE HYDROCHLORIDE 50 MG/ML
50 INJECTION INTRAMUSCULAR; INTRAVENOUS
Status: CANCELLED
Start: 2023-08-10

## 2023-08-08 RX ORDER — HEPARIN SODIUM (PORCINE) LOCK FLUSH IV SOLN 100 UNIT/ML 100 UNIT/ML
5 SOLUTION INTRAVENOUS
Status: CANCELLED | OUTPATIENT
Start: 2023-08-10

## 2023-08-08 RX ORDER — EPINEPHRINE 1 MG/ML
0.3 INJECTION, SOLUTION, CONCENTRATE INTRAVENOUS EVERY 5 MIN PRN
Status: CANCELLED | OUTPATIENT
Start: 2023-08-10

## 2023-08-08 RX ORDER — ALBUTEROL SULFATE 0.83 MG/ML
2.5 SOLUTION RESPIRATORY (INHALATION)
Status: CANCELLED | OUTPATIENT
Start: 2023-08-10

## 2023-08-08 RX ADMIN — IRON SUCROSE 200 MG: 20 INJECTION, SOLUTION INTRAVENOUS at 13:18

## 2023-08-08 ASSESSMENT — PAIN SCALES - GENERAL: PAINLEVEL: NO PAIN (0)

## 2023-08-08 NOTE — TELEPHONE ENCOUNTER
[id number]  Cande Shields  22 year old female  CBCD Diagnosis: Hx of unprovoked PE    CBCD Provider: Hardik        Inbasket from [facility] team about Cande and her anti-Xa level of 2.0 4hr after her Lovenox dose. The [facility] team then became aware that Cande has been taking 100mg BID instead of 80mg BID.    Reviewed with  who provided the following guidance.    Hold Lovenox for 1 day ( 2 doses)  Resume at 80mg and re-check level next week.  Consider dropping to 60mg if level is still too high.  Continue to check levels weekly until in range    Call placed to Cande that went to voicemail. Left this information for her in a voicemail and responded to [facility] team inbasket with Dr. Dye's recommnedations.    Cande has follow up with the CBCD scheduled for next week.    Farida RUANON, RN, PHN   [facility]   Office: [phone]  Fax: [phone]

## 2023-08-08 NOTE — PROGRESS NOTES
Infusion Nursing Note:  Cande Shields presents today for Iron sucrose 200 mg 1/5 infusion.    Patient seen by provider today: No   present during visit today: Not Applicable.    Note: Has had iron infusion in fairly recent past.      Intravenous Access:  Peripheral IV placed.    Treatment Conditions:  Not Applicable.      Post Infusion Assessment:  Patient tolerated infusion without incident.  Did state she had some mild burning at IV site.  No redness or swelling noted.  Stated this was an issue with past iron infusions. Heat applied, which patient stated helped.  Patient observed for 30 minutes post first dose 200mg iron sucrose, per protocol.  Blood return noted pre and post infusion.  Site patent and intact, free from redness, edema or discomfort.  No evidence of extravasations.  Access discontinued per protocol.       Discharge Plan:   Patient discharged in stable condition accompanied by: self.  Departure Mode: Ambulatory.  RTC 8/10/23.      Leida Higginbotham

## 2023-08-09 ENCOUNTER — TELEPHONE (OUTPATIENT)
Dept: HEMATOLOGY | Facility: CLINIC | Age: 23
End: 2023-08-09

## 2023-08-09 ENCOUNTER — HOSPITAL ENCOUNTER (OUTPATIENT)
Dept: CARDIOLOGY | Facility: CLINIC | Age: 23
Discharge: HOME | End: 2023-08-09
Attending: OBSTETRICS & GYNECOLOGY | Admitting: OBSTETRICS & GYNECOLOGY
Payer: MEDICAID

## 2023-08-09 DIAGNOSIS — M31.31 GRANULOMATOSIS WITH POLYANGIITIS WITH RENAL INVOLVEMENT (H): ICD-10-CM

## 2023-08-09 DIAGNOSIS — O09.299 HISTORY OF HELLP SYNDROME, CURRENTLY PREGNANT: ICD-10-CM

## 2023-08-09 DIAGNOSIS — O09.292 PRIOR PREGNANCY WITH FETAL DEMISE AND CURRENT PREGNANCY IN SECOND TRIMESTER: ICD-10-CM

## 2023-08-09 DIAGNOSIS — N18.30 STAGE 3 CHRONIC KIDNEY DISEASE, UNSPECIFIED WHETHER STAGE 3A OR 3B CKD (H): Chronic | ICD-10-CM

## 2023-08-09 PROCEDURE — 76827 ECHO EXAM OF FETAL HEART: CPT | Mod: 26 | Performed by: PEDIATRICS

## 2023-08-09 PROCEDURE — 93325 DOPPLER ECHO COLOR FLOW MAPG: CPT | Mod: 26 | Performed by: PEDIATRICS

## 2023-08-09 PROCEDURE — 93325 DOPPLER ECHO COLOR FLOW MAPG: CPT

## 2023-08-09 PROCEDURE — 76825 ECHO EXAM OF FETAL HEART: CPT | Mod: 26 | Performed by: PEDIATRICS

## 2023-08-09 NOTE — TELEPHONE ENCOUNTER
LVM for patient about needing to reschedule her appt on 8/17 with Dr. Dye. Offered 8/10, 8/30 or 8/31 at 3:30p as potential reschedule options. Gave [phone] as call back number.

## 2023-08-09 NOTE — PROGRESS NOTES
"Please see \"Imaging\" tab under \"Chart Review\" for details of today's ultrasound.    Deny Weathers M.D.  Specialist in Maternal-Fetal Medicine     "

## 2023-08-09 NOTE — TELEPHONE ENCOUNTER
[id number]  Cande Shields  22 year old female  CBCD Diagnosis: hx VTE, pregnant, on Lovenox  CBCD Provider: MD Cande Chowdhury PAWEL Luisana is calling for guidance on titrating dose of Lovenox.  She has Lovenox 100 mg syringes and was told to reduce her dose to 80 mg twice daily.  Talked her through the process step by step and she verbalized understanding.  Sent her the directions in pictures via [facility].      Confirmed that she has a video appt with Dr. Dye tomorrow afternoon.    She agreed to call with further questions.    Susan RUANON, RN   Nurse Clinician  [facility]  Office: [phone]  Main Office: [phone] (ask to speak with a nurse)  Fax: [phone]

## 2023-08-10 ENCOUNTER — INFUSION THERAPY VISIT (OUTPATIENT)
Dept: INFUSION THERAPY | Facility: CLINIC | Age: 23
End: 2023-08-10
Attending: INTERNAL MEDICINE
Payer: MEDICAID

## 2023-08-10 ENCOUNTER — HOSPITAL ENCOUNTER (OUTPATIENT)
Dept: ULTRASOUND IMAGING | Facility: CLINIC | Age: 23
Discharge: HOME | End: 2023-08-10
Attending: OBSTETRICS & GYNECOLOGY
Payer: MEDICAID

## 2023-08-10 ENCOUNTER — OFFICE VISIT (OUTPATIENT)
Dept: MATERNAL FETAL MEDICINE | Facility: CLINIC | Age: 23
End: 2023-08-10
Attending: OBSTETRICS & GYNECOLOGY
Payer: MEDICAID

## 2023-08-10 ENCOUNTER — TELEPHONE (OUTPATIENT)
Dept: MATERNAL FETAL MEDICINE | Facility: CLINIC | Age: 23
End: 2023-08-10

## 2023-08-10 VITALS
RESPIRATION RATE: 16 BRPM | DIASTOLIC BLOOD PRESSURE: 71 MMHG | OXYGEN SATURATION: 98 % | HEART RATE: 73 BPM | SYSTOLIC BLOOD PRESSURE: 123 MMHG | TEMPERATURE: 97.5 F

## 2023-08-10 DIAGNOSIS — I77.82 ANCA-ASSOCIATED VASCULITIS (H): ICD-10-CM

## 2023-08-10 DIAGNOSIS — N18.30 STAGE 3 CHRONIC KIDNEY DISEASE, UNSPECIFIED WHETHER STAGE 3A OR 3B CKD (H): Primary | ICD-10-CM

## 2023-08-10 DIAGNOSIS — O36.5990 FETAL GROWTH RESTRICTION ANTEPARTUM: ICD-10-CM

## 2023-08-10 DIAGNOSIS — M31.31 GRANULOMATOSIS WITH POLYANGIITIS WITH RENAL INVOLVEMENT (H): ICD-10-CM

## 2023-08-10 DIAGNOSIS — I77.82 ANCA-ASSOCIATED VASCULITIS (H): Primary | ICD-10-CM

## 2023-08-10 DIAGNOSIS — O09.299 HISTORY OF HELLP SYNDROME, CURRENTLY PREGNANT: ICD-10-CM

## 2023-08-10 DIAGNOSIS — O09.293 PRIOR PREGNANCY WITH FETAL DEMISE AND CURRENT PREGNANCY IN THIRD TRIMESTER: Primary | ICD-10-CM

## 2023-08-10 DIAGNOSIS — N18.30 STAGE 3 CHRONIC KIDNEY DISEASE, UNSPECIFIED WHETHER STAGE 3A OR 3B CKD (H): Chronic | ICD-10-CM

## 2023-08-10 DIAGNOSIS — D50.8 OTHER IRON DEFICIENCY ANEMIA: ICD-10-CM

## 2023-08-10 DIAGNOSIS — D64.9 ANEMIA: ICD-10-CM

## 2023-08-10 DIAGNOSIS — N18.30 STAGE 3 CHRONIC KIDNEY DISEASE, UNSPECIFIED WHETHER STAGE 3A OR 3B CKD (H): ICD-10-CM

## 2023-08-10 DIAGNOSIS — O09.292 PRIOR PREGNANCY WITH FETAL DEMISE AND CURRENT PREGNANCY IN SECOND TRIMESTER: ICD-10-CM

## 2023-08-10 PROCEDURE — 76819 FETAL BIOPHYS PROFIL W/O NST: CPT | Mod: 26 | Performed by: OBSTETRICS & GYNECOLOGY

## 2023-08-10 PROCEDURE — 99213 OFFICE O/P EST LOW 20 MIN: CPT | Mod: 25 | Performed by: OBSTETRICS & GYNECOLOGY

## 2023-08-10 PROCEDURE — 76816 OB US FOLLOW-UP PER FETUS: CPT

## 2023-08-10 PROCEDURE — 258N000003 HC RX IP 258 OP 636: Performed by: INTERNAL MEDICINE

## 2023-08-10 PROCEDURE — 76819 FETAL BIOPHYS PROFIL W/O NST: CPT

## 2023-08-10 PROCEDURE — 96365 THER/PROPH/DIAG IV INF INIT: CPT

## 2023-08-10 PROCEDURE — 76816 OB US FOLLOW-UP PER FETUS: CPT | Mod: 26 | Performed by: OBSTETRICS & GYNECOLOGY

## 2023-08-10 PROCEDURE — 250N000011 HC RX IP 250 OP 636: Mod: JZ | Performed by: INTERNAL MEDICINE

## 2023-08-10 RX ORDER — ALBUTEROL SULFATE 0.83 MG/ML
2.5 SOLUTION RESPIRATORY (INHALATION)
Status: CANCELLED | OUTPATIENT
Start: 2023-08-12

## 2023-08-10 RX ORDER — DIPHENHYDRAMINE HYDROCHLORIDE 50 MG/ML
50 INJECTION INTRAMUSCULAR; INTRAVENOUS
Status: CANCELLED
Start: 2023-08-12

## 2023-08-10 RX ORDER — EPINEPHRINE 1 MG/ML
0.3 INJECTION, SOLUTION, CONCENTRATE INTRAVENOUS EVERY 5 MIN PRN
Status: CANCELLED | OUTPATIENT
Start: 2023-08-12

## 2023-08-10 RX ORDER — METHYLPREDNISOLONE SODIUM SUCCINATE 125 MG/2ML
125 INJECTION, POWDER, LYOPHILIZED, FOR SOLUTION INTRAMUSCULAR; INTRAVENOUS
Status: CANCELLED
Start: 2023-08-12

## 2023-08-10 RX ORDER — ALBUTEROL SULFATE 90 UG/1
1-2 AEROSOL, METERED RESPIRATORY (INHALATION)
Status: CANCELLED
Start: 2023-08-15

## 2023-08-10 RX ORDER — DIPHENHYDRAMINE HCL 25 MG
50 CAPSULE ORAL ONCE
Status: CANCELLED
Start: 2023-08-15

## 2023-08-10 RX ORDER — HEPARIN SODIUM,PORCINE 10 UNIT/ML
5-20 VIAL (ML) INTRAVENOUS DAILY PRN
Status: CANCELLED | OUTPATIENT
Start: 2023-08-12

## 2023-08-10 RX ORDER — ALBUTEROL SULFATE 90 UG/1
1-2 AEROSOL, METERED RESPIRATORY (INHALATION)
Status: CANCELLED
Start: 2023-08-12

## 2023-08-10 RX ORDER — ACETAMINOPHEN 325 MG/1
650 TABLET ORAL ONCE
Status: CANCELLED
Start: 2023-08-15

## 2023-08-10 RX ORDER — METHYLPREDNISOLONE SODIUM SUCCINATE 125 MG/2ML
125 INJECTION, POWDER, LYOPHILIZED, FOR SOLUTION INTRAMUSCULAR; INTRAVENOUS
Status: CANCELLED
Start: 2023-08-15

## 2023-08-10 RX ORDER — EPINEPHRINE 1 MG/ML
0.3 INJECTION, SOLUTION, CONCENTRATE INTRAVENOUS EVERY 5 MIN PRN
Status: CANCELLED | OUTPATIENT
Start: 2023-08-15

## 2023-08-10 RX ORDER — ALBUTEROL SULFATE 0.83 MG/ML
2.5 SOLUTION RESPIRATORY (INHALATION)
Status: CANCELLED | OUTPATIENT
Start: 2023-08-15

## 2023-08-10 RX ORDER — METHYLPREDNISOLONE SODIUM SUCCINATE 125 MG/2ML
100 INJECTION, POWDER, LYOPHILIZED, FOR SOLUTION INTRAMUSCULAR; INTRAVENOUS ONCE
Status: CANCELLED | OUTPATIENT
Start: 2023-08-15

## 2023-08-10 RX ORDER — DIPHENHYDRAMINE HYDROCHLORIDE 50 MG/ML
50 INJECTION INTRAMUSCULAR; INTRAVENOUS
Status: CANCELLED
Start: 2023-08-15

## 2023-08-10 RX ORDER — HEPARIN SODIUM (PORCINE) LOCK FLUSH IV SOLN 100 UNIT/ML 100 UNIT/ML
5 SOLUTION INTRAVENOUS
Status: CANCELLED | OUTPATIENT
Start: 2023-08-12

## 2023-08-10 RX ADMIN — IRON SUCROSE 200 MG: 20 INJECTION, SOLUTION INTRAVENOUS at 09:40

## 2023-08-10 ASSESSMENT — PAIN SCALES - GENERAL: PAINLEVEL: NO PAIN (0)

## 2023-08-10 NOTE — PROGRESS NOTES
Infusion Nursing Note:  Cande Shields presents today for venofer.    Patient seen by provider today: Yes: Kayden Crump   present during visit today: Not Applicable.    Note: Patient still experiencing fatigue.      Intravenous Access:  Peripheral IV placed.    Treatment Conditions:  Not Applicable.      Post Infusion Assessment:  Patient tolerated infusion without incident.  Blood return noted pre and post infusion.  Site patent and intact, free from redness, edema or discomfort.  No evidence of extravasations.  Access discontinued per protocol.       Discharge Plan:   Patient discharged in stable condition accompanied by: self.  Departure Mode: Ambulatory.  Will return to clinic Monday for 3rd dose.    Cira Wagner RN

## 2023-08-10 NOTE — PROGRESS NOTES
Please see full imaging report from [employer] program under imaging tab.    Thank-you for referring your patient for a follow up US and growth.     I discussed the findings on today's ultrasound with the patient. I reviewed the limitations of ultrasound both in detecting aneuploidy and structural abnormalities.  We discussed that her growth has improved so she will not need weekly NST and UA Dopplers, and we will transition to weekly BPPs given her OB history. Growth will still be completed every 3 weeks.     She did have concerns regarding her lovenox. She had been taking 100 mg doses which is more than she was supposed to be prescribed, and she was having nosebleeds. She is down to 80 mg daily approximately - her prefilled syringes are for 100 mg so it is tough to get the exact dose. Our team will work with her pharmacy  to see if they can change her to 80 mg dosing with her next prescription (she has only a week or so of this dose left).     Continue to follow up with MFM - no OB visit today.      I spent a total of 15 minutes on the date of this encounter including preparing to see the patient (reviewing medical records/tests), counseling and discussing the plan of care, documenting the visit in the electronic medical record, and communicating with other health care professionals and/or care coordination.    Kayden Crump MD  Maternal Fetal Medicine

## 2023-08-10 NOTE — NURSING NOTE
Patient reports positive fetal movement, denies pain, denies contractions/pre-term labor, leaking of fluid, or bleeding. Patient denies headache, visual changes, nausea/vomiting, epigastric pain related to preeclampsia. Education provided to patient on NST/RL2. SBAR given to CARMELITA CAMACHO, see their note in Epic.    Rebecca Castillo RN

## 2023-08-10 NOTE — TELEPHONE ENCOUNTER
Pt called in- informed that all US/NST not associated with an OB visit were cancelled d/t no longer being FGR. Pt also informed that her pharmacy was called and the Lovenox prescription was verified so that pt will get the appropriate pre-filled syringes at next refill. Pt denies further questions or concerns at this time.    Rebecca Castillo RN

## 2023-08-11 ENCOUNTER — TELEPHONE (OUTPATIENT)
Dept: HEMATOLOGY | Facility: CLINIC | Age: 23
End: 2023-08-11
Payer: MEDICAID

## 2023-08-11 ENCOUNTER — MYC MEDICAL ADVICE (OUTPATIENT)
Dept: RHEUMATOLOGY | Facility: CLINIC | Age: 23
End: 2023-08-11
Payer: MEDICAID

## 2023-08-11 NOTE — TELEPHONE ENCOUNTER
Writer called and left message with pt. Pt's Mfm apt has been rescheduled from 8/14 to 8/15 at 8:00 am for OBV and BPP Yaz Esparza RN

## 2023-08-11 NOTE — TELEPHONE ENCOUNTER
Bleeding nose 4-5 each day.  Having to leave work as the nose bleeds will not stop.  I have had to go to the ER to get bleeding cautorized and still having issues.  INR is been running high, patient was taking Lovenox 100mq/1ml   Now back down to prescribed dose of 80mg/0.8mls.  Baby ASA also   Requesting if there is anything else she can take as the nose bleeds.   Also wondering if she can go to ER to cautorize nose as she feels that it will bleed.  Spoke with Farida Nurse for Dr. Dye and they recommend to go to ER if same nare and get that area cauterized or try OTC Afrin 2 sprays daily.   Called patient back to review instructions from Dr. Dye.  Patient confirmed she does have the bleeding come from 1 nare and has been using Afrin Nasal spray daily which is not effective.  Patient verbalized understanding that if needed go to ER where they can cauterize the one site that rebleeds. Patient instructed to follow up if needed and plan to return call to her if ENT can get her in soon.  Call placed to Dr. Vergara, ENT to see if they can fit her in today or what they suggest, message left with contact information.    Team Work makes the [employer] Work!    Marielena Jaquez RN, BSN, PHN  [facility] Nurse Navigator  [phone]

## 2023-08-11 NOTE — TELEPHONE ENCOUNTER
[id number]  Cande Shields  22 year old female  CBCD Diagnosis: Hx of unprovoked PE    CBCD Provider: Hardik                Call placed to Cande to follow up on a voicemail she left the CBCD about her ongoing nosebleeds and to reschedule the visit she missed with Dr. Dye on 8/10/23. Per Cande's voicemail, she slept through this visit.    This staff had also previously spoke to Marielena HENNESSY at Rheumatology who reached out to the CBCD shortly after Cande left her voicemail looking for guidance on her nosebleeds. Staff reviewed with Tamica Ramirez PA-C who advised cautery at ER if the bleeding is coming from a consistent location. OTC Afrin daily would be another option.    Message left on Cande's voicemail offering an appointment with Dr. Rashid at 1130 or 1230 on Monday 8/14 to discuss Lovenox dosing, rechecking Lovenox levels, reassess nose bleeding and to make a plan for delivery. Call back number provided.    Farida RUANON, RN, PHN   [facility]   Office: [phone]  Fax: [phone]

## 2023-08-14 ENCOUNTER — INFUSION THERAPY VISIT (OUTPATIENT)
Dept: INFUSION THERAPY | Facility: CLINIC | Age: 23
End: 2023-08-14
Attending: INTERNAL MEDICINE
Payer: MEDICAID

## 2023-08-14 DIAGNOSIS — D50.8 OTHER IRON DEFICIENCY ANEMIA: ICD-10-CM

## 2023-08-14 DIAGNOSIS — I77.82 ANCA-ASSOCIATED VASCULITIS (H): ICD-10-CM

## 2023-08-14 DIAGNOSIS — N18.30 STAGE 3 CHRONIC KIDNEY DISEASE, UNSPECIFIED WHETHER STAGE 3A OR 3B CKD (H): Primary | ICD-10-CM

## 2023-08-14 PROCEDURE — 96365 THER/PROPH/DIAG IV INF INIT: CPT

## 2023-08-14 PROCEDURE — 250N000011 HC RX IP 250 OP 636: Mod: JZ | Performed by: INTERNAL MEDICINE

## 2023-08-14 PROCEDURE — 258N000003 HC RX IP 258 OP 636: Performed by: INTERNAL MEDICINE

## 2023-08-14 RX ORDER — HEPARIN SODIUM (PORCINE) LOCK FLUSH IV SOLN 100 UNIT/ML 100 UNIT/ML
5 SOLUTION INTRAVENOUS
Status: CANCELLED | OUTPATIENT
Start: 2023-08-16

## 2023-08-14 RX ORDER — EPINEPHRINE 1 MG/ML
0.3 INJECTION, SOLUTION, CONCENTRATE INTRAVENOUS EVERY 5 MIN PRN
Status: CANCELLED | OUTPATIENT
Start: 2023-08-16

## 2023-08-14 RX ORDER — ACETAMINOPHEN 325 MG/1
650 TABLET ORAL ONCE
Status: CANCELLED
Start: 2023-08-15

## 2023-08-14 RX ORDER — DIPHENHYDRAMINE HCL 25 MG
50 CAPSULE ORAL ONCE
Status: CANCELLED
Start: 2023-08-15

## 2023-08-14 RX ORDER — METHYLPREDNISOLONE SODIUM SUCCINATE 125 MG/2ML
125 INJECTION, POWDER, LYOPHILIZED, FOR SOLUTION INTRAMUSCULAR; INTRAVENOUS
Status: CANCELLED
Start: 2023-08-15

## 2023-08-14 RX ORDER — ALBUTEROL SULFATE 0.83 MG/ML
2.5 SOLUTION RESPIRATORY (INHALATION)
Status: CANCELLED | OUTPATIENT
Start: 2023-08-16

## 2023-08-14 RX ORDER — DIPHENHYDRAMINE HYDROCHLORIDE 50 MG/ML
50 INJECTION INTRAMUSCULAR; INTRAVENOUS
Status: CANCELLED
Start: 2023-08-15

## 2023-08-14 RX ORDER — ALBUTEROL SULFATE 90 UG/1
1-2 AEROSOL, METERED RESPIRATORY (INHALATION)
Status: CANCELLED
Start: 2023-08-16

## 2023-08-14 RX ORDER — METHYLPREDNISOLONE SODIUM SUCCINATE 125 MG/2ML
100 INJECTION, POWDER, LYOPHILIZED, FOR SOLUTION INTRAMUSCULAR; INTRAVENOUS ONCE
Status: CANCELLED | OUTPATIENT
Start: 2023-08-15

## 2023-08-14 RX ORDER — ALBUTEROL SULFATE 90 UG/1
1-2 AEROSOL, METERED RESPIRATORY (INHALATION)
Status: CANCELLED
Start: 2023-08-15

## 2023-08-14 RX ORDER — ALBUTEROL SULFATE 0.83 MG/ML
2.5 SOLUTION RESPIRATORY (INHALATION)
Status: CANCELLED | OUTPATIENT
Start: 2023-08-15

## 2023-08-14 RX ORDER — METHYLPREDNISOLONE SODIUM SUCCINATE 125 MG/2ML
125 INJECTION, POWDER, LYOPHILIZED, FOR SOLUTION INTRAMUSCULAR; INTRAVENOUS
Status: CANCELLED
Start: 2023-08-16

## 2023-08-14 RX ORDER — EPINEPHRINE 1 MG/ML
0.3 INJECTION, SOLUTION, CONCENTRATE INTRAVENOUS EVERY 5 MIN PRN
Status: CANCELLED | OUTPATIENT
Start: 2023-08-15

## 2023-08-14 RX ORDER — HEPARIN SODIUM,PORCINE 10 UNIT/ML
5-20 VIAL (ML) INTRAVENOUS DAILY PRN
Status: CANCELLED | OUTPATIENT
Start: 2023-08-16

## 2023-08-14 RX ORDER — DIPHENHYDRAMINE HYDROCHLORIDE 50 MG/ML
50 INJECTION INTRAMUSCULAR; INTRAVENOUS
Status: CANCELLED
Start: 2023-08-16

## 2023-08-14 RX ADMIN — IRON SUCROSE 200 MG: 20 INJECTION, SOLUTION INTRAVENOUS at 11:55

## 2023-08-14 ASSESSMENT — PAIN SCALES - GENERAL: PAINLEVEL: NO PAIN (0)

## 2023-08-14 NOTE — PROGRESS NOTES
Infusion Nursing Note:  Cande Shields presents today for venofer 200 mg 3/5.    Patient seen by provider today: No   present during visit today: Not Applicable.    Note: N/A.      Intravenous Access:  Peripheral IV placed.  IV infiltrated approximately 75% through med.  Small dark spot noticed under skin approximately 1 inch above insertion site.  Med stopped and IV immediately removed after attempting aspiration.  IV restarted above this site and closely monitered through the rest of the infusion (approximately 25cc).    Treatment Conditions:  Not Applicable.      Post Infusion Assessment:  Patient tolerated infusion.  Blood return noted pre and post infusion.    No evidence of extravasations.  Access discontinued per protocol.  See above note regarding IV status.       Discharge Plan:   Patient discharged in stable condition accompanied by: self.  Departure Mode: Ambulatory.  RTC MG 8/17/23.      Leida Higginbotham

## 2023-08-15 DIAGNOSIS — Z87.74 S/P PATENT FORAMEN OVALE CLOSURE: ICD-10-CM

## 2023-08-15 RX ORDER — ENOXAPARIN SODIUM 100 MG/ML
80 INJECTION SUBCUTANEOUS EVERY 12 HOURS
Qty: 48 ML | Refills: 2 | Status: SHIPPED | OUTPATIENT
Start: 2023-08-15 | End: 2023-08-30 | Stop reason: SINTOL

## 2023-08-21 ENCOUNTER — INFUSION THERAPY VISIT (OUTPATIENT)
Dept: INFUSION THERAPY | Facility: CLINIC | Age: 23
End: 2023-08-21
Payer: MEDICAID

## 2023-08-21 VITALS
TEMPERATURE: 97.8 F | OXYGEN SATURATION: 98 % | DIASTOLIC BLOOD PRESSURE: 61 MMHG | SYSTOLIC BLOOD PRESSURE: 101 MMHG | WEIGHT: 234.6 LBS | RESPIRATION RATE: 16 BRPM | HEART RATE: 83 BPM | BODY MASS INDEX: 40.27 KG/M2

## 2023-08-21 DIAGNOSIS — N18.30 STAGE 3 CHRONIC KIDNEY DISEASE, UNSPECIFIED WHETHER STAGE 3A OR 3B CKD (H): Primary | ICD-10-CM

## 2023-08-21 DIAGNOSIS — I77.82 ANCA-ASSOCIATED VASCULITIS (H): ICD-10-CM

## 2023-08-21 DIAGNOSIS — D50.8 OTHER IRON DEFICIENCY ANEMIA: ICD-10-CM

## 2023-08-21 PROCEDURE — 96365 THER/PROPH/DIAG IV INF INIT: CPT | Performed by: INTERNAL MEDICINE

## 2023-08-21 RX ORDER — ALBUTEROL SULFATE 90 UG/1
1-2 AEROSOL, METERED RESPIRATORY (INHALATION)
Status: CANCELLED
Start: 2023-08-22

## 2023-08-21 RX ORDER — DIPHENHYDRAMINE HYDROCHLORIDE 50 MG/ML
50 INJECTION INTRAMUSCULAR; INTRAVENOUS
Status: CANCELLED
Start: 2023-08-22

## 2023-08-21 RX ORDER — HEPARIN SODIUM,PORCINE 10 UNIT/ML
5-20 VIAL (ML) INTRAVENOUS DAILY PRN
Status: CANCELLED | OUTPATIENT
Start: 2023-08-22

## 2023-08-21 RX ORDER — EPINEPHRINE 1 MG/ML
0.3 INJECTION, SOLUTION INTRAMUSCULAR; SUBCUTANEOUS EVERY 5 MIN PRN
Status: CANCELLED | OUTPATIENT
Start: 2023-08-22

## 2023-08-21 RX ORDER — METHYLPREDNISOLONE SODIUM SUCCINATE 125 MG/2ML
125 INJECTION, POWDER, LYOPHILIZED, FOR SOLUTION INTRAMUSCULAR; INTRAVENOUS
Status: CANCELLED
Start: 2023-08-22

## 2023-08-21 RX ORDER — HEPARIN SODIUM (PORCINE) LOCK FLUSH IV SOLN 100 UNIT/ML 100 UNIT/ML
5 SOLUTION INTRAVENOUS
Status: CANCELLED | OUTPATIENT
Start: 2023-08-22

## 2023-08-21 RX ORDER — ALBUTEROL SULFATE 0.83 MG/ML
2.5 SOLUTION RESPIRATORY (INHALATION)
Status: CANCELLED | OUTPATIENT
Start: 2023-08-22

## 2023-08-21 RX ADMIN — Medication 250 ML: at 09:49

## 2023-08-21 NOTE — PROGRESS NOTES
Infusion Nursing Note:  Cande Shields presents today for Venofer 200 mg dose # 4/5.  (Missed 8/17/23 infusion.)   Patient seen by provider today: No   present during visit today: Not Applicable.    Note:  Patient states no change in how she feels since starting Venofer infusions.    Intravenous Access:  Peripheral IV placed.    Treatment Conditions:  Not Applicable.      Post Infusion Assessment:  Patient tolerated infusion without incident.  Blood return noted pre and post infusion.  Site patent and intact, free from redness, edema or discomfort.  No evidence of extravasations.  Access discontinued per protocol.       Discharge Plan:   [email or web address]  Patient will stop by 's desk on the way out to schedule 5th dose of Venofer 200 mg.  Patient discharged in stable condition accompanied by: self.  Departure Mode: Ambulatory.      Beverly Fowler RN

## 2023-08-23 DIAGNOSIS — R76.8 PR3 ANCA ANTIBODIES PRESENT: Primary | ICD-10-CM

## 2023-08-24 ENCOUNTER — OFFICE VISIT (OUTPATIENT)
Dept: MATERNAL FETAL MEDICINE | Facility: CLINIC | Age: 23
End: 2023-08-24
Attending: OBSTETRICS & GYNECOLOGY
Payer: MEDICAID

## 2023-08-24 ENCOUNTER — HOSPITAL ENCOUNTER (OUTPATIENT)
Dept: ULTRASOUND IMAGING | Facility: CLINIC | Age: 23
Discharge: HOME | End: 2023-08-24
Attending: STUDENT IN AN ORGANIZED HEALTH CARE EDUCATION/TRAINING PROGRAM
Payer: MEDICAID

## 2023-08-24 ENCOUNTER — ANCILLARY ORDERS (OUTPATIENT)
Dept: MATERNAL FETAL MEDICINE | Facility: CLINIC | Age: 23
End: 2023-08-24

## 2023-08-24 VITALS
DIASTOLIC BLOOD PRESSURE: 70 MMHG | WEIGHT: 237.1 LBS | SYSTOLIC BLOOD PRESSURE: 115 MMHG | HEART RATE: 75 BPM | OXYGEN SATURATION: 96 % | BODY MASS INDEX: 40.7 KG/M2 | RESPIRATION RATE: 16 BRPM

## 2023-08-24 DIAGNOSIS — N93.9 VAGINAL BLEEDING: ICD-10-CM

## 2023-08-24 DIAGNOSIS — O09.299 HISTORY OF HELLP SYNDROME, CURRENTLY PREGNANT: ICD-10-CM

## 2023-08-24 DIAGNOSIS — O36.5990 FETAL GROWTH RESTRICTION ANTEPARTUM: ICD-10-CM

## 2023-08-24 DIAGNOSIS — O09.292 PRIOR PREGNANCY WITH FETAL DEMISE AND CURRENT PREGNANCY IN SECOND TRIMESTER: ICD-10-CM

## 2023-08-24 DIAGNOSIS — I82.4Y3 ACUTE DEEP VEIN THROMBOSIS (DVT) OF PROXIMAL VEIN OF BOTH LOWER EXTREMITIES (H): ICD-10-CM

## 2023-08-24 DIAGNOSIS — M31.31 GRANULOMATOSIS WITH POLYANGIITIS WITH RENAL INVOLVEMENT (H): ICD-10-CM

## 2023-08-24 DIAGNOSIS — N18.30 STAGE 3 CHRONIC KIDNEY DISEASE, UNSPECIFIED WHETHER STAGE 3A OR 3B CKD (H): Chronic | ICD-10-CM

## 2023-08-24 PROCEDURE — 76819 FETAL BIOPHYS PROFIL W/O NST: CPT

## 2023-08-24 PROCEDURE — G0463 HOSPITAL OUTPT CLINIC VISIT: HCPCS | Mod: 25 | Performed by: STUDENT IN AN ORGANIZED HEALTH CARE EDUCATION/TRAINING PROGRAM

## 2023-08-24 PROCEDURE — 99213 OFFICE O/P EST LOW 20 MIN: CPT | Mod: 25 | Performed by: STUDENT IN AN ORGANIZED HEALTH CARE EDUCATION/TRAINING PROGRAM

## 2023-08-24 PROCEDURE — 76819 FETAL BIOPHYS PROFIL W/O NST: CPT | Mod: 26 | Performed by: STUDENT IN AN ORGANIZED HEALTH CARE EDUCATION/TRAINING PROGRAM

## 2023-08-24 PROCEDURE — 76815 OB US LIMITED FETUS(S): CPT

## 2023-08-24 ASSESSMENT — PAIN SCALES - GENERAL: PAINLEVEL: MODERATE PAIN (4)

## 2023-08-24 NOTE — PROGRESS NOTES
"Maternal Fetal Medicine OB Follow Up    Cande Shields  : 2000  MRN: [id number]    CC: OB Follow-up    Subjective:  Cande Shields is a 22 year old  at 32w2d x LMP c/w 7w2d sono presenting for scheduled OB follow-up.     Patient overall reports she is doing well today.  Does have some pelvic soreness but is wearing a belly support belt.  Denies regular contractions, vaginal bleeding, LOF.  Active fetal movement.  Denies fever, chills, headache, visual change, RUQ pain, nausea, vomiting, chest pain, shortness of breath.  Is not frequently checking BP at home.  No additional concerns.    Of note, patient is planning on echo on 23 with cardiology follow up with Dr. Oneal on 23.  She had anesthesia consultation 23.  Has nephrology follow up 23.  She missed hematology visit with Dr. Dye on 8/10/23; is working to reschedule this.    OB Hx:  OB History    Para Term  AB Living   2 1 0 1 0 0   SAB IAB Ectopic Multiple Live Births   0 0 0 0 0      # Outcome Date GA Lbr Tobin/2nd Weight Sex Delivery Anes PTL Lv   2 Current            1  10/28/20 30w6d 05:15 0.782 kg (1 lb 11.6 oz) F Vag-Spont IV N FD      Birth Comments: Vicky- 10/20 30w6d presented to [facility] with right sided chest pain, BP in severe range, No fetal heart tones auscultated. Induced, NSVB      Name: ELOYPENDING FD      Apgar1: 0  Apgar5: 0      Obstetric Comments   10/20 30w6d presented to [facility] with right sided chest pain, BP in severe range, No fetal heart tones auscultated. Induced, NSVB, \"Vicky\" 2lbs       Objective:  /70 (BP Location: Right arm, Patient Position: Sitting, Cuff Size: Adult Regular)   Pulse 75   Resp 16   Wt 107.5 kg (237 lb 1.6 oz)   LMP 01/10/2023 (Approximate)   SpO2 96%   BMI 40.70 kg/m      Gen: alert, oriented, appears well  Skin: warm, dry, intact  Heart:  regular rate and rhythm  Respiratory: clear to auscultation bilateral fields  Abdominal: " "gravid, non-tender  Extremities: trace edema bilateral lower extremities  Psych: mood and affect WNL    OB Ultrasound:  Please see \"imaging\" tab under chart review for today's ultrasound results.    Labs:  23  Treponema NR  Anti Xa >2  INR 1.11  Fibrinogen 631  Hgb 8.5  Plt 368  Creatinine 1.49  Urine protein/creatinine 0.43    23  1 hr GCT = 97    Assessment/Plan:  Cande Shields is a 22 year old  at 32w2d x LMP c/w 7w2d sono presenting for scheduled OB follow-up.     Pregnancy complicated by:  - Granulomatosis polyangiitis (PR3 ANCA vasculitis)  - History of multiple VTE, including DVT and PE  - Subchorionic hemorrhage  - Right atrial thrombus s/p sternotomy and thrombectomy on 2023 earlier in pregnancy  - PFO s/p closure on 2023  - History of IUFD at 31 weeks  - History of severe pre-eclampsia  - Chronic kidney disease stage III (baseline creatinine ~1.3)  - Bicornuate uterus  - Fetal growth restriction with elevated umbilical artery dopplers, resolved on growth US 8/10/23 EFW 13% with AC 14%  - Anemia, iron deficiency; ferritin 8 on 23    Granulomatosis polyangitis  History of VTE (PE and DVT)  Right atrial thrombus s/p sternotomy and thrombectomy in early pregnancy  PFO s/p closure   Anemia  - Anti-Xa levels monthly 4-6 hours after administration. Last anti-Xa >2.00 on 23; she is now on correct dosing of lovenox. Recommend repeat at her next convenience (she takes it at 0700 and is thus unable to check today).  Lab appointment scheduled next week  at 1230.  - Receiving Venofer infusions, has received #4/5, last on 23.  - Missed last visit Dr. Dye of hematology to discuss management of her anticoagulation surrounding her delivery.  She is working to reschedule.  - Echocardiogram on 23 with cardiology follow up with Dr. Oneal on 23.  - Continue metoprolol 12.5 mg BID  - Scheduled to see rheumatology 23 but missed appointment.  She does not feel she " can reschedule this at this time and will prioritize cardiology, MFM, hematology, nephrology.  - Anesthesia consult completed 23 with Bree Gilliam PA-C.  Tentative Obstetrical Anesthesia Treatment plan developed in collaboration with the attending anesthesiologist Dr. Pérez. They also sent staff message to Dr. Naa Palomo to communicate hematology/anticoagulation plan.         Chronic Kidney Disease  - Goal blood pressure at 130/80; at goal today. Encouraged home BP monitoring.  - Saw Dr. Goodson (nephrology) on  and started on azathioprine.  Has follow up on 23.  - Dr. Goodson has lab orders placed, will get next week with anti-Xa level (BMP, CBC, UA, urine protein/creatining, proteinase 3 antibody IgG)     History of pre-eclampsia, severe  History of IUFD at 31 weeks   Fetal growth restriction (EFW 9%ile), RESOLVED ON 8/10/23  Abnormal aortic arch view on 23 with normal fetal echo 23  - Close monitoring for signs and/or symptoms of evolving preeclampsia using home BP cuff; precautions reviewed today  - Close monitoring of blood pressure both prenatally and in the postpartum period  - Continue low dose aspirin for preeclampsia prophylaxis 81 mg daily  - Discussed etiologies and offered diagnostic testing (previously no call NIPT x2). Declines invasive testing.  - BPP today ; will reassess growth 23      Surveillance  Subchorionic hemorrhage  - Serial growth US q3 weeks  - Weekly  testing with BPP given history of IUFD  - If growth restriction develops again will need weekly NST with UAR     Delivery planning:  - Anesthesia consult completed 23  - Mode of delivery to be determined in conjunction with cardiology and cardiovascular surgery, though would likely anticipate no contraindications to vaginal delivery at this time.  Will discuss at our cardio-obstetrics conference on 23.  - Hematology visit needed to discuss anticoagulation around the time of  delivery.  She missed last visit but will reschedule.  - Delivery at 39 weeks, sooner if driven by development of FGR (previous diagnosis of FGR resolved)  - Feeding: bottle  - Contraception plans: undecided, initial counseling provided, does not want depo, considering OCP, discussed IUD     Routine PNC  - Prenatal labs:  Rh pos, antibody negative               HepB/HIV/RPR/HepC: negative               GC/CT: negative               Rubella: immune                 UC: no growth   GCT:  WNL on 7/20/23 = 97  - Pap smear: perform postpartum  - Tdap on 7/20/23  - Aneuploidy screening: NIPT no call x 2. Declines invasive testing at this time.      RTC in 1 week for OB visit and growth US.  Labs 8/29 at 1230.  Nephrology visit 9/5/23.  Cardiology echo 8/29/23 with Dr. Oneal 9/1/23.  Working to reschedule hematology follow up.  Working to reschedule final Venofer infusion.    Future Appointments   Date Time Provider Department Center   8/29/2023 12:50 PM LAB FIRST FLOOR [facility]   8/29/2023  3:30 PM [email or web address] MGCVSV [place]   8/31/2023  9:30 AM [id number] [facility]   8/31/2023 10:15 AM UR EXAM RM 2 [facility]   9/1/2023  1:15 PM Roxie Oneal MD [facility]   9/5/2023  8:00 AM LAB FIRST FLOOR [facility]   9/5/2023  8:30 AM Kang Goodson MD [facility]   9/5/2023  9:45 AM UR NST ROOM [facility]   9/5/2023 10:15 AM [id number] [facility]   9/5/2023 11:00 AM UR EXAM RM 2 [facility]   9/8/2023  9:00 AM UR NST ROOM [facility]   9/8/2023  9:30 AM [id number] [facility]   9/11/2023  8:30 AM UR NST ROOM [facility]   9/11/2023  8:45 [facility]   9/14/2023  9:00 [facility]   9/14/2023  9:30 AM URMFMUSR1 [facility]   9/14/2023 10:15 AM UR EXAM RM 2 [facility]   9/18/2023  9:00 AM [facility]   9/18/2023  9:30 AM URMFMUSR4 [facility]   9/21/2023  8:45 AM [facility]  [place]   9/21/2023  9:30 AM URMFMUSR1 URMFUS [place]   9/21/2023 10:15 AM UR EXAM RM 2 URMFM [place]   9/25/2023  9:00 AM UR NST ROOM [facility] [place]   9/25/2023  9:30 AM URMFMUSR1 U[facility]FUS [place]   9/28/2023  9:00 AM [facility]   9/28/2023  9:30 AM URMFMUSR1 URMFUS [place]   9/28/2023 10:15 AM UR [facility]      Patient seen with Dr. Carlos To MD   Maternal-Fetal Medicine Fellow, PGY7  8/24/2023 9:29 AM     -----                                                                                                          MFM Physician Attestation  I saw this patient with the resident/fellow and agree with the their findings and plan of care as documented in the note.  I personally reviewed her vital signs, medications, labs, pertinent imaging, and fetal monitoring.    I personally spent a total of 25 minutes, including both face-to-face and non-face-to-face on the date of the encounter, addressing the above diagnosis. Activities performed in this time include chart review, obtaining / reviewing history, performing a medically necessary evaluation, documentation and counseling/care coordination/ordering tests/ordering meds.      Ina Gonzalez MD  Maternal Fetal Medicine   Date of Service (when I saw the patient): 8/24/2023

## 2023-08-24 NOTE — NURSING NOTE
Cande seen in clinic today for OB visit at 32w2d gestation. VSS. Pt reports normal fetal movement, see flowsheet. Pt evaluated for  labor, preeclampsia, infection, fetal wellbeing and cardiac symptoms without concerns. Evaluated for nosebleeds, patient continues to have them but feels she has gotten better at managing them. Has switched to correct dose of Lovenox and reports that her vasculitis RN has been working to get her in with an ENT to address the epistaxis. She missed 1 venofer infusion and plans to call and reschedule that. Also plans to reschedule virtual hematology appointment. Pt denies bleeding/lof/change in vaginal discharge/contractions/headache/vision changes/chest pain/SOB/edema. She has been having increase in pelvic pain. Discussed continued use of support belt, avoiding activities that worsen pain, using Tylenol and heat, and doing gentle stretching and exercises to improve pelvic strength. Pt notified to review new pt education in AVS, verbally reviewed highlights. Dr. To and Dr. Gonzalez met with pt and discussed POC. Plan for return in 1 week for RL2 and OBV as scheduled. Will also assist with scheduling MG lab appointment. Pt discharged stable and ambulatory.

## 2023-08-24 NOTE — PATIENT INSTRUCTIONS

## 2023-08-25 VITALS — HEART RATE: 80 BPM | TEMPERATURE: 97.7 F | DIASTOLIC BLOOD PRESSURE: 75 MMHG | SYSTOLIC BLOOD PRESSURE: 112 MMHG

## 2023-08-29 ENCOUNTER — LAB (OUTPATIENT)
Dept: LAB | Facility: CLINIC | Age: 23
End: 2023-08-29
Payer: MEDICAID

## 2023-08-29 DIAGNOSIS — O99.419 HEART DISEASE DURING PREGNANCY, ANTEPARTUM: ICD-10-CM

## 2023-08-29 DIAGNOSIS — I51.9 HEART DISEASE DURING PREGNANCY, ANTEPARTUM: ICD-10-CM

## 2023-08-29 LAB
ERYTHROCYTE [DISTWIDTH] IN BLOOD BY AUTOMATED COUNT: 24.8 % (ref 10–15)
HCT VFR BLD AUTO: 28.4 % (ref 35–47)
HGB BLD-MCNC: 8.8 G/DL (ref 11.7–15.7)
LMWH PPP CHRO-ACNC: 1.35 IU/ML
MCH RBC QN AUTO: 27.4 PG (ref 26.5–33)
MCHC RBC AUTO-ENTMCNC: 31 G/DL (ref 31.5–36.5)
MCV RBC AUTO: 89 FL (ref 78–100)
PLATELET # BLD AUTO: 320 10E3/UL (ref 150–450)
RBC # BLD AUTO: 3.21 10E6/UL (ref 3.8–5.2)
WBC # BLD AUTO: 7.2 10E3/UL (ref 4–11)

## 2023-08-29 PROCEDURE — 85027 COMPLETE CBC AUTOMATED: CPT

## 2023-08-29 PROCEDURE — 85520 HEPARIN ASSAY: CPT | Performed by: STUDENT IN AN ORGANIZED HEALTH CARE EDUCATION/TRAINING PROGRAM

## 2023-08-29 PROCEDURE — 36415 COLL VENOUS BLD VENIPUNCTURE: CPT | Performed by: STUDENT IN AN ORGANIZED HEALTH CARE EDUCATION/TRAINING PROGRAM

## 2023-08-30 ENCOUNTER — VIRTUAL VISIT (OUTPATIENT)
Dept: HEMATOLOGY | Facility: CLINIC | Age: 23
End: 2023-08-30
Attending: INTERNAL MEDICINE
Payer: MEDICAID

## 2023-08-30 VITALS — WEIGHT: 235 LBS | BODY MASS INDEX: 40.34 KG/M2

## 2023-08-30 DIAGNOSIS — I82.413 ACUTE DEEP VEIN THROMBOSIS (DVT) OF FEMORAL VEIN OF BOTH LOWER EXTREMITIES (H): Primary | ICD-10-CM

## 2023-08-30 PROCEDURE — 99214 OFFICE O/P EST MOD 30 MIN: CPT | Mod: VID | Performed by: INTERNAL MEDICINE

## 2023-08-30 RX ORDER — ENOXAPARIN SODIUM 100 MG/ML
60 INJECTION SUBCUTANEOUS EVERY 12 HOURS
Start: 2023-08-30 | End: 2023-09-19

## 2023-08-30 NOTE — PROGRESS NOTES
[facility]  [address] [place] [id number]  Phone: [phone], Fax: [phone]      Outpatient Visit Note:    Patient: Cande Shields  MRN: [id number]  : 2000  GRACIE: Aug 30, 2023     Cande Shields is a 23 year old woman with a history of recurrent unprovoked pulmonary embolism and GPA, currently pregnant on enoxaparin who returns for routine follow up.      Assessment:  In summary, Cande Shields is a 23 year old woman with complex medical history including granulomatous polyangiitis, severe preeclampsia/HELLP and IUFD with first pregnancy, who now has had an unprovoked PE in  with embolization to the arterial circulation (PFO?), and then more recently had a massive PE early in pregnancy requiring emergency sternotomy and surgical thrombectomy.  Currently she is having nuissance bleeding and elevated LMWH levels so I recommended she hold a day and then resume at 60mg Q12.    She is at very high risk for recurrence and would benefit from indefinite anticoagulation for secondary prophylaxis once she completes treatment for this VTE in Aug 2023.  I will plan to keep her on therapeutic dosing for 6 weeks post partum and then change either to enoxaparin 40mg daily if breastfeeding or back to a DOAC if not.    Recommendations:  I counseled the patient about my assessment of risks/benefits of anticoagulation and that she is at very high risk for recurrence  No enoxaparin tonight or tomorrow AM  Resume enoxaparin at 60mg Q12 hours tomorrow evening.  Check lovenox levels about once per month (we will coordinate with OB or MFM)  Continue ASA for preeclampsia prophylaxis  RTC 2 months post partum to discuss anticoagulation plan (Estimated Date of Delivery: Oct 17, 2023, so will see in Dec 2023)  Call with any questions or concerns, confirmed she has our phone and MyChart     30 minutes spent on the date of the encounter doing chart review, review of test results,  interpretation of tests, patient visit and documentation     Kodi Dye MD   of Medicine, Division of Hematology, Oncology and Transplantation  [employer]     Video-Visit Details    Type of service:  Video Visit   Video Start Time:  344 PM  Video End Time: 357 PM    Originating Location (pt. Location): Home  Distant Location (provider location):  On-site  Platform used for Video Visit: Dany       --------------------------------------------------------  Forward History:  2020 Presented G1 26W with severe preeclampsia, IUFD at 26 weeks.    2021  3/20/2021: large PE (unprovoked)  3/21/2021: ARTERIAL THROMBOSIS (R->L shunt due to PFO)  Developed acute pain and coolness in her left leg and was without pulses   --IR arteriogram and thrombolysis              -Occlusion of common femoral, profunda and superficial femoral arteries, distal popliteal artery              -TPA infusion started due to unable to suction thrombus or mechanical thrombectomy   --Due to development while on heparin gtt HIT ab sent and negative, RAUL sent and also returned negative but she was on argatroban while this was pending   --APS panel sent and was negative   --Echo: Mobile echodensity (~2 x 1.5cm) in right atrium, likely thrombus.  Cannot confirm attachment location, but most likely originating from or near IVC (also based on CT images).  -Seen by hematology: while primary arterial thrombosis is of concern, a follow up echo with bubble study is recommended to rule out right to left intracardiac shunt given her RA thrombus  3/23/21: Transferred to [facility] due to insurance, was changed to fondaparinux and ultimately xarelto  3/25/21: FVL mutation + prothrombin gene mutation testing negative, protein C >150 (elevated, on ac)   10/7/2021: Seen in clinic completed 6 months of xarelto 3-8/2021  -APS testing repeated and was negative, Antithrombin level   -She was requested to follow-up in clinic in  1 month but has not been seen since initial appointment.  Lost to followup    2022 12/21/2022: GPA flare off anticoagulation. Consult with Dr. Yemi Mcneill/LISETH Kulkarni for ?of ability to use heparin for DVT ppx as ?of HIT in the past (heparin listed as allergey)   - No evidence of HIT              -She was discharge on 12/21/22 with apixaban 2.5 mg po BID (renal dosing) and instructed to follow-up with PCP in 7 days to consider increasing to 5 mg PO BID pending renal function (to end around 1/18)    2023 2/12/23: Presented to [facility] ED with chest pain SOB and CTA showed acute large PE.  Newly pregnant,treated with LWMH 1mg/kg Q12    2/17/23: Presented to [facility] ED with ongoing chest left sided pleuritic chest pain which she related to known PE (no DVT), -Echo planned to eval for cardiac function showed large, highly mobile thrombus in transit in the right atrium (at least 2.2 cm in length)   --In IR underwent angiovac for right atrial clot thrombectomy that failed, ultimately went to OR for emergency sternotomy, thrombectomy and PFO closure      History: Cande Shields is a 23 year old woman with a history of unprovoked PE followed by severe pregnancy associated PE requiring emergency sternotomy in Feb 2023 (newly pregnant).  She is currently 33 weeks pregnant (G2, previous HELLP with IUFD) and taking enoxaparin 80mg Q12 hours for treatment of PE (reduced from 100mg BID due to very high LMWH level).      She reports she is tolerating the enoxaparin injections okay, but is having bruising and pain with the injection and frequent nuisance nose bleeds.    Medications are reviewed in the EMR and notable for aspirin and enoxaparin 80 mg every 12 hours    Objective:  Exam:  Body mass index is 40.34 kg/m .    Weight: 235 lbs 0 oz    Constitutional: Appears well, no distress  Eyes: no discharge, injection or icterus  Respiratory: no cough or labored breathing  Skin: no rashes or petechiae  Neurological: no deficits  appreciated, speech is fluent  Psych: affect is normal    Labs:  Most recent LAC panel pos (feb 2023)     Latest Reference Range & Units 08/07/23 11:02 08/29/23 12:54   Low Molecular Weight Heparin Anti Xa Level For Reference Range, See Comment IU/mL >2.00 () 1.35   (): Data is critically high    Imaging:  No new imaging

## 2023-08-31 ENCOUNTER — OFFICE VISIT (OUTPATIENT)
Dept: MATERNAL FETAL MEDICINE | Facility: CLINIC | Age: 23
End: 2023-08-31
Attending: OBSTETRICS & GYNECOLOGY
Payer: MEDICAID

## 2023-08-31 ENCOUNTER — HOSPITAL ENCOUNTER (INPATIENT)
Facility: CLINIC | Age: 23
LOS: 2 days | Discharge: HOME | End: 2023-09-03
Attending: OBSTETRICS & GYNECOLOGY | Admitting: OBSTETRICS & GYNECOLOGY
Payer: MEDICAID

## 2023-08-31 ENCOUNTER — HOSPITAL ENCOUNTER (OUTPATIENT)
Dept: ULTRASOUND IMAGING | Facility: CLINIC | Age: 23
Discharge: HOME | End: 2023-08-31
Attending: OBSTETRICS & GYNECOLOGY
Payer: MEDICAID

## 2023-08-31 VITALS
BODY MASS INDEX: 41.02 KG/M2 | OXYGEN SATURATION: 99 % | HEART RATE: 73 BPM | DIASTOLIC BLOOD PRESSURE: 71 MMHG | SYSTOLIC BLOOD PRESSURE: 104 MMHG | RESPIRATION RATE: 18 BRPM | WEIGHT: 239 LBS

## 2023-08-31 DIAGNOSIS — N18.30 STAGE 3 CHRONIC KIDNEY DISEASE, UNSPECIFIED WHETHER STAGE 3A OR 3B CKD (H): Chronic | ICD-10-CM

## 2023-08-31 DIAGNOSIS — O09.292 PRIOR PREGNANCY WITH FETAL DEMISE AND CURRENT PREGNANCY IN SECOND TRIMESTER: ICD-10-CM

## 2023-08-31 DIAGNOSIS — O09.299 HISTORY OF HELLP SYNDROME, CURRENTLY PREGNANT: ICD-10-CM

## 2023-08-31 DIAGNOSIS — O36.5990 FETAL GROWTH RESTRICTION ANTEPARTUM: ICD-10-CM

## 2023-08-31 DIAGNOSIS — M31.31 GRANULOMATOSIS WITH POLYANGIITIS WITH RENAL INVOLVEMENT (H): ICD-10-CM

## 2023-08-31 DIAGNOSIS — O36.5990 FETAL GROWTH RESTRICTION ANTEPARTUM: Primary | ICD-10-CM

## 2023-08-31 DIAGNOSIS — Z98.891 S/P PRIMARY LOW TRANSVERSE C-SECTION: Primary | ICD-10-CM

## 2023-08-31 LAB
ABO/RH(D): NORMAL
ALBUMIN SERPL BCG-MCNC: 3.1 G/DL (ref 3.5–5.2)
ALP SERPL-CCNC: 93 U/L (ref 35–104)
ALT SERPL W P-5'-P-CCNC: 27 U/L (ref 0–50)
ANION GAP SERPL CALCULATED.3IONS-SCNC: 10 MMOL/L (ref 7–15)
ANTIBODY SCREEN: NEGATIVE
AST SERPL W P-5'-P-CCNC: 15 U/L (ref 0–45)
BILIRUB SERPL-MCNC: <0.2 MG/DL
BUN SERPL-MCNC: 23.3 MG/DL (ref 6–20)
CALCIUM SERPL-MCNC: 9.2 MG/DL (ref 8.6–10)
CHLORIDE SERPL-SCNC: 106 MMOL/L (ref 98–107)
CREAT SERPL-MCNC: 1.35 MG/DL (ref 0.51–0.95)
DEPRECATED HCO3 PLAS-SCNC: 20 MMOL/L (ref 22–29)
ERYTHROCYTE [DISTWIDTH] IN BLOOD BY AUTOMATED COUNT: 25.3 % (ref 10–15)
GFR SERPL CREATININE-BSD FRML MDRD: 56 ML/MIN/1.73M2
GLUCOSE SERPL-MCNC: 109 MG/DL (ref 70–99)
HCT VFR BLD AUTO: 28.6 % (ref 35–47)
HGB BLD-MCNC: 8.8 G/DL (ref 11.7–15.7)
MCH RBC QN AUTO: 27.4 PG (ref 26.5–33)
MCHC RBC AUTO-ENTMCNC: 30.8 G/DL (ref 31.5–36.5)
MCV RBC AUTO: 89 FL (ref 78–100)
PLATELET # BLD AUTO: 297 10E3/UL (ref 150–450)
POTASSIUM SERPL-SCNC: 4.4 MMOL/L (ref 3.4–5.3)
PROT SERPL-MCNC: 5.7 G/DL (ref 6.4–8.3)
RBC # BLD AUTO: 3.21 10E6/UL (ref 3.8–5.2)
SODIUM SERPL-SCNC: 136 MMOL/L (ref 136–145)
SPECIMEN EXPIRATION DATE: NORMAL
WBC # BLD AUTO: 6.6 10E3/UL (ref 4–11)

## 2023-08-31 PROCEDURE — 76818 FETAL BIOPHYS PROFILE W/NST: CPT

## 2023-08-31 PROCEDURE — 87653 STREP B DNA AMP PROBE: CPT | Performed by: OBSTETRICS & GYNECOLOGY

## 2023-08-31 PROCEDURE — 76816 OB US FOLLOW-UP PER FETUS: CPT | Mod: 26 | Performed by: OBSTETRICS & GYNECOLOGY

## 2023-08-31 PROCEDURE — 80053 COMPREHEN METABOLIC PANEL: CPT | Performed by: OBSTETRICS & GYNECOLOGY

## 2023-08-31 PROCEDURE — 250N000011 HC RX IP 250 OP 636: Performed by: OBSTETRICS & GYNECOLOGY

## 2023-08-31 PROCEDURE — 59025 FETAL NON-STRESS TEST: CPT | Mod: 59

## 2023-08-31 PROCEDURE — 86901 BLOOD TYPING SEROLOGIC RH(D): CPT | Performed by: OBSTETRICS & GYNECOLOGY

## 2023-08-31 PROCEDURE — G0463 HOSPITAL OUTPT CLINIC VISIT: HCPCS | Mod: 25 | Performed by: OBSTETRICS & GYNECOLOGY

## 2023-08-31 PROCEDURE — 250N000012 HC RX MED GY IP 250 OP 636 PS 637

## 2023-08-31 PROCEDURE — 250N000013 HC RX MED GY IP 250 OP 250 PS 637

## 2023-08-31 PROCEDURE — G0378 HOSPITAL OBSERVATION PER HR: HCPCS

## 2023-08-31 PROCEDURE — 99221 1ST HOSP IP/OBS SF/LOW 40: CPT | Mod: 25 | Performed by: OBSTETRICS & GYNECOLOGY

## 2023-08-31 PROCEDURE — 59025 FETAL NON-STRESS TEST: CPT | Performed by: OBSTETRICS & GYNECOLOGY

## 2023-08-31 PROCEDURE — 96372 THER/PROPH/DIAG INJ SC/IM: CPT | Performed by: OBSTETRICS & GYNECOLOGY

## 2023-08-31 PROCEDURE — 76818 FETAL BIOPHYS PROFILE W/NST: CPT | Mod: 26 | Performed by: OBSTETRICS & GYNECOLOGY

## 2023-08-31 PROCEDURE — 85018 HEMOGLOBIN: CPT | Performed by: OBSTETRICS & GYNECOLOGY

## 2023-08-31 PROCEDURE — 76816 OB US FOLLOW-UP PER FETUS: CPT

## 2023-08-31 PROCEDURE — 86850 RBC ANTIBODY SCREEN: CPT | Performed by: OBSTETRICS & GYNECOLOGY

## 2023-08-31 PROCEDURE — 36415 COLL VENOUS BLD VENIPUNCTURE: CPT | Performed by: OBSTETRICS & GYNECOLOGY

## 2023-08-31 PROCEDURE — G0379 DIRECT REFER HOSPITAL OBSERV: HCPCS

## 2023-08-31 PROCEDURE — 250N000013 HC RX MED GY IP 250 OP 250 PS 637: Performed by: OBSTETRICS & GYNECOLOGY

## 2023-08-31 PROCEDURE — 99215 OFFICE O/P EST HI 40 MIN: CPT | Mod: 25 | Performed by: OBSTETRICS & GYNECOLOGY

## 2023-08-31 PROCEDURE — 76820 UMBILICAL ARTERY ECHO: CPT | Mod: 26 | Performed by: OBSTETRICS & GYNECOLOGY

## 2023-08-31 RX ORDER — FAMOTIDINE 20 MG/1
20 TABLET, FILM COATED ORAL 2 TIMES DAILY
Status: DISCONTINUED | OUTPATIENT
Start: 2023-08-31 | End: 2023-09-03 | Stop reason: HOSPADM

## 2023-08-31 RX ORDER — HYDROXYZINE HYDROCHLORIDE 50 MG/1
50 TABLET, FILM COATED ORAL EVERY 6 HOURS PRN
Status: DISCONTINUED | OUTPATIENT
Start: 2023-08-31 | End: 2023-09-01

## 2023-08-31 RX ORDER — AZATHIOPRINE 50 MG/1
50 TABLET ORAL DAILY
Status: DISCONTINUED | OUTPATIENT
Start: 2023-09-01 | End: 2023-08-31

## 2023-08-31 RX ORDER — METOCLOPRAMIDE 10 MG/1
10 TABLET ORAL EVERY 6 HOURS PRN
Status: DISCONTINUED | OUTPATIENT
Start: 2023-08-31 | End: 2023-09-01

## 2023-08-31 RX ORDER — ENOXAPARIN SODIUM 100 MG/ML
60 INJECTION SUBCUTANEOUS EVERY 12 HOURS
Status: DISCONTINUED | OUTPATIENT
Start: 2023-09-01 | End: 2023-08-31

## 2023-08-31 RX ORDER — ONDANSETRON 2 MG/ML
4 INJECTION INTRAMUSCULAR; INTRAVENOUS EVERY 6 HOURS PRN
Status: DISCONTINUED | OUTPATIENT
Start: 2023-08-31 | End: 2023-09-01

## 2023-08-31 RX ORDER — METOCLOPRAMIDE HYDROCHLORIDE 5 MG/ML
10 INJECTION INTRAMUSCULAR; INTRAVENOUS EVERY 6 HOURS PRN
Status: DISCONTINUED | OUTPATIENT
Start: 2023-08-31 | End: 2023-09-01

## 2023-08-31 RX ORDER — PYRIDOXINE HCL (VITAMIN B6) 25 MG
25 TABLET ORAL 2 TIMES DAILY
Status: DISCONTINUED | OUTPATIENT
Start: 2023-08-31 | End: 2023-09-01

## 2023-08-31 RX ORDER — ACETAMINOPHEN 325 MG/1
650 TABLET ORAL EVERY 4 HOURS PRN
Status: DISCONTINUED | OUTPATIENT
Start: 2023-08-31 | End: 2023-09-01

## 2023-08-31 RX ORDER — ENOXAPARIN SODIUM 100 MG/ML
80 INJECTION SUBCUTANEOUS EVERY 12 HOURS
Status: DISCONTINUED | OUTPATIENT
Start: 2023-09-01 | End: 2023-09-01

## 2023-08-31 RX ORDER — AZATHIOPRINE 50 MG/1
50 TABLET ORAL 2 TIMES DAILY
Status: DISCONTINUED | OUTPATIENT
Start: 2023-08-31 | End: 2023-09-03 | Stop reason: HOSPADM

## 2023-08-31 RX ORDER — PROCHLORPERAZINE 25 MG
25 SUPPOSITORY, RECTAL RECTAL EVERY 12 HOURS PRN
Status: DISCONTINUED | OUTPATIENT
Start: 2023-08-31 | End: 2023-09-01

## 2023-08-31 RX ORDER — BETAMETHASONE SODIUM PHOSPHATE AND BETAMETHASONE ACETATE 3; 3 MG/ML; MG/ML
12 INJECTION, SUSPENSION INTRA-ARTICULAR; INTRALESIONAL; INTRAMUSCULAR; SOFT TISSUE EVERY 24 HOURS
Status: DISCONTINUED | OUTPATIENT
Start: 2023-08-31 | End: 2023-09-01

## 2023-08-31 RX ORDER — PROCHLORPERAZINE MALEATE 10 MG
10 TABLET ORAL EVERY 6 HOURS PRN
Status: DISCONTINUED | OUTPATIENT
Start: 2023-08-31 | End: 2023-09-01

## 2023-08-31 RX ORDER — LIDOCAINE 40 MG/G
CREAM TOPICAL
Status: DISCONTINUED | OUTPATIENT
Start: 2023-08-31 | End: 2023-09-01

## 2023-08-31 RX ORDER — ASPIRIN 81 MG/1
81 TABLET ORAL DAILY
Status: DISCONTINUED | OUTPATIENT
Start: 2023-09-01 | End: 2023-09-03 | Stop reason: HOSPADM

## 2023-08-31 RX ORDER — ONDANSETRON 4 MG/1
4 TABLET, ORALLY DISINTEGRATING ORAL EVERY 6 HOURS PRN
Status: DISCONTINUED | OUTPATIENT
Start: 2023-08-31 | End: 2023-09-01

## 2023-08-31 RX ADMIN — BETAMETHASONE SODIUM PHOSPHATE AND BETAMETHASONE ACETATE 12 MG: 3; 3 INJECTION, SUSPENSION INTRA-ARTICULAR; INTRALESIONAL; INTRAMUSCULAR at 16:52

## 2023-08-31 RX ADMIN — Medication 25 MG: at 22:17

## 2023-08-31 RX ADMIN — FAMOTIDINE 20 MG: 20 TABLET ORAL at 22:17

## 2023-08-31 RX ADMIN — Medication 12.5 MG: at 20:05

## 2023-08-31 RX ADMIN — AZATHIOPRINE 50 MG: 50 TABLET ORAL at 22:17

## 2023-08-31 ASSESSMENT — ACTIVITIES OF DAILY LIVING (ADL)
DIFFICULTY_EATING/SWALLOWING: NO
ADLS_ACUITY_SCORE: 20
WALKING_OR_CLIMBING_STAIRS_DIFFICULTY: NO
ADLS_ACUITY_SCORE: 20
ADLS_ACUITY_SCORE: 20
DRESSING/BATHING_DIFFICULTY: NO
CHANGE_IN_FUNCTIONAL_STATUS_SINCE_ONSET_OF_CURRENT_ILLNESS/INJURY: NO
WEAR_GLASSES_OR_BLIND: NO
ADLS_ACUITY_SCORE: 20
ADLS_ACUITY_SCORE: 20
FALL_HISTORY_WITHIN_LAST_SIX_MONTHS: NO
CONCENTRATING,_REMEMBERING_OR_MAKING_DECISIONS_DIFFICULTY: NO
DOING_ERRANDS_INDEPENDENTLY_DIFFICULTY: NO
TOILETING_ISSUES: NO

## 2023-08-31 NOTE — NURSING NOTE
Cande seen in clinic today for OB visit at 33w2d gestation. VSS. Evaluated for fetal wellbeing, patient reports slight decrease in fetal movement. Pt evaluated for  labor, preeclampsia, infection without concerns. Evaluated for cardiac symptoms, patient reports sometimes feeling a pain in her chest/lungs along with some SOB when she sits too long that seems to slowly resolve when she stands up. Evaluated for new swelling, dizziness, syncope/presyncope, HA, visual changes, N/V, RUQ pain, patient denies these symptoms. Pt denies bleeding/lof/change in vaginal discharge/contractions/headache/vision changes/edema. Pt notified to review new pt education in AVS, verbally reviewed highlights. NST Performed due to FGR. Dr. Ivey reviewed efm tracing. See NST/BPP Doc Flowsheet tab. Dr. Guerra and Dr. Ivey met with pt and discussed POC. Plan for patient to present to antepartum unit for further monitoring. Pt discharged stable and ambulatory with directions for birthplace as she needs to move her car prior to admission.

## 2023-08-31 NOTE — H&P
"History and Physical   2023  Cande Shields  [id number]      HPI: Cande Shields is a 23 year old  at 33w2d by 5w1d US who presents to the antepartum service at [facility] due to severe fetal growth restriction (EFW 2%ile, AC < 1%ile) and elevated UAD discovered today on US. She was previously found to have FGR (EFW 9%ile) on  with UAD showing forward flow but increased resistance. She was monitored with twice weekly NST and UADs and the FGR resolved on 8/10 with EFW 13%ile.     She confirms feeling fetal movement but states baby is moving less than usual. She denies vaginal bleeding, loss of fluid, and contractions.     Patient states she has been experiencing one week of increased shortness of breath while at rest, sitting up. She associates the shortness of breath with a sensation of chest tightness and \"lung pain.\" She has a history of pulmonary embolism, her most recent episode was a 2023 which required sternotomy and atrial thrombectomy. She has been started on Lovenox 60mg BID    ROS: No headaches, vision changes, nausea, vomiting, fevers, chills, chest pain, abdominal pain, constipation, diarrhea, dysuria, and foul-odor discharge    Her pregnancy is complicated by:  - Granulomatosis polyangiitis  - History of multiple VTE, including DVT and PE  - Right atrial thrombus s/p sternotomy and thrombectomy on 2023 earlier in pregnancy  - PFO s/p closure on   - History of IUFD at 30w6d ()  - History of severe pre-eclampsia  - Chronic kidney disease (baseline creatinine 1.3-1.6)  - Bicornuate uterus  - Previous fetal growth restriction with elevated umbilical artery dopplers (), but resolved on 8/10    OBHX:   OB History    Para Term  AB Living   2 1 0 1 0 0   SAB IAB Ectopic Multiple Live Births   0 0 0 0 0      # Outcome Date GA Lbr Tobin/2nd Weight Sex Delivery Anes PTL Lv   2 Current            1  10/28/20 30w6d 05:15 0.782 kg (1 " "lb 11.6 oz) F Vag-Spont IV N FD      Birth Comments: Vicky- 10/20 30w6d presented to [facility] with right sided chest pain, BP in severe range, No fetal heart tones auscultated. Induced, NSVB      Name: ELOYPENDING FD      Apgar1: 0  Apgar5: 0      Obstetric Comments   10/20 30w6d presented to [facility] with right sided chest pain, BP in severe range, No fetal heart tones auscultated. 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    Multiple subsegmental pulmonary emboli without acute cor pulmonale (H) 03/2021    PFO (patent foramen ovale)     Preeclampsia, third trimester     Spontaneous pregnancy loss 2020    31 weeks    Stage 3b chronic kidney disease (H)     Wegener's disease, pulmonary 01/01/2010    renal biopsy     No asthma    SurgicalHX:   Past Surgical History:   Procedure Laterality Date    ENT SURGERY  01/01/2000    cyst    EXCISE GANGLION WRIST  01/01/2009    IR LOWER EXTREMITY ANGIOGRAM LEFT  3/21/2021    IR THROMBOLYSIS ART/VENOUS INFUSION SUBSQ DAY  3/21/2021    IR THROMBOLYSIS ART/VENOUS INFUSION SUBSQ DAY  3/21/2021    STERNOTOMY  02/18/2023    right atrial thrombectomy    THROMBECTOMY ATRIUM Right 02/18/2023    THROMBECTOMY PERCUTANEOUS N/A 2/18/2023    Procedure: Emergency Sternotomy, Right Atrial Thrombectomy, Central Cannulation, Bilateral Femoral Cannulation, Closure of PFO ; Transesophageal Echocardiogram performed by anesthesia staff;  Surgeon: Adelfo Elmore MD;  Location: [facility] OR    THROMBECTOMY PERCUTANEOUS N/A 2/18/2023    Procedure: Angiovac Mediated for Right Atrial Clot; Intracatheter Thrombectomy via bilateral percutaneous femoral venous access with 26 FR Right Femoral vein and 17 FR Left Femoral Vein cannulas. Transesophageal echchocardiogram performed by anesthesia staff;  " Surgeon: Po Monterroso MD;  Location: [facility] OR       Medications:   No current facility-administered medications on file prior to encounter.  acetaminophen (TYLENOL) 325 MG tablet, Take 2 tablets (650 mg) by mouth every 4 hours as needed for other (For optimal non-opioid multimodal pain management to improve pain control.)  aspirin (ASA) 81 MG chewable tablet, Take 1 tablet (81 mg) by mouth daily  azaTHIOprine (IMURAN) 50 MG tablet, Take 1 tablet (50 mg) by mouth 2 times daily  doxylamine (UNISOM) 25 MG TABS tablet, Take 0.5 tablets (12.5 mg) by mouth 2 times daily as needed for other (nausea) (Patient not taking: Reported on 8/30/2023)  enoxaparin ANTICOAGULANT (LOVENOX) 60 MG/0.6ML syringe, Inject 0.6 mLs (60 mg) Subcutaneous every 12 hours  famotidine (PEPCID) 20 MG tablet, Take 1 tablet (20 mg) by mouth 2 times daily  metoprolol tartrate (LOPRESSOR) 25 MG tablet, Take 0.5 tablets (12.5 mg) by mouth 2 times daily  Prenatal Vit-Fe Fumarate-FA (PRENATAL MULTIVITAMIN W/IRON) 27-0.8 MG tablet, Take 1 tablet by mouth daily Patient uses [employer] brand chewable prenatal vitamins  pyridOXINE (VITAMIN B6) 25 MG tablet, Take 1 tablet (25 mg) by mouth every 8 hours as needed (nausea)        Allergies:  Allergies   Allergen Reactions    Penicillins Rash     Unknown, but think rash.  Tolerated cephalexin (12/27/20), cefpodoxime (12/27/20), Ceftriaxone (Feb 2023), Zosyn (Feb 2023)       FamilyHX:    Family History   Problem Relation Age of Onset    Thyroid Disease Mother     Cancer Maternal Grandfather     Asthma No family hx of     Diabetes No family hx of     Anesthesia Reaction No family hx of     Venous thrombosis No family hx of      Denies bleeding or clotting disorders in the family.    SocialHX:   Denies drinking, smoking, drug use in pregnancy. She has a history of tobacco use, 6 pack-year history.    ROS: 10-point ROS negative except as indicated in HPI.    Physical Exam:  Vitals:    08/31/23 1410   BP: 118/65   BP  Location: Right arm   Patient Position: Semi-Alfaro's   Cuff Size: Adult Regular   Resp: 16   Temp: 97.6  F (36.4  C)   TempSrc: Oral   SpO2: 97%     General: alert, oriented female, resting in bed in NAD  CV: regular rate and rhythm  Lungs: non-labored breathing, on room air  Abdomen: soft, gravid, non-tender  Extremities: bilateral lower extremities non-tender without edema    FHT: baseline 130, moderate variability, + accelerations, intermittent variable decelerations  [place]: 0 contractions in 10 mins     Results for orders placed or performed during the hospital encounter of 08/31/23   Comprehensive metabolic panel     Status: Abnormal   Result Value Ref Range    Sodium 136 136 - 145 mmol/L    Potassium 4.4 3.4 - 5.3 mmol/L    Chloride 106 98 - 107 mmol/L    Carbon Dioxide (CO2) 20 (L) 22 - 29 mmol/L    Anion Gap 10 7 - 15 mmol/L    Urea Nitrogen 23.3 (H) 6.0 - 20.0 mg/dL    Creatinine 1.35 (H) 0.51 - 0.95 mg/dL    Calcium 9.2 8.6 - 10.0 mg/dL    Glucose 109 (H) 70 - 99 mg/dL    Alkaline Phosphatase 93 35 - 104 U/L    AST 15 0 - 45 U/L    ALT 27 0 - 50 U/L    Protein Total 5.7 (L) 6.4 - 8.3 g/dL    Albumin 3.1 (L) 3.5 - 5.2 g/dL    Bilirubin Total <0.2 <=1.2 mg/dL    GFR Estimate 56 (L) >60 mL/min/1.73m2   CBC with platelets     Status: Abnormal   Result Value Ref Range    WBC Count 6.6 4.0 - 11.0 10e3/uL    RBC Count 3.21 (L) 3.80 - 5.20 10e6/uL    Hemoglobin 8.8 (L) 11.7 - 15.7 g/dL    Hematocrit 28.6 (L) 35.0 - 47.0 %    MCV 89 78 - 100 fL    MCH 27.4 26.5 - 33.0 pg    MCHC 30.8 (L) 31.5 - 36.5 g/dL    RDW 25.3 (H) 10.0 - 15.0 %    Platelet Count 297 150 - 450 10e3/uL   Adult Type and Screen     Status: None   Result Value Ref Range    ABO/RH(D) A POS     Antibody Screen Negative Negative    SPECIMEN EXPIRATION DATE [id number]    ABO/Rh type and screen     Status: None    Narrative    The following orders were created for panel order ABO/Rh type and screen.  Procedure                                Abnormality         Status                     ---------                               -----------         ------                     Adult Type and Screen[[id number]]                            Final result                 Please view results for these tests on the individual orders.   Results for orders placed or performed during the hospital encounter of 23   Maternal Fetal US Comprehensive Single F/U     Status: None    Narrative            Comp Follow Up  ---------------------------------------------------------------------------------------------------------  Pat. Name: YUMIKO LYONS       Study Date:  2023 9:21am  Pat. NO:  [id number]        Referring  MD: STEFFANIE BURLESON  Site:  [facility]       Sonographer: Meena Suarez RDMS  :  [id number]        Age:   23  ---------------------------------------------------------------------------------------------------------    INDICATION  ---------------------------------------------------------------------------------------------------------  Wegener's granulomatosis / chronic kidney disease  History of preeclampsia  History of VTE on therapeutic anticoagulation  Bicornuate uterus      METHOD  ---------------------------------------------------------------------------------------------------------  Transabdominal ultrasound examination, . View: Suboptimal view: limited by fetal activity. Suboptimal view: limited by maternal body habitus      PREGNANCY  ---------------------------------------------------------------------------------------------------------  Ruelas pregnancy. Number of fetuses: 1      DATING  ---------------------------------------------------------------------------------------------------------                                           Date                                Details                                                                                      Gest. age                      LUIS  LMP                                   1/10/2023                                                                                                                         33 w + 2 d                     10/17/2023  Prior assessment               3/2/2023                          GA: 7 w + 2 d                                                                             33 w + 2 d                     10/17/2023  U/S                                   8/31/2023                         based upon AC, BPD, Femur, HC                                                31 w + 3 d                     10/30/2023  Assigned dating                  Dating performed on 06/29/2023, based on the LMP                                                            33 w + 2 d                     10/17/2023      GENERAL EVALUATION  ---------------------------------------------------------------------------------------------------------  Cardiac activity present.  bpm.  Fetal movements present, .  Presentation Variable.  Placenta  left, fundal  Umbilical cord 3 vessel cord.  Amniotic fluid Amount of AF: normal amount, normal amount. MVP 4.8 cm.      FETAL BIOMETRY  ---------------------------------------------------------------------------------------------------------  Main Fetal Biometry:  BPD                                        81.9                    mm                         32w 6d                Hadlock  OFD                                        97.6                    mm                         28w 6d                Nicolaides  HC                                          287.2                  mm                          31w 4d                Hadlock  Cerebellum tr                            40.2                   mm                          34w 1d                Nicolaides  AC                                          259.5                  mm                          30w 1d        <1%        Hadlock  Femur                                      59.5                   mm                           31w 0d                Hadlock  Humerus                                  54.9                    mm                         32w 0d                Quin  Fetal Weight Calculation:  EFW                                       1,632                  g                                     2%         Hadlock  EFW (lb,oz)                             3 lb 10                 oz  EFW by                                        Hadlock (BPD-HC-AC-FL)  Head / Face / Neck Biometry:                                             4.1                     mm  CM                                          8.2                     mm      FETAL ANATOMY  ---------------------------------------------------------------------------------------------------------  The following structures appear normal:  Head / Neck                         Cranium. Head size. Head shape. Lateral ventricles. Midline falx. Cavum septi pellucidi. Cerebellum. Cisterna magna. Thalami.  Heart / Thorax                      Diaphragm.  Abdomen                             Stomach. Kidneys. Bladder.  Spine                                  Cervical spine. Thoracic spine. Lumbar spine. Sacral spine.    The following structures were documented previously:  Face                                   Lips. Profile. Nose.  Heart / Thorax                      4-chamber view. RVOT view. LVOT view. 3-vessel-trachea view.    Gender: male.      BIOPHYSICAL PROFILE  ---------------------------------------------------------------------------------------------------------  2: Fetal breathing movements  2: Gross body movements  2: Fetal tone  2: Amniotic fluid volume  NST: non-reactive  8/10 Biophysical profile score      FETAL DOPPLER  ---------------------------------------------------------------------------------------------------------  Umbilical Artery: abnormal, Elevated PI. Sampling site: midcord  PI                                            1.42                                                           >99%         Dirk  HR                                          127                     bpm      MATERNAL STRUCTURES  ---------------------------------------------------------------------------------------------------------  Cervix                                  Suboptimal  Right Ovary                          Not examined  Left Ovary                            Not examined      NON STRESS TEST  ---------------------------------------------------------------------------------------------------------  NST interpretation: non-reactive. Test duration 20 min. Baseline  bpm. Baseline variability: moderate. Accelerations: present, 10 x 10s. Decelerations: absent.  Uterine activity: absent      RECOMMENDATION  ---------------------------------------------------------------------------------------------------------  Thank-you for referring your patient to assess fetal growth and wellbeing.    I discussed the findings on today's ultrasound with the patient. Ms. Shields was also seen in our office for a return OB visit. Given today's ultrasound findings in combination  with her maternal co-morbidities and obstetrical history she was sent to L&D for further evaluation and monitoring.    Please see documentation in Epic for full details from today's visit.    If you have questions regarding today's evaluation or if we can be of further service, please contact the [facility].    **Fetal anomalies may be present but not detected**        Impression    IMPRESSION  ---------------------------------------------------------------------------------------------------------  1. Ruelas intrauterine pregnancy at 33w 2d gestational age here for assessment of fetal growth and wellbeing.  2. None of the anomalies commonly detected by ultrasound were evident in the limited fetal anatomic survey as described above, anatomy limited by gestational age and  fetal lie.  3.  Growth parameters and estimated fetal weight are consistent with severe fetal growth restriction (EFW 2nd%, AC < 1st%) for established dates. There has been less  than expected interval growth.  4. The amniotic fluid volume appeared normal.  5. The umbilical artery dopplers are elevated. There is no evidence of absent or reversed end diastolic flow.  6. The NST was nonreactive. The BPP was .           GBS Status: pending    Assessment/Plan:  23 year old  at 33w2d by 7w2d US, here for severe fetal growth restriction (2%ile) with elevated UAD resistance in the setting of a complex medical history and an obstetrical history of IUFD at 30w6d in .     Pregnancy complicated by:  - Granulomatosis polyangiitis  - History of multiple VTE, including DVT and PE  - Right atrial thrombus s/p sternotomy and thrombectomy on 2023 earlier in pregnancy  - PFO s/p closure on   - History of IUFD at 30w6d ()  - History of severe pre-eclampsia  - Chronic kidney disease (baseline creatinine 1.3-1.6)  - Bicornuate uterus  - Previous fetal growth restriction with elevated umbilical artery Dopplers (), but resolved on 8/10    # Fetal growth restriction (EFW 2%ile)  # History of IUFD at 31 weeks   -  US: EFW 2%ile with elevated umbilical artery Dopplers; growth has fallen off the curve  - BPP 8/10 with non-reactive NST  - Admit to Antepartum Service for extended observation  - BMTZ 1st dose , 2nd dose tomorrow  - Monitoring: continuous and then transition to QID at 1800 if fetal monitoring reassuring  - Patient consented for CS 23    #Iron Deficiency Anemia  -  Iron panel: Ferritin 8, Iron 24, TIBC 510, %sat 5  - s/p 4 IV Iron Sucrose infusions, last infusion   - Repeat Iron panel  - PO iron sulfate 326mg w/ Vit C    # Granulomatosis with polyangiitis   # Chronic kidney disease  - Continue PTA azaTHIOprine (IMURAN) 50 MG tablet  - follows with Dr. Goodson (nephrology); last visit  7/25/23  - Creatine on admission 1.35. Baseline protein 1.3-1.6; UPC at proteinuria levels with most recent UPC 0.43     # History of saddle PE with B/L DVT and pulmonary infarct   # Mobile thrombus in right atrium s/p sternotomy and thrombectomy in early pregnancy   - Managed by Hematology ([facility])   - Temporary discontinuation of therapeutic lovenox yesterday and today due to super-therapeutic levels (anti-Xa level 1.35)  - Anti-XA level goal 0.6-1.0 per Society for Maternal Fetal Medicine guidelines    - Will resume at Lovenox 60mg BID subQ tomorrow (9/1)  - normal Echo 4/26/2023 with LVEF 55%-60%  - Ordered inpatient Echocardiogram, Cardiologist, Dr. Carrillo, is aware and will review  - Continue metoprolol 12.5 mg BID     # History of pre-eclampsia, severe  - on aspirin 81 mg every day  - blood pressure parameters (>140/90) and pre-eclampsia symptom precautions reviewed    # FWB/PNC  - Patient taking PNVs  - Rh pos, infectious disease labs wnl,   - GTT passed 7/20  - GBS pending  - Rubella non-immune, plan for PP MMR  - Delivery: plan for 37w delivery depending on UAD screening    Staffed with Dr. Roxie Munoz.    Christopher Caro, MS4    Resident/Fellow Attestation   I, Antony Mendoza MD, was present with the medical/OJ student who participated in the service and in the documentation of the note.  I have verified the history and personally performed the physical exam and medical decision making.  I agree with the assessment and plan of care as documented in the note.      Antony Mendoza MD, MPH  Ob/Gyn Resident, PGY-3  08/31/23 9:14 PM    Physician Attestation   I saw this patient with the resident and agree with the resident/fellow's findings and plan of care as documented in the note.      Roxie Munoz MD  Date of Service (when I saw the patient): 08/31/23

## 2023-08-31 NOTE — PROGRESS NOTES
"Maternal fetal Medicine OB Follow up visit.     Cande Shields  : 2000  MRN: [id number]    CC: OB Follow-up    Subjective:  Cande Shields is a 23 year old  at 33w2d presenting for routine OB follow-up.     Today, she reports \"lung\" pain with prolonged seated position in an upright position. Upon further clarification, pain is bilateral and involving the upper bilateral quadrant of the abdomen.The is also concurrent shortness of breath. When she stands up and walks, the pain resolves as well as the shortness of breath. This has been going on for the past 4-5 days and has only happened 3 times.   She also reports decreased fetal movement for the past couple of weeks.   She, denies, contractions, vaginal bleeding, vaginal leakage of fluid.   She denies headaches, visual changes and RLQ abdominal pain     Of note, she missed her scheduled Echocardiogram yesterday.     Objective:  /71 (BP Location: Left arm, Patient Position: Semi-Alfaro's, Cuff Size: Adult Large)   Pulse 73   Resp 18   Wt 108.4 kg (239 lb)   LMP 01/10/2023 (Approximate)   SpO2 99%   BMI 41.02 kg/m      Gen: NAD, A&Ox3  CV: Regular rate and rhythm  Pulm: Normal respiratory effort. Clear to auscultation bilaterally.   Abdominal: Gravid, non-tender.  Ext: no edema, non-tender    Assessment/Plan:  23 year old  at 33w2d by LMP c/w 7w2d ultrasound, here for follow OB visit.    Pregnancy has been complicated by:    Pregnancy complicated by:  - Granulomatosis polyangiitis  - History of multiple VTE, including DVT and PE  - Right atrial thrombus s/p sternotomy and thrombectomy on 2023 earlier in pregnancy  - PFO s/p closure on 2023  - History of IUFD at 31 weeks  - History of severe pre-eclampsia  - Chronic kidney disease (baseline creatinine 1.3-1.6)  - Bicornuate uterus  - Fetal growth restriction with elevated umbilical artery dopplers (), but resolved on 8/10    1) Granulomatosis with polyangiitis, chronic " kidney disease  - on azaTHIOprine (IMURAN) 50 MG tablet  - follows with Dr. Goodson (nephrology); last visit 7/25/23  - Last creatinine 1.4 (8/7/23) (baseline 1.3-1.6). UPC at proteinuria levels with most recent UPC 0.43  - Scheduled to see rheumatology 6/1 but appointment cancelled.  She does not feel she can reschedule this at this time and will prioritize cardiology, MFM, nephrology.    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, Mobile thrombus in right atrium s/p sternotomy and thrombectomy in early pregnancy   - seen by Hematology (Hardik) yesterday; recommended temporary discontinuation of therapeutic lovenox yesterday and today due to super-therapeutic levels (anti-Xa level 1.35); Anti-XA level goal 0.6-1.0 per Society for Maternal Fetal Medicine guidelines    - will resume at 60 mg BID subQ  - normal Echo 4/26/2023 with LVEF 55%-60%; missed her Echo yesterday; will plan to schedule Echo as inpatient   - has follow-up Cardiology (Kimmy) originally scheduled for tomorrow, but in light of her admission, will reschedule appointment; Dr. Oneal has been made aware of patient's inpatient status and will review Echo   - Continue metoprolol 12.5 mg BID  - Monthly review of care plan at our cardio-obstetrics meeting  - s/p Anesthesia consult July 2023    3) History of pre-eclampsia, severe  - on aspirin 81 mg every day  - blood pressure parameters (>140/90) and pre-eclampsia symptom precautions reviewed    4) History of IUFD at 31 weeks   Fetal growth restriction (EFW 2%ile) today   - reviewed fetal comprehensive follow-up ultrasound today; preliminary shows EFW at 2nd percentile with elevated umbilical artery dopplers; growth has fallen off the curve  - BPP 8/8 with non-reactive NST  - given history of fetal demise and above fetal ultrasound findings, recommend observation/monitoring at antepartum unit at [facility] for betamethasone course in preparation for possible delivery in  the near future     5) Ultrasound surveillance   - if discharged, recommended continuing with twice weekly biophysical profiles and q3 week growth ultrasound    6) Delivery plan  - Recommend delivery at 37w    RTC 1 week     Patient seen and discussed with Dr. Ara Guerra MD  Maternal Fetal Medicine Fellow    [facility] Attending Attestation  I have seen and evaluated the patient with Dr. Guerra. I reviewed her chart and agree with the above documented assessment and plan. I spent a total of 45 minutes on the date of this encounter including preparing to see the patient (reviewing medical records/tests), counseling and discussing the plan of care, documenting the visit in the electronic medical record, and communicating with other health care professionals and/or care coordination.Please see her note for specific details; I have made the necessary edits/additions.  Given new evidence of FGR with elevated UARs, decreased fetal movement, and prior obstetrical history recommend admission for fetal monitoring and BMZ. Patient amenable to this plan, L&D notified.   The patient was also seen for an ultrasound in the [facility] at the [facility] today.  For a detailed report of the ultrasound examination, please see the ultrasound report which can be found under the imaging tab.  Ara Ivey MD  Maternal Fetal Medicine Physician

## 2023-09-01 ENCOUNTER — ANESTHESIA (OUTPATIENT)
Dept: OBGYN | Facility: CLINIC | Age: 23
End: 2023-09-01
Payer: MEDICAID

## 2023-09-01 ENCOUNTER — APPOINTMENT (OUTPATIENT)
Dept: CARDIOLOGY | Facility: CLINIC | Age: 23
End: 2023-09-01
Payer: MEDICAID

## 2023-09-01 ENCOUNTER — ANESTHESIA EVENT (OUTPATIENT)
Dept: OBGYN | Facility: CLINIC | Age: 23
End: 2023-09-01
Payer: MEDICAID

## 2023-09-01 PROBLEM — Z98.891 S/P PRIMARY LOW TRANSVERSE C-SECTION: Status: ACTIVE | Noted: 2023-09-01

## 2023-09-01 LAB
ALBUMIN SERPL BCG-MCNC: 3.3 G/DL (ref 3.5–5.2)
ALP SERPL-CCNC: 92 U/L (ref 35–104)
ALT SERPL W P-5'-P-CCNC: 20 U/L (ref 0–50)
ANION GAP SERPL CALCULATED.3IONS-SCNC: 10 MMOL/L (ref 7–15)
AST SERPL W P-5'-P-CCNC: 15 U/L (ref 0–45)
BILIRUB SERPL-MCNC: <0.2 MG/DL
BUN SERPL-MCNC: 21.8 MG/DL (ref 6–20)
CALCIUM SERPL-MCNC: 9.4 MG/DL (ref 8.6–10)
CHLORIDE SERPL-SCNC: 105 MMOL/L (ref 98–107)
CREAT SERPL-MCNC: 1.4 MG/DL (ref 0.51–0.95)
DEPRECATED HCO3 PLAS-SCNC: 22 MMOL/L (ref 22–29)
ERYTHROCYTE [DISTWIDTH] IN BLOOD BY AUTOMATED COUNT: 25.1 % (ref 10–15)
GFR SERPL CREATININE-BSD FRML MDRD: 53 ML/MIN/1.73M2
GLUCOSE SERPL-MCNC: 95 MG/DL (ref 70–99)
GP B STREP DNA SPEC QL NAA+PROBE: NEGATIVE
HCT VFR BLD AUTO: 29.1 % (ref 35–47)
HGB BLD-MCNC: 8.9 G/DL (ref 11.7–15.7)
LVEF ECHO: NORMAL
MCH RBC QN AUTO: 27.6 PG (ref 26.5–33)
MCHC RBC AUTO-ENTMCNC: 30.6 G/DL (ref 31.5–36.5)
MCV RBC AUTO: 90 FL (ref 78–100)
PLATELET # BLD AUTO: 280 10E3/UL (ref 150–450)
POTASSIUM SERPL-SCNC: 5.6 MMOL/L (ref 3.4–5.3)
PROT SERPL-MCNC: 5.9 G/DL (ref 6.4–8.3)
RBC # BLD AUTO: 3.23 10E6/UL (ref 3.8–5.2)
SODIUM SERPL-SCNC: 137 MMOL/L (ref 136–145)
WBC # BLD AUTO: 13.1 10E3/UL (ref 4–11)

## 2023-09-01 PROCEDURE — G0378 HOSPITAL OBSERVATION PER HR: HCPCS

## 2023-09-01 PROCEDURE — 36415 COLL VENOUS BLD VENIPUNCTURE: CPT | Performed by: STUDENT IN AN ORGANIZED HEALTH CARE EDUCATION/TRAINING PROGRAM

## 2023-09-01 PROCEDURE — 258N000003 HC RX IP 258 OP 636: Performed by: STUDENT IN AN ORGANIZED HEALTH CARE EDUCATION/TRAINING PROGRAM

## 2023-09-01 PROCEDURE — 272N000001 HC OR GENERAL SUPPLY STERILE: Performed by: STUDENT IN AN ORGANIZED HEALTH CARE EDUCATION/TRAINING PROGRAM

## 2023-09-01 PROCEDURE — 120N000002 HC R&B MED SURG/OB UMMC

## 2023-09-01 PROCEDURE — 80053 COMPREHEN METABOLIC PANEL: CPT | Performed by: STUDENT IN AN ORGANIZED HEALTH CARE EDUCATION/TRAINING PROGRAM

## 2023-09-01 PROCEDURE — 96374 THER/PROPH/DIAG INJ IV PUSH: CPT

## 2023-09-01 PROCEDURE — 96375 TX/PRO/DX INJ NEW DRUG ADDON: CPT

## 2023-09-01 PROCEDURE — 250N000013 HC RX MED GY IP 250 OP 250 PS 637

## 2023-09-01 PROCEDURE — 88307 TISSUE EXAM BY PATHOLOGIST: CPT | Mod: TC

## 2023-09-01 PROCEDURE — 85027 COMPLETE CBC AUTOMATED: CPT | Performed by: STUDENT IN AN ORGANIZED HEALTH CARE EDUCATION/TRAINING PROGRAM

## 2023-09-01 PROCEDURE — 710N000010 HC RECOVERY PHASE 1, LEVEL 2, PER MIN: Performed by: STUDENT IN AN ORGANIZED HEALTH CARE EDUCATION/TRAINING PROGRAM

## 2023-09-01 PROCEDURE — 250N000012 HC RX MED GY IP 250 OP 636 PS 637

## 2023-09-01 PROCEDURE — 250N000011 HC RX IP 250 OP 636: Mod: JZ | Performed by: STUDENT IN AN ORGANIZED HEALTH CARE EDUCATION/TRAINING PROGRAM

## 2023-09-01 PROCEDURE — 250N000011 HC RX IP 250 OP 636: Mod: JZ | Performed by: OBSTETRICS & GYNECOLOGY

## 2023-09-01 PROCEDURE — 93306 TTE W/DOPPLER COMPLETE: CPT

## 2023-09-01 PROCEDURE — 250N000009 HC RX 250: Performed by: STUDENT IN AN ORGANIZED HEALTH CARE EDUCATION/TRAINING PROGRAM

## 2023-09-01 PROCEDURE — 360N000076 HC SURGERY LEVEL 3, PER MIN: Performed by: STUDENT IN AN ORGANIZED HEALTH CARE EDUCATION/TRAINING PROGRAM

## 2023-09-01 PROCEDURE — 370N000017 HC ANESTHESIA TECHNICAL FEE, PER MIN: Performed by: STUDENT IN AN ORGANIZED HEALTH CARE EDUCATION/TRAINING PROGRAM

## 2023-09-01 PROCEDURE — 59514 CESAREAN DELIVERY ONLY: CPT | Mod: GC | Performed by: STUDENT IN AN ORGANIZED HEALTH CARE EDUCATION/TRAINING PROGRAM

## 2023-09-01 PROCEDURE — 250N000011 HC RX IP 250 OP 636: Mod: JZ

## 2023-09-01 PROCEDURE — 271N000001 HC OR GENERAL SUPPLY NON-STERILE: Performed by: STUDENT IN AN ORGANIZED HEALTH CARE EDUCATION/TRAINING PROGRAM

## 2023-09-01 PROCEDURE — 93306 TTE W/DOPPLER COMPLETE: CPT | Mod: 26 | Performed by: INTERNAL MEDICINE

## 2023-09-01 RX ORDER — OXYTOCIN 10 [USP'U]/ML
10 INJECTION, SOLUTION INTRAMUSCULAR; INTRAVENOUS
Status: DISCONTINUED | OUTPATIENT
Start: 2023-09-01 | End: 2023-09-03 | Stop reason: HOSPADM

## 2023-09-01 RX ORDER — METOCLOPRAMIDE 10 MG/1
10 TABLET ORAL EVERY 6 HOURS PRN
Status: DISCONTINUED | OUTPATIENT
Start: 2023-09-01 | End: 2023-09-03 | Stop reason: HOSPADM

## 2023-09-01 RX ORDER — NALOXONE HYDROCHLORIDE 1 MG/ML
0.4 INJECTION INTRAMUSCULAR; INTRAVENOUS; SUBCUTANEOUS
Status: DISCONTINUED | OUTPATIENT
Start: 2023-09-01 | End: 2023-09-03 | Stop reason: HOSPADM

## 2023-09-01 RX ORDER — AMOXICILLIN 250 MG
1 CAPSULE ORAL 2 TIMES DAILY
Status: DISCONTINUED | OUTPATIENT
Start: 2023-09-01 | End: 2023-09-03 | Stop reason: HOSPADM

## 2023-09-01 RX ORDER — DIPHENHYDRAMINE HCL 25 MG
25 CAPSULE ORAL EVERY 6 HOURS PRN
Status: DISCONTINUED | OUTPATIENT
Start: 2023-09-01 | End: 2023-09-03 | Stop reason: HOSPADM

## 2023-09-01 RX ORDER — ACETAMINOPHEN 325 MG/1
975 TABLET ORAL EVERY 6 HOURS
Status: DISCONTINUED | OUTPATIENT
Start: 2023-09-01 | End: 2023-09-03 | Stop reason: HOSPADM

## 2023-09-01 RX ORDER — CARBOPROST TROMETHAMINE 250 UG/ML
250 INJECTION, SOLUTION INTRAMUSCULAR
Status: DISCONTINUED | OUTPATIENT
Start: 2023-09-01 | End: 2023-09-03 | Stop reason: HOSPADM

## 2023-09-01 RX ORDER — CEFAZOLIN SODIUM/WATER 2 G/20 ML
SYRINGE (ML) INTRAVENOUS
Status: COMPLETED
Start: 2023-09-01 | End: 2023-09-01

## 2023-09-01 RX ORDER — DIPHENHYDRAMINE HYDROCHLORIDE 50 MG/ML
25 INJECTION INTRAMUSCULAR; INTRAVENOUS EVERY 6 HOURS PRN
Status: DISCONTINUED | OUTPATIENT
Start: 2023-09-01 | End: 2023-09-03 | Stop reason: HOSPADM

## 2023-09-01 RX ORDER — PROCHLORPERAZINE MALEATE 10 MG
10 TABLET ORAL EVERY 6 HOURS PRN
Status: DISCONTINUED | OUTPATIENT
Start: 2023-09-01 | End: 2023-09-03 | Stop reason: HOSPADM

## 2023-09-01 RX ORDER — ONDANSETRON 4 MG/1
4 TABLET, ORALLY DISINTEGRATING ORAL EVERY 30 MIN PRN
Status: DISCONTINUED | OUTPATIENT
Start: 2023-09-01 | End: 2023-09-01 | Stop reason: HOSPADM

## 2023-09-01 RX ORDER — METHYLERGONOVINE MALEATE 0.2 MG/ML
200 INJECTION INTRAVENOUS
Status: DISCONTINUED | OUTPATIENT
Start: 2023-09-01 | End: 2023-09-03 | Stop reason: HOSPADM

## 2023-09-01 RX ORDER — MISOPROSTOL 200 UG/1
400 TABLET ORAL
Status: DISCONTINUED | OUTPATIENT
Start: 2023-09-01 | End: 2023-09-03 | Stop reason: HOSPADM

## 2023-09-01 RX ORDER — MORPHINE SULFATE 1 MG/ML
INJECTION, SOLUTION EPIDURAL; INTRATHECAL; INTRAVENOUS
Status: COMPLETED | OUTPATIENT
Start: 2023-09-01 | End: 2023-09-01

## 2023-09-01 RX ORDER — OXYCODONE HYDROCHLORIDE 5 MG/1
5 TABLET ORAL EVERY 4 HOURS PRN
Status: DISCONTINUED | OUTPATIENT
Start: 2023-09-01 | End: 2023-09-03 | Stop reason: HOSPADM

## 2023-09-01 RX ORDER — DEXTROSE, SODIUM CHLORIDE, SODIUM LACTATE, POTASSIUM CHLORIDE, AND CALCIUM CHLORIDE 5; .6; .31; .03; .02 G/100ML; G/100ML; G/100ML; G/100ML; G/100ML
INJECTION, SOLUTION INTRAVENOUS CONTINUOUS
Status: DISCONTINUED | OUTPATIENT
Start: 2023-09-01 | End: 2023-09-03 | Stop reason: HOSPADM

## 2023-09-01 RX ORDER — ENOXAPARIN SODIUM 100 MG/ML
60 INJECTION SUBCUTANEOUS EVERY 12 HOURS
Status: DISCONTINUED | OUTPATIENT
Start: 2023-09-02 | End: 2023-09-03 | Stop reason: HOSPADM

## 2023-09-01 RX ORDER — PRENATAL VIT/IRON FUM/FOLIC AC 27MG-0.8MG
1 TABLET ORAL DAILY
Status: DISCONTINUED | OUTPATIENT
Start: 2023-09-01 | End: 2023-09-03 | Stop reason: HOSPADM

## 2023-09-01 RX ORDER — TRANEXAMIC ACID 10 MG/ML
1 INJECTION, SOLUTION INTRAVENOUS EVERY 30 MIN PRN
Status: DISCONTINUED | OUTPATIENT
Start: 2023-09-01 | End: 2023-09-03 | Stop reason: HOSPADM

## 2023-09-01 RX ORDER — FENTANYL CITRATE 50 UG/ML
INJECTION, SOLUTION INTRAMUSCULAR; INTRAVENOUS
Status: COMPLETED | OUTPATIENT
Start: 2023-09-01 | End: 2023-09-01

## 2023-09-01 RX ORDER — SIMETHICONE 80 MG
80 TABLET,CHEWABLE ORAL 4 TIMES DAILY PRN
Status: DISCONTINUED | OUTPATIENT
Start: 2023-09-01 | End: 2023-09-03 | Stop reason: HOSPADM

## 2023-09-01 RX ORDER — MISOPROSTOL 200 UG/1
800 TABLET ORAL
Status: DISCONTINUED | OUTPATIENT
Start: 2023-09-01 | End: 2023-09-03 | Stop reason: HOSPADM

## 2023-09-01 RX ORDER — SODIUM CHLORIDE, SODIUM LACTATE, POTASSIUM CHLORIDE, CALCIUM CHLORIDE 600; 310; 30; 20 MG/100ML; MG/100ML; MG/100ML; MG/100ML
INJECTION, SOLUTION INTRAVENOUS CONTINUOUS
Status: DISCONTINUED | OUTPATIENT
Start: 2023-09-01 | End: 2023-09-01 | Stop reason: HOSPADM

## 2023-09-01 RX ORDER — ONDANSETRON 2 MG/ML
4 INJECTION INTRAMUSCULAR; INTRAVENOUS EVERY 30 MIN PRN
Status: DISCONTINUED | OUTPATIENT
Start: 2023-09-01 | End: 2023-09-01 | Stop reason: HOSPADM

## 2023-09-01 RX ORDER — CEFAZOLIN SODIUM/WATER 2 G/20 ML
SYRINGE (ML) INTRAVENOUS PRN
Status: DISCONTINUED | OUTPATIENT
Start: 2023-09-01 | End: 2023-09-01

## 2023-09-01 RX ORDER — METOCLOPRAMIDE HYDROCHLORIDE 5 MG/ML
10 INJECTION INTRAMUSCULAR; INTRAVENOUS EVERY 6 HOURS PRN
Status: DISCONTINUED | OUTPATIENT
Start: 2023-09-01 | End: 2023-09-03 | Stop reason: HOSPADM

## 2023-09-01 RX ORDER — PROCHLORPERAZINE 25 MG
25 SUPPOSITORY, RECTAL RECTAL EVERY 12 HOURS PRN
Status: DISCONTINUED | OUTPATIENT
Start: 2023-09-01 | End: 2023-09-03 | Stop reason: HOSPADM

## 2023-09-01 RX ORDER — OXYTOCIN/0.9 % SODIUM CHLORIDE 30/500 ML
340 PLASTIC BAG, INJECTION (ML) INTRAVENOUS CONTINUOUS PRN
Status: DISCONTINUED | OUTPATIENT
Start: 2023-09-01 | End: 2023-09-03 | Stop reason: HOSPADM

## 2023-09-01 RX ORDER — OXYTOCIN/0.9 % SODIUM CHLORIDE 30/500 ML
PLASTIC BAG, INJECTION (ML) INTRAVENOUS PRN
Status: DISCONTINUED | OUTPATIENT
Start: 2023-09-01 | End: 2023-09-01

## 2023-09-01 RX ORDER — BUPIVACAINE HYDROCHLORIDE 7.5 MG/ML
INJECTION, SOLUTION INTRASPINAL
Status: COMPLETED | OUTPATIENT
Start: 2023-09-01 | End: 2023-09-01

## 2023-09-01 RX ORDER — AMOXICILLIN 250 MG
2 CAPSULE ORAL 2 TIMES DAILY
Status: DISCONTINUED | OUTPATIENT
Start: 2023-09-01 | End: 2023-09-03 | Stop reason: HOSPADM

## 2023-09-01 RX ORDER — HYDROCORTISONE 25 MG/G
CREAM TOPICAL 3 TIMES DAILY PRN
Status: DISCONTINUED | OUTPATIENT
Start: 2023-09-01 | End: 2023-09-03 | Stop reason: HOSPADM

## 2023-09-01 RX ORDER — BISACODYL 10 MG
10 SUPPOSITORY, RECTAL RECTAL DAILY PRN
Status: DISCONTINUED | OUTPATIENT
Start: 2023-09-03 | End: 2023-09-03 | Stop reason: HOSPADM

## 2023-09-01 RX ORDER — LIDOCAINE 40 MG/G
CREAM TOPICAL
Status: DISCONTINUED | OUTPATIENT
Start: 2023-09-01 | End: 2023-09-03 | Stop reason: HOSPADM

## 2023-09-01 RX ORDER — NALOXONE HYDROCHLORIDE 1 MG/ML
0.2 INJECTION INTRAMUSCULAR; INTRAVENOUS; SUBCUTANEOUS
Status: DISCONTINUED | OUTPATIENT
Start: 2023-09-01 | End: 2023-09-03 | Stop reason: HOSPADM

## 2023-09-01 RX ORDER — KETOROLAC TROMETHAMINE 30 MG/ML
INJECTION, SOLUTION INTRAMUSCULAR; INTRAVENOUS PRN
Status: DISCONTINUED | OUTPATIENT
Start: 2023-09-01 | End: 2023-09-01

## 2023-09-01 RX ORDER — ONDANSETRON 4 MG/1
4 TABLET, ORALLY DISINTEGRATING ORAL EVERY 6 HOURS PRN
Status: DISCONTINUED | OUTPATIENT
Start: 2023-09-01 | End: 2023-09-03 | Stop reason: HOSPADM

## 2023-09-01 RX ORDER — MODIFIED LANOLIN
OINTMENT (GRAM) TOPICAL
Status: DISCONTINUED | OUTPATIENT
Start: 2023-09-01 | End: 2023-09-03 | Stop reason: HOSPADM

## 2023-09-01 RX ORDER — SODIUM CHLORIDE, SODIUM LACTATE, POTASSIUM CHLORIDE, CALCIUM CHLORIDE 600; 310; 30; 20 MG/100ML; MG/100ML; MG/100ML; MG/100ML
INJECTION, SOLUTION INTRAVENOUS CONTINUOUS PRN
Status: DISCONTINUED | OUTPATIENT
Start: 2023-09-01 | End: 2023-09-01

## 2023-09-01 RX ORDER — ONDANSETRON 2 MG/ML
4 INJECTION INTRAMUSCULAR; INTRAVENOUS EVERY 6 HOURS PRN
Status: DISCONTINUED | OUTPATIENT
Start: 2023-09-01 | End: 2023-09-03 | Stop reason: HOSPADM

## 2023-09-01 RX ADMIN — PHENYLEPHRINE HYDROCHLORIDE 50 MCG/MIN: 10 INJECTION INTRAVENOUS at 07:17

## 2023-09-01 RX ADMIN — ACETAMINOPHEN 975 MG: 325 TABLET, FILM COATED ORAL at 19:58

## 2023-09-01 RX ADMIN — AZATHIOPRINE 50 MG: 50 TABLET ORAL at 19:59

## 2023-09-01 RX ADMIN — Medication 12.5 MG: at 13:20

## 2023-09-01 RX ADMIN — SODIUM CHLORIDE, POTASSIUM CHLORIDE, SODIUM LACTATE AND CALCIUM CHLORIDE: 600; 310; 30; 20 INJECTION, SOLUTION INTRAVENOUS at 07:04

## 2023-09-01 RX ADMIN — Medication 300 ML/HR: at 07:19

## 2023-09-01 RX ADMIN — SENNOSIDES AND DOCUSATE SODIUM 2 TABLET: 50; 8.6 TABLET ORAL at 19:58

## 2023-09-01 RX ADMIN — MORPHINE SULFATE 0.15 MG: 1 INJECTION EPIDURAL; INTRATHECAL; INTRAVENOUS at 07:15

## 2023-09-01 RX ADMIN — PHENYLEPHRINE HYDROCHLORIDE 100 MCG: 10 INJECTION INTRAVENOUS at 07:21

## 2023-09-01 RX ADMIN — AZATHIOPRINE 50 MG: 50 TABLET ORAL at 10:23

## 2023-09-01 RX ADMIN — ACETAMINOPHEN 975 MG: 325 TABLET, FILM COATED ORAL at 13:20

## 2023-09-01 RX ADMIN — PHENYLEPHRINE HYDROCHLORIDE 200 MCG: 10 INJECTION INTRAVENOUS at 07:18

## 2023-09-01 RX ADMIN — ONDANSETRON 4 MG: 2 INJECTION INTRAMUSCULAR; INTRAVENOUS at 07:57

## 2023-09-01 RX ADMIN — OXYCODONE HYDROCHLORIDE 5 MG: 5 TABLET ORAL at 16:09

## 2023-09-01 RX ADMIN — Medication 12.5 MG: at 19:59

## 2023-09-01 RX ADMIN — KETOROLAC TROMETHAMINE 30 MG: 30 INJECTION, SOLUTION INTRAMUSCULAR at 08:08

## 2023-09-01 RX ADMIN — OXYCODONE HYDROCHLORIDE 5 MG: 5 TABLET ORAL at 22:50

## 2023-09-01 RX ADMIN — BUPIVACAINE HYDROCHLORIDE IN DEXTROSE 1.8 ML: 7.5 INJECTION, SOLUTION SUBARACHNOID at 07:15

## 2023-09-01 RX ADMIN — Medication 2 G: at 07:10

## 2023-09-01 RX ADMIN — FAMOTIDINE 20 MG: 20 TABLET ORAL at 19:59

## 2023-09-01 RX ADMIN — FENTANYL CITRATE 15 MCG: 50 INJECTION, SOLUTION INTRAMUSCULAR; INTRAVENOUS at 07:15

## 2023-09-01 RX ADMIN — PHENYLEPHRINE HYDROCHLORIDE 100 MCG: 10 INJECTION INTRAVENOUS at 07:15

## 2023-09-01 RX ADMIN — DIPHENHYDRAMINE HYDROCHLORIDE 25 MG: 50 INJECTION, SOLUTION INTRAMUSCULAR; INTRAVENOUS at 10:22

## 2023-09-01 ASSESSMENT — ACTIVITIES OF DAILY LIVING (ADL)
ADLS_ACUITY_SCORE: 24
ADLS_ACUITY_SCORE: 20
ADLS_ACUITY_SCORE: 24
ADLS_ACUITY_SCORE: 20
ADLS_ACUITY_SCORE: 24
ADLS_ACUITY_SCORE: 24
ADLS_ACUITY_SCORE: 20

## 2023-09-01 NOTE — PLAN OF CARE
Data: Cande Shields transferred to [id number] via zoom cart at 1035. Cande was able to visit baby in the NICU and receive an update from [facility] Cira Mac.   Action: Receiving unit notified of transfer: Yes. Patient and family notified of room change. Report given to Ariana at 1000. Belongings sent to receiving unit. Accompanied by Registered Nurse. Oriented patient to surroundings. Call light within reach.   Response: Patient tolerated transfer and is stable.

## 2023-09-01 NOTE — PLAN OF CARE
VSS and postpartum assessments WDL.  Up ad drake with steady gait and independent with cares.  Guillen removed at 1730, due to void at 2130. Bonding well with infant.  Not planning to breastfeed infant.  Pain managed with tylenol and oxycodone.  Partner present and supportive.  Will continue with postpartum cares and education per plan of care.

## 2023-09-01 NOTE — BRIEF OP NOTE
[facility]   Brief Operative Note     Surgery Date: 2023    Surgeon:  Wendy Vera MD    Assistants:  Antony Mendoza MD, PGY-3    Pre-op Diagnosis:  1. Intrauterine pregnancy at 33w3d     2. Severe FGR  3. Branulomatosis w/ Polyangiitis  4. CKD II  5. Right Atrial Thrombus s/p Sternotomy and thrombectomy  6. PFO s/p closure     Post-op Diagnosis:  1. Same      2. Liveborn male infant     Procedure:  Primary low-transverse  section with two layer uterine closure via Pfannenstiel incision    Anesthesia: Spinal    EBL:  830 mL    IVF:  500 mL crystalloid    UOP:  100 mL clear urine at the end of the case    Drains: Guillen Catheter     Specimens:  * No specimens in log *    Complications: None apparent    Findings:   Clear amniotic fluid  Liveborn male infant in Cephalic presentation. Apgars 7 at 1 minute & 8 at 5 minutes. Weight 1.53 kg.  Bicornuate uterus, no septum, Normal fallopian tubes, and ovaries.   Cord Gases Below:     Latest Reference Range & Units 23 07:23   Ph Cord Arterial 7.16 - 7.39  7.27   PCO2 Cord Arterial 35 - 71 mm Hg 51   PO2 Cord Arterial 3 - 33 mm Hg 11   Bicarbonate Cord Arterial 16 - 24 mmol/L 23   Base Excess/Deficit -9.6 - 2.0 mmol/L -4.2   Ph Cord Blood Venous 7.21 - 7.45  7.34   PCO2 Cord Venous 27 - 57 mm Hg 41   PO2 Cord Venous 21 - 37 mm Hg 21   Bicarbonate Cord Venous 16 - 24 mmol/L 22   Base Excess/Deficit Cord Venous -8.1 - 1.9 mmol/L -3.8         Disposition:  Transferred in stable condition to PACU      Antony Mendoza MD  OB/GYN Resident, PGY-3  2023 8:24 AM

## 2023-09-01 NOTE — ANESTHESIA PREPROCEDURE EVALUATION
Anesthesia Pre-Procedure Evaluation    Patient: Cande Shields   MRN: [id number] : 2000        Procedure : Procedure(s):   section          Past Medical History:   Diagnosis Date    ADHD (attention deficit hyperactivity disorder)     DVT, lower extremity, distal (H)     Dysmenorrhea     Femoral artery thrombosis, left (H) 2021    Multiple subsegmental pulmonary emboli without acute cor pulmonale (H) 2021    PFO (patent foramen ovale)     Preeclampsia, third trimester     Spontaneous pregnancy loss     31 weeks    Stage 3b chronic kidney disease (H)     Wegener's disease, pulmonary 2010    renal biopsy      Past Surgical History:   Procedure Laterality Date    ENT SURGERY  2000    cyst    EXCISE GANGLION WRIST  2009    IR LOWER EXTREMITY ANGIOGRAM LEFT  3/21/2021    IR THROMBOLYSIS ART/VENOUS INFUSION SUBSQ DAY  3/21/2021    IR THROMBOLYSIS ART/VENOUS INFUSION SUBSQ DAY  3/21/2021    STERNOTOMY  2023    right atrial thrombectomy    THROMBECTOMY ATRIUM Right 2023    THROMBECTOMY PERCUTANEOUS N/A 2023    Procedure: Emergency Sternotomy, Right Atrial Thrombectomy, Central Cannulation, Bilateral Femoral Cannulation, Closure of PFO ; Transesophageal Echocardiogram performed by anesthesia staff;  Surgeon: Adelfo Elmore MD;  Location: [facility] OR    THROMBECTOMY PERCUTANEOUS N/A 2023    Procedure: Angiovac Mediated for Right Atrial Clot; Intracatheter Thrombectomy via bilateral percutaneous femoral venous access with 26 FR Right Femoral vein and 17 FR Left Femoral Vein cannulas. Transesophageal echchocardiogram performed by anesthesia staff;  Surgeon: Po Monterroso MD;  Location: [facility] OR      Allergies   Allergen Reactions    Penicillins Rash     Unknown, but think rash.  Tolerated cephalexin (20), cefpodoxime (20), Ceftriaxone (2023), Zosyn (2023)      Social History     Tobacco Use    Smoking status: Former     Packs/day: 0.25      Types: Cigarettes    Smokeless tobacco: Former    Tobacco comments:     Vaping stopped 2/26   Substance Use Topics    Alcohol use: Not Currently      Wt Readings from Last 1 Encounters:   08/31/23 108.4 kg (239 lb)        Anesthesia Evaluation   Pt has had prior anesthetic. Type: General.    No history of anesthetic complications       ROS/MED HX  ENT/Pulmonary:  - neg pulmonary ROS     Neurologic:  - neg neurologic ROS     Cardiovascular:     (+)  - -   -  - -   Taking blood thinners Pt has received instructions:                                  METS/Exercise Tolerance:     Hematologic:     (+) History of blood clots,               Musculoskeletal:       GI/Hepatic:       Renal/Genitourinary:     (+) renal disease, type: CRI,            Endo:     (+)               Obesity,       Psychiatric/Substance Use:  - neg psychiatric ROS     Infectious Disease:  - neg infectious disease ROS     Malignancy:  - neg malignancy ROS     Other:      (+) Possibly pregnant, , ,         Physical Exam    Airway        Mallampati: III   TM distance: > 3 FB   Neck ROM: full   Mouth opening: > 3 cm    Respiratory Devices and Support         Dental       (+) Minor Abnormalities - some fillings, tiny chips      Cardiovascular    unable to assess         Pulmonary    Unable to assess               OUTSIDE LABS:  CBC:   Lab Results   Component Value Date    WBC 6.6 08/31/2023    WBC 7.2 08/29/2023    HGB 8.8 (L) 08/31/2023    HGB 8.8 (L) 08/29/2023    HCT 28.6 (L) 08/31/2023    HCT 28.4 (L) 08/29/2023     08/31/2023     08/29/2023     BMP:   Lab Results   Component Value Date     08/31/2023     (L) 08/07/2023    POTASSIUM 4.4 08/31/2023    POTASSIUM 4.8 08/07/2023    CHLORIDE 106 08/31/2023    CHLORIDE 104 08/07/2023    CO2 20 (L) 08/31/2023    CO2 20 (L) 08/07/2023    BUN 23.3 (H) 08/31/2023    BUN 24.3 (H) 08/07/2023    CR 1.35 (H) 08/31/2023    CR 1.49 (H) 08/07/2023     (H) 08/31/2023     (H)  08/07/2023     COAGS:   Lab Results   Component Value Date    PTT 33 08/07/2023    INR 1.11 08/07/2023    FIBR 631 (H) 08/07/2023     POC:   Lab Results   Component Value Date    BGM 84 02/01/2021    HCG Negative 03/04/2021    HCGS Negative 12/20/2022     HEPATIC:   Lab Results   Component Value Date    ALBUMIN 3.1 (L) 08/31/2023    PROTTOTAL 5.7 (L) 08/31/2023    ALT 27 08/31/2023    AST 15 08/31/2023    ALKPHOS 93 08/31/2023    BILITOTAL <0.2 08/31/2023     OTHER:   Lab Results   Component Value Date    PH 7.39 02/19/2023    LACT 0.6 (L) 02/21/2023    KRAIG 9.2 08/31/2023    PHOS 3.3 07/20/2023    MAG 2.2 06/30/2023    LIPASE 248 02/21/2021    TSH 2.57 06/10/2021    CRP 7.6 02/08/2023    SED 52 (H) 06/30/2023       Anesthesia Plan    ASA Status:  3, emergent    NPO Status:  ELEVATED Aspiration Risk/Unknown    Anesthesia Type: Spinal.   Induction: N/a.   Maintenance: N/A.        Consents            Postoperative Care    Pain management: intrathecal morphine, Neuraxial analgesia, Multi-modal analgesia, Peripheral nerve block (Single Shot).   PONV prophylaxis: Ondansetron (or other 5HT-3)     Comments:           H&P reviewed: Unable to attach VIRTUAL H&P to encounter due to EHR limitations. Appropriate H&P reviewed. The physical exam performed by anesthesia during this surgical encounter serves as the physical portion of that virtual H&P.  Any significant changes noted within this preop evaluation.          Naa Palomo MD

## 2023-09-01 NOTE — ANESTHESIA PROCEDURE NOTES
"Intrathecal injection Procedure Note    Pre-Procedure   Staff -        Anesthesiologist:  Naa Palomo MD       Resident/Fellow: Jasper Lora MD       Performed By: resident       Location: OR       Procedure Start/Stop Times: 9/1/2023 7:15 AM       Pre-Anesthestic Checklist: patient identified, IV checked, risks and benefits discussed, informed consent, monitors and equipment checked, pre-op evaluation, at physician/surgeon's request and post-op pain management  Timeout:       Correct Patient: Yes        Correct Procedure: Yes        Correct Site: Yes        Correct Position: Yes   Procedure Documentation  Procedure: intrathecal injection       Patient Position: sitting       Skin prep: Chloraprep       Insertion Site: L3-4. (midline approach).       Needle Gauge: 25.        Needle Length (Inches): 3.5        Spinal Needle Type: Pencan       Introducer used       Introducer: 20 G       # of attempts: 1 and  # of redirects:  0    Assessment/Narrative         Paresthesias: No.       Sensory Level: T5       CSF fluid: clear.       Opening pressure was cmH2O while  Sitting.      Medication(s) Administered   0.75% Hyperbaric Bupivacaine (Intrathecal) - Intrathecal   1.8 mL - 9/1/2023 7:15:00 AM  Fentanyl PF (Intrathecal) - Intrathecal   15 mcg - 9/1/2023 7:15:00 AM  Morphine PF 1 mg/mL (Intrathecal) - Intrathecal   0.15 mg - 9/1/2023 7:15:00 AM  Medication Administration Time: 9/1/2023 7:15 AM      FOR [facility] ([facility] ONLY:   Pain Team Contact information: please page the Pain Team Via [facility]. Search \"Pain\". During daytime hours, please page the attending first. At night please page the resident first.      "

## 2023-09-01 NOTE — CARE PLAN
Patient arrived to [facility] unit via zoomcart at 1120 AM, with belongings. Accompanied by spouse, Rogelio and mother Amy. Received report from Yaz HENNESSY. Double fundal check completed. Oriented patient to room and unit. Call light given and no concerns present at this time.

## 2023-09-01 NOTE — OP NOTE
[facility]  Operative Note     Surgery Date:  2023  Surgeon:  Wendy Vera MD   Assistant:  Antony Mendoza MD, PGY-3    Pre-op Diagnosis:  1.  at 33w3d     2. Severe fetal growth restriction  3. Wegener's granulomatosis    4. CKD   5. History of right atrial thrombus s/p sternotomy and thrombectomy    6. Category II FHT    7. Bicornuate uterus   8. Chronic anemia     Post-op Diagnosis:  1.      2. Liveborn male infant      3. Same as above    Procedure:  Primary low-transverse  section with double layer uterine closure via Pfannenstiel incision    Anesthesia: Spinal  QBL:  830 mL  IVF:  500 mL crystalloid  UOP:  100 mL clear urine at the end of the case  Drains: Guillen Catheter   Specimens:  Placenta, cord blood  Complications: None     Indications:   Cande Shields is a 23 year old  at 33w3d admitted to the antepartum service on  due to severe fetal growth restriction and elevated umbilical artery dopplers. Monitoring on the AM of  was notable for recurrent variable decelerations. A STAT CS was called. The risks, benefits, and alternatives of  section were previously discussed with the patient, and she agreed to proceed. Upon arrival to the OR FHR was noted to be in the 110s and she was consented for a CS under spinal.     Findings:   Clear amniotic fluid  Liveborn male infant in Cephalic presentation. Apgars 7 at 1 minute & 8 at 5 minutes. Weight 1.53 kg.  Bicornuate uterus with fetal body in right horn, no palpable septum. Normal fallopian tubes, and ovaries.   Cord Gases Below:       Latest Reference Range & Units 23 07:23   Ph Cord Arterial 7.16 - 7.39  7.27   PCO2 Cord Arterial 35 - 71 mm Hg 51   PO2 Cord Arterial 3 - 33 mm Hg 11   Bicarbonate Cord Arterial 16 - 24 mmol/L 23   Base Excess/Deficit -9.6 - 2.0 mmol/L -4.2   Ph Cord Blood Venous 7.21 - 7.45  7.34   PCO2 Cord Venous 27 - 57 mm Hg 41   PO2 Cord Venous 21 - 37 mm Hg 21   Bicarbonate  Cord Venous 16 - 24 mmol/L 22   Base Excess/Deficit Cord Venous -8.1 - 1.9 mmol/L -3.8       Procedure Details:   The patient was brought to the OR, where FHR was auscultated in the 110s. Adequate spinal anesthesia was administered.  She was placed in the dorsal supine position with a slight leftward tilt. She was prepped and draped with betadine following spinal due to FHR <100 by ultrasound after placement of spinal. A surgical time out was performed. A pfannenstiel skin incision was made with the scalpel, and carried down to the underlying fascia with sharp and blunt dissection. The fascia was incised in the midline, and the incision was extended laterally and superiorly in a blunt fashion. The rectus muscles were  in the midline, and the peritoneum was entered bluntly, and the opening was extended with digital pressure. The bladder blade was placed. A transverse hysterotomy was made with the scalpel in the lower uterine segment, and the incision was extended with digital pressure. The infant was noted to be in the transverse position, and was delivered atraumatically from a cephalic presentation. The cord was doubly clamped and cut after >60 seconds of pulsation, and the infant was handed off to the awaiting NICU staff. A segment of cord was cut and held for gasses. The placenta was delivered with gentle traction on the umbilical cord and uterine massage. The uterus was cleared of all clots and debris. No uterine septum was palpated but a significant bicornuate uterus present. Uterine tone was noted to be adequate with pitocin given through the running IV and uterine massage.  The hysterotomy was closed with a running locked suture of 0 Vicryl.  The hysterotomy was then imbricated using an 0 Monocryl suture. The hysterotomy was noted to be hemostatic. The pericolic gutters were cleared of all clots and debris. The fascia and rectus muscles were examined and areas of oozing were controlled with  electrocautery. The fascia was closed with a running 0 Vicryl suture. The subcutaneous tissue was irrigated and areas of oozing were controlled with electrocautery. The subcutaneous tissue was reapproximated with 0 plain gut. The skin was closed with 4-0 monocryl and covered with a sterile dressing.    All sponge, needle, and instrument counts were correct. The patient tolerated the procedure well, and was transferred to recovery in stable condition.     Wendy Vera MD  9/1/2023

## 2023-09-01 NOTE — CARE PLAN
Transitioned to monitoring QID. Cande Shields is comfortable and denies any leaking of fluid, bleeding, contractions, or tenderness. First dose of beta given at 1700 8-31. SCD's in place when not ambulating.    End of shift report given to RN. All questions answered and care relinquished at this time.

## 2023-09-01 NOTE — PHARMACY-ADMISSION MEDICATION HISTORY
Admission Medication History    Admission medication history is complete. The information provided in this note is only as accurate as the sources available at the time of the update.    Information Source(s):   Patient  Dispense Report    Pertinent Information:   Patient-reported medications are consistent with dispense history.    Changes made to PTA medication list:  Added: None  Deleted:   Patient no longer taking, per patient:  Acetaminophen 325 mg tablet  Doxylamine 25 mg tablet  Changed:   Patient taking differently, per patient and dispense report:  Enoxaparin: 60 mg subQ every 12 hours --> 80 mg subQ every 12 hours    Allergies reviewed with patient and updates made in EHR: yes    Medication History Completed By: Yaz Chou 8/31/2023 7:51 PM    PTA Med List   Medication Sig Last Dose    aspirin (ASA) 81 MG chewable tablet Take 1 tablet (81 mg) by mouth daily 8/31/2023 at AM    azaTHIOprine (IMURAN) 50 MG tablet Take 1 tablet (50 mg) by mouth 2 times daily 8/31/2023 at AM    enoxaparin ANTICOAGULANT (LOVENOX) 60 MG/0.6ML syringe Inject 0.6 mLs (60 mg) Subcutaneous every 12 hours (Patient taking differently: Inject 80 mg Subcutaneous every 12 hours) 8/30/2023 at AM    famotidine (PEPCID) 20 MG tablet Take 1 tablet (20 mg) by mouth 2 times daily 8/31/2023 at AM    metoprolol tartrate (LOPRESSOR) 25 MG tablet Take 0.5 tablets (12.5 mg) by mouth 2 times daily 8/31/2023 at AM    Prenatal Vit-Fe Fumarate-FA (PRENATAL MULTIVITAMIN W/IRON) 27-0.8 MG tablet Take 1 tablet by mouth daily Patient uses [employer] [facility] brand chewable prenatal vitamins 8/31/2023 at AM    pyridOXINE (VITAMIN B6) 25 MG tablet Take 1 tablet (25 mg) by mouth every 8 hours as needed (nausea) (Patient taking differently: Take 25 mg by mouth 2 times daily) 8/31/2023 at AM

## 2023-09-01 NOTE — PROVIDER NOTIFICATION
23 0633   Uterine Activity Assessment   Method external tocotransducer   Contraction Frequency (Minutes) 0   Contraction Intensity no contractions   Uterine Resting Tone soft by palpation   Fetal Assessment   Fetal Movement active   Fetal HR Assessment Method external US   Fetal HR (beats/min) 100   Fetal HR Baseline bradycardia   Fetal HR Variability moderate (amplitude range 6 to 25 bpm)   Fetal HR Decelerations other (see comments)  (difficulty tracing; audible changes in FHR from 115/120 down to 80/90.)   Labor Care   Change of Maternal Position right tilt   Labor Interventions   Labor Interventions difficulty tracing, fetal monitor adjusted;provider notified   Provider Notification   Provider Name/Title Dr. Kaplan   Method of Notification Electronic Page   Request Evaluate in Person   Notification Reason Fetal Baseline Change;Decels       0612: EFM applied w/patient consent for TID monitoring. VSS. Patient denies contractions, cramping, pain, leaking of fluid and bleeding. Reports +FM; writer can also palpate FM and also audible on monitor.  0618: SpO2 monitor applied to patient. Difficulty tracing; , maternal HR 70's-80's.  0620: , moderate w/accels.  0633: paged Dr. Kaplan for bradycardia of FHR, HR now . Support provided to patient with explanation of current FHR and concerns warranting provider to come to bedside for assessment.  0635: called charge RN w/update. Patient changing positions in attempts to assist w/FHR.  0640: provider at bedside.  0644: bedside US. Provider reviewing  section consent (patient signed yesterday).VORB for IV bolus.  0651: IV bolus started.  0654: provider at beside calling STAT .  0657: patient in OR.  0658: FHR spot check; FHR 95 w/accel to 120.  0714: procedure start.  0717: delivery of baby boy, apgars 7-8, weighing 3lbs 6oz. NICU present and caring for infant.  0730: report given to Yaz Barros RN on  9/1/2023 at 8:05 AM

## 2023-09-01 NOTE — ANESTHESIA CARE TRANSFER NOTE
Patient: Cande Shields    Procedure: Procedure(s):   section       Diagnosis: * No pre-op diagnosis entered *  Diagnosis Additional Information: No value filed.    Anesthesia Type:   No value filed.     Note:    Oropharynx: oropharynx clear of all foreign objects  Level of Consciousness: awake  Oxygen Supplementation: room air    Independent Airway: airway patency satisfactory and stable  Dentition: dentition unchanged  Vital Signs Stable: post-procedure vital signs reviewed and stable  Report to RN Given: handoff report given  Patient transferred to: PACU    Handoff Report: Identifed the Patient, Identified the Reponsible Provider, Reviewed the pertinent medical history, Discussed the surgical course, Reviewed Intra-OP anesthesia mangement and issues during anesthesia, Set expectations for post-procedure period and Allowed opportunity for questions and acknowledgement of understanding      Vitals:  Vitals Value Taken Time   /80    Temp     Pulse 65    Resp     SpO2 98        Electronically Signed By: Jasper Lora MD  2023  8:43 AM

## 2023-09-01 NOTE — PROGRESS NOTES
Cande denies feeling pain or discomfort. She has been feeling itchiness on her nose and face and requested benadryl, which was given with some relief. Fundus is firm with scant lochia. Guillen patent with clear fluid. Cande tolerated small amounts of water. Plan is to visit baby in the NICU before transferring to the [facility].

## 2023-09-01 NOTE — PROVIDER NOTIFICATION
08/31/23 2236   Provider Notification   Provider Name/Title Dr. Kaplan   Method of Notification Electronic Page   Notification Reason Fetal Baseline Change     Contacted provider regarding change in FHR baseline from previous tracing in the evening (see flowsheets for data). SpO2 monitor placed to ensure differentiation between patient HR and fetal HR.  2132: EFM applied w/patient consent.  2200: initial difficultly tracing. Tracing would be established and then followed by fetal movement; audible on monitor, palpation, and reported by patient.   Multiple position changes and assistance from Charge RN.  2212: tracing consistent.  2237: provider called writer and remotely reviewed tracing; stated tracing WNL.  2312: FHR tracing complete for the night. FHR baseline 110-115, moderate variability, 15 x 15 accels, no decels. Patient reports +FM, denies contractions, cramping, and pain. No contractions on TOCO. Denies leaking fluid and bleeding. Reviewed care plan and s/sx's for patient to report to RN; patient verbalized understanding of teaching and agreement with current plan of care.    Nallely Barros RN on 8/31/2023 at 11:44 PM

## 2023-09-02 LAB — HGB BLD-MCNC: 8.9 G/DL (ref 11.7–15.7)

## 2023-09-02 PROCEDURE — 36415 COLL VENOUS BLD VENIPUNCTURE: CPT

## 2023-09-02 PROCEDURE — 250N000013 HC RX MED GY IP 250 OP 250 PS 637

## 2023-09-02 PROCEDURE — 120N000002 HC R&B MED SURG/OB UMMC

## 2023-09-02 PROCEDURE — 250N000012 HC RX MED GY IP 250 OP 636 PS 637

## 2023-09-02 PROCEDURE — 99239 HOSP IP/OBS DSCHRG MGMT >30: CPT | Mod: GC | Performed by: OBSTETRICS & GYNECOLOGY

## 2023-09-02 PROCEDURE — 85018 HEMOGLOBIN: CPT

## 2023-09-02 PROCEDURE — 250N000011 HC RX IP 250 OP 636: Mod: JZ

## 2023-09-02 RX ORDER — CYCLOBENZAPRINE HCL 5 MG
10 TABLET ORAL 3 TIMES DAILY
Status: DISCONTINUED | OUTPATIENT
Start: 2023-09-02 | End: 2023-09-03 | Stop reason: HOSPADM

## 2023-09-02 RX ORDER — LIDOCAINE 4 G/G
1 PATCH TOPICAL EVERY 24 HOURS
Status: DISCONTINUED | OUTPATIENT
Start: 2023-09-02 | End: 2023-09-03 | Stop reason: HOSPADM

## 2023-09-02 RX ORDER — FERROUS SULFATE 325(65) MG
325 TABLET ORAL DAILY
Status: DISCONTINUED | OUTPATIENT
Start: 2023-09-02 | End: 2023-09-03 | Stop reason: HOSPADM

## 2023-09-02 RX ADMIN — Medication 12.5 MG: at 09:34

## 2023-09-02 RX ADMIN — ACETAMINOPHEN 975 MG: 325 TABLET, FILM COATED ORAL at 20:37

## 2023-09-02 RX ADMIN — ACETAMINOPHEN 975 MG: 325 TABLET, FILM COATED ORAL at 01:46

## 2023-09-02 RX ADMIN — ACETAMINOPHEN 975 MG: 325 TABLET, FILM COATED ORAL at 13:42

## 2023-09-02 RX ADMIN — CYCLOBENZAPRINE HYDROCHLORIDE 10 MG: 5 TABLET, FILM COATED ORAL at 15:30

## 2023-09-02 RX ADMIN — SIMETHICONE 80 MG: 80 TABLET, CHEWABLE ORAL at 05:43

## 2023-09-02 RX ADMIN — SENNOSIDES AND DOCUSATE SODIUM 2 TABLET: 50; 8.6 TABLET ORAL at 08:10

## 2023-09-02 RX ADMIN — PRENATAL VIT W/ FE FUMARATE-FA TAB 27-0.8 MG 1 TABLET: 27-0.8 TAB at 08:10

## 2023-09-02 RX ADMIN — ASPIRIN 81 MG: 81 TABLET, COATED ORAL at 08:10

## 2023-09-02 RX ADMIN — CYCLOBENZAPRINE HYDROCHLORIDE 10 MG: 5 TABLET, FILM COATED ORAL at 20:37

## 2023-09-02 RX ADMIN — OXYCODONE HYDROCHLORIDE 5 MG: 5 TABLET ORAL at 05:42

## 2023-09-02 RX ADMIN — AZATHIOPRINE 50 MG: 50 TABLET ORAL at 08:10

## 2023-09-02 RX ADMIN — FAMOTIDINE 20 MG: 20 TABLET ORAL at 20:37

## 2023-09-02 RX ADMIN — ENOXAPARIN SODIUM 60 MG: 60 INJECTION SUBCUTANEOUS at 09:34

## 2023-09-02 RX ADMIN — ENOXAPARIN SODIUM 60 MG: 60 INJECTION SUBCUTANEOUS at 22:46

## 2023-09-02 RX ADMIN — SENNOSIDES AND DOCUSATE SODIUM 1 TABLET: 50; 8.6 TABLET ORAL at 22:45

## 2023-09-02 RX ADMIN — ACETAMINOPHEN 975 MG: 325 TABLET, FILM COATED ORAL at 08:10

## 2023-09-02 RX ADMIN — Medication 12.5 MG: at 20:37

## 2023-09-02 RX ADMIN — FAMOTIDINE 20 MG: 20 TABLET ORAL at 08:10

## 2023-09-02 RX ADMIN — OXYCODONE HYDROCHLORIDE 5 MG: 5 TABLET ORAL at 13:43

## 2023-09-02 RX ADMIN — OXYCODONE HYDROCHLORIDE 5 MG: 5 TABLET ORAL at 09:46

## 2023-09-02 RX ADMIN — FERROUS SULFATE TAB 325 MG (65 MG ELEMENTAL FE) 325 MG: 325 (65 FE) TAB at 13:42

## 2023-09-02 RX ADMIN — AZATHIOPRINE 50 MG: 50 TABLET ORAL at 20:37

## 2023-09-02 ASSESSMENT — ACTIVITIES OF DAILY LIVING (ADL)
ADLS_ACUITY_SCORE: 24

## 2023-09-02 NOTE — DISCHARGE SUMMARY
[facility] Discharge Summary    Patient: Cande Shields    YOB: 2000   MRN# [id number]           Date of Admission:  2023  Date of Discharge::  23  Admitting Physician:  Wendy Vera MD  Discharge Physician:  Wendy Vera MD           Admission Diagnoses:   - Intrauterine pregnancy at 33w3d  - Granulomatosis polyangiitis   - History of multiple VTE, including DVT and PE   - Right atrial thrombus s/p sternotomy and thrombectomy on 23 earlier in pregnancy   - PFO s/p closure  - History of IUFD at 30w6d ()   - History of severe pre-eclampsia   - Chronic kidney disease  - Bicornuate uterus   - Previous FGR with elevated umbilical artery dopplers (), resolved 8/10          Discharge Diagnosis:   - Same, delivered   - Acute blood loss on chronic anemia, asymptomatic          Procedures:     Procedure(s): Primary low transverse  section with double layer closure via Pfannenstiel skin incision  Spinal Anesthesia                Medications Prior to Admission:     Medications Prior to Admission   Medication Sig Dispense Refill Last Dose    azaTHIOprine (IMURAN) 50 MG tablet Take 1 tablet (50 mg) by mouth 2 times daily 90 tablet 3 2023 at AM    enoxaparin ANTICOAGULANT (LOVENOX) 60 MG/0.6ML syringe Inject 0.6 mLs (60 mg) Subcutaneous every 12 hours   2023 at AM    metoprolol tartrate (LOPRESSOR) 25 MG tablet Take 0.5 tablets (12.5 mg) by mouth 2 times daily 60 tablet 2 2023 at AM    Prenatal Vit-Fe Fumarate-FA (PRENATAL MULTIVITAMIN W/IRON) 27-0.8 MG tablet Take 1 tablet by mouth daily Patient uses [employer] brand chewable prenatal vitamins   2023 at AM    [DISCONTINUED] aspirin (ASA) 81 MG chewable tablet Take 1 tablet (81 mg) by mouth daily 30 tablet 4 2023 at AM    [DISCONTINUED] famotidine (PEPCID) 20 MG tablet Take 1 tablet (20 mg) by mouth 2 times daily 180 tablet 0 2023 at AM     [DISCONTINUED] pyridOXINE (VITAMIN B6) 25 MG tablet Take 1 tablet (25 mg) by mouth every 8 hours as needed (nausea) (Patient taking differently: Take 25 mg by mouth 2 times daily) 30 tablet 4 8/31/2023 at AM             Discharge Medications:        Review of your medicines        START taking        Dose / Directions   acetaminophen 325 MG tablet  Commonly known as: TYLENOL      Dose: 325-650 mg  Take 1-2 tablets (325-650 mg) by mouth every 6 hours as needed for mild pain  Refills: 0     cyclobenzaprine 5 MG tablet  Commonly known as: FLEXERIL      Dose: 5-10 mg  Take 1-2 tablets (5-10 mg) by mouth 3 times daily as needed for other (adjuvant pain)  Quantity: 20 tablet  Refills: 0     ferrous sulfate 325 (65 Fe) MG tablet  Commonly known as: FEROSUL      Dose: 325 mg  Take 1 tablet (325 mg) by mouth every other day  Quantity: 45 tablet  Refills: 0     oxyCODONE 5 MG tablet  Commonly known as: ROXICODONE      Dose: 5 mg  Take 1 tablet (5 mg) by mouth every 6 hours as needed for moderate to severe pain  Quantity: 10 tablet  Refills: 0            CONTINUE these medicines which may have CHANGED, or have new prescriptions. If we are uncertain of the size of tablets/capsules you have at home, strength may be listed as something that might have changed.        Dose / Directions   enoxaparin ANTICOAGULANT 60 MG/0.6ML syringe  Commonly known as: LOVENOX  This may have changed: how much to take  Used for: Acute deep vein thrombosis (DVT) of femoral vein of both lower extremities (H)      Dose: 60 mg  Inject 0.6 mLs (60 mg) Subcutaneous every 12 hours  Refills: 0            CONTINUE these medicines which have NOT CHANGED        Dose / Directions   azaTHIOprine 50 MG tablet  Commonly known as: IMURAN  Used for: ANCA-associated vasculitis (H)      Dose: 50 mg  Take 1 tablet (50 mg) by mouth 2 times daily  Quantity: 90 tablet  Refills: 3     metoprolol tartrate 25 MG tablet  Commonly known as: LOPRESSOR  Used for: S/P patent  foramen ovale closure      Dose: 12.5 mg  Take 0.5 tablets (12.5 mg) by mouth 2 times daily  Quantity: 60 tablet  Refills: 2     prenatal multivitamin w/iron 27-0.8 MG tablet      Dose: 1 tablet  Take 1 tablet by mouth daily Patient uses [employer] brand chewable prenatal vitamins  Refills: 0            STOP taking      aspirin 81 MG chewable tablet  Commonly known as: ASA        famotidine 20 MG tablet  Commonly known as: PEPCID        pyridOXINE 25 MG tablet  Commonly known as: VITAMIN B6                  Where to get your medicines        These medications were sent to [id number] - [place] - [address] [place], [place] [address] [address] [address], [place] [address]      Phone: [phone]   cyclobenzaprine 5 MG tablet  ferrous sulfate 325 (65 Fe) MG tablet  oxyCODONE 5 MG tablet       Some of these will need a paper prescription and others can be bought over the counter. Ask your nurse if you have questions.    You don't need a prescription for these medications  acetaminophen 325 MG tablet               Consultations:   - Anesthesia   - NICU          Brief Admission History:   Ms. Cande Shields is a 23 year old now  who initially presented at 33w3d to the antepartum service at [facility] due to severe fetal growth restriction (EFW 2%ile, AC < 1%ile) and elevated UAD discovered  on US. She was previously found to have FGR (EFW 9%ile) on  with UAD showing forward flow but increased resistance. She was monitored with twice weekly NST and UADs and the FGR resolved on 8/10 with EFW 13%ile. FHT w/ baseline 130bpm and showed intermittent variable decelerations on admission. BPP 8/10 with non-reactive NST. She received one dose of BMTZ . FHT showed fetal bradycardia and recurrent variable decelerations on  and STAT  was called.        Intraoperative course   FHR was found to be in the 110s on arrival to the OR and  were able to proceed with spinal anesthesia.     Findings:   Clear amniotic fluid  Liveborn male infant in Cephalic presentation (Apgars 7 at 1 minute & 8 at 5 minutes. Weight 1.53 kg)  Bicornuate uterus with fetal body in right horn, no palpable septum. Normal fallopian tubes, and ovaries.  mL.   Cord Arterial ph 7.27, Base excess -4.2    See operative report for further details.        Postpartum Course   The patient's hospital course was unremarkable.  She recovered as anticipated and experienced no post-operative complications. Creatinine was stable at her baseline at 1.4. On discharge, her pain was well controlled with tylenol, roxicodone and flexeril. Vaginal bleeding is similar to menstrual flow.  Voiding without difficulty.  Ambulating well and tolerating a normal diet.  No fever or significant wound drainage. Formula feeding. Infant is stable in NICU She was discharged on post-partum day #2. She declined MMR vaccine prior to discharge. She was continued on lovenox 60mg BID while in the hospital.     Post-partum hemoglobin:   Hemoglobin   Date Value Ref Range Status   09/02/2023 8.9 (L) 11.7 - 15.7 g/dL Final   07/09/2021 11.7 11.7 - 15.7 g/dL Final             Discharge Instructions and Follow-Up:     Discharge diet: Regular   Discharge activity: No lifting greater than 20 lbs, pushing, pulling, or other strenuous activity for 6 weeks. Pelvic rest for 6 weeks including no sexual intercourse, tampons, or douching. No driving until you can slam on the breaks without pain or while on narcotic pain medications.    Discharge follow-up: Follow up with [facility] for incision check in 2 weeks, and routine postpartum visit in 6 weeks.   Wound care: Keep incision clean and dry           Discharge Disposition:     Discharged to home      Wendy Vera MD

## 2023-09-02 NOTE — PLAN OF CARE
"Offered to remove dressing this morning, patient stated she would like to remove after her shower. Offered patient to shower twice this shift and she declined stating she \"would like to shower later\". Educated about importance of dressing removal and stated she will shower tonight and then remove dressing. Flexeril helping with pain relief today. Working on the infants birth certificate, needing to contact FOB for more information. FOB and patient are not  and are no longer involved but FOB will be involved in infant's life. Mom states she would like him on the birth certificate but he won't be here during her stay. Explained ROP process outpatient.  Patient here with her mother and boyfriend for support. VSS and postpartum assessments WDL.  Up ad drake with steady gait and independent with cares.  Bonding well with infant.  Pain managed with tylenol, oxycodone and flexeril, has lidocaine patch available and would like it placed after shower this evening. Per OB care team will continue metoprolol even with lower pressures this morning. Will continue with postpartum cares and education per plan of care.  "

## 2023-09-02 NOTE — PROGRESS NOTES
09/02/23 1051   Provider Notification   Provider Name/Title Dr. Mendoza   Method of Notification Electronic Page   Request Evaluate-Remote   Notification Reason Medication Request     Patient having pain not covered by oxy and tylenol. Doesn't have ibuprofen ordered due to CKD. Can we try anything else?

## 2023-09-02 NOTE — PROGRESS NOTES
Post Partum Progress Note  POD#1    Went to visit patient at 0700, note delayed due to acute patient care.    Subjective: Patient was not present on arrival to room. Patient likely in NICU per RN in proximity.    Objective:  Vitals:    23 1955 23 2354 23 0401 23 0801   BP: 108/67 118/74 98/62 110/72   BP Location: Right arm Right arm Left arm Left arm   Patient Position: Semi-Alfaro's Semi-Alfaro's Semi-Alfaro's Semi-Alfaro's   Cuff Size: Adult Regular Adult Regular Adult Regular Adult Regular   Pulse: 68 60 61 78   Resp: 16 16 16 18   Temp: 97.8  F (36.6  C) 98.5  F (36.9  C) 98.4  F (36.9  C) 97.8  F (36.6  C)   TempSrc: Oral Oral Oral Oral   SpO2: 97% 98% 97%        Assessment/Plan:  Cande Shields is a 23 year old  female who is POD#1 from PLT. Not present in room on arrival for rounds this morning. Not aware of any acute complaints overnight. The day team will check in with her later this morning.    - Encourage routine post-operative goals including ambulation and incentive spirometry  - PNC: Rh positive. Rubella non immune, consider MMR vaccine prior to discharge  - Pain: controlled on oral medications  - Heme: Hgb 8.8>> 8.9.  Will discharge home with iron.  - GI: continue anti-emetics and stool softeners as needed.  - : Voiding spontaneously per chart review.  - Infant: Stable in NICU  - Feeding: Will discuss  - BC: Will discuss    # Granulomatosis w/ Polyangiitis, CKD III  - Cr 1.35 (b/l 1.3-1.6), 1.40 ()   - Continue PTA Azathioprine 50mg, ASA 81    # Right atrial thrombus s/p sternotomy and thrombectomy   # PFO s/p closure   # Hx saddle PE, DVT  - Metoprolol 12.5mg BID  -  BID 60mg Lovenox    Discharge to home on POD#2-3    Tracy Rivas MD  ObGyn Resident, PGY-2  8:48 AM 2023

## 2023-09-02 NOTE — PROGRESS NOTES
Pt assisted to bathroom. States feels the urge to void but unable to void. Pt wants to walk around in room and then try again. Refused to be bladder scanned at this moment.    2300 Pt bladder scanned 405 mls. Straight cathed 450 mls   Pt voiding adequately post arambula removal.    Pt's doctor requested that the pt have labs done while she is in hosp but unable to find what specific labs are required. Charge notified and said to check with the provider in the morning.

## 2023-09-02 NOTE — PLAN OF CARE
VSS and postpartum assessment WDL. She is up ad drake, voiding, pain managed withTylenol and oxycodone. Incision covered with dressing. Uterus firm and midline. Scant lochia rubra. No breast or nipple pain. No BM and passing flatus. Support person spouse present at bedside; patient goes to visit baby often in NICU. Education provided see record. Continue with plan of care.

## 2023-09-02 NOTE — PROGRESS NOTES
09/02/23 0826   Provider Notification   Provider Name/Title Dr. Mendoza   Method of Notification Electronic Page   Request Evaluate-Remote   Notification Reason Lab Results;Medication Request     Patient HgB 8.9 from 8.9 yesterday. Pt states she had iron infusions during pregnancy and they didn't help her anemia. /72, pulse 78. Do you still want to give metoprolol this AM?

## 2023-09-03 VITALS
RESPIRATION RATE: 16 BRPM | SYSTOLIC BLOOD PRESSURE: 126 MMHG | BODY MASS INDEX: 40.11 KG/M2 | HEART RATE: 81 BPM | DIASTOLIC BLOOD PRESSURE: 82 MMHG | TEMPERATURE: 98.6 F | WEIGHT: 233.7 LBS | OXYGEN SATURATION: 96 %

## 2023-09-03 PROCEDURE — 250N000013 HC RX MED GY IP 250 OP 250 PS 637

## 2023-09-03 PROCEDURE — 250N000011 HC RX IP 250 OP 636: Mod: JZ

## 2023-09-03 PROCEDURE — 250N000012 HC RX MED GY IP 250 OP 636 PS 637

## 2023-09-03 PROCEDURE — 99238 HOSP IP/OBS DSCHRG MGMT 30/<: CPT | Mod: GC | Performed by: STUDENT IN AN ORGANIZED HEALTH CARE EDUCATION/TRAINING PROGRAM

## 2023-09-03 RX ORDER — FERROUS SULFATE 325(65) MG
325 TABLET ORAL EVERY OTHER DAY
Qty: 45 TABLET | Refills: 0 | Status: SHIPPED | OUTPATIENT
Start: 2023-09-03 | End: 2024-05-30

## 2023-09-03 RX ORDER — CYCLOBENZAPRINE HCL 5 MG
5-10 TABLET ORAL 3 TIMES DAILY PRN
Qty: 20 TABLET | Refills: 0 | Status: SHIPPED | OUTPATIENT
Start: 2023-09-03

## 2023-09-03 RX ORDER — OXYCODONE HYDROCHLORIDE 5 MG/1
5 TABLET ORAL EVERY 6 HOURS PRN
Qty: 10 TABLET | Refills: 0 | Status: SHIPPED | OUTPATIENT
Start: 2023-09-03 | End: 2023-12-06

## 2023-09-03 RX ORDER — ACETAMINOPHEN 325 MG/1
325-650 TABLET ORAL EVERY 6 HOURS PRN
COMMUNITY
Start: 2023-09-03 | End: 2023-12-06

## 2023-09-03 RX ADMIN — CYCLOBENZAPRINE HYDROCHLORIDE 10 MG: 5 TABLET, FILM COATED ORAL at 08:20

## 2023-09-03 RX ADMIN — Medication 12.5 MG: at 08:28

## 2023-09-03 RX ADMIN — FERROUS SULFATE TAB 325 MG (65 MG ELEMENTAL FE) 325 MG: 325 (65 FE) TAB at 08:20

## 2023-09-03 RX ADMIN — ASPIRIN 81 MG: 81 TABLET, COATED ORAL at 08:20

## 2023-09-03 RX ADMIN — FAMOTIDINE 20 MG: 20 TABLET ORAL at 08:20

## 2023-09-03 RX ADMIN — AZATHIOPRINE 50 MG: 50 TABLET ORAL at 08:28

## 2023-09-03 RX ADMIN — ACETAMINOPHEN 975 MG: 325 TABLET, FILM COATED ORAL at 08:20

## 2023-09-03 RX ADMIN — OXYCODONE HYDROCHLORIDE 5 MG: 5 TABLET ORAL at 02:40

## 2023-09-03 RX ADMIN — ACETAMINOPHEN 975 MG: 325 TABLET, FILM COATED ORAL at 02:38

## 2023-09-03 RX ADMIN — ENOXAPARIN SODIUM 60 MG: 60 INJECTION SUBCUTANEOUS at 10:45

## 2023-09-03 RX ADMIN — PRENATAL VIT W/ FE FUMARATE-FA TAB 27-0.8 MG 1 TABLET: 27-0.8 TAB at 08:20

## 2023-09-03 ASSESSMENT — ACTIVITIES OF DAILY LIVING (ADL)
ADLS_ACUITY_SCORE: 20

## 2023-09-03 NOTE — PLAN OF CARE
Problem: Postpartum ( Delivery)  Goal: Successful Maternal Role Transition  9/3/2023 110 by Abril Gomez, RN  Outcome: Adequate for Care Transition  9/3/2023 1109 by Abril Gomez, RN  Outcome: Progressing     Problem: Plan of Care - These are the overarching goals to be used throughout the patient stay.    Goal: Readiness for Transition of Care  Outcome: Adequate for Care Transition     Assessment complete and WDL. Pt up ad drake, voiding WDL, passing gas. Pt taking tylenol and flexeril for pain.     Pt requesting to discharge today.     Discharge orders put in this AM - paperwork covered with pt and spouse. Pt is aware of what to look for and when to seek medical help. Discharge meds sent to home pharmacy. Discharge complete at 1100.

## 2023-09-03 NOTE — PROVIDER NOTIFICATION
09/03/23 1005   Provider Notification   Provider Name/Title Dr. Kaplan G3   Method of Notification Electronic Page   Request Evaluate-Remote   Notification Reason Status Update  (Pt is anxious to discharge today, hoping to go this morning if possible. Is someone able to see her and put orders in as soon as possible? Thanks!)

## 2023-09-03 NOTE — PLAN OF CARE
Goal Outcome Evaluation:      Plan of Care Reviewed With: patient    Overall Patient Progress: improving       AFVSS. Postpartum assessments WDL. Patient is ambulating and voiding without difficulty. She is passing flatus, has not yet had BM. Fundus firm with scant flow. Incision steri strips intact and free of s/s infection. States cramping and incisional pain controlled with tylenol, flexeril and intermittently oxycodone.   She is bonding with baby in NICU. Continues to work on birth certificate. Continue to provide support and education as needed. Continue present cares

## 2023-09-03 NOTE — DISCHARGE INSTRUCTIONS
Postop  Birth Instructions    Activity     Do not lift more than 10 pounds for 6 weeks after surgery.  Ask family and friends for help when you need it.  No driving until you have stopped taking your pain medications (usually two weeks after surgery).  No heavy exercise or activity for 6 weeks.  Don't do anything that will put a strain on your surgery site.  Don't strain when using the toilet.  Your care team may prescribe a stool softener if you have problems with your bowel movements.     To care for your incision:     Keep the incision clean and dry.  Do not soak your incision in water. No swimming or hot tubs until it has fully healed. You may soak in the bathtub if the water level is below your incision.  Do not use peroxide, gel, cream, lotion, or ointment on your incision.  Adjust your clothes to avoid pressure on your surgery site (check the elastic in your underwear for example).     You may see a small amount of clear or pink drainage and this is normal.  Check with your health care provider:     If the drainage increases or has an odor.  If the incision reddens, you have swelling, or develop a rash.  If you have increased pain and the medicine we prescribed doesn't help.  If you have a fever above 100.4 F (38 C) with or without chills when placing thermometer under your tongue.   The area around your incision (surgery wound), will feel numb.  This is normal. The numbness should go away in less than a year.   Keep your hands clean:  Always wash your hands before touching your incision (surgery wound). This helps reduce your risk of infection. If your hands aren't dirty, you may use an alcohol hand-rub to clean your hands. Keep your nails clean and short.  Call your healthcare provider if you have any of these symptoms:     You soak a sanitary pad with blood within 1 hour, or you see blood clots larger than a golf ball.  Bleeding that lasts more than 6 weeks.  Vaginal discharge that smells  bad.  Severe pain, cramping or tenderness in your lower belly area.  A need to urinate more frequently (use the toilet more often), more urgently (use the toilet very quickly), or it burns when you urinate.  Nausea and vomiting.  Redness, swelling or pain around a vein in your leg.  Problems breastfeeding or a red or painful area on your breast.  Chest pain and cough or are gasping for air.  Problems with coping with sadness, anxiety or depression. If you have concerns about hurting yourself or the baby, call your provider immediately.    You have questions or concerns after you return home.     Warning Signs after Having a Baby    Keep this paper on your fridge or somewhere else where you can see it.    Call your provider if you have any of these symptoms up to 12 weeks after having your baby.    Thoughts of hurting yourself or your baby  Pain in your chest or trouble breathing  Severe headache not helped by pain medicine  Eyesight concerns (blurry vision, seeing spots or flashes of light, other changes to eyesight)  Fainting, shaking or other signs of a seizure    Call 9-1-1 if you feel that it is an emergency.     The symptoms below can happen to anyone after giving birth. They can be very serious. Call your provider if you have any of these warning signs.    Losing too much blood (hemorrhage)    Call your provider if you soak through a pad in less than an hour or pass blood clots bigger than a golf ball. These may be signs that you are bleeding too much.    Blood clots in the legs or lungs    After you give birth, your body naturally clots its blood to help prevent blood loss. Sometimes this increased clotting can happen in other areas of the body, like the legs or lungs. This can block your blood flow and be very dangerous.     Call your provider if you:  Have a red, swollen spot on the back of your leg that is warm or painful when you touch it.   Are coughing up blood.     Infection    Call your provider if  you have any of these symptoms:  Fever of 100.4 F (38 C) or higher.  Pain or redness around your stitches if you had an incision.   Any yellow, white, or green fluid coming from places where you had stitches or surgery.    Mood Problems (postpartum depression)    Many people feel sad or have mood changes after having a baby. But for some people, these mood swings are worse.     Call your provider right away if you feel so anxious or nervous that you can't care for yourself or your baby.    Preeclampsia (high blood pressure)    Even if you didn't have high blood pressure when you were pregnant, you are at risk for the high blood pressure disease called preeclampsia. This risk can last up to 12 weeks after giving birth.     Call your provider if you have:   Pain on your right side under your rib cage  Sudden swelling in the hands and face    Remember: You know your body. If something doesn't feel right, get medical help.     For informational purposes only. Not to replace the advice of your health care provider. Copyright 2020 [facility]. All rights reserved. Clinically reviewed by Kacy Hauser, RNC-OB, MSN. [employer] [id number] - Rev 02/23.

## 2023-09-03 NOTE — PROGRESS NOTES
[facility]   Postpartum Progress Note    S: Cande is doing well this AM and desires discharge to home this AM. Ambulating without dizziness, eating and drinking without nausea. Lochia less than a typical period. Passing gas, has not yet had a BM. Pain well controlled on oral medications. Formula feeding.    O:  Vitals:    23 0801 23 1533 23 2030 23 0236   BP: 110/72 103/71 114/70 126/82   BP Location: Left arm Left arm Left arm Left arm   Patient Position: Semi-Alfaro's Semi-Alfaro's Semi-Alfaro's Semi-Alfaro's   Cuff Size: Adult Regular Adult Regular Adult Regular Adult Regular   Pulse: 78 69 73 81   Resp: 18 18 18 16   Temp: 97.8  F (36.6  C) 97.8  F (36.6  C) 97.7  F (36.5  C) 98.6  F (37  C)   TempSrc: Oral Oral Oral Oral   SpO2:   97% 96%     Gen: Walking around the room, no distress  CV: RRR, extremities warm and well perfused  Lungs: Breathing comfortably on room air  Abd: Soft, nondistended, FF below umbilicus  Incision: steri-strips in place, dry, intact, no erythema or oozing  Ext: non-tender, trace edema, no erythema    Hemoglobin   Date Value Ref Range Status   2023 8.9 (L) 11.7 - 15.7 g/dL Final   2023 8.9 (L) 11.7 - 15.7 g/dL Final     A: 23 year old  POD#2 s/p PLTCS for Cat II FHT.     P:   Routine post-operative care. Encourage ambulation  Heme: 8.8> >8.9, discharging home with PO iron  Rh positive, no Rhogam indicated. Rubella non-immune, declines MMR  Pain well controlled on oral tylenol, michael and flexeril.  Infant: Doing well in NICU per patient report  Birth control: not discussed today  Planning postpartum follow-up with MFM     #Granulomatosis w/ Polyangiitis, CKD III  - Cr 1.40 (b/l 1.3-1.6)  - Continue PTA Azathioprine 50mg  - No NSAIDs  - has nephro follow-up      # Right atrial thrombus s/p sternotomy and thrombectomy   # PFO s/p closure   # Hx saddle PE, DVT  - Metoprolol 12.5mg BID  - BID 60mg Lovenox, continuing  on discharge   - has follow-up with cardiology scheduled     Anticipate discharge home today. Reviewed discharge meds     Wendy Vera MD  09/03/23

## 2023-09-05 DIAGNOSIS — Z98.891 S/P CESAREAN SECTION: Primary | ICD-10-CM

## 2023-09-05 DIAGNOSIS — Z98.891 S/P PRIMARY LOW TRANSVERSE C-SECTION: ICD-10-CM

## 2023-09-05 NOTE — ANESTHESIA POSTPROCEDURE EVALUATION
Patient: Cande Shields    Procedure: Procedure(s):   section       Anesthesia Type:  No value filed.    Note:  Disposition: Inpatient; Admission   Postop Pain Control: Uneventful            Sign Out: Well controlled pain   PONV: No   Neuro/Psych: Uneventful            Sign Out: Acceptable/Baseline neuro status   Airway/Respiratory: Uneventful            Sign Out: Acceptable/Baseline resp. status   CV/Hemodynamics: Uneventful            Sign Out: Acceptable CV status; No obvious hypovolemia; No obvious fluid overload   Other NRE: NONE   DID A NON-ROUTINE EVENT OCCUR? No           Last vitals:  Vitals Value Taken Time   /83 23 1030   Temp 36.5  C (97.7  F) 23 1000   Pulse 66 23 1030   Resp 16 23 1015   SpO2 99 % 23 1030       Electronically Signed By: Naa Palomo MD  2023  1:54 PM

## 2023-09-06 ENCOUNTER — PATIENT OUTREACH (OUTPATIENT)
Dept: CARE COORDINATION | Facility: CLINIC | Age: 23
End: 2023-09-06
Payer: MEDICAID

## 2023-09-06 ENCOUNTER — TELEPHONE (OUTPATIENT)
Dept: MATERNAL FETAL MEDICINE | Facility: CLINIC | Age: 23
End: 2023-09-06
Payer: MEDICAID

## 2023-09-06 NOTE — PROGRESS NOTES
Connected Care Resource Center Contact  [facility]/Voicemail     Clinical Data: Post-Discharge Outreach     Outreach attempted x 2.  Left message on patient's voicemail, providing [facility]'s 24/7 scheduling and nurse triage phone number [phone] ([phone]) for questions/concerns and/or to schedule an appt with an [facility] provider, if they do not have a PCP.      Plan:  [facility] will do no further outreaches at this time.       MICHELLE Montoya  [facility] Care Resource [place], [facility]    *Connected Care Resource Team does NOT follow patient ongoing. Referrals are identified based on internal discharge reports and the outreach is to ensure patient has an understanding of their discharge instructions.

## 2023-09-06 NOTE — TELEPHONE ENCOUNTER
Cande called in with complaints of pain to C/S incision.  Pt denies fever and denies redness around incision site.  Pt is asking for a refill for oxycodone as she took her last pill today.  Pt hasn't taken Flexeril, pt took Tylenol and didn't think it helped. Writer encouraged abdominal binder for pt to wear, which pt states she will do.  Writer spoke with Dr. Fox who is in agreement with plan to try Flexeril and Tylenol together with abdominal binder for increased pain. Message was given to pt and she will call with any further concerns/complaints for pain. Yaz Esparza RN

## 2023-09-12 LAB
PATH REPORT.COMMENTS IMP SPEC: NORMAL
PATH REPORT.COMMENTS IMP SPEC: NORMAL
PATH REPORT.FINAL DX SPEC: NORMAL
PATH REPORT.GROSS SPEC: NORMAL
PATH REPORT.MICROSCOPIC SPEC OTHER STN: NORMAL
PATH REPORT.RELEVANT HX SPEC: NORMAL
PHOTO IMAGE: NORMAL

## 2023-09-12 PROCEDURE — 88307 TISSUE EXAM BY PATHOLOGIST: CPT | Mod: 26 | Performed by: STUDENT IN AN ORGANIZED HEALTH CARE EDUCATION/TRAINING PROGRAM

## 2023-09-19 ENCOUNTER — OFFICE VISIT (OUTPATIENT)
Dept: MATERNAL FETAL MEDICINE | Facility: CLINIC | Age: 23
End: 2023-09-19
Attending: STUDENT IN AN ORGANIZED HEALTH CARE EDUCATION/TRAINING PROGRAM
Payer: MEDICAID

## 2023-09-19 VITALS
HEART RATE: 79 BPM | DIASTOLIC BLOOD PRESSURE: 74 MMHG | BODY MASS INDEX: 38.99 KG/M2 | WEIGHT: 227.13 LBS | OXYGEN SATURATION: 97 % | SYSTOLIC BLOOD PRESSURE: 107 MMHG

## 2023-09-19 DIAGNOSIS — Z98.891 S/P PRIMARY LOW TRANSVERSE C-SECTION: ICD-10-CM

## 2023-09-19 DIAGNOSIS — I82.413 ACUTE DEEP VEIN THROMBOSIS (DVT) OF FEMORAL VEIN OF BOTH LOWER EXTREMITIES (H): ICD-10-CM

## 2023-09-19 DIAGNOSIS — Z30.011 ENCOUNTER FOR INITIAL PRESCRIPTION OF CONTRACEPTIVE PILLS: Primary | ICD-10-CM

## 2023-09-19 PROCEDURE — G0463 HOSPITAL OUTPT CLINIC VISIT: HCPCS | Performed by: ADVANCED PRACTICE MIDWIFE

## 2023-09-19 PROCEDURE — 99214 OFFICE O/P EST MOD 30 MIN: CPT | Mod: 24 | Performed by: ADVANCED PRACTICE MIDWIFE

## 2023-09-19 RX ORDER — ENOXAPARIN SODIUM 100 MG/ML
60 INJECTION SUBCUTANEOUS EVERY 12 HOURS
Qty: 16.8 ML | Refills: 0 | Status: SHIPPED | OUTPATIENT
Start: 2023-09-19 | End: 2023-09-22

## 2023-09-19 RX ORDER — ACETAMINOPHEN AND CODEINE PHOSPHATE 120; 12 MG/5ML; MG/5ML
0.35 SOLUTION ORAL DAILY
Qty: 84 TABLET | Refills: 4 | Status: SHIPPED | OUTPATIENT
Start: 2023-09-19

## 2023-09-19 ASSESSMENT — EDINBURGH POSTNATAL DEPRESSION SCALE (EPDS)
I HAVE BEEN ABLE TO LAUGH AND SEE THE FUNNY SIDE OF THINGS: AS MUCH AS I ALWAYS COULD
I HAVE FELT SAD OR MISERABLE: NO, NOT AT ALL
I HAVE BEEN ANXIOUS OR WORRIED FOR NO GOOD REASON: HARDLY EVER
THE THOUGHT OF HARMING MYSELF HAS OCCURRED TO ME: NEVER
I HAVE FELT SCARED OR PANICKY FOR NO GOOD REASON: NO, NOT AT ALL
I HAVE BEEN SO UNHAPPY THAT I HAVE BEEN CRYING: NO, NEVER
I HAVE BEEN SO UNHAPPY THAT I HAVE HAD DIFFICULTY SLEEPING: NOT VERY OFTEN
I HAVE BLAMED MYSELF UNNECESSARILY WHEN THINGS WENT WRONG: YES, MOST OF THE TIME
TOTAL SCORE: 5
I HAVE LOOKED FORWARD WITH ENJOYMENT TO THINGS: AS MUCH AS I EVER DID
THINGS HAVE BEEN GETTING ON TOP OF ME: NO, I HAVE BEEN COPING AS WELL AS EVER

## 2023-09-19 ASSESSMENT — PAIN SCALES - GENERAL: PAINLEVEL: NO PAIN (0)

## 2023-09-19 NOTE — NURSING NOTE
Cande is here for a two week PPV. Denies pain, SOB, CP, & visual disturbances. Endorses ongoing loose stools. Denies fevers, & abdominal pain. Incision MALLIKA, no s/s of infection or dehiscence noted, healing well. Reports her mood is overall well, EPDS completed (see FS). Requesting refill on Lovenox 60mg & to discuss birth control options. Ariana Anne CNM met with patient and discussed POC, see note.     Lala LEON RN    We discussed making a regular getting ready for bedtime, a regular going to bedtime, and getting up time. She will try to do relaxing things for about an hour before bedtime.     We discussed sleep hygiene including going to bed at the same time and getting up at the same time every day, going to bed early enough to get 7 or 8 hours in bed, reading and relaxing before bedtime, and avoiding the TV, computer, phone, iPad close to bedtime.

## 2023-09-19 NOTE — PROGRESS NOTES
"SUBJECTIVE:  Cande Shields,  is here for a 2 week postpartum visit.  She had a  Section  on 2023 delivering a healthy baby boy.   delivery at 33.3 weeks due to severe FGR and fetal heart rate decelerations.  Cande reports she has been doing well.  Recovery has gone well.  Eating and drinking normally.  Has noticed looser stools 2x/day since surgery.  Denies feeling ill.  Denies fever/chills.  Bleeding is light.  Pain has been minimal, is no longer requiring medications.  Denies leg pain or SOB  Continues on lovenox 60mg subcutaneous BID.  Plans to follow up with hematology.  Her baby remains in the NICU but is doing well.  Interested in birth control method.  Reports in the past she has ordered a birth pill control online but it \"made her feel weird\" wondering about other options    Feels her moods since delivery have been good.  PHQ-9: 5      Pap: last done 2020, due at 6 week visit    Breast feeding:  No--no issues with engorgement, infection, discomfort  Bladder problems:  No  Bowel problems/hemorrhoids:  Looser stools but no other concerns  Vaginal flow:  Light  Contraception: Discussed recommended pregnancy spacing with C/S, also recommended avoiding pregnancy until cleared by hematology.  Reviewed all birth control options.  Discussed strong recommendation to avoid estrogen containing birth control methods.  Reviewed condoms/barrier methods, POPs, Depo Provera, Nexplanon, Paragard and Mirena/Kyleena  Emotional adjustment:  doing well and happy      OBJECTIVE:  Vitals: /74 (Cuff Size: Adult Large)   Pulse 79   Wt 103 kg (227 lb 2 oz)   LMP 01/10/2023 (Approximate)   SpO2 97%   BMI 38.99 kg/m    BMI= Body mass index is 38.99 kg/m .  General - pleasant female in no acute distress.  Breast -  deferred  Abdomen - Incision well-healed  Pelvic - Deferred    ASSESSMENT:    ICD-10-CM    1. Encounter for initial prescription of contraceptive pills  Z30.011 norethindrone " (MICRONOR) 0.35 MG tablet      2. S/P primary low transverse   Z98.891 [facility] Office Visit     norethindrone (MICRONOR) 0.35 MG tablet      3. Acute deep vein thrombosis (DVT) of femoral vein of both lower extremities (H)  I82.413           PLAN:  After discussion re: birth control methods pt prefers to proceed with initiation of minipill.  Rx sent today.  Discussed how to start.  Cande feels she will be able to be consistent with taking every day.   Reviewed failure rate.  Reviewed that minipill does not protect against STDs.  Reviewed normal postpartum course and what to expect.  Reviewed postpartum warning signs.  Sent 14 day supply of lovenox.  Pt to follow up with hematology re: continued anticoagulation.  Return to primary care provider for 6 week postpartum visit/pap smear.    Ariana Anne CNM    35  minutes on the date of the encounter doing chart review, patient visit, documentation, and discussion with other provider(s)

## 2023-09-20 DIAGNOSIS — I26.02 ACUTE SADDLE PULMONARY EMBOLISM WITH ACUTE COR PULMONALE (H): Primary | ICD-10-CM

## 2023-09-22 DIAGNOSIS — I82.413 ACUTE DEEP VEIN THROMBOSIS (DVT) OF FEMORAL VEIN OF BOTH LOWER EXTREMITIES (H): ICD-10-CM

## 2023-09-22 RX ORDER — ENOXAPARIN SODIUM 100 MG/ML
60 INJECTION SUBCUTANEOUS EVERY 12 HOURS
Qty: 36 ML | Refills: 0 | Status: SHIPPED | OUTPATIENT
Start: 2023-09-22

## 2023-09-22 NOTE — TELEPHONE ENCOUNTER
"[id number]  Cande ARMAS Cornelius  23 year old female  CBCD Diagnosis: Unprovoked PE w/ embolization to arterial circulatpon, thrombectomy/sternotomy, PFO Closure  CBCD Provider: Dr. Dye    Incoming call from Cande. She needs a lovenox refill as soon as possible. She is out of medication today. She has been trying to pick it up at local [facility], however they are out of supply.     She gave birth to a baby who is currently staying in the NICU. She is not breastfeeding. She is currently taking 60 mg of Lovenox every 12 hours. As she is not breastfeeding, she is wondering if she should stay on Lovenox or switch to a an oral anticoagulant. She is off on her timing today, but usually takes Lovenox at 9 am and 9 pm.     Plan per Dr. Dye's last note on 8/30/23:  \"Recommendations:  I counseled the patient about my assessment of risks/benefits of anticoagulation and that she is at very high risk for recurrence  No enoxaparin tonight or tomorrow AM  Resume enoxaparin at 60mg Q12 hours tomorrow evening.  Check lovenox levels about once per month (we will coordinate with OB or MFM)  Continue ASA for preeclampsia prophylaxis  RTC 2 months post partum to discuss anticoagulation plan (Estimated Date of Delivery: Oct 17, 2023, so will see in Dec 2023)  Call with any questions or concerns, confirmed she has our phone and MyChart\"    One month refill of Lovenox (60 mg BID) sent to [facility] pharmacy. RN recommended patient continue with current regimen and care team will discuss if she should stay on Lovenox or switch to an oral anticoagulant with Dr. Dye next week. She is due for a lovenox level. RN assisted her in making an appointment at 2 PM on 10/2/23 (5 hours after her 9 AM dose of Lovenox). As she will be off on medication timing because of the delay in medication today, RN recommend that she slowly adjust back to 9am and 9 pm dosing. Educated her on importance of consistently taking it at those times for 4-5 days before " getting level drawn.     Cande agrees to plan and will expect a call back late next week with updated plan.  Long-term AC plan pending provider recommendations.      Arlen Santiago RN, BSN, PCCN  Nurse Clinician    [facility]  [address] [place] [address], [address], [place] [address]   Office, direct: [phone]  Main office number: [phone]  Pronouns: She, her, hers

## 2023-09-25 ENCOUNTER — TELEPHONE (OUTPATIENT)
Dept: MATERNAL FETAL MEDICINE | Facility: CLINIC | Age: 23
End: 2023-09-25
Payer: MEDICAID

## 2023-09-25 ENCOUNTER — MYC MEDICAL ADVICE (OUTPATIENT)
Dept: NEPHROLOGY | Facility: CLINIC | Age: 23
End: 2023-09-25
Payer: MEDICAID

## 2023-09-25 DIAGNOSIS — Z98.891 S/P PRIMARY LOW TRANSVERSE C-SECTION: Primary | ICD-10-CM

## 2023-09-25 DIAGNOSIS — I77.82 ANCA-ASSOCIATED VASCULITIS (H): Primary | ICD-10-CM

## 2023-09-25 NOTE — TELEPHONE ENCOUNTER
Patient is calling requesting labs to be drawn.  Patient has been having low back pain and wants to make sure her kidney function is ok.  Reviewed active current Med lists and confirmed patient is taking all as prescribed.  Also noted missed appt right after discharge from hospital, confirmed with patient availability to see Dr. Goodson tomorrow at [facility]. Patient is scheduled and labs orders in besides Renal Panel, CD19, and CRP which were added per patient standing clinic protocol orders.  Patient also requesting STD lab check, patient denies any signs or symptoms just requesting as she does routinely.  Noted patient reached out to her MFM team, sent EPIC chat message to see if they can order as this is outside scope for [facility] nephrology team. Received ok that STD order placed.  Updated Dr. Goodson of upcoming appt and pending lab results that were ordered in anticipation for appt tomorrow at [facility].  Team Work makes the Dream Work!    Marielena Jaquez RN, BSN, PHN  [facility] Nurse Navigator  [phone]

## 2023-09-25 NOTE — TELEPHONE ENCOUNTER
Cande called PCC line today reporting that she continues to have loose stools 2x daily and over the past 5 days has lower abdominal cramping and is now going to her back, waking her at night. Cande reports taking occasional tylenol with relief. Pt wondering if she should be evaluated. Cande denies change in discharge/SOB/chest pains/edema/fevers/UTI symptoms. Pt reports normal lochia flow that has been decreasing, states occasional, smaller than dime sized clots.     Writer suggested trying ibuprofen for comfort, following up with nephrology to get labs and to make an appointment with her primary OB Dr. Sorensen this week, as pt is planning to return there for care.  PT VU and is agreeable to plan.  Kortney Washington RN    Pt called and scheduled with Nephrology on 9/26. Writer received instant message asking MFM to order STI labs. Writer discussed with Dr. Fox who VORB urine collection of GC/Chlamydia. Also advised pt to follow up with primary OB.   Kortney Washington RN

## 2023-09-26 ENCOUNTER — LAB (OUTPATIENT)
Dept: LAB | Facility: CLINIC | Age: 23
End: 2023-09-26
Payer: MEDICAID

## 2023-09-26 ENCOUNTER — OFFICE VISIT (OUTPATIENT)
Dept: NEPHROLOGY | Facility: CLINIC | Age: 23
End: 2023-09-26
Payer: MEDICAID

## 2023-09-26 ENCOUNTER — OFFICE VISIT (OUTPATIENT)
Dept: OBGYN | Facility: CLINIC | Age: 23
End: 2023-09-26
Attending: OBSTETRICS & GYNECOLOGY
Payer: MEDICAID

## 2023-09-26 VITALS
WEIGHT: 226 LBS | HEART RATE: 92 BPM | DIASTOLIC BLOOD PRESSURE: 77 MMHG | BODY MASS INDEX: 38.79 KG/M2 | OXYGEN SATURATION: 98 % | SYSTOLIC BLOOD PRESSURE: 112 MMHG

## 2023-09-26 VITALS
OXYGEN SATURATION: 96 % | SYSTOLIC BLOOD PRESSURE: 115 MMHG | WEIGHT: 226.7 LBS | DIASTOLIC BLOOD PRESSURE: 78 MMHG | HEART RATE: 78 BPM | BODY MASS INDEX: 38.91 KG/M2

## 2023-09-26 DIAGNOSIS — M54.50 ACUTE LEFT-SIDED LOW BACK PAIN, UNSPECIFIED WHETHER SCIATICA PRESENT: ICD-10-CM

## 2023-09-26 DIAGNOSIS — Z98.891 S/P PRIMARY LOW TRANSVERSE C-SECTION: ICD-10-CM

## 2023-09-26 DIAGNOSIS — I77.82 ANCA-ASSOCIATED VASCULITIS (H): ICD-10-CM

## 2023-09-26 DIAGNOSIS — I26.02 ACUTE SADDLE PULMONARY EMBOLISM WITH ACUTE COR PULMONALE (H): ICD-10-CM

## 2023-09-26 DIAGNOSIS — R10.32 LLQ ABDOMINAL PAIN: Primary | ICD-10-CM

## 2023-09-26 DIAGNOSIS — R19.8 ALTERED BOWEL FUNCTION: ICD-10-CM

## 2023-09-26 DIAGNOSIS — R76.8 PR3 ANCA ANTIBODIES PRESENT: ICD-10-CM

## 2023-09-26 DIAGNOSIS — R05.2 SUBACUTE COUGH: Primary | ICD-10-CM

## 2023-09-26 LAB
ALBUMIN MFR UR ELPH: 43.7 MG/DL
ALBUMIN SERPL BCG-MCNC: 3.7 G/DL (ref 3.5–5.2)
ALBUMIN UR-MCNC: 30 MG/DL
ANION GAP SERPL CALCULATED.3IONS-SCNC: 11 MMOL/L (ref 7–15)
APPEARANCE UR: CLEAR
BASOPHILS # BLD AUTO: 0.1 10E3/UL (ref 0–0.2)
BASOPHILS NFR BLD AUTO: 1 %
BILIRUB UR QL STRIP: NEGATIVE
BUN SERPL-MCNC: 22.8 MG/DL (ref 6–20)
C PNEUM DNA SPEC QL NAA+PROBE: NOT DETECTED
C TRACH DNA SPEC QL NAA+PROBE: NEGATIVE
CALCIUM SERPL-MCNC: 9.2 MG/DL (ref 8.6–10)
CD19 B CELL COMMENT: ABNORMAL
CD19 CELLS # BLD: 2 CELLS/UL (ref 107–698)
CD19 CELLS NFR BLD: <1 % (ref 6–27)
CHLORIDE SERPL-SCNC: 111 MMOL/L (ref 98–107)
COLOR UR AUTO: ABNORMAL
CREAT SERPL-MCNC: 1.45 MG/DL (ref 0.51–0.95)
CREAT UR-MCNC: 149 MG/DL
CRP SERPL-MCNC: 32 MG/L
DEPRECATED HCO3 PLAS-SCNC: 19 MMOL/L (ref 22–29)
EGFRCR SERPLBLD CKD-EPI 2021: 51 ML/MIN/1.73M2
EOSINOPHIL # BLD AUTO: 0.6 10E3/UL (ref 0–0.7)
EOSINOPHIL NFR BLD AUTO: 10 %
ERYTHROCYTE [DISTWIDTH] IN BLOOD BY AUTOMATED COUNT: 19.5 % (ref 10–15)
FLUAV H1 2009 PAND RNA SPEC QL NAA+PROBE: NOT DETECTED
FLUAV H1 RNA SPEC QL NAA+PROBE: NOT DETECTED
FLUAV H3 RNA SPEC QL NAA+PROBE: NOT DETECTED
FLUAV RNA SPEC QL NAA+PROBE: NOT DETECTED
FLUBV RNA SPEC QL NAA+PROBE: NOT DETECTED
GLUCOSE SERPL-MCNC: 81 MG/DL (ref 70–99)
GLUCOSE UR STRIP-MCNC: NEGATIVE MG/DL
HADV DNA SPEC QL NAA+PROBE: NOT DETECTED
HCOV PNL SPEC NAA+PROBE: NOT DETECTED
HCT VFR BLD AUTO: 35.9 % (ref 35–47)
HGB BLD-MCNC: 11.2 G/DL (ref 11.7–15.7)
HGB UR QL STRIP: ABNORMAL
HMPV RNA SPEC QL NAA+PROBE: NOT DETECTED
HPIV1 RNA SPEC QL NAA+PROBE: NOT DETECTED
HPIV2 RNA SPEC QL NAA+PROBE: NOT DETECTED
HPIV3 RNA SPEC QL NAA+PROBE: NOT DETECTED
HPIV4 RNA SPEC QL NAA+PROBE: NOT DETECTED
IMM GRANULOCYTES # BLD: 0 10E3/UL
IMM GRANULOCYTES NFR BLD: 0 %
KETONES UR STRIP-MCNC: NEGATIVE MG/DL
LEUKOCYTE ESTERASE UR QL STRIP: NEGATIVE
LYMPHOCYTES # BLD AUTO: 1.2 10E3/UL (ref 0.8–5.3)
LYMPHOCYTES NFR BLD AUTO: 20 %
M PNEUMO DNA SPEC QL NAA+PROBE: NOT DETECTED
MCH RBC QN AUTO: 28.2 PG (ref 26.5–33)
MCHC RBC AUTO-ENTMCNC: 31.2 G/DL (ref 31.5–36.5)
MCV RBC AUTO: 90 FL (ref 78–100)
MONOCYTES # BLD AUTO: 0.5 10E3/UL (ref 0–1.3)
MONOCYTES NFR BLD AUTO: 8 %
N GONORRHOEA DNA SPEC QL NAA+PROBE: NEGATIVE
NEUTROPHILS # BLD AUTO: 3.7 10E3/UL (ref 1.6–8.3)
NEUTROPHILS NFR BLD AUTO: 61 %
NITRATE UR QL: NEGATIVE
NRBC # BLD AUTO: 0 10E3/UL
NRBC BLD AUTO-RTO: 0 /100
PH UR STRIP: 5.5 [PH] (ref 5–7)
PHOSPHATE SERPL-MCNC: 4.3 MG/DL (ref 2.5–4.5)
PLATELET # BLD AUTO: 316 10E3/UL (ref 150–450)
POTASSIUM SERPL-SCNC: 4.8 MMOL/L (ref 3.4–5.3)
PROT/CREAT 24H UR: 0.29 MG/MG CR (ref 0–0.2)
RBC # BLD AUTO: 3.97 10E6/UL (ref 3.8–5.2)
RBC #/AREA URNS AUTO: ABNORMAL /HPF
RSV RNA SPEC QL NAA+PROBE: NOT DETECTED
RSV RNA SPEC QL NAA+PROBE: NOT DETECTED
RV+EV RNA SPEC QL NAA+PROBE: DETECTED
SODIUM SERPL-SCNC: 141 MMOL/L (ref 136–145)
SP GR UR STRIP: 1.02 (ref 1–1.03)
SQUAMOUS #/AREA URNS AUTO: ABNORMAL /LPF
UROBILINOGEN UR STRIP-MCNC: NORMAL MG/DL
WBC # BLD AUTO: 6 10E3/UL (ref 4–11)
WBC #/AREA URNS AUTO: ABNORMAL /HPF

## 2023-09-26 PROCEDURE — 80069 RENAL FUNCTION PANEL: CPT

## 2023-09-26 PROCEDURE — 87486 CHLMYD PNEUM DNA AMP PROBE: CPT | Performed by: INTERNAL MEDICINE

## 2023-09-26 PROCEDURE — 87633 RESP VIRUS 12-25 TARGETS: CPT | Performed by: INTERNAL MEDICINE

## 2023-09-26 PROCEDURE — 86140 C-REACTIVE PROTEIN: CPT

## 2023-09-26 PROCEDURE — 87581 M.PNEUMON DNA AMP PROBE: CPT | Performed by: INTERNAL MEDICINE

## 2023-09-26 PROCEDURE — 83516 IMMUNOASSAY NONANTIBODY: CPT

## 2023-09-26 PROCEDURE — 87491 CHLMYD TRACH DNA AMP PROBE: CPT

## 2023-09-26 PROCEDURE — 84156 ASSAY OF PROTEIN URINE: CPT

## 2023-09-26 PROCEDURE — 81001 URINALYSIS AUTO W/SCOPE: CPT

## 2023-09-26 PROCEDURE — 36415 COLL VENOUS BLD VENIPUNCTURE: CPT

## 2023-09-26 PROCEDURE — 99215 OFFICE O/P EST HI 40 MIN: CPT | Performed by: INTERNAL MEDICINE

## 2023-09-26 PROCEDURE — 85025 COMPLETE CBC W/AUTO DIFF WBC: CPT

## 2023-09-26 PROCEDURE — 99213 OFFICE O/P EST LOW 20 MIN: CPT | Mod: FT | Performed by: OBSTETRICS & GYNECOLOGY

## 2023-09-26 PROCEDURE — 87591 N.GONORRHOEAE DNA AMP PROB: CPT

## 2023-09-26 PROCEDURE — 86355 B CELLS TOTAL COUNT: CPT

## 2023-09-26 NOTE — PROGRESS NOTES
CC:   -PR3 ANCA vasculitis  -Delivery at 33 weeks for SGA  -CKD stage 3    HPI:   Cande Shields is a 22 year old female L0  who presents for Follow-up of PR3 ANCA vasculitis. She is 28 weeks +0    Her past medical history is significant for:  1.  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  Chronic kidney disease  3.  History of intrauterine fetal death secondary to severe preeclampsia at 30 weeks  4  Severe preeclampsia and IUFD at 31 weeks  5  Saddle pulmonary embolus and history of DVT with PE, Cardiac thrombus  6. bicornuate uterus  7. Large subchoronic hemorrhage  8. ADHD    ANCA history:  The patient was initially diagnosed with TN-3 ANCA vascultis at the age of 10 years. 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 She was treated in the past by Dr. Velásquez. She was treated with methotrexate, high dose steroid. As per patient, she did not respond to rituximab so she was placed on oral cytoxan (unclear how long) then she had been followed by Dr. Enriqueta Alcantara, pediatric rheumatologist at [facility].    Her Cr was 0.6-0.8mg/dl in . She was pregnant and had severe pre-eclampsia and noted to have IUFD at 31 weeks in 2020. In 2020, she developed several days of myalgia, joint pain, pedal edema and was admitted to [facility] between -. She was thought to have a flare of GPA . Of note, she had COVID 1 mo preceding this event. She was given solumedrol 80 mg per day and received 4 doses of  mg/m2. She received the last dose of the series on 21 ([facility]). Cr during that flare was 0.77. However, she was readmitted again on 22 after rising creatinine=2.05mg/dl. Followup pulse methylprednisolone and Cytoxan 500 mg/m2 were given on 2021 in response to rising creatinine and active urinary sediment. She  received IV cytoxan 500 mg/m2 x 3 doses (total dose cumulation about 3.5 g). Gynecology input on ovarian preservation in setting of cyclophosphamide - did not give leuprolide. Eventually steroid was tapered down in 4/2021. She was then lost follow-up and did not receive maintenance therapy. Her creatinine was 1.11 mg/dl in January 2022.      She presented in December 2022 with cold symptoms with positive home COVID test and was treated with Paxlovid. She also noted to have petechial rashes similar to previous flare. Upon presentation, she has Cr of 2.05 but after IV fluid and 1 dose of methlyprednisolone 125 mg Cr down to 1.48 the next day.  She has no leg swelling. She has some phlegm with blood tinge and denied any SOB. We thought that she has flare as her PR3 was elevated at 27. She received tapering dose of methylprednisone 125 mg IV x1-> 32 mg IV x1 and was started on oral Medrol tapering dose: Take 8 tablets (32 mg) by mouth daily for 6 days, THEN 6 tablets (24 mg) daily for 7 days, THEN 4 tablets (16 mg) daily for 7 days, THEN 3 tablets (12 mg) daily for 14 days. We also started her on Avacopan 30 mg BID through emergency program. She received Rituximab 375 mg/m2 1st dose on 1/10/23 and 2nd dose on 1/17/23 and 3rd dose on 1/24/23. She did not receive her last dose as she was found to be pregnant at 4 weeks in February. She also received avacopan which was stopped once the team knew she was pregnant.     Of note, she was hospitalized February 2023 for acute saddle PE and underwent  right atrial thrombectomy with sternotomy in the setting of pregnancy and COVID-19. Her course was complicated by hospital-acquired pneumonia. She is currently on on enoxaparin.  She had a PFO which was closed as well as per patient.      She was seen last week by maternal-fetal medicine. She is not gaining much weight (around 6 kg). Her fetal ultrasound showed that her baby is at the 9th percentile and she is now doing weekly US.   "    Interval history:  She delivered at 33 weeks because of small for gestational age baby.  The baby was born at 3 pounds 6 ounces.  She delivered a baby boy and named him \"eNo\".  The baby has been doing well.  He lost some weight early on but now he is at 4 pounds.  Currently he is being worked up for infection.  She is complaining of a chronic cough for 3 weeks now which started after her delivery.  She is producing sputum but no blood.  She denies any sinus pain or headache or oral ulcers or skin rash.  She denies any arthralgias or chest pain.  She did not test herself for COVID but she thinks that she would have been much sicker as per her prior experience.  She still have vaginal bleeding and she will continue oral contraceptive pills as a method of contraception.  She is still on maternity leave.  She is on enoxaparin for anticoagulation twice daily.  She will follow with Dr. Dye as above for a long-term plan. She received 5 infusions of IV iron. She elected not to breast feed.    She is on imuran 50 mg daily (started mid July 2023). She denies any joint pain hemoptysis, shortness of breath, skin rash or dysuria.    Social history: She lives in [place].  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 She is single but she has a boyfriend (not the current father) who has been very supportive. Her mom will help with the baby.    Family history not significant for autoimmune or chronic kidney disease.    Allergies   Allergen Reactions    Penicillins Rash     Unknown, but think rash.  Tolerated cephalexin (12/27/20), cefpodoxime (12/27/20), Ceftriaxone (Feb 2023), Zosyn (Feb 2023)       acetaminophen (TYLENOL) 325 MG tablet, Take 1-2 tablets (325-650 mg) by mouth every 6 hours as needed for mild pain  azaTHIOprine (IMURAN) 50 MG tablet, Take 1 tablet (50 mg) by mouth 2 times daily  enoxaparin ANTICOAGULANT (LOVENOX) 60 MG/0.6ML syringe, Inject 0.6 mLs (60 mg) Subcutaneous every 12 hours  ferrous " sulfate (FEROSUL) 325 (65 Fe) MG tablet, Take 1 tablet (325 mg) by mouth every other day  metoprolol tartrate (LOPRESSOR) 25 MG tablet, Take 0.5 tablets (12.5 mg) by mouth 2 times daily  norethindrone (MICRONOR) 0.35 MG tablet, Take 1 tablet (0.35 mg) by mouth daily  cyclobenzaprine (FLEXERIL) 5 MG tablet, Take 1-2 tablets (5-10 mg) by mouth 3 times daily as needed for other (adjuvant pain) (Patient not taking: Reported on 2023)  oxyCODONE (ROXICODONE) 5 MG tablet, Take 1 tablet (5 mg) by mouth every 6 hours as needed for moderate to severe pain (Patient not taking: Reported on 2023)  Prenatal Vit-Fe Fumarate-FA (PRENATAL MULTIVITAMIN W/IRON) 27-0.8 MG tablet, Take 1 tablet by mouth daily Patient uses [employer] brand chewable prenatal vitamins (Patient not taking: Reported on 2023)    No current facility-administered medications on file prior to visit.      Past Medical History:   Diagnosis Date    ADHD (attention deficit hyperactivity disorder)     DVT, lower extremity, distal (H)     Dysmenorrhea     Femoral artery thrombosis, left (H) 2021    Multiple subsegmental pulmonary emboli without acute cor pulmonale (H) 2021    PFO (patent foramen ovale)     Preeclampsia, third trimester     Spontaneous pregnancy loss 2020    31 weeks    Stage 3b chronic kidney disease (H)     Wegener's disease, pulmonary 2010    renal biopsy       Past Surgical History:   Procedure Laterality Date     SECTION N/A 2023    Procedure:  section;  Surgeon: Wendy Vera MD;  Location: [facility] L[facility]    ENT SURGERY  2000    cyst    EXCISE GANGLION WRIST  2009    IR LOWER EXTREMITY ANGIOGRAM LEFT  3/21/2021    IR THROMBOLYSIS ART/VENOUS INFUSION SUBSQ DAY  3/21/2021    IR THROMBOLYSIS ART/VENOUS INFUSION SUBSQ DAY  3/21/2021    STERNOTOMY  2023    right atrial thrombectomy    THROMBECTOMY ATRIUM Right 2023    THROMBECTOMY PERCUTANEOUS N/A 2023    Procedure: Emergency  Sternotomy, Right Atrial Thrombectomy, Central Cannulation, Bilateral Femoral Cannulation, Closure of PFO ; Transesophageal Echocardiogram performed by anesthesia staff;  Surgeon: Adelfo Elmore MD;  Location: [facility] OR    THROMBECTOMY PERCUTANEOUS N/A 2/18/2023    Procedure: Angiovac Mediated for Right Atrial Clot; Intracatheter Thrombectomy via bilateral percutaneous femoral venous access with 26 FR Right Femoral vein and 17 FR Left Femoral Vein cannulas. Transesophageal echchocardiogram performed by anesthesia staff;  Surgeon: Po Monterroso MD;  Location: [facility] OR       Social History     Tobacco Use    Smoking status: Former     Packs/day: 0.25     Types: Cigarettes    Smokeless tobacco: Former    Tobacco comments:     Vaping stopped 2/26   Vaping Use    Vaping Use: Some days    Substances: Nicotine, Flavoring    Devices: Disposable   Substance Use Topics    Alcohol use: Not Currently    Drug use: No       Family History   Problem Relation Age of Onset    Thyroid Disease Mother     Cancer Maternal Grandfather     Asthma No family hx of     Diabetes No family hx of     Anesthesia Reaction No family hx of     Venous thrombosis No family hx of        ROS: A 12 system review of systems was negative other than noted here or above.     Exam:  /78 (BP Location: Left arm, Patient Position: Chair, Cuff Size: Adult Regular)   Pulse 78   Wt 102.8 kg (226 lb 11.2 oz)   LMP 01/10/2023 (Approximate)   SpO2 96%   BMI 38.91 kg/m       Physical Exam:  General : Alert, cooperative, comfortable  Skin: Skin color, texture and turgor normal, no rashes, no lesions   Lymph Nodes: No obvious adenopathy, no swelling   Eyes: No scleral icterus, equal pupils  HENT: no traumatic injury to the head or face, no gross abnormalities  Lungs: Normal respiratory effort, bilateral symmetrical breath sounds, No added sounds  Heart: Regular rate and rhythm, No murmurs, rub or gallop  Abdomen: Normal bowel sounds, no tenderness, no  organomegaly  Musculoskeletal: No obvious abnormalities  Neurologic: Grossly intact    Results:reviewed in details with the patient    Assessment/Plan:  #1 granulomatous polyangiitis/CO-3 ANCA vasculitis with glomerulonephritis, upper respiratory, musculoskeletal, and cutaneous involvement  #2 CKD stage III  Diagnosed early at the age of 10 when she received hemodialysis at that point and Cytoxan.  It seemed that she had another flare in 2021 which was also treated with Cytoxan.  Her third flare was in January 2023 where she was treated with 3 doses of rituximab and was found to be pregnant so her forth dose of rituximab was withheld. She has been on AZA while pregnant.   - She continues to be in clinical and biochemical remission at this point.  No symptoms of active disease.  Her serologies and her inflammatory markers continue to decrease. Her hematuria have resolved and her proteinuria has been improving.  - Her last dose of rituximab was in January 24,2023.  Her CD19 count remains below 1%. We started her on imuran 50 mg her during her pregnancy as maintenance to keep her immune system under control given her background of comorbidities and her high risk for complications. Now that she delivered, will resume her rituximab 1 g every 6 months for at least 2 years(likely longer given her frequent relapses recently).    #3 hypertension: Her blood pressure is currently well controlled.  She is on metoprolol 12.5 mg twice daily.     #4 Anemia: She received IV iron loading. Her Hb today is at 11.2 g/dl.    #5 Contraception: she is currently using OCPs. She doesn't want any babies in the near future. I emphasized the importance of being consistent with her OCPs, because even if she has lower fertility given her CKD, she still can get pregnant.     #6 Subacute /cough: will check a viral panel and CXR. Viral panel came back positive for rhinovirus. She was instructed to keep away from the baby until her symptoms resolve.      #7 hypercoagulable state: She is being followed by Dr. Linton from hematology.  She is currently on Lovenox and she will need to be on lifelong anticoagulation.    *This dictation was prepared in part using Dragon recognition software.  As a result errors may occur. When identified these transcription errors have been corrected.  While every attempt is made to correct errors during dictation, errors may still exist.     Kang Goodson MD   [facility]   Department of Medicine   Division of Renal Disease and Hypertension

## 2023-09-26 NOTE — TELEPHONE ENCOUNTER
Called and spoke with pt.  Assisted with scheduling a Lab appt today at 7:50am.  Pt has a Inclinic appt with Dr. Goodson today at 8:00am.    Marlee Lawrence LPN

## 2023-09-26 NOTE — PROGRESS NOTES
Cande is a 23 year old female, , she is 3.5 weeks since her  section.  She was seen last week with the delivering group (Maternal Fetal Medicine), but she has had a couple days of LLQ pain. She called MFM and the RN told her to see ob/gyn.   It has been awaking her at night, with pain that also radiates to her back.  She has had some bowel changes, with having loose stools.  She has used Ibuprofen with a little help.      The patient's medical history, social history and family history are reviewed and updated as indicated.      Review of Systems:  10 point ROS of systems including Constitutional, Eyes, Respiratory, Cardiovascular, Gastroenterology, Genitourinary, Integumentary, Muscularskeletal, Psychiatric were all negative except for pertinent positives noted in my HPI and in the PMH.      Exam  /77 (BP Location: Right arm, Patient Position: Chair, Cuff Size: Adult Regular)   Pulse 92   Wt 102.5 kg (226 lb)   LMP 01/10/2023 (Approximate)   SpO2 98%   Breastfeeding No   BMI 38.79 kg/m    General:  WNWD female, NAD  Alert  Oriented x 3  Gait:  Normal  Skin:  Normal skin turgor  HEENT:  NC/AT, EOMI  Abdomen:  Non-distended in all quadrants.  No rebound pain.  No evidence of masses or hematomas seem.  She has ecchymosis from the Lovenox .   Pelvic exam:  Not performed  Extremities:  No clubbing, no cyanosis and no edema.      Assessment  LLQ pain  Lower back pain  Altered bowel function       Plan  At this time I don't find she has an acute abdomen.  The altered bowel function may be contributing to her symptoms.  I recommend to use Imodium if she has multiple loose stools in a day.  Ibuprofen may be used for musculoskeletal pain.   Return if symptoms worsen.        Antonio Sorensen MD

## 2023-09-26 NOTE — NURSING NOTE
Cande Shields's goals for this visit include:   Chief Complaint   Patient presents with    RECHECK     Follow up Lupus Nephritis  has concerns with cough  recently had  last week        She requests these members of her care team be copied on today's visit information: yes     PCP: Cira Amanda    Referring Provider:  Kang Goodson MD  [address] [place] [id number]    /78 (BP Location: Left arm, Patient Position: Chair, Cuff Size: Adult Regular)   Pulse 78   Wt 102.8 kg (226 lb 11.2 oz)   LMP 01/10/2023 (Approximate)   SpO2 96%   BMI 38.91 kg/m      Do you need any medication refills at today's visit? No       LUCA Suarez   Neph/Pulm [facility]

## 2023-09-26 NOTE — PATIENT INSTRUCTIONS
It was a pleasure taking care of you today.  I've included a brief summary of our discussion and care plan from today's visit below.  Please review this information with your primary care provider.     My recommendations are summarized as follows:  -will check an x-ray, respiratory swab for the cough  -Will get back to you about Rituximab  -keep imuran for now  -Check a home Covid test if not included in the swab already    Who do I call with any questions after my visit?  Please be in touch if there are any further questions that arise following today's visit.  There are multiple ways to contact your nephrology care team.       During business hours, you may reach your Nephrology Care Team or schedule or reschedule an appointment or lab at [phone].       If you need to schedule imaging, please call [phone].   To schedule a COVID test, please call [phone].     You can always send a secure message through FanIQ. FanIQ messages are answered by your nurse or doctor typically within 24-48 hours. Please allow extra time on weekends and holidays.       For urgent/emergent questions after business hours, you may reach the on-call Nephrology Fellow by contacting the [facility]  at [phone].     How will I get the results of any tests ordered?    You will receive all of your results.  If you have signed up for FanIQ, any tests ordered at your visit will be available to you once resulted on [employer]. Typically the physician reviews them and may or may not make further recommendations. If there are urgent results that require a change in your care plan, your physician or nurse will call you to discuss the next steps. If you are not on [employer], a letter may be generated and mailed to you with your results.

## 2023-09-27 NOTE — RESULT ENCOUNTER NOTE
Now she is not pregnant, we can resume her rituximab infusions 1 gram every 6 months for two years. Thanks     FRAN NICOLE MD

## 2023-09-28 RX ORDER — ALBUTEROL SULFATE 0.83 MG/ML
2.5 SOLUTION RESPIRATORY (INHALATION)
Status: CANCELLED | OUTPATIENT
Start: 2023-10-02

## 2023-09-28 RX ORDER — ALBUTEROL SULFATE 90 UG/1
1-2 AEROSOL, METERED RESPIRATORY (INHALATION)
Status: CANCELLED
Start: 2023-10-02

## 2023-09-28 RX ORDER — METHYLPREDNISOLONE SODIUM SUCCINATE 125 MG/2ML
125 INJECTION, POWDER, LYOPHILIZED, FOR SOLUTION INTRAMUSCULAR; INTRAVENOUS
Status: CANCELLED
Start: 2023-10-02

## 2023-09-28 RX ORDER — METHYLPREDNISOLONE SODIUM SUCCINATE 125 MG/2ML
100 INJECTION, POWDER, LYOPHILIZED, FOR SOLUTION INTRAMUSCULAR; INTRAVENOUS ONCE
Status: CANCELLED | OUTPATIENT
Start: 2023-10-02

## 2023-09-28 RX ORDER — DIPHENHYDRAMINE HYDROCHLORIDE 50 MG/ML
50 INJECTION INTRAMUSCULAR; INTRAVENOUS
Status: CANCELLED
Start: 2023-10-02

## 2023-09-28 RX ORDER — ACETAMINOPHEN 325 MG/1
650 TABLET ORAL ONCE
Status: CANCELLED
Start: 2023-10-02

## 2023-09-28 RX ORDER — EPINEPHRINE 1 MG/ML
0.3 INJECTION, SOLUTION, CONCENTRATE INTRAVENOUS EVERY 5 MIN PRN
Status: CANCELLED | OUTPATIENT
Start: 2023-10-02

## 2023-09-28 NOTE — TELEPHONE ENCOUNTER
Update from Dr. Goodson to initiate Rituximab IV now that patient is no longer pregnant.  Team Work makes the Dream Work!    Marielena Jaquez RN, BSN, PHN  Vasculitis & Lupus Program Nurse Navigator  [phone]

## 2023-09-29 DIAGNOSIS — I82.413 ACUTE DEEP VEIN THROMBOSIS (DVT) OF FEMORAL VEIN OF BOTH LOWER EXTREMITIES (H): Primary | ICD-10-CM

## 2023-09-29 LAB
PROTEINASE3 AB SER IA-ACNC: 2.2 U/ML
PROTEINASE3 AB SER IA-ACNC: ABNORMAL

## 2023-09-29 NOTE — TELEPHONE ENCOUNTER
Updated patient that Dr. Goodson would like patient to restart her Rituximab infusion.  Plan in and signed.  Called patient and she is aware and ok to proceed.  Request support to schedule, no time constraints.  Called [facility] per patient request and spoke to Ashley who will call me back on timing of maintenance Rituximab infusion.  Team Work makes the Dream Work!    Marielena Jaquez RN, BSN, PHN  Vasculitis & Lupus Program Nurse Navigator  [phone]

## 2023-10-02 ENCOUNTER — APPOINTMENT (OUTPATIENT)
Dept: LAB | Facility: CLINIC | Age: 23
End: 2023-10-02
Attending: INTERNAL MEDICINE
Payer: COMMERCIAL

## 2023-10-02 ENCOUNTER — MYC MEDICAL ADVICE (OUTPATIENT)
Dept: RHEUMATOLOGY | Facility: CLINIC | Age: 23
End: 2023-10-02

## 2023-10-02 DIAGNOSIS — M31.31 GRANULOMATOSIS WITH POLYANGIITIS WITH RENAL INVOLVEMENT (H): Primary | ICD-10-CM

## 2023-10-02 LAB

## 2023-10-02 PROCEDURE — 87633 RESP VIRUS 12-25 TARGETS: CPT | Performed by: INTERNAL MEDICINE

## 2023-10-02 PROCEDURE — 87486 CHLMYD PNEUM DNA AMP PROBE: CPT | Performed by: INTERNAL MEDICINE

## 2023-10-02 NOTE — TELEPHONE ENCOUNTER
Call from [facility] that they can get patient into her infusion tomorrow at 8am.  Appt confirmed and updated patient.  Reached out to Dr. Goodson with positive PCR and need to delay Rituximab or not.  Patient called and updated who also voiced concern if Infusion will cause patient to get more sick and need to hold infusion.  [facility] [phone].  Reached out to Dr. Goodson on if we need to delay Rituximab or not.  Team Work makes the Dream Work!    Marielena Jaquez RN, BSN, PHN  Vasculitis & Lupus Program Nurse Navigator  [phone]

## 2023-10-02 NOTE — TELEPHONE ENCOUNTER
Patient is calling requesting to to redo the PCR test so she can go see her son today.  Reviewed lab results/orders and routed to Dr. Goodson for ok.  Team Work makes the Dream Work!    Marielena Jaquez RN, BSN, PHN  Vasculitis & Lupus Program Nurse Navigator  [phone]

## 2023-10-02 NOTE — TELEPHONE ENCOUNTER
Update from Dr. Hamzah gonzalez to hold Rituximab infusion for 1 week while PCR +.  Update sent to patient via [employer].  Will follow up later this week on plan moving forward.  Team Work makes the Dream Work!    Marielena Jaquez RN, BSN, PHN  Vasculitis & Lupus Program Nurse Navigator  [phone]

## 2023-10-03 NOTE — TELEPHONE ENCOUNTER
Update from Dr. Goodson to follow up with patient to talk with NICU if no symptoms may beable to see son.  Encourage patient to reschedule Rituximab.

## 2023-10-14 ENCOUNTER — MYC MEDICAL ADVICE (OUTPATIENT)
Dept: OBGYN | Facility: CLINIC | Age: 23
End: 2023-10-14
Payer: COMMERCIAL

## 2023-10-16 NOTE — TELEPHONE ENCOUNTER
Pt last seen 2023 for LLQ abdominal pain, pt had  on 2023.    Pt has been having bleeding since delivery, recently increased, changing a pad every couple hours. Pt has some cramping.    Pt denies vaginal itching or abnormal discharge.    Pt currently on blood thinners and asking if this can contribute to heavier bleeding.    Pt has not started on OCPs, cancelled them when they were sent. Pt asking if these would help with her bleeding.    RN routing to provider to advise.    Anu Agarwal RN on 10/16/2023 at 2:09 PM

## 2023-10-19 ENCOUNTER — MEDICAL CORRESPONDENCE (OUTPATIENT)
Dept: HEALTH INFORMATION MANAGEMENT | Facility: CLINIC | Age: 23
End: 2023-10-19
Payer: COMMERCIAL

## 2023-10-25 ENCOUNTER — TELEPHONE (OUTPATIENT)
Dept: INTERNAL MEDICINE | Facility: CLINIC | Age: 23
End: 2023-10-25
Payer: COMMERCIAL

## 2023-10-25 NOTE — TELEPHONE ENCOUNTER
Writer was initially talking to patient re: child and concerns for him as she just tested positive for COVID today via self test, when patient mentioned that she may be interested in treatment options as she had a bad case of COVID when she was pregnant and ended up with a blood clot.     Writer asked patient a few questions below re: COVID and then scheduled patient for virtual  visit tomorrow morning to discuss tx options as patient is on Eliquis. Patient aware of appt time, patient also aware of [facility] COVID-19 Telehealth Program for treatment options as well:    1. What is the date of your positive COVID-19 lab test or COVID-19 at-home self-test? Today    2. Was there any known exposure to COVID before the symptoms began? Boyfriend was feeling sick    3. Have you been exposed to anyone with a positive influenza test result in the last week? If so, when? No    4. What date did the COVID-19 symptoms start? Maybe 10/22    5. What is your worst symptom? (cough, fever, shortness of breath, muscle aches) Only having cough right now    6. Do you have a cough? If yes, how bad is the cough? Mild/moderate    7. Do you have a fever? If yes, what is your temperature, and when did it start? No    8. Describe your breathing. (short of breath, wheezing, unable to speak, or no significant respiratory symptoms) Normal    9. Are you getting better, staying the same, or getting worse compared to yesterday? Staying the same    10. Do you have any chronic medical problems? (asthma, heart or lung disease, weak immune system, obesity, etc.) Had blood clot (on blood thinners), CKD3    11. Do you have any other symptoms?  (chills, fatigue, headache, loss of smell or taste, muscle pain, sore throat, vomiting) None    12. Do you use an oxygen saturation monitor (pulse oximeter) at home? If yes, what is your reading (oxygen level) today? What is your usual oxygen saturation reading? (e.g., 95%) Not sure    13. Do you take Leah's  Wort, or other over-the-counter medications or herbal supplements? If yes, please list over-the-counter medications if they are not already visible on your current [facility] medication list. No    14. Is there any chance you are pregnant? No, just had kiddo a few weeks ago      CHAO Martinez, RN  [facility]

## 2023-10-26 ENCOUNTER — VIRTUAL VISIT (OUTPATIENT)
Dept: URGENT CARE | Facility: CLINIC | Age: 23
End: 2023-10-26
Payer: COMMERCIAL

## 2023-10-26 DIAGNOSIS — Z53.9 ERRONEOUS ENCOUNTER--DISREGARD: Primary | ICD-10-CM

## 2023-10-26 NOTE — PROGRESS NOTES
No answer when called for visit.    Alannah Gibbons PA-C  [facility]    This encounter was opened in error. Please disregard.

## 2023-11-06 ENCOUNTER — PATIENT OUTREACH (OUTPATIENT)
Dept: NEPHROLOGY | Facility: CLINIC | Age: 23
End: 2023-11-06
Payer: COMMERCIAL

## 2023-11-06 NOTE — PROGRESS NOTES
Vasculitis and Lupus Program Note: Patient Outreach Encounter    REASON FOR CALL:     REASON FOR CALL: RECHECK (Follow up on patient status ) and Lupus Program Care Coordination  Last clinic and contact with patient reported cold and then COVID symptoms. Also checking on rescheduling her Rituximab infusion and follow up with Dr. Call or recheck of labs message left and MyChart as well for timely support.                               SITUATION/BACKROUND:   Patient is being treated for Lupus Nephritis.    Per provider instruction, writer to follow up on medication change., lab results., and symptom follow up.  Message left and reached out via [employer] for timely support.    Marielena Jaquez RN  Vasculitis and Lupus Program: [phone]

## 2023-11-07 ENCOUNTER — MEDICAL CORRESPONDENCE (OUTPATIENT)
Dept: HEALTH INFORMATION MANAGEMENT | Facility: CLINIC | Age: 23
End: 2023-11-07
Payer: COMMERCIAL

## 2023-12-06 ENCOUNTER — OFFICE VISIT (OUTPATIENT)
Dept: FAMILY MEDICINE | Facility: CLINIC | Age: 23
End: 2023-12-06
Payer: COMMERCIAL

## 2023-12-06 VITALS
HEART RATE: 81 BPM | DIASTOLIC BLOOD PRESSURE: 76 MMHG | BODY MASS INDEX: 38.79 KG/M2 | RESPIRATION RATE: 14 BRPM | TEMPERATURE: 98.7 F | WEIGHT: 226 LBS | OXYGEN SATURATION: 98 % | SYSTOLIC BLOOD PRESSURE: 118 MMHG

## 2023-12-06 DIAGNOSIS — N30.01 ACUTE CYSTITIS WITH HEMATURIA: ICD-10-CM

## 2023-12-06 DIAGNOSIS — R30.0 DYSURIA: Primary | ICD-10-CM

## 2023-12-06 LAB
ALBUMIN UR-MCNC: 100 MG/DL
APPEARANCE UR: ABNORMAL
BACTERIA #/AREA URNS HPF: ABNORMAL /HPF
BILIRUB UR QL STRIP: NEGATIVE
COLOR UR AUTO: YELLOW
GLUCOSE UR STRIP-MCNC: NEGATIVE MG/DL
HGB UR QL STRIP: ABNORMAL
KETONES UR STRIP-MCNC: NEGATIVE MG/DL
LEUKOCYTE ESTERASE UR QL STRIP: ABNORMAL
NITRATE UR QL: NEGATIVE
PH UR STRIP: 6 [PH] (ref 5–7)
RBC #/AREA URNS AUTO: ABNORMAL /HPF
SP GR UR STRIP: 1.02 (ref 1–1.03)
SQUAMOUS #/AREA URNS AUTO: ABNORMAL /LPF
UROBILINOGEN UR STRIP-ACNC: 0.2 E.U./DL
WBC #/AREA URNS AUTO: ABNORMAL /HPF

## 2023-12-06 PROCEDURE — 87086 URINE CULTURE/COLONY COUNT: CPT | Performed by: FAMILY MEDICINE

## 2023-12-06 PROCEDURE — 99213 OFFICE O/P EST LOW 20 MIN: CPT | Performed by: FAMILY MEDICINE

## 2023-12-06 PROCEDURE — 81001 URINALYSIS AUTO W/SCOPE: CPT | Performed by: FAMILY MEDICINE

## 2023-12-06 PROCEDURE — 87186 SC STD MICRODIL/AGAR DIL: CPT | Performed by: FAMILY MEDICINE

## 2023-12-06 RX ORDER — NITROFURANTOIN 25; 75 MG/1; MG/1
100 CAPSULE ORAL 2 TIMES DAILY
Qty: 10 CAPSULE | Refills: 0 | Status: SHIPPED | OUTPATIENT
Start: 2023-12-06 | End: 2023-12-11

## 2023-12-06 ASSESSMENT — ENCOUNTER SYMPTOMS: DYSURIA: 1

## 2023-12-06 ASSESSMENT — PAIN SCALES - GENERAL: PAINLEVEL: NO PAIN (0)

## 2023-12-06 NOTE — PROGRESS NOTES
"  Assessment & Plan     Dysuria  - UA Macroscopic with reflex to Microscopic and Culture - Lab Collect; Future  - UA Macroscopic with reflex to Microscopic and Culture - Lab Collect  - UA Microscopic with Reflex to Culture  - Urine Culture    Acute cystitis with hematuria  - nitroFURantoin macrocrystal-monohydrate (MACROBID) 100 MG capsule; Take 1 capsule (100 mg) by mouth 2 times daily for 5 days           BMI:   Estimated body mass index is 38.79 kg/m  as calculated from the following:    Height as of 7/24/23: 1.626 m (5' 4\").    Weight as of this encounter: 102.5 kg (226 lb).           Kai Cosme DO  [facility] CORONA Castellon is a 23 year old, presenting for the following health issues:  Dysuria        12/6/2023    10:46 AM   Additional Questions   Roomed by RACHID WOODSON   Accompanied by SELF       History of Present Illness       Reason for visit:  UTI, abdominal pain ans back pain  Symptom onset:  1-2 weeks ago  Symptom intensity:  Moderate  Symptom progression:  Worsening  Had these symptoms before:  Yes  Has tried/received treatment for these symptoms:  Yes  Previous treatment was successful:  Yes  Prior treatment description:  Uti medication  What makes it worse:  No  What makes it better:  No    She eats 0-1 servings of fruits and vegetables daily.She consumes 2 sweetened beverage(s) daily.She exercises with enough effort to increase her heart rate 30 to 60 minutes per day.  She exercises with enough effort to increase her heart rate 7 days per week. She is missing 5 dose(s) of medications per week.  She is not taking prescribed medications regularly due to side effects and remembering to take.         Review of Systems   Genitourinary:  Positive for dysuria.      Constitutional, HEENT, cardiovascular, pulmonary, gi and gu systems are negative, except as otherwise noted.      Objective    /76   Pulse 81   Temp 98.7  F (37.1  C) (Tympanic)   Resp 14   Wt 102.5 kg " (226 lb)   LMP 11/07/2023 (Approximate)   SpO2 98%   BMI 38.79 kg/m    Body mass index is 38.79 kg/m .  Physical Exam   GENERAL: healthy, alert and no distress  RESP: lungs clear to auscultation - no rales, rhonchi or wheezes  CV: regular rates and rhythm, normal S1 S2, no S3 or S4, and no murmur, click or rub  ABDOMEN: soft, nontender, no hepatosplenomegaly, no masses  MS: no gross musculoskeletal defects noted, no edema

## 2023-12-07 LAB — BACTERIA UR CULT: ABNORMAL

## 2023-12-11 ENCOUNTER — MYC MEDICAL ADVICE (OUTPATIENT)
Dept: FAMILY MEDICINE | Facility: CLINIC | Age: 23
End: 2023-12-11
Payer: COMMERCIAL

## 2023-12-11 DIAGNOSIS — N30.01 ACUTE CYSTITIS WITH HEMATURIA: Primary | ICD-10-CM

## 2023-12-13 NOTE — TELEPHONE ENCOUNTER
Would provider want a repeat lab or be seen?     Please advise   [  Thank you     Shama Mancuso RN, BSN  [facility]

## 2023-12-14 NOTE — TELEPHONE ENCOUNTER
If still symptomatic, we should repeat urinalysis/UCx. Future orders placed.    Kai Cosme DO  12/13/2023 10:04 PM

## 2024-01-15 DIAGNOSIS — O99.419 HEART DISEASE DURING PREGNANCY, ANTEPARTUM: Primary | ICD-10-CM

## 2024-01-15 DIAGNOSIS — I51.9 HEART DISEASE DURING PREGNANCY, ANTEPARTUM: Primary | ICD-10-CM

## 2024-01-17 ENCOUNTER — MEDICAL CORRESPONDENCE (OUTPATIENT)
Dept: HEALTH INFORMATION MANAGEMENT | Facility: CLINIC | Age: 24
End: 2024-01-17

## 2024-01-17 ENCOUNTER — DOCUMENTATION ONLY (OUTPATIENT)
Dept: RHEUMATOLOGY | Facility: CLINIC | Age: 24
End: 2024-01-17

## 2024-01-17 ENCOUNTER — PATIENT OUTREACH (OUTPATIENT)
Dept: NEPHROLOGY | Facility: CLINIC | Age: 24
End: 2024-01-17

## 2024-01-17 NOTE — PROGRESS NOTES
Reached out to patient to check in on status.  Need to schedule infusion.  Left message to return call. MyChart message also sent.    Marielena Jaquez RN, BSN, PHN  Vasculitis & Lupus Program Nephrology Nurse Navigator  [phone]

## 2024-01-17 NOTE — PROGRESS NOTES
Cande was a no-show to her Rheumatology appointment today       Connor Domínguez MD  Rheumatology attending

## 2024-01-24 NOTE — PROGRESS NOTES
Update from patient requesting assistance in scheduling appts. Reached out via MyChart to to clarify ask.  Marielena Jaquez RN, BSN, PHN  Vasculitis & Lupus Program Nephrology Nurse Navigator  [phone]

## 2024-01-25 NOTE — PROGRESS NOTES
[facility] notified and can fit her in for Thursday, Feb 1 at 1pm.  Givit sent to patient with appt details.    Marielena Jaquez RN, BSN, PHN  Vasculitis & Lupus Program Nephrology Nurse Navigator  [phone]

## 2024-01-29 ENCOUNTER — ANCILLARY PROCEDURE (OUTPATIENT)
Dept: ULTRASOUND IMAGING | Facility: CLINIC | Age: 24
End: 2024-01-29
Attending: INTERNAL MEDICINE
Payer: COMMERCIAL

## 2024-01-29 ENCOUNTER — LAB (OUTPATIENT)
Dept: LAB | Facility: CLINIC | Age: 24
End: 2024-01-29
Attending: INTERNAL MEDICINE
Payer: COMMERCIAL

## 2024-01-29 ENCOUNTER — PATIENT OUTREACH (OUTPATIENT)
Dept: NEPHROLOGY | Facility: CLINIC | Age: 24
End: 2024-01-29

## 2024-01-29 DIAGNOSIS — M79.661 PAIN IN RIGHT LOWER LEG: ICD-10-CM

## 2024-01-29 DIAGNOSIS — I77.82 ANCA-ASSOCIATED VASCULITIS (H): ICD-10-CM

## 2024-01-29 DIAGNOSIS — I51.9 HEART DISEASE DURING PREGNANCY, ANTEPARTUM: ICD-10-CM

## 2024-01-29 DIAGNOSIS — O99.419 HEART DISEASE DURING PREGNANCY, ANTEPARTUM: ICD-10-CM

## 2024-01-29 DIAGNOSIS — I77.82 ANCA-ASSOCIATED VASCULITIS (H): Primary | ICD-10-CM

## 2024-01-29 DIAGNOSIS — Z86.718 HX OF BLOOD CLOTS: ICD-10-CM

## 2024-01-29 LAB
ALBUMIN MFR UR ELPH: 35.9 MG/DL
ALBUMIN SERPL BCG-MCNC: 4.1 G/DL (ref 3.5–5.2)
ALBUMIN UR-MCNC: 30 MG/DL
ANION GAP SERPL CALCULATED.3IONS-SCNC: 13 MMOL/L (ref 7–15)
APPEARANCE UR: CLEAR
BASOPHILS # BLD AUTO: 0.1 10E3/UL (ref 0–0.2)
BASOPHILS NFR BLD AUTO: 1 %
BILIRUB UR QL STRIP: NEGATIVE
BUN SERPL-MCNC: 30.8 MG/DL (ref 6–20)
CALCIUM SERPL-MCNC: 9.4 MG/DL (ref 8.6–10)
CHLORIDE SERPL-SCNC: 106 MMOL/L (ref 98–107)
COLOR UR AUTO: ABNORMAL
CREAT SERPL-MCNC: 1.29 MG/DL (ref 0.51–0.95)
CREAT UR-MCNC: 77.4 MG/DL
CRP SERPL-MCNC: 4.59 MG/L
DEPRECATED HCO3 PLAS-SCNC: 20 MMOL/L (ref 22–29)
EGFRCR SERPLBLD CKD-EPI 2021: 59 ML/MIN/1.73M2
EOSINOPHIL # BLD AUTO: 0.2 10E3/UL (ref 0–0.7)
EOSINOPHIL NFR BLD AUTO: 2 %
ERYTHROCYTE [DISTWIDTH] IN BLOOD BY AUTOMATED COUNT: 13.9 % (ref 10–15)
GLUCOSE SERPL-MCNC: 99 MG/DL (ref 70–99)
GLUCOSE UR STRIP-MCNC: NEGATIVE MG/DL
HCT VFR BLD AUTO: 37.9 % (ref 35–47)
HGB BLD-MCNC: 11.8 G/DL (ref 11.7–15.7)
HGB UR QL STRIP: ABNORMAL
IMM GRANULOCYTES # BLD: 0 10E3/UL
IMM GRANULOCYTES NFR BLD: 0 %
KETONES UR STRIP-MCNC: NEGATIVE MG/DL
LEUKOCYTE ESTERASE UR QL STRIP: ABNORMAL
LYMPHOCYTES # BLD AUTO: 2.1 10E3/UL (ref 0.8–5.3)
LYMPHOCYTES NFR BLD AUTO: 21 %
MCH RBC QN AUTO: 26.3 PG (ref 26.5–33)
MCHC RBC AUTO-ENTMCNC: 31.1 G/DL (ref 31.5–36.5)
MCV RBC AUTO: 84 FL (ref 78–100)
MONOCYTES # BLD AUTO: 0.7 10E3/UL (ref 0–1.3)
MONOCYTES NFR BLD AUTO: 7 %
NEUTROPHILS # BLD AUTO: 6.7 10E3/UL (ref 1.6–8.3)
NEUTROPHILS NFR BLD AUTO: 69 %
NITRATE UR QL: NEGATIVE
NRBC # BLD AUTO: 0 10E3/UL
NRBC BLD AUTO-RTO: 0 /100
PH UR STRIP: 6 [PH] (ref 5–7)
PHOSPHATE SERPL-MCNC: 4.2 MG/DL (ref 2.5–4.5)
PLATELET # BLD AUTO: 360 10E3/UL (ref 150–450)
POTASSIUM SERPL-SCNC: 4.9 MMOL/L (ref 3.4–5.3)
PROT/CREAT 24H UR: 0.46 MG/MG CR (ref 0–0.2)
RBC # BLD AUTO: 4.49 10E6/UL (ref 3.8–5.2)
RBC #/AREA URNS AUTO: ABNORMAL /HPF
SKIP: ABNORMAL
SODIUM SERPL-SCNC: 139 MMOL/L (ref 135–145)
SP GR UR STRIP: 1.02 (ref 1–1.03)
SQUAMOUS #/AREA URNS AUTO: ABNORMAL /LPF
UROBILINOGEN UR STRIP-MCNC: NORMAL MG/DL
WBC # BLD AUTO: 9.7 10E3/UL (ref 4–11)
WBC #/AREA URNS AUTO: ABNORMAL /HPF

## 2024-01-29 PROCEDURE — 84156 ASSAY OF PROTEIN URINE: CPT

## 2024-01-29 PROCEDURE — 86036 ANCA SCREEN EACH ANTIBODY: CPT

## 2024-01-29 PROCEDURE — 86037 ANCA TITER EACH ANTIBODY: CPT

## 2024-01-29 PROCEDURE — 85025 COMPLETE CBC W/AUTO DIFF WBC: CPT

## 2024-01-29 PROCEDURE — 81001 URINALYSIS AUTO W/SCOPE: CPT

## 2024-01-29 PROCEDURE — 36415 COLL VENOUS BLD VENIPUNCTURE: CPT

## 2024-01-29 PROCEDURE — 83876 ASSAY MYELOPEROXIDASE: CPT

## 2024-01-29 PROCEDURE — 82570 ASSAY OF URINE CREATININE: CPT

## 2024-01-29 PROCEDURE — 82043 UR ALBUMIN QUANTITATIVE: CPT

## 2024-01-29 PROCEDURE — 83516 IMMUNOASSAY NONANTIBODY: CPT

## 2024-01-29 PROCEDURE — 93971 EXTREMITY STUDY: CPT | Mod: RT | Performed by: STUDENT IN AN ORGANIZED HEALTH CARE EDUCATION/TRAINING PROGRAM

## 2024-01-29 PROCEDURE — 80069 RENAL FUNCTION PANEL: CPT

## 2024-01-29 PROCEDURE — 86140 C-REACTIVE PROTEIN: CPT

## 2024-01-29 NOTE — PROGRESS NOTES
Update from Dr. March for Order of Right Leg Ultrasound to rule out DVT, patient has R calf pain that radiates from right leg up to right buttock.  Patient has history of blood clots.  Jia- scheduling has 315pm appt for 330 Ultrasound appt.  Returned call to patient who is agreeable to appt, requesting labs be moved up.  Returned call to radiology and spoke to Chang who scheduled patient today at [facility] for ultrasound at 315arrival for 330pm appt.  Patient labs moved to 350pm appt to ensure she is done with ultrasound as per patient request.  [email or web address] message sent of appt confirming them.  Updated provider appts scheduled and orders in.    Marielena Jaquez RN, BSN, PHN  Vasculitis & Lupus Program Nephrology Nurse Navigator  [phone]

## 2024-01-29 NOTE — PROGRESS NOTES
Patient reports purpura red area, looks like pimple in nature, near elbow/forearm, right inner arm.  Red spot appeared, over weekend and this morning it looks like a pimple or pus filled.  Does not itch and is painful to touch.  Patient has concern this could be flare symptom usually more than 1 dot but mimic flare symptom. Size of red spot is pencil eraser.  Other red spot top of right foot looks like purpura spot, does not itch or painful to touch.  No swelling or other skin rash noted.  Pain is radiating from ankle to buttock whole right leg.  Neg dionicio's sign.  Denies numbness or tingling and no swelling of right leg vs left.   Tingling and pain started 2 days ago in calf, that encompassed ankle and now radiating to buttock on Right side only.  Vasculitis labs ordered per standing Triage protocol.   CD19 added and reached out to Dr. March to see if Ultrasound is needed to rule out clot.    Marielena Jaquez RN, BSN, PHN  Vasculitis & Lupus Program Nephrology Nurse Navigator  [phone]

## 2024-01-30 ENCOUNTER — TELEPHONE (OUTPATIENT)
Dept: RHEUMATOLOGY | Facility: CLINIC | Age: 24
End: 2024-01-30
Payer: COMMERCIAL

## 2024-01-30 LAB
CREAT UR-MCNC: 74.6 MG/DL
MICROALBUMIN UR-MCNC: 190 MG/L
MICROALBUMIN/CREAT UR: 254.69 MG/G CR (ref 0–25)

## 2024-01-30 NOTE — PROGRESS NOTES
Updated Dr. March, provider covering for Dr. Goodson, of lab results and if needing any additional therapy.     Reached out to patient who confirmed she has reviewed lab results and noted they are within her baseline at this time.  Patient is not breast feeding at this time, reviewed if we should return back to her previous med regimen before she was pregnant or not.  Patient reports she would like to stop the Azathioprine because if causes her to be lightheaded and nauseous at times.  Would like to see if maintenance Rituximab is enough.  Patient denies need to add any other medications like Prednisone at this time.  Ok to wait for Rituximab infusion later this week.    Patient is also reporting increased anxiety.  Encouraged patient to reach out to her PCP to help support her symptoms.  Patient verbalized she will send a [employer] message to per PCP for timely support.    Noted patient does not have a follow up scheduled with Dr. Call and did not make it to Dr. Domínguez.  Patient is agreeable to make appt to discuss med regimen and she will make it work.  Fwd patient request to schedule follow up with Dr. Domínguez to [facility] for timely support.  Marielena Jaquez RN, BSN, PHN  Vasculitis & Lupus Program Nephrology Nurse Navigator  [phone]

## 2024-01-31 LAB
MYELOPEROXIDASE AB SER IA-ACNC: 0.3 U/ML
MYELOPEROXIDASE AB SER IA-ACNC: NEGATIVE
PROTEINASE3 AB SER IA-ACNC: 8.5 U/ML
PROTEINASE3 AB SER IA-ACNC: POSITIVE

## 2024-02-01 ENCOUNTER — LAB (OUTPATIENT)
Dept: INFUSION THERAPY | Facility: CLINIC | Age: 24
End: 2024-02-01
Attending: NURSE PRACTITIONER
Payer: COMMERCIAL

## 2024-02-01 ENCOUNTER — INFUSION THERAPY VISIT (OUTPATIENT)
Dept: INFUSION THERAPY | Facility: CLINIC | Age: 24
End: 2024-02-01
Attending: INTERNAL MEDICINE
Payer: COMMERCIAL

## 2024-02-01 VITALS
TEMPERATURE: 98.2 F | HEART RATE: 68 BPM | WEIGHT: 228.1 LBS | SYSTOLIC BLOOD PRESSURE: 110 MMHG | BODY MASS INDEX: 39.15 KG/M2 | DIASTOLIC BLOOD PRESSURE: 75 MMHG | RESPIRATION RATE: 16 BRPM | OXYGEN SATURATION: 99 %

## 2024-02-01 DIAGNOSIS — M31.31 GRANULOMATOSIS WITH POLYANGIITIS WITH RENAL INVOLVEMENT (H): Primary | ICD-10-CM

## 2024-02-01 LAB
HOLD SPECIMEN: NORMAL
HOLD SPECIMEN: NORMAL

## 2024-02-01 PROCEDURE — 250N000011 HC RX IP 250 OP 636: Performed by: INTERNAL MEDICINE

## 2024-02-01 PROCEDURE — 86355 B CELLS TOTAL COUNT: CPT | Performed by: INTERNAL MEDICINE

## 2024-02-01 PROCEDURE — 96415 CHEMO IV INFUSION ADDL HR: CPT

## 2024-02-01 PROCEDURE — 36415 COLL VENOUS BLD VENIPUNCTURE: CPT | Performed by: INTERNAL MEDICINE

## 2024-02-01 PROCEDURE — 82784 ASSAY IGA/IGD/IGG/IGM EACH: CPT | Performed by: INTERNAL MEDICINE

## 2024-02-01 PROCEDURE — 96413 CHEMO IV INFUSION 1 HR: CPT

## 2024-02-01 PROCEDURE — 250N000013 HC RX MED GY IP 250 OP 250 PS 637: Performed by: INTERNAL MEDICINE

## 2024-02-01 PROCEDURE — 258N000003 HC RX IP 258 OP 636: Performed by: INTERNAL MEDICINE

## 2024-02-01 PROCEDURE — 96375 TX/PRO/DX INJ NEW DRUG ADDON: CPT

## 2024-02-01 RX ORDER — METHYLPREDNISOLONE SODIUM SUCCINATE 125 MG/2ML
125 INJECTION, POWDER, LYOPHILIZED, FOR SOLUTION INTRAMUSCULAR; INTRAVENOUS
Start: 2024-02-01

## 2024-02-01 RX ORDER — EPINEPHRINE 1 MG/ML
0.3 INJECTION, SOLUTION INTRAMUSCULAR; SUBCUTANEOUS EVERY 5 MIN PRN
OUTPATIENT
Start: 2024-02-01

## 2024-02-01 RX ORDER — ALBUTEROL SULFATE 0.83 MG/ML
2.5 SOLUTION RESPIRATORY (INHALATION)
Status: CANCELLED | OUTPATIENT
Start: 2024-02-01

## 2024-02-01 RX ORDER — DIPHENHYDRAMINE HYDROCHLORIDE 50 MG/ML
50 INJECTION INTRAMUSCULAR; INTRAVENOUS
Start: 2024-02-01

## 2024-02-01 RX ORDER — ACETAMINOPHEN 325 MG/1
650 TABLET ORAL ONCE
Status: COMPLETED | OUTPATIENT
Start: 2024-02-01 | End: 2024-02-01

## 2024-02-01 RX ORDER — ACETAMINOPHEN 325 MG/1
650 TABLET ORAL ONCE
Status: CANCELLED
Start: 2024-02-01

## 2024-02-01 RX ORDER — METHYLPREDNISOLONE SODIUM SUCCINATE 125 MG/2ML
100 INJECTION, POWDER, LYOPHILIZED, FOR SOLUTION INTRAMUSCULAR; INTRAVENOUS ONCE
Status: COMPLETED | OUTPATIENT
Start: 2024-02-01 | End: 2024-02-01

## 2024-02-01 RX ORDER — ALBUTEROL SULFATE 90 UG/1
1-2 AEROSOL, METERED RESPIRATORY (INHALATION)
Start: 2024-02-01

## 2024-02-01 RX ORDER — METHYLPREDNISOLONE SODIUM SUCCINATE 125 MG/2ML
100 INJECTION, POWDER, LYOPHILIZED, FOR SOLUTION INTRAMUSCULAR; INTRAVENOUS ONCE
Status: CANCELLED | OUTPATIENT
Start: 2024-02-01

## 2024-02-01 RX ORDER — DIPHENHYDRAMINE HYDROCHLORIDE 50 MG/ML
50 INJECTION INTRAMUSCULAR; INTRAVENOUS
Status: CANCELLED
Start: 2024-02-01

## 2024-02-01 RX ORDER — EPINEPHRINE 1 MG/ML
0.3 INJECTION, SOLUTION INTRAMUSCULAR; SUBCUTANEOUS EVERY 5 MIN PRN
Status: CANCELLED | OUTPATIENT
Start: 2024-02-01

## 2024-02-01 RX ORDER — ALBUTEROL SULFATE 90 UG/1
1-2 AEROSOL, METERED RESPIRATORY (INHALATION)
Status: CANCELLED
Start: 2024-02-01

## 2024-02-01 RX ORDER — ALBUTEROL SULFATE 0.83 MG/ML
2.5 SOLUTION RESPIRATORY (INHALATION)
OUTPATIENT
Start: 2024-02-01

## 2024-02-01 RX ORDER — METHYLPREDNISOLONE SODIUM SUCCINATE 125 MG/2ML
125 INJECTION, POWDER, LYOPHILIZED, FOR SOLUTION INTRAMUSCULAR; INTRAVENOUS
Status: CANCELLED
Start: 2024-02-01

## 2024-02-01 RX ADMIN — METHYLPREDNISOLONE SODIUM SUCCINATE 100 MG: 125 INJECTION, POWDER, FOR SOLUTION INTRAMUSCULAR; INTRAVENOUS at 13:46

## 2024-02-01 RX ADMIN — RITUXIMAB-ABBS 1000 MG: 10 INJECTION, SOLUTION INTRAVENOUS at 14:16

## 2024-02-01 RX ADMIN — DIPHENHYDRAMINE HYDROCHLORIDE 50 MG: 50 INJECTION, SOLUTION INTRAMUSCULAR; INTRAVENOUS at 14:00

## 2024-02-01 RX ADMIN — ACETAMINOPHEN 650 MG: 325 TABLET ORAL at 13:43

## 2024-02-01 RX ADMIN — SODIUM CHLORIDE 250 ML: 9 INJECTION, SOLUTION INTRAVENOUS at 14:16

## 2024-02-01 NOTE — PROGRESS NOTES
Infusion Nursing Note:  Cande ARMAS Luisana presents today for Rituxan.    Patient seen by provider today: No   present during visit today: Not Applicable.    Note: Patient reports tolerating infusions in the past.     Intravenous Access:  Peripheral IV placed.    Treatment Conditions:  ~~~ NOTE: If the patient answers yes to any of the questions below, hold the infusion and contact ordering provider or on-call provider.    Do you currently have any signs of illness or infection or are you on any antibiotics? No  Have you recently had an elevated temperature, fever, chills, productive cough, coughing for 3 weeks or longer or hemoptysis, abnormal vital signs, night sweats, chest pain or have you noticed a decrease in your appetite, unexplained weight loss or fatigue? No  Have you had any new, sudden, or worsening abdominal pain? No  Do you have any open wounds or new incisions? (exclude for patients with hidradenitis suppurativa) No  Have you recently been diagnosed with any new nervous system diseases (ie. Multiple sclerosis, Guillain Harrison, seizures, neurological changes) or cancer diagnosis? Are you on any form of radiation or chemotherapy? No  Have there been any other new onset medical symptoms? No  Are you pregnant or breast feeding or do you have plans of pregnancy in the future? No; N/A  Do you have any upcoming hospitalizations or surgeries? Does not include esophagogastroduodenoscopy, colonoscopy, endoscopic retrograde cholangiopancreatography (ERCP), endoscopic ultrasound (EUS), dental procedures (including cleanings, fillings, implants, extractions)  or joint aspiration/steroid injections No  Have you or anyone in your household received a live vaccination in the past 4 weeks? Please note: No live vaccines while on biologic/chemotherapy until 6 months after the last treatment. Patient can receive the flu vaccine (shot only).  It is optimal for the patient to get it mid cycle, but it can be given at  any time as long as it is not on the day of the infusion. No  If applicable to prescribed medication, confirm negative PPD or quantiferon gold MTB. If positive, verify has negative chest x-ray or the patient is at least 4 weeks post initiation of INH/B6 therapy and have clearance from provider before infusion N\A  If applicable to prescribed medication, confirm negative hepatitis B surface antigen or hepatitis C. If positive, clearance from provider before infusion. Y  Rheumatology patients receiving tocilizumab (Actemra): If labs were drawn within the past week, hold dosing until cleared to infuse If AST/ALT > 2 X upper limit normal; ANC < 1.0. NO; N/A  Patients receiving belimumab (Benlysta): Have you been having any signs of worsening depression or suicidal ideations? No; N/A     Post Infusion Assessment:  Patient tolerated infusion without incident.  Site patent and intact, free from redness, edema or discomfort.  No evidence of extravasations.  Access discontinued per protocol.  Biologic Infusion Post Education: Call the triage nurse at your clinic or seek medical attention if you have chills and/or temperature greater than or equal to 100.5, uncontrolled nausea/vomiting, diarrhea, constipation, dizziness, shortness of breath, chest pain, heart palpitations, weakness or any other new or concerning symptoms, questions or concerns.  You cannot have any live virus vaccines prior to or during treatment or up to 6 months post infusion.  If you have an upcoming surgery, medical procedure or dental procedure during treatment, this should be discussed with your ordering physician and your surgeon/dentist.  If you are having any concerning symptom, if you are unsure if you should get your next infusion or wish to speak to a provider before your next infusion, please call your care coordinator or triage nurse at your clinic to notify them so we can adequately serve you.       Discharge Plan:   AVS to patient via [employer].   Patient will call for next appointment when time gets closer.   Patient discharged in stable condition accompanied by: self.  Departure Mode: Ambulatory.      Johanna Griffin RN

## 2024-02-02 LAB
CD19 B CELL COMMENT: NORMAL
CD19 CELLS # BLD: 205 CELLS/UL (ref 107–698)
CD19 CELLS NFR BLD: 9 % (ref 6–27)
IGA SERPL-MCNC: 159 MG/DL (ref 84–499)
IGG SERPL-MCNC: 1094 MG/DL (ref 610–1616)
IGM SERPL-MCNC: 36 MG/DL (ref 35–242)

## 2024-02-06 LAB
ANCA AB PATTERN SER IF-IMP: ABNORMAL
C-ANCA TITR SER IF: ABNORMAL {TITER}

## 2024-03-20 DIAGNOSIS — N18.30 STAGE 3 CHRONIC KIDNEY DISEASE, UNSPECIFIED WHETHER STAGE 3A OR 3B CKD (H): Chronic | ICD-10-CM

## 2024-03-20 DIAGNOSIS — I77.82 ANCA-ASSOCIATED VASCULITIS (H): ICD-10-CM

## 2024-03-20 DIAGNOSIS — M31.30 GRANULOMATOSIS WITH POLYANGIITIS (H): Primary | ICD-10-CM

## 2024-03-21 NOTE — PATIENT INSTRUCTIONS
"Counting Your Baby's Kicks: Care Instructions  Overview     Counting your baby's kicks is one way your doctor can tell that your baby is healthy. You will probably feel your baby move for the first time between 16 and 22 weeks. The movement may feel like flutters rather than kicks. Your baby may move more at certain times of the day. When you are active, you may notice less kicking than when you are resting. At your prenatal visits, your doctor will ask whether the baby is active.  In your last trimester, your doctor may ask you to count the number of times you feel your baby move.  Follow-up care is a key part of your treatment and safety. Be sure to make and go to all appointments, and call your doctor if you are having problems. It's also a good idea to know your test results and keep a list of the medicines you take.  How do you count fetal kicks?  A common method of checking your baby's movement is to note the length of time it takes to count 10 movements (such as kicks, flutters, or rolls).  Pick your baby's most active time of day to count. This may be any time from morning to evening.  If you don't feel 10 movements in an hour, have something to eat or drink and count for another hour. If you don't feel at least 10 movements in the 2-hour period, call your doctor.  Do not use an at-home Doppler heart monitor in place of counting fetal movements.  When should you call for help?   Call your doctor now or seek immediate medical care if:    You feel fewer than 10 movements in a 2-hour period.     You noticed that your baby has stopped moving or is moving less than normal.   Watch closely for changes in your health, and be sure to contact your doctor if you have any problems.  Where can you learn more?  Go to https://www.healthwise.net/patiented  Enter U048 in the search box to learn more about \"Counting Your Baby's Kicks: Care Instructions.\"  Current as of: November 9, 2022               Content Version: 13.7    " 4882-7037 [employer].   Care instructions adapted under license by your healthcare professional. If you have questions about a medical condition or this instruction, always ask your healthcare professional. [employer] disclaims any warranty or liability for your use of this information.      Counting Your Baby's Movements   Counting your unborn baby's movements will assure you of your baby's health.   The best time to count is when your baby is most active. Do it at the same time every day.  To keep track of your baby's movements, use the attached chart. Bring the chart with you to each clinic visit.  Do not smoke for at least 2 hours before counting your baby's movements. (In fact, it's best to quit smoking if you're pregnant.)  Lie on your side. Put your hand on your baby.  Write the time you start counting movements.  Count the next 10 kicks, rolls, and other movements.  Write the time when your baby has finished 10 movements.  If you reach 10 movements within 2 hours, you're done for the day.  Call your care provider right away if:  You feel no movement for the first hour.  It takes more than 2 hours to feel 10 movements.  There's a change in the normal pattern of movements.  Your care provider's phone number is:   ________________________________________  The number to your [facility] center is:   ________________________________________     For informational purposes only. Not to replace the advice of your health care provider. Copyright   1991, 2005 [employer]. All rights reserved. Clinically reviewed by Aletha Lima RN, [facility]. [employer] [id number] - REV 10/21.      Ambulatory

## 2024-05-17 ENCOUNTER — PATIENT OUTREACH (OUTPATIENT)
Dept: NEPHROLOGY | Facility: CLINIC | Age: 24
End: 2024-05-17
Payer: COMMERCIAL

## 2024-05-17 NOTE — PROGRESS NOTES
Reached out to patient via [employer] to check in on status and coordinate scheduling follow up.  Marielena Jaquez RN, BSN, PHN  [facility] Nephrology Nurse Navigator  [phone]

## 2024-05-28 ENCOUNTER — LAB (OUTPATIENT)
Dept: LAB | Facility: CLINIC | Age: 24
End: 2024-05-28
Payer: COMMERCIAL

## 2024-05-28 ENCOUNTER — PATIENT OUTREACH (OUTPATIENT)
Dept: NEPHROLOGY | Facility: CLINIC | Age: 24
End: 2024-05-28

## 2024-05-28 DIAGNOSIS — D64.9 ANEMIA, UNSPECIFIED TYPE: ICD-10-CM

## 2024-05-28 DIAGNOSIS — M31.31 GRANULOMATOSIS WITH POLYANGIITIS WITH RENAL INVOLVEMENT (H): ICD-10-CM

## 2024-05-28 DIAGNOSIS — I77.82 ANCA-ASSOCIATED VASCULITIS (H): Primary | ICD-10-CM

## 2024-05-28 DIAGNOSIS — I77.82 ANCA-ASSOCIATED VASCULITIS (H): ICD-10-CM

## 2024-05-28 DIAGNOSIS — M31.30 GRANULOMATOSIS WITH POLYANGIITIS (H): ICD-10-CM

## 2024-05-28 DIAGNOSIS — N18.30 STAGE 3 CHRONIC KIDNEY DISEASE, UNSPECIFIED WHETHER STAGE 3A OR 3B CKD (H): ICD-10-CM

## 2024-05-28 LAB
ACANTHOCYTES BLD QL SMEAR: ABNORMAL
ALBUMIN MFR UR ELPH: 46 MG/DL
ALBUMIN SERPL BCG-MCNC: 4.3 G/DL (ref 3.5–5.2)
ALBUMIN UR-MCNC: 50 MG/DL
ANION GAP SERPL CALCULATED.3IONS-SCNC: 10 MMOL/L (ref 7–15)
APPEARANCE UR: CLEAR
AUER BODIES BLD QL SMEAR: ABNORMAL
BACTERIA #/AREA URNS HPF: ABNORMAL /HPF
BASO STIPL BLD QL SMEAR: ABNORMAL
BASOPHILS # BLD AUTO: 0 10E3/UL (ref 0–0.2)
BASOPHILS NFR BLD AUTO: 1 %
BILIRUB UR QL STRIP: NEGATIVE
BITE CELLS BLD QL SMEAR: ABNORMAL
BLISTER CELLS BLD QL SMEAR: ABNORMAL
BUN SERPL-MCNC: 30.6 MG/DL (ref 6–20)
BURR CELLS BLD QL SMEAR: ABNORMAL
CALCIUM SERPL-MCNC: 9.5 MG/DL (ref 8.6–10)
CHLORIDE SERPL-SCNC: 106 MMOL/L (ref 98–107)
COLOR UR AUTO: ABNORMAL
CREAT SERPL-MCNC: 1.27 MG/DL (ref 0.51–0.95)
CREAT UR-MCNC: 93 MG/DL
CREAT UR-MCNC: 97.3 MG/DL
CRP SERPL-MCNC: 3.61 MG/L
DACRYOCYTES BLD QL SMEAR: ABNORMAL
DEPRECATED HCO3 PLAS-SCNC: 23 MMOL/L (ref 22–29)
EGFRCR SERPLBLD CKD-EPI 2021: 60 ML/MIN/1.73M2
ELLIPTOCYTES BLD QL SMEAR: SLIGHT
EOSINOPHIL # BLD AUTO: 0.3 10E3/UL (ref 0–0.7)
EOSINOPHIL NFR BLD AUTO: 7 %
ERYTHROCYTE [DISTWIDTH] IN BLOOD BY AUTOMATED COUNT: 15.9 % (ref 10–15)
FRAGMENTS BLD QL SMEAR: ABNORMAL
GIANT PLATELETS BLD QL SMEAR: SLIGHT
GLUCOSE SERPL-MCNC: 94 MG/DL (ref 70–99)
GLUCOSE UR STRIP-MCNC: NEGATIVE MG/DL
HCT VFR BLD AUTO: 36.3 % (ref 35–47)
HGB BLD-MCNC: 11.2 G/DL (ref 11.7–15.7)
HGB C CRYSTALS: ABNORMAL
HGB UR QL STRIP: ABNORMAL
HOWELL-JOLLY BOD BLD QL SMEAR: ABNORMAL
IMM GRANULOCYTES # BLD: 0 10E3/UL
IMM GRANULOCYTES NFR BLD: 0 %
KETONES UR STRIP-MCNC: NEGATIVE MG/DL
LEUKOCYTE ESTERASE UR QL STRIP: NEGATIVE
LYMPHOCYTES # BLD AUTO: 2.2 10E3/UL (ref 0.8–5.3)
LYMPHOCYTES NFR BLD AUTO: 63 %
MCH RBC QN AUTO: 23.8 PG (ref 26.5–33)
MCHC RBC AUTO-ENTMCNC: 30.9 G/DL (ref 31.5–36.5)
MCV RBC AUTO: 77 FL (ref 78–100)
MICROALBUMIN UR-MCNC: 275 MG/L
MICROALBUMIN/CREAT UR: 295.7 MG/G CR (ref 0–25)
MONOCYTES # BLD AUTO: 0.6 10E3/UL (ref 0–1.3)
MONOCYTES NFR BLD AUTO: 17 %
NEUTROPHILS # BLD AUTO: 0.4 10E3/UL (ref 1.6–8.3)
NEUTROPHILS NFR BLD AUTO: 11 %
NEUTS HYPERSEG BLD QL SMEAR: ABNORMAL
NITRATE UR QL: NEGATIVE
NRBC # BLD AUTO: 0 10E3/UL
NRBC BLD AUTO-RTO: 0 /100
PATH REV: ABNORMAL
PH UR STRIP: 5.5 [PH] (ref 5–7)
PHOSPHATE SERPL-MCNC: 4.3 MG/DL (ref 2.5–4.5)
PLAT MORPH BLD: ABNORMAL
PLATELET # BLD AUTO: 359 10E3/UL (ref 150–450)
POLYCHROMASIA BLD QL SMEAR: ABNORMAL
POTASSIUM SERPL-SCNC: 4.6 MMOL/L (ref 3.4–5.3)
PROT/CREAT 24H UR: 0.47 MG/MG CR (ref 0–0.2)
RBC # BLD AUTO: 4.71 10E6/UL (ref 3.8–5.2)
RBC #/AREA URNS AUTO: ABNORMAL /HPF
RBC AGGLUT BLD QL: ABNORMAL
RBC MORPH BLD: ABNORMAL
ROULEAUX BLD QL SMEAR: ABNORMAL
SICKLE CELLS BLD QL SMEAR: ABNORMAL
SKIP: ABNORMAL
SMUDGE CELLS BLD QL SMEAR: ABNORMAL
SODIUM SERPL-SCNC: 139 MMOL/L (ref 135–145)
SP GR UR STRIP: 1.02 (ref 1–1.03)
SPHEROCYTES BLD QL SMEAR: ABNORMAL
SQUAMOUS #/AREA URNS AUTO: ABNORMAL /LPF
STOMATOCYTES BLD QL SMEAR: ABNORMAL
TARGETS BLD QL SMEAR: ABNORMAL
TOXIC GRANULES BLD QL SMEAR: ABNORMAL
UROBILINOGEN UR STRIP-MCNC: NORMAL MG/DL
VARIANT LYMPHS BLD QL SMEAR: PRESENT
WBC # BLD AUTO: 3.5 10E3/UL (ref 4–11)
WBC #/AREA URNS AUTO: ABNORMAL /HPF

## 2024-05-28 PROCEDURE — 86036 ANCA SCREEN EACH ANTIBODY: CPT

## 2024-05-28 PROCEDURE — 36415 COLL VENOUS BLD VENIPUNCTURE: CPT

## 2024-05-28 PROCEDURE — 84156 ASSAY OF PROTEIN URINE: CPT

## 2024-05-28 PROCEDURE — 86355 B CELLS TOTAL COUNT: CPT

## 2024-05-28 PROCEDURE — 86160 COMPLEMENT ANTIGEN: CPT

## 2024-05-28 PROCEDURE — 86140 C-REACTIVE PROTEIN: CPT

## 2024-05-28 PROCEDURE — 83516 IMMUNOASSAY NONANTIBODY: CPT

## 2024-05-28 PROCEDURE — 83550 IRON BINDING TEST: CPT

## 2024-05-28 PROCEDURE — 82570 ASSAY OF URINE CREATININE: CPT

## 2024-05-28 PROCEDURE — 83540 ASSAY OF IRON: CPT

## 2024-05-28 PROCEDURE — 86225 DNA ANTIBODY NATIVE: CPT

## 2024-05-28 PROCEDURE — 82728 ASSAY OF FERRITIN: CPT

## 2024-05-28 PROCEDURE — 81001 URINALYSIS AUTO W/SCOPE: CPT

## 2024-05-28 PROCEDURE — 80069 RENAL FUNCTION PANEL: CPT

## 2024-05-28 PROCEDURE — 85025 COMPLETE CBC W/AUTO DIFF WBC: CPT

## 2024-05-28 PROCEDURE — 82043 UR ALBUMIN QUANTITATIVE: CPT

## 2024-05-28 PROCEDURE — 83876 ASSAY MYELOPEROXIDASE: CPT

## 2024-05-28 NOTE — PROGRESS NOTES
Reached out to patient to see if she is available for appt with Dr. Goodson this week.  Voicemail and Big[employer] message sent.    Patient returned call able to make appt this week.  Will get labs done today at [facility].  Lab appt scheduled after confirmed appt with patient.  Linked Vasculitis pre-clinic labs to appt today.  Return appt also confirmed and scheduled for Thursday with Dr. Goodson.  Also encouraged patient to schedule follow up with her Vasculitis team as her sinus and ear symptoms could be related to her GPA.  Patient also been concerned for back pain requiring chiropractor and OTC motrin.  Encouraged in person appt to see if additional tests are needed.  Patient is agreeable to inperson and requests support for follow up with her GPA team.  Reached out to Dr. Call to get scheduled at [facility].  Marielena Jaquez RN, BSN, PHN  [facility] Nephrology Nurse Navigator  [phone]

## 2024-05-28 NOTE — PROGRESS NOTES
Patient called after message received from nephrology nurse.  See previous messages in this chain.  Patient states she does not have s/s at this time.  Denies any headaches, vision issues, rash, fever, joint pain, ear ache, congestion, joint pain, urinary s/s, or cough.  She was recently seen by her PCP 5/7 and given a Zpak for an URI. She denies any new or lingering s/s. She does report back pain, but thinks it is related to carrying around her new baby. She is scheduled for follow up in January.  She is having labs done today.  Advised writer will wait for results and forward for you to review.      Lolis Vera RN

## 2024-05-29 LAB
ANCA AB PATTERN SER IF-IMP: NORMAL
C-ANCA TITR SER IF: NORMAL {TITER}
C3 SERPL-MCNC: 123 MG/DL (ref 81–157)
C4 SERPL-MCNC: 32 MG/DL (ref 13–39)
CD19 B CELL COMMENT: ABNORMAL
CD19 CELLS # BLD: <1 CELLS/UL (ref 107–698)
CD19 CELLS NFR BLD: <1 % (ref 6–27)
DSDNA AB SER-ACNC: 0.9 IU/ML

## 2024-05-30 ENCOUNTER — OFFICE VISIT (OUTPATIENT)
Dept: NEPHROLOGY | Facility: CLINIC | Age: 24
End: 2024-05-30
Attending: INTERNAL MEDICINE
Payer: COMMERCIAL

## 2024-05-30 VITALS
WEIGHT: 229 LBS | RESPIRATION RATE: 18 BRPM | HEIGHT: 64 IN | OXYGEN SATURATION: 99 % | HEART RATE: 79 BPM | DIASTOLIC BLOOD PRESSURE: 78 MMHG | SYSTOLIC BLOOD PRESSURE: 114 MMHG | BODY MASS INDEX: 39.09 KG/M2 | TEMPERATURE: 98.4 F

## 2024-05-30 DIAGNOSIS — Z98.891 S/P PRIMARY LOW TRANSVERSE C-SECTION: ICD-10-CM

## 2024-05-30 DIAGNOSIS — R80.1 PERSISTENT PROTEINURIA: Primary | ICD-10-CM

## 2024-05-30 DIAGNOSIS — D64.9 ANEMIA, UNSPECIFIED TYPE: ICD-10-CM

## 2024-05-30 LAB
FERRITIN SERPL-MCNC: 8 NG/ML (ref 6–175)
IRON BINDING CAPACITY (ROCHE): 380 UG/DL (ref 240–430)
IRON SATN MFR SERPL: 7 % (ref 15–46)
IRON SERPL-MCNC: 28 UG/DL (ref 37–145)

## 2024-05-30 PROCEDURE — G0463 HOSPITAL OUTPT CLINIC VISIT: HCPCS | Performed by: INTERNAL MEDICINE

## 2024-05-30 PROCEDURE — G2211 COMPLEX E/M VISIT ADD ON: HCPCS | Performed by: INTERNAL MEDICINE

## 2024-05-30 PROCEDURE — 99215 OFFICE O/P EST HI 40 MIN: CPT | Performed by: INTERNAL MEDICINE

## 2024-05-30 RX ORDER — FERROUS SULFATE 325(65) MG
325 TABLET ORAL
Qty: 45 TABLET | Refills: 0 | Status: SHIPPED | OUTPATIENT
Start: 2024-05-30 | End: 2024-10-03

## 2024-05-30 RX ORDER — LISINOPRIL 2.5 MG/1
2.5 TABLET ORAL DAILY
Qty: 90 TABLET | Refills: 3 | Status: SHIPPED | OUTPATIENT
Start: 2024-05-30

## 2024-05-30 ASSESSMENT — PAIN SCALES - GENERAL: PAINLEVEL: MODERATE PAIN (5)

## 2024-05-30 NOTE — LETTER
2024       RE: Cande Shields  [address] [id number]     Dear Colleague,    Thank you for referring your patient, Cande Shields, to the [facility] [place] at [facility]. Please see a copy of my visit note below.    CC:   -PR3 ANCA vasculitis  -Delivery at 33 weeks for SGA  -CKD stage 3    HPI:   Cande Shields is a 22 year old female L1 who presents for Follow-up of PR3 ANCA vasculitis. She was initially seen in clinic while she was pregnant. She delivered baby Neo at 33 weeks.    Her past medical history is significant for:  1.  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  Chronic kidney disease  3.  History of intrauterine fetal death secondary to severe preeclampsia at 31 weeks  4  Severe preeclampsia and IUFD at 31 weeks  5  Saddle pulmonary embolus and history of DVT with PE, Cardiac thrombus  6. bicornuate uterus  7. Large subchoronic hemorrhage  8. ADHD    ANCA history:  The patient was initially diagnosed with CO-3 ANCA vascultis at the age of 10 years. 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 She was treated in the past by Dr. Velásquez. She was treated with methotrexate, high dose steroid. As per patient, she did not respond to rituximab so she was placed on oral cytoxan (unclear how long) then she had been followed by Dr. Enriqueta Alcantara[facility].    Her Cr was 0.6-0.8mg/dl in . She was pregnant and had severe pre-eclampsia and noted to have IUFD at 31 weeks in 2020. In 2020, she developed several days of myalgia, joint pain, pedal edema and was admitted to [facility] between -. She was thought to have a flare of GPA . Of note, she had COVID 1 month preceding this event. She was  given solumedrol 80 mg per day and received 4 doses of  mg/m2. She received the last dose of the series on 1/20/21 ([facility] Cr during that flare was 0.77. However, she was readmitted again on 1/27/22 after rising creatinine=2.05mg/dl. Followup pulse methylprednisolone and Cytoxan 500 mg/m2 were given on 1- in response to rising creatinine and active urinary sediment. She received IV cytoxan 500 mg/m2 x 3 doses (total dose cumulation about 3.5 g). Gynecology input on ovarian preservation in setting of cyclophosphamide - did not give leuprolide. Eventually steroid was tapered down in 4/2021. She was then lost follow-up and did not receive maintenance therapy. Her creatinine was 1.11 mg/dl in January 2022.      She presented in December 2022 with cold symptoms with positive home COVID test and was treated with Paxlovid. She also noted to have petechial rashes similar to previous flare. Upon presentation, she has Cr of 2.05 but after IV fluid and 1 dose of methlyprednisolone 125 mg Cr down to 1.48 the next day.  She has no leg swelling. She has some phlegm with blood tinge and denied any SOB. We thought that she has flare as her PR3 was elevated at 27. She received tapering dose of methylprednisone 125 mg IV x1-> 32 mg IV x1 and was started on oral Medrol tapering dose: Take 8 tablets (32 mg) by mouth daily for 6 days, THEN 6 tablets (24 mg) daily for 7 days, THEN 4 tablets (16 mg) daily for 7 days, THEN 3 tablets (12 mg) daily for 14 days. We also started her on Avacopan 30 mg BID through emergency program. She received Rituximab 375 mg/m2 1st dose on 1/10/23 and 2nd dose on 1/17/23 and 3rd dose on 1/24/23. She did not receive her last dose as she was found to be pregnant at 4 weeks in February. She also received avacopan which was stopped once the team knew she was pregnant.     Of note, she was hospitalized February 2023 for acute saddle PE and underwent  right atrial thrombectomy with sternotomy  in the setting of pregnancy and COVID-19. Her course was complicated by hospital-acquired pneumonia. She had a PFO which was closed surgically.    During her pregnancy, her fetal ultrasound showed IUGR and she was delivered on September 1, 2023 at 33 weeks: baby boy, apgars 7-8, weighing 3lbs 6oz. He was admitted to the NICU for ~2 months.    Interval history:  Her last visit in clinic was 2 weeks after her delivery on September 2023.  At that time, she has been on Imuran as a maintenance therapy(started mid July 2023).  However she elected to stop the Imuran and go back to the rituximab.  She did receive rituximab 500 mg in February 2024    Currently she feels fine.  Her spirits are high.  She still works at the  and her baby goes to the same .  The baby is growing well and he is 10 months now.  She denies any sinus pain or headache or oral ulcers or skin rash.  She denies any arthralgias or chest pain, shortness of breath, skin rash or dysuria.    Social history: She lives in [place].  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 She is single but she has a boyfriend (not the current father) who has been very supportive. Her mom will help with the baby.    Family history not significant for autoimmune or chronic kidney disease.    Allergies   Allergen Reactions     Penicillins Rash     Unknown, but think rash.  Tolerated cephalexin (12/27/20), cefpodoxime (12/27/20), Ceftriaxone (Feb 2023), Zosyn (Feb 2023)       Current Outpatient Medications   Medication Sig Dispense Refill     apixaban ANTICOAGULANT (ELIQUIS) 2.5 MG tablet Take 1 tablet (2.5 mg) by mouth 2 times daily (Patient not taking: Reported on 2/1/2024) 60 tablet 11     azaTHIOprine (IMURAN) 50 MG tablet Take 1 tablet (50 mg) by mouth 2 times daily (Patient not taking: Reported on 2/1/2024) 90 tablet 3     cyclobenzaprine (FLEXERIL) 5 MG tablet Take 1-2 tablets (5-10 mg) by mouth 3 times daily as needed for other (adjuvant pain)  (Patient not taking: Reported on 2023) 20 tablet 0     enoxaparin ANTICOAGULANT (LOVENOX) 60 MG/0.6ML syringe Inject 0.6 mLs (60 mg) Subcutaneous every 12 hours 36 mL 0     ferrous sulfate (FEROSUL) 325 (65 Fe) MG tablet Take 1 tablet (325 mg) by mouth every other day (Patient not taking: Reported on 2024) 45 tablet 0     metoprolol tartrate (LOPRESSOR) 25 MG tablet Take 0.5 tablets (12.5 mg) by mouth 2 times daily (Patient not taking: Reported on 2024) 60 tablet 2     norethindrone (MICRONOR) 0.35 MG tablet Take 1 tablet (0.35 mg) by mouth daily (Patient not taking: Reported on 2024) 84 tablet 4     No current facility-administered medications for this visit.       Past Medical History:   Diagnosis Date     ADHD (attention deficit hyperactivity disorder)      DVT, lower extremity, distal (H)      Dysmenorrhea      Femoral artery thrombosis, left (H) 2021     Multiple subsegmental pulmonary emboli without acute cor pulmonale (H) 2021     PFO (patent foramen ovale)      Preeclampsia, third trimester      Spontaneous pregnancy loss 2020    31 weeks     Stage 3b chronic kidney disease (H)      Wegener's disease, pulmonary 2010    renal biopsy       Past Surgical History:   Procedure Laterality Date      SECTION N/A 2023    Procedure:  section;  Surgeon: Wendy Vera MD;  Location: [facility]     ENT SURGERY  2000    cyst     EXCISE GANGLION WRIST  2009     IR LOWER EXTREMITY ANGIOGRAM LEFT  3/21/2021     IR THROMBOLYSIS ART/VENOUS INFUSION SUBSQ DAY  3/21/2021     IR THROMBOLYSIS ART/VENOUS INFUSION SUBSQ DAY  3/21/2021     STERNOTOMY  2023    right atrial thrombectomy     THROMBECTOMY ATRIUM Right 2023     THROMBECTOMY PERCUTANEOUS N/A 2023    Procedure: Emergency Sternotomy, Right Atrial Thrombectomy, Central Cannulation, Bilateral Femoral Cannulation, Closure of PFO ; Transesophageal Echocardiogram performed by anesthesia staff;   "Surgeon: Adelfo Elmore MD;  Location: [facility] OR     THROMBECTOMY PERCUTANEOUS N/A 2/18/2023    Procedure: Angiovac Mediated for Right Atrial Clot; Intracatheter Thrombectomy via bilateral percutaneous femoral venous access with 26 FR Right Femoral vein and 17 FR Left Femoral Vein cannulas. Transesophageal echchocardiogram performed by anesthesia staff;  Surgeon: Po Monterroso MD;  Location: [facility] OR       Social History     Tobacco Use     Smoking status: Former     Current packs/day: 0.25     Types: Cigarettes     Smokeless tobacco: Former     Tobacco comments:     Vaping stopped 2/26   Vaping Use     Vaping status: Some Days     Substances: Nicotine, Flavoring     Devices: Disposable   Substance Use Topics     Alcohol use: Not Currently     Drug use: No       Family History   Problem Relation Age of Onset     Thyroid Disease Mother      Cancer Maternal Grandfather      Asthma No family hx of      Diabetes No family hx of      Anesthesia Reaction No family hx of      Venous thrombosis No family hx of        ROS: A 12 system review of systems was negative other than noted here or above.     Exam:  /78 (BP Location: Right arm, Patient Position: Sitting, Cuff Size: Adult Regular)   Pulse 79   Temp 98.4  F (36.9  C) (Oral)   Resp 18   Ht 1.626 m (5' 4.02\")   Wt 103.9 kg (229 lb)   SpO2 99%   BMI 39.29 kg/m       Physical Exam:  General : Alert, cooperative, comfortable  Skin: Skin color, texture and turgor normal, no rashes, no lesions   Lymph Nodes: No obvious adenopathy, no swelling   Eyes: No scleral icterus, equal pupils  HENT: no traumatic injury to the head or face, no gross abnormalities  Lungs: Normal respiratory effort, bilateral symmetrical breath sounds, No added sounds  Heart: Regular rate and rhythm, No murmurs, rub or gallop  Abdomen: Normal bowel sounds, no tenderness, no organomegaly  Musculoskeletal: No obvious abnormalities  Neurologic: Grossly intact    Results:reviewed in details " with the patient    Assessment/Plan:  #1 granulomatous polyangiitis/IL-3 ANCA vasculitis with glomerulonephritis, upper respiratory, musculoskeletal, and cutaneous involvement  #2 CKD stage III  Diagnosed early at the age of 10 when she received hemodialysis at that point and Cytoxan.  It seemed that she had another flare in 2021 which was also treated with Cytoxan.  Her third flare was in January 2023 where she was treated with 3 doses of rituximab and was found to be pregnant so her forth dose of rituximab was withheld. She has been on AZA while pregnant. That was stopped and she received 1 dose of rituximab 500 mg in Feb 2024.  - She continues to be in clinical and biochemical remission at this point.  No symptoms of active disease.  Her serologies and her inflammatory markers are stable. She has some hematuria,her proteinuria has been improving.  - Her last dose of rituximab was Feb 2024  Her CD19 count remains below 1%.  Will resume her Rituximab 500mg every 6 months for at least another 2 years (likely longer given her frequent relapses recently). Emphasized compliance  --Will start lisinopril 2.5 mg daily given proteinuria    #3 hypertension: Her blood pressure is currently well controlled.  She is not on any medications  -Will start lisinopril 2.5 mg daily given proteinuria    #4 Microcytic Anemia: She had received IV iron loading during her pregnancy. Her Hb today is at 11.2 g/dl. Her iron stores are low.   -Will start PO iron for 3 months    #5 Neutropenia/Markedly decreased ANC: likely driven by lymphocytosis as her % Lymphocytes is at 63%. She is currently asymptomatic without any signs or symptoms of infection or fevers. She has a cold recently. Will repeat labs in 1 month    #6 Contraception: she is currently not using any. She doesn't want any babies in the near future. I emphasized the importance of contraception. Even if she has lower fertility given her CKD and hx of cyclophosphamide, she still can  get pregnant. Also counseled on the teratogenic effect of lisinopril and the risk of fetal lymphopenia with Rituximab    #7 hypercoagulable state: She is being followed by Dr. Linton from hematology.  She has stopped for one year. She was instructed to Follow-up with Dr. Dye. As her records indicate that she will need to be on lifelong anticoagulation.    The total time of this encounter amounted to 50 minutes on the day of the encounter 5/30/2024. This time included face to face time spent with the patient, preparatory work and chart review, ordering tests, and performing post visit documentation.    The longitudinal plan of care for this patient was addressed during this visit. Due to the added complexity in care, I will continue to support the patient with subsequent management of this condition(s) and with the ongoing continuity of care of this condition(s).     *This dictation was prepared in part using Dragon recognition software.  As a result errors may occur. When identified these transcription errors have been corrected.  While every attempt is made to correct errors during dictation, errors may still exist.     Fran Nicole MD   [facility]   Department of Medicine   Division of Renal Disease and Hypertension           Again, thank you for allowing me to participate in the care of your patient.      Sincerely,    FRAN NICOLE MD

## 2024-05-30 NOTE — PATIENT INSTRUCTIONS
It was a pleasure taking care of you today.  I've included a brief summary of our discussion and care plan from today's visit below.  Please review this information with your primary care provider.     My recommendations are summarized as follows:  -Next rituximab in August, labs prior to that.  -Please communicate with Dr. Dye regarding the blood thinner    Who do I call with any questions after my visit?  Please be in touch if there are any further questions that arise following today's visit.  There are multiple ways to contact your nephrology care team.       During business hours, you may reach your Nephrology Care Team or schedule or reschedule an appointment or lab at [phone].       If you need to schedule imaging, please call [phone].   To schedule a COVID test, please call [phone].     You can always send a secure message through [employer]. [employer] messages are answered by your nurse or doctor typically within 24-48 hours. Please allow extra time on weekends and holidays.       For urgent/emergent questions after business hours, you may reach the on-call Nephrology Fellow by contacting the [facility]  at [phone].     How will I get the results of any tests ordered?    You will receive all of your results.  If you have signed up for [employer], any tests ordered at your visit will be available to you once resulted on [employer]. Typically the physician reviews them and may or may not make further recommendations. If there are urgent results that require a change in your care plan, your physician or nurse will call you to discuss the next steps. If you are not on [employer], a letter may be generated and mailed to you with your results.

## 2024-05-30 NOTE — PROGRESS NOTES
CC:   -PR3 ANCA vasculitis  -Delivery at 33 weeks for SGA  -CKD stage 3    HPI:   Cande Shields is a 22 year old female L1 who presents for Follow-up of PR3 ANCA vasculitis. She was initially seen in clinic while she was pregnant. She delivered baby Neo at 33 weeks.    Her past medical history is significant for:  1.  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  Chronic kidney disease  3.  History of intrauterine fetal death secondary to severe preeclampsia at 31 weeks  4  Severe preeclampsia and IUFD at 31 weeks  5  Saddle pulmonary embolus and history of DVT with PE, Cardiac thrombus  6. bicornuate uterus  7. Large subchoronic hemorrhage  8. ADHD    ANCA history:  The patient was initially diagnosed with NE-3 ANCA vascultis at the age of 10 years. 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 She was treated in the past by Dr. Velásquez. She was treated with methotrexate, high dose steroid. As per patient, she did not respond to rituximab so she was placed on oral cytoxan (unclear how long) then she had been followed by Dr. Enriqueta Alcantara, [facility].    Her Cr was 0.6-0.8mg/dl in . She was pregnant and had severe pre-eclampsia and noted to have IUFD at 31 weeks in 2020. In 2020, she developed several days of myalgia, joint pain, pedal edema and was admitted to [facility] between -. She was thought to have a flare of GPA . Of note, she had COVID 1 month preceding this event. She was given solumedrol 80 mg per day and received 4 doses of  mg/m2. She received the last dose of the series on 21 ([facility]). Cr during that flare was 0.77. However, she was readmitted again on 22 after rising creatinine=2.05mg/dl. Followup pulse methylprednisolone and Cytoxan 500 mg/m2 were given  on 1- in response to rising creatinine and active urinary sediment. She received IV cytoxan 500 mg/m2 x 3 doses (total dose cumulation about 3.5 g). Gynecology input on ovarian preservation in setting of cyclophosphamide - did not give leuprolide. Eventually steroid was tapered down in 4/2021. She was then lost follow-up and did not receive maintenance therapy. Her creatinine was 1.11 mg/dl in January 2022.      She presented in December 2022 with cold symptoms with positive home COVID test and was treated with Paxlovid. She also noted to have petechial rashes similar to previous flare. Upon presentation, she has Cr of 2.05 but after IV fluid and 1 dose of methlyprednisolone 125 mg Cr down to 1.48 the next day.  She has no leg swelling. She has some phlegm with blood tinge and denied any SOB. We thought that she has flare as her PR3 was elevated at 27. She received tapering dose of methylprednisone 125 mg IV x1-> 32 mg IV x1 and was started on oral Medrol tapering dose: Take 8 tablets (32 mg) by mouth daily for 6 days, THEN 6 tablets (24 mg) daily for 7 days, THEN 4 tablets (16 mg) daily for 7 days, THEN 3 tablets (12 mg) daily for 14 days. We also started her on Avacopan 30 mg BID through emergency program. She received Rituximab 375 mg/m2 1st dose on 1/10/23 and 2nd dose on 1/17/23 and 3rd dose on 1/24/23. She did not receive her last dose as she was found to be pregnant at 4 weeks in February. She also received avacopan which was stopped once the team knew she was pregnant.     Of note, she was hospitalized February 2023 for acute saddle PE and underwent  right atrial thrombectomy with sternotomy in the setting of pregnancy and COVID-19. Her course was complicated by hospital-acquired pneumonia. She had a PFO which was closed surgically.    During her pregnancy, her fetal ultrasound showed IUGR and she was delivered on September 1, 2023 at 33 weeks: baby boy, apgars 7-8, weighing 3lbs 6oz. He was admitted  to the NICU for ~2 months.    Interval history:  Her last visit in clinic was 2 weeks after her delivery on September 2023.  At that time, she has been on Imuran as a maintenance therapy(started mid July 2023).  However she elected to stop the Imuran and go back to the rituximab.  She did receive rituximab 500 mg in February 2024    Currently she feels fine.  Her spirits are high.  She still works at the  and her baby goes to the same .  The baby is growing well and he is 10 months now.  She denies any sinus pain or headache or oral ulcers or skin rash.  She denies any arthralgias or chest pain, shortness of breath, skin rash or dysuria.    Social history: She lives in [place].  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 She is single but she has a boyfriend (not the current father) who has been very supportive. Her mom will help with the baby.    Family history not significant for autoimmune or chronic kidney disease.    Allergies   Allergen Reactions    Penicillins Rash     Unknown, but think rash.  Tolerated cephalexin (12/27/20), cefpodoxime (12/27/20), Ceftriaxone (Feb 2023), Zosyn (Feb 2023)       Current Outpatient Medications   Medication Sig Dispense Refill    apixaban ANTICOAGULANT (ELIQUIS) 2.5 MG tablet Take 1 tablet (2.5 mg) by mouth 2 times daily (Patient not taking: Reported on 2/1/2024) 60 tablet 11    azaTHIOprine (IMURAN) 50 MG tablet Take 1 tablet (50 mg) by mouth 2 times daily (Patient not taking: Reported on 2/1/2024) 90 tablet 3    cyclobenzaprine (FLEXERIL) 5 MG tablet Take 1-2 tablets (5-10 mg) by mouth 3 times daily as needed for other (adjuvant pain) (Patient not taking: Reported on 9/26/2023) 20 tablet 0    enoxaparin ANTICOAGULANT (LOVENOX) 60 MG/0.6ML syringe Inject 0.6 mLs (60 mg) Subcutaneous every 12 hours 36 mL 0    ferrous sulfate (FEROSUL) 325 (65 Fe) MG tablet Take 1 tablet (325 mg) by mouth every other day (Patient not taking: Reported on 2/1/2024) 45 tablet  0    metoprolol tartrate (LOPRESSOR) 25 MG tablet Take 0.5 tablets (12.5 mg) by mouth 2 times daily (Patient not taking: Reported on 2024) 60 tablet 2    norethindrone (MICRONOR) 0.35 MG tablet Take 1 tablet (0.35 mg) by mouth daily (Patient not taking: Reported on 2024) 84 tablet 4     No current facility-administered medications for this visit.       Past Medical History:   Diagnosis Date    ADHD (attention deficit hyperactivity disorder)     DVT, lower extremity, distal (H)     Dysmenorrhea     Femoral artery thrombosis, left (H) 2021    Multiple subsegmental pulmonary emboli without acute cor pulmonale (H) 2021    PFO (patent foramen ovale)     Preeclampsia, third trimester     Spontaneous pregnancy loss 2020    31 weeks    Stage 3b chronic kidney disease (H)     Wegener's disease, pulmonary 2010    renal biopsy       Past Surgical History:   Procedure Laterality Date     SECTION N/A 2023    Procedure:  section;  Surgeon: Wendy Vera MD;  [facility]    ENT SURGERY  2000    cyst    EXCISE GANGLION WRIST  2009    IR LOWER EXTREMITY ANGIOGRAM LEFT  3/21/2021    IR THROMBOLYSIS ART/VENOUS INFUSION SUBSQ DAY  3/21/2021    IR THROMBOLYSIS ART/VENOUS INFUSION SUBSQ DAY  3/21/2021    STERNOTOMY  2023    right atrial thrombectomy    THROMBECTOMY ATRIUM Right 2023    THROMBECTOMY PERCUTANEOUS N/A 2023    Procedure: Emergency Sternotomy, Right Atrial Thrombectomy, Central Cannulation, Bilateral Femoral Cannulation, Closure of PFO ; Transesophageal Echocardiogram performed by anesthesia staff;  Surgeon: Adelfo Elmore MD;  Location: [facility] OR    THROMBECTOMY PERCUTANEOUS N/A 2023    Procedure: Angiovac Mediated for Right Atrial Clot; Intracatheter Thrombectomy via bilateral percutaneous femoral venous access with 26 FR Right Femoral vein and 17 FR Left Femoral Vein cannulas. Transesophageal echchocardiogram performed by  "anesthesia staff;  Surgeon: Po Monterroso MD;  Location:  OR       Social History     Tobacco Use    Smoking status: Former     Current packs/day: 0.25     Types: Cigarettes    Smokeless tobacco: Former    Tobacco comments:     Vaping stopped 2/26   Vaping Use    Vaping status: Some Days    Substances: Nicotine, Flavoring    Devices: Disposable   Substance Use Topics    Alcohol use: Not Currently    Drug use: No       Family History   Problem Relation Age of Onset    Thyroid Disease Mother     Cancer Maternal Grandfather     Asthma No family hx of     Diabetes No family hx of     Anesthesia Reaction No family hx of     Venous thrombosis No family hx of        ROS: A 12 system review of systems was negative other than noted here or above.     Exam:  /78 (BP Location: Right arm, Patient Position: Sitting, Cuff Size: Adult Regular)   Pulse 79   Temp 98.4  F (36.9  C) (Oral)   Resp 18   Ht 1.626 m (5' 4.02\")   Wt 103.9 kg (229 lb)   SpO2 99%   BMI 39.29 kg/m       Physical Exam:  General : Alert, cooperative, comfortable  Skin: Skin color, texture and turgor normal, no rashes, no lesions   Lymph Nodes: No obvious adenopathy, no swelling   Eyes: No scleral icterus, equal pupils  HENT: no traumatic injury to the head or face, no gross abnormalities  Lungs: Normal respiratory effort, bilateral symmetrical breath sounds, No added sounds  Heart: Regular rate and rhythm, No murmurs, rub or gallop  Abdomen: Normal bowel sounds, no tenderness, no organomegaly  Musculoskeletal: No obvious abnormalities  Neurologic: Grossly intact    Results:reviewed in details with the patient    Assessment/Plan:  #1 granulomatous polyangiitis/WA-3 ANCA vasculitis with glomerulonephritis, upper respiratory, musculoskeletal, and cutaneous involvement  #2 CKD stage III  Diagnosed early at the age of 10 when she received hemodialysis at that point and Cytoxan.  It seemed that she had another flare in 2021 which was also treated " with Cytoxan.  Her third flare was in January 2023 where she was treated with 3 doses of rituximab and was found to be pregnant so her forth dose of rituximab was withheld. She has been on AZA while pregnant. That was stopped and she received 1 dose of rituximab 500 mg in Feb 2024.  - She continues to be in clinical and biochemical remission at this point.  No symptoms of active disease.  Her serologies and her inflammatory markers are stable. She has some hematuria,her proteinuria has been improving.  - Her last dose of rituximab was Feb 2024  Her CD19 count remains below 1%.  Will resume her Rituximab 500mg every 6 months for at least another 2 years (likely longer given her frequent relapses recently). Emphasized compliance  --Will start lisinopril 2.5 mg daily given proteinuria    #3 hypertension: Her blood pressure is currently well controlled.  She is not on any medications  -Will start lisinopril 2.5 mg daily given proteinuria    #4 Microcytic Anemia: She had received IV iron loading during her pregnancy. Her Hb today is at 11.2 g/dl. Her iron stores are low.   -Will start PO iron for 3 months    #5 Neutropenia/Markedly decreased ANC: likely driven by lymphocytosis as her % Lymphocytes is at 63%. She is currently asymptomatic without any signs or symptoms of infection or fevers. She has a cold recently. Will repeat labs in 1 month    #6 Contraception: she is currently not using any. She doesn't want any babies in the near future. I emphasized the importance of contraception. Even if she has lower fertility given her CKD and hx of cyclophosphamide, she still can get pregnant. Also counseled on the teratogenic effect of lisinopril and the risk of fetal lymphopenia with Rituximab    #7 hypercoagulable state: She is being followed by Dr. Linton from hematology.  She has stopped for one year. She was instructed to Follow-up with Dr. Dye. As her records indicate that she will need to be on lifelong  anticoagulation.    The total time of this encounter amounted to 50 minutes on the day of the encounter 5/30/2024. This time included face to face time spent with the patient, preparatory work and chart review, ordering tests, and performing post visit documentation.    The longitudinal plan of care for this patient was addressed during this visit. Due to the added complexity in care, I will continue to support the patient with subsequent management of this condition(s) and with the ongoing continuity of care of this condition(s).     *This dictation was prepared in part using Dragon recognition software.  As a result errors may occur. When identified these transcription errors have been corrected.  While every attempt is made to correct errors during dictation, errors may still exist.     Kang Goodson MD   [facility]   Department of Medicine   Division of Renal Disease and Hypertension

## 2024-05-31 LAB
MYELOPEROXIDASE AB SER IA-ACNC: <0.3 U/ML
MYELOPEROXIDASE AB SER IA-ACNC: NEGATIVE
PROTEINASE3 AB SER IA-ACNC: 2.7 U/ML
PROTEINASE3 AB SER IA-ACNC: ABNORMAL

## 2024-06-03 ENCOUNTER — PATIENT OUTREACH (OUTPATIENT)
Dept: NEPHROLOGY | Facility: CLINIC | Age: 24
End: 2024-06-03
Payer: COMMERCIAL

## 2024-06-03 DIAGNOSIS — M31.31 GRANULOMATOSIS WITH POLYANGIITIS WITH RENAL INVOLVEMENT (H): Primary | ICD-10-CM

## 2024-06-03 RX ORDER — EPINEPHRINE 1 MG/ML
0.3 INJECTION, SOLUTION, CONCENTRATE INTRAVENOUS EVERY 5 MIN PRN
OUTPATIENT
Start: 2024-08-01

## 2024-06-03 RX ORDER — DIPHENHYDRAMINE HCL 25 MG
50 CAPSULE ORAL ONCE
Start: 2024-08-01

## 2024-06-03 RX ORDER — METHYLPREDNISOLONE SODIUM SUCCINATE 125 MG/2ML
80 INJECTION, POWDER, LYOPHILIZED, FOR SOLUTION INTRAMUSCULAR; INTRAVENOUS ONCE
OUTPATIENT
Start: 2024-08-01

## 2024-06-03 RX ORDER — ACETAMINOPHEN 325 MG/1
650 TABLET ORAL ONCE
Start: 2024-08-01

## 2024-06-03 RX ORDER — DIPHENHYDRAMINE HYDROCHLORIDE 50 MG/ML
50 INJECTION INTRAMUSCULAR; INTRAVENOUS
Start: 2024-08-01

## 2024-06-03 RX ORDER — ALBUTEROL SULFATE 90 UG/1
1-2 AEROSOL, METERED RESPIRATORY (INHALATION)
Start: 2024-08-01

## 2024-06-03 RX ORDER — ALBUTEROL SULFATE 0.83 MG/ML
2.5 SOLUTION RESPIRATORY (INHALATION)
OUTPATIENT
Start: 2024-08-01

## 2024-06-03 NOTE — PROGRESS NOTES
Reached out to patient about Infusion plan and agreeable to get schedule at [facility] once signed plan.  Follow up appt scheduled for Oct, video visit requested.  Patient confirmed she will get labs done a week before appt.  Denies further questions or concerns at this time,  Marielena Jaquez RN, BSN, PHN  [facility] Nephrology Nurse Navigator  [phone]

## 2024-06-06 ENCOUNTER — TELEPHONE (OUTPATIENT)
Dept: NEPHROLOGY | Facility: CLINIC | Age: 24
End: 2024-06-06
Payer: COMMERCIAL

## 2024-06-06 NOTE — TELEPHONE ENCOUNTER
Infusion plan is signed.  Called to [facility] and scheduled for 8/1 at 12noon with labs at 1130am at same site.    [employer] message sent to patient of appt.  Confirmed Standing Labs are in and follow up appt is scheduled. No refills needed.  Marielena Jaquez RN, BSN, PHN  [facility] Nephrology Nurse Navigator  [phone]

## 2024-06-07 ENCOUNTER — PATIENT OUTREACH (OUTPATIENT)
Dept: NEPHROLOGY | Facility: CLINIC | Age: 24
End: 2024-06-07
Payer: COMMERCIAL

## 2024-06-07 NOTE — PROGRESS NOTES
Alerted by [employer] that patient was not able to read lab results and Dr. Goodson updates.  [employer] message sent to patient on plan of care changes- sent message via [employer]:    Hello Niki,   Your iron levels are low. I prescribed some oral iron pills for you to be taken daily for 3 months. Please do labs in 1 month and will schedule Rituximab for August.  All the best,     MD Marielena Dangelo RN, BSN, PHN  [facility] Nephrology Nurse Navigator  [phone]

## 2024-06-12 ENCOUNTER — MYC MEDICAL ADVICE (OUTPATIENT)
Dept: RHEUMATOLOGY | Facility: CLINIC | Age: 24
End: 2024-06-12
Payer: COMMERCIAL

## 2024-06-12 DIAGNOSIS — M31.31 GRANULOMATOSIS WITH POLYANGIITIS WITH RENAL INVOLVEMENT (H): Primary | ICD-10-CM

## 2024-06-12 NOTE — TELEPHONE ENCOUNTER
Glad health is good. Planned procedure is safe to proceed. Recommend BMP, UA, CBC with differential for ANCA vasculitis within 2-4 weeks after procedure.

## 2024-06-23 ENCOUNTER — HEALTH MAINTENANCE LETTER (OUTPATIENT)
Age: 24
End: 2024-06-23

## 2024-06-24 ENCOUNTER — PATIENT OUTREACH (OUTPATIENT)
Dept: NEPHROLOGY | Facility: CLINIC | Age: 24
End: 2024-06-24
Payer: COMMERCIAL

## 2024-06-24 ENCOUNTER — HOSPITAL ENCOUNTER (EMERGENCY)
Facility: CLINIC | Age: 24
Discharge: HOME | End: 2024-06-24
Attending: EMERGENCY MEDICINE | Admitting: EMERGENCY MEDICINE
Payer: COMMERCIAL

## 2024-06-24 VITALS
OXYGEN SATURATION: 99 % | RESPIRATION RATE: 16 BRPM | HEART RATE: 90 BPM | HEIGHT: 64 IN | BODY MASS INDEX: 39.61 KG/M2 | WEIGHT: 232 LBS | DIASTOLIC BLOOD PRESSURE: 76 MMHG | TEMPERATURE: 98 F | SYSTOLIC BLOOD PRESSURE: 104 MMHG

## 2024-06-24 DIAGNOSIS — R21 RASH: ICD-10-CM

## 2024-06-24 PROCEDURE — 99283 EMERGENCY DEPT VISIT LOW MDM: CPT | Performed by: EMERGENCY MEDICINE

## 2024-06-24 PROCEDURE — 99282 EMERGENCY DEPT VISIT SF MDM: CPT | Performed by: EMERGENCY MEDICINE

## 2024-06-24 ASSESSMENT — COLUMBIA-SUICIDE SEVERITY RATING SCALE - C-SSRS
6. HAVE YOU EVER DONE ANYTHING, STARTED TO DO ANYTHING, OR PREPARED TO DO ANYTHING TO END YOUR LIFE?: NO
2. HAVE YOU ACTUALLY HAD ANY THOUGHTS OF KILLING YOURSELF IN THE PAST MONTH?: NO
1. IN THE PAST MONTH, HAVE YOU WISHED YOU WERE DEAD OR WISHED YOU COULD GO TO SLEEP AND NOT WAKE UP?: NO

## 2024-06-24 ASSESSMENT — ACTIVITIES OF DAILY LIVING (ADL): ADLS_ACUITY_SCORE: 39

## 2024-06-24 NOTE — ED PROVIDER NOTES
ED Provider Note  [facility]      History     Chief Complaint   Patient presents with    Mouth Lesions     Has redness under her tongue and on the roof of her mouth     HPI  Cande Shields is a 23 year old female who presents to the emergency department for redness under her tongue on the roof of her mouth.  This just began in the past day or two.  She vapes and wonders if it could be causing her symptoms.  She feels a burning sensation. No swallowing concerns.  She has not had this before.  She is not on any medications currently.  She was on meds for wegener's granulomatosis but is in remission. No skin rash. She denies sore throat. She denies pain.  She denies viral symptoms. She is suppose to see her ENT doctor tomorrow and dentist soon as well.     Per [facility] medical advice yesterday, 2024, patient wrote having pain under her tongue was swelling that is now moved to most of her mouth, it is having red blotches that are sore.  Patient went to urgent care, however topical ointment was not covered by her insurance.    Past Medical History  Past Medical History:   Diagnosis Date    ADHD (attention deficit hyperactivity disorder)     DVT, lower extremity, distal (H)     Dysmenorrhea     Femoral artery thrombosis, left (H) 2021    Multiple subsegmental pulmonary emboli without acute cor pulmonale (H) 2021    PFO (patent foramen ovale)     Preeclampsia, third trimester     Spontaneous pregnancy loss 2020    31 weeks    Stage 3b chronic kidney disease (H)     Wegener's disease, pulmonary 2010    renal biopsy     Past Surgical History:   Procedure Laterality Date     SECTION N/A 2023    Procedure:  section;  Surgeon: Wendy Vera MD;  [facility]    ENT SURGERY  2000    cyst    EXCISE GANGLION WRIST  2009    IR LOWER EXTREMITY ANGIOGRAM LEFT  3/21/2021    IR THROMBOLYSIS ART/VENOUS INFUSION SUBSQ DAY  3/21/2021    IR THROMBOLYSIS  ART/VENOUS INFUSION SUBSQ DAY  3/21/2021    STERNOTOMY  02/18/2023    right atrial thrombectomy    THROMBECTOMY ATRIUM Right 02/18/2023    THROMBECTOMY PERCUTANEOUS N/A 2/18/2023    Procedure: Emergency Sternotomy, Right Atrial Thrombectomy, Central Cannulation, Bilateral Femoral Cannulation, Closure of PFO ; Transesophageal Echocardiogram performed by anesthesia staff;  Surgeon: Adelfo Elmore MD;  [facility]    THROMBECTOMY PERCUTANEOUS N/A 2/18/2023    Procedure: Angiovac Mediated for Right Atrial Clot; Intracatheter Thrombectomy via bilateral percutaneous femoral venous access with 26 FR Right Femoral vein and 17 FR Left Femoral Vein cannulas. Transesophageal echchocardiogram performed by anesthesia staff;  Surgeon: Po Monterroso MD;  Location[facility] OR     ferrous sulfate (FEROSUL) 325 (65 Fe) MG tablet  lisinopril (ZESTRIL) 2.5 MG tablet  apixaban ANTICOAGULANT (ELIQUIS) 2.5 MG tablet  azaTHIOprine (IMURAN) 50 MG tablet  cyclobenzaprine (FLEXERIL) 5 MG tablet  enoxaparin ANTICOAGULANT (LOVENOX) 60 MG/0.6ML syringe  metoprolol tartrate (LOPRESSOR) 25 MG tablet  norethindrone (MICRONOR) 0.35 MG tablet      Allergies   Allergen Reactions    Penicillins Rash     Unknown, but think rash.  Tolerated cephalexin (12/27/20), cefpodoxime (12/27/20), Ceftriaxone (Feb 2023), Zosyn (Feb 2023)     Family History  Family History   Problem Relation Age of Onset    Thyroid Disease Mother     Cancer Maternal Grandfather     Asthma No family hx of     Diabetes No family hx of     Anesthesia Reaction No family hx of     Venous thrombosis No family hx of      Social History   Social History     Tobacco Use    Smoking status: Former     Types: Vaping Device    Smokeless tobacco: Former    Tobacco comments:     Vaping stopped 2/26   Vaping Use    Vaping status: Some Days    Substances: Nicotine, Flavoring    Devices: Disposable   Substance Use Topics    Alcohol use: Not Currently    Drug use: No      A medically  "appropriate review of systems was performed with pertinent positives and negatives noted in the HPI, and all other systems negative.    Physical Exam   BP: 120/80  Pulse: 84  Temp: 97.9  F (36.6  C)  Resp: 16  Height: 162.6 cm (5' 4\")  Weight: 105.2 kg (232 lb)  SpO2: 100 %  Physical Exam  Vitals and nursing note reviewed.   Constitutional:       General: She is not in acute distress.     Appearance: Normal appearance. She is not ill-appearing, toxic-appearing or diaphoretic.   HENT:      Head: Normocephalic and atraumatic.      Nose: Nose normal.      Mouth/Throat:        Comments: Mild swelling under the tongue in vertical pattern and mild erythema in linear arched pattern.   Eyes:      General: No scleral icterus.        Right eye: No discharge.         Left eye: No discharge.      Extraocular Movements: Extraocular movements intact.      Conjunctiva/sclera: Conjunctivae normal.   Cardiovascular:      Rate and Rhythm: Normal rate.   Pulmonary:      Effort: Pulmonary effort is normal.   Musculoskeletal:         General: Normal range of motion.      Cervical back: Normal range of motion. No rigidity or tenderness.   Lymphadenopathy:      Cervical: No cervical adenopathy.   Skin:     General: Skin is warm and dry.      Findings: No rash.   Neurological:      General: No focal deficit present.      Mental Status: She is alert and oriented to person, place, and time.           ED Course, Procedures, & Data      Procedures   No results found for any visits on 06/24/24.  Medications - No data to display  Labs Ordered and Resulted from Time of ED Arrival to Time of ED Departure - No data to display  No orders to display          Critical care was not performed.     Medical Decision Making  The patient's presentation was of moderate complexity (an undiagnosed new problem with uncertain prognosis).    The patient's evaluation involved:  history and exam without other MDM data elements    The patient's management " necessitated only low risk treatment.    Assessment & Plan    Cande Shields is a 23 year old female who presents to the emergency department for redness under her tongue on the roof of her mouth. She has some linear swelling under her tongue both sides and also a linear erythema patter over her oropharyngeal arch.  I do not know what she has. She denies other viral symptoms.  Despite her med list she says she is not taking any meds.  She does vape and so could be reacting to a chemical exposure.  We discussed using benadryl and see if this helps.  She mentions that she has wegener's granulomatosis which is in remission.  This could be due to wegener's but I do not know.  Fortunately she is seeing her ENT MD tomorrow and will see them tomorrow for further evaluation.     I have reviewed the nursing notes. I have reviewed the findings, diagnosis, plan and need for follow up with the patient.    Discharge Medication List as of 6/24/2024 10:07 AM          Final diagnoses:   Rash - oral       Yadira Resendiz MD  [facility] EMERGENCY DEPARTMENT  6/24/2024     Yadira Resendiz MD  06/25/24 1602

## 2024-06-24 NOTE — ED TRIAGE NOTES
"States that she was seen yesterday in UC and given a \"topical antibiotic\", couldn't get it filled because her insurance wouldn't cover it      Triage Assessment (Adult)       Row Name 06/24/24 4301          Triage Assessment    Airway WDL WDL        Respiratory WDL    Respiratory WDL WDL        Skin Circulation/Temperature WDL    Skin Circulation/Temperature WDL WDL        Peripheral/Neurovascular WDL    Peripheral Neurovascular WDL WDL        Cognitive/Neuro/Behavioral WDL    Cognitive/Neuro/Behavioral WDL WDL                     "

## 2024-06-24 NOTE — DISCHARGE INSTRUCTIONS
Follow up with your ENT as soon as you are able.     You can try benadryl 25-50 mg every 6 hours and see if it goes away with benadryl. If it does, it is likely allergic.     Follow up with dentistry as already scheduled.     Return if difficulty swallowing/back of throat swelling affecting your ability to breathe.

## 2024-06-24 NOTE — ED TRIAGE NOTES
Triage Assessment (Adult)       Row Name 06/24/24 0812          Triage Assessment    Airway WDL WDL        Respiratory WDL    Respiratory WDL WDL        Skin Circulation/Temperature WDL    Skin Circulation/Temperature WDL WDL        Peripheral/Neurovascular WDL    Peripheral Neurovascular WDL WDL        Cognitive/Neuro/Behavioral WDL    Cognitive/Neuro/Behavioral WDL WDL

## 2024-07-01 ENCOUNTER — PATIENT OUTREACH (OUTPATIENT)
Dept: NEPHROLOGY | Facility: CLINIC | Age: 24
End: 2024-07-01
Payer: COMMERCIAL

## 2024-07-01 NOTE — PROGRESS NOTES
Updated from ER visits for possible flare  Reached out via [employer] for timely follow up  Marielena Jaquez RN, BSN, PHN  [facility] Nephrology Nurse Navigator  [phone]

## 2024-09-19 ENCOUNTER — VIRTUAL VISIT (OUTPATIENT)
Dept: URGENT CARE | Facility: CLINIC | Age: 24
End: 2024-09-19
Payer: COMMERCIAL

## 2024-09-19 DIAGNOSIS — B00.1 COLD SORE: Primary | ICD-10-CM

## 2024-09-19 PROCEDURE — 99213 OFFICE O/P EST LOW 20 MIN: CPT | Mod: 95

## 2024-09-19 RX ORDER — VALACYCLOVIR HYDROCHLORIDE 1 G/1
2000 TABLET, FILM COATED ORAL 2 TIMES DAILY
Qty: 4 TABLET | Refills: 0 | Status: SHIPPED | OUTPATIENT
Start: 2024-09-19 | End: 2024-10-03

## 2024-09-19 NOTE — PROGRESS NOTES
Cande is a 24 year old who is being evaluated via a billable video visit.          Assessment & Plan     Cold sore    - valACYclovir (VALTREX) 1000 mg tablet; Take 2 tablets (2,000 mg) by mouth 2 times daily for 1 day.    Recurrent painful sores on her tongue since June of this year.  Was seen in the ER for this in June 2024 and given doxycycline for possible gingivitis.  In review it sounds well like cold sores.  Will treat with Valtrex today.  If she gets recurrent symptoms recommend coming in for testing        Return in about 1 week (around 9/26/2024), or if symptoms worsen or fail to improve.    Subjective   Cande is a 24 year old, presenting for the following health issues:  No chief complaint on file.      Video Start Time:  1600    HPI   Recurrent sores on the tip of the tongue for the past several months. Has been treated with antibiotics in the past past for this with no success.   No fever, no throat pain.           Review of Systems  Constitutional, HEENT, cardiovascular, pulmonary, gi and gu systems are negative, except as otherwise noted.      Objective           Vitals:  No vitals were obtained today due to virtual visit.    Physical Exam   GENERAL: alert and no distress  HENT: small red sores on the tip of the tongue  PSYCH: Appropriate affect, tone, and pace of words          Video-Visit Details    Type of service:  Video Visit   Video End Time:4:09 PM  Originating Location (pt. Location): Home    Distant Location (provider location):  Off-site  Platform used for Video Visit: Dany  Signed Electronically by: [facility]

## 2024-09-20 ENCOUNTER — PATIENT OUTREACH (OUTPATIENT)
Dept: NEPHROLOGY | Facility: CLINIC | Age: 24
End: 2024-09-20
Payer: COMMERCIAL

## 2024-09-20 NOTE — PROGRESS NOTES
Voicemail left late yesterday afternoon wanting to get labs done.  Patient reports concerns for bumps on her hands and wanted to check her labs to make sure everything was ok.  Voicemail and Kazaanahart sent to patient to get additional details as well as confirm standing lab orders are entered for Dr. Goodson she can get done today or near future.  Asked patient to call back or reply to [facility] [facility] message.  If acute symptoms encouraged to seek more immediate medical attention.  Plan: follow up on return messages as well as lab results.  Update provider once findings noted.  Marielena Jaquez RN, BSN, PHN  [facility] Nephrology Nurse Navigator  [phone]

## 2024-10-02 ENCOUNTER — TELEPHONE (OUTPATIENT)
Dept: NEPHROLOGY | Facility: CLINIC | Age: 24
End: 2024-10-02
Payer: COMMERCIAL

## 2024-10-03 ENCOUNTER — LAB (OUTPATIENT)
Dept: LAB | Facility: CLINIC | Age: 24
End: 2024-10-03
Payer: COMMERCIAL

## 2024-10-03 ENCOUNTER — VIRTUAL VISIT (OUTPATIENT)
Dept: NEPHROLOGY | Facility: CLINIC | Age: 24
End: 2024-10-03
Attending: INTERNAL MEDICINE
Payer: COMMERCIAL

## 2024-10-03 DIAGNOSIS — I77.82 ANCA-ASSOCIATED VASCULITIS (H): Primary | ICD-10-CM

## 2024-10-03 PROCEDURE — 99214 OFFICE O/P EST MOD 30 MIN: CPT | Mod: 95 | Performed by: INTERNAL MEDICINE

## 2024-10-03 PROCEDURE — G2211 COMPLEX E/M VISIT ADD ON: HCPCS | Mod: 95 | Performed by: INTERNAL MEDICINE

## 2024-10-03 NOTE — PROGRESS NOTES
Virtual Visit Details    Type of service:  Video Visit   Video Start Time:  4:00  Video End Time:4:15 PM    Originating Location (pt. Location): Home    Distant Location (provider location):  On-site  Platform used for Video Visit: Dany    CC:   -PR3 ANCA vasculitis  -Delivery at 33 weeks for SGA  -CKD stage 3    HPI:   Cande Shields is a 22 year old female L1 who presents for Follow-up of PR3 ANCA vasculitis. She was initially seen in clinic while she was pregnant. She delivered baby Neo at 33 weeks.    Her past medical history is significant for:  1.  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  Chronic kidney disease  3.  History of intrauterine fetal death secondary to severe preeclampsia at 31 weeks  4  Severe preeclampsia and IUFD at 31 weeks  5  Saddle pulmonary embolus and history of DVT with PE, Cardiac thrombus  6. bicornuate uterus  7. Large subchoronic hemorrhage  8. ADHD    ANCA history:  The patient was initially diagnosed with NH-3 ANCA vascultis at the age of 10 years. 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 She was treated in the past by Dr. Velásquez. She was treated with methotrexate, high dose steroid. As per patient, she did not respond to rituximab so she was placed on oral cytoxan (unclear how long) then she had been followed by Dr. Enriqueta Alcantara, pediatric rheumatologist at [facility].    Her Cr was 0.6-0.8mg/dl in . She was pregnant and had severe pre-eclampsia and noted to have IUFD at 31 weeks in 2020. In 2020, she developed several days of myalgia, joint pain, pedal edema and was admitted to [facility] between -. She was thought to have a flare of GPA . Of note, she had COVID 1 month preceding this event. She was given solumedrol 80 mg per day and received 4 doses of  mg/m2. She  received the last dose of the series on 1/20/21 ([facility]). Cr during that flare was 0.77. However, she was readmitted again on 1/27/22 after rising creatinine=2.05mg/dl. Followup pulse methylprednisolone and Cytoxan 500 mg/m2 were given on 1- in response to rising creatinine and active urinary sediment. She received IV cytoxan 500 mg/m2 x 3 doses (total dose cumulation about 3.5 g). Gynecology input on ovarian preservation in setting of cyclophosphamide - did not give leuprolide. Eventually steroid was tapered down in 4/2021. She was then lost follow-up and did not receive maintenance therapy. Her creatinine was 1.11 mg/dl in January 2022.      She presented in December 2022 with cold symptoms with positive home COVID test and was treated with Paxlovid. She also noted to have petechial rashes similar to previous flare. Upon presentation, she has Cr of 2.05 but after IV fluid and 1 dose of methlyprednisolone 125 mg Cr down to 1.48 the next day.  She has no leg swelling. She has some phlegm with blood tinge and denied any SOB. We thought that she has flare as her PR3 was elevated at 27. She received tapering dose of methylprednisone 125 mg IV x1-> 32 mg IV x1 and was started on oral Medrol tapering dose: Take 8 tablets (32 mg) by mouth daily for 6 days, THEN 6 tablets (24 mg) daily for 7 days, THEN 4 tablets (16 mg) daily for 7 days, THEN 3 tablets (12 mg) daily for 14 days. We also started her on Avacopan 30 mg BID through emergency program. She received Rituximab 375 mg/m2 1st dose on 1/10/23 and 2nd dose on 1/17/23 and 3rd dose on 1/24/23. She did not receive her last dose as she was found to be pregnant at 4 weeks in February. She also received avacopan which was stopped once the team knew she was pregnant.     Of note, she was hospitalized February 2023 for acute saddle PE and underwent  right atrial thrombectomy with sternotomy in the setting of pregnancy and COVID-19. Her course was complicated by  hospital-acquired pneumonia. She had a PFO which was closed surgically.    During her pregnancy, her fetal ultrasound showed IUGR and she was delivered on September 1, 2023 at 33 weeks: baby boy, apgars 7-8, weighing 3lbs 6oz. He was admitted to the NICU for ~2 months.    Interval history:  She was on Imuran as a maintenance therapy during pregnancy (started mid July 2023).  However she elected to stop the Imuran and go back to the rituximab.  She did receive rituximab 500 mg in February 2024 but missed her dose in August 2024.    Currently she feels fine.  Her spirits are high.  She still works at the  and her baby goes to the same .  The baby is growing well (918 pounds now and he is about to start walking). She denies any sinus pain or headache or oral ulcers or skin rash.  She denies any arthralgias or chest pain, shortness of breath, skin rash or dysuria.    She has not been taking any lisinopril and she has not been checking her BP at home. Her clinic BP has been ok.  She is off lovenox and off iron pills    Social history: She lives in [place].  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 She is single but she has a boyfriend (not the father of her child) who has been very supportive. Her mom will help with the baby.    Family history not significant for autoimmune or chronic kidney disease.      Allergies   Allergen Reactions    Penicillins Rash     Unknown, but think rash.  Tolerated cephalexin (12/27/20), cefpodoxime (12/27/20), Ceftriaxone (Feb 2023), Zosyn (Feb 2023)       Current Outpatient Medications   Medication Sig Dispense Refill    apixaban ANTICOAGULANT (ELIQUIS) 2.5 MG tablet Take 1 tablet (2.5 mg) by mouth 2 times daily (Patient not taking: Reported on 2/1/2024) 60 tablet 11    azaTHIOprine (IMURAN) 50 MG tablet Take 1 tablet (50 mg) by mouth 2 times daily (Patient not taking: Reported on 2/1/2024) 90 tablet 3    cyclobenzaprine (FLEXERIL) 5 MG tablet Take 1-2 tablets (5-10  mg) by mouth 3 times daily as needed for other (adjuvant pain) (Patient not taking: Reported on 2023) 20 tablet 0    enoxaparin ANTICOAGULANT (LOVENOX) 60 MG/0.6ML syringe Inject 0.6 mLs (60 mg) Subcutaneous every 12 hours 36 mL 0    ferrous sulfate (FEROSUL) 325 (65 Fe) MG tablet Take 1 tablet (325 mg) by mouth daily (with breakfast) 45 tablet 0    lisinopril (ZESTRIL) 2.5 MG tablet Take 1 tablet (2.5 mg) by mouth daily 90 tablet 3    metoprolol tartrate (LOPRESSOR) 25 MG tablet Take 0.5 tablets (12.5 mg) by mouth 2 times daily (Patient not taking: Reported on 2024) 60 tablet 2    norethindrone (MICRONOR) 0.35 MG tablet Take 1 tablet (0.35 mg) by mouth daily (Patient not taking: Reported on 2024) 84 tablet 4    valACYclovir (VALTREX) 1000 mg tablet Take 2 tablets (2,000 mg) by mouth 2 times daily for 1 day. 4 tablet 0     No current facility-administered medications for this visit.       Past Medical History:   Diagnosis Date    ADHD (attention deficit hyperactivity disorder)     DVT, lower extremity, distal (H)     Dysmenorrhea     Femoral artery thrombosis, left (H) 2021    Multiple subsegmental pulmonary emboli without acute cor pulmonale (H) 2021    PFO (patent foramen ovale)     Preeclampsia, third trimester     Spontaneous pregnancy loss 2020    31 weeks    Stage 3b chronic kidney disease (H)     Wegener's disease, pulmonary 2010    renal biopsy       Past Surgical History:   Procedure Laterality Date     SECTION N/A 2023    Procedure:  section;  Surgeon: Wendy Vera MD;  Location: [facility]    ENT SURGERY  2000    cyst    EXCISE GANGLION WRIST  2009    IR LOWER EXTREMITY ANGIOGRAM LEFT  3/21/2021    IR THROMBOLYSIS ART/VENOUS INFUSION SUBSQ DAY  3/21/2021    IR THROMBOLYSIS ART/VENOUS INFUSION SUBSQ DAY  3/21/2021    STERNOTOMY  2023    right atrial thrombectomy    THROMBECTOMY ATRIUM Right 2023    THROMBECTOMY PERCUTANEOUS N/A  2/18/2023    Procedure: Emergency Sternotomy, Right Atrial Thrombectomy, Central Cannulation, Bilateral Femoral Cannulation, Closure of PFO ; Transesophageal Echocardiogram performed by anesthesia staff;  Surgeon: Adelfo Elmore MD;  Location: [facility] OR    THROMBECTOMY PERCUTANEOUS N/A 2/18/2023    Procedure: Angiovac Mediated for Right Atrial Clot; Intracatheter Thrombectomy via bilateral percutaneous femoral venous access with 26 FR Right Femoral vein and 17 FR Left Femoral Vein cannulas. Transesophageal echchocardiogram performed by anesthesia staff;  Surgeon: Po Monterroso MD;  Location: [facility] OR       Social History     Tobacco Use    Smoking status: Former     Types: Vaping Device    Smokeless tobacco: Former    Tobacco comments:     Vaping stopped 2/26   Vaping Use    Vaping status: Some Days    Substances: Nicotine, Flavoring    Devices: Disposable   Substance Use Topics    Alcohol use: Not Currently    Drug use: No       Family History   Problem Relation Age of Onset    Thyroid Disease Mother     Cancer Maternal Grandfather     Asthma No family hx of     Diabetes No family hx of     Anesthesia Reaction No family hx of     Venous thrombosis No family hx of        ROS: A 12 system review of systems was negative other than noted here or above.     Exam:  There were no vitals taken for this visit.   BP Readings from Last 6 Encounters:   06/24/24 104/76   05/30/24 114/78   02/01/24 110/75   12/06/23 118/76   09/26/23 112/77   09/26/23 115/78     Physical Exam:  General : Alert, cooperative, comfortable  Skin: Skin color, texture and turgor normal, no rashes, no lesions   Lymph Nodes: No obvious adenopathy, no swelling   Eyes: No scleral icterus, equal pupils  HENT: no traumatic injury to the head or face, no gross abnormalities  Lungs: Normal respiratory effort, bilateral symmetrical breath sounds, No added sounds  Heart: Regular rate and rhythm, No murmurs, rub or gallop  Abdomen: Normal bowel sounds, no  tenderness, no organomegaly  Musculoskeletal: No obvious abnormalities  Neurologic: Grossly intact    Results:reviewed in details with the patient    Assessment/Plan:  #1 granulomatous polyangiitis/OR-3 ANCA vasculitis with glomerulonephritis, upper respiratory, musculoskeletal, and cutaneous involvement  #2 CKD stage III  Diagnosed early at the age of 10 when she received hemodialysis at that point and Cytoxan.  It seemed that she had another flare in 2021 which was also treated with Cytoxan.  Her third flare was in January 2023 where she was treated with 3 doses of rituximab and was found to be pregnant so her forth dose of rituximab was withheld. She has been on AZA while pregnant. That was stopped and she received 1 dose of rituximab 500 mg in Feb 2024. Josephine missed her 6 month dose in August 2024.  -Her ANCA serology from today are still pending but her most recent labs show that she continues to be in clinical and biochemical remission at this point.  No symptoms of active disease.  She has some hematuria,her proteinuria has been improving.  - Her last dose of rituximab was Feb 2024.  She was supposed to have a dose in August 2024 but missed that.  Her CD19 count from today still pending   -We discussed today the duration of rituximab; given her OR-3 status and her frequent relapses, I discussed with her that she will need to be on rituximab for at least 3 to 4 years.  I also emphasized the importance of compliance with infusions as she already have some degree of kidney disease.  - Urine protein to creatinine ratio is pending today but her blood pressure seems to be on the lower end and we will hold on the lisinopril for now.    #3 hypertension: Her blood pressure is currently well controlled, with some readings around 100 systolic  - Will hold on the lisinopril for now.    #4 Microcytic Anemia: She had received IV iron loading during her pregnancy. Her Hb today is at 12 g/dl.  She received some oral iron for 3  months.    #5 Neutropenia/Markedly decreased ANC: This has resolved.  Her current white blood cell is around 6.4.    #6 Contraception: she is currently not using any. She doesn't want any babies in the near future. I emphasized the importance of contraception. Even if she has lower fertility given her CKD and hx of cyclophosphamide, she still can get pregnant. Also counseled on the risk of fetal lymphopenia with Rituximab    #7 hypercoagulable state: She is being followed by Dr. Linton from hematology.  She has stopped her Lovenox.    # vaccination: I emphasized to her to get her flu shot and COVID-vaccine at least 4 weeks before her scheduled rituximab.  She agreed to that.    The total time of this encounter amounted to 30 minutes on the day of the encounter 10/3/2024. This time included face to face time spent with the patient, preparatory work and chart review, ordering tests, and performing post visit documentation.    The longitudinal plan of care for this patient was addressed during this visit. Due to the added complexity in care, I will continue to support the patient with subsequent management of this condition(s) and with the ongoing continuity of care of this condition(s).     *This dictation was prepared in part using Dragon recognition software.  As a result errors may occur. When identified these transcription errors have been corrected.  While every attempt is made to correct errors during dictation, errors may still exist.     Kang Goodson MD   [facility]   Department of Medicine   Division of Renal Disease and Hypertension

## 2024-10-03 NOTE — NURSING NOTE
Current patient location: [address] [place] [id number]    Is the patient currently in the state of [place]? YES    Visit mode:VIDEO    If the visit is dropped, the patient can be reconnected by: VIDEO VISIT: Text to cell phone:   Telephone Information:   Mobile [phone]       Will anyone else be joining the visit? NO  (If patient encounters technical issues they should call [phone]    Are changes needed to the allergy or medication list? No    Are refills needed on medications prescribed by this physician? NO    Rooming Documentation:  Questionnaire(s) completed    Reason for visit: RECHECK    Phi PASCAL

## 2024-10-03 NOTE — TELEPHONE ENCOUNTER
Reached out to patient to schedule labs for appt tomorrow via [employer]ig[employer].  Marielena Jaquez RN, BSN, PHN  [facility] Nephrology Nurse Navigator  [phone]

## 2024-10-03 NOTE — Clinical Note
Needs to have her rituximab scheduled.  But she needs to get her flu shot and COVID-vaccine at least 4 weeks before her infusion.  Thank you

## 2024-10-03 NOTE — PATIENT INSTRUCTIONS
It was a pleasure taking care of you today.  I've included a brief summary of our discussion and care plan from today's visit below.  Please review this information with your primary care provider.     My recommendations are summarized as follows:  -It is extremely important to get your flu shot and COVID-vaccine at least 4 weeks before your next rituximab infusion.  -Our nurse Marielena will contact you to schedule the rituximab infusion.    Who do I call with any questions after my visit?  Please be in touch if there are any further questions that arise following today's visit.  There are multiple ways to contact your nephrology care team.       During business hours, you may reach your Nephrology Care Team or schedule or reschedule an appointment or lab at [phone].       If you need to schedule imaging, please call [phone].   To schedule a COVID test, please call [phone].     You can always send a secure message through [email or web address]. [email or web address] messages are answered by your nurse or doctor typically within 24-48 hours. Please allow extra time on weekends and holidays.       For urgent/emergent questions after business hours, you may reach the on-call Nephrology Fellow by contacting the [facility]  at [phone].     How will I get the results of any tests ordered?    You will receive all of your results.  If you have signed up for [email or web address], any tests ordered at your visit will be available to you once resulted on [employer]. Typically the physician reviews them and may or may not make further recommendations. If there are urgent results that require a change in your care plan, your physician or nurse will call you to discuss the next steps. If you are not on [employer], a letter may be generated and mailed to you with your results.

## 2024-10-03 NOTE — LETTER
10/3/2024       RE: Cande Shields  [address] [address]     Dear Colleague,    Thank you for referring your patient, Cande Shields, to the [facility] [place] at [facility]. Please see a copy of my visit note below.    Virtual Visit Details    Type of service:  Video Visit   Video Start Time:  4:00  Video End Time:4:15 PM    Originating Location (pt. Location): Home    Distant Location (provider location):  On-site  Platform used for Video Visit: Dany    CC:   -PR3 ANCA vasculitis  -Delivery at 33 weeks for SGA  -CKD stage 3    HPI:   Cande Shields is a 22 year old female L1 who presents for Follow-up of PR3 ANCA vasculitis. She was initially seen in clinic while she was pregnant. She delivered baby Neo at 33 weeks.    Her past medical history is significant for:  1.  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  Chronic kidney disease  3.  History of intrauterine fetal death secondary to severe preeclampsia at 31 weeks  4  Severe preeclampsia and IUFD at 31 weeks  5  Saddle pulmonary embolus and history of DVT with PE, Cardiac thrombus  6. bicornuate uterus  7. Large subchoronic hemorrhage  8. ADHD    ANCA history:  The patient was initially diagnosed with OH-3 ANCA vascultis at the age of 10 years. 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 She was treated in the past by Dr. Velásquez. She was treated with methotrexate, high dose steroid. As per patient, she did not respond to rituximab so she was placed on oral cytoxan (unclear how long) then she had been followed by Dr. Enriqueta Alcantara, pediatric rheumatologist at [facility].    Her Cr was 0.6-0.8mg/dl in . She was pregnant and had severe pre-eclampsia and noted to have IUFD at 31 weeks in October  2020. In December 2020, she developed several days of myalgia, joint pain, pedal edema and was admitted to [facility] between 12/26-12/30. She was thought to have a flare of GPA . Of note, she had COVID 1 month preceding this event. She was given solumedrol 80 mg per day and received 4 doses of  mg/m2. She received the last dose of the series on 1/20/21 ([facility]). Cr during that flare was 0.77. However, she was readmitted again on 1/27/22 after rising creatinine=2.05mg/dl. Followup pulse methylprednisolone and Cytoxan 500 mg/m2 were given on 1- in response to rising creatinine and active urinary sediment. She received IV cytoxan 500 mg/m2 x 3 doses (total dose cumulation about 3.5 g). Gynecology input on ovarian preservation in setting of cyclophosphamide - did not give leuprolide. Eventually steroid was tapered down in 4/2021. She was then lost follow-up and did not receive maintenance therapy. Her creatinine was 1.11 mg/dl in January 2022.      She presented in December 2022 with cold symptoms with positive home COVID test and was treated with Paxlovid. She also noted to have petechial rashes similar to previous flare. Upon presentation, she has Cr of 2.05 but after IV fluid and 1 dose of methlyprednisolone 125 mg Cr down to 1.48 the next day.  She has no leg swelling. She has some phlegm with blood tinge and denied any SOB. We thought that she has flare as her PR3 was elevated at 27. She received tapering dose of methylprednisone 125 mg IV x1-> 32 mg IV x1 and was started on oral Medrol tapering dose: Take 8 tablets (32 mg) by mouth daily for 6 days, THEN 6 tablets (24 mg) daily for 7 days, THEN 4 tablets (16 mg) daily for 7 days, THEN 3 tablets (12 mg) daily for 14 days. We also started her on Avacopan 30 mg BID through emergency program. She received Rituximab 375 mg/m2 1st dose on 1/10/23 and 2nd dose on 1/17/23 and 3rd dose on 1/24/23. She did not receive her last dose as she was found  to be pregnant at 4 weeks in February. She also received avacopan which was stopped once the team knew she was pregnant.     Of note, she was hospitalized February 2023 for acute saddle PE and underwent  right atrial thrombectomy with sternotomy in the setting of pregnancy and COVID-19. Her course was complicated by hospital-acquired pneumonia. She had a PFO which was closed surgically.    During her pregnancy, her fetal ultrasound showed IUGR and she was delivered on September 1, 2023 at 33 weeks: baby boy, apgars 7-8, weighing 3lbs 6oz. He was admitted to the NICU for ~2 months.    Interval history:  She was on Imuran as a maintenance therapy during pregnancy (started mid July 2023).  However she elected to stop the Imuran and go back to the rituximab.  She did receive rituximab 500 mg in February 2024 but missed her dose in August 2024.    Currently she feels fine.  Her spirits are high.  She still works at the  and her baby goes to the same .  The baby is growing well (918 pounds now and he is about to start walking). She denies any sinus pain or headache or oral ulcers or skin rash.  She denies any arthralgias or chest pain, shortness of breath, skin rash or dysuria.    She has not been taking any lisinopril and she has not been checking her BP at home. Her clinic BP has been ok.  She is off lovenox and off iron pills    Social history: She lives in [place].  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 She is single but she has a boyfriend (not the father of her child) who has been very supportive. Her mom will help with the baby.    Family history not significant for autoimmune or chronic kidney disease.      Allergies   Allergen Reactions     Penicillins Rash     Unknown, but think rash.  Tolerated cephalexin (12/27/20), cefpodoxime (12/27/20), Ceftriaxone (Feb 2023), Zosyn (Feb 2023)       Current Outpatient Medications   Medication Sig Dispense Refill     apixaban ANTICOAGULANT  (ELIQUIS) 2.5 MG tablet Take 1 tablet (2.5 mg) by mouth 2 times daily (Patient not taking: Reported on 2024) 60 tablet 11     azaTHIOprine (IMURAN) 50 MG tablet Take 1 tablet (50 mg) by mouth 2 times daily (Patient not taking: Reported on 2024) 90 tablet 3     cyclobenzaprine (FLEXERIL) 5 MG tablet Take 1-2 tablets (5-10 mg) by mouth 3 times daily as needed for other (adjuvant pain) (Patient not taking: Reported on 2023) 20 tablet 0     enoxaparin ANTICOAGULANT (LOVENOX) 60 MG/0.6ML syringe Inject 0.6 mLs (60 mg) Subcutaneous every 12 hours 36 mL 0     ferrous sulfate (FEROSUL) 325 (65 Fe) MG tablet Take 1 tablet (325 mg) by mouth daily (with breakfast) 45 tablet 0     lisinopril (ZESTRIL) 2.5 MG tablet Take 1 tablet (2.5 mg) by mouth daily 90 tablet 3     metoprolol tartrate (LOPRESSOR) 25 MG tablet Take 0.5 tablets (12.5 mg) by mouth 2 times daily (Patient not taking: Reported on 2024) 60 tablet 2     norethindrone (MICRONOR) 0.35 MG tablet Take 1 tablet (0.35 mg) by mouth daily (Patient not taking: Reported on 2024) 84 tablet 4     valACYclovir (VALTREX) 1000 mg tablet Take 2 tablets (2,000 mg) by mouth 2 times daily for 1 day. 4 tablet 0     No current facility-administered medications for this visit.       Past Medical History:   Diagnosis Date     ADHD (attention deficit hyperactivity disorder)      DVT, lower extremity, distal (H)      Dysmenorrhea      Femoral artery thrombosis, left (H) 2021     Multiple subsegmental pulmonary emboli without acute cor pulmonale (H) 2021     PFO (patent foramen ovale)      Preeclampsia, third trimester      Spontaneous pregnancy loss 2020    31 weeks     Stage 3b chronic kidney disease (H)      Wegener's disease, pulmonary 2010    renal biopsy       Past Surgical History:   Procedure Laterality Date      SECTION N/A 2023    Procedure:  section;  Surgeon: Wendy Vera MD;  Location:  L+D     ENT SURGERY   01/01/2000    cyst     EXCISE GANGLION WRIST  01/01/2009     IR LOWER EXTREMITY ANGIOGRAM LEFT  3/21/2021     IR THROMBOLYSIS ART/VENOUS INFUSION SUBSQ DAY  3/21/2021     IR THROMBOLYSIS ART/VENOUS INFUSION SUBSQ DAY  3/21/2021     STERNOTOMY  02/18/2023    right atrial thrombectomy     THROMBECTOMY ATRIUM Right 02/18/2023     THROMBECTOMY PERCUTANEOUS N/A 2/18/2023    Procedure: Emergency Sternotomy, Right Atrial Thrombectomy, Central Cannulation, Bilateral Femoral Cannulation, Closure of PFO ; Transesophageal Echocardiogram performed by anesthesia staff;  Surgeon: Adelfo Elmore MD;  Location: [facility] OR     THROMBECTOMY PERCUTANEOUS N/A 2/18/2023    Procedure: Angiovac Mediated for Right Atrial Clot; Intracatheter Thrombectomy via bilateral percutaneous femoral venous access with 26 FR Right Femoral vein and 17 FR Left Femoral Vein cannulas. Transesophageal echchocardiogram performed by anesthesia staff;  Surgeon: Po Monterroso MD;  Location: [facility] OR       Social History     Tobacco Use     Smoking status: Former     Types: Vaping Device     Smokeless tobacco: Former     Tobacco comments:     Vaping stopped 2/26   Vaping Use     Vaping status: Some Days     Substances: Nicotine, Flavoring     Devices: Disposable   Substance Use Topics     Alcohol use: Not Currently     Drug use: No       Family History   Problem Relation Age of Onset     Thyroid Disease Mother      Cancer Maternal Grandfather      Asthma No family hx of      Diabetes No family hx of      Anesthesia Reaction No family hx of      Venous thrombosis No family hx of        ROS: A 12 system review of systems was negative other than noted here or above.     Exam:  There were no vitals taken for this visit.   BP Readings from Last 6 Encounters:   06/24/24 104/76   05/30/24 114/78   02/01/24 110/75   12/06/23 118/76   09/26/23 112/77   09/26/23 115/78     Physical Exam:  General : Alert, cooperative, comfortable  Skin: Skin color, texture and turgor  normal, no rashes, no lesions   Lymph Nodes: No obvious adenopathy, no swelling   Eyes: No scleral icterus, equal pupils  HENT: no traumatic injury to the head or face, no gross abnormalities  Lungs: Normal respiratory effort, bilateral symmetrical breath sounds, No added sounds  Heart: Regular rate and rhythm, No murmurs, rub or gallop  Abdomen: Normal bowel sounds, no tenderness, no organomegaly  Musculoskeletal: No obvious abnormalities  Neurologic: Grossly intact    Results:reviewed in details with the patient    Assessment/Plan:  #1 granulomatous polyangiitis/WV-3 ANCA vasculitis with glomerulonephritis, upper respiratory, musculoskeletal, and cutaneous involvement  #2 CKD stage III  Diagnosed early at the age of 10 when she received hemodialysis at that point and Cytoxan.  It seemed that she had another flare in 2021 which was also treated with Cytoxan.  Her third flare was in January 2023 where she was treated with 3 doses of rituximab and was found to be pregnant so her forth dose of rituximab was withheld. She has been on AZA while pregnant. That was stopped and she received 1 dose of rituximab 500 mg in Feb 2024.  missed her 6 month dose in August 2024.  -Her ANCA serology from today are still pending but her most recent labs show that she continues to be in clinical and biochemical remission at this point.  No symptoms of active disease.  She has some hematuria,her proteinuria has been improving.  - Her last dose of rituximab was Feb 2024.  She was supposed to have a dose in August 2024 but missed that.  Her CD19 count from today still pending   -We discussed today the duration of rituximab; given her WV-3 status and her frequent relapses, I discussed with her that she will need to be on rituximab for at least 3 to 4 years.  I also emphasized the importance of compliance with infusions as she already have some degree of kidney disease.  - Urine protein to creatinine ratio is pending today but her blood  pressure seems to be on the lower end and we will hold on the lisinopril for now.    #3 hypertension: Her blood pressure is currently well controlled, with some readings around 100 systolic  - Will hold on the lisinopril for now.    #4 Microcytic Anemia: She had received IV iron loading during her pregnancy. Her Hb today is at 12 g/dl.  She received some oral iron for 3 months.    #5 Neutropenia/Markedly decreased ANC: This has resolved.  Her current white blood cell is around 6.4.    #6 Contraception: she is currently not using any. She doesn't want any babies in the near future. I emphasized the importance of contraception. Even if she has lower fertility given her CKD and hx of cyclophosphamide, she still can get pregnant. Also counseled on the risk of fetal lymphopenia with Rituximab    #7 hypercoagulable state: She is being followed by Dr. Linton from hematology.  She has stopped her Lovenox.    # vaccination: I emphasized to her to get her flu shot and COVID-vaccine at least 4 weeks before her scheduled rituximab.  She agreed to that.    The total time of this encounter amounted to 30 minutes on the day of the encounter 10/3/2024. This time included face to face time spent with the patient, preparatory work and chart review, ordering tests, and performing post visit documentation.    The longitudinal plan of care for this patient was addressed during this visit. Due to the added complexity in care, I will continue to support the patient with subsequent management of this condition(s) and with the ongoing continuity of care of this condition(s).     *This dictation was prepared in part using Dragon recognition software.  As a result errors may occur. When identified these transcription errors have been corrected.  While every attempt is made to correct errors during dictation, errors may still exist.     Kang Goodson MD   [facility]   Department of Medicine   Division of Renal  Disease and Hypertension             Again, thank you for allowing me to participate in the care of your patient.      Sincerely,    FRAN NICOLE MD

## 2024-10-16 ENCOUNTER — PATIENT OUTREACH (OUTPATIENT)
Dept: NEPHROLOGY | Facility: CLINIC | Age: 24
End: 2024-10-16
Payer: COMMERCIAL

## 2024-10-16 NOTE — PROGRESS NOTES
Update from Dr. Goodson to support patient getting her annual Flu and COVID shot and then 4 wks later Rituximab scheduled.  Reached out to offer support via [employer].  Marielena Jaquez RN, BSN, PHN  [facility] Nephrology Nurse Navigator  [phone]

## 2024-10-17 ENCOUNTER — TELEPHONE (OUTPATIENT)
Dept: NEPHROLOGY | Facility: CLINIC | Age: 24
End: 2024-10-17
Payer: COMMERCIAL

## 2024-10-17 NOTE — TELEPHONE ENCOUNTER
Left Voicemail (1st Attempt) and Sent Mychart (1st Attempt) for the patient to call back and schedule the following:    Appointment type: Return Glomerular  Provider: Dr. Goodson  Return date: Around 2/3/25, ROSALINA gonzalez  Specialty phone number: [phone]  Additional appointment(s) needed: Labs prior  Additonal Notes: 4mo f/up, ROSALINA gonzalez'd by GERHARD Duque

## 2024-10-21 ENCOUNTER — TELEPHONE (OUTPATIENT)
Dept: NEPHROLOGY | Facility: CLINIC | Age: 24
End: 2024-10-21
Payer: COMMERCIAL

## 2024-10-21 NOTE — TELEPHONE ENCOUNTER
Sent Mychart (1st Attempt) and Left Voicemail (2nd Attempt) for the patient to call back and schedule the following:    Appointment type: Return Glomerular  Provider: Dr. Goodson  Return date: Around 2/3/25, ROSALINA gonzalez  Specialty phone number: [phone]  Additional appointment(s) needed: Labs prior  Additonal Notes: 4mo f/up, ROSALINA gonzalez'd by GERHARD Duque

## 2024-10-25 ENCOUNTER — PATIENT OUTREACH (OUTPATIENT)
Dept: NEPHROLOGY | Facility: CLINIC | Age: 24
End: 2024-10-25
Payer: COMMERCIAL

## 2024-10-25 NOTE — PROGRESS NOTES
Follow up received from Dr. Goodson to support getting patient scheduled for Infusion as well as follow up appt.  Patient also needs vaccines done the month before Rituximab.  Reached out to patient, left voicemail and MyChart to support clinic follow up instructions.    Marielena Jaquez RN, BSN, PHN  [facility] Nephrology Nurse Navigator  [phone]

## 2024-11-03 ASSESSMENT — ANXIETY QUESTIONNAIRES: GAD7 TOTAL SCORE: 0

## 2024-11-11 ENCOUNTER — PATIENT OUTREACH (OUTPATIENT)
Dept: NEPHROLOGY | Facility: CLINIC | Age: 24
End: 2024-11-11
Payer: COMMERCIAL

## 2024-11-11 NOTE — PROGRESS NOTES
Reached out to patient to support scheduling annual flu and COVID vaccines for 2024 season as well as getting Infusion, maintenance Rituximab scheduled and follow up appt for Feb/March.  Marielena Jaquez RN, BSN, PHN  [facility] Nephrology Nurse Navigator  [phone]

## 2024-11-12 ENCOUNTER — E-VISIT (OUTPATIENT)
Dept: INTERNAL MEDICINE | Facility: CLINIC | Age: 24
End: 2024-11-12
Payer: COMMERCIAL

## 2024-11-12 DIAGNOSIS — F41.1 GENERALIZED ANXIETY DISORDER: Primary | ICD-10-CM

## 2024-11-12 PROCEDURE — 99423 OL DIG E/M SVC 21+ MIN: CPT | Performed by: INTERNAL MEDICINE

## 2024-11-12 ASSESSMENT — ANXIETY QUESTIONNAIRES
GAD7 TOTAL SCORE: 12
2. NOT BEING ABLE TO STOP OR CONTROL WORRYING: NEARLY EVERY DAY
7. FEELING AFRAID AS IF SOMETHING AWFUL MIGHT HAPPEN: MORE THAN HALF THE DAYS
6. BECOMING EASILY ANNOYED OR IRRITABLE: SEVERAL DAYS
GAD7 TOTAL SCORE: 12
1. FEELING NERVOUS, ANXIOUS, OR ON EDGE: NEARLY EVERY DAY
7. FEELING AFRAID AS IF SOMETHING AWFUL MIGHT HAPPEN: MORE THAN HALF THE DAYS
IF YOU CHECKED OFF ANY PROBLEMS ON THIS QUESTIONNAIRE, HOW DIFFICULT HAVE THESE PROBLEMS MADE IT FOR YOU TO DO YOUR WORK, TAKE CARE OF THINGS AT HOME, OR GET ALONG WITH OTHER PEOPLE: VERY DIFFICULT
8. IF YOU CHECKED OFF ANY PROBLEMS, HOW DIFFICULT HAVE THESE MADE IT FOR YOU TO DO YOUR WORK, TAKE CARE OF THINGS AT HOME, OR GET ALONG WITH OTHER PEOPLE?: VERY DIFFICULT
4. TROUBLE RELAXING: NOT AT ALL
3. WORRYING TOO MUCH ABOUT DIFFERENT THINGS: NEARLY EVERY DAY
5. BEING SO RESTLESS THAT IT IS HARD TO SIT STILL: NOT AT ALL

## 2024-11-14 ENCOUNTER — VIRTUAL VISIT (OUTPATIENT)
Dept: FAMILY MEDICINE | Facility: CLINIC | Age: 24
End: 2024-11-14
Payer: COMMERCIAL

## 2024-11-14 DIAGNOSIS — H66.92 LEFT OTITIS MEDIA, UNSPECIFIED OTITIS MEDIA TYPE: Primary | ICD-10-CM

## 2024-11-14 PROCEDURE — 99213 OFFICE O/P EST LOW 20 MIN: CPT | Mod: 95 | Performed by: FAMILY MEDICINE

## 2024-11-14 RX ORDER — AZITHROMYCIN 250 MG/1
TABLET, FILM COATED ORAL
Qty: 6 TABLET | Refills: 0 | Status: SHIPPED | OUTPATIENT
Start: 2024-11-14

## 2024-11-14 NOTE — PROGRESS NOTES
"Cande is a 24 year old who is being evaluated via a billable video visit.    What phone number would you like to be contacted at? [phone]   How would you like to obtain your AVS? MyChart      Assessment & Plan     Left otitis media, unspecified otitis media type  Recurrent issues of ear problems she is complaining of sudden onset left-sided ear pain with some discharge.  She is advised if symptoms continue over the next few days she can start antibiotic if not improved she should go and see in person.  - azithromycin (ZITHROMAX) 250 MG tablet; Two tablets first day, then one tablet daily for four days.          BMI  Estimated body mass index is 39.82 kg/m  as calculated from the following:    Height as of 6/24/24: 1.626 m (5' 4\").    Weight as of 6/24/24: 105.2 kg (232 lb).             Subjective   Cande is a 24 year old, presenting for the following health issues:  Ear Problem (Left ear pain)        11/14/2024     1:48 PM   Additional Questions   Roomed by Yris JOHNSON       Video Start Time:  1:50    History of Present Illness       Reason for visit:  Left Ear Pain  Symptom onset:  1-3 days ago  Symptoms include:  Pain  Symptom progression:  Worsening  Had these symptoms before:  Yes  Has tried/received treatment for these symptoms:  Yes  Previous treatment was successful:  Yes  Prior treatment description:  Antibiotics-cefidnir  What makes it worse:  Eating, swallowing  What makes it better:  Tylenol   She is taking medications regularly.           Review of Systems  Constitutional, HEENT, cardiovascular, pulmonary, gi and gu systems are negative, except as otherwise noted.      Objective           Vitals:  No vitals were obtained today due to virtual visit.    Physical Exam   GENERAL: alert and no distress  EYES: Eyes grossly normal to inspection.  No discharge or erythema, or obvious scleral/conjunctival abnormalities.  RESP: No audible wheeze, cough, or visible cyanosis.    SKIN: Visible skin clear. No " significant rash, abnormal pigmentation or lesions.  NEURO: Cranial nerves grossly intact.  Mentation and speech appropriate for age.  PSYCH: Appropriate affect, tone, and pace of words        Video-Visit Details    Type of service:  Video Visit   Video End Time:2:06 PM  Originating Location (pt. Location): Home    Distant Location (provider location):  On-site  Platform used for Video Visit: Dany  Signed Electronically by: Anupam Werner MD

## 2024-11-15 ENCOUNTER — MYC MEDICAL ADVICE (OUTPATIENT)
Dept: INTERNAL MEDICINE | Facility: CLINIC | Age: 24
End: 2024-11-15
Payer: COMMERCIAL

## 2024-11-15 ENCOUNTER — TELEPHONE (OUTPATIENT)
Dept: NEPHROLOGY | Facility: CLINIC | Age: 24
End: 2024-11-15
Payer: COMMERCIAL

## 2024-11-15 NOTE — TELEPHONE ENCOUNTER
Left Voicemail (1st Attempt) and Sent Mychart (1st Attempt) for the patient to call back and schedule the following:    Appointment type: Return Glomerular  Provider: Dr. Goodson  Return date: March 2025  Specialty phone number: [phone]  Additional appointment(s) needed: Labs prior  Additonal Notes: 5 month follow up

## 2024-11-15 NOTE — TELEPHONE ENCOUNTER
Left Voicemail (1st Attempt) and Sent Mychart (1st Attempt) for the patient to call back and schedule the following:    Appointment type: Return/Video Visit  Provider: PCP  Return date: Next available (ROSALINA bender)  Specialty phone number: [phone]  Additonal Notes: per message - schedule follow-up appt with me for mental health; okay to use ROSALINA.

## 2024-11-18 NOTE — PATIENT INSTRUCTIONS
Thank you for choosing us for your care. I think an in-clinic or virtual visit would be the best next step based on your symptoms. Please schedule a clinic appointment; you won t be charged for this eVisit.      You can schedule an appointment by clicking here in AppMyDay, or call [phone].

## 2024-11-18 NOTE — TELEPHONE ENCOUNTER
Patient confirmed scheduled appointment:  Date: Nov 22nd  Time: 12:30pm  Visit type: Video Visit Return   Provider: PCP  Location: [facility]

## 2024-12-10 ENCOUNTER — VIRTUAL VISIT (OUTPATIENT)
Dept: URGENT CARE | Facility: CLINIC | Age: 24
End: 2024-12-10
Payer: COMMERCIAL

## 2024-12-10 DIAGNOSIS — J06.9 ACUTE RESPIRATORY DISEASE: Primary | ICD-10-CM

## 2024-12-10 PROCEDURE — 99207 PR NON-BILLABLE SERV PER CHARTING: CPT | Mod: 95 | Performed by: EMERGENCY MEDICINE

## 2024-12-10 RX ORDER — GUAIFENESIN, PSEUDOEPHEDRINE HYDROCHLORIDE 600; 60 MG/1; MG/1
1 TABLET, EXTENDED RELEASE ORAL EVERY 12 HOURS
Qty: 12 TABLET | Refills: 0 | Status: CANCELLED | OUTPATIENT
Start: 2024-12-10

## 2024-12-10 RX ORDER — GUAIFENESIN 600 MG/1
600 TABLET, EXTENDED RELEASE ORAL 2 TIMES DAILY
Qty: 12 TABLET | Refills: 0 | Status: SHIPPED | OUTPATIENT
Start: 2024-12-10

## 2024-12-10 NOTE — PROGRESS NOTES
Video visit:  Start time: 2:02 PM  Stop time: 2:10 PM  Duration: 8 minutes  Patient location: At home  Provider location:  [employer] [place] virtual provider (remote).  [facility]        CHIEF COMPLAINT: URI symptoms      HPI: Patient is a 24-year-old female whose been ill for 2 days with cough, congestion and headache.  No fever.  No COVID testing.  No severe chronic respiratory disease.  No shortness of breath.  Sounds like she has been taking some generic Sudafed with little improvement.      ROS: See HPI otherwise normal.    Allergies   Allergen Reactions    Penicillins Rash     Unknown, but think rash.    Tolerated cephalexin (12/27/20), cefpodoxime (12/27/20), Ceftriaxone (Feb 2023), Zosyn (Feb 2023)    Patient states she has taken Amoxicillin many times before without issue.      Unknown, but think rash. Tolerated cephalexin (12/27/20), cefpodoxime (12/27/20), Ceftriaxone (Feb 2023), Zosyn (Feb 2023)      Current Outpatient Medications   Medication Sig Dispense Refill    guaiFENesin (MUCINEX) 600 MG 12 hr tablet Take 1 tablet (600 mg) by mouth 2 times daily. 12 tablet 0    azithromycin (ZITHROMAX) 250 MG tablet Two tablets first day, then one tablet daily for four days. 6 tablet 0         PE: No acute distress on video visit.  She is alert and oriented.  She is nondyspneic appearing speaking in full sentences.  Slight nasal congestion is noted.        TREATMENT: None.      ASSESSMENT: Short duration illness consistent with viral URI without complicating factors.      DIAGNOSIS: Viral URI.      PLAN: Will add guaifenesin due to her congestion.  She should follow-up in 1 week if still ill, sooner if worse

## 2024-12-24 ENCOUNTER — PATIENT OUTREACH (OUTPATIENT)
Dept: NEPHROLOGY | Facility: CLINIC | Age: 24
End: 2024-12-24
Payer: COMMERCIAL

## 2024-12-24 NOTE — PROGRESS NOTES
Reached out to patient to get their vaccines up to date and Rituximab infusion scheduled.  Marielena Jaquez RN, BSN, PHN  [facility] Nephrology Nurse Navigator  [phone]

## 2024-12-30 ENCOUNTER — HOSPITAL ENCOUNTER (OUTPATIENT)
Facility: CLINIC | Age: 24
Setting detail: OBSERVATION
Discharge: HOME | End: 2024-12-31
Attending: EMERGENCY MEDICINE | Admitting: INTERNAL MEDICINE
Payer: COMMERCIAL

## 2024-12-30 ENCOUNTER — APPOINTMENT (OUTPATIENT)
Dept: CT IMAGING | Facility: CLINIC | Age: 24
End: 2024-12-30
Attending: EMERGENCY MEDICINE
Payer: COMMERCIAL

## 2024-12-30 ENCOUNTER — PATIENT OUTREACH (OUTPATIENT)
Dept: NEPHROLOGY | Facility: CLINIC | Age: 24
End: 2024-12-30
Payer: COMMERCIAL

## 2024-12-30 DIAGNOSIS — I77.82 ANCA-ASSOCIATED VASCULITIS (H): Primary | ICD-10-CM

## 2024-12-30 DIAGNOSIS — R04.0 EPISTAXIS: ICD-10-CM

## 2024-12-30 DIAGNOSIS — I26.99 OTHER PULMONARY EMBOLISM WITHOUT ACUTE COR PULMONALE, UNSPECIFIED CHRONICITY (H): Primary | ICD-10-CM

## 2024-12-30 DIAGNOSIS — M31.31 GRANULOMATOSIS WITH POLYANGIITIS WITH RENAL INVOLVEMENT (H): ICD-10-CM

## 2024-12-30 DIAGNOSIS — F17.210 CIGARETTE SMOKER: ICD-10-CM

## 2024-12-30 DIAGNOSIS — R04.2 HEMOPTYSIS: ICD-10-CM

## 2024-12-30 DIAGNOSIS — R04.2 BLOODY SPUTUM: ICD-10-CM

## 2024-12-30 LAB
ALBUMIN MFR UR ELPH: 33.3 MG/DL
ALBUMIN SERPL BCG-MCNC: 3.3 G/DL (ref 3.5–5.2)
ALBUMIN UR-MCNC: 30 MG/DL
ALP SERPL-CCNC: 65 U/L (ref 40–150)
ALT SERPL W P-5'-P-CCNC: 16 U/L (ref 0–50)
ANION GAP SERPL CALCULATED.3IONS-SCNC: 12 MMOL/L (ref 7–15)
APPEARANCE UR: ABNORMAL
APTT PPP: 26 SECONDS (ref 22–38)
AST SERPL W P-5'-P-CCNC: 15 U/L (ref 0–45)
ATRIAL RATE - MUSE: 75 BPM
BASOPHILS # BLD AUTO: 0 10E3/UL (ref 0–0.2)
BASOPHILS NFR BLD AUTO: 0 %
BILIRUB DIRECT SERPL-MCNC: <0.2 MG/DL (ref 0–0.3)
BILIRUB SERPL-MCNC: <0.2 MG/DL
BILIRUB UR QL STRIP: NEGATIVE
BUN SERPL-MCNC: 33.9 MG/DL (ref 6–20)
CALCIUM SERPL-MCNC: 9.1 MG/DL (ref 8.8–10.4)
CHLORIDE SERPL-SCNC: 105 MMOL/L (ref 98–107)
COLOR UR AUTO: ABNORMAL
CREAT SERPL-MCNC: 1.32 MG/DL (ref 0.51–0.95)
CREAT UR-MCNC: 95.2 MG/DL
CRP SERPL-MCNC: 48.72 MG/L
DIASTOLIC BLOOD PRESSURE - MUSE: NORMAL MMHG
EGFRCR SERPLBLD CKD-EPI 2021: 57 ML/MIN/1.73M2
EOSINOPHIL # BLD AUTO: 0.1 10E3/UL (ref 0–0.7)
EOSINOPHIL NFR BLD AUTO: 2 %
ERYTHROCYTE [DISTWIDTH] IN BLOOD BY AUTOMATED COUNT: 15.9 % (ref 10–15)
ERYTHROCYTE [SEDIMENTATION RATE] IN BLOOD BY WESTERGREN METHOD: 28 MM/HR (ref 0–20)
GLUCOSE SERPL-MCNC: 98 MG/DL (ref 70–99)
GLUCOSE UR STRIP-MCNC: NEGATIVE MG/DL
HCG SERPL QL: NEGATIVE
HCO3 SERPL-SCNC: 22 MMOL/L (ref 22–29)
HCT VFR BLD AUTO: 36.3 % (ref 35–47)
HGB BLD-MCNC: 11.1 G/DL (ref 11.7–15.7)
HGB BLD-MCNC: 11.6 G/DL (ref 11.7–15.7)
HGB BLD-MCNC: 11.6 G/DL (ref 11.7–15.7)
HGB UR QL STRIP: ABNORMAL
HOLD SPECIMEN: NORMAL
IMM GRANULOCYTES # BLD: 0 10E3/UL
IMM GRANULOCYTES NFR BLD: 0 %
INR PPP: 0.97 (ref 0.85–1.15)
INTERPRETATION ECG - MUSE: NORMAL
KETONES UR STRIP-MCNC: NEGATIVE MG/DL
LEUKOCYTE ESTERASE UR QL STRIP: ABNORMAL
LYMPHOCYTES # BLD AUTO: 1.6 10E3/UL (ref 0.8–5.3)
LYMPHOCYTES NFR BLD AUTO: 31 %
MCH RBC QN AUTO: 24.8 PG (ref 26.5–33)
MCHC RBC AUTO-ENTMCNC: 32 G/DL (ref 31.5–36.5)
MCV RBC AUTO: 78 FL (ref 78–100)
MONOCYTES # BLD AUTO: 0.6 10E3/UL (ref 0–1.3)
MONOCYTES NFR BLD AUTO: 11 %
MUCOUS THREADS #/AREA URNS LPF: PRESENT /LPF
NEUTROPHILS # BLD AUTO: 3 10E3/UL (ref 1.6–8.3)
NEUTROPHILS NFR BLD AUTO: 57 %
NITRATE UR QL: NEGATIVE
NRBC # BLD AUTO: 0 10E3/UL
NRBC BLD AUTO-RTO: 0 /100
P AXIS - MUSE: 46 DEGREES
PH UR STRIP: 5.5 [PH] (ref 5–7)
PLATELET # BLD AUTO: 270 10E3/UL (ref 150–450)
POTASSIUM SERPL-SCNC: 4.7 MMOL/L (ref 3.4–5.3)
PR INTERVAL - MUSE: 150 MS
PROT SERPL-MCNC: 6.2 G/DL (ref 6.4–8.3)
PROT/CREAT 24H UR: 0.35 MG/MG CR (ref 0–0.2)
QRS DURATION - MUSE: 84 MS
QT - MUSE: 384 MS
QTC - MUSE: 428 MS
R AXIS - MUSE: 47 DEGREES
RBC # BLD AUTO: 4.68 10E6/UL (ref 3.8–5.2)
RBC URINE: 7 /HPF
SARS-COV-2 RNA RESP QL NAA+PROBE: NEGATIVE
SODIUM SERPL-SCNC: 139 MMOL/L (ref 135–145)
SP GR UR STRIP: 1.04 (ref 1–1.03)
SQUAMOUS EPITHELIAL: 12 /HPF
SYSTOLIC BLOOD PRESSURE - MUSE: NORMAL MMHG
T AXIS - MUSE: 57 DEGREES
TROPONIN T SERPL HS-MCNC: <6 NG/L
UROBILINOGEN UR STRIP-MCNC: NORMAL MG/DL
VENTRICULAR RATE- MUSE: 75 BPM
WBC # BLD AUTO: 5.4 10E3/UL (ref 4–11)
WBC URINE: 3 /HPF

## 2024-12-30 PROCEDURE — 84484 ASSAY OF TROPONIN QUANT: CPT | Performed by: EMERGENCY MEDICINE

## 2024-12-30 PROCEDURE — 250N000013 HC RX MED GY IP 250 OP 250 PS 637: Performed by: INTERNAL MEDICINE

## 2024-12-30 PROCEDURE — 96360 HYDRATION IV INFUSION INIT: CPT

## 2024-12-30 PROCEDURE — 84703 CHORIONIC GONADOTROPIN ASSAY: CPT | Performed by: EMERGENCY MEDICINE

## 2024-12-30 PROCEDURE — 83516 IMMUNOASSAY NONANTIBODY: CPT | Performed by: EMERGENCY MEDICINE

## 2024-12-30 PROCEDURE — 85018 HEMOGLOBIN: CPT | Performed by: PHYSICIAN ASSISTANT

## 2024-12-30 PROCEDURE — 71275 CT ANGIOGRAPHY CHEST: CPT

## 2024-12-30 PROCEDURE — 82784 ASSAY IGA/IGD/IGG/IGM EACH: CPT | Performed by: EMERGENCY MEDICINE

## 2024-12-30 PROCEDURE — 83876 ASSAY MYELOPEROXIDASE: CPT | Performed by: EMERGENCY MEDICINE

## 2024-12-30 PROCEDURE — 99253 IP/OBS CNSLTJ NEW/EST LOW 45: CPT | Performed by: INTERNAL MEDICINE

## 2024-12-30 PROCEDURE — 85730 THROMBOPLASTIN TIME PARTIAL: CPT | Performed by: EMERGENCY MEDICINE

## 2024-12-30 PROCEDURE — 81001 URINALYSIS AUTO W/SCOPE: CPT | Performed by: PHYSICIAN ASSISTANT

## 2024-12-30 PROCEDURE — 85025 COMPLETE CBC W/AUTO DIFF WBC: CPT | Performed by: EMERGENCY MEDICINE

## 2024-12-30 PROCEDURE — 36415 COLL VENOUS BLD VENIPUNCTURE: CPT | Performed by: PHYSICIAN ASSISTANT

## 2024-12-30 PROCEDURE — 86140 C-REACTIVE PROTEIN: CPT | Performed by: EMERGENCY MEDICINE

## 2024-12-30 PROCEDURE — 99223 1ST HOSP IP/OBS HIGH 75: CPT | Mod: AI | Performed by: INTERNAL MEDICINE

## 2024-12-30 PROCEDURE — 86036 ANCA SCREEN EACH ANTIBODY: CPT | Performed by: EMERGENCY MEDICINE

## 2024-12-30 PROCEDURE — 250N000009 HC RX 250: Performed by: EMERGENCY MEDICINE

## 2024-12-30 PROCEDURE — 84156 ASSAY OF PROTEIN URINE: CPT | Performed by: PHYSICIAN ASSISTANT

## 2024-12-30 PROCEDURE — 85652 RBC SED RATE AUTOMATED: CPT | Performed by: EMERGENCY MEDICINE

## 2024-12-30 PROCEDURE — 250N000011 HC RX IP 250 OP 636: Performed by: EMERGENCY MEDICINE

## 2024-12-30 PROCEDURE — 80048 BASIC METABOLIC PNL TOTAL CA: CPT | Performed by: EMERGENCY MEDICINE

## 2024-12-30 PROCEDURE — 120N000002 HC R&B MED SURG/OB UMMC

## 2024-12-30 PROCEDURE — 99285 EMERGENCY DEPT VISIT HI MDM: CPT | Performed by: EMERGENCY MEDICINE

## 2024-12-30 PROCEDURE — 85610 PROTHROMBIN TIME: CPT | Performed by: EMERGENCY MEDICINE

## 2024-12-30 PROCEDURE — 93005 ELECTROCARDIOGRAM TRACING: CPT | Performed by: EMERGENCY MEDICINE

## 2024-12-30 PROCEDURE — 82040 ASSAY OF SERUM ALBUMIN: CPT | Performed by: PHYSICIAN ASSISTANT

## 2024-12-30 PROCEDURE — 99207 PR APP CREDIT; MD BILLING SHARED VISIT: CPT | Performed by: PHYSICIAN ASSISTANT

## 2024-12-30 PROCEDURE — 250N000013 HC RX MED GY IP 250 OP 250 PS 637: Performed by: PHYSICIAN ASSISTANT

## 2024-12-30 PROCEDURE — 258N000003 HC RX IP 258 OP 636: Performed by: PHYSICIAN ASSISTANT

## 2024-12-30 PROCEDURE — 96361 HYDRATE IV INFUSION ADD-ON: CPT

## 2024-12-30 PROCEDURE — 99285 EMERGENCY DEPT VISIT HI MDM: CPT | Mod: 25 | Performed by: EMERGENCY MEDICINE

## 2024-12-30 PROCEDURE — 84155 ASSAY OF PROTEIN SERUM: CPT | Performed by: PHYSICIAN ASSISTANT

## 2024-12-30 PROCEDURE — 36415 COLL VENOUS BLD VENIPUNCTURE: CPT | Performed by: EMERGENCY MEDICINE

## 2024-12-30 PROCEDURE — 87635 SARS-COV-2 COVID-19 AMP PRB: CPT | Performed by: PHYSICIAN ASSISTANT

## 2024-12-30 PROCEDURE — 258N000003 HC RX IP 258 OP 636: Performed by: EMERGENCY MEDICINE

## 2024-12-30 PROCEDURE — 93010 ELECTROCARDIOGRAM REPORT: CPT | Performed by: EMERGENCY MEDICINE

## 2024-12-30 PROCEDURE — 80053 COMPREHEN METABOLIC PANEL: CPT | Performed by: EMERGENCY MEDICINE

## 2024-12-30 PROCEDURE — 86355 B CELLS TOTAL COUNT: CPT | Performed by: PHYSICIAN ASSISTANT

## 2024-12-30 RX ORDER — AMOXICILLIN 250 MG
1 CAPSULE ORAL 2 TIMES DAILY PRN
Status: DISCONTINUED | OUTPATIENT
Start: 2024-12-30 | End: 2024-12-31 | Stop reason: HOSPADM

## 2024-12-30 RX ORDER — OSELTAMIVIR PHOSPHATE 75 MG/1
75 CAPSULE ORAL 2 TIMES DAILY
COMMUNITY
End: 2025-01-09

## 2024-12-30 RX ORDER — IOPAMIDOL 755 MG/ML
100 INJECTION, SOLUTION INTRAVASCULAR ONCE
Status: COMPLETED | OUTPATIENT
Start: 2024-12-30 | End: 2024-12-30

## 2024-12-30 RX ORDER — BENZONATATE 100 MG/1
100 CAPSULE ORAL 3 TIMES DAILY PRN
Status: DISCONTINUED | OUTPATIENT
Start: 2024-12-30 | End: 2024-12-31 | Stop reason: HOSPADM

## 2024-12-30 RX ORDER — ACETAMINOPHEN 500 MG
500-1000 TABLET ORAL EVERY 6 HOURS PRN
Status: DISCONTINUED | OUTPATIENT
Start: 2024-12-30 | End: 2024-12-31 | Stop reason: HOSPADM

## 2024-12-30 RX ORDER — CALCIUM CARBONATE 500 MG/1
1000 TABLET, CHEWABLE ORAL 4 TIMES DAILY PRN
Status: DISCONTINUED | OUTPATIENT
Start: 2024-12-30 | End: 2024-12-31 | Stop reason: HOSPADM

## 2024-12-30 RX ORDER — ONDANSETRON 4 MG/1
4 TABLET, ORALLY DISINTEGRATING ORAL EVERY 6 HOURS PRN
Status: DISCONTINUED | OUTPATIENT
Start: 2024-12-30 | End: 2024-12-31 | Stop reason: HOSPADM

## 2024-12-30 RX ORDER — ACETAMINOPHEN 500 MG
500-1000 TABLET ORAL EVERY 6 HOURS PRN
COMMUNITY

## 2024-12-30 RX ORDER — AMOXICILLIN 250 MG
2 CAPSULE ORAL 2 TIMES DAILY PRN
Status: DISCONTINUED | OUTPATIENT
Start: 2024-12-30 | End: 2024-12-31 | Stop reason: HOSPADM

## 2024-12-30 RX ORDER — POLYETHYLENE GLYCOL 3350 17 G/17G
17 POWDER, FOR SOLUTION ORAL 2 TIMES DAILY PRN
Status: DISCONTINUED | OUTPATIENT
Start: 2024-12-30 | End: 2024-12-31 | Stop reason: HOSPADM

## 2024-12-30 RX ORDER — ONDANSETRON 2 MG/ML
4 INJECTION INTRAMUSCULAR; INTRAVENOUS EVERY 6 HOURS PRN
Status: DISCONTINUED | OUTPATIENT
Start: 2024-12-30 | End: 2024-12-31 | Stop reason: HOSPADM

## 2024-12-30 RX ORDER — GUAIFENESIN 600 MG/1
600 TABLET, EXTENDED RELEASE ORAL 2 TIMES DAILY
Status: DISCONTINUED | OUTPATIENT
Start: 2024-12-30 | End: 2024-12-31 | Stop reason: HOSPADM

## 2024-12-30 RX ORDER — OSELTAMIVIR PHOSPHATE 75 MG/1
75 CAPSULE ORAL 2 TIMES DAILY
Status: DISCONTINUED | OUTPATIENT
Start: 2024-12-30 | End: 2024-12-30

## 2024-12-30 RX ORDER — LIDOCAINE 40 MG/G
CREAM TOPICAL
Status: DISCONTINUED | OUTPATIENT
Start: 2024-12-30 | End: 2024-12-31 | Stop reason: HOSPADM

## 2024-12-30 RX ORDER — OSELTAMIVIR PHOSPHATE 75 MG/1
75 CAPSULE ORAL 2 TIMES DAILY
Status: DISCONTINUED | OUTPATIENT
Start: 2024-12-30 | End: 2024-12-31 | Stop reason: HOSPADM

## 2024-12-30 RX ORDER — SODIUM CHLORIDE, SODIUM LACTATE, POTASSIUM CHLORIDE, CALCIUM CHLORIDE 600; 310; 30; 20 MG/100ML; MG/100ML; MG/100ML; MG/100ML
INJECTION, SOLUTION INTRAVENOUS CONTINUOUS
Status: DISCONTINUED | OUTPATIENT
Start: 2024-12-30 | End: 2024-12-31

## 2024-12-30 RX ADMIN — GUAIFENESIN 600 MG: 600 TABLET ORAL at 21:47

## 2024-12-30 RX ADMIN — SODIUM CHLORIDE 1000 ML: 9 INJECTION, SOLUTION INTRAVENOUS at 12:01

## 2024-12-30 RX ADMIN — SODIUM CHLORIDE 90 ML: 9 INJECTION, SOLUTION INTRAVENOUS at 12:23

## 2024-12-30 RX ADMIN — IOPAMIDOL 72 ML: 755 INJECTION, SOLUTION INTRAVENOUS at 12:22

## 2024-12-30 RX ADMIN — OSELTAMAVIR PHOSPHATE 75 MG: 75 CAPSULE ORAL at 21:47

## 2024-12-30 RX ADMIN — SODIUM CHLORIDE, POTASSIUM CHLORIDE, SODIUM LACTATE AND CALCIUM CHLORIDE: 600; 310; 30; 20 INJECTION, SOLUTION INTRAVENOUS at 16:57

## 2024-12-30 ASSESSMENT — ACTIVITIES OF DAILY LIVING (ADL)
ADLS_ACUITY_SCORE: 32
ADLS_ACUITY_SCORE: 32
ADLS_ACUITY_SCORE: 58
ADLS_ACUITY_SCORE: 32
ADLS_ACUITY_SCORE: 58
ADLS_ACUITY_SCORE: 32
ADLS_ACUITY_SCORE: 32
ADLS_ACUITY_SCORE: 58
ADLS_ACUITY_SCORE: 32

## 2024-12-30 ASSESSMENT — COLUMBIA-SUICIDE SEVERITY RATING SCALE - C-SSRS
1. IN THE PAST MONTH, HAVE YOU WISHED YOU WERE DEAD OR WISHED YOU COULD GO TO SLEEP AND NOT WAKE UP?: NO
6. HAVE YOU EVER DONE ANYTHING, STARTED TO DO ANYTHING, OR PREPARED TO DO ANYTHING TO END YOUR LIFE?: NO
2. HAVE YOU ACTUALLY HAD ANY THOUGHTS OF KILLING YOURSELF IN THE PAST MONTH?: NO

## 2024-12-30 NOTE — PROGRESS NOTES
8MS Admission Note    Reason for admission: Hemoptysis  Admitted from: [facility] ED  Belongings: Remain with Patient  Admission Required Doc Completed: Yes  Teaching: Orientation to unit and call light- call light within reach, use of console, meal times, when to call for the RN, and enforced importance of safety.  IV Access: Yes  Telemetry: No  Ht./Wt.: Completed  Code Status verified on armband: Yes  2 RN Skin Assessment Completed with: Yes  Suction/Ambu bag/Flowmeter at bedside: Yes      Reports ongoing hemoptysis for 2 days. Diagnosed with Influenza 12/28. Denies fever, chills, headache, sore throat, sweats. Upon admission to unit, reports coughing up small amount of clear sputum with no blood present. A/Ox4. VS WNL. Denies pain. Independent. Continent of bowel and bladder. RUE PIV, infusing LR. COVID PCR and urine specimen collected.

## 2024-12-30 NOTE — CONSULTS
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 Goodson's note for further details of her history. Has had a total of 3 significant flares, last being in January 2023 where she was treated with 3 doses of rituximab and was found to be pregnant so her forth dose of rituximab was withheld. She had been on AZA while pregnant. That was stopped and she received 1 dose of rituximab 500 mg in Feb 2024. She missed her 6 month dose in August 2024. Her ANCA serology from last visit demonstrated clinical and biochemical remission. She has some hematuria,her proteinuria which had been improving. It was discussed  during last nephrology visit that given her VT-3 status and her frequent relapses, she would need to be on rituximab for at least 3 to 4 years. Currently her Creatinine seems to be at her baseline of 1.3-1.4 mg/dL.  - UA  - Daily BMPs  - Monitor UOP closely   - Avoid nephrotoxins  - Pulmonology consulted: Bronchoscopy not indicated given small amount of change on the chest CT. Hemoptysis likely related to influenza.\"  - Pending CD19, IgG, ANCA, MPO, PR3  - CRP up to 48.7 from 3.84 (in the setting of influenza A), ESR 28     #3 hypertension: Her blood pressure a little bit low,  - Lisinopril was held last visit to nephrology     #4 Microcytic Anemia: She had received IV iron loading during her pregnancy. She received some oral iron for 3 months. Now hgb is 11.6.     #5 History of neutropenia/Markedly decreased ANC: This has resolved.  Her current white blood cell is around 5.4.     #8 hypercoagulable state: She is being followed by Dr. Linton " from hematology.  She has stopped her Lovenox.  - pending hematology consult       #9 Influenza A  Receiving Tamiflu    Recommendations were communicated to primary team via note    I spent a total of 50 minutes on the date of this encounter in reviewing the medical records, face-to-face evaluation and physical exam, review of labs, counseling, orders, care coordination and documentation      Rae García MD   Division of Nephrology and Hypertension  [employer]  Vocera Web Console      REASON FOR CONSULT: GPA with renal involvement    HISTORY OF PRESENT ILLNESS:  Admitting provider and nursing notes reviewed  Cande Shields is a 24 year old with medical history notable for GPA, previously on rituximab (last dose ) with renal involvement, history of DVT and saddle PE s/p open thrombectomy () not currently on anticoagulation, PFO, 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 She is here with small-volume hemoptysis and influenza A. Follows with Dr. Kang Goodson in Nephrology.    PAST MEDICAL HISTORY:  Reviewed with patient on 2024   Past Medical History:   Diagnosis Date    ADHD (attention deficit hyperactivity disorder)     DVT, lower extremity, distal (H)     Dysmenorrhea     Femoral artery thrombosis, left (H) 2021    Multiple subsegmental pulmonary emboli without acute cor pulmonale (H) 2021    PFO (patent foramen ovale)     Preeclampsia, third trimester     Spontaneous pregnancy loss 2020    31 weeks    Stage 3b chronic kidney disease (H)     Wegener's disease, pulmonary 2010    renal biopsy       Past Surgical History:   Procedure Laterality Date     SECTION N/A 2023    Procedure:  section;  Surgeon: Wendy Vera MD;  [facility]    ENT SURGERY  2000    cyst    EXCISE GANGLION WRIST  2009    IR LOWER EXTREMITY ANGIOGRAM LEFT  3/21/2021    IR THROMBOLYSIS ART/VENOUS INFUSION SUBSQ DAY  3/21/2021    IR THROMBOLYSIS  ART/VENOUS INFUSION SUBSQ DAY  3/21/2021    STERNOTOMY  02/18/2023    right atrial thrombectomy    THROMBECTOMY ATRIUM Right 02/18/2023    THROMBECTOMY PERCUTANEOUS N/A 2/18/2023    Procedure: Emergency Sternotomy, Right Atrial Thrombectomy, Central Cannulation, Bilateral Femoral Cannulation, Closure of PFO ; Transesophageal Echocardiogram performed by anesthesia staff;  Surgeon: Adelfo Elmore MD;  Location: [facility]    THROMBECTOMY PERCUTANEOUS N/A 2/18/2023    Procedure: Angiovac Mediated for Right Atrial Clot; Intracatheter Thrombectomy via bilateral percutaneous femoral venous access with 26 FR Right Femoral vein and 17 FR Left Femoral Vein cannulas. Transesophageal echchocardiogram performed by anesthesia staff;  Surgeon: Po Monterroso MD;  Location: [facility] OR        MEDICATIONS:  PTA Meds  Prior to Admission medications    Medication Sig Last Dose Taking? Auth Provider Long Term End Date   acetaminophen (TYLENOL) 500 MG tablet Take 500-1,000 mg by mouth every 6 hours as needed for mild pain. Past Week Yes Unknown, Entered By History No    guaiFENesin (MUCINEX) 600 MG 12 hr tablet Take 1 tablet (600 mg) by mouth 2 times daily. Past Week Yes Derrell Myers MD     oseltamivir (TAMIFLU) 75 MG capsule Take 75 mg by mouth 2 times daily. 12/30/2024 Morning Yes Reported, Patient        Current Meds  Current Facility-Administered Medications   Medication Dose Route Frequency Provider Last Rate Last Admin    guaiFENesin (MUCINEX) 12 hr tablet 600 mg  600 mg Oral BID Brinda Perkins E PA-C        oseltamivir (TAMIFLU) capsule 75 mg  75 mg Oral BID Brinda Perkins PA-C        sodium chloride (PF) 0.9% PF flush 3 mL  3 mL Intracatheter Q8H Brinda Perkins PA-C         Infusion Meds  Current Facility-Administered Medications   Medication Dose Route Frequency Provider Last Rate Last Admin    lactated ringers infusion   Intravenous Continuous Brinda Perkins PA-C           ALLERGIES:    Allergies    Allergen Reactions    Heparin Other (See Comments)     Reaction: HIT    Penicillins Rash     Unknown, but think rash.    Tolerated cephalexin (12/27/20), cefpodoxime (12/27/20), Ceftriaxone (Feb 2023), Zosyn (Feb 2023)    Patient states she has taken Amoxicillin many times before without issue.      Unknown, but think rash. Tolerated cephalexin (12/27/20), cefpodoxime (12/27/20), Ceftriaxone (Feb 2023), Zosyn (Feb 2023)       REVIEW OF SYSTEMS:  A comprehensive of systems was negative except as noted above.    SOCIAL HISTORY:   Social History     Socioeconomic History    Marital status: Legally      Spouse name: Not on file    Number of children: Not on file    Years of education: Not on file    Highest education level: Not on file   Occupational History    Not on file   Tobacco Use    Smoking status: Former     Types: Vaping Device    Smokeless tobacco: Former    Tobacco comments:     Vaping stopped 2/26   Vaping Use    Vaping status: Never Used   Substance and Sexual Activity    Alcohol use: Not Currently    Drug use: No    Sexual activity: Yes     Partners: Male     Birth control/protection: None   Other Topics Concern    Parent/sibling w/ CABG, MI or angioplasty before 65F 55M? Not Asked   Social History Narrative    Lives with brother 16yo and mother. Pets: Dog Nicholas.    Lives with  in [place], has cat.    Mom lives next door.     Social Drivers of Health     Financial Resource Strain: Low Risk  (12/5/2023)    Financial Resource Strain     Within the past 12 months, have you or your family members you live with been unable to get utilities (heat, electricity) when it was really needed?: No   Food Insecurity: Low Risk  (12/5/2023)    Food Insecurity     Within the past 12 months, did you worry that your food would run out before you got money to buy more?: No     Within the past 12 months, did the food you bought just not last and you didn t have money to get more?: No   Transportation Needs:  "Low Risk  (2023)    Transportation Needs     Within the past 12 months, has lack of transportation kept you from medical appointments, getting your medicines, non-medical meetings or appointments, work, or from getting things that you need?: No   Physical Activity: Not on file   Stress: Not on file   Social Connections: Unknown (2024)    [facility]    Social Connections     Frequency of Communication with Friends and Family: Not on file   Interpersonal Safety: Not At Risk (2024)    Received from [facility]     Humiliation, Afraid, Rape, and Kick questionnaire     Fear of Current or Ex-Partner: No     Emotionally Abused: No     Physically Abused: No     Sexually Abused: No   Housing Stability: Low Risk  (2023)    Housing Stability     Do you have housing? : Yes     Are you worried about losing your housing?: No     FAMILY MEDICAL HISTORY:   Family History   Problem Relation Age of Onset    Thyroid Disease Mother     Cancer Maternal Grandfather     Asthma No family hx of     Diabetes No family hx of     Anesthesia Reaction No family hx of     Venous thrombosis No family hx of      PHYSICAL EXAM:   Temp  Av.2  F (36.8  C)  Min: 98.2  F (36.8  C)  Max: 98.2  F (36.8  C)      Pulse  Av  Min: 86  Max: 86 Resp  Av  Min: 16  Max: 16  SpO2  Av %  Min: 96 %  Max: 96 %       BP 98/81   Pulse 86   Temp 98.2  F (36.8  C) (Oral)   Resp 16   Ht 1.626 m (5' 4\")   Wt 103 kg (227 lb)   LMP 2024 (Exact Date)   SpO2 96%   BMI 38.96 kg/m        Admit Weight: 103 kg (227 lb)     GENERAL APPEARANCE: no distress,  awake  EYES: no scleral icterus, pupils equal  Lymphatics: no cervical or supraclavicular LAD  Pulmonary: lungs clear to auscultation, no crackles, n wheeze  CV: regular rhythm, normal rate, no rub   - JVD N/A   - Edema none  GI: soft, nontender, normal bowel sounds  MS: no evidence of inflammation in joints, no muscle " tenderness  : no arambula  SKIN: no rash, warm, dry, no cyanosis  NEURO: face symmetric, no asterixis, speech fluent    LABS:   I have reviewed the following labs:  CMP  Recent Labs   Lab 12/30/24  1043      POTASSIUM 4.7   CHLORIDE 105   CO2 22   ANIONGAP 12   GLC 98   BUN 33.9*   CR 1.32*   GFRESTIMATED 57*   KRAIG 9.1     CBC  Recent Labs   Lab 12/30/24  1044   HGB 11.6*  11.6*   WBC 5.4   RBC 4.68   HCT 36.3   MCV 78   MCH 24.8*   MCHC 32.0   RDW 15.9*        INR  Recent Labs   Lab 12/30/24  1043   INR 0.97   PTT 26     ABGNo lab results found in last 7 days.   URINE STUDIES  Recent Labs   Lab Test 10/03/24  1136 05/28/24  1333 01/29/24  1602 12/06/23  1127 12/20/22  1251 01/31/22  1351 09/08/21  1027   COLOR Yellow Light Yellow Light Yellow Yellow   < > Yellow Yellow   APPEARANCE Clear Clear Clear Slightly Cloudy*   < > Clear Clear   URINEGLC Negative Negative Negative Negative   < > Negative Negative   URINEBILI Negative Negative Negative Negative   < > Negative Negative   URINEKETONE Negative Negative Negative Negative   < > Negative Negative   SG 1.025 1.019 1.016 1.025   < > 1.025 >=1.030   UBLD Large* Trace* Trace* Trace*   < > Moderate* Moderate*   URINEPH 5.5 5.5 6.0 6.0   < > 5.0 5.5   PROTEIN Trace* 50* 30* 100*   < > 100* 100*   UROBILINOGEN 0.2  --   --  0.2  --  0.2 0.2   NITRITE Negative Negative Negative Negative   < > Negative Negative   LEUKEST Negative Negative Small* Trace*   < > Negative Negative   RBCU 2-5* 5-10* 0-2 2-5*   < > 2-5*  --    WBCU 0-5 None Seen 0-5 10-25*   < > 0-5  --     < > = values in this interval not displayed.     Recent Labs   Lab Test 01/31/22  1351 04/07/21  1238 02/12/21  1425 01/31/21  1450 12/27/20  1630   UTPG 0.60* 1.10* 1.89* 1.64* 1.46*     PTH  Recent Labs   Lab Test 01/19/23  1201   PTHI 230*     IRON STUDIES  Recent Labs   Lab Test 05/28/24  1333 06/30/23  1352   IRON 28* 24*    510*   IRONSAT 7* 5*   WILFREDO 8 8       IMAGING:  All imaging  studies reviewed by me.     Rae García MD      [Diarrhea: Grade 2 - Increase of 4 - 6 stools per day over baseline; moderate increase in ostomy output compared to baseline] : Diarrhea: Grade 2 - Increase of 4 - 6 stools per day over baseline; moderate increase in ostomy output compared to baseline [FreeTextEntry2] : negative except as reviewed in interval history [de-identified] : 4-5x day; 15 days

## 2024-12-30 NOTE — CONSULTS
[facility] Pulmonology Consultation    Cande Shields  MRN: [id number]  : 2000    Date of Admission: 2024  Date of Service: 24    Reason for consult: hx of GPA, here with hemoptysis  Requesting provider: Brinda Weeks PA-C     HPI    Ms. Cande Shields is a 24-year-old woman with medical history notable for GPA, previously on rituximab (last dose ) with renal involvement, history of DVT and saddle PE s/p open thrombectomy () not currently on anticoagulation, here with small-volume hemoptysis and influenza A. Pulmonary is consulted for history of GPA and hemoptysis. Ms. Shields was in her usual state of health until Friday, when she started to feel ill. Her mother was noted to have the flu on Vy day, when they spent the day together. On Saturday, had a few bouts of expectorated phlegm with blood mixed in. Episodes continued on , again just a few times throughout the day. Today she blew her nose and noticed blood on the tissue. Not currently feeling dyspneic, wheezy. Able to manage all of her normal daily activities. Has a 16 month old son at home. Here with her boyfriend in the ED. Lives in [place]. Previously smoked cigarettes; currently vaping nicotine products - unsure of how much in an average day. Last dose of rituximab late in . Regarding PE history: saddle PE in . Had open thrombectomy. Managed on DOAC, then transitioned to enoxaparin during/after pregnancy. Currently not on any anticoagulation in shared decision making with her hematologist.    Review of systems   Complete 10 point review of systems completed and negative except as noted above in HPI.    Medical history    Past Medical History:   Diagnosis Date    ADHD (attention deficit hyperactivity disorder)     DVT, lower extremity, distal (H)     Dysmenorrhea     Femoral artery thrombosis, left (H) 2021    Multiple subsegmental pulmonary emboli without acute cor pulmonale (H) 2021     PFO (patent foramen ovale)     Preeclampsia, third trimester     Spontaneous pregnancy loss 2020    31 weeks    Stage 3b chronic kidney disease (H)     Wegener's disease, pulmonary 01/01/2010    renal biopsy     Social history   Social History     Socioeconomic History    Marital status: Legally      Spouse name: None    Number of children: None    Years of education: None    Highest education level: None   Tobacco Use    Smoking status: Former     Types: Vaping Device    Smokeless tobacco: Former    Tobacco comments:     Vaping stopped 2/26   Vaping Use    Vaping status: Never Used   Substance and Sexual Activity    Alcohol use: Not Currently    Drug use: No    Sexual activity: Yes     Partners: Male     Birth control/protection: None   Social History Narrative    Lives with brother 16yo and mother. Pets: Dog Nicholas.    Lives with  in [place], has cat.    Mom lives next door.     Social Drivers of Health     Financial Resource Strain: Low Risk  (12/5/2023)    Financial Resource Strain     Within the past 12 months, have you or your family members you live with been unable to get utilities (heat, electricity) when it was really needed?: No   Food Insecurity: Low Risk  (12/5/2023)    Food Insecurity     Within the past 12 months, did you worry that your food would run out before you got money to buy more?: No     Within the past 12 months, did the food you bought just not last and you didn t have money to get more?: No   Transportation Needs: Low Risk  (12/5/2023)    Transportation Needs     Within the past 12 months, has lack of transportation kept you from medical appointments, getting your medicines, non-medical meetings or appointments, work, or from getting things that you need?: No    Received [facility]    Social Connections   Interpersonal Safety: Not At Risk (11/1/2024)    Received from [facility]     Humiliation, Afraid, Rape, and Kick  questionnaire     Fear of Current or Ex-Partner: No     Emotionally Abused: No     Physically Abused: No     Sexually Abused: No   Housing Stability: Low Risk  (2023)    Housing Stability     Do you have housing? : Yes     Are you worried about losing your housing?: No     Surgical history    Past Surgical History:   Procedure Laterality Date     SECTION N/A 2023    Procedure:  section;  Surgeon: Wendy Vear MD;  [facility]    ENT SURGERY  2000    cyst    EXCISE GANGLION WRIST  2009    IR LOWER EXTREMITY ANGIOGRAM LEFT  3/21/2021    IR THROMBOLYSIS ART/VENOUS INFUSION SUBSQ DAY  3/21/2021    IR THROMBOLYSIS ART/VENOUS INFUSION SUBSQ DAY  3/21/2021    STERNOTOMY  2023    right atrial thrombectomy    THROMBECTOMY ATRIUM Right 2023    THROMBECTOMY PERCUTANEOUS N/A 2023    Procedure: Emergency Sternotomy, Right Atrial Thrombectomy, Central Cannulation, Bilateral Femoral Cannulation, Closure of PFO ; Transesophageal Echocardiogram performed by anesthesia staff;  Surgeon: Adelfo Elmore MD;  [facility]: [facility] OR    THROMBECTOMY PERCUTANEOUS N/A 2023    Procedure: Angiovac Mediated for Right Atrial Clot; Intracatheter Thrombectomy via bilateral percutaneous femoral venous access with 26 FR Right Femoral vein and 17 FR Left Femoral Vein cannulas. Transesophageal echchocardiogram performed by anesthesia staff;  Surgeon: Po Monterroso MD;  Location: [facility] OR     Family history  Family History   Problem Relation Age of Onset    Thyroid Disease Mother     Cancer Maternal Grandfather     Asthma No family hx of     Diabetes No family hx of     Anesthesia Reaction No family hx of     Venous thrombosis No family hx of        Allergies    Allergies   Allergen Reactions    Heparin Other (See Comments)     Reaction: HIT    Penicillins Rash     Unknown, but think rash.    Tolerated cephalexin (20), cefpodoxime (20), Ceftriaxone (2023), Zosyn  (Feb 2023)    Patient states she has taken Amoxicillin many times before without issue.      Unknown, but think rash. Tolerated cephalexin (12/27/20), cefpodoxime (12/27/20), Ceftriaxone (Feb 2023), Zosyn (Feb 2023)     Medications  Current Facility-Administered Medications   Medication Dose Route Frequency Provider Last Rate Last Admin    acetaminophen (TYLENOL) tablet 500-1,000 mg  500-1,000 mg Oral Q6H PRN Maddie, Brinda E, PA-C        benzonatate (TESSALON) capsule 100 mg  100 mg Oral TID PRN Maddie, Brinda E, PA-C        calcium carbonate (TUMS) chewable tablet 1,000 mg  1,000 mg Oral 4x Daily PRN Maddie, Brinda E, PA-C        guaiFENesin (MUCINEX) 12 hr tablet 600 mg  600 mg Oral BID Maddie, Brinda E, PA-C        lactated ringers infusion   Intravenous Continuous Maddie, Brinda E, PA-C        lidocaine (LMX4) cream   Topical Q1H PRN Maddie, Brinda E, PA-C        lidocaine 1 % 0.1-1 mL  0.1-1 mL Other Q1H PRN Maddie, Brinda E, PA-C        ondansetron (ZOFRAN ODT) ODT tab 4 mg  4 mg Oral Q6H PRN Maddie, Brinda E, PA-C        Or    ondansetron (ZOFRAN) injection 4 mg  4 mg Intravenous Q6H PRN Maddie, Brinda E, PA-C        oseltamivir (TAMIFLU) capsule 75 mg  75 mg Oral BID Maddie, Brinda E, PA-C        polyethylene glycol (MIRALAX) Packet 17 g  17 g Oral BID PRN Maddie, Brinda E, PA-C        senna-docusate (SENOKOT-S/PERICOLACE) 8.6-50 MG per tablet 1 tablet  1 tablet Oral BID PRN Maddie, Brinda E, PA-C        Or    senna-docusate (SENOKOT-S/PERICOLACE) 8.6-50 MG per tablet 2 tablet  2 tablet Oral BID PRN Maddie, Brinda E, PA-C        sodium chloride (PF) 0.9% PF flush 3 mL  3 mL Intracatheter Q8H Maddie, Brinda E, PA-C        sodium chloride (PF) 0.9% PF flush 3 mL  3 mL Intracatheter q1 min prn Brinda Perkins PA-C         Current Outpatient Medications   Medication Sig Dispense Refill    acetaminophen (TYLENOL) 500 MG tablet Take 500-1,000 mg by mouth every 6 hours as needed for mild pain.    "   guaiFENesin (MUCINEX) 600 MG 12 hr tablet Take 1 tablet (600 mg) by mouth 2 times daily. 12 tablet 0    oseltamivir (TAMIFLU) 75 MG capsule Take 75 mg by mouth 2 times daily.       Objective    BP 98/81   Pulse 86   Temp 98.2  F (36.8  C) (Oral)   Resp 16   Ht 1.626 m (5' 4\")   Wt 103 kg (227 lb)   LMP 12/11/2024 (Exact Date)   SpO2 96%   BMI 38.96 kg/m      Gen: in no acute distress, resting comfortably in \[facility] [place] [facility]\""  HEENT: MMM  CV: RRR, no murmurs appreciated, extremities warm, no peripheral edema  Pulm: no increased work of breathing, CTA on posterior auscultation  GI: soft, not distended  MSK: no gross deformities, no joint swelling  Integ: no rashes or lesions appreciated  Neuro: speech clear, alert and grossly oriented    Data  I have personally reviewed laboratory data. Notably:  Cr 1.32  CRP 48.72  Hgb 11.6  WSBC 5.4  Platelet 270  ESR 28  INR 1    I have personally reviewed imaging available. Notably:   1.  No acute pulmonary emboli. Evidence of chronic pulmonary embolism  with webbing and wall thickening in bilateral lower lobe segmental  pulmonary arteries.  2.  Mild groundglass opacities in the bilateral lung bases, likely  infectious or inflammatory. Trace right pleural effusion.  3.  Mildly enlarged main pulmonary artery which can be seen with  pulmonary hypertension.    I have personally reviewed cardiac studies. Notably:  TTE 8/2023  Global and regional left ventricular function is normal with an EF of 55-60%.  Right ventricular function, chamber size, wall motion, and thickness are  normal.  No significant valvular abnormalities present.    Most recent PFTs:  None available for review.    Assessment  Hemoptysis, small-volume  Chronic bilateral segmental PE  GPA  History of saddle PE s/p open thrombectomy, not currently on anticoagulation  CKD  Enlarged pulmonary artery    Ms. Cande Shields is a 24-year-old woman with medical history notable for GPA, previously on rituximab (last " dose 2023) with renal involvement, history of DVT and saddle PE s/p open thrombectomy (2022) not currently on anticoagulation, here with small-volume hemoptysis and influenza A. Pulmonary is consulted for history of GPA and hemoptysis. Ms. Shields has noticed blood mixed with phlegm a handful of times since becoming ill on Friday. Ms. Shields's hemoptysis may be related to chronic PE, dry nasal mucosa from frequent blowing of the nose while sick for the last 2-3 days (note that she's seen some blood while blowing her nose too). Less likely related to GPA given absence of other systemic symptoms associated with flare and minimal abnormalities on chest CT, kidney function at her recent baseline. She hasn't had any further hemoptysis since arriving; last episode was yesterday evening.     Regarding her PE history: had a saddle PE requiring open thrombectomy. Transitioned from DOAC to enoxaparin injections while pregnant and is currently off of anticoagulation. Had some challenges with anticoagulation including bruising, epistaxis. Clots thought provoked in the context of COVID-19 infections (both in 2020 and in 2022). Noting chronic PE on CT, wonder about necessity of longer term (or indefinite) anticoagulation. We discussed this and counseled following up with her hematologist. Would not recommend starting therapeutic anticoagulation in this acute setting given the small-volume hemoptysis, but should be raised with her care team as an outpatient.     Recommendations  -no current indication for bronchoscopy  -TXA nebs PRN if increasing hemoptysis  -agree with current treatment of influenza with Tamiflu  -follow up with Dr. Goodson for next dose of rituximab  -recommend Hematology follow up as outpatient to re-address anticoagulation noting chronic PE on CT today     Staffed with Dr. Dang.    Yanira Peña MD PGY5  Pulmonary and Critical Care

## 2024-12-30 NOTE — ED TRIAGE NOTES
Triage Assessment (Adult)       Row Name 12/30/24 1001          Triage Assessment    Airway WDL WDL        Respiratory WDL    Respiratory WDL WDL        Skin Circulation/Temperature WDL    Skin Circulation/Temperature WDL WDL        Cardiac WDL    Cardiac WDL WDL        Peripheral/Neurovascular WDL    Peripheral Neurovascular WDL WDL

## 2024-12-30 NOTE — ED TRIAGE NOTES
Pt reports coughing up bright red blood mixed with mucus x 20 (approx) per pt. Hx of GPA. Pt said she was seen at ER in [place] yesterday for same issue and everything checked out ok but she would like a second opinion. Denies fever, chills but admit to nausea and vomiting. Pt is also recuperating from flu is is currently taking tamiflu.      Triage Assessment (Adult)       Row Name 12/30/24 1001          Triage Assessment    Airway WDL WDL        Respiratory WDL    Respiratory WDL WDL        Skin Circulation/Temperature WDL    Skin Circulation/Temperature WDL WDL        Cardiac WDL    Cardiac WDL WDL        Peripheral/Neurovascular WDL    Peripheral Neurovascular WDL WDL

## 2024-12-30 NOTE — PROGRESS NOTES
Voicemail received from patient that she is going to [facility] to get further eval for her bloody coughing.  Also updated by Dr. Goodson that she spoke to ED team to get ANCA tests (ANCA/MPO PR3 antibodies) done to confirm its not flare vs flu.  Clarified if CD19 is needed since she is also overdue for this.  Ordered tests as they have not been ordered per T.O. and patient request.  Marielena Jaquez RN, BSN, PHN  [facility] Nephrology Nurse Navigator  [phone]

## 2024-12-30 NOTE — PROGRESS NOTES
Update from Dr. Goodson that patient needs to get Rituximab as she is overdue, last dose Feb 2024. Next due in August/Oct but waited for patient to get vaccine then was instructed to get Ritux 4 weeks after.  Chart reviewed and will need to wait to determine infection cleared before coordinating.  Marielena Jaquez RN, BSN, PHN  [facility] Nephrology Nurse Navigator  [phone]

## 2024-12-30 NOTE — H&P
[facility]    History and Physical - Hospitalist Service, GOLD TEAM        Date of Admission:  12/30/2024    Assessment & Plan      Cande Shields is a 24 year old female with PMH significant for pulmonary embolism, PFO, occulusion of common femoral artery and CVA associated vasculitis, Carolyn's granulomatosis who was admitted on 12/30/2024 for hemoptysis.     Hemoptysis   Carolyn's granulomatosis   Initially presented to ED at OSH on 12/29 for hemoptysis. Symptoms started 2 days ago of variable volume (flecks to entire expectorate). Was recommended to be admitted, but patient declined due to need to . Presented again on 12/30/2024 for current admission for persistent sxs. ESR slightly elevated to 28 and CRP elevated to 48.75. CT Chest without evidence for acute pulmonary embolism. Evidence of chronic PE, mild ground glass opacities, possibly infectious vs inflammatory. Also noted pulmonary artery to be mildly enlarged, possibly consistent with pulmonary HTN. ED spoke with Pulmonary team, no indication for steroids at this time. Presentation concerning for relapse vs from acute infection vs airway irritation from persistent coughing vs bronchitis vs other. Appears euvolemic on exam and no concern for dyspnea. No new rashes, purpura, myalgias, visual sxs. Only sxs hemoptysis. Notes previous relapses to present with hemoptysis and purpura. Hx of GPA with nasal, lung, kidney involvement. Has been on chronic immune suppressive therapy with rituximab every 8 months. Last dose of rituximab was 02/2024, was to have dose in 08/2024, but missed this dose. Follows with Dr. Goodson in Nephrology clinic.  -Pulmonary, Nephrology, Rheumatology consulted, appreciate recs   -Continue PTA tamiflu, plan for 10 day course   -Start IVF LR 75mL/hr   -ANCA and IgG still in process   -Ordered urine protein, creatinine ratio and UA   -CRP in AM  -Follow up MPO and PR3 panel, pending    -CD19 Ab ordered per Rheumatology   -Repeat Hgb now   Continue PTA mucinex 600mg BID; continue PRN tessalon   -Establish with Rheumatology, first apt scheduled for 01/23/2024    Possible pulmonary HTN   No prior hx per her knowledge. On CT on admission noted enlarged pulmonary artery, possibly consistent with pulmonary HTN, appears this has been previously dilated per imaging from 05/2023. Appears euvolemic on exam. No dyspnea, edema, chest pain. TTE from 08/2023 with normal RV function chamber size and wall motion.   -Pulmonary consulted, appreciate recs   -Could consider repeat TTE vs outpatient evaluation.     Influenza A infection   Diagnosed on 12/28/2024. Started on Tramiflu. CT Chest with possible acute infection. No leukocytosis. Aefbrile. Question if acute infection contributing to presentation   -Continue PTA tamiflu   -Monitor sxs    Normocytic anemia   Appears chronic.   -Continue to trend     CKD stage III  Follows with Nephrology here. Last seen 10/03/2024. Previously noted Cr baseline to be ~ 1.3-1.6. Cr currently appears stable.   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 CT chest on current admission with evidence of chronic PE. Not on AC and noted chronic PE on CT chest. Consider Hematology consult during admission vs follow up as OP. Has followed with Hematology in clinic and recommended to follow up  2 months postpartum, but never follow up. Recommended to follow up with Hematology in clinic for ongoing discussion of long term AC   -Follow up with Hematology clinic as OP (new referral placed)     Hx of DVT: not on AC, per patient noted this to be provoked in the setting of COVID infection, but possibly hx of unprovoked per discussion as above.  "Follow up with Hematology as mentioned above   CVA associated vasculitis   PFO present           Diet: Regular Diet Adult  DVT Prophylaxis: Pneumatic Compression Devices  Guillen Catheter: Not present  Lines: None     Cardiac Monitoring: None  Code Status: Full Code    Clinically Significant Risk Factors Present on Admission                             # Obesity: Estimated body mass index is 38.96 kg/m  as calculated from the following:    Height as of this encounter: 1.626 m (5' 4\").    Weight as of this encounter: 103 kg (227 lb).              Disposition Plan     Medically Ready for Discharge: Anticipated in 2-4 Days         The patient's care was discussed with the Attending Physician, Dr. Fermin, Patient, Patient's Family, and Nephrology, Rheumatology and Pulmonary Consultant(s).    Brinda Weeks PA-C  Hospitalist Service, [facility]  Securely message with [employer] (more info)  Text page via [employer] Paging/Directory   See signed in provider for up to date coverage information    ______________________________________________________________________    Chief Complaint   Hemoptysis     History is obtained from the patient    History of Present Illness   Cande Shields is a 24 year old female with PMH significant for pulmonary embolism, PFO, occulusion of common femoral artery and CVA associated vasculitis, Carolyn's granulomatosis who was admitted on 12/30/2024 for hemoptysis.     Ongoing hemoptysis for 2 days. Notes that she has been having persistent cough. Blood varies from flecks to all of phelgm. No clots. Was feverish and chilled previously, but no longer. No sore throat. No dyspnea. No new rashes. Previous flares with purpura, but hasn't noted any sxs consistent with this. No myalgias. No GI sxs. No chest pain. No vision changes, eye pain, double vision. No night sweats. Diagnosed with influenza on 12/28 and taking tamiflu. Hasn't made visits for " rituximab due to having a young child. No other sick contacts outside of mother who also tested positive with flu. Doesn't take any meds PTA. Denies substance use. No swelling in legs. No longer taking AC as she completed course. Clots were in the setting of COVID.       Past Medical History    Past Medical History:   Diagnosis Date    ADHD (attention deficit hyperactivity disorder)     DVT, lower extremity, distal (H)     Dysmenorrhea     Femoral artery thrombosis, left (H) 2021    Multiple subsegmental pulmonary emboli without acute cor pulmonale (H) 2021    PFO (patent foramen ovale)     Preeclampsia, third trimester     Spontaneous pregnancy loss     31 weeks    Stage 3b chronic kidney disease (H)     Wegener's disease, pulmonary 2010    renal biopsy       Past Surgical History   Past Surgical History:   Procedure Laterality Date     SECTION N/A 2023    Procedure:  section;  Surgeon: Wendy Vera MD;  Location: [facility] L[facility]    ENT SURGERY  2000    cyst    EXCISE GANGLION WRIST  2009    IR LOWER EXTREMITY ANGIOGRAM LEFT  3/21/2021    IR THROMBOLYSIS ART/VENOUS INFUSION SUBSQ DAY  3/21/2021    IR THROMBOLYSIS ART/VENOUS INFUSION SUBSQ DAY  3/21/2021    STERNOTOMY  2023    right atrial thrombectomy    THROMBECTOMY ATRIUM Right 2023    THROMBECTOMY PERCUTANEOUS N/A 2023    Procedure: Emergency Sternotomy, Right Atrial Thrombectomy, Central Cannulation, Bilateral Femoral Cannulation, Closure of PFO ; Transesophageal Echocardiogram performed by anesthesia staff;  Surgeon: Adelfo Elmore MD;  Location: [facility] OR    THROMBECTOMY PERCUTANEOUS N/A 2023    Procedure: Angiovac Mediated for Right Atrial Clot; Intracatheter Thrombectomy via bilateral percutaneous femoral venous access with 26 FR Right Femoral vein and 17 FR Left Femoral Vein cannulas. Transesophageal echchocardiogram performed by anesthesia staff;  Surgeon: Po Monterroso MD;   Location: [facility] OR       Prior to Admission Medications   Prior to Admission Medications   Prescriptions Last Dose Informant Patient Reported? Taking?   acetaminophen (TYLENOL) 500 MG tablet Past Week  Yes Yes   Sig: Take 500-1,000 mg by mouth every 6 hours as needed for mild pain.   guaiFENesin (MUCINEX) 600 MG 12 hr tablet Past Week  No Yes   Sig: Take 1 tablet (600 mg) by mouth 2 times daily.   oseltamivir (TAMIFLU) 75 MG capsule 12/30/2024 Morning  Yes Yes   Sig: Take 75 mg by mouth 2 times daily.      Facility-Administered Medications: None        Review of Systems    The 10 point Review of Systems is negative other than noted in the HPI or here.     Social History   I have reviewed this patient's social history and updated it with pertinent information if needed.  Social History     Tobacco Use    Smoking status: Former     Types: Vaping Device    Smokeless tobacco: Former    Tobacco comments:     Vaping stopped 2/26   Vaping Use    Vaping status: Never Used   Substance Use Topics    Alcohol use: Not Currently    Drug use: No         Family History   I have reviewed this patient's family history and updated it with pertinent information if needed.  Family History   Problem Relation Age of Onset    Thyroid Disease Mother     Cancer Maternal Grandfather     Asthma No family hx of     Diabetes No family hx of     Anesthesia Reaction No family hx of     Venous thrombosis No family hx of          Allergies   Allergies   Allergen Reactions    Heparin Other (See Comments)     Reaction: HIT    Penicillins Rash     Unknown, but think rash.    Tolerated cephalexin (12/27/20), cefpodoxime (12/27/20), Ceftriaxone (Feb 2023), Zosyn (Feb 2023)    Patient states she has taken Amoxicillin many times before without issue.      Unknown, but think rash. Tolerated cephalexin (12/27/20), cefpodoxime (12/27/20), Ceftriaxone (Feb 2023), Zosyn (Feb 2023)        Physical Exam   Vital Signs: Temp: 98.2  F (36.8  C) Temp src: Oral BP:  98/81 Pulse: 86   Resp: 16 SpO2: 96 % O2 Device: None (Room air)    Weight: 227 lbs 0 oz  General: laying in bed, alert, cooperative, awake, in no acute distress  HEENT: normocephalic, atraumatic, anicteric sclera, moist mucus membranes, no lymphadenopathy appreciated, PERRLA, EOM wnl  Respiratory: breathing comfortably on room air, clear to auscultation bilaterally without wheezing, crackles, or rhonchi appreciated  Cardiac: regular rate and rhythm with normal S1/S2 without murmurs, clicks, rubs or gallops, 2+ radial pulse on LUE, no signs of peripheral edema bilaterally  GI: soft, non-distended, normoactive bowel sounds, non-tender per palpation  Neuro: grossly non-focal, alert and oriented, normal speech  MSK: no bony deformities, moving all extremities independently  Skin: no rashes or lesions on uncovered surfaces, no jaundice, no evidence of purpura on upper or lower extremities       Medical Decision Making       80 MINUTES SPENT BY ME on the date of service doing chart review, history, exam, documentation & further activities per the note.      Data   NOTE: Data reviewed over the past 24 hrs contributes toward MDM complexity

## 2024-12-30 NOTE — ED PROVIDER NOTES
[facility] ([facility])    12/30/24      ED PROVIDER NOTE   ED 19  History     Chief Complaint   Patient presents with    Hemoptysis     Pt reports coughing up bright red blood mixed with mucus x 20 (approx) per pt. Hx of GPA     The history is provided by the patient and medical records.     Cande hSields is a 24 year old female with history of Wegener's granulomatosis, CVA associated vasculitis, saddle pulmonary embolism with occlusion of common femoral artery (2021), DVT, not on anticoagulation, PFO who presents with cough productive of blood and mucus for the past 5 days.  Patient states that her cough and upper respiratory symptoms started around [place] time after she was exposed to her mother who was influenza positive.  On 12/28/2024 Patient was seen at [facility] clinic and tested positive for influenza A 2 days ago.  She was started on Tamiflu for this.  2 days ago she started noticing hemoptysis with variable volumes of blood. Sometimes just flecks of sputum, other times the entire expectorate was bloody. She presented to [facility] ER yesterday reporting cough with bright red phlegm.  She was seen by Dr. Fransico Salgado who called and spoke with Dr. Goodson, patient's nephrologist.  Admission was recommended but patient did not wish to be admitted as she had to care for her child and had family obligation and would come to [facility] for follow-up. Patient left AMA.  She presents today with bloody sputum.  She states that she has had somewhat similar symptoms in the past with Wegener's flares but this time she does not have any purpura or arm nodules.  She came here for second opinion.  Has nephrologist and rheumatologist in our system.     Epic records reviewed, patient is on rituximab, missed 2 doses this year.  Patient smokes half a pack a day.    Past Medical History  Past Medical History:   Diagnosis Date    ADHD (attention deficit  hyperactivity disorder)     DVT, lower extremity, distal (H)     Dysmenorrhea     Femoral artery thrombosis, left (H) 2021    Multiple subsegmental pulmonary emboli without acute cor pulmonale (H) 2021    PFO (patent foramen ovale)     Preeclampsia, third trimester     Spontaneous pregnancy loss 2020    31 weeks    Stage 3b chronic kidney disease (H)     Wegener's disease, pulmonary 2010    renal biopsy     Past Surgical History:   Procedure Laterality Date     SECTION N/A 2023    Procedure:  section;  Surgeon: Wendy Vera MD;  [facility]    ENT SURGERY  2000    cyst    EXCISE GANGLION WRIST  2009    IR LOWER EXTREMITY ANGIOGRAM LEFT  3/21/2021    IR THROMBOLYSIS ART/VENOUS INFUSION SUBSQ DAY  3/21/2021    IR THROMBOLYSIS ART/VENOUS INFUSION SUBSQ DAY  3/21/2021    STERNOTOMY  2023    right atrial thrombectomy    THROMBECTOMY ATRIUM Right 2023    THROMBECTOMY PERCUTANEOUS N/A 2023    Procedure: Emergency Sternotomy, Right Atrial Thrombectomy, Central Cannulation, Bilateral Femoral Cannulation, Closure of PFO ; Transesophageal Echocardiogram performed by anesthesia staff;  Surgeon: Adelfo Elmore MD;  [facility]: [facility] OR    THROMBECTOMY PERCUTANEOUS N/A 2023    Procedure: Angiovac Mediated for Right Atrial Clot; Intracatheter Thrombectomy via bilateral percutaneous femoral venous access with 26 FR Right Femoral vein and 17 FR Left Femoral Vein cannulas. Transesophageal echchocardiogram performed by anesthesia staff;  Surgeon: Po Monterroso MD;  Location: [facility] OR     azithromycin (ZITHROMAX) 250 MG tablet  guaiFENesin (MUCINEX) 600 MG 12 hr tablet  oseltamivir (TAMIFLU) 30 MG capsule      Allergies   Allergen Reactions    Heparin Other (See Comments)     Reaction: HIT    Penicillins Rash     Unknown, but think rash.    Tolerated cephalexin (20), cefpodoxime (20), Ceftriaxone (2023), Zosyn (2023)    Patient  states she has taken Amoxicillin many times before without issue.      Unknown, but think rash. Tolerated cephalexin (12/27/20), cefpodoxime (12/27/20), Ceftriaxone (Feb 2023), Zosyn (Feb 2023)     Family History  Family History   Problem Relation Age of Onset    Thyroid Disease Mother     Cancer Maternal Grandfather     Asthma No family hx of     Diabetes No family hx of     Anesthesia Reaction No family hx of     Venous thrombosis No family hx of      Social History   Social History     Tobacco Use    Smoking status: Former     Types: Vaping Device    Smokeless tobacco: Former    Tobacco comments:     Vaping stopped 2/26   Vaping Use    Vaping status: Never Used   Substance Use Topics    Alcohol use: Not Currently    Drug use: No      A medically appropriate review of systems was performed with pertinent positives and negatives noted in the HPI, and all other systems negative.    Physical Exam   BP: 98/81  Pulse: 86  Temp: 98.2  F (36.8  C)  Resp: 16  SpO2: 96 %    Physical Exam  Vitals and nursing note reviewed.   Constitutional:       General: She is not in acute distress.     Appearance: Normal appearance. She is well-developed.   HENT:      Head: Normocephalic and atraumatic.   Eyes:      General: No scleral icterus.     Conjunctiva/sclera: Conjunctivae normal.   Cardiovascular:      Rate and Rhythm: Normal rate.   Pulmonary:      Effort: Pulmonary effort is normal. No respiratory distress.   Abdominal:      General: Abdomen is flat.   Musculoskeletal:      Cervical back: Normal range of motion and neck supple.   Skin:     General: Skin is warm and dry.      Findings: No rash.   Neurological:      Mental Status: She is alert and oriented to person, place, and time.             ED Course, Procedures, & Data       Procedures            EKG Interpretation:      Interpreted by Yanelis Oliva                        Scheduling Type..: Person, MD  Time reviewed: per Epic  Symptoms at time of EKG: SOB   Rhythm: normal  sinus   Rate: normal  Axis: normal  Ectopy: none  Conduction: normal  ST Segments/ T Waves: No ST-T wave changes  Q Waves: none  Comparison to prior: Unchanged    Clinical Impression: normal EKG         No results found for any visits on 12/30/24.  Medications - No data to display  Labs Ordered and Resulted from Time of ED Arrival to Time of ED Departure - No data to display  No orders to display          Critical care was not performed.     Medical Decision Making  The patient's presentation was of high complexity (a chronic illness severe exacerbation, progression, or side effect of treatment).    The patient's evaluation involved:  ordering and/or review of 3+ test(s) in this encounter (see separate area of note for details)    The patient's management necessitated high risk (a decision regarding hospitalization).    Assessment & Plan    Cande Shields is a 24 year old female with history of Wegener's granulomatosis, CVA associated vasculitis, saddle pulmonary embolism with occlusion of common femoral artery (2021), DVT, not on anticoagulation, PFO who presents with cough productive of blood and mucus for the past 5 days.  Vital signs stable and afebrile including normal pulse ox at 96% on room air.  EKG obtained which revealed normal sinus rhythm without ischemic changes.  IV established, labs sent which revealed elevated inflammatory markers with a ESR of 28 and a CRP of 48, elevated BUN/creatinine at 33.9 and 1.32 with a stable GFR of 57, mild anemia with hemoglobin 11.6 with otherwise normal CBC electrolytes, normal coag markers, negative hCG.  Patient sent to CT for imaging of the chest which revealed no evidence of acute PE, no PE and bilateral groundglass opacities in bilateral bases seen.    Patient discussed with pulmonology as well as medicine hospitalist triage with agreement to admit to inpatient status.  Abdominal labs requested by nephrology also ordered.    I have reviewed the nursing notes. I  have reviewed the findings, diagnosis, plan and need for follow up with the patient.    New Prescriptions    No medications on file       Final diagnoses:   Hemoptysis     Patricia MENDES, am serving as a trained medical scribe to document services personally performed by Yanelis Oliva MD based on the provider's statements to me on December 30, 2024.  This document has been checked and approved by the attending provider.    Yanelis MENDES MD was physically present and have reviewed and verified the accuracy of this note documented by Patricia Merrill, medical scribe.      Yanelis Oliva MD    [facility] EMERGENCY DEPARTMENT  12/30/2024        Yanelis Oliva MD  12/30/24 1930

## 2024-12-31 ENCOUNTER — PATIENT OUTREACH (OUTPATIENT)
Dept: NEPHROLOGY | Facility: CLINIC | Age: 24
End: 2024-12-31
Payer: COMMERCIAL

## 2024-12-31 VITALS
BODY MASS INDEX: 38.76 KG/M2 | SYSTOLIC BLOOD PRESSURE: 107 MMHG | HEART RATE: 73 BPM | WEIGHT: 227 LBS | DIASTOLIC BLOOD PRESSURE: 71 MMHG | HEIGHT: 64 IN | TEMPERATURE: 99.1 F | RESPIRATION RATE: 18 BRPM | OXYGEN SATURATION: 96 %

## 2024-12-31 LAB
ALBUMIN SERPL BCG-MCNC: 3.4 G/DL (ref 3.5–5.2)
ALP SERPL-CCNC: 77 U/L (ref 40–150)
ALT SERPL W P-5'-P-CCNC: 18 U/L (ref 0–50)
ANION GAP SERPL CALCULATED.3IONS-SCNC: 12 MMOL/L (ref 7–15)
AST SERPL W P-5'-P-CCNC: 16 U/L (ref 0–45)
BASOPHILS # BLD AUTO: 0 10E3/UL (ref 0–0.2)
BASOPHILS # BLD AUTO: 0 10E3/UL (ref 0–0.2)
BASOPHILS NFR BLD AUTO: 0 %
BASOPHILS NFR BLD AUTO: 0 %
BILIRUB SERPL-MCNC: <0.2 MG/DL
BUN SERPL-MCNC: 22.1 MG/DL (ref 6–20)
CALCIUM SERPL-MCNC: 8.8 MG/DL (ref 8.8–10.4)
CD19 B CELL COMMENT: NORMAL
CD19 CELLS # BLD: 130 CELLS/UL (ref 107–698)
CD19 CELLS NFR BLD: 6 % (ref 6–27)
CHLORIDE SERPL-SCNC: 107 MMOL/L (ref 98–107)
CREAT SERPL-MCNC: 1.05 MG/DL (ref 0.51–0.95)
CRP SERPL-MCNC: 33.77 MG/L
EGFRCR SERPLBLD CKD-EPI 2021: 75 ML/MIN/1.73M2
EOSINOPHIL # BLD AUTO: 0.1 10E3/UL (ref 0–0.7)
EOSINOPHIL # BLD AUTO: 0.2 10E3/UL (ref 0–0.7)
EOSINOPHIL NFR BLD AUTO: 2 %
EOSINOPHIL NFR BLD AUTO: 4 %
ERYTHROCYTE [DISTWIDTH] IN BLOOD BY AUTOMATED COUNT: 15.8 % (ref 10–15)
ERYTHROCYTE [DISTWIDTH] IN BLOOD BY AUTOMATED COUNT: 15.9 % (ref 10–15)
GLUCOSE SERPL-MCNC: 75 MG/DL (ref 70–99)
HCO3 SERPL-SCNC: 19 MMOL/L (ref 22–29)
HCT VFR BLD AUTO: 35.5 % (ref 35–47)
HCT VFR BLD AUTO: 36.4 % (ref 35–47)
HGB BLD-MCNC: 11.2 G/DL (ref 11.7–15.7)
HGB BLD-MCNC: 11.2 G/DL (ref 11.7–15.7)
HGB BLD-MCNC: 11.4 G/DL (ref 11.7–15.7)
HOLD SPECIMEN: NORMAL
IGG SERPL-MCNC: 852 MG/DL (ref 610–1616)
IMM GRANULOCYTES # BLD: 0 10E3/UL
IMM GRANULOCYTES # BLD: 0 10E3/UL
IMM GRANULOCYTES NFR BLD: 0 %
IMM GRANULOCYTES NFR BLD: 0 %
LACTATE SERPL-SCNC: 1.4 MMOL/L (ref 0.7–2)
LYMPHOCYTES # BLD AUTO: 1.7 10E3/UL (ref 0.8–5.3)
LYMPHOCYTES # BLD AUTO: 2.2 10E3/UL (ref 0.8–5.3)
LYMPHOCYTES NFR BLD AUTO: 40 %
LYMPHOCYTES NFR BLD AUTO: 48 %
MAGNESIUM SERPL-MCNC: 1.8 MG/DL (ref 1.7–2.3)
MCH RBC QN AUTO: 24.2 PG (ref 26.5–33)
MCH RBC QN AUTO: 24.3 PG (ref 26.5–33)
MCHC RBC AUTO-ENTMCNC: 31.3 G/DL (ref 31.5–36.5)
MCHC RBC AUTO-ENTMCNC: 31.5 G/DL (ref 31.5–36.5)
MCV RBC AUTO: 77 FL (ref 78–100)
MCV RBC AUTO: 77 FL (ref 78–100)
MONOCYTES # BLD AUTO: 0.4 10E3/UL (ref 0–1.3)
MONOCYTES # BLD AUTO: 0.4 10E3/UL (ref 0–1.3)
MONOCYTES NFR BLD AUTO: 10 %
MONOCYTES NFR BLD AUTO: 10 %
MYELOPEROXIDASE AB SER IA-ACNC: <0.3 U/ML
MYELOPEROXIDASE AB SER IA-ACNC: NEGATIVE
NEUTROPHILS # BLD AUTO: 1.8 10E3/UL (ref 1.6–8.3)
NEUTROPHILS # BLD AUTO: 2.1 10E3/UL (ref 1.6–8.3)
NEUTROPHILS NFR BLD AUTO: 39 %
NEUTROPHILS NFR BLD AUTO: 48 %
NRBC # BLD AUTO: 0 10E3/UL
NRBC # BLD AUTO: 0 10E3/UL
NRBC BLD AUTO-RTO: 0 /100
NRBC BLD AUTO-RTO: 0 /100
NT-PROBNP SERPL-MCNC: 314 PG/ML (ref 0–450)
PHOSPHATE SERPL-MCNC: 3.4 MG/DL (ref 2.5–4.5)
PLATELET # BLD AUTO: 255 10E3/UL (ref 150–450)
PLATELET # BLD AUTO: 257 10E3/UL (ref 150–450)
POTASSIUM SERPL-SCNC: 4.6 MMOL/L (ref 3.4–5.3)
PROCALCITONIN SERPL IA-MCNC: 0.17 NG/ML
PROT SERPL-MCNC: 6.3 G/DL (ref 6.4–8.3)
PROTEINASE3 AB SER IA-ACNC: 6.4 U/ML
PROTEINASE3 AB SER IA-ACNC: POSITIVE
RBC # BLD AUTO: 4.62 10E6/UL (ref 3.8–5.2)
RBC # BLD AUTO: 4.7 10E6/UL (ref 3.8–5.2)
RETICS # AUTO: 0.01 10E6/UL (ref 0.03–0.1)
RETICS/RBC NFR AUTO: 0.3 % (ref 0.5–2)
SODIUM SERPL-SCNC: 138 MMOL/L (ref 135–145)
WBC # BLD AUTO: 4.3 10E3/UL (ref 4–11)
WBC # BLD AUTO: 4.6 10E3/UL (ref 4–11)

## 2024-12-31 PROCEDURE — 83880 ASSAY OF NATRIURETIC PEPTIDE: CPT | Performed by: INTERNAL MEDICINE

## 2024-12-31 PROCEDURE — 83605 ASSAY OF LACTIC ACID: CPT | Performed by: INTERNAL MEDICINE

## 2024-12-31 PROCEDURE — 86140 C-REACTIVE PROTEIN: CPT | Performed by: PHYSICIAN ASSISTANT

## 2024-12-31 PROCEDURE — 36415 COLL VENOUS BLD VENIPUNCTURE: CPT | Performed by: INTERNAL MEDICINE

## 2024-12-31 PROCEDURE — 250N000013 HC RX MED GY IP 250 OP 250 PS 637: Performed by: PHYSICIAN ASSISTANT

## 2024-12-31 PROCEDURE — 84145 PROCALCITONIN (PCT): CPT | Performed by: INTERNAL MEDICINE

## 2024-12-31 PROCEDURE — 82040 ASSAY OF SERUM ALBUMIN: CPT | Performed by: PHYSICIAN ASSISTANT

## 2024-12-31 PROCEDURE — 83735 ASSAY OF MAGNESIUM: CPT | Performed by: INTERNAL MEDICINE

## 2024-12-31 PROCEDURE — 99238 HOSP IP/OBS DSCHRG MGMT 30/<: CPT | Performed by: INTERNAL MEDICINE

## 2024-12-31 PROCEDURE — 250N000013 HC RX MED GY IP 250 OP 250 PS 637: Performed by: INTERNAL MEDICINE

## 2024-12-31 PROCEDURE — 80048 BASIC METABOLIC PNL TOTAL CA: CPT | Performed by: PHYSICIAN ASSISTANT

## 2024-12-31 PROCEDURE — 99232 SBSQ HOSP IP/OBS MODERATE 35: CPT | Mod: GC | Performed by: INTERNAL MEDICINE

## 2024-12-31 PROCEDURE — 85018 HEMOGLOBIN: CPT | Performed by: PHYSICIAN ASSISTANT

## 2024-12-31 PROCEDURE — 258N000003 HC RX IP 258 OP 636: Performed by: PHYSICIAN ASSISTANT

## 2024-12-31 PROCEDURE — 85045 AUTOMATED RETICULOCYTE COUNT: CPT | Performed by: INTERNAL MEDICINE

## 2024-12-31 PROCEDURE — 85041 AUTOMATED RBC COUNT: CPT | Performed by: PHYSICIAN ASSISTANT

## 2024-12-31 PROCEDURE — 84100 ASSAY OF PHOSPHORUS: CPT | Performed by: INTERNAL MEDICINE

## 2024-12-31 PROCEDURE — 36415 COLL VENOUS BLD VENIPUNCTURE: CPT | Performed by: PHYSICIAN ASSISTANT

## 2024-12-31 PROCEDURE — 96361 HYDRATE IV INFUSION ADD-ON: CPT

## 2024-12-31 RX ORDER — ALBUTEROL SULFATE 0.83 MG/ML
2.5 SOLUTION RESPIRATORY (INHALATION) EVERY 8 HOURS PRN
Status: DISCONTINUED | OUTPATIENT
Start: 2024-12-31 | End: 2024-12-31 | Stop reason: HOSPADM

## 2024-12-31 RX ORDER — LIDOCAINE 40 MG/G
CREAM TOPICAL
Status: DISCONTINUED | OUTPATIENT
Start: 2024-12-31 | End: 2024-12-31

## 2024-12-31 RX ORDER — AMOXICILLIN 250 MG
2 CAPSULE ORAL 2 TIMES DAILY PRN
Status: DISCONTINUED | OUTPATIENT
Start: 2024-12-31 | End: 2024-12-31

## 2024-12-31 RX ORDER — CALCIUM CARBONATE 500 MG/1
1000 TABLET, CHEWABLE ORAL 4 TIMES DAILY PRN
Status: DISCONTINUED | OUTPATIENT
Start: 2024-12-31 | End: 2024-12-31

## 2024-12-31 RX ORDER — AMOXICILLIN 250 MG
1 CAPSULE ORAL 2 TIMES DAILY PRN
Status: DISCONTINUED | OUTPATIENT
Start: 2024-12-31 | End: 2024-12-31

## 2024-12-31 RX ADMIN — GUAIFENESIN 600 MG: 600 TABLET ORAL at 08:30

## 2024-12-31 RX ADMIN — SODIUM CHLORIDE, POTASSIUM CHLORIDE, SODIUM LACTATE AND CALCIUM CHLORIDE: 600; 310; 30; 20 INJECTION, SOLUTION INTRAVENOUS at 04:43

## 2024-12-31 RX ADMIN — OSELTAMAVIR PHOSPHATE 75 MG: 75 CAPSULE ORAL at 08:30

## 2024-12-31 ASSESSMENT — ACTIVITIES OF DAILY LIVING (ADL)
ADLS_ACUITY_SCORE: 32

## 2024-12-31 NOTE — PROGRESS NOTES
Patient calling stating she is in the hospital, confirmed to have the Flu and would like support to schedule Rituximab IV at [facility] if possible, no time constraints.  Patient will ask hospital team if Flu shot is needed with her recent flu diagnosis or not.  Prefers not to do COVID.  Reached out to [facility] and scheduled for Jan 27th 10am labs and 1030am appt.  Icerat sent to patient of appt details.  Marielena Jaquez RN, BSN, PHN  [facility] Nephrology Nurse Navigator  [phone]

## 2024-12-31 NOTE — PLAN OF CARE
"Goal Outcome Evaluation:  /71 (BP Location: Left arm)   Pulse 73   Temp 99.1  F (37.3  C) (Oral)   Resp 18   Ht 1.626 m (5' 4\")   Wt 103 kg (227 lb)   LMP 12/11/2024 (Exact Date)   SpO2 96%   BMI 38.96 kg/m        Plan of Care Reviewed With: spouse, patient    Overall Patient Progress: improvingOverall Patient Progress: improving    Outcome Evaluation: pt alert and oriented x4, able to make needs known, on room air, lost IV access   Pt changed to obs status, given form \"what is observation?\" Pt acknowledged obs status  For discharge later today   Pt. discharged at 1214 to home, and left with personal belongings. Pt. received complete discharge paperwork and no medications as filled by discharge pharmacy. Pt. was given times of last dose for all discharge medications in writing on discharge medication sheets. Discharge teaching included tamiflu medication, pain management, activity restrictions, and signs and symptoms of infection. Pt. to follow up with PCP in 7 days . Pt. And  had no further questions at the time of discharge and no unmet needs were identified.    "

## 2024-12-31 NOTE — PLAN OF CARE
Goal Outcome Evaluation: [id number]  Overall Patient Progress: no change  Plan of Care Reviewed with: patient    Neuro/CMS: A&Ox4.  Resp/Cardiac: Denies SOB, chest pain.  GI/: Continent bowel and bladder. Last BM 12/29.   Skin: Intact.   Activity: Independent.   Pain: Denies.   LDA: Right PIV infusing 75 ml/hr.   Diet: Regular.   Other: No coughing up of blood since patient has been here.     Plan: Continue with POC.

## 2024-12-31 NOTE — PROGRESS NOTES
"  Nephrology Progress Note  12/31/2024         Assessment & Recommendations:   Cande Shields is a 24 year old with medical history notable for GPA, previously on rituximab (last dose 2023) with renal involvement, history of DVT and saddle PE s/p open thrombectomy (2022) not currently on anticoagulation, PFO, 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 She is here with small-volume hemoptysis and influenza A. Follows with Dr. Kang Goodson in Nephrology.     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 Goodson's note for further details of her history. Has had a total of 3 significant flares, last being in January 2023 where she was treated with 3 doses of rituximab and was found to be pregnant so her forth dose of rituximab was withheld. She had been on AZA while pregnant. That was stopped and she received 1 dose of rituximab 500 mg in Feb 2024. She missed her 6 month dose in August 2024. Her ANCA serology from last visit demonstrated clinical and biochemical remission. She has some hematuria,her proteinuria which had been improving. It was discussed  during last nephrology visit that given her FL-3 status and her frequent relapses, she would need to be on rituximab for at least 3 to 4 years. Currently her Creatinine seems to be at her baseline of 1.3-1.4 mg/dL.  - Daily BMPs  - Monitor UOP closely   - Avoid nephrotoxins  - Pulmonology consulted: Bronchoscopy not indicated given small amount of change on the chest CT. Hemoptysis likely related to influenza.\"  - Pending CD19, IgG pending  - ANCA from 5/28 was 1:40 (prior was 1:80)  - MPO negative  - PR3 positive  - CRP up to 48.7 from 3.84 (in the setting of influenza A), ESR 28  - Stable renal function. Nephrology will sign off.   - Needs follow up with Marielena Jaquez from Nephrology for scheduling Rituximab infusion " "     #3 hypertension: Her blood pressure a little bit low,  - Lisinopril was held last visit to nephrology     #4 Microcytic Anemia: She had received IV iron loading during her pregnancy. She received some oral iron for 3 months. Now hgb is 11.6.     #5 History of neutropenia/Markedly decreased ANC: This has resolved.  Her current white blood cell is around 5.4.     #8 hypercoagulable state: She is being followed by Dr. Linton from hematology.  She has stopped her Lovenox.  - pending hematology consult       #9 Influenza A  Receiving Tamiflu    Recommendations were communicated to primary team via note    I spent a total of 35 minutes on the date of this encounter in reviewing the medical records, face-to-face evaluation and physical exam, review of labs, counseling, orders, care coordination and documentation    Rae Tran MD  Division of Nephrology and Hypertension      Interval History :   Nursing and provider notes from last 24 hours reviewed.  In the last 24 hours Cande Shields no significant events. Creatinine stable/improved     Review of Systems:   I reviewed the following systems:  GI: good appetite. no nausea or vomiting or diarrhea.   Negative except noted. Cough +    Physical Exam:   I/O last 3 completed shifts:  In: 300 [P.O.:300]  Out: -    /71 (BP Location: Left arm)   Pulse 73   Temp 99.1  F (37.3  C) (Oral)   Resp 18   Ht 1.626 m (5' 4\")   Wt 103 kg (227 lb)   LMP 12/11/2024 (Exact Date)   SpO2 96%   BMI 38.96 kg/m       GENERAL APPEARANCE: NAD  EYES:  no scleral icterus, pupils equal  HENT: mouth without ulcers or lesions  PULM: lungs clear to auscultation bilaterally, equal air movement, no clubbing  CV: regular rhythm, normal rate, no rub     -JVD NA     -edema no   GI: soft, tender, nondistended, bowel sounds are present  INTEGUMENT: no cyanosis, no rash  NEURO:  nio asterixis   Access none    Labs:   All labs reviewed by me  Electrolytes/Renal -   Recent Labs   Lab Test " 12/31/24  0532 12/30/24  1043 10/03/24  1136 05/28/24  1333 01/29/24  1602 07/20/23  0944 06/30/23  1352 02/20/23  0814 02/20/23  0411 02/19/23  0403 02/19/23  0400    139 140 139 139   < > 137   < > 135*  --  136   POTASSIUM 4.6 4.7 4.9 4.6 4.9   < > 4.8   < > 4.5  --  4.6   CHLORIDE 107 105 107 106 106   < > 107   < > 104  --  106   CO2 19* 22 19* 23 20*   < > 18*   < > 21*  --  18*   BUN 22.1* 33.9* 29.2* 30.6* 30.8*   < > 23.1*   < > 19.5  --  19.7   CR 1.05* 1.32* 1.28* 1.27* 1.29*   < > 1.61*   < > 1.17*  --  1.16*   GLC 75 98 80 94 99   < > 80   < > 98   < > 103*   KRAIG 8.8 9.1 9.5 9.5 9.4   < > 8.8   < > 8.0*  --  7.7*   MAG  --   --   --   --   --   --  2.2  --  2.0  --  2.1   PHOS  --   --  4.2 4.3 4.2   < > 3.6   < > 4.2  --  4.0    < > = values in this interval not displayed.       CBC -   Recent Labs   Lab Test 12/31/24  0610 12/30/24  1728 12/30/24  1044 10/03/24  1136   WBC 4.6  --  5.4 6.4   HGB 11.4* 11.1* 11.6*  11.6* 12.0     --  270 328       LFTs -   Recent Labs   Lab Test 12/31/24  0532 12/30/24  1728 10/03/24  1136 09/26/23  0758 09/01/23  1540   ALKPHOS 77 65  --   --  92   BILITOTAL <0.2 <0.2  --   --  <0.2   ALT 18 16  --   --  20   AST 16 15  --   --  15   PROTTOTAL 6.3* 6.2*  --   --  5.9*   ALBUMIN 3.4* 3.3* 4.1   < > 3.3*    < > = values in this interval not displayed.       Iron Panel -   Recent Labs   Lab Test 05/28/24  1333 06/30/23  1352   IRON 28* 24*   IRONSAT 7* 5*   WILFREDO 8 8         Imaging:  All imaging studies reviewed by me.     Current Medications:  Current Facility-Administered Medications   Medication Dose Route Frequency Provider Last Rate Last Admin    guaiFENesin (MUCINEX) 12 hr tablet 600 mg  600 mg Oral BID Brinda Perkins PA-C   600 mg at 12/31/24 0830    oseltamivir (TAMIFLU) capsule 75 mg  75 mg Oral BID Brinda Perkins PA-C   75 mg at 12/31/24 0830    sodium chloride (PF) 0.9% PF flush 3 mL  3 mL Intracatheter Q8H Brinda Perkins PA-C   3 mL  at 12/30/24 1657     Current Facility-Administered Medications   Medication Dose Route Frequency Provider Last Rate Last Admin     Rae García MD

## 2025-01-01 LAB
ANCA AB PATTERN SER IF-IMP: ABNORMAL
C-ANCA TITR SER IF: ABNORMAL {TITER}

## 2025-01-01 NOTE — DISCHARGE SUMMARY
"[facility]  Hospitalist Discharge Summary      Date of Admission:  12/30/2024  Date of Discharge:  12/31/2024 12:15 PM  Discharging Provider: Jose Flowers MD  Discharge Service: Hospitalist Service, GOLD TEAM 22    Discharge Diagnoses   # Hemoptysis likely secondary to influenza, POA  # Chronic PE noted on CT PE protocol with history of saddle pulmonary embolism, POA  # Acute kidney injury on top of CKD stage IIIa secondary to granulomatous polyangiitis with ANCA vasculitis and glomerulonephritis with upper respiratory, musculoskeletal, and cutaneous involvement, POA  # Normocytic anemia likely anemia of chronic disease, POA    Clinically Significant Risk Factors     # Obesity: Estimated body mass index is 38.96 kg/m  as calculated from the following:    Height as of this encounter: 1.626 m (5' 4\").    Weight as of this encounter: 103 kg (227 lb).       Follow-ups Needed After Discharge   Follow-up Appointments       Adult [facility] Follow-up and recommended labs and tests      Follow up with primary care provider, Cira Amanda, within 7 days for hospital follow- up.  The following labs/tests are recommended: CBC.        Appointments on [place] and/or [facility] (with [facility] or [facility] provider or service). Call [phone] if you haven't heard regarding these appointments within 7 days of discharge.            No studies to be followed by primary care physician    Discharge Disposition   Discharged to home  Condition at discharge: Stable    Hospital Course   23 y/o woman who has a past medical history significant for granulomatous polyangiitis, patent foramen ovale s/p surgical correction, past DVT, past pulmonary emboli and past right atrial thrombus s/p emergency sternotomy and right atrial thrombectomy. Patient presented on 30-Dec-2024 with a 2 day history of hemoptysis. Patient had been diagnosed with influenza A on 28-Dec-2024.     NephrologistDr." Hamzah  Hematologist, Dr. Gonzalez    # Hemoptysis likely secondary to influenza, POA  The patient reported the symptoms started 2 days prior to admission with flex to entire expectorate.  CT PE protocol ruled out any acute pulmonary embolism.  There was evidence of chronic pulmonary emboli and mild enlargement of pulmonary arteries consistent with pulmonary arterial hypertension, mild groundglass opacities consistent with influenza.  Based on the patient's constellation of symptoms, signs, and CT imaging, pulmonology service recommended against bronchoscopy and the steroids.  They recommended continuing to follow-up using rituximab every 8 months that the patient is on as outpatient.  The patient was maintained on her Tamiflu with plans for 10 days course.  An order was placed for as needed tranexamic acid for hemoptysis however this was never utilized secondary to lack of hemoptysis during this admission.    The patient also reported concerns that she may have had epistaxis that she thought was the hemoptysis.  This was not witnessed by the undersigned.  I made a recommendation for inpatient evaluation by ENT but given the need of the patient to leave for childcare reasons, the elected for an outpatient referral instead.  I placed the outpatient referral.    # Chronic PE noted on CT PE protocol with history of saddle pulmonary embolism, POA  The patient will need to follow-up with hematology for reconsideration of anticoagulation given chronic pulmonary emboli.  This would be the recommendation of the undersigned and also the pulmonologist.    # Acute kidney injury on top of CKD stage IIIa secondary to granulomatous polyangiitis with ANCA vasculitis and glomerulonephritis with upper respiratory, musculoskeletal, and cutaneous involvement, POA  Her immune profile is evaluated by nephrology during this admission.  Her kidney function normalized with intravenous fluids that was initiated.  She will need to follow-up  with Marielena Jaquez of nephrology for scheduling her rituximab.  The patient was seen in collaboration with nephrology service.    # Normocytic anemia likely anemia of chronic disease, POA  There was no evidence of acute anemia during this admission.    Consultations This Hospital Stay   NEPHROLOGY GENERAL ADULT IP CONSULT  PULMONARY GENERAL ADULT IP CONSULT  CARE MANAGEMENT / SOCIAL WORK IP CONSULT    Code Status   Prior    Time Spent on this Encounter   IJose MD, personally saw the patient today and spent less than or equal to 30 minutes discharging this patient.       Jose Flowers MD  [facility] [address]  [facility] [address]  Phone: [phone]  Fax: [phone]  ______________________________________________________________________    Physical Exam   Vital Signs:                  Blood pressure 107/71, temperature 99.1, heart rate 73, respiratory rate 18, saturating 96% on room air, weight 103  Weight: 227 lbs 0 oz  Constitutional: Awake, alert, cooperative, no apparent distress  Respiratory: Clear to auscultation bilaterally, no crackles or wheezing  Cardiovascular: Regular rate and rhythm, normal S1 and S2, and no murmur noted  GI: Normal bowel sounds, soft, non-distended, non-tender  Skin/Integumen: No rashes, no cyanosis, no edema  Other:         Primary Care Physician   Cira Amanda    Discharge Orders      Adult Oncology/Hematology  Referral      Adult ENT  Referral      Reason for your hospital stay    You have been admitted to the hospital for further management of hemoptysis.  You have done extremely well with lack of any further hemoptysis.  Your hemoglobin is stable.  You are concerned for epistaxis.  I placed a referral for ENT.  It has been a pleasure and an honor to take care of you.     Activity    Your activity upon discharge: activity as tolerated     Adult [facility] Follow-up and recommended labs and tests    Follow up with primary care  provider, Cira Amanda, within 7 days for hospital follow- up.  The following labs/tests are recommended: CBC.        Appointments on [place] and/or [facility] (with [facility] or [facility] provider or service). Call [phone] if you haven't heard regarding these appointments within 7 days of discharge.     Diet    Follow this diet upon discharge: Current Diet:Orders Placed This Encounter      Regular Diet Adult     CBC with platelets and differential       Significant Results and Procedures   Most Recent 3 CBC's:  Recent Labs   Lab Test 12/31/24  1053 12/31/24  0610 12/30/24  1728 12/30/24  1044   WBC 4.3 4.6  --  5.4   HGB 11.2*  11.2* 11.4* 11.1* 11.6*  11.6*   MCV 77* 77*  --  78    255  --  270   ,   Results for orders placed or performed during the hospital encounter of 12/30/24   CT Chest Pulmonary Embolism w Contrast    Narrative    CT CHEST PULMONARY EMBOLISM W CONTRAST 12/30/2024 12:28 PM    CLINICAL HISTORY: hemoptysis  TECHNIQUE: CT angiogram chest during arterial phase injection IV  contrast. 2D and 3D MIP reconstructions were performed by the CT  technologist. Dose reduction techniques were used.     CONTRAST: 72mL Isovue 370    COMPARISON: Chest x-ray on 3/10/2023 and chest CT on 12/20/2023    FINDINGS:  ANGIOGRAM CHEST: Multiple previously seen pulmonary and have resolved.  Mild webbing in segmental pulmonary arteries in the right lower lobe  (series 4, image [id number]) and stable mild eccentric wall thickening in  the segmental pulmonary arteries in the left lower lobe (series 4,  image 135), likely related to chronic pulmonary emboli. No new filling  defects in pulmonary arteries. Mildly enlarged main pulmonary artery  measuring 3.0 cm this could be seen with pulmonary arterial  hypertension. Thoracic aorta is negative for dissection. No CT  evidence of right heart strain.    LUNGS AND PLEURA: Mild groundglass nodular opacities in the lingula  and bilateral lower lobes, likely infectious or  inflammatory. Small  right pleural effusion. No pulmonary nodules or masses.    MEDIASTINUM/AXILLAE: No lymphadenopathy. No thoracic aortic aneurysm.  Postoperative changes of right atrial thrombectomy. No coronary artery  calcifications. No pericardial effusion.    UPPER ABDOMEN: Normal.    MUSCULOSKELETAL: Median sternotomy. No suspicious lesions within the  bones.      Impression    IMPRESSION:  1.  No acute pulmonary emboli. Evidence of chronic pulmonary embolism  with webbing and wall thickening in bilateral lower lobe segmental  pulmonary arteries.  2.  Mild groundglass opacities in the bilateral lung bases, likely  infectious or inflammatory. Trace right pleural effusion.  3.  Mildly enlarged main pulmonary artery which can be seen with  pulmonary hypertension. Please correlate clinically and consider  nonemergent pulmonology consultation if not previously obtained.    KATHY VAZ MD         SYSTEM ID:  [id number]       Discharge Medications   Discharge Medication List as of 12/31/2024 11:55 AM        CONTINUE these medications which have NOT CHANGED    Details   acetaminophen (TYLENOL) 500 MG tablet Take 500-1,000 mg by mouth every 6 hours as needed for mild pain., Historical      guaiFENesin (MUCINEX) 600 MG 12 hr tablet Take 1 tablet (600 mg) by mouth 2 times daily., Disp-12 tablet, R-0, E-Prescribe      oseltamivir (TAMIFLU) 75 MG capsule Take 75 mg by mouth 2 times daily., Historical           Allergies   Allergies   Allergen Reactions    Heparin Other (See Comments)     Reaction: HIT    Penicillins Rash     Unknown, but think rash.    Tolerated cephalexin (12/27/20), cefpodoxime (12/27/20), Ceftriaxone (Feb 2023), Zosyn (Feb 2023)    Patient states she has taken Amoxicillin many times before without issue.      Unknown, but think rash. Tolerated cephalexin (12/27/20), cefpodoxime (12/27/20), Ceftriaxone (Feb 2023), Zosyn (Feb 2023)

## 2025-01-06 ENCOUNTER — PATIENT OUTREACH (OUTPATIENT)
Dept: CARE COORDINATION | Facility: CLINIC | Age: 25
End: 2025-01-06
Payer: COMMERCIAL

## 2025-01-06 NOTE — PROGRESS NOTES
Clinic Care Coordination Contact    Situation: Patient chart reviewed by care coordinator.    Background: Patient identified via recently discharged list.     Assessment: Pt has care coordination through Nephrology, contacted pt on day of discharge via [employer].     Plan/Recommendations: RN will not complete TCM call due to duplication of outreach.    CHANDNI NYE RN on 1/6/2025 at 3:40 PM

## 2025-01-08 NOTE — PROGRESS NOTES
[facility]  [address] [place] [id number]  Main: [phone], Fax: [phone]    Patient seen at: [facility] at [address] [place] [address]    Outpatient Visit Note:    Patient: Cande Shields  MRN: [id number]  : 2000  GRACIE: 2025  Location of this writer at the time of this clinic visit was conducted: [facility].  Location of the patient at the time of this clinic visit was conducted: [facility].     Reason for visit:  History of arterial thrombosis. Right to left shunt PFO.     Clinical History Summary:  Cande is a 24 year old female with a history of granulomatous polyangiitis, severe preeclampsia / HELLP and IUFD with first pregnancy, who also had an unprovoked pulmonary embolism back in  with embolization to the arterial circulation, as well as massive pulmonary embolism in 2023 during early pregnancy, currently not on any anticoagulation therapy despite Dr. Dye's recommendation back in Aug 2023 that she should remain on indefinite anticoagulation therapy, return to clinic for her follow up visit / re-established care. She was last seen by Dr. Kodi Dye back on 2023. Please refer to his consult note for this patient's clinical details. She actually had a re-referral placed by Brinda Bates PA-C of [facility] on 2024.     Thrombosis History Summary:  2020 Presented G1 26W with severe preeclampsia, IUFD at 26 weeks.    Early Dec 2022, tested positive for COVID and was hospitalized later.  3/20/2021: large PE (unprovoked)  3/21/2021: ARTERIAL THROMBOSIS (R->L shunt due to PFO), Developed acute pain and coolness in her left leg and was without pulses  --IR arteriogram and thrombolysis -Occlusion of common femoral, profunda and superficial  femoral arteries, distal popliteal artery -TPA infusion started due to unable to suction thrombus or mechanical thrombectomy --Due to development while on heparin gtt HIT ab sent and negative, RAUL sent and also returned negative but she was on argatroban while this was pending  --APS panel sent and was negative  --Echo: Mobile echodensity (~2 x 1.5cm) in right atrium, likely thrombus. Cannot confirm attachment location, but most likely originating from or near IVC (also based on CT images). -Seen by hematology: while primary arterial thrombosis is of concern, a follow up echo with bubble study is recommended to rule out right to left intracardiac shunt given her RA thrombus  3/23/21: [place] [facility] [place], was changed to fondaparinux and ultimately xarelto  3/25/21: FVL mutation + prothrombin gene mutation testing negative, protein C >150 (elevated, on ac)   10/7/2021: Seen in clinic completed 6 months of xarelto -APS testing repeated and was negative, Antithrombin level  -She was requested to follow-up in clinic in 1 month but has not been seen since initial appointment.  Lost to followup  12/21/2022: GPA flare off anticoagulation. Consult with Dr. Yemi Mcneill/LISETH Kulkarni for ?of ability to use heparin for DVT ppx as ?of HIT in the past (heparin listed as allergey) - No evidence of HIT -She was discharge on 12/21/22 with apixaban 2.5 mg po BID (renal dosing) and instructed to follow-up with PCP in 7 days to consider increasing to 5 mg PO BID pending renal function (to end around 1/18)  2/12/23: Presented to [facility] ED with chest pain SOB and CTA showed acute large PE.  Newly pregnant,treated with LWMH 1mg/kg Q12  2/17/23: Presented to [facility] ED with ongoing chest left sided pleuritic chest pain which she related to known PE (no DVT), -Echo planned to eval for cardiac function showed large, highly mobile thrombus in transit in the right atrium (at least 2.2 cm in length) --In IR underwent angiovac  for right atrial clot thrombectomy that failed, ultimately went to OR for emergency sternotomy, thrombectomy and PFO closure     Interim History:  8/30/2023, her last visit with Dr. Dye of this clinic for which he recommended the patient to maintain on indefinite anticoagulation therapy. At the time, she was still pregnant and thus continuation of enoxaparin. Dr. Dye also recommended that she should remain on aspirin. She supposed to return to clinic 2 months post partum to discuss about further anticoagulation therapy plan. Unfortunately, she failed to do so.   11/1/2024, she presented to the emergency department with complaint of chest pain. I do not belief the patient was on any type of anticoagulation therapy at the time. D-Dimer was negative.   12/28/2024, Influenza testing was positive.   12/29/2024, she presented to the emergency department with complaint of coughing up blood. D-Dimer was negative. CTA chest showed: no acute pulmonary emboli. Evidence of chronic pulmonary embolism with webbing and wall thickening in bilateral lower lobe segmental pulmonary arteries. Mildly enlarged main pulmonary artery which can be seen with pulmonary hypertension.   12/30/2024, she returned to the emergency department with hemoptysis and this time she was admitted overnight. Her hemoptysis was thought to be related to her influenza infection. She was discharged on 12/31/2024 and a referral was placed for our clinic to re-evaluate the patient.     In speaking with Cande today, she delivered her child back in 2023 without any issues. Then she recalls that she was on enoxaparin for about 1 month post partum and then discontinued all anticoagulation therapy. Thus, she has not been on any anticoagulation therapy for at least the past year. Fortunately, she has not had any recurrent VTE since.     She reports that she has not seen her rheumatologist and cardiologist for quite some time. Last cardiology visit, from what I  can tell, was back in April 2023. She then recently had a visit with her primary care provider, Sue Wasserman CNP, who told her that she needs to reconnect care with all the specialist. Cande also realizes that now she has a child and it is more important to be healthy herself in order to care for her child.     ROS:  Denies any bleeding complications. Specifically, no frequent epistaxis. No issues with oral mucosal bleeding. Denies any hematuria or blood in stools. Denies any shortness of breath. No chest pain. No cough. No fever.      Medications:  Current Outpatient Medications   Medication Sig Dispense Refill    acetaminophen (TYLENOL) 500 MG tablet Take 500-1,000 mg by mouth every 6 hours as needed for mild pain.      guaiFENesin (MUCINEX) 600 MG 12 hr tablet Take 1 tablet (600 mg) by mouth 2 times daily. 12 tablet 0    oseltamivir (TAMIFLU) 75 MG capsule Take 75 mg by mouth 2 times daily.       No current facility-administered medications for this visit.     Allergies:  Allergies   Allergen Reactions    Heparin Other (See Comments)     Reaction: HIT    Penicillins Rash     Unknown, but think rash.    Tolerated cephalexin (12/27/20), cefpodoxime (12/27/20), Ceftriaxone (Feb 2023), Zosyn (Feb 2023)    Patient states she has taken Amoxicillin many times before without issue.      Unknown, but think rash. 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    Family History:  Deferred.    Objective:  Vitals: /79 (BP Location: Right arm)   Pulse 80   Temp 97.1  F (36.2  C) (Tympanic)   Wt 102.5 kg (226 lb)   LMP 12/11/2024 (Exact Date)   SpO2 98%   BMI 38.79 kg/m    Exam:   Complete exam is not performed today.       Labs:  Component      Latest Ref Rn 12/31/2024  5:32 AM 12/31/2024  10:53 AM   Sodium      135 - 145 mmol/L 138     Potassium      3.4 - 5.3 mmol/L 4.6     Carbon Dioxide (CO2)      22 - 29 mmol/L 19 (L)     Anion Gap      7 - 15 mmol/L 12     Urea Nitrogen      6.0 - 20.0 mg/dL 22.1 (H)     Creatinine      0.51 - 0.95 mg/dL 1.05 (H)     GFR Estimate      >60 mL/min/1.73m2 75     Calcium      8.8 - 10.4 mg/dL 8.8     Chloride      98 - 107 mmol/L 107     Glucose      70 - 99 mg/dL 75     Alkaline Phosphatase      40 - 150 U/L 77     AST      0 - 45 U/L 16     ALT      0 - 50 U/L 18     Protein Total      6.4 - 8.3 g/dL 6.3 (L)     Albumin      3.5 - 5.2 g/dL 3.4 (L)     Bilirubin Total      <=1.2 mg/dL <0.2     Hemoglobin      11.7 - 15.7 g/dL  11.2 (L)       Component      Latest Ref Rn 10/3/2024  11:36 AM 12/30/2024  5:28 PM 12/31/2024  10:53 AM 1/9/2025  8:38 AM   WBC      4.0 - 11.0 10e3/uL 6.4   4.3  5.9    RBC Count      3.80 - 5.20 10e6/uL 5.05   4.62  4.89    Hemoglobin      11.7 - 15.7 g/dL 12.0  11.1 (L)  11.2 (L)  12.0    Hemoglobin         11.2 (L)     Hematocrit      35.0 - 47.0 % 38.6   35.5  38.8    MCV      78 - 100 fL 76 (L)   77 (L)  79    MCH      26.5 - 33.0 pg 23.8 (L)   24.2 (L)  24.5 (L)    MCHC      31.5 - 36.5 g/dL 31.1 (L)   31.5  30.9 (L)    RDW      10.0 - 15.0 % 16.3 (H)   15.8 (H)  15.3 (H)    Platelet Count      150 - 450 10e3/uL 328   257  391    % Neutrophils      % 60   48     % Lymphocytes      % 27   40     % Monocytes      % 8   10     % Eosinophils      % 3   2     % Basophils      % 1   0     % Immature Granulocytes      % 0   0     NRBCs per 100 WBC      <1 /100   0     Absolute  Neutrophils      1.6 - 8.3 10e3/uL 3.8   2.1     Absolute Lymphocytes      0.8 - 5.3 10e3/uL 1.8   1.7     Absolute Monocytes      0.0 - 1.3 10e3/uL 0.5   0.4     Absolute Eosinophils      0.0 - 0.7 10e3/uL 0.2   0.1     Absolute Basophils      0.0 - 0.2 10e3/uL 0.1   0.0     Absolute Immature Granulocytes      <=0.4 10e3/uL 0.0   0.0     Absolute NRBCs      10e3/uL   0.0        Component      Latest Ref Rng 12/31/2024  5:32 AM   Sodium      135 - 145 mmol/L 138    Potassium      3.4 - 5.3 mmol/L 4.6    Carbon Dioxide (CO2)      22 - 29 mmol/L 19 (L)    Anion Gap      7 - 15 mmol/L 12    Urea Nitrogen      6.0 - 20.0 mg/dL 22.1 (H)    Creatinine      0.51 - 0.95 mg/dL 1.05 (H)    GFR Estimate      >60 mL/min/1.73m2 75    Calcium      8.8 - 10.4 mg/dL 8.8    Chloride      98 - 107 mmol/L 107    Glucose      70 - 99 mg/dL 75    Alkaline Phosphatase      40 - 150 U/L 77    AST      0 - 45 U/L 16    ALT      0 - 50 U/L 18    Protein Total      6.4 - 8.3 g/dL 6.3 (L)    Albumin      3.5 - 5.2 g/dL 3.4 (L)    Bilirubin Total      <=1.2 mg/dL <0.2       Imaging:  Reviewed and are as described above.     Assessment:  In summary, Cande is a 24 year old female with a history of granulomatous polyangiitis, severe preeclampsia / HELLP and IUFD with first pregnancy, who also had an unprovoked pulmonary embolism back in 2021 with embolization to the arterial circulation, as well as massive pulmonary embolism in Feb 2023 during early pregnancy S/P urgent open thrombectomy along with PFO closure, currently not on any anticoagulation therapy despite Dr. Dye's recommendation back in Aug 2023 that she should remain on indefinite anticoagulation therapy, return to clinic for her follow up visit / re-establish care.    She is currently not on any anticoagulation therapy since she has delivered her last child back in 2023. Fortunately, has not experience a recurrent venous thromboembolic event.     She has multiple reasons why she  "should remain on indefinite anticoagulation therapy. She has had an unprovoked pulmonary embolic event in March 2021. Just this reason alone, in accordance to current American Society of Hematology guidelines, she should remain on indefinite anticoagulation therapy. She did have a pregnancy provoked pulmonary embolism in 2023 where she underwent urgent open thrombectomy and eventual PFO closure. 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     Diagnosis:  History of unprovoked pulmonary embolism back in March 2021.   Pregnancy provoked massive pulmonary embolism in Feb 2023 S/P urgent sternotomy for thrombectomy and eventual PFO closure.   History of arterial thrombosis with occlusion of lower extremity arteries S/P thrombolysis in March 2021. This was likely caused by emboli through right to left shunting of her PFO at the time.   History of granulomatous polyangiitis.   History of severe preeclampsia / HELLP.   Evidence of chronic pulmonary embolism on recent CT chest on 12/29/2024. 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     Plan:  I explain to Cande today about the reasons of my recommendation that she should restart anticoagulation therapy as explained above. After reviewing the above reasoning, Cande is willing to restart anticoagulation therapy at this time and is willing to stay on it indefinitely. I will restart her on apixaban at 2.5 mg PO BID dosing as she is requesting the lowered dose due to her fear of menorrhagia being worsened while on anticoagulation therapy. She reports that currently (without being on anticoagulation) her menses last about 7 days with the first 2-3 days being \"very heavy\". I do note that she did have mild anemia in the past several months. She is instructed to call us if she experiences significant increase in bleeding with her menses after she is started on apixaban, and if that is the case, we can consider " placing her on oral tranexamic acid during her menses. Note that she is not on any contraceptives now and she just finished her last period yesterday.     I explain to her that she should inform our clinic immediately if she should find herself pregnant once again in the future. If she is pregnant once again, she will need to go back on enoxaparin at 1 mg/kg SubQ Q 12 hours dosing.     I encourage her to return to see cardiology (now with CT evidence of CTEPH) and rheumatology soon to re-establish care.     Patient is instructed to call our clinic if she should experience any unusual bleeding complications or if she should need any invasive or surgical procedures in the future. Otherwise, will plan to see her back in one year for routine follow up. Virtual or in-person visit is fine.         Aren Fermin PA-C, MPAS  Physician Assistant  [facility].     The longitudinal plan of care for these conditions below were addressed during this visit. Due to the added complexity in care, I will continue to support Cande Shields  in the subsequent management of these conditions and with the ongoing continuity of care of these conditions.    Problem List Items Addressed This Visit as of 2/19/2024   History of unprovoked pulmonary embolism back in March 2021.   Pregnancy provoked massive pulmonary embolism in Feb 2023 S/P urgent sternotomy for thrombectomy and eventual PFO closure.   History of arterial thrombosis with occlusion of lower extremity arteries S/P thrombolysis in March 2021. This was likely caused by emboli through right to left shunting of her PFO at the time.   History of granulomatous polyangiitis.   History of severe preeclampsia / HELLP.   Evidence of chronic pulmonary embolism on recent CT chest on 12/29/2024. 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    40 minutes spent by me on the date of the encounter doing chart review, history and exam, documentation and further  activities per the note

## 2025-01-09 ENCOUNTER — OFFICE VISIT (OUTPATIENT)
Dept: INTERNAL MEDICINE | Facility: CLINIC | Age: 25
End: 2025-01-09
Payer: COMMERCIAL

## 2025-01-09 ENCOUNTER — LAB (OUTPATIENT)
Dept: LAB | Facility: CLINIC | Age: 25
End: 2025-01-09
Payer: COMMERCIAL

## 2025-01-09 VITALS
DIASTOLIC BLOOD PRESSURE: 74 MMHG | WEIGHT: 226 LBS | RESPIRATION RATE: 16 BRPM | SYSTOLIC BLOOD PRESSURE: 109 MMHG | BODY MASS INDEX: 38.58 KG/M2 | OXYGEN SATURATION: 96 % | HEART RATE: 80 BPM | HEIGHT: 64 IN | TEMPERATURE: 98 F

## 2025-01-09 DIAGNOSIS — R09.89 TENDERNESS OF LYMPH NODE: ICD-10-CM

## 2025-01-09 DIAGNOSIS — D64.9 ANEMIA, UNSPECIFIED TYPE: ICD-10-CM

## 2025-01-09 DIAGNOSIS — Z09 HOSPITAL DISCHARGE FOLLOW-UP: Primary | ICD-10-CM

## 2025-01-09 DIAGNOSIS — M31.30 GRANULOMATOSIS WITH POLYANGIITIS, UNSPECIFIED WHETHER RENAL INVOLVEMENT (H): ICD-10-CM

## 2025-01-09 LAB
ERYTHROCYTE [DISTWIDTH] IN BLOOD BY AUTOMATED COUNT: 15.3 % (ref 10–15)
HCT VFR BLD AUTO: 38.8 % (ref 35–47)
HGB BLD-MCNC: 12 G/DL (ref 11.7–15.7)
MCH RBC QN AUTO: 24.5 PG (ref 26.5–33)
MCHC RBC AUTO-ENTMCNC: 30.9 G/DL (ref 31.5–36.5)
MCV RBC AUTO: 79 FL (ref 78–100)
PLATELET # BLD AUTO: 391 10E3/UL (ref 150–450)
RBC # BLD AUTO: 4.89 10E6/UL (ref 3.8–5.2)
WBC # BLD AUTO: 5.9 10E3/UL (ref 4–11)

## 2025-01-09 PROCEDURE — 85027 COMPLETE CBC AUTOMATED: CPT | Performed by: PATHOLOGY

## 2025-01-09 PROCEDURE — 36415 COLL VENOUS BLD VENIPUNCTURE: CPT | Performed by: PATHOLOGY

## 2025-01-09 NOTE — PROGRESS NOTES
Assessment & Plan     Hospital discharge follow-up  Granulomatosis with polyangiitis, unspecified whether renal involvement (H)  Anemia, unspecified type  She is feeling improved since hospitalization. No longer having hemoptysis. CBC completed today which is stable/improved from 12/31/24. Hemoglobin increased to 12.0, WBC 5.9. She will plan to keep the appointment with hematology as scheduled to further discuss possible anticoagulation due to chronic appearing PE on recent imaging. Plan to keep rituxan infusion and rheumatology appointments as scheduled.   - CBC with platelets      Tenderness of lymph node  Bilateral tenderness without enlargement on exam today. Recent influenza, likely reactive. Discussed symptoms should improve with time. If she notices increases in pain, redness, size, then she will update the clinic and imaging can be ordered to further evaluate. Plan for re-evaluation if any other symptoms develop, such as fever, chills, fatigue, dyspnea, etc.        MED REC REQUIRED  Post Medication Reconciliation Status:  Discharge medications reconciled and changed, see notes/orders        Return if symptoms worsen or fail to improve.    Hari Castellon is a 24 year old, presenting for the following health issues:  Follow Up (ED follow up; lymph nodes on both side of neck are sore since leaving the ED but otherwise feels okay per pt )        1/9/2025     7:43 AM   Additional Questions   Roomed by      ALDA     Cande PAWEL Shields presents to the clinic today for a hospital follow-up visit. She has a history of PE, PFO, occlusion of common femoral artery, CVA associated vasculitis, granulomatosis with polyangiitis, CKD, ACD, ADHD, and depression. She was at [facility] on 12/29/24 for hemoptysis and influenza A, plan was to admit though the patient declined as she did not have care for her child. She then was admitted to [facility] from 12/30/24-12/31/24. CT PE completed without signs of  "active PE, though signs of chronic PE, pulmonary hypertension, and mild groundglass opacities consistent with influenza per chart review. Plan to continue tamiflu, rituximab. Follow-up with hematology scheduled for 1/16/25 for consideration of anticoagulation. Rheumatology scheduled for 1/23/25, infusion scheduled for 1/27/25.     She feels improved, notes lymph tenderness bilaterally on her neck. Will still see some blood when blowing her nose, right side only, feels dry. Does not have a humidifier. Otherwise notes symptoms have improved. No further episodes of hemoptysis, no cough, no chest pain, no dyspnea, no fevers.     Hospital Follow-up Visit:    Hospital/Nursing Home/IP Rehab Facility: [facility]  Date of Admission: 12/30/24   Date of Discharge: 12/31/24  Reason(s) for Admission: influenza  Was the patient in the ICU or did the patient experience delirium during hospitalization?  No  Do you have any other stressors you would like to discuss with your provider? No    Problems taking medications regularly:  None  Medication changes since discharge: None  Problems adhering to non-medication therapy:  None    Summary of hospitalization:  [facility] discharge summary reviewed  Diagnostic Tests/Treatments reviewed.  Follow up needed: CBC recommended  Other Healthcare Providers Involved in Patient s Care:         Specialist appointment - hematology, rheumatology, infusion, and nephrology upcoming  Update since discharge: improved.         Plan of care communicated with patient             Review of Systems  Constitutional, HEENT, cardiovascular, pulmonary, gi and gu systems are negative, except as otherwise noted.      Objective    /74 (BP Location: Right arm, Patient Position: Sitting, Cuff Size: Adult Large)   Pulse 80   Temp 98  F (36.7  C) (Oral)   Resp 16   Ht 1.626 m (5' 4\")   Wt 102.5 kg (226 lb)   LMP 12/11/2024 (Exact Date)   SpO2 " 96%   BMI 38.79 kg/m    Body mass index is 38.79 kg/m .  Physical Exam   GENERAL: alert and no distress  EYES: Eyes grossly normal to inspection, PERRL and conjunctivae and sclerae normal  NECK: no adenopathy, no asymmetry, masses, or scars; tenderness bilaterally with submandibular palpation  RESP: lungs clear to auscultation - no rales, rhonchi or wheezes  CV: regular rate and rhythm, normal S1 S2, no S3 or S4, no murmur, click or rub, no peripheral edema  PSYCH: mentation appears normal, affect normal/bright          Signed Electronically by: Sue Wasserman NP

## 2025-01-16 ENCOUNTER — OFFICE VISIT (OUTPATIENT)
Dept: HEMATOLOGY | Facility: CLINIC | Age: 25
End: 2025-01-16
Attending: PHYSICIAN ASSISTANT
Payer: COMMERCIAL

## 2025-01-16 VITALS
SYSTOLIC BLOOD PRESSURE: 115 MMHG | HEART RATE: 80 BPM | DIASTOLIC BLOOD PRESSURE: 79 MMHG | OXYGEN SATURATION: 98 % | BODY MASS INDEX: 38.79 KG/M2 | WEIGHT: 226 LBS | TEMPERATURE: 97.1 F

## 2025-01-16 DIAGNOSIS — Z86.711 HISTORY OF PULMONARY EMBOLISM: ICD-10-CM

## 2025-01-16 DIAGNOSIS — I27.24 CTEPH (CHRONIC THROMBOEMBOLIC PULMONARY HYPERTENSION) (H): ICD-10-CM

## 2025-01-16 DIAGNOSIS — Z87.74 S/P PATENT FORAMEN OVALE CLOSURE: Primary | ICD-10-CM

## 2025-01-16 PROCEDURE — G0463 HOSPITAL OUTPT CLINIC VISIT: HCPCS | Performed by: PHYSICIAN ASSISTANT

## 2025-01-16 NOTE — LETTER
[facility]  [address] [place] [id number]  Main: [phone], Fax: [phone]    Patient seen at: [facility] at [address] [place] [address]    Outpatient Visit Note:    Patient: Cande Shields  MRN: [id number]  : 2000  GRACIE: 2025  Location of this writer at the time of this clinic visit was conducted: [facility].  Location of the patient at the time of this clinic visit was conducted: [facility].     Reason for visit:  History of arterial thrombosis. Right to left shunt PFO.     Clinical History Summary:  Cande is a 24 year old female with a history of granulomatous polyangiitis, severe preeclampsia / HELLP and IUFD with first pregnancy, who also had an unprovoked pulmonary embolism back in  with embolization to the arterial circulation, as well as massive pulmonary embolism in 2023 during early pregnancy, currently not on any anticoagulation therapy despite Dr. Dye's recommendation back in Aug 2023 that she should remain on indefinite anticoagulation therapy, return to clinic for her follow up visit / re-established care. She was last seen by Dr. Kodi Dye back on 2023. Please refer to his consult note for this patient's clinical details. She actually had a re-referral placed by Brinda Bates PA-C of [facility] on 2024.     Thrombosis History Summary:  2020 Presented G1 26W with severe preeclampsia, IUFD at 26 weeks.    Early Dec 2022, tested positive for COVID and was hospitalized later.  3/20/2021: large PE (unprovoked)  3/21/2021: ARTERIAL THROMBOSIS (R->L shunt due to PFO), Developed acute pain and coolness in her left leg and was without pulses  --IR arteriogram and thrombolysis -Occlusion of common femoral, profunda and  superficial femoral arteries, distal popliteal artery -TPA infusion started due to unable to suction thrombus or mechanical thrombectomy --Due to development while on heparin gtt HIT ab sent and negative, RAUL sent and also returned negative but she was on argatroban while this was pending  --APS panel sent and was negative  --Echo: Mobile echodensity (~2 x 1.5cm) in right atrium, likely thrombus. Cannot confirm attachment location, but most likely originating from or near IVC (also based on CT images). -Seen by hematology: while primary arterial thrombosis is of concern, a follow up echo with bubble study is recommended to rule out right to left intracardiac shunt given her RA thrombus  3[place]23[place]21: [place] [facility] [place] was changed to fondaparinux and ultimately xarelto  3/25/21: FVL mutation + prothrombin gene mutation testing negative, protein C >150 (elevated, on ac)   10/7/2021: Seen in clinic completed 6 months of xarelto -APS testing repeated and was negative, Antithrombin level  -She was requested to follow-up in clinic in 1 month but has not been seen since initial appointment.  Lost to followup  12/21/2022: GPA flare off anticoagulation. Consult with Dr. Yemi Mcneill/LISETH Kulkarni for ?of ability to use heparin for DVT ppx as ?of HIT in the past (heparin listed as allergey) - No evidence of HIT -She was discharge on 12/21/22 with apixaban 2.5 mg po BID (renal dosing) and instructed to follow-up with PCP in 7 days to consider increasing to 5 mg PO BID pending renal function (to end around 1/18)  2/12/23: Presented to [facility] ED with chest pain SOB and CTA showed acute large PE.  Newly pregnant,treated with LWMH 1mg/kg Q12  2/17/23: Presented to [facility] ED with ongoing chest left sided pleuritic chest pain which she related to known PE (no DVT), -Echo planned to eval for cardiac function showed large, highly mobile thrombus in transit in the right atrium (at least 2.2 cm in length) --In IR  underwent angiovac for right atrial clot thrombectomy that failed, ultimately went to OR for emergency sternotomy, thrombectomy and PFO closure     Interim History:  8/30/2023, her last visit with Dr. Dye of this clinic for which he recommended the patient to maintain on indefinite anticoagulation therapy. At the time, she was still pregnant and thus continuation of enoxaparin. Dr. Dye also recommended that she should remain on aspirin. She supposed to return to clinic 2 months post partum to discuss about further anticoagulation therapy plan. Unfortunately, she failed to do so.   11/1/2024, she presented to the emergency department with complaint of chest pain. I do not belief the patient was on any type of anticoagulation therapy at the time. D-Dimer was negative.   12/28/2024, Influenza testing was positive.   12/29/2024, she presented to the emergency department with complaint of coughing up blood. D-Dimer was negative. CTA chest showed: no acute pulmonary emboli. Evidence of chronic pulmonary embolism with webbing and wall thickening in bilateral lower lobe segmental pulmonary arteries. Mildly enlarged main pulmonary artery which can be seen with pulmonary hypertension.   12/30/2024, she returned to the emergency department with hemoptysis and this time she was admitted overnight. Her hemoptysis was thought to be related to her influenza infection. She was discharged on 12/31/2024 and a referral was placed for our clinic to re-evaluate the patient.     In speaking with Cande today, she delivered her child back in 2023 without any issues. Then she recalls that she was on enoxaparin for about 1 month post partum and then discontinued all anticoagulation therapy. Thus, she has not been on any anticoagulation therapy for at least the past year. Fortunately, she has not had any recurrent VTE since.     She reports that she has not seen her rheumatologist and cardiologist for quite some time. Last cardiology  visit, from what I can tell, was back in April 2023. She then recently had a visit with her primary care provider, Sue Wasserman CNP, who told her that she needs to reconnect care with all the specialist. Cande also realizes that now she has a child and it is more important to be healthy herself in order to care for her child.     ROS:  Denies any bleeding complications. Specifically, no frequent epistaxis. No issues with oral mucosal bleeding. Denies any hematuria or blood in stools. Denies any shortness of breath. No chest pain. No cough. No fever.      Medications:  Current Outpatient Medications   Medication Sig Dispense Refill     acetaminophen (TYLENOL) 500 MG tablet Take 500-1,000 mg by mouth every 6 hours as needed for mild pain.       guaiFENesin (MUCINEX) 600 MG 12 hr tablet Take 1 tablet (600 mg) by mouth 2 times daily. 12 tablet 0     oseltamivir (TAMIFLU) 75 MG capsule Take 75 mg by mouth 2 times daily.       No current facility-administered medications for this visit.     Allergies:  Allergies   Allergen Reactions     Heparin Other (See Comments)     Reaction: HIT     Penicillins Rash     Unknown, but think rash.    Tolerated cephalexin (12/27/20), cefpodoxime (12/27/20), Ceftriaxone (Feb 2023), Zosyn (Feb 2023)    Patient states she has taken Amoxicillin many times before without issue.      Unknown, but think rash. 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    Family History:  Deferred.    Objective:  Vitals: /79 (BP Location: Right arm)   Pulse 80   Temp 97.1  F (36.2  C) (Tympanic)   Wt 102.5 kg (226 lb)   LMP 12/11/2024 (Exact Date)   SpO2 98%   BMI 38.79 kg/m    Exam:   Complete exam is not performed today.       Labs:  Component      Latest Ref [facility] 12/31/2024  5:32 AM 12/31/2024  10:53 AM   Sodium      135 - 145 mmol/L 138     Potassium      3.4 - 5.3 mmol/L 4.6     Carbon Dioxide (CO2)      22 - 29 mmol/L 19 (L)     Anion Gap      7 - 15 mmol/L 12     Urea Nitrogen      6.0 - 20.0 mg/dL 22.1 (H)     Creatinine      0.51 - 0.95 mg/dL 1.05 (H)     GFR Estimate      >60 mL/min/1.73m2 75     Calcium      8.8 - 10.4 mg/dL 8.8     Chloride      98 - 107 mmol/L 107     Glucose      70 - 99 mg/dL 75     Alkaline Phosphatase      40 - 150 U/L 77     AST      0 - 45 U/L 16     ALT      0 - 50 U/L 18     Protein Total      6.4 - 8.3 g/dL 6.3 (L)     Albumin      3.5 - 5.2 g/dL 3.4 (L)     Bilirubin Total      <=1.2 mg/dL <0.2     Hemoglobin      11.7 - 15.7 g/dL  11.2 (L)       Component      Latest Ref [facility] 10/3/2024  11:36 AM 12/30/2024  5:28 PM 12/31/2024  10:53 AM 1/9/2025  8:38 AM   WBC      4.0 - 11.0 10e3/uL 6.4   4.3  5.9    RBC Count      3.80 - 5.20 10e6/uL 5.05   4.62  4.89    Hemoglobin      11.7 - 15.7 g/dL 12.0  11.1 (L)  11.2 (L)  12.0    Hemoglobin         11.2 (L)     Hematocrit      35.0 - 47.0 % 38.6   35.5  38.8    MCV      78 - 100 fL 76 (L)   77 (L)  79    MCH      26.5 - 33.0 pg 23.8 (L)   24.2 (L)  24.5 (L)    MCHC      31.5 - 36.5 g/dL 31.1 (L)   31.5  30.9 (L)    RDW      10.0 - 15.0 % 16.3 (H)   15.8 (H)  15.3 (H)    Platelet Count      150 - 450 10e3/uL 328   257  391    % Neutrophils      % 60   48     % Lymphocytes      % 27   40     % Monocytes      % 8   10     % Eosinophils      % 3   2     % Basophils      % 1   0     % Immature Granulocytes      % 0   0     NRBCs per 100 WBC      <1 /100    0     Absolute Neutrophils      1.6 - 8.3 10e3/uL 3.8   2.1     Absolute Lymphocytes      0.8 - 5.3 10e3/uL 1.8   1.7     Absolute Monocytes      0.0 - 1.3 10e3/uL 0.5   0.4     Absolute Eosinophils      0.0 - 0.7 10e3/uL 0.2   0.1     Absolute Basophils      0.0 - 0.2 10e3/uL 0.1   0.0     Absolute Immature Granulocytes      <=0.4 10e3/uL 0.0   0.0     Absolute NRBCs      10e3/uL   0.0        Component      Latest Ref Rng 12/31/2024  5:32 AM   Sodium      135 - 145 mmol/L 138    Potassium      3.4 - 5.3 mmol/L 4.6    Carbon Dioxide (CO2)      22 - 29 mmol/L 19 (L)    Anion Gap      7 - 15 mmol/L 12    Urea Nitrogen      6.0 - 20.0 mg/dL 22.1 (H)    Creatinine      0.51 - 0.95 mg/dL 1.05 (H)    GFR Estimate      >60 mL/min/1.73m2 75    Calcium      8.8 - 10.4 mg/dL 8.8    Chloride      98 - 107 mmol/L 107    Glucose      70 - 99 mg/dL 75    Alkaline Phosphatase      40 - 150 U/L 77    AST      0 - 45 U/L 16    ALT      0 - 50 U/L 18    Protein Total      6.4 - 8.3 g/dL 6.3 (L)    Albumin      3.5 - 5.2 g/dL 3.4 (L)    Bilirubin Total      <=1.2 mg/dL <0.2       Imaging:  Reviewed and are as described above.     Assessment:  In summary, Cande is a 24 year old female with a history of granulomatous polyangiitis, severe preeclampsia / HELLP and IUFD with first pregnancy, who also had an unprovoked pulmonary embolism back in 2021 with embolization to the arterial circulation, as well as massive pulmonary embolism in Feb 2023 during early pregnancy S/P urgent open thrombectomy along with PFO closure, currently not on any anticoagulation therapy despite Dr. Dye's recommendation back in Aug 2023 that she should remain on indefinite anticoagulation therapy, return to clinic for her follow up visit / re-establish care.    She is currently not on any anticoagulation therapy since she has delivered her last child back in 2023. Fortunately, has not experience a recurrent venous thromboembolic event.     She has multiple  "reasons why she should remain on indefinite anticoagulation therapy. She has had an unprovoked pulmonary embolic event in March 2021. Just this reason alone, in accordance to current American Society of Hematology guidelines, she should remain on indefinite anticoagulation therapy. She did have a pregnancy provoked pulmonary embolism in 2023 where she underwent urgent open thrombectomy and eventual PFO closure. 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     Diagnosis:  History of unprovoked pulmonary embolism back in March 2021.   Pregnancy provoked massive pulmonary embolism in Feb 2023 S/P urgent sternotomy for thrombectomy and eventual PFO closure.   History of arterial thrombosis with occlusion of lower extremity arteries S/P thrombolysis in March 2021. This was likely caused by emboli through right to left shunting of her PFO at the time.   History of granulomatous polyangiitis.   History of severe preeclampsia / HELLP.   Evidence of chronic pulmonary embolism on recent CT chest on 12/29/2024. 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     Plan:  I explain to Cande today about the reasons of my recommendation that she should restart anticoagulation therapy as explained above. After reviewing the above reasoning, Cande is willing to restart anticoagulation therapy at this time and is willing to stay on it indefinitely. I will restart her on apixaban at 2.5 mg PO BID dosing as she is requesting the lowered dose due to her fear of menorrhagia being worsened while on anticoagulation therapy. She reports that currently (without being on anticoagulation) her menses last about 7 days with the first 2-3 days being \"very heavy\". I do note that she did have mild anemia in the past several months. She is instructed to call us if she experiences significant increase in bleeding with her menses after she is started on apixaban, and if that is the case, " we can consider placing her on oral tranexamic acid during her menses. Note that she is not on any contraceptives now and she just finished her last period yesterday.     I explain to her that she should inform our clinic immediately if she should find herself pregnant once again in the future. If she is pregnant once again, she will need to go back on enoxaparin at 1 mg/kg SubQ Q 12 hours dosing.     I encourage her to return to see cardiology (now with CT evidence of CTEPH) and rheumatology soon to re-establish care.     Patient is instructed to call our clinic if she should experience any unusual bleeding complications or if she should need any invasive or surgical procedures in the future. Otherwise, will plan to see her back in one year for routine follow up. Virtual or in-person visit is fine.         Aren Fermin PA-C, MPAS  Physician Assistant  [facility].     The longitudinal plan of care for these conditions below were addressed during this visit. Due to the added complexity in care, I will continue to support Cande Shields  in the subsequent management of these conditions and with the ongoing continuity of care of these conditions.    Problem List Items Addressed This Visit as of 2/19/2024   History of unprovoked pulmonary embolism back in March 2021.   Pregnancy provoked massive pulmonary embolism in Feb 2023 S/P urgent sternotomy for thrombectomy and eventual PFO closure.   History of arterial thrombosis with occlusion of lower extremity arteries S/P thrombolysis in March 2021. This was likely caused by emboli through right to left shunting of her PFO at the time.   History of granulomatous polyangiitis.   History of severe preeclampsia / HELLP.   Evidence of chronic pulmonary embolism on recent CT chest on 12/29/2024. 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    40 minutes spent by me on the date of the encounter doing chart review, history and exam,  documentation and further activities per the note

## 2025-01-23 ENCOUNTER — LAB (OUTPATIENT)
Dept: LAB | Facility: CLINIC | Age: 25
End: 2025-01-23
Payer: COMMERCIAL

## 2025-01-23 ENCOUNTER — VIRTUAL VISIT (OUTPATIENT)
Dept: RHEUMATOLOGY | Facility: CLINIC | Age: 25
End: 2025-01-23
Payer: COMMERCIAL

## 2025-01-23 ENCOUNTER — TELEPHONE (OUTPATIENT)
Dept: NEPHROLOGY | Facility: CLINIC | Age: 25
End: 2025-01-23

## 2025-01-23 VITALS — WEIGHT: 224 LBS | HEIGHT: 64 IN | BODY MASS INDEX: 38.24 KG/M2

## 2025-01-23 DIAGNOSIS — M31.31 GRANULOMATOSIS WITH POLYANGIITIS WITH RENAL INVOLVEMENT (H): ICD-10-CM

## 2025-01-23 DIAGNOSIS — R04.2 BLOODY SPUTUM: ICD-10-CM

## 2025-01-23 DIAGNOSIS — R04.0 EPISTAXIS: ICD-10-CM

## 2025-01-23 DIAGNOSIS — I77.82 ANCA-ASSOCIATED VASCULITIS (H): Primary | ICD-10-CM

## 2025-01-23 LAB
ALBUMIN MFR UR ELPH: 41.6 MG/DL
ALBUMIN SERPL BCG-MCNC: 3.9 G/DL (ref 3.5–5.2)
ALBUMIN UR-MCNC: 30 MG/DL
ANION GAP SERPL CALCULATED.3IONS-SCNC: 14 MMOL/L (ref 7–15)
APPEARANCE UR: CLEAR
BACTERIA #/AREA URNS HPF: ABNORMAL /HPF
BASOPHILS # BLD AUTO: 0 10E3/UL (ref 0–0.2)
BASOPHILS NFR BLD AUTO: 1 %
BILIRUB UR QL STRIP: NEGATIVE
BUN SERPL-MCNC: 26.5 MG/DL (ref 6–20)
CALCIUM SERPL-MCNC: 9.4 MG/DL (ref 8.8–10.4)
CHLORIDE SERPL-SCNC: 104 MMOL/L (ref 98–107)
COLOR UR AUTO: ABNORMAL
CREAT SERPL-MCNC: 1.39 MG/DL (ref 0.51–0.95)
CREAT UR-MCNC: 186 MG/DL
CREAT UR-MCNC: 189 MG/DL
CRP SERPL-MCNC: 8.65 MG/L
EGFRCR SERPLBLD CKD-EPI 2021: 53 ML/MIN/1.73M2
EOSINOPHIL # BLD AUTO: 0.2 10E3/UL (ref 0–0.7)
EOSINOPHIL NFR BLD AUTO: 3 %
ERYTHROCYTE [DISTWIDTH] IN BLOOD BY AUTOMATED COUNT: 15.3 % (ref 10–15)
GLUCOSE SERPL-MCNC: 88 MG/DL (ref 70–99)
GLUCOSE UR STRIP-MCNC: NEGATIVE MG/DL
HCO3 SERPL-SCNC: 21 MMOL/L (ref 22–29)
HCT VFR BLD AUTO: 34.3 % (ref 35–47)
HGB BLD-MCNC: 10.9 G/DL (ref 11.7–15.7)
HGB UR QL STRIP: ABNORMAL
IMM GRANULOCYTES # BLD: 0 10E3/UL
IMM GRANULOCYTES NFR BLD: 1 %
KETONES UR STRIP-MCNC: NEGATIVE MG/DL
LEUKOCYTE ESTERASE UR QL STRIP: NEGATIVE
LYMPHOCYTES # BLD AUTO: 2 10E3/UL (ref 0.8–5.3)
LYMPHOCYTES NFR BLD AUTO: 22 %
MCH RBC QN AUTO: 24.9 PG (ref 26.5–33)
MCHC RBC AUTO-ENTMCNC: 31.8 G/DL (ref 31.5–36.5)
MCV RBC AUTO: 78 FL (ref 78–100)
MICROALBUMIN UR-MCNC: 215 MG/L
MICROALBUMIN/CREAT UR: 113.76 MG/G CR (ref 0–25)
MONOCYTES # BLD AUTO: 0.6 10E3/UL (ref 0–1.3)
MONOCYTES NFR BLD AUTO: 7 %
NEUTROPHILS # BLD AUTO: 5.9 10E3/UL (ref 1.6–8.3)
NEUTROPHILS NFR BLD AUTO: 67 %
NITRATE UR QL: NEGATIVE
NRBC # BLD AUTO: 0 10E3/UL
NRBC BLD AUTO-RTO: 0 /100
PH UR STRIP: 6 [PH] (ref 5–7)
PHOSPHATE SERPL-MCNC: 3.9 MG/DL (ref 2.5–4.5)
PLATELET # BLD AUTO: 341 10E3/UL (ref 150–450)
POTASSIUM SERPL-SCNC: 4.5 MMOL/L (ref 3.4–5.3)
PROT/CREAT 24H UR: 0.22 MG/MG CR (ref 0–0.2)
RBC # BLD AUTO: 4.38 10E6/UL (ref 3.8–5.2)
RBC #/AREA URNS AUTO: ABNORMAL /HPF
SODIUM SERPL-SCNC: 139 MMOL/L (ref 135–145)
SP GR UR STRIP: 1.02 (ref 1–1.03)
SQUAMOUS #/AREA URNS AUTO: ABNORMAL /LPF
UROBILINOGEN UR STRIP-MCNC: NORMAL MG/DL
WBC # BLD AUTO: 8.8 10E3/UL (ref 4–11)
WBC #/AREA URNS AUTO: ABNORMAL /HPF

## 2025-01-23 ASSESSMENT — PAIN SCALES - GENERAL: PAINLEVEL_OUTOF10: NO PAIN (0)

## 2025-01-23 NOTE — TELEPHONE ENCOUNTER
Patient calling reporting appt with Dr. Call at [facility] today but transportation barriers.  Hoping to switch appt to virtual.  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  Reached out to Dr. Jakub Carrera's CC to see if ok to switch to virtual.  Appt scheduled at  per patient request.  Message sent to [facility] for timely follow up.    Marielena Jaquez RN, BSN, PHN  [facility] Nephrology Nurse Navigator  [phone]

## 2025-01-23 NOTE — NURSING NOTE
Current patient location: Pt is at work in [place] per pt    Is the patient currently in the state of [place]? YES    Visit mode: VIDEO    If the visit is dropped, the patient can be reconnected by:VIDEO VISIT: Text to cell phone:   Telephone Information:   Mobile [phone]       Will anyone else be joining the visit? NO  (If patient encounters technical issues they should call [phone]    Are changes needed to the allergy or medication list? No    Are refills needed on medications prescribed by this physician? NO    Rooming Documentation:  Not applicable    Reason for visit: RECHECK    No other vitals to report per pt    Shama Galan VVF

## 2025-01-23 NOTE — PROGRESS NOTES
Virtual Visit Details    Type of service:  Video Visit   Video Start Time: 3:42 PM  Video End Time:4:10 PM    Originating Location (pt. Location): Home    Distant Location (provider location):  On-site  Platform used for Video Visit: [facility]          Rheumatology Clinic Visit     Cande Shields MRN# [id number]   YOB: 2000 Age: 24 year old     Date of Visit: 01/23/2025  Primary care provider: Trudy Faye          Assessment and Plan:     # Granulomatous polyangiitis with glomerulonephritis, upper respiratory, musculoskeletal, and cutaneous involvement;   former constitutional symptoms, skin rash, respiratory symptoms all remain mild or absent.  Scant bloody nasal discharge has recurred in the past several months in association with upper respiratory infection.  Video exam today shows no facial rash.    Data: Laboratory workup on January 23, 2025 shows creatinine of 1.39 with a GFR of 53, lower than on December 31, 2024, but comparable to GFR noted in October 2024.  Albumin 3.9; CRP 8.7, down from 34 noted in December 31; CBC shows hemoglobin 10.9, stable, urinalysis showed 30+ protein and scant red blood cells, stable; total protein was 0.22 mg/mg of creatinine, stable compared with 2023.    Discussion: GPA with history of glomerulonephritis, rash and upper respiratory involvement is now in biochemical and clinical remission, with minimal symptoms; renal function is at baseline.  I do not think that painless scalp or facial papules, or recent scant epistaxis represent recurrence of ANCA associated vasculitis.  Patient formerly completed rituximab induction therapy with 4 weekly doses 375 mg/m  last dose on 1-.  Last maintenance dose of rituximab was February 2024.  She last had Cytoxan 500 mg/m2 on 1-.    I agree with Dr. Goodson that with history of CKD after pauci-immune glomerulonephritis associated with ANCA, patient's best option to minimize further risk of kidney injury is to  "continue maintenance rituximab on a schedule of every 6 months.  Next rituximab dose, prescribed through nephrology is due in the last week of January 2025.    # Hypercoagulable state (DVT/PE in 2023): Recent CT scan showed \"debris\" and residual of prior saddle embolus.  There is no reason to think that hypercoagulable state has diminished.  I agree with Dr. Fermin's and Dr. Dye's  assessment that lifelong coagulation is indicated.    Diagnosis:  1.  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 Now doing well off all prednisone with stable kidney filtering function as of April 4, 2023. I recommend checking urinalysis and urine protein, and monitoring for recurrence of symptoms (bloody sputum production, fever, skin rash, rising blood pressure).  2. Blood clot in the lungs, February 2023  3. Post-surgical chest pain  4. Chronic kidney disease, stable    Recommendation:  1.  Continue Eliquis  2. Follow up with Nephrology (Dr. Goodson), Hematology (Dr. Dye and Zander), ENT as scheduled  3.  Receive rituximab 500 mg once every 6 months to maintain remission of ANCA vasculitis  4. Monitor urinalysis, blood counts, inflammation, metabolic panel every 6 months.    RTC 6 mos    Ramses Call MD  Staff Rheumatologist, [facility]    On the day of the encounter, a total of 35 minutes was spent in chart review, and in counseling and coordination of care, regarding the patient's complex medical problem of ANCA associated vasculitis, hypercoagulable state with DVT/PE, high risk medication.    The longitudinal plan of care for the diagnosis(es)/condition(s) as documented were addressed during this visit. Due to the added complexity in care, I will continue to support Cande in the subsequent management and with ongoing continuity of care.            Active Problem List:     Patient Active Problem List    Diagnosis Date Noted    " Hemoptysis 2024     Priority: Medium    S/P primary low transverse  2023     Priority: Medium    Fetal growth restriction antepartum 2023     Priority: Medium    Anemia 2023     Priority: Medium    High-risk pregnancy, unspecified trimester 2023     Priority: Medium    History of HELLP syndrome, currently pregnant 2023     Priority: Medium    Cardiac disease during pregnancy 2023     Priority: Medium     Cardiac diagnosis: Polyangiitis granulomatosis, history of right atrial thrombectomy with sternotomy, PFO closure, history of DVT/PE, CKD (Cr 1.3)    Modified WHO Class: III // NYHA Class: II    Last Echo  23: LV size, function nl. EF 55-60%. RV size function nl.    Medications: Lovenox 80 BID (anti-Xa  = 1.13), aspirin, metoprolol, azathioprine  Comorbidities: FGR, bicornuate uterus, subchorionic hematoma, history of preeclampsia and IUFD at 31 weeks, granulomatosis polyangiitis, hx DVT/PE, CKD stage III (Cr 1.61 on )    Antepartum plan:  [] Fetal echocardiogram   [] Anti Xa levels   [] Follow up with cardiology with echo  [] Follow up with nephrology  and rheumatology  and hematology   [] Discussion at Cardio-OB conference: , 5/3, , , 80    Delivery Plan:  - Timing and location of delivery: [facility] [place], [place], timing pending comorbidities  - Mode of delivery: Likely   - Anesthesia plan: Consult , plan for anticoagulation per hematology  - Monitoring:   - IV access/lines:  - SBE prophylaxis:     Postpartum Recommendations:  -ppBCM:    Providers:  Primary OB: CARMELITA   Cardiologist: Dr. Oneal       Other pulmonary embolism without acute cor pulmonale, unspecified chronicity (H) 2023     Priority: Medium    Chest pain, unspecified type 2023     Priority: Medium    Pneumonia due to infectious organism, unspecified laterality, unspecified part of lung 2023     Priority: Medium    ATN (acute tubular necrosis) 2022  "    Priority: Medium    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) 12/20/2022     Priority: Medium    Infection due to 2019 novel coronavirus 12/20/2022     Priority: Medium    Acute deep vein thrombosis (DVT) of proximal vein of both lower extremities (H) 10/30/2021     Priority: Medium    BMI 40.0-44.9, adult (H) 10/30/2021     Priority: Medium    Femoral artery thrombosis, left (H) 10/30/2021     Priority: Medium    Thrombosis 03/23/2021     Priority: Medium    Occlusion of common femoral artery 03/21/2021     Priority: Medium    Stage 3 chronic kidney disease, unspecified whether stage 3a or 3b CKD (H) 03/20/2021     Priority: Medium    Acute saddle pulmonary embolism with acute cor pulmonale (H) 03/20/2021     Priority: Medium    Need for pneumocystis prophylaxis 03/04/2021     Priority: Medium    Pulmonary infiltrates 01/28/2021     Priority: Medium    Acute kidney injury 01/28/2021     Priority: Medium    Personal history of COVID-19 01/10/2021     Priority: Medium    Pyoderma gangrenosum (H) 01/10/2021     Priority: Medium    ANCA-associated vasculitis (H) 12/31/2020     Priority: Medium    Myalgia 12/27/2020     Priority: Medium    Granulomatosis with polyangiitis, unspecified whether renal involvement (H) 12/27/2020     Priority: Medium    Bicornuate uterus 10/28/2020     Priority: Medium    Fetal demise, greater than 22 weeks, antepartum 10/28/2020     Priority: Medium     10/20 30w6d presented to [facility] with right sided chest pain, BP in severe range, No fetal heart tones auscultated. Induced, NSVB, \"Yasmin\" 2lbs      Alcohol use disorder, mild, in sustained remission 02/11/2015     Priority: Medium     Formatting of this note might be different from the original.  sober since 01/2014.      Binge-eating disorder, moderate 02/11/2015     Priority: Medium    Cannabis use disorder, mild, in early remission 02/11/2015     Priority: Medium     Formatting of this note might be different " from the original.  sober since 06/2014.      Depression 02/11/2015     Priority: Medium    ADHD (attention deficit hyperactivity disorder), inattentive type 07/09/2014     Priority: Medium    Generalized anxiety disorder 07/09/2014     Priority: Medium    Performance anxiety 07/09/2014     Priority: Medium    Self-injurious behavior 07/09/2014     Priority: Medium    History of granulomatosis with polyangiitis 02/15/2012     Priority: Medium    Wegener's granulomatosis 10/04/2011     Priority: Medium     Replacing diagnoses that were inactivated after the 10/1/2021 regulatory import.              History of Present Illness:   Cande Shields presents for followup vasculitis. Last seen 4-2023.  On that date, granulomatous polyangiitis with chronic kidney disease and glomerulonephritis was improved.  Patient had recently learned she was in the early stages of pregnancy.  Recommendation was to continue prednisone at low-dose, monitor for renal disease, discontinue planned Rituxan infusions, and follow-up with maternal-fetal medicine.     Background: Granulomatosis with polyangitis dx at age 10, with PR3 positivitiy, initially treated with Methotrexate, Ssteroids, Rituximab infusions Q6-8 months until about 2012, since then had been in remission. On December 26 2020, she was admitted to [facility] for for myalgias, arthralgias, pedal edema and a petechial non blanching palpable purpura rash involving her thighs, legs, elbows, chin and neck (vasculitis). She had proteinuria on UA, lung nodule on CXR, elevated inflammatory markers.  Although COVID-19 infection was also present, a flare of granulomatous polyangiitis was diagnosed.  The patient was started on Solu-Medrol 1 mg/kg/day.  She was also begun on induction protocol with rituximab 375 mg/m  IV 4 doses, given on a weekly schedule.  She improved, and was discharged on December 30, 2020 on high-dose prednisone. Last seen in 2-2021, when she was improving  after hospitalization.  Another flare of GPA with nephritis was diagnosed in December 2022.  Patient was hospitalized, treated with high-dose steroids and new induction therapy with rituximab.  She received 3 out of planned 4 infusions of rituximab; therapy was stopped due to pregnancy diagnosed in early 2-2023.    Interval history January 23, 2025    Patient was hospitalized briefly on December 31, 2024.  Discharge diagnoses included hemoptysis, likely due to influenza, chronic pulmonary embolism, acute kidney injury.  Shortly after diagnosis of influenza A on December 28, patient had presented with hemoptysis.  Patient was treated with Tamiflu, and plan to continue rituximab every 8 months was instituted.  Patient was discharged with recommendations for follow-up in nephrology, ENT and in hematology.    Patient was seen by Dr. Goodson in nephrology in early October 2024.  Review of recent treatment for ANCA associated vasculitis revealed that patient had used Imuran as a maintenance during pregnancy, starting in July 2023.  Imuran was stopped and patient resumed rituximab after delivery with 500 mg dose in February 2024.  Planned maintenance dose in August 2024 did not occur.  Impression was of ME-3 positive ANCA vasculitis with glomerulonephritis, CKD stage III diagnosed at the age of 10.  Inflammatory disease was judged to be in clinical and biochemical remission; recommendations nevertheless was to resume and continue rituximab for a period of at least 3 to 4 years given positive ME-3 and frequent relapses.  History of hypercoagulable state, off all anticoagulation was noted.  Recommendation was for follow-up with hematology, Dr. Dye.    She had developed flu in late December, prior to hospitalization.  Today, she reports markedly reduced blood in nasal secretions since hospitalization  In the last few days, she is coughing again with mild ST. There is scant blood from the R side of the nose.  She notes daily  body aches affecting her low back and under her R bra-line.  Symptoms are not severe enough that she is impaired in performing activities of daily living including full-time work, and caring for her [id number] son.    She reports that she saw Hematology in January; she restarted eliquis in response to recommendations to maintain lifelong anticoagulation.  No fevers, chills, sweats, dyspnea on exertion, joint pain, leg rash.  She has noted painless lumps/spots on her scalp and on her face.      Interval history April 13, 2023    Shortly after last visit, unfortunately, patient suffered venous thromboembolism and pulmonary embolus with significant hemodynamic decline.  Echo showed large right atrial thrombus noted. She was therefore admitted to the ICU and angiovac for thrombectomy was attempted, but ultimately she underwent emergency sternotomy with right atrial thrombectomy and closure of PFO with central and bilateral femoral cannulation.      Patient was seen by maternal-fetal medicine on April 4, 2023.  Intrauterine pregnancy at 12 weeks was noted.  Recent history of multiple venous thromboemboli with DVT and PE, and PFO closure on February 18, 2023 was also noted.  Recommendation were to continue low-dose aspirin for preeclampsia prophylaxis, received IV iron for anemia, continue 10 a factor monitoring for anticoagulation, continue blood pressure control with metoprolol.  Expected delivery would be by vaginal route at 37 to 39 weeks of pregnancy.    Patient was seen by Dr. Gonzalez on March 16, 2023 in the [facility] for clot[facility] bleeding disorders.  History of GPA, severe preeclampsia and intrauterine feeder and death with prior pregnancy was noted.  During ongoing pregnancy, recommendation was to continue enoxaparin 1 mg/kg every 12 hours and check Lovenox levels once monthly, to continue aspirin for preeclampsia prophylaxis, and to return at 32 weeks of gestation for discussion of peridelivery  anticoagulation plan.    Today, feeling well. No n/v. No f/c/s. No joint pain, skin rash.  No upper respiratory symptoms, nasal discharge, sinus pressure.  Chest pain is worsening, dating from February surgery.  Pain is not pleuritic, but is migratory, affecting back and front not necessarily related to site of previous sternotomy.  Energy level is overall good, as is appetite.  She plans to return to full-time work as a  next week.    Interval history February 9, 2023    Since last visit in 20221:  Patient saw Dr. Chang in nephrology in April 2021 in follow-up of RI-3+ ANCA vasculitis.  Creatinine was noted to be improving and patient was feeling well several weeks after a single course of intravenous cyclophosphamide.  Plan was to continue prednisone through the month of April, and then start tapering to off.  Plan was to consider maintenance Rituxan in June or July 2021. She was lost to Renal and Rheumatology followup.    She was next admitted between 12/20-12/21 of 2022. She came in with positive home COVID test and  petechial rashes that were similar to GPA previous flare and some phlegm with blood tinge her PR3 was elevated at 27, her Cr was 2.05 but after IV fluid and 1 dose of methlyprednisolone 125 mg Cr down to 1.48 the next day. She received tapering dose of methylprednisone IV and oral, as well as avacopan. She received Rituximab 375 mg/m2 1st dose on 1/10/23 and 2nd dose on 1/17/23, 1-.    Patient saw Dr. Dozier in nephrology on January 19, 2023.  Impression was of ANCA vasculitis flare, partially completed induction treatment with rituximab, and concerning decrease in GFR compared to 1 month earlier.  Nevertheless, GPA flare was considered to be under improving control.  A plan for tapering Medrol to 8 mg/day as of February 8 was given, and a plan for a total of 4 weekly doses of rituximab was discussed.    Today, patient reports that she is 4 weeks pregnant, testing positive  just 4 days ago. She will meet with OB/Gyn tomorrow.  She feels well overall; no joint pain, skin rash, shortness of breath, fever. She quit smoking 2 weeks ago; she still has a cough (dry, chronic). She has not had recurrence of blood-tinged phlegm. No nosebleeds or facial pain/sinus drainage.    She has continued avacopan, but stopped on learning that she is pregnant, per vasculitis service. She is taking prenatal vitamins.  prednisone 12 mg daily has continued. She missed a 4th rituxan dose scheduled for 2-3-2023.  She still has low back pain, every day. Pain is constant, but modest.  She works full time at a day care.    No recurrence of COVID symptoms that she had in .     Interval history 02-:    She is feeling well. Good energy level  Doing cardio, situps, jumping jacks at home.  She notes R foot pain for the past several weeks; made worse with high impact weight bearing. Pain is in the foot arch and in the pad.    After the last visit in January, creatinine returned unexpectedly elevated at 2.  Dr. Chang and the renal team recommended immediate hospitalization for possible ANCA vasculitis flare unresponsive to rituximab and prednisone.  Patient was hospitalized for steroid pulse and received Cytoxan 500 mg/m  on January 31.  She tolerated medications well and was discharged in early February.    Patient was seen in the emergency room on February 21, 2021 for right foot pain.  Impression was of probable plantar fasciitis; symptomatic Rx was recommended.  Pregnancy test was negative.  Left flank pain was judged mild; urinalysis showed mild hematuria, and patient was discharged with plans to monitor.    She tapered prednisone from 50 to 40 5 days ago. She will taper by 10 mg each week per Renal.      Interval history 01-:    She had more bumps on her feet,a nd her joints started to ache when she went down to 40 mg prednisone after a few days.    She notes tremors and shakinesss in her  "hands, and her legs are swollen on prednisone.    Skin on her legs has cleared up. \"bumps\" on her elbows persist, no larger.    She is breathless with minimal exertion, like climbing stairs. She notes hot flashes, she sometiems has sweats as well, sudden onset after being cold.    She continues to cough; there is frequently flecks of blood, usually dark.    Her nose is dry despite use of a humidifier. She also has had a bloody nose daily for the last 10 days.            Review of Systems:       Constitutional: negative  Skin: negative  Eyes: negative  Ears/Nose/Throat: negative  Respiratory: No shortness of breath, dyspnea on exertion, cough, or hemoptysis  Cardiovascular: negative  Gastrointestinal: negative  Genitourinary: negative  Musculoskeletal: negative  Neurologic: negative  Psychiatric: negative  Hematologic/Lymphatic/Immunologic: negative  Endocrine: negative          Past Medical History:     Past Medical History:   Diagnosis Date    ADHD (attention deficit hyperactivity disorder)     DVT, lower extremity, distal (H)     Dysmenorrhea     Femoral artery thrombosis, left (H) 2021    Multiple subsegmental pulmonary emboli without acute cor pulmonale (H) 2021    PFO (patent foramen ovale)     Preeclampsia, third trimester     Spontaneous pregnancy loss     31 weeks    Stage 3b chronic kidney disease (H)     Wegener's disease, pulmonary 2010    renal biopsy     Past Surgical History:   Procedure Laterality Date     SECTION N/A 2023    Procedure:  section;  Surgeon: Wendy Vera MD;  Location: [facility] L[facility]    ENT SURGERY  2000    cyst    EXCISE GANGLION WRIST  2009    IR LOWER EXTREMITY ANGIOGRAM LEFT  3/21/2021    IR THROMBOLYSIS ART/VENOUS INFUSION SUBSQ DAY  3/21/2021    IR THROMBOLYSIS ART/VENOUS INFUSION SUBSQ DAY  3/21/2021    STERNOTOMY  2023    right atrial thrombectomy    THROMBECTOMY ATRIUM Right 2023    THROMBECTOMY PERCUTANEOUS N/A " 2/18/2023    Procedure: Emergency Sternotomy, Right Atrial Thrombectomy, Central Cannulation, Bilateral Femoral Cannulation, Closure of PFO ; Transesophageal Echocardiogram performed by anesthesia staff;  Surgeon: Adelfo Elmore MD;  Location: [facility] OR    THROMBECTOMY PERCUTANEOUS N/A 2/18/2023    Procedure: Angiovac Mediated for Right Atrial Clot; Intracatheter Thrombectomy via bilateral percutaneous femoral venous access with 26 FR Right Femoral vein and 17 FR Left Femoral Vein cannulas. Transesophageal echchocardiogram performed by anesthesia staff;  Surgeon: Po Monterroso MD;  Location: [facility] OR     # ANCA vasculitis:  GPA flare, treated with 1 mg/kg/day prednisone, and 4 doses of rituximab 375 mg/m  completed in late January 2021. Followup 3-day pulse methylprednisolone and Cytoxan 500 mg/m2 were given on 1- in response to rising creatinine and active urinary sediment. Creatinine stable at ~ 2 as of February 23, 2021.         Social History:     Social History     Occupational History    Not on file   Tobacco Use    Smoking status: Former     Types: Vaping Device    Smokeless tobacco: Former    Tobacco comments:     Vaping stopped 2/26   Vaping Use    Vaping status: Never Used   Substance and Sexual Activity    Alcohol use: Not Currently    Drug use: No    Sexual activity: Yes     Partners: Male     Birth control/protection: None            Family History:     Family History   Problem Relation Age of Onset    Thyroid Disease Mother     Cancer Maternal Grandfather     Asthma No family hx of     Diabetes No family hx of     Anesthesia Reaction No family hx of     Venous thrombosis No family hx of             Allergies:     Allergies   Allergen Reactions    Heparin Other (See Comments)     Reaction: HIT    Penicillins Rash     Unknown, but think rash.    Tolerated cephalexin (12/27/20), cefpodoxime (12/27/20), Ceftriaxone (Feb 2023), Zosyn (Feb 2023)    Patient states she has taken  Amoxicillin many times before without issue.      Unknown, but think rash. Tolerated cephalexin (12/27/20), cefpodoxime (12/27/20), Ceftriaxone (Feb 2023), Zosyn (Feb 2023)            Medications:     Current Outpatient Medications   Medication Sig Dispense Refill    acetaminophen (TYLENOL) 500 MG tablet Take 500-1,000 mg by mouth every 6 hours as needed for mild pain.      apixaban ANTICOAGULANT (ELIQUIS) 2.5 MG tablet Take 1 tablet (2.5 mg) by mouth 2 times daily. 180 tablet 3            Physical Exam:   Last menstrual period 12/11/2024, not currently breastfeeding.  Wt Readings from Last 4 Encounters:   01/16/25 102.5 kg (226 lb)   01/09/25 102.5 kg (226 lb)   12/30/24 103 kg (227 lb)   06/24/24 105.2 kg (232 lb)       Constitutional: well-developed, appearing stated age; cooperative  Eyes: nl EOM, PERRLA, vision, conjunctiva, sclera  ENT: nl external ears, nose, hearing, lips, teeth, gums, throat  No mucous membrane lesions, normal saliva pool  Neck: no mass or thyroid enlargement  Resp: lungs clear to auscultation, nl to palpation  CV: RRR, no murmurs, rubs or gallops, no edema  MS: The TMJ, neck, shoulder, elbow, wrist, MCP/PIP/DIP, spine, hip, knee were examined and found normal. No active synovitis or altered joint anatomy. Full joint ROM. Normal  strength. No dactylitis,  tenosynovitis, enthesopathy.  Skin: No evidence of former rash over thighs legs elbows or hands.  Neuro: nl cranial nerves  Psych: nl judgement, orientation, memory, affect.         Data:     No results found for any visits on 01/23/25.      Latest Ref Rng & Units 12/31/2024     6:10 AM 12/31/2024    10:53 AM 1/9/2025     8:38 AM   RHEUM RESULTS   Hematocrit 35.0 - 47.0 % 36.4  35.5  38.8    Hemoglobin 11.7 - 15.7 g/dL 11.4  11.2     11.2  12.0    WBC 4.0 - 11.0 10e3/uL 4.6  4.3  5.9    RBC Count 3.80 - 5.20 10e6/uL 4.70  4.62  4.89    RDW 10.0 - 15.0 % 15.9  15.8  15.3    MCHC 31.5 - 36.5 g/dL 31.3  31.5  30.9    MCV 78 - 100 fL 77   77  79    Platelet Count 150 - 450 10e3/uL 255  257  391         ,  ,  ,  ,  ,  ,   CESARIO interpretation   Date Value Ref Range Status   12/28/2020 Negative NEG^Negative Final     Comment:                                        Reference range:  <1:40  NEGATIVE  1:40 - 1:80  BORDERLINE POSITIVE  >1:80 POSITIVE       ,  ,  ,  ,  ,  ,  ,  ,  ,   Hep B Surface Agn   Date Value Ref Range Status   12/28/2020 Nonreactive NR^Nonreactive Final   ,  ,  ,  ,  ,  ,  ,  ,  ,  ,  ,   Neutrophil Cytoplasmic IgG Antibody   Date Value Ref Range Status   02/22/2011 1:160  Final     Comment:     Reference range: <1:20  (Note)  Cytoplasmic ANCA (c-ANCA) staining pattern observed. No  perinuclear ANCA (p-ANCA) pattern seen. Presence of p-ANCA  ruled out on formalin-fixed neutrophils.  TEST INFORMATION: Anti-Neutrophil Cyto Ab, IgG    Neutrophil Cytoplasmic Antibodies (C-ANCA = granular  cytoplasmic staining, P-ANCA = perinuclear staining) are  found in the serum of over 90 percent of patients with  certain necrotizing systemic vasculitides, and usually in  less than 5 percent of patients with collagen vascular  disease or arthritis.  Performed by [facility],  [address] [place] [id number] [phone]  [email or web address], Leonie Damon MD, Lab. Director                                                                           ,   Proteinase 3 Antibody IgG   Date Value Ref Range Status   12/28/2020 >8.0 (H) 0.0 - 0.9 AI Final     Comment:     Positive  Antibody index (AI) values reflect qualitative changes in antibody   concentration that cannot be directly associated with clinical condition or   disease state.       ,   Myeloperoxidase Antibody IgG   Date Value Ref Range Status   12/28/2020 <0.2 0.0 - 0.9 AI Final     Comment:     Negative  Antibody index (AI) values reflect qualitative changes in antibody   concentration that cannot be directly associated with clinical condition or   disease state.       ,   Neutrophil  Cytoplasmic Antibody   Date Value Ref Range Status   12/28/2020 1:80 (A) <1:10 [titer] Final   ,   Neutrophil Cytoplasmic Antibody Pattern   Date Value Ref Range Status   12/28/2020   Final    Cytoplasmic ANCA (c-ANCA) staining pattern observed and confirmed on formalin-fixed   neutrophils.       ,   Serine Protease 3   Date Value Ref Range Status   02/22/2011 1684 (H)  Final     Comment:     Reference range: 0 to 19  Unit: AU/mL  (Note)  REFERENCE INTERVAL: Serine Protease 3, IgG     19 AU/mL or Less ........ Negative   20-25 AU/mL ............. Equivocal   26 AU/mL or Greater ..... Positive    Approximately 90% of patients with a P-ANCA pattern by IFA  have antibodies specific for MPO.    Approximately 85% of patients with a C-ANCA pattern by IFA  have antibodies specific for PR3.  Performed by [facility],  [address] [place] [id number] [phone]  [email or web address], Leonie Damon MD, Lab. Director                                                                                                                                                                                                                                    ,   Myeloperox Antibodyys   Date Value Ref Range Status   02/22/2011 0  Final     Comment:     Reference range: 0 to 19  Unit: AU/mL  (Note)  REFERENCE INTERVAL: Myeloperoxidase Abs, IgG     19 AU/mL or Less ......... Negative   20-25 AU/mL .............. Equivocal   26 AU/mL or Greater ...... Positive    Approximately 90% of patients with a P-ANCA pattern by IFA  have antibodies specific for MPO.    Approximately 85% of patients with a C-ANCA pattern by IFA  have antibodies specific for PR3.                                                                                                        ,  ,  ,  ,  ,  ,  ,  ,  ,  ,  ,  ,      Rheumatology Clinic Visit     Cande Shields MRN# [id number]   YOB: 2000 Age: 24 year old     Date of Visit: 01/23/2025  Primary care  provider: Trudy Faye          Assessment and Plan:     # Granulomatous polyangiitis with glomerulonephritis, upper respiratory, musculoskeletal, and cutaneous involvement;   Constitutional symptoms, skin rash on the legs and elbows, shortness of breath, and frequency and severity of bloody nasal discharge have greatly improved since 1 month ago. Physical exam today is remarkable for 10 lb wt gain since 12-22, otherwise normal vitals; normal nose and throat exam; no edema and pulmonary, cutaneous, and musculoskeletal exams are all unremarkable.    Patient has now completed induction therapy for GPA flare, started in January 2023 with steroid burst/taper and 3 doses of rituximab 375 mg/m  last on 1-. She last had Cytoxan 500 mg/m2 on 1- in response to rising creatinine and active urinary sediment.    Data: On January 19, 2023, basic metabolic panel revealed creatinine of 1.63 with a GFR of 45, decreased from 53 noted in January 17.  Phosphorus was elevated at 4.7.  Hemoglobin was 11.3 with normal MCV.  Urinalysis showed 100+ protein, 5 red cells per high-powered field.  Total urine protein was 1.11 g/g of creatinine.  AL-3 antibodies were positive at 12 units.  Absolute CD19 counts on January 17 were less than 1.  Repeat antinuclear antibody in December 2022 was negative.  Chest x-ray was negative.    Discussion: GPA flare with rising creatinine and rash, associated with COVID-19 infection in 2022 is now much better controlled with minimal symptoms.  Patient had completed 3 out of planned 4 weekly doses of rituximab treatment.  I agree with withholding further Rituxan given the discovery of first trimester pregnancy in the past week.  I agree with continuing prednisone (12.5 mg current dose) with taper under direction of Dr. Dozier.    Patient describes intention to carry pregnancy to term.  During pregnancy, I recommend collaboration with high risk maternal-fetal medicine service, and monitoring  for worsening renal function and recurrence of GPA with creatinine, CBC with platelets, inflammatory markers monthly.  I recommend continued use of prenatal vitamins, avoidance of nonsteroidal anti-inflammatory medications, smoking.  Immunomodulatory medications that might be considered during pregnancy include corticosteroids and azathioprine; rituximab and Cytoxan should not be used.    Recommendation:  1.  Continue prednisone 12 mg daily, follow recommendations from nephrology/vasculitis team regarding prednisone taper.  2.  Urinalysis, spot urine protein in the next week  3.  Follow through with OB entry telephone call and follow-up for fetal monitoring for high risk pregnancy.  4.  Monitor blood pressure weekly at home; report change in upper or lower blood pressure number by 20 units to vasculitis service or rheumatology  5.  Continue off cigarettes, but use prenatal vitamin 1 daily    RTC 6 weeks    Ramses Call MD  [facility]              Active Problem List:     Patient Active Problem List    Diagnosis Date Noted    Hemoptysis 2024     Priority: Medium    S/P primary low transverse  2023     Priority: Medium    Fetal growth restriction antepartum 2023     Priority: Medium    Anemia 2023     Priority: Medium    High-risk pregnancy, unspecified trimester 2023     Priority: Medium    History of HELLP syndrome, currently pregnant 2023     Priority: Medium    Cardiac disease during pregnancy 2023     Priority: Medium     Cardiac diagnosis: Polyangiitis granulomatosis, history of right atrial thrombectomy with sternotomy, PFO closure, history of DVT/PE, CKD (Cr 1.3)    Modified WHO Class: III // NYHA Class: II    Last Echo  23: LV size, function nl. EF 55-60%. RV size function nl.    Medications: Lovenox 80 BID (anti-Xa  = 1.13), aspirin, metoprolol, azathioprine  Comorbidities: FGR, bicornuate uterus, subchorionic hematoma,  history of preeclampsia and IUFD at 31 weeks, granulomatosis polyangiitis, hx DVT/PE, CKD stage III (Cr 1.61 on )    Antepartum plan:  [] Fetal echocardiogram   [] Anti Xa levels   [] Follow up with cardiology with echo  [] Follow up with nephrology  and rheumatology  and hematology   [] Discussion at Cardio-OB conference: , 5/3, , , 80    Delivery Plan:  - Timing and location of delivery: [facility], timing pending comorbidities  - Mode of delivery: Likely   - Anesthesia plan: Consult , plan for anticoagulation per hematology  - Monitoring:   - IV access/lines:  - SBE prophylaxis:     Postpartum Recommendations:  -ppBCM:    Providers:  Primary OB: DOLORESM   Cardiologist: Dr. Oneal       Other pulmonary embolism without acute cor pulmonale, unspecified chronicity (H) 2023     Priority: Medium    Chest pain, unspecified type 2023     Priority: Medium    Pneumonia due to infectious organism, unspecified laterality, unspecified part of lung 2023     Priority: Medium    ATN (acute tubular necrosis) 2022     Priority: Medium    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) 2022     Priority: Medium    Infection due to 2019 novel coronavirus 2022     Priority: Medium    Acute deep vein thrombosis (DVT) of proximal vein of both lower extremities (H) 10/30/2021     Priority: Medium    BMI 40.0-44.9, adult (H) 10/30/2021     Priority: Medium    Femoral artery thrombosis, left (H) 10/30/2021     Priority: Medium    Thrombosis 2021     Priority: Medium    Occlusion of common femoral artery 2021     Priority: Medium    Stage 3 chronic kidney disease, unspecified whether stage 3a or 3b CKD (H) 2021     Priority: Medium    Acute saddle pulmonary embolism with acute cor pulmonale (H) 2021     Priority: Medium    Need for pneumocystis prophylaxis 2021     Priority: Medium    Pulmonary infiltrates 2021     Priority:  "Medium    Acute kidney injury 01/28/2021     Priority: Medium    Personal history of COVID-19 01/10/2021     Priority: Medium    Pyoderma gangrenosum (H) 01/10/2021     Priority: Medium    ANCA-associated vasculitis (H) 12/31/2020     Priority: Medium    Myalgia 12/27/2020     Priority: Medium    Granulomatosis with polyangiitis, unspecified whether renal involvement (H) 12/27/2020     Priority: Medium    Bicornuate uterus 10/28/2020     Priority: Medium    Fetal demise, greater than 22 weeks, antepartum 10/28/2020     Priority: Medium     10/20 30w6d presented to [facility] with right sided chest pain, BP in severe range, No fetal heart tones auscultated. Induced, NSVB, \"Yasmin\" 2lbs      Alcohol use disorder, mild, in sustained remission 02/11/2015     Priority: Medium     Formatting of this note might be different from the original.  sober since 01/2014.      Binge-eating disorder, moderate 02/11/2015     Priority: Medium    Cannabis use disorder, mild, in early remission 02/11/2015     Priority: Medium     Formatting of this note might be different from the original.  sober since 06/2014.      Depression 02/11/2015     Priority: Medium    ADHD (attention deficit hyperactivity disorder), inattentive type 07/09/2014     Priority: Medium    Generalized anxiety disorder 07/09/2014     Priority: Medium    Performance anxiety 07/09/2014     Priority: Medium    Self-injurious behavior 07/09/2014     Priority: Medium    History of granulomatosis with polyangiitis 02/15/2012     Priority: Medium    Wegener's granulomatosis 10/04/2011     Priority: Medium     Replacing diagnoses that were inactivated after the 10/1/2021 regulatory import.              History of Present Illness:   Cande Shields presents for followup vasculitis. Last seen 2-.     Background: Granulomatosis with polyangitis dx at age 10, with PR3 positivitiy, initially treated with Methotrexate, Steroids, Rituximab infusions Q6-8 months until " about 2012, since then had been in remission.    On December 26 2020, she was admitted to [facility] for for myalgias, arthralgias, pedal edema and a petechial non blanching palpable purpura rash involving her thighs, legs, elbows, chin and neck (vasculitis). She had proteinuria on UA, lung nodule on CXR, elevated inflammatory markers.  Although COVID-19 infection was also present, a flare of granulomatous polyangiitis was diagnosed.  The patient was started on Solu-Medrol 1 mg/kg/day.  She was also begun on induction protocol with rituximab 375 mg/m  IV 4 doses, given on a weekly schedule.  She improved, and was discharged on December 30, 2020 on high-dose prednisone. Last seen in 2-2021, when she was improving after hospitalization.    Interval history February 9, 2023    Since last visit in 20221:  Patient saw Dr. Chang in nephrology in April 2021 in follow-up of NC-3+ ANCA vasculitis.  Creatinine was noted to be improving and patient was feeling well several weeks after a single course of intravenous cyclophosphamide.  Plan was to continue prednisone through the month of April, and then start tapering to off.  Plan was to consider maintenance Rituxan in June or July 2021. She was lost to Renal and Rheumatology followup.    She was next admitted between 12/20-12/21 of 2022. She came in with positive home COVID test and  petechial rashes that were similar to GPA previous flare and some phlegm with blood tinge her PR3 was elevated at 27, her Cr was 2.05 but after IV fluid and 1 dose of methlyprednisolone 125 mg Cr down to 1.48 the next day. She received tapering dose of methylprednisone IV and oral, as well as avacopan. She received Rituximab 375 mg/m2 1st dose on 1/10/23 and 2nd dose on 1/17/23, 1-.    Patient saw Dr. Dozier in nephrology on January 19, 2023.  Impression was of ANCA vasculitis flare, partially completed induction treatment with rituximab, and concerning decrease in GFR compared  to 1 month earlier.  Nevertheless, GPA flare was considered to be under improving control.  A plan for tapering Medrol to 8 mg/day as of February 8 was given, and a plan for a total of 4 weekly doses of rituximab was discussed.    Today, patient reports that she is 4 weeks pregnant, testing positive just 4 days ago. She will meet with OB/Gyn tomorrow.  She feels well overall; no joint pain, skin rash, shortness of breath, fever. She quit smoking 2 weeks ago; she still has a cough (dry, chronic). She has not had recurrence of blood-tinged phlegm. No nosebleeds or facial pain/sinus drainage.    She has continued avacopan, but stopped on learning that she is pregnant, per vasculitis service. She is taking prenatal vitamins.  prednisone 12 mg daily has continued. She missed a 4th rituxan dose scheduled for 2-3-2023.  She still has low back pain, every day. Pain is constant, but modest.  She works full time at a day care.    No recurrence of COVID symptoms that she had in .     Interval history 02-:    She is feeling well. Good energy level  Doing cardio, situps, jumping jacks at home.  She notes R foot pain for the past several weeks; made worse with high impact weight bearing. Pain is in the foot arch and in the pad.    After the last visit in January, creatinine returned unexpectedly elevated at 2.  Dr. Chang and the renal team recommended immediate hospitalization for possible ANCA vasculitis flare unresponsive to rituximab and prednisone.  Patient was hospitalized for steroid pulse and received Cytoxan 500 mg/m  on January 31.  She tolerated medications well and was discharged in early February.    Patient was seen in the emergency room on February 21, 2021 for right foot pain.  Impression was of probable plantar fasciitis; symptomatic Rx was recommended.  Pregnancy test was negative.  Left flank pain was judged mild; urinalysis showed mild hematuria, and patient was discharged with plans to  "monitor.    She tapered prednisone from 50 to 40 5 days ago. She will taper by 10 mg each week per Renal.      Interval history 2021:    She had more bumps on her feet,a nd her joints started to ache when she went down to 40 mg prednisone after a few days.    She notes tremors and shakinesss in her hands, and her legs are swollen on prednisone.    Skin on her legs has cleared up. \"bumps\" on her elbows persist, no larger.    She is breathless with minimal exertion, like climbing stairs. She notes hot flashes, she sometiems has sweats as well, sudden onset after being cold.    She continues to cough; there is frequently flecks of blood, usually dark.    Her nose is dry despite use of a humidifier. She also has had a bloody nose daily for the last 10 days.            Review of Systems:       Constitutional: negative  Skin: negative  Eyes: negative  Ears/Nose/Throat: negative  Respiratory: No shortness of breath, dyspnea on exertion, cough, or hemoptysis  Cardiovascular: negative  Gastrointestinal: negative  Genitourinary: negative  Musculoskeletal: negative  Neurologic: negative  Psychiatric: negative  Hematologic/Lymphatic/Immunologic: negative  Endocrine: negative          Past Medical History:     Past Medical History:   Diagnosis Date    ADHD (attention deficit hyperactivity disorder)     DVT, lower extremity, distal (H)     Dysmenorrhea     Femoral artery thrombosis, left (H) 2021    Multiple subsegmental pulmonary emboli without acute cor pulmonale (H) 2021    PFO (patent foramen ovale)     Preeclampsia, third trimester     Spontaneous pregnancy loss 2020    31 weeks    Stage 3b chronic kidney disease (H)     Wegener's disease, pulmonary 2010    renal biopsy     Past Surgical History:   Procedure Laterality Date     SECTION N/A 2023    Procedure:  section;  Surgeon: Wendy Vera MD;  Location: [facility] L[facility]    ENT SURGERY  2000    cyst    EXCISE GANGLION WRIST  " 01/01/2009    IR LOWER EXTREMITY ANGIOGRAM LEFT  3/21/2021    IR THROMBOLYSIS ART/VENOUS INFUSION SUBSQ DAY  3/21/2021    IR THROMBOLYSIS ART/VENOUS INFUSION SUBSQ DAY  3/21/2021    STERNOTOMY  02/18/2023    right atrial thrombectomy    THROMBECTOMY ATRIUM Right 02/18/2023    THROMBECTOMY PERCUTANEOUS N/A 2/18/2023    Procedure: Emergency Sternotomy, Right Atrial Thrombectomy, Central Cannulation, Bilateral Femoral Cannulation, Closure of PFO ; Transesophageal Echocardiogram performed by anesthesia staff;  Surgeon: Adelfo Elmore MD;  Location: [facility] OR    THROMBECTOMY PERCUTANEOUS N/A 2/18/2023    Procedure: Angiovac Mediated for Right Atrial Clot; Intracatheter Thrombectomy via bilateral percutaneous femoral venous access with 26 FR Right Femoral vein and 17 FR Left Femoral Vein cannulas. Transesophageal echchocardiogram performed by anesthesia staff;  Surgeon: Po Monterroso MD;  Location: [facility] OR     # ANCA vasculitis:  GPA flare, treated with 1 mg/kg/day prednisone, and 4 doses of rituximab 375 mg/m  completed in late January 2021. Followup 3-day pulse methylprednisolone and Cytoxan 500 mg/m2 were given on 1- in response to rising creatinine and active urinary sediment. Creatinine stable at ~ 2 as of February 23, 2021.         Social History:     Social History     Occupational History    Not on file   Tobacco Use    Smoking status: Former     Types: Vaping Device    Smokeless tobacco: Former    Tobacco comments:     Vaping stopped 2/26   Vaping Use    Vaping status: Never Used   Substance and Sexual Activity    Alcohol use: Not Currently    Drug use: No    Sexual activity: Yes     Partners: Male     Birth control/protection: None            Family History:     Family History   Problem Relation Age of Onset    Thyroid Disease Mother     Cancer Maternal Grandfather     Asthma No family hx of     Diabetes No family hx of     Anesthesia Reaction No family hx of     Venous thrombosis No  family hx of             Allergies:     Allergies   Allergen Reactions    Heparin Other (See Comments)     Reaction: HIT    Penicillins Rash     Unknown, but think rash.    Tolerated cephalexin (12/27/20), cefpodoxime (12/27/20), Ceftriaxone (Feb 2023), Zosyn (Feb 2023)    Patient states she has taken Amoxicillin many times before without issue.      Unknown, but think rash. Tolerated cephalexin (12/27/20), cefpodoxime (12/27/20), Ceftriaxone (Feb 2023), Zosyn (Feb 2023)            Medications:     Current Outpatient Medications   Medication Sig Dispense Refill    acetaminophen (TYLENOL) 500 MG tablet Take 500-1,000 mg by mouth every 6 hours as needed for mild pain.      apixaban ANTICOAGULANT (ELIQUIS) 2.5 MG tablet Take 1 tablet (2.5 mg) by mouth 2 times daily. 180 tablet 3            Physical Exam:   Last menstrual period 12/11/2024, not currently breastfeeding.  Wt Readings from Last 4 Encounters:   01/16/25 102.5 kg (226 lb)   01/09/25 102.5 kg (226 lb)   12/30/24 103 kg (227 lb)   06/24/24 105.2 kg (232 lb)     Repeat BP 94/65 r arm    Constitutional: well-developed, appearing stated age; cooperative  Eyes: nl EOM, PERRLA, vision, conjunctiva, sclera  ENT: nl external ears, nose, hearing, lips, teeth, gums, throat  No mucous membrane lesions, normal saliva pool  Neck: no mass or thyroid enlargement  Resp: lungs clear to auscultation  CV: RRR, no murmurs, rubs or gallops, no edema  MS: The TMJ, neck, shoulder, elbow, wrist, MCP/PIP/DIP, spine, hip, knee were examined and found normal. No active synovitis or altered joint anatomy. Full joint ROM. Normal  strength. No dactylitis,  tenosynovitis, enthesopathy.  Skin: Hematoma over abdominal skin.  Neuro: nl cranial nerves  Psych: nl judgement, orientation, memory, affect.         Data:     No results found for any visits on 01/23/25.      Latest Ref Rng & Units 12/31/2024     6:10 AM 12/31/2024    10:53 AM 1/9/2025     8:38 AM   RHEUM RESULTS   Hematocrit 35.0  - 47.0 % 36.4  35.5  38.8    Hemoglobin 11.7 - 15.7 g/dL 11.4  11.2     11.2  12.0    WBC 4.0 - 11.0 10e3/uL 4.6  4.3  5.9    RBC Count 3.80 - 5.20 10e6/uL 4.70  4.62  4.89    RDW 10.0 - 15.0 % 15.9  15.8  15.3    MCHC 31.5 - 36.5 g/dL 31.3  31.5  30.9    MCV 78 - 100 fL 77  77  79    Platelet Count 150 - 450 10e3/uL 255  257  391         ,  ,  ,  ,  ,  ,   CESARIO interpretation   Date Value Ref Range Status   12/28/2020 Negative NEG^Negative Final     Comment:                                        Reference range:  <1:40  NEGATIVE  1:40 - 1:80  BORDERLINE POSITIVE  >1:80 POSITIVE       ,  ,  ,  ,  ,  ,  ,  ,  ,   Hep B Surface Agn   Date Value Ref Range Status   12/28/2020 Nonreactive NR^Nonreactive Final   ,  ,  ,  ,  ,  ,  ,  ,  ,  ,  ,   Neutrophil Cytoplasmic IgG Antibody   Date Value Ref Range Status   02/22/2011 1:160  Final     Comment:     Reference range: <1:20  (Note)  Cytoplasmic ANCA (c-ANCA) staining pattern observed. No  perinuclear ANCA (p-ANCA) pattern seen. Presence of p-ANCA  ruled out on formalin-fixed neutrophils.  TEST INFORMATION: Anti-Neutrophil Cyto Ab, IgG    Neutrophil Cytoplasmic Antibodies (C-ANCA = granular  cytoplasmic staining, P-ANCA = perinuclear staining) are  found in the serum of over 90 percent of patients with  certain necrotizing systemic vasculitides, and usually in  less than 5 percent of patients with collagen vascular  disease or arthritis.  Performed by [facility],  [address] [place] [id number] [phone]  [email or web address], Leonie Damon MD, Lab. Director                                                                           ,   Proteinase 3 Antibody IgG   Date Value Ref Range Status   12/28/2020 >8.0 (H) 0.0 - 0.9 AI Final     Comment:     Positive  Antibody index (AI) values reflect qualitative changes in antibody   concentration that cannot be directly associated with clinical condition or   disease state.       ,   Myeloperoxidase Antibody IgG   Date  Value Ref Range Status   12/28/2020 <0.2 0.0 - 0.9 AI Final     Comment:     Negative  Antibody index (AI) values reflect qualitative changes in antibody   concentration that cannot be directly associated with clinical condition or   disease state.       ,   Neutrophil Cytoplasmic Antibody   Date Value Ref Range Status   12/28/2020 1:80 (A) <1:10 [titer] Final   ,   Neutrophil Cytoplasmic Antibody Pattern   Date Value Ref Range Status   12/28/2020   Final    Cytoplasmic ANCA (c-ANCA) staining pattern observed and confirmed on formalin-fixed   neutrophils.       ,   Serine Protease 3   Date Value Ref Range Status   02/22/2011 1684 (H)  Final     Comment:     Reference range: 0 to 19  Unit: AU/mL  (Note)  REFERENCE INTERVAL: Serine Protease 3, IgG     19 AU/mL or Less ........ Negative   20-25 AU/mL ............. Equivocal   26 AU/mL or Greater ..... Positive    Approximately 90% of patients with a P-ANCA pattern by IFA  have antibodies specific for MPO.    Approximately 85% of patients with a C-ANCA pattern by IFA  have antibodies specific for PR3.  Performed by [facility],  [address] [place] [id number] [phone]  [email or web address], Leonie Damon MD, Lab. Director                                                                                                                                                                                                                                    ,   Myeloperox Antibodyys   Date Value Ref Range Status   02/22/2011 0  Final     Comment:     Reference range: 0 to 19  Unit: AU/mL  (Note)  REFERENCE INTERVAL: Myeloperoxidase Abs, IgG     19 AU/mL or Less ......... Negative   20-25 AU/mL .............. Equivocal   26 AU/mL or Greater ...... Positive    Approximately 90% of patients with a P-ANCA pattern by IFA  have antibodies specific for MPO.    Approximately 85% of patients with a C-ANCA pattern by IFA  have antibodies specific for PR3.                                                                                                         ,  ,  ,  ,  ,  ,  ,  ,  ,  ,  ,  ,

## 2025-01-24 NOTE — PATIENT INSTRUCTIONS
Diagnosis:  1.  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 Now doing well off all prednisone with stable kidney filtering function as of April 4, 2023. I recommend checking urinalysis and urine protein, and monitoring for recurrence of symptoms (bloody sputum production, fever, skin rash, rising blood pressure).  2. Blood clot in the lungs, February 2023  3. Post-surgical chest pain  4. Chronic kidney disease, stable    Recommendation:  1.  Continue Eliquis  2. Follow up with Nephrology (Dr. Goodson), Hematology (Dr. Dye and Zander), ENT as scheduled; reestablish primary care  3.  Receive rituximab 500 mg once every 6 months to maintain remission of ANCA vasculitis  4. Monitor urinalysis, blood counts, inflammation, metabolic panel every 6 months.  5.  If scalp or facial lump/rash persist, follow-up with primary care.  Dermatology consultation may be in order.  6.  Monitor blood pressure at home; review results of monitoring with primary care Hollie Call good what time unfortunately I am a in the clinic tomorrow at that time so I cannot do it thanks for thinking of me thank you alright thanks

## 2025-01-25 LAB
ANCA AB PATTERN SER IF-IMP: ABNORMAL
C-ANCA TITR SER IF: ABNORMAL {TITER}

## 2025-01-27 ENCOUNTER — INFUSION THERAPY VISIT (OUTPATIENT)
Dept: INFUSION THERAPY | Facility: CLINIC | Age: 25
End: 2025-01-27
Attending: INTERNAL MEDICINE
Payer: COMMERCIAL

## 2025-01-27 VITALS
TEMPERATURE: 97.7 F | BODY MASS INDEX: 39.07 KG/M2 | WEIGHT: 227.6 LBS | HEART RATE: 96 BPM | SYSTOLIC BLOOD PRESSURE: 125 MMHG | OXYGEN SATURATION: 99 % | RESPIRATION RATE: 16 BRPM | DIASTOLIC BLOOD PRESSURE: 81 MMHG

## 2025-01-27 DIAGNOSIS — M31.31 GRANULOMATOSIS WITH POLYANGIITIS WITH RENAL INVOLVEMENT (H): Primary | ICD-10-CM

## 2025-01-27 LAB
ALBUMIN SERPL BCG-MCNC: 4.1 G/DL (ref 3.5–5.2)
ANION GAP SERPL CALCULATED.3IONS-SCNC: 16 MMOL/L (ref 7–15)
BASOPHILS # BLD AUTO: 0 10E3/UL (ref 0–0.2)
BASOPHILS NFR BLD AUTO: 1 %
BUN SERPL-MCNC: 22.5 MG/DL (ref 6–20)
CALCIUM SERPL-MCNC: 9.5 MG/DL (ref 8.8–10.4)
CHLORIDE SERPL-SCNC: 103 MMOL/L (ref 98–107)
CREAT SERPL-MCNC: 1.32 MG/DL (ref 0.51–0.95)
EGFRCR SERPLBLD CKD-EPI 2021: 57 ML/MIN/1.73M2
EOSINOPHIL # BLD AUTO: 0.3 10E3/UL (ref 0–0.7)
EOSINOPHIL NFR BLD AUTO: 4 %
ERYTHROCYTE [DISTWIDTH] IN BLOOD BY AUTOMATED COUNT: 15.3 % (ref 10–15)
GLUCOSE SERPL-MCNC: 81 MG/DL (ref 70–99)
HCO3 SERPL-SCNC: 20 MMOL/L (ref 22–29)
HCT VFR BLD AUTO: 37.7 % (ref 35–47)
HGB BLD-MCNC: 11.9 G/DL (ref 11.7–15.7)
IMM GRANULOCYTES # BLD: 0 10E3/UL
IMM GRANULOCYTES NFR BLD: 0 %
LYMPHOCYTES # BLD AUTO: 1.8 10E3/UL (ref 0.8–5.3)
LYMPHOCYTES NFR BLD AUTO: 24 %
MCH RBC QN AUTO: 24.8 PG (ref 26.5–33)
MCHC RBC AUTO-ENTMCNC: 31.6 G/DL (ref 31.5–36.5)
MCV RBC AUTO: 79 FL (ref 78–100)
MONOCYTES # BLD AUTO: 0.5 10E3/UL (ref 0–1.3)
MONOCYTES NFR BLD AUTO: 7 %
NEUTROPHILS # BLD AUTO: 4.8 10E3/UL (ref 1.6–8.3)
NEUTROPHILS NFR BLD AUTO: 65 %
NRBC # BLD AUTO: 0 10E3/UL
NRBC BLD AUTO-RTO: 0 /100
PHOSPHATE SERPL-MCNC: 3.6 MG/DL (ref 2.5–4.5)
PLATELET # BLD AUTO: 357 10E3/UL (ref 150–450)
POTASSIUM SERPL-SCNC: 4.7 MMOL/L (ref 3.4–5.3)
RBC # BLD AUTO: 4.8 10E6/UL (ref 3.8–5.2)
SODIUM SERPL-SCNC: 139 MMOL/L (ref 135–145)
WBC # BLD AUTO: 7.5 10E3/UL (ref 4–11)

## 2025-01-27 PROCEDURE — 250N000011 HC RX IP 250 OP 636: Performed by: INTERNAL MEDICINE

## 2025-01-27 PROCEDURE — 258N000003 HC RX IP 258 OP 636: Performed by: INTERNAL MEDICINE

## 2025-01-27 PROCEDURE — 96415 CHEMO IV INFUSION ADDL HR: CPT

## 2025-01-27 PROCEDURE — 84100 ASSAY OF PHOSPHORUS: CPT | Performed by: INTERNAL MEDICINE

## 2025-01-27 PROCEDURE — 96375 TX/PRO/DX INJ NEW DRUG ADDON: CPT

## 2025-01-27 PROCEDURE — 85004 AUTOMATED DIFF WBC COUNT: CPT | Performed by: INTERNAL MEDICINE

## 2025-01-27 PROCEDURE — 36415 COLL VENOUS BLD VENIPUNCTURE: CPT | Performed by: INTERNAL MEDICINE

## 2025-01-27 PROCEDURE — 86037 ANCA TITER EACH ANTIBODY: CPT | Performed by: INTERNAL MEDICINE

## 2025-01-27 PROCEDURE — 99207 PR NO CHARGE LOS: CPT

## 2025-01-27 PROCEDURE — 85049 AUTOMATED PLATELET COUNT: CPT | Performed by: INTERNAL MEDICINE

## 2025-01-27 PROCEDURE — 80051 ELECTROLYTE PANEL: CPT | Performed by: INTERNAL MEDICINE

## 2025-01-27 PROCEDURE — 86036 ANCA SCREEN EACH ANTIBODY: CPT | Performed by: INTERNAL MEDICINE

## 2025-01-27 PROCEDURE — 96413 CHEMO IV INFUSION 1 HR: CPT

## 2025-01-27 PROCEDURE — 250N000013 HC RX MED GY IP 250 OP 250 PS 637: Performed by: INTERNAL MEDICINE

## 2025-01-27 RX ORDER — ACETAMINOPHEN 325 MG/1
650 TABLET ORAL ONCE
Status: COMPLETED | OUTPATIENT
Start: 2025-01-27 | End: 2025-01-27

## 2025-01-27 RX ORDER — EPINEPHRINE 1 MG/ML
0.3 INJECTION, SOLUTION INTRAMUSCULAR; SUBCUTANEOUS EVERY 5 MIN PRN
OUTPATIENT
Start: 2025-02-03

## 2025-01-27 RX ORDER — DIPHENHYDRAMINE HCL 25 MG
50 CAPSULE ORAL ONCE
Start: 2025-02-03

## 2025-01-27 RX ORDER — DIPHENHYDRAMINE HYDROCHLORIDE 50 MG/ML
50 INJECTION INTRAMUSCULAR; INTRAVENOUS
Start: 2025-02-03

## 2025-01-27 RX ORDER — DIPHENHYDRAMINE HCL 25 MG
50 CAPSULE ORAL ONCE
Status: CANCELLED
Start: 2025-02-01

## 2025-01-27 RX ORDER — DIPHENHYDRAMINE HCL 25 MG
50 CAPSULE ORAL ONCE
Status: COMPLETED | OUTPATIENT
Start: 2025-01-27 | End: 2025-01-27

## 2025-01-27 RX ORDER — METHYLPREDNISOLONE SODIUM SUCCINATE 125 MG/2ML
125 INJECTION INTRAMUSCULAR; INTRAVENOUS
Start: 2025-02-03

## 2025-01-27 RX ORDER — ALBUTEROL SULFATE 0.83 MG/ML
2.5 SOLUTION RESPIRATORY (INHALATION)
OUTPATIENT
Start: 2025-02-03

## 2025-01-27 RX ORDER — DIPHENHYDRAMINE HYDROCHLORIDE 50 MG/ML
50 INJECTION INTRAMUSCULAR; INTRAVENOUS
Status: CANCELLED
Start: 2025-02-01

## 2025-01-27 RX ORDER — METHYLPREDNISOLONE SODIUM SUCCINATE 125 MG/2ML
80 INJECTION INTRAMUSCULAR; INTRAVENOUS ONCE
OUTPATIENT
Start: 2025-02-03

## 2025-01-27 RX ORDER — METHYLPREDNISOLONE SODIUM SUCCINATE 125 MG/2ML
80 INJECTION INTRAMUSCULAR; INTRAVENOUS ONCE
Status: CANCELLED | OUTPATIENT
Start: 2025-02-01

## 2025-01-27 RX ORDER — ALBUTEROL SULFATE 0.83 MG/ML
2.5 SOLUTION RESPIRATORY (INHALATION)
Status: CANCELLED | OUTPATIENT
Start: 2025-02-01

## 2025-01-27 RX ORDER — METHYLPREDNISOLONE SODIUM SUCCINATE 40 MG/ML
80 INJECTION INTRAMUSCULAR; INTRAVENOUS ONCE
Status: COMPLETED | OUTPATIENT
Start: 2025-01-27 | End: 2025-01-27

## 2025-01-27 RX ORDER — ACETAMINOPHEN 325 MG/1
650 TABLET ORAL ONCE
Status: CANCELLED
Start: 2025-02-01

## 2025-01-27 RX ORDER — METHYLPREDNISOLONE SODIUM SUCCINATE 125 MG/2ML
125 INJECTION INTRAMUSCULAR; INTRAVENOUS
Status: CANCELLED
Start: 2025-02-01

## 2025-01-27 RX ORDER — ALBUTEROL SULFATE 90 UG/1
1-2 INHALANT RESPIRATORY (INHALATION)
Status: CANCELLED
Start: 2025-02-01

## 2025-01-27 RX ORDER — ALBUTEROL SULFATE 90 UG/1
1-2 INHALANT RESPIRATORY (INHALATION)
Start: 2025-02-03

## 2025-01-27 RX ORDER — EPINEPHRINE 1 MG/ML
0.3 INJECTION, SOLUTION INTRAMUSCULAR; SUBCUTANEOUS EVERY 5 MIN PRN
Status: CANCELLED | OUTPATIENT
Start: 2025-02-01

## 2025-01-27 RX ORDER — ACETAMINOPHEN 325 MG/1
650 TABLET ORAL ONCE
Start: 2025-02-03

## 2025-01-27 RX ADMIN — DIPHENHYDRAMINE HYDROCHLORIDE 50 MG: 25 CAPSULE ORAL at 10:59

## 2025-01-27 RX ADMIN — METHYLPREDNISOLONE SODIUM SUCCINATE 80 MG: 40 INJECTION, POWDER, FOR SOLUTION INTRAMUSCULAR; INTRAVENOUS at 11:08

## 2025-01-27 RX ADMIN — RITUXIMAB-ABBS 500 MG: 10 INJECTION, SOLUTION INTRAVENOUS at 11:28

## 2025-01-27 RX ADMIN — ACETAMINOPHEN 650 MG: 325 TABLET ORAL at 11:00

## 2025-01-27 ASSESSMENT — PAIN SCALES - GENERAL: PAINLEVEL_OUTOF10: NO PAIN (0)

## 2025-01-27 NOTE — PROGRESS NOTES
Infusion Nursing Note:  Cande Shields presents today for Rapid Rituxan.    Patient seen by provider today: No   present during visit today: Not Applicable.    Note: Cande is feeling well at this point.      Was in ED at end of December, per Dr Goodson last note, ok for Rituxan today      Intravenous Access:  Peripheral IV placed.    Treatment Conditions:  Biological Infusion Checklist:  ~~~ NOTE: If the patient answers yes to any of the questions below, hold the infusion and contact ordering provider or on-call provider.    Have you recently had an elevated temperature, fever, chills, productive cough, coughing for 3 weeks or longer or hemoptysis,  abnormal vital signs, night sweats,  chest pain or have you noticed a decrease in your appetite, unexplained weight loss or fatigue? No  Do you have any open wounds or new incisions? No  Do you have any upcoming hospitalizations or surgeries? Does not include esophagogastroduodenoscopy, colonoscopy, endoscopic retrograde cholangiopancreatography (ERCP), endoscopic ultrasound (EUS), dental procedures or joint aspiration/steroid injections No  Do you currently have any signs of illness or infection or are you on any antibiotics? No  Have you had any new, sudden or worsening abdominal pain? No  Have you or anyone in your household received a live vaccination in the past 4 weeks? Please note: No live vaccines while on biologic/chemotherapy until 6 months after the last treatment. Patient can receive the flu vaccine (shot only), pneumovax and the Covid vaccine. It is optimal for the patient to get these vaccines mid cycle, but they can be given at any time as long as it is not on the day of the infusion. No  Have you recently been diagnosed with any new nervous system diseases (ie. Multiple sclerosis, Guillain Neskowin, seizures, neurological changes) or cancer diagnosis? Are you on any form of radiation or chemotherapy? No  Are you pregnant or breast feeding or do  you have plans of pregnancy in the future? No  Have you been having any signs of worsening depression or suicidal ideations?  (benlysta only) No  Have there been any other new onset medical symptoms? No  Have you had any new blood clots? (IVIG only) No      Post Infusion Assessment:  Patient tolerated infusion without incident.       Discharge Plan:   AVS to patient via MYCHART.  Patient will follow up with ordering provider Patient discharged in stable condition accompanied by: self.  Departure Mode: Ambulatory.      Salina Todd RN

## 2025-01-28 ENCOUNTER — TELEPHONE (OUTPATIENT)
Dept: RHEUMATOLOGY | Facility: CLINIC | Age: 25
End: 2025-01-28
Payer: COMMERCIAL

## 2025-01-28 LAB
ANCA AB PATTERN SER IF-IMP: ABNORMAL
C-ANCA TITR SER IF: ABNORMAL {TITER}

## 2025-01-28 NOTE — TELEPHONE ENCOUNTER
1st attempt: LVM for Cande to schedule 6 mos follow up with Dr. Call end of July towards beginning of August. Per provider ok for video visit as well. If both months is full, please schedule next availability. Thank you   [phone]    -- Rosy JUNG

## 2025-02-03 ENCOUNTER — VIRTUAL VISIT (OUTPATIENT)
Dept: FAMILY MEDICINE | Facility: CLINIC | Age: 25
End: 2025-02-03
Payer: COMMERCIAL

## 2025-02-03 ENCOUNTER — PATIENT OUTREACH (OUTPATIENT)
Dept: NEPHROLOGY | Facility: CLINIC | Age: 25
End: 2025-02-03

## 2025-02-03 DIAGNOSIS — J01.90 ACUTE SINUSITIS WITH SYMPTOMS > 10 DAYS: Primary | ICD-10-CM

## 2025-02-03 DIAGNOSIS — N18.30 STAGE 3 CHRONIC KIDNEY DISEASE, UNSPECIFIED WHETHER STAGE 3A OR 3B CKD (H): Chronic | ICD-10-CM

## 2025-02-03 DIAGNOSIS — M31.31 GRANULOMATOSIS WITH POLYANGIITIS WITH RENAL INVOLVEMENT (H): Primary | ICD-10-CM

## 2025-02-03 DIAGNOSIS — M54.50 LOW BACK PAIN: ICD-10-CM

## 2025-02-03 DIAGNOSIS — M31.31 GRANULOMATOSIS WITH POLYANGIITIS WITH RENAL INVOLVEMENT (H): ICD-10-CM

## 2025-02-03 DIAGNOSIS — I77.82 ANCA-ASSOCIATED VASCULITIS (H): ICD-10-CM

## 2025-02-03 PROCEDURE — 98006 SYNCH AUDIO-VIDEO EST MOD 30: CPT | Performed by: FAMILY MEDICINE

## 2025-02-03 RX ORDER — CEFUROXIME AXETIL 500 MG/1
500 TABLET ORAL 2 TIMES DAILY
Qty: 20 TABLET | Refills: 0 | Status: SHIPPED | OUTPATIENT
Start: 2025-02-03

## 2025-02-03 NOTE — PROGRESS NOTES
"Cande is a 24 year old who is being evaluated via a billable video visit.    How would you like to obtain your AVS? MyChart  If the video visit is dropped, the invitation should be resent by: Text to cell phone: [phone]  Will anyone else be joining your video visit? No      Assessment & Plan     Acute sinusitis with symptoms > 10 days  Has been worsening with sinus tenderness and postnasal drainage along with HA over past 2 weeks, she has immune suppressed status by underlying health condition mentioned here, will have her to start abx   - cefuroxime (CEFTIN) 500 MG tablet; Take 1 tablet (500 mg) by mouth 2 times daily.    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    Weight as of 1/27/25: 103.2 kg (227 lb 9.6 oz).   Weight management plan: Discussed healthy diet and exercise guidelines      FUTURE APPOINTMENTS:       - Follow-up visit as needed     Subjective   Cande is a 24 year old, presenting for the following health issues:  Sinus Problem        2/3/2025    10:57 AM   Additional Questions   Roomed by Queta NVAARRETE       Video Start Time:  11:30    Sinus Problem     History of Present Illness       Reason for visit:  Sinus infection  Symptom onset:  1-2 weeks ago  Symptoms include:  Nose pain, pressure in forehead, yellow/green/bloody mucus  Symptom intensity:  Moderate  Symptom progression:  Worsening  Had these symptoms before:  Yes  Has tried/received treatment for these symptoms:  Yes  Previous treatment was successful:  Yes   She is taking medications regularly.    Pt did have the flu over Christmas 2024 and now she keeps having sinus issues.  "     Acute Illness  Acute illness concerns: Sinus infections   Onset/Duration: About 2 weeks   Symptoms:  Fever: pt is not sure- states she feels warm but is not sure   Chills/Sweats: YES- a little bit   Headache (location?): YES- more on the front of head   Sinus Pressure: YES  Conjunctivitis:  No  Ear Pain: no  Rhinorrhea: YES  Congestion: YES  Sore Throat: YES  Cough: YES - a little bit, productive cough- dark green   Wheeze: No  Decreased Appetite: YES- slighty   Nausea: No  Vomiting: No  Diarrhea: No  Dysuria/Freq.: No  Dysuria or Hematuria: No  Fatigue/Achiness: YES  Sick/Strep Exposure: works at [employer] so yes, but not sure,   Therapies tried and outcome: Sudafed, Tylenol and Ibuprofen, they help a little bit.     Is having back pain now as well that started yesterday morning, is not sure if that related, or if its a kidney related since she does have a history of kidney disease.           Review of Systems  Constitutional, HEENT, cardiovascular, pulmonary, GI, , musculoskeletal, neuro, skin, endocrine and psych systems are negative, except as otherwise noted.      Objective           Vitals:  No vitals were obtained today due to virtual visit.    Physical Exam   GENERAL: alert and no distress  EYES: Eyes grossly normal to inspection.  No discharge or erythema, or obvious scleral/conjunctival abnormalities.  RESP: No audible wheeze, cough, or visible cyanosis.    SKIN: Visible skin clear. No significant rash, abnormal pigmentation or lesions.  NEURO: Cranial nerves grossly intact.  Mentation and speech appropriate for age.  PSYCH: Appropriate affect, tone, and pace of words          Video-Visit Details    Type of service:  Video Visit   Video End Time:12:12 PM  Originating Location (pt. Location): Home    Distant Location (provider location):  On-site  Platform used for Video Visit: Dany  Signed Electronically by: Juan Diego Givens MD

## 2025-02-03 NOTE — PROGRESS NOTES
Voicemail received from patient reporting that she thinks she has a sinus infection, which she is seening ENT today.  But concerns with low back pain, requesting labs to check kidney status.  Updated Dr. Dozier who is On Call for [facility] for Dr. Goodson who is out of country this week.  He is ok to check kidney labs.  Voicemail left for patient confirming ok for labs and wanted to clarify which labs she is wanting beyond Renal panel, UA micro to culture, and Protein Random Urine.   Instructed patient to call or pick if she wants CBC and antibody check if she wants to add in additional to Renal Panel, UA Micro to culture, and Protein Random Urine.  Plan: follow up on results.  Marielena Jaquez RN, BSN, PHN  [facility] Nephrology Nurse Navigator  [phone]

## 2025-02-04 NOTE — PROGRESS NOTES
MA was unable to reach patient. Left patient a detail message about her test results. Patient can call back if any questions.    Voicemail received from patient that she is feeling much better, low backpain is gone.  Reached out via [facility] that is back pain is gone, no fever or flare symptoms ok to not do labs at this time.  Plan: follow up if needed.  Marielena Jaquez RN, BSN, PHN  [facility] Nephrology Nurse Navigator  [phone]

## 2025-02-19 ENCOUNTER — MYC MEDICAL ADVICE (OUTPATIENT)
Dept: HEMATOLOGY | Facility: CLINIC | Age: 25
End: 2025-02-19
Payer: COMMERCIAL

## 2025-02-19 DIAGNOSIS — Z87.74 S/P PATENT FORAMEN OVALE CLOSURE: ICD-10-CM

## 2025-02-19 DIAGNOSIS — N92.0 MENORRHAGIA WITH REGULAR CYCLE: ICD-10-CM

## 2025-02-19 DIAGNOSIS — Z86.711 HISTORY OF PULMONARY EMBOLISM: Primary | ICD-10-CM

## 2025-02-19 DIAGNOSIS — I27.24 CTEPH (CHRONIC THROMBOEMBOLIC PULMONARY HYPERTENSION) (H): ICD-10-CM

## 2025-02-19 RX ORDER — TRANEXAMIC ACID 650 MG/1
1300 TABLET ORAL 3 TIMES DAILY
Qty: 60 TABLET | Refills: 11 | Status: SHIPPED | OUTPATIENT
Start: 2025-02-19

## 2025-02-20 ENCOUNTER — VIRTUAL VISIT (OUTPATIENT)
Dept: INTERNAL MEDICINE | Facility: CLINIC | Age: 25
End: 2025-02-20
Payer: COMMERCIAL

## 2025-02-20 DIAGNOSIS — J32.9 CHRONIC RHINOSINUSITIS: Primary | ICD-10-CM

## 2025-02-20 DIAGNOSIS — M31.31 GRANULOMATOSIS WITH POLYANGIITIS WITH RENAL INVOLVEMENT (H): ICD-10-CM

## 2025-02-20 RX ORDER — FLUTICASONE PROPIONATE 50 MCG
2 SPRAY, SUSPENSION (ML) NASAL DAILY
Qty: 16 G | Refills: 0 | Status: SHIPPED | OUTPATIENT
Start: 2025-02-20

## 2025-02-20 NOTE — PROGRESS NOTES
Cande is a 24 year old who is being evaluated via a billable video visit.    How would you like to obtain your AVS? MyChart  If the video visit is dropped, the invitation should be resent by: Send to e-mail at: [email or web address]  Will anyone else be joining your video visit? 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 Antibx given earlier this month had no effect and her sx have been going on since early Jan.   Rec'd she try nasal steroid and then f/u as scheduled with ENT. If her sx don't improve they can further evaluate this       See Patient Instructions    Subjective   Cande is a 24 year old, presenting for the following health issues:  Sinus Problem      Video Start Time: 3:49 PM    History of Present Illness       Reason for visit:  Sinus infection    She eats 0-1 servings of fruits and vegetables daily.She consumes 2 sweetened beverage(s) daily.She exercises with enough effort to increase her heart rate 60 or more minutes per day.  She exercises with enough effort to increase her heart rate 5 days per week.   She is taking medications regularly.       Acute Illness  Acute illness concerns  Sinus infection  Onset/Duration: 1 month  Symptoms:  Fever: No  Chills/Sweats: No  Headache (location?): YES  Sinus Pressure: YES  Conjunctivitis:  No  Ear Pain: no  Rhinorrhea: YES  Congestion: YES  Sore Throat: YES- improving   Cough: YES-non-productive  Wheeze: No  Decreased Appetite: No  Nausea: No  Vomiting: No  Diarrhea: No  Dysuria/Freq.: No  Dysuria or Hematuria: No  Fatigue/Achiness: No  Sick/Strep Exposure: YES- son had RSV, works at   Therapies tried and outcome: None            Objective           Vitals:  No vitals were obtained today due to virtual visit.    Physical Exam   GENERAL: alert and no distress  HENT:  normal cephalic/atraumatic  NEURO: Cranial nerves grossly intact.  Mentation and speech appropriate for age.  PSYCH: Appropriate affect, tone, and pace of words          Video-Visit Details    Type of service:  Video Visit   Video End Time:4:03 PM  Originating Location (pt. Location): Home    Distant Location (provider location):  On-site  Platform used for Video Visit: Dany  Signed Electronically by: Henrry Stokes MD

## 2025-03-16 NOTE — PROGRESS NOTES
I am seeing this patient in consultation for epistaxis at the request of the provider Jose Mcnair.    Chief Complaint - Epistaxis, sinus concerns    History of Present Illness - Cande Shields is a 24 year old female presents with approximately years of epistaxis. The history is provided by the patient. It occurs on the right side. She feels congestion. Has discolored drainage and some blood. She was hospitalized a few months ago for a nose bleed, but is better since. She feels her smell is poor. She has a h/o Wegener's. She takes rituxan. The patient takes eliquis blood-thinning medication due to a history of clots. She takes TXA by mouth during menstrual periods. Flonase doesn't help. She also has pain and rawness on the tip of her tongue.     Tests personally reviewed today for this visit:   1.) CBC on 2/8/25 showed hgb 11 and platelets  2.) 2/8/25 BMP showed Cr of 1.17  3.) 1/27/25 c-ANCA elevated and W[place]-3 elevated    Past Medical History -   Patient Active Problem List   Diagnosis    Wegener's granulomatosis    Myalgia    Granulomatosis with polyangiitis, unspecified whether renal involvement (H)    ANCA-associated vasculitis (H)    Pulmonary infiltrates    Acute kidney injury    Need for pneumocystis prophylaxis    Acute deep vein thrombosis (DVT) of proximal vein of both lower extremities (H)    ADHD (attention deficit hyperactivity disorder), inattentive type    Alcohol use disorder, mild, in sustained remission    Bicornuate uterus    Binge-eating disorder, moderate    BMI 40.0-44.9, adult (H)    Cannabis use disorder, mild, in early remission    Femoral artery thrombosis, left (H)    Fetal demise, greater than 22 weeks, antepartum    Generalized anxiety disorder    History of granulomatosis with polyangiitis    Depression    Occlusion of common femoral artery    Performance anxiety    Personal history of COVID-19    Pyoderma gangrenosum (H)    Self-injurious behavior    Stage 3 chronic kidney  disease, unspecified whether stage 3a or 3b CKD (H)    Acute saddle pulmonary embolism with acute cor pulmonale (H)    Thrombosis    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)    Infection due to 2019 novel coronavirus    ATN (acute tubular necrosis)    Other pulmonary embolism without acute cor pulmonale, unspecified chronicity (H)    Chest pain, unspecified type    Pneumonia due to infectious organism, unspecified laterality, unspecified part of lung    Cardiac disease during pregnancy    High-risk pregnancy, unspecified trimester    History of HELLP syndrome, currently pregnant    Anemia    Fetal growth restriction antepartum    S/P primary low transverse     Hemoptysis       Current Medications -   Current Outpatient Medications:     acetaminophen (TYLENOL) 500 MG tablet, Take 500-1,000 mg by mouth every 6 hours as needed for mild pain., Disp: , Rfl:     apixaban ANTICOAGULANT (ELIQUIS) 2.5 MG tablet, Take 1 tablet (2.5 mg) by mouth 2 times daily., Disp: 180 tablet, Rfl: 3    cefuroxime (CEFTIN) 500 MG tablet, Take 1 tablet (500 mg) by mouth 2 times daily., Disp: 20 tablet, Rfl: 0    fluticasone (FLONASE) 50 MCG/ACT nasal spray, Spray 2 sprays into both nostrils daily., Disp: 16 g, Rfl: 0    tranexamic acid (LYSTEDA) 650 MG tablet, Take 2 tablets (1,300 mg) by mouth 3 times daily. During menstrual cycle, Disp: 60 tablet, Rfl: 11    Allergies -   Allergies   Allergen Reactions    Heparin Other (See Comments)     Reaction: HIT    Penicillins Rash     Unknown, but think rash.    Tolerated cephalexin (20), cefpodoxime (20), Ceftriaxone (2023), Zosyn (2023)    Patient states she has taken Amoxicillin many times before without issue.      Unknown, but think rash. Tolerated cephalexin (20), cefpodoxime (20), Ceftriaxone (2023), Zosyn (2023)    Reports she tolerates amoxicillin       Social History -   Social History     Socioeconomic History    Marital  status: Legally    Tobacco Use    Smoking status: Former     Types: Vaping Device    Smokeless tobacco: Never    Tobacco comments:     Vaping stopped 2/26   Vaping Use    Vaping status: Former    Quit date: 2/26/2024    Substances: Nicotine, Flavoring    Devices: Disposable   Substance and Sexual Activity    Alcohol use: Not Currently    Drug use: No    Sexual activity: Yes     Partners: Male     Birth control/protection: None   Social History Narrative    Lives with brother 14yo and mother. Pets: Dog Nicholas.    Lives with  in [place], has cat.    Mom lives next door.     Social Drivers of Health     Financial Resource Strain: Low Risk  (12/31/2024)    Financial Resource Strain     Within the past 12 months, have you or your family members you live with been unable to get utilities (heat, electricity) when it was really needed?: No   Food Insecurity: Low Risk  (12/31/2024)    Food Insecurity     Within the past 12 months, did you worry that your food would run out before you got money to buy more?: No     Within the past 12 months, did the food you bought just not last and you didn t have money to get more?: No   Transportation Needs: Low Risk  (12/31/2024)    Transportation Needs     Within the past 12 months, has lack of transportation kept you from medical appointments, getting your medicines, non-medical meetings or appointments, work, or from getting things that you need?: No    [facility]    Social Connections   Interpersonal Safety: High Risk (12/30/2024)    Interpersonal Safety     Do you feel physically and emotionally safe where you currently live?: No     Within the past 12 months, have you been hit, slapped, kicked or otherwise physically hurt by someone?: No     Within the past 12 months, have you been humiliated or emotionally abused in other ways by your partner or ex-partner?: No   Housing Stability: Low Risk  (12/31/2024)    Housing  Stability     Do you have housing? : Yes     Are you worried about losing your housing?: No       Family History -   Family History   Problem Relation Age of Onset    Thyroid Disease Mother     Cancer Maternal Grandfather     Asthma No family hx of     Diabetes No family hx of     Anesthesia Reaction No family hx of     Venous thrombosis No family hx of        Review of Systems - As per HPI and PMHx, tip of tongue inflammation/irriations, sometimes gums are irritated, otherwise 10+ comprehensive system review is negative.    Physical Exam  General - The patient is in no distress.  Alert and oriented x3, answers questions and cooperates with examination appropriately.   Voice and Breathing - The patient was breathing comfortably without the use of accessory muscles. There was no wheezing, stridor, or stertor.  The patients voice was clear and strong, with no dysphonia.  Eyes - Extraocular movements intact. Sclera were not icteric or injected, conjunctiva were pink and moist.  Neurologic - Cranial nerves II-XII are grossly intact. Specifically, the facial nerve is intact, House-Brackmann grade 1 of 6.   Nose - No significant external deformity. The nasal mucosa along the right mid septum shows crusting and necrotic mucosa. There is exposed cartilage on the right of about 1.5 cm. Also discolored drainage around this that I suctioned. Left septal mucosal intact. No perforation. It is dry. The septum was deviated to the left some, turbinates are of normal size and position.  No polyps, masses, or purulence.  Mouth - Examination of the oral cavity showed some glossitis tip of tongue. No lesions or ulcerations noted.  The tongue was mobile and protrudes midline.   Oropharynx - The walls of the oropharynx were smooth, symmetric, and had no lesions or ulcerations. The uvula was midline and the palate raised symmetrically. No blood.  Neck -  Soft, non-tender. Palpation of the occipital, submental, submandibular, internal  jugular chain, and supraclavicular nodes did not demonstrate any abnormal lymph nodes or masses. The parotid glands were without masses. Palpation of the thyroid was soft and smooth, with no nodules or goiter appreciated.  The trachea was midline.      A/P -     ICD-10-CM    1. Chronic maxillary sinusitis  J32.0 doxycycline hyclate (VIBRAMYCIN) 100 MG capsule      2. Epistaxis  R04.0 Adult ENT  Referral     doxycycline hyclate (VIBRAMYCIN) 100 MG capsule      3. Granulomatosis with polyangiitis, unspecified whether renal involvement (H)  M31.30 doxycycline hyclate (VIBRAMYCIN) 100 MG capsule          Cande Shields is a 24 year old female with epistaxis, sinus congestion, and mucosal breakdown of her right mid septum. This is almost certainly from active granulomatous with polyangiitis. She does not have cartilage breakdown or a septal perforation yet, but is in the early stages of this. She takes Rituxan, but I wonder if she needs more systemic treatment. She wants to avoid prednisone. I'll reach out to her Rheumatologist, Dr. Call to see if more can be done. She can try nasal saline irrigations, AYR saline gel, and stop flonase. She has some purulence and I will treat her with doxycycline. Her tongue is also bothering her and I wonder if this too is GPA flare causing glossitis. Return in 4-6 weeks.     Itz Ceballos MD  Otolaryngology  [facility]

## 2025-03-17 ENCOUNTER — OFFICE VISIT (OUTPATIENT)
Dept: OTOLARYNGOLOGY | Facility: CLINIC | Age: 25
End: 2025-03-17
Payer: COMMERCIAL

## 2025-03-17 VITALS
RESPIRATION RATE: 18 BRPM | HEART RATE: 80 BPM | SYSTOLIC BLOOD PRESSURE: 140 MMHG | DIASTOLIC BLOOD PRESSURE: 87 MMHG | OXYGEN SATURATION: 99 %

## 2025-03-17 DIAGNOSIS — R04.0 EPISTAXIS: ICD-10-CM

## 2025-03-17 DIAGNOSIS — J32.0 CHRONIC MAXILLARY SINUSITIS: Primary | ICD-10-CM

## 2025-03-17 DIAGNOSIS — M31.30 GRANULOMATOSIS WITH POLYANGIITIS, UNSPECIFIED WHETHER RENAL INVOLVEMENT (H): ICD-10-CM

## 2025-03-17 RX ORDER — RITUXIMAB 10 MG/ML
INJECTION, SOLUTION INTRAVENOUS
COMMUNITY

## 2025-03-17 RX ORDER — DOXYCYCLINE 100 MG/1
100 CAPSULE ORAL 2 TIMES DAILY
Qty: 20 CAPSULE | Refills: 0 | Status: SHIPPED | OUTPATIENT
Start: 2025-03-17 | End: 2025-03-27

## 2025-03-17 NOTE — LETTER
3/17/2025      Cande Shields  [address] [facility] [address]      Dear Colleague,    Thank you for referring your patient, Cande Shields, to the [facility]. Please see a copy of my visit note below.    I am seeing this patient in consultation for epistaxis at the request of the provider Dr. Kristie Same.    Chief Complaint - Epistaxis, sinus concerns    History of Present Illness - Cande Shields is a 24 year old female presents with approximately years of epistaxis. The history is provided by the patient. It occurs on the right side. She feels congestion. Has discolored drainage and some blood. She was hospitalized a few months ago for a nose bleed, but is better since. She feels her smell is poor. She has a h/o Wegener's. She takes rituxan. The patient takes eliquis blood-thinning medication due to a history of clots. She takes TXA by mouth during menstrual periods. Flonase doesn't help. She also has pain and rawness on the tip of her tongue.     Tests personally reviewed today for this visit:   1.) CBC on 2/8/25 showed hgb 11 and platelets  2.) 2/8/25 BMP showed Cr of 1.17  3.) 1/27/25 c-ANCA elevated and NV-3 elevated    Past Medical History -   Patient Active Problem List   Diagnosis     Wegener's granulomatosis     Myalgia     Granulomatosis with polyangiitis, unspecified whether renal involvement (H)     ANCA-associated vasculitis (H)     Pulmonary infiltrates     Acute kidney injury     Need for pneumocystis prophylaxis     Acute deep vein thrombosis (DVT) of proximal vein of both lower extremities (H)     ADHD (attention deficit hyperactivity disorder), inattentive type     Alcohol use disorder, mild, in sustained remission     Bicornuate uterus     Binge-eating disorder, moderate     BMI 40.0-44.9, adult (H)     Cannabis use disorder, mild, in early remission     Femoral artery thrombosis, left (H)     Fetal demise, greater than 22 weeks, antepartum     Generalized  anxiety disorder     History of granulomatosis with polyangiitis     Depression     Occlusion of common femoral artery     Performance anxiety     Personal history of COVID-19     Pyoderma gangrenosum (H)     Self-injurious behavior     Stage 3 chronic kidney disease, unspecified whether stage 3a or 3b CKD (H)     Acute saddle pulmonary embolism with acute cor pulmonale (H)     Thrombosis     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)     Infection due to 2019 novel coronavirus     ATN (acute tubular necrosis)     Other pulmonary embolism without acute cor pulmonale, unspecified chronicity (H)     Chest pain, unspecified type     Pneumonia due to infectious organism, unspecified laterality, unspecified part of lung     Cardiac disease during pregnancy     High-risk pregnancy, unspecified trimester     History of HELLP syndrome, currently pregnant     Anemia     Fetal growth restriction antepartum     S/P primary low transverse      Hemoptysis       Current Medications -   Current Outpatient Medications:      acetaminophen (TYLENOL) 500 MG tablet, Take 500-1,000 mg by mouth every 6 hours as needed for mild pain., Disp: , Rfl:      apixaban ANTICOAGULANT (ELIQUIS) 2.5 MG tablet, Take 1 tablet (2.5 mg) by mouth 2 times daily., Disp: 180 tablet, Rfl: 3     cefuroxime (CEFTIN) 500 MG tablet, Take 1 tablet (500 mg) by mouth 2 times daily., Disp: 20 tablet, Rfl: 0     fluticasone (FLONASE) 50 MCG/ACT nasal spray, Spray 2 sprays into both nostrils daily., Disp: 16 g, Rfl: 0     tranexamic acid (LYSTEDA) 650 MG tablet, Take 2 tablets (1,300 mg) by mouth 3 times daily. During menstrual cycle, Disp: 60 tablet, Rfl: 11    Allergies -   Allergies   Allergen Reactions     Heparin Other (See Comments)     Reaction: HIT     Penicillins Rash     Unknown, but think rash.    Tolerated cephalexin (20), cefpodoxime (20), Ceftriaxone (2023), Zosyn (2023)    Patient states she has taken  Amoxicillin many times before without issue.      Unknown, but think rash. Tolerated cephalexin (12/27/20), cefpodoxime (12/27/20), Ceftriaxone (Feb 2023), Zosyn (Feb 2023)    Reports she tolerates amoxicillin       Social History -   Social History     Socioeconomic History     Marital status: Legally    Tobacco Use     Smoking status: Former     Types: Vaping Device     Smokeless tobacco: Never     Tobacco comments:     Vaping stopped 2/26   Vaping Use     Vaping status: Former     Quit date: 2/26/2024     Substances: Nicotine, Flavoring     Devices: Disposable   Substance and Sexual Activity     Alcohol use: Not Currently     Drug use: No     Sexual activity: Yes     Partners: Male     Birth control/protection: None   Social History Narrative    Lives with brother 16yo and mother. Pets: Dog Nicholas.    Lives with  in [place], has cat.    Mom lives next door.     Social Drivers of Health     Financial Resource Strain: Low Risk  (12/31/2024)    Financial Resource Strain      Within the past 12 months, have you or your family members you live with been unable to get utilities (heat, electricity) when it was really needed?: No   Food Insecurity: Low Risk  (12/31/2024)    Food Insecurity      Within the past 12 months, did you worry that your food would run out before you got money to buy more?: No      Within the past 12 months, did the food you bought just not last and you didn t have money to get more?: No   Transportation Needs: Low Risk  (12/31/2024)    Transportation Needs      Within the past 12 months, has lack of transportation kept you from medical appointments, getting your medicines, non-medical meetings or appointments, work, or from getting things that you need?: No    [facility]    Social Connections   Interpersonal Safety: High Risk (12/30/2024)    Interpersonal Safety      Do you feel physically and emotionally safe where you currently  live?: No      Within the past 12 months, have you been hit, slapped, kicked or otherwise physically hurt by someone?: No      Within the past 12 months, have you been humiliated or emotionally abused in other ways by your partner or ex-partner?: No   Housing Stability: Low Risk  (12/31/2024)    Housing Stability      Do you have housing? : Yes      Are you worried about losing your housing?: No       Family History -   Family History   Problem Relation Age of Onset     Thyroid Disease Mother      Cancer Maternal Grandfather      Asthma No family hx of      Diabetes No family hx of      Anesthesia Reaction No family hx of      Venous thrombosis No family hx of        Review of Systems - As per HPI and PMHx, tip of tongue inflammation/irriations, sometimes gums are irritated, otherwise 10+ comprehensive system review is negative.    Physical Exam  General - The patient is in no distress.  Alert and oriented x3, answers questions and cooperates with examination appropriately.   Voice and Breathing - The patient was breathing comfortably without the use of accessory muscles. There was no wheezing, stridor, or stertor.  The patients voice was clear and strong, with no dysphonia.  Eyes - Extraocular movements intact. Sclera were not icteric or injected, conjunctiva were pink and moist.  Neurologic - Cranial nerves II-XII are grossly intact. Specifically, the facial nerve is intact, House-Brackmann grade 1 of 6.   Nose - No significant external deformity. The nasal mucosa along the right mid septum shows crusting and necrotic mucosa. There is exposed cartilage on the right of about 1.5 cm. Also discolored drainage around this that I suctioned. Left septal mucosal intact. No perforation. It is dry. The septum was deviated to the left some, turbinates are of normal size and position.  No polyps, masses, or purulence.  Mouth - Examination of the oral cavity showed some glossitis tip of tongue. No lesions or ulcerations  noted.  The tongue was mobile and protrudes midline.   Oropharynx - The walls of the oropharynx were smooth, symmetric, and had no lesions or ulcerations. The uvula was midline and the palate raised symmetrically. No blood.  Neck -  Soft, non-tender. Palpation of the occipital, submental, submandibular, internal jugular chain, and supraclavicular nodes did not demonstrate any abnormal lymph nodes or masses. The parotid glands were without masses. Palpation of the thyroid was soft and smooth, with no nodules or goiter appreciated.  The trachea was midline.      A/P -     ICD-10-CM    1. Chronic maxillary sinusitis  J32.0 doxycycline hyclate (VIBRAMYCIN) 100 MG capsule      2. Epistaxis  R04.0 Adult ENT  Referral     doxycycline hyclate (VIBRAMYCIN) 100 MG capsule      3. Granulomatosis with polyangiitis, unspecified whether renal involvement (H)  M31.30 doxycycline hyclate (VIBRAMYCIN) 100 MG capsule          Cande Shields is a 24 year old female with epistaxis, sinus congestion, and mucosal breakdown of her right mid septum. This is almost certainly from active granulomatous with polyangiitis. She does not have cartilage breakdown or a septal perforation yet, but is in the early stages of this. She takes Rituxan, but I wonder if she needs more systemic treatment. She wants to avoid prednisone. I'll reach out to her Rheumatologist, Dr. Call to see if more can be done. She can try nasal saline irrigations, AYR saline gel, and stop flonase. She has some purulence and I will treat her with doxycycline. Her tongue is also bothering her and I wonder if this too is GPA flare causing glossitis. Return in 4-6 weeks.     Itz Ceballos MD  Otolaryngology  [facility]      Again, thank you for allowing me to participate in the care of your patient.        Sincerely,        Itz Ceballos MD    Electronically signed

## 2025-03-17 NOTE — PATIENT INSTRUCTIONS
- I'll contact Dr. Call and ask what else you can take for Wegener's given you septal and sinus involvement  - stop flonase  - use nasal saline irrigations 1-2 times a daily  - Use AYR nasal saline gel - gel and spray.   
Home

## 2025-03-18 ENCOUNTER — TELEPHONE (OUTPATIENT)
Dept: NEPHROLOGY | Facility: CLINIC | Age: 25
End: 2025-03-18
Payer: COMMERCIAL

## 2025-03-18 NOTE — TELEPHONE ENCOUNTER
Received voicemail from patient she was seen at ENT and they are concerned for Sinus vasculitis flare.  ENT was going to reach out to Dr. Call on next steps.  Patient prefers not to do Prednisone if there is another option.  Routed to Dr. Call's team for timely support.  Marielena Jaquez RN, BSN, PHN  [facility] Nephrology Nurse Navigator  [phone]

## 2025-03-19 NOTE — PROGRESS NOTES
Rheumatology Clinic Visit     Cande Shields MRN# [id number]   YOB: 2000 Age: 24 year old     Date of Visit: 03/20/2025  Primary care provider: Trudy Faye          Assessment and Plan:     # Granulomatous polyangiitis with glomerulonephritis, upper respiratory, musculoskeletal, and cutaneous involvement:   former constitutional symptoms and respiratory symptoms remain absent.  However, recent increase in bloody nasal discharge has persisted; there is pain in the gums and along the tongue.  Exam shows friable mucosa in the nasal passages; gums show edema and subtle focal hemorrhage    Data:  Laboratory evaluation on January 27, 2025 showed comprehensive metabolic panel remarkable for GFR of 57, down from 75 noted in December 2024.  Chest x-ray on February 8, 2025 showed no effusion, consolidation or pneumothorax.    Laboratory workup on January 23, 2025 shows creatinine of 1.39 with a GFR of 53, lower than on December 31, 2024, but comparable to GFR noted in October 2024.  Albumin 3.9; CRP 8.7, down from 34 noted in December 31; CBC shows hemoglobin 10.9, stable, urinalysis showed 30+ protein and scant red blood cells, stable; total protein was 0.22 mg/mg of creatinine, stable compared with 2023.    Discussion: GPA with history of glomerulonephritis, rash and respiratory involvement.  Recent slowly progressive nasal bloody discharge, along with tongue and gum, likely reflect limited GPA activation.  I recommend investigation for renal and bone marrow function; repeat UT-3 and inflammatory markers, complete blood count and urinalysis.  I recommend continuing every 6-month rituximab, but adding methotrexate for limited, extrarenal ANCA-associated vasculitis.  Alternative medications for limited granulomatosis with polyangiitis could include azathioprine or mycophenolate.    Patient formerly completed rituximab induction therapy with 4 weekly doses 375 mg/m  last dose on 1-.  Last  "maintenance dose of rituximab was January 2025.  She last had Cytoxan 500 mg/m2 on 1-.    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  Next rituximab dose will be in due 6 months after the end of January 2025.  Patient is followed with Dr. Goodson in nephrology, and I recommend that she return for routine follow-up as soon as possible.    # Hypercoagulable state (DVT/PE in 2023): Recent CT scan showed \"debris\" and residual of prior saddle embolus.  There is no reason to think that hypercoagulable state has diminished.  I agree with Dr. Fermin's and Dr. Dye's  assessment that lifelong coagulation is indicated.      Diagnosis:  1.  Granulomatosis with polyangiitis  2. Recent increase in crusting, bloody discharge from the nose, and gum pain likely represents \"limited\" GPA.  I recommend continuing rituximab every 6-month, but adding low-dose methotrexate to treat nose and mouth inflammation.    Plan:  1.  Blood work, urinalysis to assess organ function and inflammation  2.  Start methotrexate 6 tablets (15 mg) once weekly. While on methotrexate:   -- Check blood tests every 3 months (AST/ALT, Albumin, CBC with platelets)   -- Limit alcohol intake to 2 drinks weekly; use folate 1 mg daily.  --Tylenol 500-1000 mg can be used as needed up to three times daily for nausea/headache associated with dosing.    Use at least 1 form of contraception, since methotrexate can cause birth defects.  3.  Rituxan 500 mg IV to be given no less than 6 months after January 27, 2025  4.  If methotrexate is well-tolerated in 3 to 4 weeks, but no change in nose or tongue/gum symptoms, will increase methotrexate dose    RTC 3-4  mos    Ramses Call MD  Staff Rheumatologist, [facility]    On the day of the encounter, a total of 35 minutes was spent in chart review, and in " counseling and coordination of care, regarding the patient's complex medical problem of ANCA associated vasculitis, hypercoagulable state with DVT/PE, high risk medication.    The longitudinal plan of care for the diagnosis(es)/condition(s) as documented were addressed during this visit. Due to the added complexity in care, I will continue to support Cande in the subsequent management and with ongoing continuity of care.    Orders Placed This Encounter   Procedures    CBC with platelets    AST    ALT    Albumin level    Creatinine    CRP inflammation    Routine UA with micro reflex to culture    Comprehensive metabolic panel    Proteinase 3 Antibody IgG                 Active Problem List:     Patient Active Problem List    Diagnosis Date Noted    Hemoptysis 2024     Priority: Medium    S/P primary low transverse  2023     Priority: Medium    Fetal growth restriction antepartum 2023     Priority: Medium    Anemia 2023     Priority: Medium    High-risk pregnancy, unspecified trimester 2023     Priority: Medium    History of HELLP syndrome, currently pregnant 2023     Priority: Medium    Cardiac disease during pregnancy 2023     Priority: Medium     Cardiac diagnosis: Polyangiitis granulomatosis, history of right atrial thrombectomy with sternotomy, PFO closure, history of DVT/PE, CKD (Cr 1.3)    Modified WHO Class: III // NYHA Class: II    Last Echo  23: LV size, function nl. EF 55-60%. RV size function nl.    Medications: Lovenox 80 BID (anti-Xa  = 1.13), aspirin, metoprolol, azathioprine  Comorbidities: FGR, bicornuate uterus, subchorionic hematoma, history of preeclampsia and IUFD at 31 weeks, granulomatosis polyangiitis, hx DVT/PE, CKD stage III (Cr 1.61 on )    Antepartum plan:  [] Fetal echocardiogram   [] Anti Xa levels   [] Follow up with cardiology with echo  [] Follow up with nephrology  and rheumatology  and hematology   []  Discussion at Cardio-OB conference: , 5/3, , , 80    Delivery Plan:  - [place]ing and [place] [facility] [place] pending comorbidities  - Mode of delivery: Likely   - Anesthesia plan: Consult , plan for anticoagulation per hematology  - Monitoring:   - IV access/lines:  - SBE prophylaxis:     Postpartum Recommendations:  -ppBCM:    Providers:  Primary OB: MFM   Cardiologist: Dr. Oneal       Other pulmonary embolism without acute cor pulmonale, unspecified chronicity (H) 2023     Priority: Medium    Chest pain, unspecified type 2023     Priority: Medium    Pneumonia due to infectious organism, unspecified laterality, unspecified part of lung 2023     Priority: Medium    ATN (acute tubular necrosis) 2022     Priority: Medium    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) 2022     Priority: Medium    Infection due to 2019 novel coronavirus 2022     Priority: Medium    Acute deep vein thrombosis (DVT) of proximal vein of both lower extremities (H) 10/30/2021     Priority: Medium    BMI 40.0-44.9, adult (H) 10/30/2021     Priority: Medium    Femoral artery thrombosis, left (H) 10/30/2021     Priority: Medium    Thrombosis 2021     Priority: Medium    Occlusion of common femoral artery 2021     Priority: Medium    Stage 3 chronic kidney disease, unspecified whether stage 3a or 3b CKD (H) 2021     Priority: Medium    Acute saddle pulmonary embolism with acute cor pulmonale (H) 2021     Priority: Medium    Need for pneumocystis prophylaxis 2021     Priority: Medium    Pulmonary infiltrates 2021     Priority: Medium    Acute kidney injury 2021     Priority: Medium    Personal history of COVID-19 01/10/2021     Priority: Medium    Pyoderma gangrenosum (H) 01/10/2021     Priority: Medium    ANCA-associated vasculitis (H) 2020     Priority: Medium    Myalgia 2020     Priority: Medium     "Granulomatosis with polyangiitis, unspecified whether renal involvement (H) 12/27/2020     Priority: Medium    Bicornuate uterus 10/28/2020     Priority: Medium    Fetal demise, greater than 22 weeks, antepartum 10/28/2020     Priority: Medium     10/20 30w6d presented to [facility] with right sided chest pain, BP in severe range, No fetal heart tones auscultated. Induced, NSVB, \"Yasmin\" 2lbs      Alcohol use disorder, mild, in sustained remission 02/11/2015     Priority: Medium     Formatting of this note might be different from the original.  sober since 01/2014.      Binge-eating disorder, moderate 02/11/2015     Priority: Medium    Cannabis use disorder, mild, in early remission 02/11/2015     Priority: Medium     Formatting of this note might be different from the original.  sober since 06/2014.      Depression 02/11/2015     Priority: Medium    ADHD (attention deficit hyperactivity disorder), inattentive type 07/09/2014     Priority: Medium    Generalized anxiety disorder 07/09/2014     Priority: Medium    Performance anxiety 07/09/2014     Priority: Medium    Self-injurious behavior 07/09/2014     Priority: Medium    History of granulomatosis with polyangiitis 02/15/2012     Priority: Medium    Wegener's granulomatosis 10/04/2011     Priority: Medium     Replacing diagnoses that were inactivated after the 10/1/2021 regulatory import.              History of Present Illness:   Cande Shields presents for followup of vasculitis in response to observation of increased upper respiratory symptoms per otolaryngology.. Last seen 1-2025.  On that date, granulomatous polyangiitis with chronic kidney disease and glomerulonephritis was improved.     Background: Granulomatosis with polyangitis dx at age 10, with PR3 positivitiy, initially treated with Methotrexate, Ssteroids, Rituximab infusions Q6-8 months until about 2012, since then had been in remission. On December 26 2020, she was admitted to [facility] " for for myalgias, arthralgias, pedal edema and a petechial non blanching palpable purpura rash involving her thighs, legs, elbows, chin and neck (vasculitis). She had proteinuria on UA, lung nodule on CXR, elevated inflammatory markers.  Although COVID-19 infection was also present, a flare of granulomatous polyangiitis was diagnosed.  The patient was started on Solu-Medrol 1 mg/kg/day.  She was also begun on induction protocol with rituximab 375 mg/m  IV 4 doses, given on a weekly schedule.  She improved, and was discharged on December 30, 2020 on high-dose prednisone. Last seen in 2-2021, when she was improving after hospitalization.  Another flare of GPA with nephritis was diagnosed in December 2022.  Patient was hospitalized, treated with high-dose steroids and new induction therapy with rituximab.  She received 3 out of planned 4 infusions of rituximab; therapy was stopped due to pregnancy diagnosed in early 2-2023.  Rituxan resumed in January 2025 for maintenance therapy.  Methotrexate added in March 2025 for limited, nonrenal upper respiratory mucosal involvement.    Interval history March 19, 2025  Patient was seen by Dr. Ceballos in otolaryngology on March 17, 2025.  Impression was of chronic epistaxis, with exam findings of sinus congestion, mucosal breakdown of her right mid septum, with risk of cartilage breakdown or septal perforation.  Recommendation was for topical therapy with nasal saline irrigations and reduction in Flonase.  A course of doxycycline was recommended and follow-up with rheumatology was suggested.    Patient was seen in the emergency room on February 8, 2025 with chest pain.  Impression was of transient chest pain not of coronary or pulmonary etiology.  Recommendation was for her to continue anticoagulants, to use oral oxycodone, and follow-up with cardiology.    Today, she reports starting doxycycline since seeing Dr. Ceballos.  Within 12 hours of starting doxycycline, she developed itchy  "rash on her thighs.  She stopped the medication.  For the last 48 hours she has started azathioprine instead.  Still has \"sinusitis\" symptoms with daily discharge, crusting, blood, R > L nose. Symptoms gradually improving  R leg has a spot of \"bruise\" on the back of her right leg.  Still has a cough, constant, present for years, no blood or mucous.    Otherwise she is feeling well. No fevers since December 2024.  She works full time in , > 40 hours weekly.  Her child is 18 months old, she reports increased physical activity involved with chasing and monitoring her child.    Taking ibu after getting wisdom teeth out 1 week ago.    Last rituximab infusion 500 mg given on January 27, 2025.    She has not used prednisone since last visit.    Interval history January 23, 2025    Patient was hospitalized briefly on December 31, 2024.  Discharge diagnoses included hemoptysis, likely due to influenza, chronic pulmonary embolism, acute kidney injury.  Shortly after diagnosis of influenza A on December 28, patient had presented with hemoptysis.  Patient was treated with Tamiflu, and plan to continue rituximab every 8 months was instituted.  Patient was discharged with recommendations for follow-up in nephrology, ENT and in hematology.    Patient was seen by Dr. Goodson in nephrology in early October 2024.  Review of recent treatment for ANCA associated vasculitis revealed that patient had used Imuran as a maintenance during pregnancy, starting in July 2023.  Imuran was stopped and patient resumed rituximab after delivery with 500 mg dose in February 2024.  Planned maintenance dose in August 2024 did not occur.  Impression was of LA-3 positive ANCA vasculitis with glomerulonephritis, CKD stage III diagnosed at the age of 10.  Inflammatory disease was judged to be in clinical and biochemical remission; recommendations nevertheless was to resume and continue rituximab for a period of at least 3 to 4 years given positive " AL-3 and frequent relapses.  History of hypercoagulable state, off all anticoagulation was noted.  Recommendation was for follow-up with hematology, Dr. Dye.    She had developed flu in late December, prior to hospitalization.  Today, she reports markedly reduced blood in nasal secretions since hospitalization  In the last few days, she is coughing again with mild ST. There is scant blood from the R side of the nose.  She notes daily body aches affecting her low back and under her R bra-line.  Symptoms are not severe enough that she is impaired in performing activities of daily living including full-time work, and caring for her [id number] son.    She reports that she saw Hematology in January; she restarted eliquis in response to recommendations to maintain lifelong anticoagulation.  No fevers, chills, sweats, dyspnea on exertion, joint pain, leg rash.  She has noted painless lumps/spots on her scalp and on her face.      Interval history April 13, 2023    Shortly after last visit, unfortunately, patient suffered venous thromboembolism and pulmonary embolus with significant hemodynamic decline.  Echo showed large right atrial thrombus noted. She was therefore admitted to the ICU and angiovac for thrombectomy was attempted, but ultimately she underwent emergency sternotomy with right atrial thrombectomy and closure of PFO with central and bilateral femoral cannulation.      Patient was seen by maternal-fetal medicine on April 4, 2023.  Intrauterine pregnancy at 12 weeks was noted.  Recent history of multiple venous thromboemboli with DVT and PE, and PFO closure on February 18, 2023 was also noted.  Recommendation were to continue low-dose aspirin for preeclampsia prophylaxis, received IV iron for anemia, continue 10 a factor monitoring for anticoagulation, continue blood pressure control with metoprolol.  Expected delivery would be by vaginal route at 37 to 39 weeks of pregnancy.    Patient was seen by   Carlos on March 16, 2023 in [facility].  History of GPA, severe preeclampsia and intrauterine feeder and death with prior pregnancy was noted.  During ongoing pregnancy, recommendation was to continue enoxaparin 1 mg/kg every 12 hours and check Lovenox levels once monthly, to continue aspirin for preeclampsia prophylaxis, and to return at 32 weeks of gestation for discussion of peridelivery anticoagulation plan.    Today, feeling well. No n/v. No f/c/s. No joint pain, skin rash.  No upper respiratory symptoms, nasal discharge, sinus pressure.  Chest pain is worsening, dating from February surgery.  Pain is not pleuritic, but is migratory, affecting back and front not necessarily related to site of previous sternotomy.  Energy level is overall good, as is appetite.  She plans to return to full-time work as a  next week.    Interval history February 9, 2023    Since last visit in 20221:  Patient saw Dr. Chang in nephrology in April 2021 in follow-up of FL-3+ ANCA vasculitis.  Creatinine was noted to be improving and patient was feeling well several weeks after a single course of intravenous cyclophosphamide.  Plan was to continue prednisone through the month of April, and then start tapering to off.  Plan was to consider maintenance Rituxan in June or July 2021. She was lost to Renal and Rheumatology followup.    She was next admitted between 12/20-12/21 of 2022. She came in with positive home COVID test and  petechial rashes that were similar to GPA previous flare and some phlegm with blood tinge her PR3 was elevated at 27, her Cr was 2.05 but after IV fluid and 1 dose of methlyprednisolone 125 mg Cr down to 1.48 the next day. She received tapering dose of methylprednisone IV and oral, as well as avacopan. She received Rituximab 375 mg/m2 1st dose on 1/10/23 and 2nd dose on 1/17/23, 1-.    Patient saw Dr. Dozier in nephrology on January 19, 2023.   Impression was of ANCA vasculitis flare, partially completed induction treatment with rituximab, and concerning decrease in GFR compared to 1 month earlier.  Nevertheless, GPA flare was considered to be under improving control.  A plan for tapering Medrol to 8 mg/day as of February 8 was given, and a plan for a total of 4 weekly doses of rituximab was discussed.    Today, patient reports that she is 4 weeks pregnant, testing positive just 4 days ago. She will meet with OB/Gyn tomorrow.  She feels well overall; no joint pain, skin rash, shortness of breath, fever. She quit smoking 2 weeks ago; she still has a cough (dry, chronic). She has not had recurrence of blood-tinged phlegm. No nosebleeds or facial pain/sinus drainage.    She has continued avacopan, but stopped on learning that she is pregnant, per vasculitis service. She is taking prenatal vitamins.  prednisone 12 mg daily has continued. She missed a 4th rituxan dose scheduled for 2-3-2023.  She still has low back pain, every day. Pain is constant, but modest.  She works full time at a day care.    No recurrence of COVID symptoms that she had in .     Interval history 02-:    She is feeling well. Good energy level  Doing cardio, situps, jumping jacks at home.  She notes R foot pain for the past several weeks; made worse with high impact weight bearing. Pain is in the foot arch and in the pad.    After the last visit in January, creatinine returned unexpectedly elevated at 2.  Dr. Chang and the renal team recommended immediate hospitalization for possible ANCA vasculitis flare unresponsive to rituximab and prednisone.  Patient was hospitalized for steroid pulse and received Cytoxan 500 mg/m  on January 31.  She tolerated medications well and was discharged in early February.    Patient was seen in the emergency room on February 21, 2021 for right foot pain.  Impression was of probable plantar fasciitis; symptomatic Rx was recommended.  Pregnancy  "test was negative.  Left flank pain was judged mild; urinalysis showed mild hematuria, and patient was discharged with plans to monitor.    She tapered prednisone from 50 to 40 5 days ago. She will taper by 10 mg each week per Renal.      Interval history 2021:    She had more bumps on her feet,a nd her joints started to ache when she went down to 40 mg prednisone after a few days.    She notes tremors and shakinesss in her hands, and her legs are swollen on prednisone.    Skin on her legs has cleared up. \"bumps\" on her elbows persist, no larger.    She is breathless with minimal exertion, like climbing stairs. She notes hot flashes, she sometiems has sweats as well, sudden onset after being cold.    She continues to cough; there is frequently flecks of blood, usually dark.    Her nose is dry despite use of a humidifier. She also has had a bloody nose daily for the last 10 days.            Review of Systems:       Constitutional: negative  Skin: negative  Eyes: negative  Ears/Nose/Throat: negative  Respiratory: No shortness of breath, dyspnea on exertion, cough, or hemoptysis  Cardiovascular: negative  Gastrointestinal: negative  Genitourinary: negative  Musculoskeletal: negative  Neurologic: negative  Psychiatric: negative  Hematologic/Lymphatic/Immunologic: negative  Endocrine: negative          Past Medical History:     Past Medical History:   Diagnosis Date    ADHD (attention deficit hyperactivity disorder)     DVT, lower extremity, distal (H)     Dysmenorrhea     Femoral artery thrombosis, left (H) 2021    Multiple subsegmental pulmonary emboli without acute cor pulmonale (H) 2021    PFO (patent foramen ovale)     Preeclampsia, third trimester     Spontaneous pregnancy loss 2020    31 weeks    Stage 3b chronic kidney disease (H)     Wegener's disease, pulmonary 2010    renal biopsy     Past Surgical History:   Procedure Laterality Date     SECTION N/A 2023    Procedure:  " section;  Surgeon: Wendy Vera MD;  Location: [facility]    ENT SURGERY  01/01/2000    cyst    EXCISE GANGLION WRIST  01/01/2009    IR LOWER EXTREMITY ANGIOGRAM LEFT  3/21/2021    IR THROMBOLYSIS ART/VENOUS INFUSION SUBSQ DAY  3/21/2021    IR THROMBOLYSIS ART/VENOUS INFUSION SUBSQ DAY  3/21/2021    STERNOTOMY  02/18/2023    right atrial thrombectomy    THROMBECTOMY ATRIUM Right 02/18/2023    THROMBECTOMY PERCUTANEOUS N/A 2/18/2023    Procedure: Emergency Sternotomy, Right Atrial Thrombectomy, Central Cannulation, Bilateral Femoral Cannulation, Closure of PFO ; Transesophageal Echocardiogram performed by anesthesia staff;  Surgeon: Adelfo Elmore MD;  Location: [facility] OR    THROMBECTOMY PERCUTANEOUS N/A 2/18/2023    Procedure: Angiovac Mediated for Right Atrial Clot; Intracatheter Thrombectomy via bilateral percutaneous femoral venous access with 26 FR Right Femoral vein and 17 FR Left Femoral Vein cannulas. Transesophageal echchocardiogram performed by anesthesia staff;  Surgeon: Po Monterroso MD;  Location: [facility] OR     # ANCA vasculitis:  GPA flare, treated with 1 mg/kg/day prednisone, and 4 doses of rituximab 375 mg/m  completed in late January 2021. Followup 3-day pulse methylprednisolone and Cytoxan 500 mg/m2 were given on 1- in response to rising creatinine and active urinary sediment. Creatinine stable at ~ 2 as of February 23, 2021.         Social History:     Social History     Occupational History    Not on file   Tobacco Use    Smoking status: Every Day     Types: Vaping Device    Smokeless tobacco: Never    Tobacco comments:     Vaping stopped 2/26   Vaping Use    Vaping status: Former    Quit date: 2/26/2024    Substances: Nicotine, Flavoring    Devices: Disposable   Substance and Sexual Activity    Alcohol use: Not Currently    Drug use: No    Sexual activity: Yes     Partners: Male     Birth control/protection: None            Family History:     Family History  "  Problem Relation Age of Onset    Thyroid Disease Mother     Cancer Maternal Grandfather     Asthma No family hx of     Diabetes No family hx of     Anesthesia Reaction No family hx of     Venous thrombosis No family hx of             Allergies:     Allergies   Allergen Reactions    Heparin Other (See Comments)     Reaction: HIT    Penicillins Rash     Unknown, but think rash.    Tolerated cephalexin (12/27/20), cefpodoxime (12/27/20), Ceftriaxone (Feb 2023), Zosyn (Feb 2023)    Patient states she has taken Amoxicillin many times before without issue.      Unknown, but think rash. Tolerated cephalexin (12/27/20), cefpodoxime (12/27/20), Ceftriaxone (Feb 2023), Zosyn (Feb 2023)    Reports she tolerates amoxicillin            Medications:     Current Outpatient Medications   Medication Sig Dispense Refill    acetaminophen (TYLENOL) 500 MG tablet Take 500-1,000 mg by mouth every 6 hours as needed for mild pain.      apixaban ANTICOAGULANT (ELIQUIS) 2.5 MG tablet Take 1 tablet (2.5 mg) by mouth 2 times daily. 180 tablet 3    doxycycline hyclate (VIBRAMYCIN) 100 MG capsule Take 1 capsule (100 mg) by mouth 2 times daily for 10 days. 20 capsule 0    methotrexate 2.5 MG tablet Take 6 tablets (15 mg) by mouth every 7 days. Labs every 8 - 12 weeks for refills. 24 tablet 3    riTUXimab (RITUXAN) 500 MG/50ML injection Inject into the vein.      cefuroxime (CEFTIN) 500 MG tablet Take 1 tablet (500 mg) by mouth 2 times daily. (Patient not taking: Reported on 3/20/2025) 20 tablet 0    tranexamic acid (LYSTEDA) 650 MG tablet Take 2 tablets (1,300 mg) by mouth 3 times daily. During menstrual cycle 60 tablet 11            Physical Exam:   Blood pressure 111/71, pulse 90, height 1.626 m (5' 4\"), weight 99.1 kg (218 lb 8 oz), last menstrual period 01/11/2025, SpO2 99%, not currently breastfeeding.  Wt Readings from Last 4 Encounters:   03/20/25 99.1 kg (218 lb 8 oz)   01/27/25 103.2 kg (227 lb 9.6 oz)   01/23/25 101.6 kg (224 lb) "   01/16/25 102.5 kg (226 lb)       Constitutional: well-developed, appearing stated age; cooperative  Eyes: nl EOM, PERRLA, vision, conjunctiva, sclera  ENT: nl external ears, nose, hearing, lips, teeth, gums, throat  Gums show edema and punctate hemorrhage at the gumline near upper incisor tooth insertion  Neck: no mass or thyroid enlargement  Resp: lungs clear to auscultation, nl to palpation  CV: RRR, no murmurs, rubs or gallops, no edema  MS: The TMJ, neck, shoulder, elbow, wrist, MCP/PIP/DIP, spine, hip, knee were examined and found normal. No active synovitis or altered joint anatomy. Full joint ROM. Normal  strength. No dactylitis,  tenosynovitis, enthesopathy.  Skin: Nickel sized cluster of punctate nonblanchable petechiae posterior right calf  Neuro: nl cranial nerves  Psych: nl judgement, orientation, memory, affect.         Data:         Latest Ref Rng & Units 1/9/2025     8:38 AM 1/23/2025     1:18 PM 1/27/2025    10:05 AM   RHEUM RESULTS   Albumin 3.5 - 5.2 g/dL  3.9  4.1    Complement C3 81 - 157 mg/dL  117     Complement C4 13 - 39 mg/dL  30     Creatinine 0.51 - 0.95 mg/dL  1.39  1.32    CRP Inflammation <5.00 mg/L  8.65     DNA-ds <10.0 IU/mL  1.6     GFR Estimate >60 mL/min/1.73m2  53  57    Hematocrit 35.0 - 47.0 % 38.8  34.3  37.7    Hemoglobin 11.7 - 15.7 g/dL 12.0  10.9  11.9    WBC 4.0 - 11.0 10e3/uL 5.9  8.8  7.5    RBC Count 3.80 - 5.20 10e6/uL 4.89  4.38  4.80    RDW 10.0 - 15.0 % 15.3  15.3  15.3    MCHC 31.5 - 36.5 g/dL 30.9  31.8  31.6    MCV 78 - 100 fL 79  78  79    Platelet Count 150 - 450 10e3/uL 391  341  357         ,  ,  ,  ,  ,  ,   CESARIO interpretation   Date Value Ref Range Status   12/20/2022 Negative Negative Final     Comment:       Negative:              <1:40  Borderline Positive:   1:40 - 1:80  Positive:              >1:80   12/28/2020 Negative NEG^Negative Final     Comment:                                        Reference range:  <1:40  NEGATIVE  1:40 - 1:80   BORDERLINE POSITIVE  >1:80 POSITIVE     ,  ,   DNA (ds) Antibody   Date Value Ref Range Status   01/23/2025 1.6 <10.0 IU/mL Final     Comment:     Negative   ,  ,   Beta 2 Glycoprotein 1 Antibody IgG   Date Value Ref Range Status   10/19/2021 0.9 <7.0 U/mL Final     Comment:     Negative     Beta 2 Glycoprotein 1 Antibody IgM   Date Value Ref Range Status   10/19/2021 <2.4 <7.0 U/mL Final     Comment:     Negative   ,   Cardiolipin Antibody IgG   Date Value Ref Range Status   10/19/2021 Negative Negative Final     Cardiolipin Antibody IgM   Date Value Ref Range Status   10/19/2021 Negative Negative Final   ,  ,  ,  ,   Hep B Surface Agn   Date Value Ref Range Status   12/28/2020 Nonreactive NR^Nonreactive Final     Hepatitis B Surface Antigen   Date Value Ref Range Status   03/02/2023 Nonreactive Nonreactive Final   ,  ,  ,  ,  ,  ,  ,  ,  ,  ,  ,   Neutrophil Cytoplasmic IgG Antibody   Date Value Ref Range Status   02/22/2011 1:160  Final     Comment:     Reference range: <1:20  (Note)  Cytoplasmic ANCA (c-ANCA) staining pattern observed. No  perinuclear ANCA (p-ANCA) pattern seen. Presence of p-ANCA  ruled out on formalin-fixed neutrophils.  TEST INFORMATION: Anti-Neutrophil Cyto Ab, IgG    Neutrophil Cytoplasmic Antibodies (C-ANCA = granular  cytoplasmic staining, P-ANCA = perinuclear staining) are  found in the serum of over 90 percent of patients with  certain necrotizing systemic vasculitides, and usually in  less than 5 percent of patients with collagen vascular  disease or arthritis.  Performed by [facility],  [address] [place] [id number] [phone]  [email or web address], Leonie Damon MD, Lab. Director                                                                         ,   Proteinase 3 Antibody IgG   Date Value Ref Range Status   01/23/2025 Positive (A) Negative Final   04/07/2021 0.3 0.0 - 0.9 AI Final     Comment:     Negative  Antibody index (AI) values reflect qualitative changes in  antibody   concentration that cannot be directly associated with clinical condition or   disease state.     ,   Myeloperoxidase Antibody IgG   Date Value Ref Range Status   01/23/2025 Negative Negative Final   04/07/2021 <0.2 0.0 - 0.9 AI Final     Comment:     Negative  Antibody index (AI) values reflect qualitative changes in antibody   concentration that cannot be directly associated with clinical condition or   disease state.     ,   Neutrophil Cytoplasmic Antibody   Date Value Ref Range Status   01/27/2025 1:40 (H) <=1:10 Final   01/28/2021 1:80 (A) <1:10 [titer] Final   ,   Neutrophil Cytoplasmic Antibody Pattern   Date Value Ref Range Status   01/27/2025   Final    Cytoplasmic ANCA (c-ANCA) staining pattern observed and confirmed on formalin-fixed neutrophils.   01/28/2021   Final    Cytoplasmic ANCA (c-ANCA) staining pattern observed and confirmed on formalin-fixed   neutrophils.     ,   Serine Protease 3   Date Value Ref Range Status   02/22/2011 1684 (H)  Final     Comment:     Reference range: 0 to 19  Unit: AU/mL  (Note)  REFERENCE INTERVAL: Serine Protease 3, IgG     19 AU/mL or Less ........ Negative   20-25 AU/mL ............. Equivocal   26 AU/mL or Greater ..... Positive    Approximately 90% of patients with a P-ANCA pattern by IFA  have antibodies specific for MPO.    Approximately 85% of patients with a C-ANCA pattern by IFA  have antibodies specific for PR3.  Performed by [facility],  [address] [place] [id number] [phone]  [email or web address], Leonie Damon MD, Lab. Director                                                                                                                                                                                                                                  ,   Myeloperox Antibodyys   Date Value Ref Range Status   02/22/2011 0  Final     Comment:     Reference range: 0 to 19  Unit: AU/mL  (Note)  REFERENCE INTERVAL: Myeloperoxidase Abs, IgG      19 AU/mL or Less ......... Negative   20-25 AU/mL .............. Equivocal   26 AU/mL or Greater ...... Positive    Approximately 90% of patients with a P-ANCA pattern by IFA  have antibodies specific for MPO.    Approximately 85% of patients with a C-ANCA pattern by IFA  have antibodies specific for PR3.                                                                                                      ,  ,  ,  ,  ,   Immunoglobulin G   Date Value Ref Range Status   12/30/2024 852 610 - 1,616 mg/dL Final     Immunoglobulin A   Date Value Ref Range Status   02/01/2024 159 84 - 499 mg/dL Final     Immunoglobulin M   Date Value Ref Range Status   02/01/2024 36 35 - 242 mg/dL Final   ,  ,  ,  ,  ,  ,  ,

## 2025-03-20 ENCOUNTER — OFFICE VISIT (OUTPATIENT)
Dept: RHEUMATOLOGY | Facility: CLINIC | Age: 25
End: 2025-03-20
Payer: COMMERCIAL

## 2025-03-20 VITALS
HEART RATE: 90 BPM | DIASTOLIC BLOOD PRESSURE: 71 MMHG | HEIGHT: 64 IN | OXYGEN SATURATION: 99 % | SYSTOLIC BLOOD PRESSURE: 111 MMHG | BODY MASS INDEX: 37.3 KG/M2 | WEIGHT: 218.5 LBS

## 2025-03-20 DIAGNOSIS — M31.31 GRANULOMATOSIS WITH POLYANGIITIS WITH RENAL INVOLVEMENT (H): Primary | ICD-10-CM

## 2025-03-20 RX ORDER — METHOTREXATE 2.5 MG/1
15 TABLET ORAL
Qty: 24 TABLET | Refills: 3 | Status: SHIPPED | OUTPATIENT
Start: 2025-03-20

## 2025-03-20 ASSESSMENT — PAIN SCALES - GENERAL: PAINLEVEL_OUTOF10: NO PAIN (0)

## 2025-03-20 NOTE — PATIENT INSTRUCTIONS
Diagnosis:  1.  Granulomatous polyangiitis  2. Recent increase in crusting, bloody discharge from the nose, and gum pain likely represents limited activity of GPA.  I recommend continuing rituximab every 6-month, but adding low-dose methotrexate to treat nose and mouth inflammation.    Plan:  1.  Blood work, urinalysis to assess organ function and inflammation  2.  Start methotrexate 6 tablets (15 mg) once weekly. While on methotrexate:   -- Check blood tests every 3 months (AST/ALT, Albumin, CBC with platelets)   -- Limit alcohol intake to 2 drinks weekly; use folate 1 mg daily.  --Tylenol 500-1000 mg can be used as needed up to three times daily for nausea/headache associated with dosing.    Use at least 1 form of contraception, since methotrexate can cause birth defects.  3.  Rituxan 500 mg IV to be given no less than 6 months after January 27, 2025  4.  If methotrexate is well-tolerated in 3 to 4 weeks, but no change in nose or tongue/gum symptoms, will increase methotrexate dose

## 2025-03-20 NOTE — NURSING NOTE
"Chief Complaint   Patient presents with    RECHECK     ANCA-associated vasculitis (H)       Vitals:    03/20/25 0949   BP: 111/71   BP Location: Left arm   Patient Position: Sitting   Cuff Size: Adult Large   Pulse: 90   SpO2: 99%   Weight: 99.1 kg (218 lb 8 oz)   Height: 1.626 m (5' 4\")       Body mass index is 37.51 kg/m .    "

## 2025-04-08 ENCOUNTER — LAB (OUTPATIENT)
Dept: LAB | Facility: CLINIC | Age: 25
End: 2025-04-08
Payer: COMMERCIAL

## 2025-04-08 DIAGNOSIS — M54.50 LOW BACK PAIN: ICD-10-CM

## 2025-04-08 DIAGNOSIS — M31.31 GRANULOMATOSIS WITH POLYANGIITIS WITH RENAL INVOLVEMENT (H): ICD-10-CM

## 2025-04-08 LAB
ALBUMIN MFR UR ELPH: 49.1 MG/DL
ALBUMIN SERPL BCG-MCNC: 4.1 G/DL (ref 3.5–5.2)
ALBUMIN UR-MCNC: 50 MG/DL
ALP SERPL-CCNC: 97 U/L (ref 40–150)
ALT SERPL W P-5'-P-CCNC: 25 U/L (ref 0–50)
ANION GAP SERPL CALCULATED.3IONS-SCNC: 11 MMOL/L (ref 7–15)
APPEARANCE UR: CLEAR
AST SERPL W P-5'-P-CCNC: 19 U/L (ref 0–45)
BACTERIA #/AREA URNS HPF: ABNORMAL /HPF
BASOPHILS # BLD AUTO: 0 10E3/UL (ref 0–0.2)
BASOPHILS NFR BLD AUTO: 1 %
BILIRUB SERPL-MCNC: 0.2 MG/DL
BILIRUB UR QL STRIP: NEGATIVE
BUN SERPL-MCNC: 23.3 MG/DL (ref 6–20)
CALCIUM SERPL-MCNC: 9.3 MG/DL (ref 8.8–10.4)
CHLORIDE SERPL-SCNC: 104 MMOL/L (ref 98–107)
COLOR UR AUTO: ABNORMAL
CREAT SERPL-MCNC: 1.34 MG/DL (ref 0.51–0.95)
CREAT UR-MCNC: 205 MG/DL
CRP SERPL-MCNC: 13.9 MG/L
EGFRCR SERPLBLD CKD-EPI 2021: 56 ML/MIN/1.73M2
EOSINOPHIL # BLD AUTO: 0.2 10E3/UL (ref 0–0.7)
EOSINOPHIL NFR BLD AUTO: 5 %
ERYTHROCYTE [DISTWIDTH] IN BLOOD BY AUTOMATED COUNT: 14.3 % (ref 10–15)
GLUCOSE SERPL-MCNC: 102 MG/DL (ref 70–99)
GLUCOSE UR STRIP-MCNC: NEGATIVE MG/DL
HCO3 SERPL-SCNC: 25 MMOL/L (ref 22–29)
HCT VFR BLD AUTO: 37.6 % (ref 35–47)
HGB BLD-MCNC: 11.5 G/DL (ref 11.7–15.7)
HGB UR QL STRIP: ABNORMAL
IMM GRANULOCYTES # BLD: 0 10E3/UL
IMM GRANULOCYTES NFR BLD: 0 %
KETONES UR STRIP-MCNC: NEGATIVE MG/DL
LEUKOCYTE ESTERASE UR QL STRIP: NEGATIVE
LYMPHOCYTES # BLD AUTO: 1.8 10E3/UL (ref 0.8–5.3)
LYMPHOCYTES NFR BLD AUTO: 42 %
MCH RBC QN AUTO: 24.5 PG (ref 26.5–33)
MCHC RBC AUTO-ENTMCNC: 30.6 G/DL (ref 31.5–36.5)
MCV RBC AUTO: 80 FL (ref 78–100)
MONOCYTES # BLD AUTO: 0.4 10E3/UL (ref 0–1.3)
MONOCYTES NFR BLD AUTO: 8 %
MUCOUS THREADS #/AREA URNS LPF: PRESENT /LPF
NEUTROPHILS # BLD AUTO: 2 10E3/UL (ref 1.6–8.3)
NEUTROPHILS NFR BLD AUTO: 45 %
NITRATE UR QL: NEGATIVE
NRBC # BLD AUTO: 0 10E3/UL
NRBC BLD AUTO-RTO: 0 /100
PH UR STRIP: 5.5 [PH] (ref 5–7)
PHOSPHATE SERPL-MCNC: 3.5 MG/DL (ref 2.5–4.5)
PLATELET # BLD AUTO: 326 10E3/UL (ref 150–450)
POTASSIUM SERPL-SCNC: 4.7 MMOL/L (ref 3.4–5.3)
PROT SERPL-MCNC: 7.5 G/DL (ref 6.4–8.3)
PROT/CREAT 24H UR: 0.24 MG/MG CR (ref 0–0.2)
RBC # BLD AUTO: 4.7 10E6/UL (ref 3.8–5.2)
RBC #/AREA URNS AUTO: ABNORMAL /HPF
SODIUM SERPL-SCNC: 140 MMOL/L (ref 135–145)
SP GR UR STRIP: 1.02 (ref 1–1.03)
SQUAMOUS #/AREA URNS AUTO: ABNORMAL /LPF
TRANS CELLS #/AREA URNS HPF: ABNORMAL /HPF
UROBILINOGEN UR STRIP-MCNC: NORMAL MG/DL
WBC # BLD AUTO: 4.4 10E3/UL (ref 4–11)
WBC #/AREA URNS AUTO: ABNORMAL /HPF

## 2025-04-08 PROCEDURE — 81001 URINALYSIS AUTO W/SCOPE: CPT

## 2025-04-08 PROCEDURE — 80053 COMPREHEN METABOLIC PANEL: CPT

## 2025-04-09 LAB
C3 SERPL-MCNC: 123 MG/DL (ref 81–157)
C4 SERPL-MCNC: 37 MG/DL (ref 13–39)
CREAT UR-MCNC: 202 MG/DL
MICROALBUMIN UR-MCNC: 231 MG/L
MICROALBUMIN/CREAT UR: 114.36 MG/G CR (ref 0–25)

## 2025-04-12 LAB
DSDNA AB SER-ACNC: 2 IU/ML
PROTEINASE3 AB SER IA-ACNC: 6.7 U/ML
PROTEINASE3 AB SER IA-ACNC: POSITIVE

## 2025-04-18 NOTE — PLAN OF CARE
Form received at [facility] on 4/18/2025.   Please note that it takes 7-10 business days for completion.  Auth emailed to patient to e-mail address on file.     Form received was missing pages 1 and 3; replaced with pages from form in the Electronic Forms Library.      Neuro: A&Ox4.   Cardiac: SR. HR 70s VSS.   Respiratory: Sating high 90s on RA.  GI/: Adequate urine output. No BM  Diet/appetite: Tolerating 2gK+ restricted diet. Eating well.  Activity:  Ind within room  Pain: Denies pain   Skin: No new deficits noted.  LDA's: L [id number]  *Chemo precautions until 2/2     Plan: Continue with POC. Notify primary team with changes.

## 2025-04-28 ENCOUNTER — MYC REFILL (OUTPATIENT)
Dept: RHEUMATOLOGY | Facility: CLINIC | Age: 25
End: 2025-04-28
Payer: COMMERCIAL

## 2025-04-28 DIAGNOSIS — M31.31 GRANULOMATOSIS WITH POLYANGIITIS WITH RENAL INVOLVEMENT (H): ICD-10-CM

## 2025-04-28 RX ORDER — METHOTREXATE 2.5 MG/1
15 TABLET ORAL
Qty: 24 TABLET | Refills: 3 | Status: CANCELLED | OUTPATIENT
Start: 2025-04-28

## 2025-04-28 NOTE — TELEPHONE ENCOUNTER
methotrexate 2.5 MG tablet : refills on file  - Rx sent 3/20/2025 Disp:24 tab (30 day) R:3 Pharmacy: [id number] - [place]   - message sent to patient

## 2025-04-29 ENCOUNTER — MYC MEDICAL ADVICE (OUTPATIENT)
Dept: RHEUMATOLOGY | Facility: CLINIC | Age: 25
End: 2025-04-29
Payer: COMMERCIAL

## 2025-04-30 ENCOUNTER — LAB (OUTPATIENT)
Dept: LAB | Facility: CLINIC | Age: 25
End: 2025-04-30
Payer: COMMERCIAL

## 2025-04-30 ENCOUNTER — OFFICE VISIT (OUTPATIENT)
Dept: NEPHROLOGY | Facility: CLINIC | Age: 25
End: 2025-04-30
Attending: INTERNAL MEDICINE
Payer: COMMERCIAL

## 2025-04-30 VITALS
TEMPERATURE: 98 F | OXYGEN SATURATION: 100 % | WEIGHT: 218.5 LBS | DIASTOLIC BLOOD PRESSURE: 74 MMHG | HEART RATE: 80 BPM | SYSTOLIC BLOOD PRESSURE: 108 MMHG | BODY MASS INDEX: 37.51 KG/M2

## 2025-04-30 DIAGNOSIS — M31.31 GRANULOMATOSIS WITH POLYANGIITIS WITH RENAL INVOLVEMENT (H): ICD-10-CM

## 2025-04-30 DIAGNOSIS — I77.82 ANCA-ASSOCIATED VASCULITIS (H): Primary | ICD-10-CM

## 2025-04-30 LAB
ALBUMIN MFR UR ELPH: 48 MG/DL
ALBUMIN SERPL BCG-MCNC: 4 G/DL (ref 3.5–5.2)
ALBUMIN UR-MCNC: 30 MG/DL
ANION GAP SERPL CALCULATED.3IONS-SCNC: 8 MMOL/L (ref 7–15)
APPEARANCE UR: CLEAR
BASOPHILS # BLD AUTO: 0 10E3/UL (ref 0–0.2)
BASOPHILS NFR BLD AUTO: 1 %
BILIRUB UR QL STRIP: NEGATIVE
BUN SERPL-MCNC: 24.3 MG/DL (ref 6–20)
CALCIUM SERPL-MCNC: 9.6 MG/DL (ref 8.8–10.4)
CD19 B CELL COMMENT: ABNORMAL
CD19 CELLS # BLD: <1 CELLS/UL (ref 107–698)
CD19 CELLS NFR BLD: <1 % (ref 6–27)
CHLORIDE SERPL-SCNC: 106 MMOL/L (ref 98–107)
COLOR UR AUTO: YELLOW
CREAT SERPL-MCNC: 1.33 MG/DL (ref 0.51–0.95)
CREAT UR-MCNC: 172 MG/DL
CREAT UR-MCNC: 172 MG/DL
CRP SERPL-MCNC: 5.69 MG/L
EGFRCR SERPLBLD CKD-EPI 2021: 56 ML/MIN/1.73M2
EOSINOPHIL # BLD AUTO: 0.2 10E3/UL (ref 0–0.7)
EOSINOPHIL NFR BLD AUTO: 4 %
ERYTHROCYTE [DISTWIDTH] IN BLOOD BY AUTOMATED COUNT: 16.5 % (ref 10–15)
GLUCOSE SERPL-MCNC: 85 MG/DL (ref 70–99)
GLUCOSE UR STRIP-MCNC: NEGATIVE MG/DL
HCO3 SERPL-SCNC: 24 MMOL/L (ref 22–29)
HCT VFR BLD AUTO: 35.4 % (ref 35–47)
HGB BLD-MCNC: 11.1 G/DL (ref 11.7–15.7)
HGB UR QL STRIP: ABNORMAL
HYALINE CASTS: 8 /LPF
IMM GRANULOCYTES # BLD: 0 10E3/UL
IMM GRANULOCYTES NFR BLD: 0 %
KETONES UR STRIP-MCNC: NEGATIVE MG/DL
LEUKOCYTE ESTERASE UR QL STRIP: NEGATIVE
LYMPHOCYTES # BLD AUTO: 1.2 10E3/UL (ref 0.8–5.3)
LYMPHOCYTES NFR BLD AUTO: 25 %
MCH RBC QN AUTO: 25.1 PG (ref 26.5–33)
MCHC RBC AUTO-ENTMCNC: 31.4 G/DL (ref 31.5–36.5)
MCV RBC AUTO: 80 FL (ref 78–100)
MICROALBUMIN UR-MCNC: 249 MG/L
MICROALBUMIN/CREAT UR: 144.77 MG/G CR (ref 0–25)
MONOCYTES # BLD AUTO: 0.5 10E3/UL (ref 0–1.3)
MONOCYTES NFR BLD AUTO: 9 %
MUCOUS THREADS #/AREA URNS LPF: PRESENT /LPF
NEUTROPHILS # BLD AUTO: 2.9 10E3/UL (ref 1.6–8.3)
NEUTROPHILS NFR BLD AUTO: 60 %
NITRATE UR QL: NEGATIVE
NRBC # BLD AUTO: 0 10E3/UL
NRBC BLD AUTO-RTO: 0 /100
PH UR STRIP: 5.5 [PH] (ref 5–7)
PHOSPHATE SERPL-MCNC: 3.7 MG/DL (ref 2.5–4.5)
PLATELET # BLD AUTO: 336 10E3/UL (ref 150–450)
POTASSIUM SERPL-SCNC: 4.7 MMOL/L (ref 3.4–5.3)
PROT/CREAT 24H UR: 0.28 MG/MG CR (ref 0–0.2)
RBC # BLD AUTO: 4.42 10E6/UL (ref 3.8–5.2)
RBC URINE: 5 /HPF
SODIUM SERPL-SCNC: 138 MMOL/L (ref 135–145)
SP GR UR STRIP: 1.03 (ref 1–1.03)
SQUAMOUS EPITHELIAL: 3 /HPF
UROBILINOGEN UR STRIP-MCNC: NORMAL MG/DL
WBC # BLD AUTO: 4.8 10E3/UL (ref 4–11)
WBC URINE: 1 /HPF

## 2025-04-30 PROCEDURE — G0463 HOSPITAL OUTPT CLINIC VISIT: HCPCS | Performed by: INTERNAL MEDICINE

## 2025-04-30 PROCEDURE — 3078F DIAST BP <80 MM HG: CPT | Performed by: INTERNAL MEDICINE

## 2025-04-30 PROCEDURE — 86140 C-REACTIVE PROTEIN: CPT | Performed by: PATHOLOGY

## 2025-04-30 PROCEDURE — 99417 PROLNG OP E/M EACH 15 MIN: CPT | Performed by: INTERNAL MEDICINE

## 2025-04-30 PROCEDURE — 99000 SPECIMEN HANDLING OFFICE-LAB: CPT | Performed by: PATHOLOGY

## 2025-04-30 PROCEDURE — 81001 URINALYSIS AUTO W/SCOPE: CPT | Performed by: PATHOLOGY

## 2025-04-30 PROCEDURE — 85025 COMPLETE CBC W/AUTO DIFF WBC: CPT | Performed by: PATHOLOGY

## 2025-04-30 PROCEDURE — 80069 RENAL FUNCTION PANEL: CPT | Performed by: PATHOLOGY

## 2025-04-30 PROCEDURE — 3074F SYST BP LT 130 MM HG: CPT | Performed by: INTERNAL MEDICINE

## 2025-04-30 PROCEDURE — 86355 B CELLS TOTAL COUNT: CPT | Performed by: INTERNAL MEDICINE

## 2025-04-30 PROCEDURE — 84156 ASSAY OF PROTEIN URINE: CPT | Performed by: PATHOLOGY

## 2025-04-30 PROCEDURE — 86036 ANCA SCREEN EACH ANTIBODY: CPT | Performed by: INTERNAL MEDICINE

## 2025-04-30 PROCEDURE — G2211 COMPLEX E/M VISIT ADD ON: HCPCS | Performed by: INTERNAL MEDICINE

## 2025-04-30 PROCEDURE — 99215 OFFICE O/P EST HI 40 MIN: CPT | Performed by: INTERNAL MEDICINE

## 2025-04-30 PROCEDURE — 83516 IMMUNOASSAY NONANTIBODY: CPT | Performed by: INTERNAL MEDICINE

## 2025-04-30 PROCEDURE — 82043 UR ALBUMIN QUANTITATIVE: CPT | Performed by: INTERNAL MEDICINE

## 2025-04-30 PROCEDURE — 36415 COLL VENOUS BLD VENIPUNCTURE: CPT | Performed by: PATHOLOGY

## 2025-04-30 PROCEDURE — 1126F AMNT PAIN NOTED NONE PRSNT: CPT | Performed by: INTERNAL MEDICINE

## 2025-04-30 RX ORDER — METHOTREXATE 2.5 MG/1
20 TABLET ORAL
Qty: 32 TABLET | Refills: 3 | Status: SHIPPED | OUTPATIENT
Start: 2025-04-30

## 2025-04-30 ASSESSMENT — PAIN SCALES - GENERAL: PAINLEVEL_OUTOF10: NO PAIN (0)

## 2025-04-30 NOTE — NURSING NOTE
Chief Complaint   Patient presents with    RECHECK     Return Glomerular       /74   Pulse 80   Temp 98  F (36.7  C) (Oral)   Wt 99.1 kg (218 lb 8 oz)   SpO2 100%   BMI 37.51 kg/m      Kirill Mercer on 4/30/2025 at 1:16 PM

## 2025-04-30 NOTE — PROGRESS NOTES
CC:   -PR3 ANCA vasculitis  -CKD stage 3    HPI:   Cande Shields is a 24 year old female L1 who presents for Follow-up of PR3 ANCA vasculitis. She was initially seen by me in clinic in 2023 while she was pregnant. She delivered baby Neo at 33 weeks.    Her past medical history is significant for:  1.  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  Chronic kidney disease  3.  History of intrauterine fetal death secondary to severe preeclampsia at 31 weeks  4  Severe preeclampsia and IUFD at 31 weeks  5  Saddle pulmonary embolus and history of DVT with PE, Cardiac thrombus  6. bicornuate uterus  7. Large subchoronic hemorrhage  8. ADHD    ANCA history:  The patient was initially diagnosed with CO-3 ANCA vascultis at the age of 10 years. 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 She was treated in the past by Dr. Velásquez. She was treated with methotrexate, high dose steroid. As per patient, she did not respond to rituximab so she was placed on oral cytoxan (unclear how long) then she had been followed by Dr. Enriqueta Alcantara, [facility].    Her Cr was 0.6-0.8mg/dl in . She was pregnant and had severe pre-eclampsia and noted to have IUFD at 31 weeks in 2020. In 2020, she developed several days of myalgia, joint pain, pedal edema and was admitted to [facility] between -. She was thought to have a flare of GPA . Of note, she had COVID 1 month preceding this event. She was given solumedrol 80 mg per day and received 4 doses of  mg/m2. She received the last dose of the series on 21 ([facility]). Cr during that flare was 0.77. However, she was readmitted again on 22 after rising creatinine=2.05mg/dl. Followup pulse methylprednisolone and Cytoxan 500 mg/m2 were given on 2021  in response to rising creatinine and active urinary sediment. She received IV cytoxan 500 mg/m2 x 3 doses (total dose cumulation about 3.5 g). Gynecology input on ovarian preservation in setting of cyclophosphamide - did not give leuprolide. Eventually steroid was tapered down in 4/2021. She was then lost follow-up and did not receive maintenance therapy. Her creatinine was 1.11 mg/dl in January 2022.      She presented in December 2022 with cold symptoms with positive home COVID test and was treated with Paxlovid. She also noted to have petechial rashes similar to previous flare. Upon presentation, she has Cr of 2.05 but after IV fluid and 1 dose of methlyprednisolone 125 mg Cr down to 1.48 the next day.  She has no leg swelling. She has some phlegm with blood tinge and denied any SOB. We thought that she has flare as her PR3 was elevated at 27. She received tapering dose of methylprednisone 125 mg IV x1-> 32 mg IV x1 and was started on oral Medrol tapering dose: Take 8 tablets (32 mg) by mouth daily for 6 days, THEN 6 tablets (24 mg) daily for 7 days, THEN 4 tablets (16 mg) daily for 7 days, THEN 3 tablets (12 mg) daily for 14 days. We also started her on Avacopan 30 mg BID through emergency program. She received Rituximab 375 mg/m2 1st dose on 1/10/23 and 2nd dose on 1/17/23 and 3rd dose on 1/24/23. She did not receive her last dose as she was found to be pregnant at 4 weeks in February. She also received avacopan which was stopped once the team knew she was pregnant.     Of note, she was hospitalized February 2023 for acute saddle PE and underwent  right atrial thrombectomy with sternotomy in the setting of pregnancy and COVID-19. Her course was complicated by hospital-acquired pneumonia. She had a PFO which was closed surgically.    During her pregnancy, her fetal ultrasound showed IUGR and she was delivered on September 1, 2023 at 33 weeks: baby boy, apgars 7-8, weighing 3lbs 6oz. He was admitted to the NICU  for ~2 months.    Interval history:  She presented to the emergency late December 2024 with hemoptysis, which was attributed to influenza.  During that admission she was also found to have acute kidney injury which was thought to be prerenal and her creatinine was back to baseline with IV fluids.  Her serology was not suggestive of a vasculitis flare.    Over the past several weeks, she has been experiencing sinus symptoms in the form of congestion and epistaxis.  She was seen by several ENT specialist.  She was treated initially for bacterial sinusitis with antibiotics with no improvement.  She saw Dr. 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  She does not have any cartilage breakdown or any septal perforation.  She saw her rheumatologist Dr. Call who started her on methotrexate 15 mg daily earlier in March 2025.    She also reports some concern today with scattered pinpoint skin rash which she thinks is purpura. Otherwise, she feels okay.  She denies any joint pain, neuropathy, headache, chest pain, shortness of breath, fevers or chills.  She denies any oral ulcers.  Her spirits are high.  She is enjoying her motherhood and thinks this is the best thing she had so far.  Her baby has been sick recently.  She is not using any contraception.    Immunosuppression hx:  - Cytoxan at the age of 10 when she was first diagnosed  - Cytoxan in 2021   - Her third flare was in January 2023 where she was treated with 3 doses of rituximab and was found to be pregnant so her forth dose of rituximab was withheld.  - Imuran as a maintenance therapy during pregnancy (started mid July 2023)  - Rituximab 500 mg in Feb 2024 (missed dose in August 2024).  - Rituximab 500 mg in January 2025    Social history: She lives in [place].  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 She is single. Her mom lives with her and help with the  baby.    Family history not significant for autoimmune or chronic kidney disease.      Allergies   Allergen Reactions    Heparin Other (See Comments)     Reaction: HIT    Penicillins Rash     Unknown, but think rash.    Tolerated cephalexin (12/27/20), cefpodoxime (12/27/20), Ceftriaxone (Feb 2023), Zosyn (Feb 2023)    Patient states she has taken Amoxicillin many times before without issue.      Unknown, but think rash. Tolerated cephalexin (12/27/20), cefpodoxime (12/27/20), Ceftriaxone (Feb 2023), Zosyn (Feb 2023)    Reports she tolerates amoxicillin       Current Outpatient Medications   Medication Sig Dispense Refill    acetaminophen (TYLENOL) 500 MG tablet Take 500-1,000 mg by mouth every 6 hours as needed for mild pain.      apixaban ANTICOAGULANT (ELIQUIS) 2.5 MG tablet Take 1 tablet (2.5 mg) by mouth 2 times daily. 180 tablet 3    folic acid (FOLVITE) 1 MG tablet Take 1 tablet (1 mg) by mouth daily. 90 tablet 3    methotrexate 2.5 MG tablet Take 6 tablets (15 mg) by mouth every 7 days. Labs every 8 - 12 weeks for refills. 24 tablet 3    riTUXimab (RITUXAN) 500 MG/50ML injection Inject into the vein.      tranexamic acid (LYSTEDA) 650 MG tablet Take 2 tablets (1,300 mg) by mouth 3 times daily. During menstrual cycle 60 tablet 11    cefuroxime (CEFTIN) 500 MG tablet Take 1 tablet (500 mg) by mouth 2 times daily. (Patient not taking: Reported on 4/30/2025) 20 tablet 0     No current facility-administered medications for this visit.       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    Multiple subsegmental pulmonary emboli without acute cor pulmonale (H) 03/2021    PFO (patent foramen ovale)     Preeclampsia, third trimester     Spontaneous pregnancy loss 2020    31 weeks    Stage 3b chronic kidney disease (H)     Wegener's disease, pulmonary 01/01/2010    renal  biopsy       Past Surgical History:   Procedure Laterality Date     SECTION N/A 2023    Procedure:  section;  Surgeon: Wendy Vera MD;  Location: [facility] L[facility]    ENT SURGERY  2000    cyst    EXCISE GANGLION WRIST  2009    IR LOWER EXTREMITY ANGIOGRAM LEFT  3/21/2021    IR THROMBOLYSIS ART/VENOUS INFUSION SUBSQ DAY  3/21/2021    IR THROMBOLYSIS ART/VENOUS INFUSION SUBSQ DAY  3/21/2021    STERNOTOMY  2023    right atrial thrombectomy    THROMBECTOMY ATRIUM Right 2023    THROMBECTOMY PERCUTANEOUS N/A 2023    Procedure: Emergency Sternotomy, Right Atrial Thrombectomy, Central Cannulation, Bilateral Femoral Cannulation, Closure of PFO ; Transesophageal Echocardiogram performed by anesthesia staff;  Surgeon: Adelfo Elmore MD;  Location: [facility] OR    THROMBECTOMY PERCUTANEOUS N/A 2023    Procedure: Angiovac Mediated for Right Atrial Clot; Intracatheter Thrombectomy via bilateral percutaneous femoral venous access with 26 FR Right Femoral vein and 17 FR Left Femoral Vein cannulas. Transesophageal echchocardiogram performed by anesthesia staff;  Surgeon: Po Monterroso MD;  Location: [facility] OR       Social History     Tobacco Use    Smoking status: Every Day     Types: Vaping Device    Smokeless tobacco: Never    Tobacco comments:     Vaping stopped    Vaping Use    Vaping status: Former    Quit date: 2024    Substances: Nicotine, Flavoring    Devices: Disposable   Substance Use Topics    Alcohol use: Not Currently    Drug use: No       Family History   Problem Relation Age of Onset    Thyroid Disease Mother     Cancer Maternal Grandfather     Asthma No family hx of     Diabetes No family hx of     Anesthesia Reaction No family hx of     Venous thrombosis No family hx of        ROS: A 12 system review of systems was negative other than noted here or above.     Exam:  /74   Pulse 80   Temp 98  F (36.7  C) (Oral)   Wt 99.1 kg (218 lb 8 oz)   SpO2  100%   BMI 37.51 kg/m     BP Readings from Last 6 Encounters:   04/30/25 108/74   03/20/25 111/71   03/17/25 (!) 140/87   01/27/25 125/81   01/16/25 115/79   01/09/25 109/74     Wt Readings from Last 4 Encounters:   04/30/25 99.1 kg (218 lb 8 oz)   03/20/25 99.1 kg (218 lb 8 oz)   01/27/25 103.2 kg (227 lb 9.6 oz)   01/23/25 101.6 kg (224 lb)     Physical Exam:  General : Alert, cooperative, comfortable  Skin: Skin color, texture and turgor normal, no rashes, no lesions   Lymph Nodes: No obvious adenopathy, no swelling   Eyes: No scleral icterus, equal pupils  HENT: red nasal mucosa with scattered ulcerations.  Lungs: Normal respiratory effort, bilateral symmetrical breath sounds, No added sounds  Heart: Regular rate and rhythm, No murmurs, rub or gallop  Abdomen: Normal bowel sounds, no tenderness, no organomegaly  Musculoskeletal: No obvious abnormalities  Neurologic: Grossly intact  2-3 red spots scattered on leg, upper left thigh    Results: reviewed in details with the patient    Assessment/Plan:  #1 granulomatous polyangiitis/ME-3 ANCA vasculitis with glomerulonephritis, upper respiratory, musculoskeletal, and cutaneous involvement  #2 CKD stage III  # 3 History of severe preeclampsia  Diagnosed early at the age of 10 when she received hemodialysis at that point and Cytoxan.  It seemed that she had another flare in 2021 which was also treated with Cytoxan.  Her third flare was in January 2023 where she was treated with 3 doses of rituximab and was found to be pregnant so her forth dose of rituximab was withheld. She was on AZA while pregnant. That was stopped and she received 1 dose of rituximab 500 mg in Feb 2024. She missed her 6 month dose in August 2024.    -I am not concerned about her skin rash.  She does seem to have active granulomatous inflammation involving her nose.  She has been started by Dr. Call from rheumatology on methotrexate 15 mg weekly for almost 6 weeks now without much improvement.  I did discuss with her today a short course of prednisone for 4 weeks but she does not prefer to use prednisone.  Dr. Call had also suggested to increase her methotrexate dose to 20 mg weekly.  Her ANCA serology remains positive though mostly downtrending.  Her CRP is also downtrending.      -Her UA show 5 red blood cells and she continues to have some proteinuria but overall her renal disease seems to be in remission.    - Her last dose of rituximab was January 2025.   Her CD19 count remains suppressed and her next dose is scheduled for August 2025 in [place]  - We discussed today the duration of rituximab; given her TN-3 status and her frequent relapses, I discussed with her that she will need to be on rituximab for at least 4-5 years.  I also emphasized the importance of compliance with infusions as she already have some degree of kidney disease.  - Her blood pressure is on the lower end and we will hold on RAASi for now.    #4 hypertension: Her blood pressure is currently on the lower end, with some readings around 100 systolic  - Will hold on the lisinopril for now.    #5 Normocytic Anemia: Likely anemia of chronic disease. She had received IV iron loading during her pregnancy. Her Hb today is at 11 g/dl.  She received some oral iron for 3 months.    #6 Contraception: she is currently not using any. She doesn't want any babies in the near future. I emphasized the importance of contraception. Even if she has lower fertility given her CKD and hx of cyclophosphamide, she still can get pregnant. Also counseled on the risk of fetal lymphopenia with Rituximab and the teratogenicity with methotroxate.    #7 Hypercoagulable state: She is being followed by Dr. Linton from hematology.  She needs to be on life-long anticoagulation    Other medical issues:    History of unprovoked pulmonary embolism back in March 2021.   Pregnancy provoked massive pulmonary embolism in Feb 2023 S/P urgent sternotomy for  thrombectomy and eventual PFO closure.   History of arterial thrombosis with occlusion of lower extremity arteries S/P thrombolysis in March 2021. This was likely caused by emboli through right to left shunting of her PFO at the time.   History of granulomatous polyangiitis.   History of severe preeclampsia / HELLP.   Evidence of chronic pulmonary embolism on recent CT chest on 12/29/2024. 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     The total time of this encounter amounted to 65 minutes on the day of the encounter 4/30/2025. This time included face to face time spent with the patient, preparatory work and chart review, ordering tests, and performing post visit documentation.    The longitudinal plan of care for this patient was addressed during this visit. Due to the added complexity in care, I will continue to support the patient with subsequent management of this condition(s) and with the ongoing continuity of care of this condition(s).     *This dictation was prepared in part using Dragon recognition software.  As a result errors may occur. When identified these transcription errors have been corrected.  While every attempt is made to correct errors during dictation, errors may still exist.     Kang Goodson MD   [facility]   Department of Medicine   Division of Renal Disease and Hypertension

## 2025-04-30 NOTE — LETTER
2025       RE: Cande Shields  [address] [facility] [address]     Dear Colleague,    Thank you for referring your patient, Cande Shields, to the [facility] [place] at [facility]. Please see a copy of my visit note below.    CC:   -PR3 ANCA vasculitis  -CKD stage 3    HPI:   Cande Shields is a 24 year old female L1 who presents for Follow-up of PR3 ANCA vasculitis. She was initially seen by me in clinic in 2023 while she was pregnant. She delivered baby Neo at 33 weeks.    Her past medical history is significant for:  1.  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  Chronic kidney disease  3.  History of intrauterine fetal death secondary to severe preeclampsia at 31 weeks  4  Severe preeclampsia and IUFD at 31 weeks  5  Saddle pulmonary embolus and history of DVT with PE, Cardiac thrombus  6. bicornuate uterus  7. Large subchoronic hemorrhage  8. ADHD    ANCA history:  The patient was initially diagnosed with WI-3 ANCA vascultis at the age of 10 years. 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 She was treated in the past by Dr. Velásquez. She was treated with methotrexate, high dose steroid. As per patient, she did not respond to rituximab so she was placed on oral cytoxan (unclear how long) then she had been followed by Dr. Enriqueta Alcantara, pediatric rheumatologist at [facility].    Her Cr was 0.6-0.8mg/dl in . She was pregnant and had severe pre-eclampsia and noted to have IUFD at 31 weeks in 2020. In 2020, she developed several days of myalgia, joint pain, pedal edema and was admitted to [facility] between -. She was thought to have a flare of GPA . Of note, she had COVID 1 month preceding this event. She was given  solumedrol 80 mg per day and received 4 doses of  mg/m2. She received the last dose of the series on 1/20/21 ([facility]). Cr during that flare was 0.77. However, she was readmitted again on 1/27/22 after rising creatinine=2.05mg/dl. Followup pulse methylprednisolone and Cytoxan 500 mg/m2 were given on 1- in response to rising creatinine and active urinary sediment. She received IV cytoxan 500 mg/m2 x 3 doses (total dose cumulation about 3.5 g). Gynecology input on ovarian preservation in setting of cyclophosphamide - did not give leuprolide. Eventually steroid was tapered down in 4/2021. She was then lost follow-up and did not receive maintenance therapy. Her creatinine was 1.11 mg/dl in January 2022.      She presented in December 2022 with cold symptoms with positive home COVID test and was treated with Paxlovid. She also noted to have petechial rashes similar to previous flare. Upon presentation, she has Cr of 2.05 but after IV fluid and 1 dose of methlyprednisolone 125 mg Cr down to 1.48 the next day.  She has no leg swelling. She has some phlegm with blood tinge and denied any SOB. We thought that she has flare as her PR3 was elevated at 27. She received tapering dose of methylprednisone 125 mg IV x1-> 32 mg IV x1 and was started on oral Medrol tapering dose: Take 8 tablets (32 mg) by mouth daily for 6 days, THEN 6 tablets (24 mg) daily for 7 days, THEN 4 tablets (16 mg) daily for 7 days, THEN 3 tablets (12 mg) daily for 14 days. We also started her on Avacopan 30 mg BID through emergency program. She received Rituximab 375 mg/m2 1st dose on 1/10/23 and 2nd dose on 1/17/23 and 3rd dose on 1/24/23. She did not receive her last dose as she was found to be pregnant at 4 weeks in February. She also received avacopan which was stopped once the team knew she was pregnant.     Of note, she was hospitalized February 2023 for acute saddle PE and underwent  right atrial thrombectomy with sternotomy in the  setting of pregnancy and COVID-19. Her course was complicated by hospital-acquired pneumonia. She had a PFO which was closed surgically.    During her pregnancy, her fetal ultrasound showed IUGR and she was delivered on September 1, 2023 at 33 weeks: baby boy, apgars 7-8, weighing 3lbs 6oz. He was admitted to the NICU for ~2 months.    Interval history:  She presented to the emergency late December 2024 with hemoptysis, which was attributed to influenza.  During that admission she was also found to have acute kidney injury which was thought to be prerenal and her creatinine was back to baseline with IV fluids.  Her serology was not suggestive of a vasculitis flare.    Over the past several weeks, she has been experiencing sinus symptoms in the form of congestion and epistaxis.  She was seen by several ENT specialist.  She was treated initially for bacterial sinusitis with antibiotics with no improvement.  She saw Dr. 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  She does not have any cartilage breakdown or any septal perforation.  She saw her rheumatologist Dr. Call who started her on methotrexate 15 mg daily earlier in March 2025.    She also reports some concern today with scattered pinpoint skin rash which she thinks is purpura. Otherwise, she feels okay.  She denies any joint pain, neuropathy, headache, chest pain, shortness of breath, fevers or chills.  She denies any oral ulcers.  Her spirits are high.  She is enjoying her motherhood and thinks this is the best thing she had so far.  Her baby has been sick recently.  She is not using any contraception.    Immunosuppression hx:  - Cytoxan at the age of 10 when she was first diagnosed  - Cytoxan in 2021   - Her third flare was in January 2023 where she was treated with 3 doses of rituximab and was found to be pregnant so her forth dose of rituximab was withheld.  - Imuran as  a maintenance therapy during pregnancy (started mid July 2023)  - Rituximab 500 mg in Feb 2024 (missed dose in August 2024).  - Rituximab 500 mg in January 2025    Social history: She lives in [place].  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 She is single. Her mom lives with her and help with the baby.    Family history not significant for autoimmune or chronic kidney disease.      Allergies   Allergen Reactions     Heparin Other (See Comments)     Reaction: HIT     Penicillins Rash     Unknown, but think rash.    Tolerated cephalexin (12/27/20), cefpodoxime (12/27/20), Ceftriaxone (Feb 2023), Zosyn (Feb 2023)    Patient states she has taken Amoxicillin many times before without issue.      Unknown, but think rash. Tolerated cephalexin (12/27/20), cefpodoxime (12/27/20), Ceftriaxone (Feb 2023), Zosyn (Feb 2023)    Reports she tolerates amoxicillin       Current Outpatient Medications   Medication Sig Dispense Refill     acetaminophen (TYLENOL) 500 MG tablet Take 500-1,000 mg by mouth every 6 hours as needed for mild pain.       apixaban ANTICOAGULANT (ELIQUIS) 2.5 MG tablet Take 1 tablet (2.5 mg) by mouth 2 times daily. 180 tablet 3     folic acid (FOLVITE) 1 MG tablet Take 1 tablet (1 mg) by mouth daily. 90 tablet 3     methotrexate 2.5 MG tablet Take 6 tablets (15 mg) by mouth every 7 days. Labs every 8 - 12 weeks for refills. 24 tablet 3     riTUXimab (RITUXAN) 500 MG/50ML injection Inject into the vein.       tranexamic acid (LYSTEDA) 650 MG tablet Take 2 tablets (1,300 mg) by mouth 3 times daily. During menstrual cycle 60 tablet 11     cefuroxime (CEFTIN) 500 MG tablet Take 1 tablet (500 mg) by mouth 2 times daily. (Patient not taking: Reported on 4/30/2025) 20 tablet 0     No current facility-administered medications for this visit.       Past Medical History:   Diagnosis Date     ADHD (attention deficit hyperactivity disorder)      DVT, lower extremity, distal (H)      Dysmenorrhea      Femoral  artery thrombosis, left (H) 2021     Multiple subsegmental pulmonary emboli without acute cor pulmonale (H) 2021     PFO (patent foramen ovale)      Preeclampsia, third trimester      Spontaneous pregnancy loss 2020    31 weeks     Stage 3b chronic kidney disease (H)      Wegener's disease, pulmonary 2010    renal biopsy       Past Surgical History:   Procedure Laterality Date      SECTION N/A 2023    Procedure:  section;  Surgeon: Wendy Vera MD;  Location: [facility] L[facility]D     ENT SURGERY  2000    cyst     EXCISE GANGLION WRIST  2009     IR LOWER EXTREMITY ANGIOGRAM LEFT  3/21/2021     IR THROMBOLYSIS ART/VENOUS INFUSION SUBSQ DAY  3/21/2021     IR THROMBOLYSIS ART/VENOUS INFUSION SUBSQ DAY  3/21/2021     STERNOTOMY  2023    right atrial thrombectomy     THROMBECTOMY ATRIUM Right 2023     THROMBECTOMY PERCUTANEOUS N/A 2023    Procedure: Emergency Sternotomy, Right Atrial Thrombectomy, Central Cannulation, Bilateral Femoral Cannulation, Closure of PFO ; Transesophageal Echocardiogram performed by anesthesia staff;  Surgeon: Adelfo Elmore MD;  Location: [facility] OR     THROMBECTOMY PERCUTANEOUS N/A 2023    Procedure: Angiovac Mediated for Right Atrial Clot; Intracatheter Thrombectomy via bilateral percutaneous femoral venous access with 26 FR Right Femoral vein and 17 FR Left Femoral Vein cannulas. Transesophageal echchocardiogram performed by anesthesia staff;  Surgeon: Po Monterroso MD;  Location: [facility] OR       Social History     Tobacco Use     Smoking status: Every Day     Types: Vaping Device     Smokeless tobacco: Never     Tobacco comments:     Vaping stopped    Vaping Use     Vaping status: Former     Quit date: 2024     Substances: Nicotine, Flavoring     Devices: Disposable   Substance Use Topics     Alcohol use: Not Currently     Drug use: No       Family History   Problem Relation Age of Onset     Thyroid Disease Mother       Cancer Maternal Grandfather      Asthma No family hx of      Diabetes No family hx of      Anesthesia Reaction No family hx of      Venous thrombosis No family hx of        ROS: A 12 system review of systems was negative other than noted here or above.     Exam:  /74   Pulse 80   Temp 98  F (36.7  C) (Oral)   Wt 99.1 kg (218 lb 8 oz)   SpO2 100%   BMI 37.51 kg/m     BP Readings from Last 6 Encounters:   04/30/25 108/74   03/20/25 111/71   03/17/25 (!) 140/87   01/27/25 125/81   01/16/25 115/79   01/09/25 109/74     Wt Readings from Last 4 Encounters:   04/30/25 99.1 kg (218 lb 8 oz)   03/20/25 99.1 kg (218 lb 8 oz)   01/27/25 103.2 kg (227 lb 9.6 oz)   01/23/25 101.6 kg (224 lb)     Physical Exam:  General : Alert, cooperative, comfortable  Skin: Skin color, texture and turgor normal, no rashes, no lesions   Lymph Nodes: No obvious adenopathy, no swelling   Eyes: No scleral icterus, equal pupils  HENT: red nasal mucosa with scattered ulcerations.  Lungs: Normal respiratory effort, bilateral symmetrical breath sounds, No added sounds  Heart: Regular rate and rhythm, No murmurs, rub or gallop  Abdomen: Normal bowel sounds, no tenderness, no organomegaly  Musculoskeletal: No obvious abnormalities  Neurologic: Grossly intact  2-3 red spots scattered on leg, upper left thigh    Results: reviewed in details with the patient    Assessment/Plan:  #1 granulomatous polyangiitis/ME-3 ANCA vasculitis with glomerulonephritis, upper respiratory, musculoskeletal, and cutaneous involvement  #2 CKD stage III  # 3 History of severe preeclampsia  Diagnosed early at the age of 10 when she received hemodialysis at that point and Cytoxan.  It seemed that she had another flare in 2021 which was also treated with Cytoxan.  Her third flare was in January 2023 where she was treated with 3 doses of rituximab and was found to be pregnant so her forth dose of rituximab was withheld. She was on AZA while pregnant. That was stopped and  she received 1 dose of rituximab 500 mg in Feb 2024. She missed her 6 month dose in August 2024.    -I am not concerned about her skin rash.  She does seem to have active granulomatous inflammation involving her nose.  She has been started by Dr. Call from rheumatology on methotrexate 15 mg weekly for almost 6 weeks now without much improvement. I did discuss with her today a short course of prednisone for 4 weeks but she does not prefer to use prednisone.  Dr. Call had also suggested to increase her methotrexate dose to 20 mg weekly.  Her ANCA serology remains positive though mostly downtrending.  Her CRP is also downtrending.      -Her UA show 5 red blood cells and she continues to have some proteinuria but overall her renal disease seems to be in remission.    - Her last dose of rituximab was January 2025.   Her CD19 count remains suppressed and her next dose is scheduled for August 2025 in [facility]  - We discussed today the duration of rituximab; given her CO-3 status and her frequent relapses, I discussed with her that she will need to be on rituximab for at least 4-5 years.  I also emphasized the importance of compliance with infusions as she already have some degree of kidney disease.  - Her blood pressure is on the lower end and we will hold on RAASi for now.    #4 hypertension: Her blood pressure is currently on the lower end, with some readings around 100 systolic  - Will hold on the lisinopril for now.    #5 Normocytic Anemia: Likely anemia of chronic disease. She had received IV iron loading during her pregnancy. Her Hb today is at 11 g/dl.  She received some oral iron for 3 months.    #6 Contraception: she is currently not using any. She doesn't want any babies in the near future. I emphasized the importance of contraception. Even if she has lower fertility given her CKD and hx of cyclophosphamide, she still can get pregnant. Also counseled on the risk of fetal lymphopenia with Rituximab and  the teratogenicity with methotroxate.    #7 Hypercoagulable state: She is being followed by Dr. Linton from hematology.  She needs to be on life-long anticoagulation    Other medical issues:    History of unprovoked pulmonary embolism back in March 2021.   Pregnancy provoked massive pulmonary embolism in Feb 2023 S/P urgent sternotomy for thrombectomy and eventual PFO closure.   History of arterial thrombosis with occlusion of lower extremity arteries S/P thrombolysis in March 2021. This was likely caused by emboli through right to left shunting of her PFO at the time.   History of granulomatous polyangiitis.   History of severe preeclampsia / HELLP.   Evidence of chronic pulmonary embolism on recent CT chest on 12/29/2024. 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     The total time of this encounter amounted to 65 minutes on the day of the encounter 4/30/2025. This time included face to face time spent with the patient, preparatory work and chart review, ordering tests, and performing post visit documentation.    The longitudinal plan of care for this patient was addressed during this visit. Due to the added complexity in care, I will continue to support the patient with subsequent management of this condition(s) and with the ongoing continuity of care of this condition(s).     *This dictation was prepared in part using Dragon recognition software.  As a result errors may occur. When identified these transcription errors have been corrected.  While every attempt is made to correct errors during dictation, errors may still exist.     Fran Nicole MD   [facility]   Department of Medicine   Division of Renal Disease and Hypertension             Again, thank you for allowing me to participate in the care of your patient.      Sincerely,    FRAN NICOLE MD

## 2025-04-30 NOTE — CONFIDENTIAL NOTE
I do not think that the red bumps are caused by methotrexate. If the bumps increase in number, I will ask for patient to be seen by Dr. Israel or Dr. Beauchamp or Dr. Teixeira.    I conferred with Dr. Goodson through [facility] today about the nasal crusting. I agree with her recommendation to increase methotrexate dose to 8 days per week.    I reviewed uirne and blood results from 4-30; these look stable, which is good news with respect to GPA.    Please give progress report to Rheumatology in another 10-14 days.

## 2025-04-30 NOTE — TELEPHONE ENCOUNTER
"Patient called regarding [employer] message sent.  She states she saw Dr Goodson today and Dr Goodson was going to reach out to you to increase her methotrexate.     Patient reports red flat spots on her legs since restarting methotrexate.  Has red raised bumps on her hands and elbows.   She feels like her nose is worse, \"eroding away\". Increased crusting. Last rituxan was 1/27/25.  Currently taking 6 tablets of methotrexate.  Concerns about flare.     Lolis Vera RN    "

## 2025-04-30 NOTE — PATIENT INSTRUCTIONS
It was a pleasure taking care of you today.  I've included a brief summary of our discussion and care plan from today's visit below.  Please review this information with your primary care provider.     My recommendations are summarized as follows:  -Will communicate with Dr. Call  about increasing Methroxate dose.    Who do I call with any questions after my visit?  Please be in touch if there are any further questions that arise following today's visit.  There are multiple ways to contact your nephrology care team.       During business hours, you may reach your Nephrology Care Team or schedule or reschedule an appointment or lab at [phone].       If you need to schedule imaging, please call [phone].   To schedule a COVID test, please call [phone].     You can always send a secure message through [employer]. [employer] messages are answered by your nurse or doctor typically within 24-48 hours. Please allow extra time on weekends and holidays.       For urgent/emergent questions after business hours, you may reach the on-call Nephrology Fellow by contacting the [facility]  at [phone].     How will I get the results of any tests ordered?    You will receive all of your results.  If you have signed up for [employer], any tests ordered at your visit will be available to you once resulted on [employer]. Typically the physician reviews them and may or may not make further recommendations. If there are urgent results that require a change in your care plan, your physician or nurse will call you to discuss the next steps. If you are not on [employer], a letter may be generated and mailed to you with your results.

## 2025-05-22 DIAGNOSIS — M31.31 GRANULOMATOSIS WITH POLYANGIITIS WITH RENAL INVOLVEMENT (H): Primary | ICD-10-CM

## 2025-06-01 ENCOUNTER — MYC REFILL (OUTPATIENT)
Dept: RHEUMATOLOGY | Facility: CLINIC | Age: 25
End: 2025-06-01
Payer: COMMERCIAL

## 2025-06-01 DIAGNOSIS — M31.31 GRANULOMATOSIS WITH POLYANGIITIS WITH RENAL INVOLVEMENT (H): ICD-10-CM

## 2025-06-03 RX ORDER — METHOTREXATE 2.5 MG/1
20 TABLET ORAL
Qty: 32 TABLET | Refills: 3 | OUTPATIENT
Start: 2025-06-03

## 2025-07-12 ENCOUNTER — HEALTH MAINTENANCE LETTER (OUTPATIENT)
Age: 25
End: 2025-07-12

## 2025-07-24 ENCOUNTER — PATIENT OUTREACH (OUTPATIENT)
Dept: NEPHROLOGY | Facility: CLINIC | Age: 25
End: 2025-07-24

## 2025-07-28 ENCOUNTER — PATIENT OUTREACH (OUTPATIENT)
Dept: NEPHROLOGY | Facility: CLINIC | Age: 25
End: 2025-07-28
Payer: MEDICAID

## 2025-07-28 NOTE — PROGRESS NOTES
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    Patient updated that her straight MA is back and active, has not received her [employer] card yet.   Requesting to get set up with Dr. Call, Dr. Ceballos in ENT, and would like to get 8/1 Rituximab approved and beable to complete.  ASSESSMENT:     Patient reports she is taking all medications without any gaps.  Now that her insurance is back on will beable to  her Methotrexate, VitD.  Instructed patient to please update MyChart with her current active insurance so we can request approval for Ritux asap.  Patient encouraged to get labs done as last done in April.  Reached out to Dr. Call's CC nurse, Lolis LUNSFORD to see about rescheduling a follow up this week as missed June appt r/t insurance barriers.    Nurse Assessments:  Denies flare, nasal passages have improved but wanting to followup with them to confirm no flare concerns.  Last ENT appt also missed with Dr. Ceballos r/t insurance barriers.     Medications  Nephology medication reconciliation completed: Yes  Any barriers to taking medication(s) as prescribed?  No  Taking over the counter medication(s?) No    Current Outpatient Medications (Antihypertensive, Cardiovascular, Diuretics, Beta blockers, Calcium blockers, Anticoagulants)   Medication Sig    apixaban ANTICOAGULANT (ELIQUIS) 2.5 MG tablet Take 1 tablet (2.5 mg) by mouth 2 times daily.      folic acid (FOLVITE) 1 MG tablet Take 1 tablet (1 mg) by mouth daily.    methotrexate 2.5 MG tablet Take 8 tablets (20 mg) by mouth every 7 days. Labs every 8 - 12 weeks for refills.    riTUXimab (RITUXAN) 500 MG/50ML injection Inject into the vein.     Current Outpatient Medications (Other)   Medication Sig     acetaminophen (TYLENOL) 500 MG tablet Take 500-1,000 mg by mouth every 6 hours as needed for mild pain.    cefuroxime (CEFTIN) 500 MG tablet Take 1 tablet (500 mg) by mouth 2 times daily. (Patient not taking: Reported on 4/30/2025)    tranexamic acid (LYSTEDA) 650 MG tablet Take 2 tablets (1,300 mg) by mouth 3 times daily. During menstrual cycle     Last Renal Panel:  Sodium   Date Value Ref Range Status   04/30/2025 138 135 - 145 mmol/L Final   07/09/2021 139 133 - 144 mmol/L Final     Potassium   Date Value Ref Range Status   04/30/2025 4.7 3.4 - 5.3 mmol/L Final   02/08/2023 4.5 3.4 - 5.3 mmol/L Final   07/09/2021 4.6 3.4 - 5.3 mmol/L Final     Potassium POCT   Date Value Ref Range Status   02/18/2023 5.1 (H) 3.5 - 5.0 mmol/L Final     Chloride   Date Value Ref Range Status   04/30/2025 106 98 - 107 mmol/L Final   02/08/2023 114 (H) 94 - 109 mmol/L Final   07/09/2021 110 (H) 94 - 109 mmol/L Final     Carbon Dioxide   Date Value Ref Range Status   07/09/2021 23 20 - 32 mmol/L Final     Carbon Dioxide (CO2)   Date Value Ref Range Status   04/30/2025 24 22 - 29 mmol/L Final   02/08/2023 19 (L) 20 - 32 mmol/L Final     Anion Gap   Date Value Ref Range Status   04/30/2025 8 7 - 15 mmol/L Final   02/08/2023 8 3 - 14 mmol/L Final   07/09/2021 6 3 - 14 mmol/L Final     Glucose   Date Value Ref Range Status   04/30/2025 85 70 - 99 mg/dL Final   02/08/2023 84 70 - 99 mg/dL Final   07/09/2021 78 70 - 99 mg/dL Final     GLUCOSE BY METER POCT   Date Value Ref Range Status   02/21/2023 91 70 - 99 mg/dL Final     Urea Nitrogen   Date Value Ref Range Status   04/30/2025 24.3 (H) 6.0 - 20.0 mg/dL Final   02/08/2023 31 (H) 7 - 30 mg/dL Final   07/09/2021 29 7 - 30 mg/dL Final     Creatinine   Date Value Ref Range Status   04/30/2025 1.33 (H) 0.51 - 0.95 mg/dL Final   07/09/2021 1.38 (H) 0.52 - 1.04 mg/dL Final     GFR Estimate   Date Value Ref Range Status   04/30/2025 56 (L) >60 mL/min/1.73m2 Final     Comment:     eGFR  calculated using 2021 CKD-EPI equation.   07/09/2021 54 (L) >60 mL/min/[1.73_m2] Final     Comment:     Non  GFR Calc  Starting 12/18/2018, serum creatinine based estimated GFR (eGFR) will be   calculated using the Chronic Kidney Disease Epidemiology Collaboration   (CKD-EPI) equation.       Calcium   Date Value Ref Range Status   04/30/2025 9.6 8.8 - 10.4 mg/dL Final   07/09/2021 9.2 8.5 - 10.1 mg/dL Final     Phosphorus   Date Value Ref Range Status   04/30/2025 3.7 2.5 - 4.5 mg/dL Final   04/07/2021 3.6 2.5 - 4.5 mg/dL Final     Albumin   Date Value Ref Range Status   04/30/2025 4.0 3.5 - 5.2 g/dL Final   02/08/2023 3.4 3.4 - 5.0 g/dL Final   07/09/2021 3.2 (L) 3.4 - 5.0 g/dL Final       Liver Function Studies:  Recent Labs   Lab Test 04/30/25  1257 04/08/25  1419   PROTTOTAL  --  7.5   ALBUMIN 4.0 4.1   BILITOTAL  --  0.2   ALKPHOS  --  97   AST  --  19   ALT  --  25     CBC Results:  Recent Labs   Lab Test 04/30/25  1257   WBC 4.8   RBC 4.42   HGB 11.1*   HCT 35.4   MCV 80   MCH 25.1*   MCHC 31.4*   RDW 16.5*        Urinalysis:  Color Urine (no units)   Date Value   04/30/2025 Yellow   04/07/2021 Yellow     Appearance Urine (no units)   Date Value   04/30/2025 Clear   04/07/2021 Clear     Glucose Urine (mg/dL)   Date Value   04/30/2025 Negative   04/07/2021 Negative     Bilirubin Urine (no units)   Date Value   04/30/2025 Negative   04/07/2021 Negative     Ketones Urine (mg/dL)   Date Value   04/30/2025 Negative   04/07/2021 Negative     Specific Gravity Urine (no units)   Date Value   04/30/2025 1.028   04/07/2021 >1.030     pH Urine   Date Value   04/30/2025 5.5   04/07/2021 5.5 pH     Protein Albumin Urine (mg/dL)   Date Value   04/30/2025 30 (A)   04/07/2021 >=300 (A)     Urobilinogen Urine   Date Value   10/03/2024 0.2 E.U./dL   04/07/2021 0.2 EU/dL     Nitrite Urine (no units)   Date Value   04/30/2025 Negative   04/07/2021 Negative     Leukocyte Esterase Urine (no units)   Date  Value   04/30/2025 Negative   04/07/2021 Negative       PLAN:     Education:  Method:  general discussion/verbal explanation on post clinic instructions from Dr. Goodson, Dr. Ceballos, and Dr. Call's last notes.    Discussed: managing blood pressure- update if outside of goal range  Labs- encouraged to get as soon as schedule allows- denies any kidney concerns at this time.  low sodium diet- continue  Medications- continue as ordered, if sexually active again, strongly encourage birth control restart asap.  health status- keep us updated on any flare concerns.  Followup with Dr. Call and Dr. Ceballos asap, shared ENT contact information with patient (Dr. Ceballos ENT at  [phone]).    These interventions were used: Collaboration, Evocation, Support autonomy  Education material provided and patient was given an opportunity to ask questions.      Asked and no refills needed per provider.     Follow Up:   Follow up call /MyChart back to confirm insurance has been updated to initiate PA for [place].  Infusion PA team staff message updated to insurance re-instated.  Patient to call/MyChart message with updates.  Followup to support Rheum, Dr. Call appt as needed.    Patient verbalized understanding and will follow up as recommended.    Marielena Jaquez RN  Vasculitis and Lupus Program: [phone]

## 2025-07-29 NOTE — PROGRESS NOTES
Update from Insurance team they have been able to verify insurance thru end of  July.  Called [facility] and they are able to add patient onto Thursday at 1030am.  Reached out to patient and updated appt details, onboard with reschedule and will get Standing Labs for Dr. Goodson and Dr. Call at that time.  Reached out to Insurance Team staff message to request urgent auth.  Updated infusion note to please draw Dr. Goodson and Dr. Call- orders with lab draw per infusion regimen.  Dr. Goodsno updated on plan of care as well.  Marielena Jaquez RN, BSN, PHN  [facility]culitis [facility] Lupus Program Nephrology Nurse Navigator  [phone]

## 2025-07-29 NOTE — PROGRESS NOTES
Update from Infusion PA team that auth is secure.  Updated patient.  Marielena Jaquez RN, BSN, PHN  Vasculitis & Lupus Program Nephrology Nurse Navigator  [phone]

## 2025-07-31 ENCOUNTER — INFUSION THERAPY VISIT (OUTPATIENT)
Dept: INFUSION THERAPY | Facility: CLINIC | Age: 25
End: 2025-07-31
Attending: INTERNAL MEDICINE
Payer: MEDICAID

## 2025-07-31 VITALS
DIASTOLIC BLOOD PRESSURE: 76 MMHG | SYSTOLIC BLOOD PRESSURE: 113 MMHG | TEMPERATURE: 98 F | RESPIRATION RATE: 18 BRPM | OXYGEN SATURATION: 99 % | BODY MASS INDEX: 35.65 KG/M2 | WEIGHT: 207.7 LBS | HEART RATE: 59 BPM

## 2025-07-31 DIAGNOSIS — M31.31 GRANULOMATOSIS WITH POLYANGIITIS WITH RENAL INVOLVEMENT (H): Primary | ICD-10-CM

## 2025-07-31 LAB
ALBUMIN MFR UR ELPH: 30.6 MG/DL
ALBUMIN SERPL BCG-MCNC: 4.1 G/DL (ref 3.5–5.2)
ALBUMIN UR-MCNC: 20 MG/DL
ALT SERPL W P-5'-P-CCNC: 14 U/L (ref 0–50)
ANION GAP SERPL CALCULATED.3IONS-SCNC: 13 MMOL/L (ref 7–15)
APPEARANCE UR: CLEAR
AST SERPL W P-5'-P-CCNC: 23 U/L (ref 0–45)
BACTERIA #/AREA URNS HPF: ABNORMAL /HPF
BASOPHILS # BLD AUTO: 0 10E3/UL (ref 0–0.2)
BASOPHILS NFR BLD AUTO: 1 %
BILIRUB UR QL STRIP: NEGATIVE
BUN SERPL-MCNC: 33 MG/DL (ref 6–20)
CALCIUM SERPL-MCNC: 9.5 MG/DL (ref 8.8–10.4)
CHLORIDE SERPL-SCNC: 106 MMOL/L (ref 98–107)
COLOR UR AUTO: YELLOW
CREAT SERPL-MCNC: 1.27 MG/DL (ref 0.51–0.95)
CREAT UR-MCNC: 150 MG/DL
CREAT UR-MCNC: 156 MG/DL
CRP SERPL-MCNC: 10.4 MG/L
EGFRCR SERPLBLD CKD-EPI 2021: 60 ML/MIN/1.73M2
EOSINOPHIL # BLD AUTO: 0.2 10E3/UL (ref 0–0.7)
EOSINOPHIL NFR BLD AUTO: 3 %
ERYTHROCYTE [DISTWIDTH] IN BLOOD BY AUTOMATED COUNT: 16.6 % (ref 10–15)
GLUCOSE SERPL-MCNC: 89 MG/DL (ref 70–99)
GLUCOSE UR STRIP-MCNC: NEGATIVE MG/DL
HCO3 SERPL-SCNC: 17 MMOL/L (ref 22–29)
HCT VFR BLD AUTO: 36.5 % (ref 35–47)
HGB BLD-MCNC: 11.7 G/DL (ref 11.7–15.7)
HGB UR QL STRIP: ABNORMAL
IMM GRANULOCYTES # BLD: 0 10E3/UL
IMM GRANULOCYTES NFR BLD: 0 %
KETONES UR STRIP-MCNC: NEGATIVE MG/DL
LEUKOCYTE ESTERASE UR QL STRIP: NEGATIVE
LYMPHOCYTES # BLD AUTO: 1.6 10E3/UL (ref 0.8–5.3)
LYMPHOCYTES NFR BLD AUTO: 31 %
MCH RBC QN AUTO: 26 PG (ref 26.5–33)
MCHC RBC AUTO-ENTMCNC: 32.1 G/DL (ref 31.5–36.5)
MCV RBC AUTO: 81 FL (ref 78–100)
MICROALBUMIN UR-MCNC: 119 MG/L
MICROALBUMIN/CREAT UR: 79.33 MG/G CR (ref 0–25)
MONOCYTES # BLD AUTO: 0.5 10E3/UL (ref 0–1.3)
MONOCYTES NFR BLD AUTO: 10 %
MUCOUS THREADS #/AREA URNS LPF: PRESENT /LPF
NEUTROPHILS # BLD AUTO: 2.8 10E3/UL (ref 1.6–8.3)
NEUTROPHILS NFR BLD AUTO: 56 %
NITRATE UR QL: NEGATIVE
NRBC # BLD AUTO: 0 10E3/UL
NRBC BLD AUTO-RTO: 0 /100
PH UR STRIP: 5.5 [PH] (ref 5–7)
PHOSPHATE SERPL-MCNC: 4.2 MG/DL (ref 2.5–4.5)
PLATELET # BLD AUTO: 294 10E3/UL (ref 150–450)
POTASSIUM SERPL-SCNC: 4.9 MMOL/L (ref 3.4–5.3)
PROT/CREAT 24H UR: 0.2 MG/MG CR (ref 0–0.2)
RBC # BLD AUTO: 4.5 10E6/UL (ref 3.8–5.2)
RBC #/AREA URNS AUTO: ABNORMAL /HPF
SKIP: ABNORMAL
SODIUM SERPL-SCNC: 136 MMOL/L (ref 135–145)
SP GR UR STRIP: 1.02 (ref 1–1.03)
SQUAMOUS #/AREA URNS AUTO: ABNORMAL /LPF
TRANS CELLS #/AREA URNS HPF: ABNORMAL /HPF
UROBILINOGEN UR STRIP-MCNC: NORMAL MG/DL
WBC # BLD AUTO: 5.1 10E3/UL (ref 4–11)
WBC #/AREA URNS AUTO: ABNORMAL /HPF

## 2025-07-31 PROCEDURE — 84460 ALANINE AMINO (ALT) (SGPT): CPT

## 2025-07-31 PROCEDURE — 250N000013 HC RX MED GY IP 250 OP 250 PS 637: Performed by: INTERNAL MEDICINE

## 2025-07-31 PROCEDURE — 85014 HEMATOCRIT: CPT

## 2025-07-31 PROCEDURE — 86140 C-REACTIVE PROTEIN: CPT

## 2025-07-31 PROCEDURE — 82435 ASSAY OF BLOOD CHLORIDE: CPT

## 2025-07-31 PROCEDURE — 250N000011 HC RX IP 250 OP 636: Mod: JZ | Performed by: INTERNAL MEDICINE

## 2025-07-31 PROCEDURE — 84156 ASSAY OF PROTEIN URINE: CPT

## 2025-07-31 PROCEDURE — 258N000003 HC RX IP 258 OP 636: Performed by: INTERNAL MEDICINE

## 2025-07-31 PROCEDURE — 84450 TRANSFERASE (AST) (SGOT): CPT

## 2025-07-31 PROCEDURE — 81001 URINALYSIS AUTO W/SCOPE: CPT

## 2025-07-31 PROCEDURE — 82570 ASSAY OF URINE CREATININE: CPT

## 2025-07-31 PROCEDURE — 81003 URINALYSIS AUTO W/O SCOPE: CPT

## 2025-07-31 RX ORDER — DIPHENHYDRAMINE HYDROCHLORIDE 50 MG/ML
50 INJECTION, SOLUTION INTRAMUSCULAR; INTRAVENOUS
Start: 2026-01-01

## 2025-07-31 RX ORDER — METHYLPREDNISOLONE SODIUM SUCCINATE 40 MG/ML
80 INJECTION INTRAMUSCULAR; INTRAVENOUS ONCE
Status: COMPLETED | OUTPATIENT
Start: 2025-07-31 | End: 2025-07-31

## 2025-07-31 RX ORDER — METHYLPREDNISOLONE SODIUM SUCCINATE 125 MG/2ML
125 INJECTION INTRAMUSCULAR; INTRAVENOUS
Status: CANCELLED
Start: 2026-01-01

## 2025-07-31 RX ORDER — DIPHENHYDRAMINE HCL 25 MG
50 CAPSULE ORAL ONCE
Status: COMPLETED | OUTPATIENT
Start: 2025-07-31 | End: 2025-07-31

## 2025-07-31 RX ORDER — METHYLPREDNISOLONE SODIUM SUCCINATE 125 MG/2ML
125 INJECTION INTRAMUSCULAR; INTRAVENOUS
Start: 2026-01-01

## 2025-07-31 RX ORDER — ALBUTEROL SULFATE 0.83 MG/ML
2.5 SOLUTION RESPIRATORY (INHALATION)
OUTPATIENT
Start: 2026-01-01

## 2025-07-31 RX ORDER — DIPHENHYDRAMINE HCL 25 MG
50 CAPSULE ORAL ONCE
Start: 2026-01-01

## 2025-07-31 RX ORDER — METHYLPREDNISOLONE SODIUM SUCCINATE 125 MG/2ML
80 INJECTION INTRAMUSCULAR; INTRAVENOUS ONCE
Status: CANCELLED | OUTPATIENT
Start: 2026-01-01

## 2025-07-31 RX ORDER — ALBUTEROL SULFATE 90 UG/1
1-2 INHALANT RESPIRATORY (INHALATION)
Status: CANCELLED
Start: 2026-01-01

## 2025-07-31 RX ORDER — ACETAMINOPHEN 325 MG/1
650 TABLET ORAL ONCE
Status: CANCELLED
Start: 2026-01-01

## 2025-07-31 RX ORDER — ALBUTEROL SULFATE 90 UG/1
1-2 INHALANT RESPIRATORY (INHALATION)
Start: 2026-01-01

## 2025-07-31 RX ORDER — EPINEPHRINE 1 MG/ML
0.3 INJECTION, SOLUTION INTRAMUSCULAR; SUBCUTANEOUS EVERY 5 MIN PRN
Status: CANCELLED | OUTPATIENT
Start: 2026-01-01

## 2025-07-31 RX ORDER — EPINEPHRINE 1 MG/ML
0.3 INJECTION, SOLUTION INTRAMUSCULAR; SUBCUTANEOUS EVERY 5 MIN PRN
OUTPATIENT
Start: 2026-01-01

## 2025-07-31 RX ORDER — DIPHENHYDRAMINE HYDROCHLORIDE 50 MG/ML
50 INJECTION, SOLUTION INTRAMUSCULAR; INTRAVENOUS
Status: CANCELLED
Start: 2026-01-01

## 2025-07-31 RX ORDER — DIPHENHYDRAMINE HCL 25 MG
50 CAPSULE ORAL ONCE
Status: CANCELLED
Start: 2026-01-01

## 2025-07-31 RX ORDER — METHYLPREDNISOLONE SODIUM SUCCINATE 125 MG/2ML
80 INJECTION INTRAMUSCULAR; INTRAVENOUS ONCE
OUTPATIENT
Start: 2026-01-01

## 2025-07-31 RX ORDER — ACETAMINOPHEN 325 MG/1
650 TABLET ORAL ONCE
Status: COMPLETED | OUTPATIENT
Start: 2025-07-31 | End: 2025-07-31

## 2025-07-31 RX ORDER — ACETAMINOPHEN 325 MG/1
650 TABLET ORAL ONCE
Start: 2026-01-01

## 2025-07-31 RX ORDER — ALBUTEROL SULFATE 0.83 MG/ML
2.5 SOLUTION RESPIRATORY (INHALATION)
Status: CANCELLED | OUTPATIENT
Start: 2026-01-01

## 2025-07-31 RX ADMIN — SODIUM CHLORIDE 500 MG: 9 INJECTION, SOLUTION INTRAVENOUS at 11:35

## 2025-07-31 RX ADMIN — SODIUM CHLORIDE 250 ML: 0.9 INJECTION, SOLUTION INTRAVENOUS at 11:28

## 2025-07-31 RX ADMIN — DIPHENHYDRAMINE HYDROCHLORIDE 50 MG: 25 CAPSULE ORAL at 11:28

## 2025-07-31 RX ADMIN — ACETAMINOPHEN 650 MG: 325 TABLET ORAL at 11:28

## 2025-07-31 RX ADMIN — METHYLPREDNISOLONE SODIUM SUCCINATE 80 MG: 40 INJECTION, POWDER, FOR SOLUTION INTRAMUSCULAR; INTRAVENOUS at 11:30

## 2025-07-31 NOTE — PROGRESS NOTES
Infusion Nursing Note:  Cande Shields presents today for rapid Rituxan infusion.    Patient seen by provider today: No   present during visit today: Not Applicable.    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    States she is grateful that she last 20 pounds of weight in the past year.    Intravenous Access:  Peripheral IV placed.    Treatment Conditions:  Biological Infusion Checklist:  ~~~ NOTE: If the patient answers yes to any of the questions below, hold the infusion and contact ordering provider or on-call provider.    Have you recently had an elevated temperature, fever, chills, productive cough, coughing for 3 weeks or longer or hemoptysis,  abnormal vital signs, night sweats,  chest pain or have you noticed a decrease in your appetite, unexplained weight loss or fatigue? No  Do you have any open wounds or new incisions? No  Do you have any upcoming hospitalizations or surgeries? Does not include esophagogastroduodenoscopy, colonoscopy, endoscopic retrograde cholangiopancreatography (ERCP), endoscopic ultrasound (EUS), dental procedures or joint aspiration/steroid injections No  Do you currently have any signs of illness or infection or are you on any antibiotics? No  Have you had any new, sudden or worsening abdominal pain? No  Have you or anyone in your household received a live vaccination in the past 4 weeks? Please note: No live vaccines while on biologic/chemotherapy until 6 months after the last treatment. Patient can receive the flu vaccine (shot only), pneumovax and the Covid vaccine. It is optimal for the patient to get these vaccines mid cycle, but they can be given at any time as long as it is not on the day of the infusion. No  Have you recently been diagnosed with any new nervous system diseases (ie. Multiple sclerosis, Guillain [place], seizures, neurological changes) or cancer diagnosis? Are you on any form of radiation or  chemotherapy? No  Are you pregnant or breast feeding or do you have plans of pregnancy in the future? No  Have there been any other new onset medical symptoms? No      Post Infusion Assessment:  Patient tolerated infusion without incident.  Blood return noted pre and post infusion.  Site patent and intact, free from redness, edema or discomfort.  No evidence of extravasations.  Access discontinued per protocol.       Discharge Plan:   AVS to patient via MYCHART.  Patient discharged in stable condition accompanied by: self.  Departure Mode: Ambulatory.      Beverly Fowler RN

## 2025-08-07 LAB
ANCA AB PATTERN SER IF-IMP: ABNORMAL
C-ANCA TITR SER IF: ABNORMAL {TITER}

## (undated) DEVICE — CANNULA ART HLS 17FR ECMO BIOL

## (undated) DEVICE — WIRE GUIDE 0.035"X150CM EMERALD J TIP 502521

## (undated) DEVICE — CONNECTOR DRAIN CHEST Y EXTENSION SET 19909

## (undated) DEVICE — ESU GROUND PAD ADULT W/CORD E7507

## (undated) DEVICE — SUTURE BOOTS 051003PBX

## (undated) DEVICE — GLOVE PROTEXIS BLUE W/NEU-THERA 7.0  2D73EB70

## (undated) DEVICE — CANISTER WOUND VAC W/GEL 1000ML M8275093/5

## (undated) DEVICE — TOURNIQUET VASCULAR KIT 7 1/2" 79012

## (undated) DEVICE — SU PROLENE 4-0 SHDA 36" 8521H

## (undated) DEVICE — SU ETHIBOND 3-0 BBDA 36" X588H

## (undated) DEVICE — SOL WATER IRRIG 1000ML BOTTLE 2F7114

## (undated) DEVICE — DRAPE FLUID WARMING 52 X 60" ORS-321

## (undated) DEVICE — SUCTION MANIFOLD NEPTUNE 2 SYS 4 PORT 0702-020-000

## (undated) DEVICE — SOL NACL 0.9% IRRIG 1000ML BOTTLE 07138-09

## (undated) DEVICE — SU PROLENE 5-0 RB-2DA 30" 8710H

## (undated) DEVICE — SU VICRYL 3-0 CTX 36" UND J980H

## (undated) DEVICE — SOL WATER IRRIG 1000ML BOTTLE 07139-09

## (undated) DEVICE — SOL ADH LIQUID BENZOIN SWAB 0.6ML C1544

## (undated) DEVICE — WIRE GUIDE 0.035"X145CM AMPLATZ XSTIFF J THSCF-35-145-3-AES

## (undated) DEVICE — GLOVE BIOGEL PI MICRO SZ 7.5 48575

## (undated) DEVICE — LINEN TOWEL PACK X30 5481

## (undated) DEVICE — DRSG ACTICOAT 7 4X5" 20141

## (undated) DEVICE — DRSG TELFA 3X8" 1238

## (undated) DEVICE — SU ETHIBOND 0 CT-1 CR 8X18" CX21D

## (undated) DEVICE — Device

## (undated) DEVICE — STRAP KNEE/BODY 31143004

## (undated) DEVICE — SUCTION CATH AIRLIFE TRI-FLO W/CONTROL PORT 14FR  T60C

## (undated) DEVICE — STOCKING SLEEVE COMPRESSION CALF LG

## (undated) DEVICE — SU PLEDGET SOFT TFE 13MMX7MMX1.5MM D7044

## (undated) DEVICE — SUCTION DRY CHEST DRAIN OASIS 3600-100

## (undated) DEVICE — SU SILK 0 TIE 6X30" A306H

## (undated) DEVICE — SOL NACL 0.9% IRRIG 1000ML BOTTLE 2F7124

## (undated) DEVICE — GLOVE ESTEEM POWDER FREE SMT 6.5  2D72PT65

## (undated) DEVICE — SPONGE RAY-TEC 4X8" 7318

## (undated) DEVICE — BONE WAX 2.5GM W31G

## (undated) DEVICE — SU PLEDGET SOFT TFE 3/8"X3/26"X1/16" PCP40

## (undated) DEVICE — CLIP HORIZON SM RED WIDE SLOT 001201

## (undated) DEVICE — SURGICEL HEMOSTAT 4X8" 1952

## (undated) DEVICE — LUB INST 20ML RTGLD 6/PK H7492354800162

## (undated) DEVICE — SU MONOCRYL 4-0 PS-2 18" UND Y496G

## (undated) DEVICE — SYR 10ML LL W/O NDL 302995

## (undated) DEVICE — DRAPE STERI FLUOROSCOPE 35X43"1012 LATEX FREE

## (undated) DEVICE — SHEATH INTRODUCER DRYSEAL FLEX W/HYDRO CT 26FRX33CM DSF2633

## (undated) DEVICE — CATH TRAY FOLEY SURESTEP 16FR W/URNE MTR STLK LATEX A303316A

## (undated) DEVICE — SU VICRYL 0 CT-1 36" J346H

## (undated) DEVICE — SOL NACL 0.9% INJ 1000ML BAG 2B1324X

## (undated) DEVICE — SU STRATAFIX PDS PLUS 0 CT 45CM SXPP1A406

## (undated) DEVICE — SU STRATAFIX MONOCRYL 3-0 SPIRAL PS-2 45CM SXMP1B107

## (undated) DEVICE — SU ETHIBOND 2-0 SHDA 30" X563H

## (undated) DEVICE — TIES CABLE STERILE 8" 17133/30

## (undated) DEVICE — LINEN TOWEL PACK X6 WHITE 5487

## (undated) DEVICE — PROTECTOR ARM ONE-STEP TRENDELENBURG 40418

## (undated) DEVICE — DRAIN MEDIASTINAL 9MM 14609

## (undated) DEVICE — SYR BULB IRRIG DOVER 60 ML LATEX FREE 67000

## (undated) DEVICE — PREP CHLORAPREP 26ML TINTED HI-LITE ORANGE 930815

## (undated) DEVICE — DRSG WOUND VAC SPONGE MED BLACK M8275052/5

## (undated) DEVICE — LEAD PACER MYOCARDIAL BIPOLAR TEMPORARY 53CM 6495F

## (undated) DEVICE — BLADE CLIPPER SGL USE 9680

## (undated) DEVICE — SHEATH INTRODUCER DRYSEAL FLEX W/HYDRO CT 24FRX33CM DSF2433

## (undated) DEVICE — DRAIN CHEST TUBE RIGHT ANGLED 28FR 8128

## (undated) DEVICE — CATH TRAY FOLEY 16FR BARDEX W/DRAIN BAG STATLOCK 300316A

## (undated) DEVICE — SU STRATAFIX PDS PLUS 2-0 SPIRAL CT-1 45CM SXPP1B411

## (undated) DEVICE — TUBING INSUFFLATION PNEUMOCLEAR 0620050100

## (undated) DEVICE — PACK C-SECTION LF PL15OTA83B

## (undated) DEVICE — DRSG PRIMAPORE 02X3" 7133

## (undated) DEVICE — CLIP HORIZON MED BLUE 002200

## (undated) DEVICE — DRAPE IOBAN INCISE 23X17" 6650EZ

## (undated) DEVICE — PATCH SURGICAL EVARREST FIBRIN SEALANT 4X2IN EVT5024

## (undated) DEVICE — SU VICRYL 0 CTX 36" J370H

## (undated) DEVICE — SU STEEL 6 CCS 4X18" M654G

## (undated) DEVICE — SU PROLENE 4-0 RB-1DA 36" 8557H

## (undated) DEVICE — DRSG ABDOMINAL 07 1/2X8" 7197D

## (undated) DEVICE — DRSG STERI STRIP 1/4X3" R1541

## (undated) DEVICE — ESU GROUND PAD UNIVERSAL W/O CORD

## (undated) DEVICE — TEST TUBE W/SCREW CAP 17361

## (undated) DEVICE — TUBING SUCTION 10'X3/16" N510

## (undated) DEVICE — DRSG DRAIN 4X4" 7086

## (undated) DEVICE — LINEN GOWN XLG 5407

## (undated) DEVICE — ELTRD DFBR ADLT 15.2X10.8CM ADH PD RDTRNS PCH PADPRO UNV NS

## (undated) DEVICE — PREP CHLORAPREP 26ML TINTED ORANGE  260815

## (undated) RX ORDER — FENTANYL CITRATE 50 UG/ML
INJECTION, SOLUTION INTRAMUSCULAR; INTRAVENOUS
Status: DISPENSED
Start: 2023-09-01

## (undated) RX ORDER — MORPHINE SULFATE 1 MG/ML
INJECTION, SOLUTION EPIDURAL; INTRATHECAL; INTRAVENOUS
Status: DISPENSED
Start: 2023-09-01

## (undated) RX ORDER — FENTANYL CITRATE 50 UG/ML
INJECTION, SOLUTION INTRAMUSCULAR; INTRAVENOUS
Status: DISPENSED
Start: 2023-02-18

## (undated) RX ORDER — HEPARIN SODIUM 1000 [USP'U]/ML
INJECTION, SOLUTION INTRAVENOUS; SUBCUTANEOUS
Status: DISPENSED
Start: 2023-02-18

## (undated) RX ORDER — DIPHENHYDRAMINE HYDROCHLORIDE 50 MG/ML
INJECTION INTRAMUSCULAR; INTRAVENOUS
Status: DISPENSED
Start: 2023-09-01

## (undated) RX ORDER — CEFAZOLIN SODIUM 1 G/3ML
INJECTION, POWDER, FOR SOLUTION INTRAMUSCULAR; INTRAVENOUS
Status: DISPENSED
Start: 2023-02-18

## (undated) RX ORDER — FENTANYL CITRATE-0.9 % NACL/PF 10 MCG/ML
PLASTIC BAG, INJECTION (ML) INTRAVENOUS
Status: DISPENSED
Start: 2023-02-18

## (undated) RX ORDER — PROPOFOL 10 MG/ML
INJECTION, EMULSION INTRAVENOUS
Status: DISPENSED
Start: 2023-02-18

## (undated) RX ORDER — HYDROMORPHONE HYDROCHLORIDE 1 MG/ML
INJECTION, SOLUTION INTRAMUSCULAR; INTRAVENOUS; SUBCUTANEOUS
Status: DISPENSED
Start: 2023-02-18

## (undated) RX ORDER — LIDOCAINE HYDROCHLORIDE 10 MG/ML
INJECTION, SOLUTION EPIDURAL; INFILTRATION; INTRACAUDAL; PERINEURAL
Status: DISPENSED
Start: 2023-02-18

## (undated) RX ORDER — ONDANSETRON 2 MG/ML
INJECTION INTRAMUSCULAR; INTRAVENOUS
Status: DISPENSED
Start: 2023-09-01

## (undated) RX ORDER — PENTAMIDINE ISETHIONATE 300 MG/300MG
INHALANT RESPIRATORY (INHALATION)
Status: DISPENSED
Start: 2021-03-09

## (undated) RX ORDER — KETOROLAC TROMETHAMINE 30 MG/ML
INJECTION, SOLUTION INTRAMUSCULAR; INTRAVENOUS
Status: DISPENSED
Start: 2023-09-01

## (undated) RX ORDER — NEOSTIGMINE METHYLSULFATE 1 MG/ML
VIAL (ML) INJECTION
Status: DISPENSED
Start: 2023-02-18

## (undated) RX ORDER — ALBUTEROL SULFATE 0.83 MG/ML
SOLUTION RESPIRATORY (INHALATION)
Status: DISPENSED
Start: 2021-03-09